# Patient Record
Sex: FEMALE | Race: WHITE | NOT HISPANIC OR LATINO | Employment: FULL TIME | URBAN - METROPOLITAN AREA
[De-identification: names, ages, dates, MRNs, and addresses within clinical notes are randomized per-mention and may not be internally consistent; named-entity substitution may affect disease eponyms.]

---

## 2021-02-10 ENCOUNTER — TELEPHONE (OUTPATIENT)
Dept: SURGERY | Facility: CLINIC | Age: 56
End: 2021-02-10

## 2021-02-10 DIAGNOSIS — K31.5 DUODENAL OBSTRUCTION: Primary | ICD-10-CM

## 2021-02-11 ENCOUNTER — OFFICE VISIT (OUTPATIENT)
Dept: SURGERY | Facility: CLINIC | Age: 56
DRG: 326 | End: 2021-02-11
Payer: COMMERCIAL

## 2021-02-11 ENCOUNTER — HOSPITAL ENCOUNTER (OUTPATIENT)
Dept: RADIOLOGY | Facility: HOSPITAL | Age: 56
Discharge: HOME | DRG: 326 | End: 2021-02-11
Attending: SURGERY
Payer: COMMERCIAL

## 2021-02-11 ENCOUNTER — HOSPITAL ENCOUNTER (INPATIENT)
Facility: HOSPITAL | Age: 56
LOS: 21 days | Discharge: HOME | DRG: 326 | End: 2021-03-04
Attending: EMERGENCY MEDICINE | Admitting: SURGERY
Payer: COMMERCIAL

## 2021-02-11 VITALS
SYSTOLIC BLOOD PRESSURE: 118 MMHG | HEART RATE: 92 BPM | BODY MASS INDEX: 24.29 KG/M2 | WEIGHT: 179.31 LBS | DIASTOLIC BLOOD PRESSURE: 73 MMHG | HEIGHT: 72 IN

## 2021-02-11 DIAGNOSIS — K31.5 DUODENAL OBSTRUCTION: ICD-10-CM

## 2021-02-11 DIAGNOSIS — Z90.411 HISTORY OF PARTIAL PANCREATECTOMY: Primary | ICD-10-CM

## 2021-02-11 DIAGNOSIS — C17.0 DUODENAL CANCER: ICD-10-CM

## 2021-02-11 DIAGNOSIS — K31.5 DUODENAL OBSTRUCTION: Primary | ICD-10-CM

## 2021-02-11 DIAGNOSIS — C25.9 PANCREATIC ADENOCARCINOMA: Primary | ICD-10-CM

## 2021-02-11 PROBLEM — N17.9 AKI (ACUTE KIDNEY INJURY): Status: ACTIVE | Noted: 2021-01-06

## 2021-02-11 PROBLEM — N20.0 KIDNEY STONE: Status: ACTIVE | Noted: 2019-10-14

## 2021-02-11 PROBLEM — I10 BENIGN HYPERTENSION: Status: ACTIVE | Noted: 2019-07-24

## 2021-02-11 PROBLEM — Z86.018 HISTORY OF DYSPLASTIC NEVUS: Status: ACTIVE | Noted: 2021-02-11

## 2021-02-11 PROBLEM — I34.1 MITRAL VALVE PROLAPSE: Status: ACTIVE | Noted: 2019-07-24

## 2021-02-11 PROBLEM — E78.00 PURE HYPERCHOLESTEROLEMIA: Status: ACTIVE | Noted: 2019-07-24

## 2021-02-11 LAB
ANION GAP SERPL CALC-SCNC: 10 MMOL/L (ref 8–16)
BASOPHILS # BLD AUTO: 0.02 K/UL (ref 0–0.2)
BASOPHILS NFR BLD: 0.4 % (ref 0–1.9)
BUN SERPL-MCNC: 26 MG/DL (ref 6–20)
CALCIUM SERPL-MCNC: 8.6 MG/DL (ref 8.7–10.5)
CHLORIDE SERPL-SCNC: 102 MMOL/L (ref 95–110)
CO2 SERPL-SCNC: 18 MMOL/L (ref 23–29)
CREAT SERPL-MCNC: 1.2 MG/DL (ref 0.5–1.4)
CTP QC/QA: YES
DIFFERENTIAL METHOD: ABNORMAL
EOSINOPHIL # BLD AUTO: 0.3 K/UL (ref 0–0.5)
EOSINOPHIL NFR BLD: 5.3 % (ref 0–8)
ERYTHROCYTE [DISTWIDTH] IN BLOOD BY AUTOMATED COUNT: 15.5 % (ref 11.5–14.5)
EST. GFR  (AFRICAN AMERICAN): 58.4 ML/MIN/1.73 M^2
EST. GFR  (NON AFRICAN AMERICAN): 50.6 ML/MIN/1.73 M^2
GLUCOSE SERPL-MCNC: 103 MG/DL (ref 70–110)
HCT VFR BLD AUTO: 31.9 % (ref 37–48.5)
HGB BLD-MCNC: 10.8 G/DL (ref 12–16)
IMM GRANULOCYTES # BLD AUTO: 0.01 K/UL (ref 0–0.04)
IMM GRANULOCYTES NFR BLD AUTO: 0.2 % (ref 0–0.5)
LYMPHOCYTES # BLD AUTO: 1.5 K/UL (ref 1–4.8)
LYMPHOCYTES NFR BLD: 26.2 % (ref 18–48)
MAGNESIUM SERPL-MCNC: 2.1 MG/DL (ref 1.6–2.6)
MCH RBC QN AUTO: 27.6 PG (ref 27–31)
MCHC RBC AUTO-ENTMCNC: 33.9 G/DL (ref 32–36)
MCV RBC AUTO: 82 FL (ref 82–98)
MONOCYTES # BLD AUTO: 0.4 K/UL (ref 0.3–1)
MONOCYTES NFR BLD: 7 % (ref 4–15)
NEUTROPHILS # BLD AUTO: 3.5 K/UL (ref 1.8–7.7)
NEUTROPHILS NFR BLD: 60.9 % (ref 38–73)
NRBC BLD-RTO: 0 /100 WBC
PHOSPHATE SERPL-MCNC: 3.7 MG/DL (ref 2.7–4.5)
PLATELET # BLD AUTO: 273 K/UL (ref 150–350)
PMV BLD AUTO: 10.8 FL (ref 9.2–12.9)
POTASSIUM SERPL-SCNC: 4 MMOL/L (ref 3.5–5.1)
RBC # BLD AUTO: 3.91 M/UL (ref 4–5.4)
SARS-COV-2 RDRP RESP QL NAA+PROBE: NEGATIVE
SODIUM SERPL-SCNC: 130 MMOL/L (ref 136–145)
WBC # BLD AUTO: 5.68 K/UL (ref 3.9–12.7)

## 2021-02-11 PROCEDURE — U0002 COVID-19 LAB TEST NON-CDC: HCPCS | Performed by: PHYSICIAN ASSISTANT

## 2021-02-11 PROCEDURE — 71250 CT CHEST ABDOMEN PELVIS WITHOUT CONTRAST(XPD): ICD-10-PCS | Mod: 26,,, | Performed by: RADIOLOGY

## 2021-02-11 PROCEDURE — 1126F AMNT PAIN NOTED NONE PRSNT: CPT | Mod: S$GLB,,, | Performed by: SURGERY

## 2021-02-11 PROCEDURE — 74176 CT ABD & PELVIS W/O CONTRAST: CPT | Mod: TC

## 2021-02-11 PROCEDURE — 99205 OFFICE O/P NEW HI 60 MIN: CPT | Mod: S$GLB,,, | Performed by: SURGERY

## 2021-02-11 PROCEDURE — 74176 CT ABD & PELVIS W/O CONTRAST: CPT | Mod: 26,,, | Performed by: RADIOLOGY

## 2021-02-11 PROCEDURE — 3008F BODY MASS INDEX DOCD: CPT | Mod: CPTII,S$GLB,, | Performed by: SURGERY

## 2021-02-11 PROCEDURE — 84100 ASSAY OF PHOSPHORUS: CPT

## 2021-02-11 PROCEDURE — 3008F PR BODY MASS INDEX (BMI) DOCUMENTED: ICD-10-PCS | Mod: CPTII,S$GLB,, | Performed by: SURGERY

## 2021-02-11 PROCEDURE — 99999 PR PBB SHADOW E&M-EST. PATIENT-LVL II: CPT | Mod: PBBFAC,,, | Performed by: SURGERY

## 2021-02-11 PROCEDURE — 94761 N-INVAS EAR/PLS OXIMETRY MLT: CPT

## 2021-02-11 PROCEDURE — 99285 PR EMERGENCY DEPT VISIT,LEVEL V: ICD-10-PCS | Mod: CS,,, | Performed by: PHYSICIAN ASSISTANT

## 2021-02-11 PROCEDURE — 63600175 PHARM REV CODE 636 W HCPCS: Performed by: PHYSICIAN ASSISTANT

## 2021-02-11 PROCEDURE — C9113 INJ PANTOPRAZOLE SODIUM, VIA: HCPCS | Performed by: STUDENT IN AN ORGANIZED HEALTH CARE EDUCATION/TRAINING PROGRAM

## 2021-02-11 PROCEDURE — 1126F PR PAIN SEVERITY QUANTIFIED, NO PAIN PRESENT: ICD-10-PCS | Mod: S$GLB,,, | Performed by: SURGERY

## 2021-02-11 PROCEDURE — 99285 EMERGENCY DEPT VISIT HI MDM: CPT | Mod: 25

## 2021-02-11 PROCEDURE — 71250 CT THORAX DX C-: CPT | Mod: 26,,, | Performed by: RADIOLOGY

## 2021-02-11 PROCEDURE — 83735 ASSAY OF MAGNESIUM: CPT

## 2021-02-11 PROCEDURE — 99285 EMERGENCY DEPT VISIT HI MDM: CPT | Mod: CS,,, | Performed by: PHYSICIAN ASSISTANT

## 2021-02-11 PROCEDURE — 63600175 PHARM REV CODE 636 W HCPCS: Performed by: STUDENT IN AN ORGANIZED HEALTH CARE EDUCATION/TRAINING PROGRAM

## 2021-02-11 PROCEDURE — 99999 PR PBB SHADOW E&M-EST. PATIENT-LVL II: ICD-10-PCS | Mod: PBBFAC,,, | Performed by: SURGERY

## 2021-02-11 PROCEDURE — 74176 CT CHEST ABDOMEN PELVIS WITHOUT CONTRAST(XPD): ICD-10-PCS | Mod: 26,,, | Performed by: RADIOLOGY

## 2021-02-11 PROCEDURE — 20600001 HC STEP DOWN PRIVATE ROOM

## 2021-02-11 PROCEDURE — 71250 CT THORAX DX C-: CPT | Mod: TC

## 2021-02-11 PROCEDURE — 25000003 PHARM REV CODE 250: Performed by: STUDENT IN AN ORGANIZED HEALTH CARE EDUCATION/TRAINING PROGRAM

## 2021-02-11 PROCEDURE — 85025 COMPLETE CBC W/AUTO DIFF WBC: CPT

## 2021-02-11 PROCEDURE — 99205 PR OFFICE/OUTPT VISIT, NEW, LEVL V, 60-74 MIN: ICD-10-PCS | Mod: S$GLB,,, | Performed by: SURGERY

## 2021-02-11 PROCEDURE — 80048 BASIC METABOLIC PNL TOTAL CA: CPT

## 2021-02-11 RX ORDER — PANTOPRAZOLE SODIUM 40 MG/1
TABLET, DELAYED RELEASE ORAL
COMMUNITY
Start: 2021-02-10 | End: 2022-03-25

## 2021-02-11 RX ORDER — ENOXAPARIN SODIUM 100 MG/ML
40 INJECTION SUBCUTANEOUS EVERY 24 HOURS
Status: DISCONTINUED | OUTPATIENT
Start: 2021-02-11 | End: 2021-02-21

## 2021-02-11 RX ORDER — LIDOCAINE HYDROCHLORIDE 10 MG/ML
1 INJECTION, SOLUTION EPIDURAL; INFILTRATION; INTRACAUDAL; PERINEURAL ONCE
Status: DISCONTINUED | OUTPATIENT
Start: 2021-02-11 | End: 2021-03-01

## 2021-02-11 RX ORDER — IBUPROFEN 600 MG/1
600 TABLET ORAL EVERY 6 HOURS PRN
Status: DISCONTINUED | OUTPATIENT
Start: 2021-02-11 | End: 2021-02-18

## 2021-02-11 RX ORDER — ACETAMINOPHEN 325 MG/1
650 TABLET ORAL EVERY 8 HOURS PRN
Status: DISCONTINUED | OUTPATIENT
Start: 2021-02-11 | End: 2021-02-18

## 2021-02-11 RX ORDER — ONDANSETRON 2 MG/ML
8 INJECTION INTRAMUSCULAR; INTRAVENOUS EVERY 6 HOURS PRN
Status: DISCONTINUED | OUTPATIENT
Start: 2021-02-11 | End: 2021-03-04 | Stop reason: HOSPADM

## 2021-02-11 RX ORDER — TALC
6 POWDER (GRAM) TOPICAL NIGHTLY PRN
Status: DISCONTINUED | OUTPATIENT
Start: 2021-02-11 | End: 2021-02-18

## 2021-02-11 RX ORDER — SODIUM CHLORIDE 0.9 % (FLUSH) 0.9 %
10 SYRINGE (ML) INJECTION
Status: DISCONTINUED | OUTPATIENT
Start: 2021-02-11 | End: 2021-03-04 | Stop reason: HOSPADM

## 2021-02-11 RX ORDER — PROMETHAZINE HYDROCHLORIDE 25 MG/1
SUPPOSITORY RECTAL
COMMUNITY
Start: 2021-02-10 | End: 2022-03-25

## 2021-02-11 RX ORDER — PANTOPRAZOLE SODIUM 40 MG/10ML
40 INJECTION, POWDER, LYOPHILIZED, FOR SOLUTION INTRAVENOUS 2 TIMES DAILY
Status: DISCONTINUED | OUTPATIENT
Start: 2021-02-11 | End: 2021-02-22

## 2021-02-11 RX ORDER — SODIUM CHLORIDE 9 MG/ML
INJECTION, SOLUTION INTRAVENOUS CONTINUOUS
Status: DISCONTINUED | OUTPATIENT
Start: 2021-02-11 | End: 2021-02-13

## 2021-02-11 RX ORDER — ROSUVASTATIN CALCIUM 10 MG/1
TABLET, COATED ORAL
COMMUNITY
Start: 2021-02-10

## 2021-02-11 RX ORDER — HYDROMORPHONE HYDROCHLORIDE 1 MG/ML
0.5 INJECTION, SOLUTION INTRAMUSCULAR; INTRAVENOUS; SUBCUTANEOUS EVERY 4 HOURS PRN
Status: DISCONTINUED | OUTPATIENT
Start: 2021-02-11 | End: 2021-02-15

## 2021-02-11 RX ADMIN — SODIUM CHLORIDE, SODIUM LACTATE, POTASSIUM CHLORIDE, AND CALCIUM CHLORIDE 1000 ML: .6; .31; .03; .02 INJECTION, SOLUTION INTRAVENOUS at 04:02

## 2021-02-11 RX ADMIN — ENOXAPARIN SODIUM 40 MG: 40 INJECTION SUBCUTANEOUS at 07:02

## 2021-02-11 RX ADMIN — PANTOPRAZOLE SODIUM 40 MG: 40 INJECTION, POWDER, FOR SOLUTION INTRAVENOUS at 11:02

## 2021-02-11 RX ADMIN — SODIUM CHLORIDE: 0.9 INJECTION, SOLUTION INTRAVENOUS at 07:02

## 2021-02-12 PROBLEM — E44.0 MALNUTRITION OF MODERATE DEGREE: Status: ACTIVE | Noted: 2021-02-12

## 2021-02-12 LAB
ALBUMIN SERPL BCP-MCNC: 3.4 G/DL (ref 3.5–5.2)
ALP SERPL-CCNC: 71 U/L (ref 55–135)
ALT SERPL W/O P-5'-P-CCNC: 22 U/L (ref 10–44)
ANION GAP SERPL CALC-SCNC: 8 MMOL/L (ref 8–16)
AST SERPL-CCNC: 17 U/L (ref 10–40)
BASOPHILS # BLD AUTO: 0.02 K/UL (ref 0–0.2)
BASOPHILS NFR BLD: 0.4 % (ref 0–1.9)
BILIRUB SERPL-MCNC: 0.5 MG/DL (ref 0.1–1)
BUN SERPL-MCNC: 23 MG/DL (ref 6–20)
CALCIUM SERPL-MCNC: 8.6 MG/DL (ref 8.7–10.5)
CHLORIDE SERPL-SCNC: 105 MMOL/L (ref 95–110)
CO2 SERPL-SCNC: 18 MMOL/L (ref 23–29)
CREAT SERPL-MCNC: 1.2 MG/DL (ref 0.5–1.4)
DIFFERENTIAL METHOD: ABNORMAL
EOSINOPHIL # BLD AUTO: 0.3 K/UL (ref 0–0.5)
EOSINOPHIL NFR BLD: 6.4 % (ref 0–8)
ERYTHROCYTE [DISTWIDTH] IN BLOOD BY AUTOMATED COUNT: 15.5 % (ref 11.5–14.5)
EST. GFR  (AFRICAN AMERICAN): 58.4 ML/MIN/1.73 M^2
EST. GFR  (NON AFRICAN AMERICAN): 50.6 ML/MIN/1.73 M^2
GLUCOSE SERPL-MCNC: 98 MG/DL (ref 70–110)
HCT VFR BLD AUTO: 29.8 % (ref 37–48.5)
HGB BLD-MCNC: 9.9 G/DL (ref 12–16)
IMM GRANULOCYTES # BLD AUTO: 0.01 K/UL (ref 0–0.04)
IMM GRANULOCYTES NFR BLD AUTO: 0.2 % (ref 0–0.5)
LYMPHOCYTES # BLD AUTO: 1.5 K/UL (ref 1–4.8)
LYMPHOCYTES NFR BLD: 29.7 % (ref 18–48)
MAGNESIUM SERPL-MCNC: 2 MG/DL (ref 1.6–2.6)
MCH RBC QN AUTO: 27 PG (ref 27–31)
MCHC RBC AUTO-ENTMCNC: 33.2 G/DL (ref 32–36)
MCV RBC AUTO: 81 FL (ref 82–98)
MONOCYTES # BLD AUTO: 0.4 K/UL (ref 0.3–1)
MONOCYTES NFR BLD: 8 % (ref 4–15)
NEUTROPHILS # BLD AUTO: 2.8 K/UL (ref 1.8–7.7)
NEUTROPHILS NFR BLD: 55.3 % (ref 38–73)
NRBC BLD-RTO: 0 /100 WBC
PHOSPHATE SERPL-MCNC: 4.4 MG/DL (ref 2.7–4.5)
PLATELET # BLD AUTO: 223 K/UL (ref 150–350)
PMV BLD AUTO: 11 FL (ref 9.2–12.9)
POTASSIUM SERPL-SCNC: 4.2 MMOL/L (ref 3.5–5.1)
PROT SERPL-MCNC: 6.4 G/DL (ref 6–8.4)
RBC # BLD AUTO: 3.66 M/UL (ref 4–5.4)
SODIUM SERPL-SCNC: 131 MMOL/L (ref 136–145)
WBC # BLD AUTO: 4.98 K/UL (ref 3.9–12.7)

## 2021-02-12 PROCEDURE — C9113 INJ PANTOPRAZOLE SODIUM, VIA: HCPCS | Performed by: STUDENT IN AN ORGANIZED HEALTH CARE EDUCATION/TRAINING PROGRAM

## 2021-02-12 PROCEDURE — 25000003 PHARM REV CODE 250: Performed by: STUDENT IN AN ORGANIZED HEALTH CARE EDUCATION/TRAINING PROGRAM

## 2021-02-12 PROCEDURE — 80053 COMPREHEN METABOLIC PANEL: CPT

## 2021-02-12 PROCEDURE — 20600001 HC STEP DOWN PRIVATE ROOM

## 2021-02-12 PROCEDURE — 84100 ASSAY OF PHOSPHORUS: CPT

## 2021-02-12 PROCEDURE — 63600175 PHARM REV CODE 636 W HCPCS: Performed by: STUDENT IN AN ORGANIZED HEALTH CARE EDUCATION/TRAINING PROGRAM

## 2021-02-12 PROCEDURE — 25000003 PHARM REV CODE 250: Performed by: NURSE PRACTITIONER

## 2021-02-12 PROCEDURE — 83735 ASSAY OF MAGNESIUM: CPT

## 2021-02-12 PROCEDURE — 36415 COLL VENOUS BLD VENIPUNCTURE: CPT

## 2021-02-12 PROCEDURE — 25500020 PHARM REV CODE 255: Performed by: SURGERY

## 2021-02-12 PROCEDURE — 85025 COMPLETE CBC W/AUTO DIFF WBC: CPT

## 2021-02-12 PROCEDURE — 97802 MEDICAL NUTRITION INDIV IN: CPT

## 2021-02-12 RX ORDER — MUPIROCIN 20 MG/G
OINTMENT TOPICAL DAILY PRN
Status: DISCONTINUED | OUTPATIENT
Start: 2021-02-12 | End: 2021-03-04 | Stop reason: HOSPADM

## 2021-02-12 RX ORDER — DOCUSATE SODIUM 100 MG/1
100 CAPSULE, LIQUID FILLED ORAL 2 TIMES DAILY PRN
Status: DISCONTINUED | OUTPATIENT
Start: 2021-02-12 | End: 2021-02-12

## 2021-02-12 RX ORDER — ROSUVASTATIN CALCIUM 10 MG/1
10 TABLET, COATED ORAL DAILY
Status: DISCONTINUED | OUTPATIENT
Start: 2021-02-12 | End: 2021-02-18

## 2021-02-12 RX ADMIN — HYDROMORPHONE HYDROCHLORIDE 0.5 MG: 1 INJECTION, SOLUTION INTRAMUSCULAR; INTRAVENOUS; SUBCUTANEOUS at 02:02

## 2021-02-12 RX ADMIN — HYDROMORPHONE HYDROCHLORIDE 0.5 MG: 1 INJECTION, SOLUTION INTRAMUSCULAR; INTRAVENOUS; SUBCUTANEOUS at 07:02

## 2021-02-12 RX ADMIN — ROSUVASTATIN CALCIUM 10 MG: 10 TABLET, FILM COATED ORAL at 02:02

## 2021-02-12 RX ADMIN — SODIUM CHLORIDE: 0.9 INJECTION, SOLUTION INTRAVENOUS at 08:02

## 2021-02-12 RX ADMIN — SODIUM CHLORIDE: 0.9 INJECTION, SOLUTION INTRAVENOUS at 03:02

## 2021-02-12 RX ADMIN — HYDROMORPHONE HYDROCHLORIDE 0.5 MG: 1 INJECTION, SOLUTION INTRAMUSCULAR; INTRAVENOUS; SUBCUTANEOUS at 10:02

## 2021-02-12 RX ADMIN — ENOXAPARIN SODIUM 40 MG: 40 INJECTION SUBCUTANEOUS at 06:02

## 2021-02-12 RX ADMIN — SODIUM CHLORIDE: 0.9 INJECTION, SOLUTION INTRAVENOUS at 12:02

## 2021-02-12 RX ADMIN — PANTOPRAZOLE SODIUM 40 MG: 40 INJECTION, POWDER, FOR SOLUTION INTRAVENOUS at 08:02

## 2021-02-12 RX ADMIN — IOHEXOL 75 ML: 350 INJECTION, SOLUTION INTRAVENOUS at 11:02

## 2021-02-13 LAB
ALBUMIN SERPL BCP-MCNC: 2.9 G/DL (ref 3.5–5.2)
ALP SERPL-CCNC: 60 U/L (ref 55–135)
ALT SERPL W/O P-5'-P-CCNC: 18 U/L (ref 10–44)
ANION GAP SERPL CALC-SCNC: 7 MMOL/L (ref 8–16)
AST SERPL-CCNC: 12 U/L (ref 10–40)
BASOPHILS # BLD AUTO: 0.02 K/UL (ref 0–0.2)
BASOPHILS NFR BLD: 0.6 % (ref 0–1.9)
BILIRUB SERPL-MCNC: 0.5 MG/DL (ref 0.1–1)
BUN SERPL-MCNC: 21 MG/DL (ref 6–20)
CALCIUM SERPL-MCNC: 8.1 MG/DL (ref 8.7–10.5)
CHLORIDE SERPL-SCNC: 113 MMOL/L (ref 95–110)
CO2 SERPL-SCNC: 16 MMOL/L (ref 23–29)
CREAT SERPL-MCNC: 1 MG/DL (ref 0.5–1.4)
DIFFERENTIAL METHOD: ABNORMAL
EOSINOPHIL # BLD AUTO: 0.2 K/UL (ref 0–0.5)
EOSINOPHIL NFR BLD: 6 % (ref 0–8)
ERYTHROCYTE [DISTWIDTH] IN BLOOD BY AUTOMATED COUNT: 15.8 % (ref 11.5–14.5)
EST. GFR  (AFRICAN AMERICAN): >60 ML/MIN/1.73 M^2
EST. GFR  (NON AFRICAN AMERICAN): >60 ML/MIN/1.73 M^2
GLUCOSE SERPL-MCNC: 112 MG/DL (ref 70–110)
HCT VFR BLD AUTO: 26.6 % (ref 37–48.5)
HGB BLD-MCNC: 8.8 G/DL (ref 12–16)
IMM GRANULOCYTES # BLD AUTO: 0.01 K/UL (ref 0–0.04)
IMM GRANULOCYTES NFR BLD AUTO: 0.3 % (ref 0–0.5)
LYMPHOCYTES # BLD AUTO: 1.2 K/UL (ref 1–4.8)
LYMPHOCYTES NFR BLD: 34.5 % (ref 18–48)
MAGNESIUM SERPL-MCNC: 2 MG/DL (ref 1.6–2.6)
MCH RBC QN AUTO: 27.2 PG (ref 27–31)
MCHC RBC AUTO-ENTMCNC: 33.1 G/DL (ref 32–36)
MCV RBC AUTO: 82 FL (ref 82–98)
MONOCYTES # BLD AUTO: 0.3 K/UL (ref 0.3–1)
MONOCYTES NFR BLD: 9.4 % (ref 4–15)
NEUTROPHILS # BLD AUTO: 1.7 K/UL (ref 1.8–7.7)
NEUTROPHILS NFR BLD: 49.2 % (ref 38–73)
NRBC BLD-RTO: 0 /100 WBC
PHOSPHATE SERPL-MCNC: 3.7 MG/DL (ref 2.7–4.5)
PLATELET # BLD AUTO: 201 K/UL (ref 150–350)
PMV BLD AUTO: 11.3 FL (ref 9.2–12.9)
POTASSIUM SERPL-SCNC: 3.9 MMOL/L (ref 3.5–5.1)
PROT SERPL-MCNC: 5.5 G/DL (ref 6–8.4)
RBC # BLD AUTO: 3.23 M/UL (ref 4–5.4)
SODIUM SERPL-SCNC: 136 MMOL/L (ref 136–145)
WBC # BLD AUTO: 3.51 K/UL (ref 3.9–12.7)

## 2021-02-13 PROCEDURE — 25000003 PHARM REV CODE 250: Performed by: NURSE PRACTITIONER

## 2021-02-13 PROCEDURE — 97535 SELF CARE MNGMENT TRAINING: CPT

## 2021-02-13 PROCEDURE — 97165 OT EVAL LOW COMPLEX 30 MIN: CPT

## 2021-02-13 PROCEDURE — 25000003 PHARM REV CODE 250: Performed by: STUDENT IN AN ORGANIZED HEALTH CARE EDUCATION/TRAINING PROGRAM

## 2021-02-13 PROCEDURE — 80053 COMPREHEN METABOLIC PANEL: CPT

## 2021-02-13 PROCEDURE — 97161 PT EVAL LOW COMPLEX 20 MIN: CPT

## 2021-02-13 PROCEDURE — 63600175 PHARM REV CODE 636 W HCPCS: Performed by: STUDENT IN AN ORGANIZED HEALTH CARE EDUCATION/TRAINING PROGRAM

## 2021-02-13 PROCEDURE — 84100 ASSAY OF PHOSPHORUS: CPT

## 2021-02-13 PROCEDURE — 85025 COMPLETE CBC W/AUTO DIFF WBC: CPT

## 2021-02-13 PROCEDURE — 83735 ASSAY OF MAGNESIUM: CPT

## 2021-02-13 PROCEDURE — C9113 INJ PANTOPRAZOLE SODIUM, VIA: HCPCS | Performed by: STUDENT IN AN ORGANIZED HEALTH CARE EDUCATION/TRAINING PROGRAM

## 2021-02-13 PROCEDURE — 20600001 HC STEP DOWN PRIVATE ROOM

## 2021-02-13 PROCEDURE — 36415 COLL VENOUS BLD VENIPUNCTURE: CPT

## 2021-02-13 RX ORDER — SODIUM CHLORIDE 450 MG/100ML
INJECTION, SOLUTION INTRAVENOUS CONTINUOUS
Status: DISCONTINUED | OUTPATIENT
Start: 2021-02-13 | End: 2021-02-14

## 2021-02-13 RX ORDER — POLYETHYLENE GLYCOL 3350 17 G/17G
17 POWDER, FOR SOLUTION ORAL DAILY
Status: DISCONTINUED | OUTPATIENT
Start: 2021-02-13 | End: 2021-02-14

## 2021-02-13 RX ADMIN — PANTOPRAZOLE SODIUM 40 MG: 40 INJECTION, POWDER, FOR SOLUTION INTRAVENOUS at 09:02

## 2021-02-13 RX ADMIN — SODIUM CHLORIDE: 0.45 INJECTION, SOLUTION INTRAVENOUS at 09:02

## 2021-02-13 RX ADMIN — SODIUM CHLORIDE: 0.9 INJECTION, SOLUTION INTRAVENOUS at 04:02

## 2021-02-13 RX ADMIN — ROSUVASTATIN CALCIUM 10 MG: 10 TABLET, FILM COATED ORAL at 09:02

## 2021-02-13 RX ADMIN — POLYETHYLENE GLYCOL 3350 17 G: 17 POWDER, FOR SOLUTION ORAL at 09:02

## 2021-02-13 RX ADMIN — ENOXAPARIN SODIUM 40 MG: 40 INJECTION SUBCUTANEOUS at 05:02

## 2021-02-13 RX ADMIN — PANTOPRAZOLE SODIUM 40 MG: 40 INJECTION, POWDER, FOR SOLUTION INTRAVENOUS at 08:02

## 2021-02-14 LAB
ALBUMIN SERPL BCP-MCNC: 3.4 G/DL (ref 3.5–5.2)
ALP SERPL-CCNC: 65 U/L (ref 55–135)
ALT SERPL W/O P-5'-P-CCNC: 17 U/L (ref 10–44)
ANION GAP SERPL CALC-SCNC: 9 MMOL/L (ref 8–16)
AST SERPL-CCNC: 14 U/L (ref 10–40)
BASOPHILS # BLD AUTO: 0.02 K/UL (ref 0–0.2)
BASOPHILS NFR BLD: 0.5 % (ref 0–1.9)
BILIRUB SERPL-MCNC: 0.5 MG/DL (ref 0.1–1)
BUN SERPL-MCNC: 13 MG/DL (ref 6–20)
CALCIUM SERPL-MCNC: 8.6 MG/DL (ref 8.7–10.5)
CHLORIDE SERPL-SCNC: 110 MMOL/L (ref 95–110)
CO2 SERPL-SCNC: 16 MMOL/L (ref 23–29)
CREAT SERPL-MCNC: 1 MG/DL (ref 0.5–1.4)
DIFFERENTIAL METHOD: ABNORMAL
EOSINOPHIL # BLD AUTO: 0.2 K/UL (ref 0–0.5)
EOSINOPHIL NFR BLD: 5.9 % (ref 0–8)
ERYTHROCYTE [DISTWIDTH] IN BLOOD BY AUTOMATED COUNT: 15.8 % (ref 11.5–14.5)
EST. GFR  (AFRICAN AMERICAN): >60 ML/MIN/1.73 M^2
EST. GFR  (NON AFRICAN AMERICAN): >60 ML/MIN/1.73 M^2
GLUCOSE SERPL-MCNC: 98 MG/DL (ref 70–110)
HCT VFR BLD AUTO: 28.8 % (ref 37–48.5)
HGB BLD-MCNC: 9.5 G/DL (ref 12–16)
IMM GRANULOCYTES # BLD AUTO: 0 K/UL (ref 0–0.04)
IMM GRANULOCYTES NFR BLD AUTO: 0 % (ref 0–0.5)
LYMPHOCYTES # BLD AUTO: 1.2 K/UL (ref 1–4.8)
LYMPHOCYTES NFR BLD: 31.7 % (ref 18–48)
MAGNESIUM SERPL-MCNC: 1.9 MG/DL (ref 1.6–2.6)
MCH RBC QN AUTO: 26.8 PG (ref 27–31)
MCHC RBC AUTO-ENTMCNC: 33 G/DL (ref 32–36)
MCV RBC AUTO: 81 FL (ref 82–98)
MONOCYTES # BLD AUTO: 0.3 K/UL (ref 0.3–1)
MONOCYTES NFR BLD: 8.3 % (ref 4–15)
NEUTROPHILS # BLD AUTO: 2 K/UL (ref 1.8–7.7)
NEUTROPHILS NFR BLD: 53.6 % (ref 38–73)
NRBC BLD-RTO: 0 /100 WBC
PHOSPHATE SERPL-MCNC: 4.3 MG/DL (ref 2.7–4.5)
PLATELET # BLD AUTO: 215 K/UL (ref 150–350)
PMV BLD AUTO: 11.2 FL (ref 9.2–12.9)
POTASSIUM SERPL-SCNC: 3.8 MMOL/L (ref 3.5–5.1)
PROT SERPL-MCNC: 6.3 G/DL (ref 6–8.4)
RBC # BLD AUTO: 3.55 M/UL (ref 4–5.4)
SODIUM SERPL-SCNC: 135 MMOL/L (ref 136–145)
WBC # BLD AUTO: 3.75 K/UL (ref 3.9–12.7)

## 2021-02-14 PROCEDURE — 83735 ASSAY OF MAGNESIUM: CPT

## 2021-02-14 PROCEDURE — 63600175 PHARM REV CODE 636 W HCPCS: Performed by: STUDENT IN AN ORGANIZED HEALTH CARE EDUCATION/TRAINING PROGRAM

## 2021-02-14 PROCEDURE — 85025 COMPLETE CBC W/AUTO DIFF WBC: CPT

## 2021-02-14 PROCEDURE — 84100 ASSAY OF PHOSPHORUS: CPT

## 2021-02-14 PROCEDURE — 25000003 PHARM REV CODE 250: Performed by: STUDENT IN AN ORGANIZED HEALTH CARE EDUCATION/TRAINING PROGRAM

## 2021-02-14 PROCEDURE — 25000003 PHARM REV CODE 250: Performed by: NURSE PRACTITIONER

## 2021-02-14 PROCEDURE — 36415 COLL VENOUS BLD VENIPUNCTURE: CPT

## 2021-02-14 PROCEDURE — 20600001 HC STEP DOWN PRIVATE ROOM

## 2021-02-14 PROCEDURE — 80053 COMPREHEN METABOLIC PANEL: CPT

## 2021-02-14 PROCEDURE — C9113 INJ PANTOPRAZOLE SODIUM, VIA: HCPCS | Performed by: STUDENT IN AN ORGANIZED HEALTH CARE EDUCATION/TRAINING PROGRAM

## 2021-02-14 RX ORDER — SODIUM CHLORIDE, SODIUM LACTATE, POTASSIUM CHLORIDE, CALCIUM CHLORIDE 600; 310; 30; 20 MG/100ML; MG/100ML; MG/100ML; MG/100ML
INJECTION, SOLUTION INTRAVENOUS CONTINUOUS
Status: DISCONTINUED | OUTPATIENT
Start: 2021-02-14 | End: 2021-02-18

## 2021-02-14 RX ORDER — POLYETHYLENE GLYCOL 3350 17 G/17G
17 POWDER, FOR SOLUTION ORAL DAILY PRN
Status: DISCONTINUED | OUTPATIENT
Start: 2021-02-14 | End: 2021-02-18

## 2021-02-14 RX ADMIN — PANTOPRAZOLE SODIUM 40 MG: 40 INJECTION, POWDER, FOR SOLUTION INTRAVENOUS at 09:02

## 2021-02-14 RX ADMIN — ENOXAPARIN SODIUM 40 MG: 40 INJECTION SUBCUTANEOUS at 06:02

## 2021-02-14 RX ADMIN — SODIUM CHLORIDE: 0.45 INJECTION, SOLUTION INTRAVENOUS at 04:02

## 2021-02-14 RX ADMIN — SODIUM CHLORIDE, SODIUM LACTATE, POTASSIUM CHLORIDE, AND CALCIUM CHLORIDE: .6; .31; .03; .02 INJECTION, SOLUTION INTRAVENOUS at 10:02

## 2021-02-14 RX ADMIN — ROSUVASTATIN CALCIUM 10 MG: 10 TABLET, FILM COATED ORAL at 10:02

## 2021-02-14 RX ADMIN — PANTOPRAZOLE SODIUM 40 MG: 40 INJECTION, POWDER, FOR SOLUTION INTRAVENOUS at 10:02

## 2021-02-15 ENCOUNTER — ANESTHESIA EVENT (OUTPATIENT)
Dept: SURGERY | Facility: HOSPITAL | Age: 56
DRG: 326 | End: 2021-02-15
Payer: COMMERCIAL

## 2021-02-15 LAB
ALBUMIN SERPL BCP-MCNC: 3.4 G/DL (ref 3.5–5.2)
ALP SERPL-CCNC: 61 U/L (ref 55–135)
ALT SERPL W/O P-5'-P-CCNC: 19 U/L (ref 10–44)
ANION GAP SERPL CALC-SCNC: 7 MMOL/L (ref 8–16)
AST SERPL-CCNC: 13 U/L (ref 10–40)
BASOPHILS # BLD AUTO: 0.02 K/UL (ref 0–0.2)
BASOPHILS NFR BLD: 0.5 % (ref 0–1.9)
BILIRUB SERPL-MCNC: 0.5 MG/DL (ref 0.1–1)
BUN SERPL-MCNC: 10 MG/DL (ref 6–20)
CALCIUM SERPL-MCNC: 8.6 MG/DL (ref 8.7–10.5)
CHLORIDE SERPL-SCNC: 109 MMOL/L (ref 95–110)
CO2 SERPL-SCNC: 17 MMOL/L (ref 23–29)
CREAT SERPL-MCNC: 0.9 MG/DL (ref 0.5–1.4)
DIFFERENTIAL METHOD: ABNORMAL
EOSINOPHIL # BLD AUTO: 0.2 K/UL (ref 0–0.5)
EOSINOPHIL NFR BLD: 5.4 % (ref 0–8)
ERYTHROCYTE [DISTWIDTH] IN BLOOD BY AUTOMATED COUNT: 15.4 % (ref 11.5–14.5)
EST. GFR  (AFRICAN AMERICAN): >60 ML/MIN/1.73 M^2
EST. GFR  (NON AFRICAN AMERICAN): >60 ML/MIN/1.73 M^2
GLUCOSE SERPL-MCNC: 91 MG/DL (ref 70–110)
HCT VFR BLD AUTO: 28.1 % (ref 37–48.5)
HGB BLD-MCNC: 9.6 G/DL (ref 12–16)
IMM GRANULOCYTES # BLD AUTO: 0.01 K/UL (ref 0–0.04)
IMM GRANULOCYTES NFR BLD AUTO: 0.3 % (ref 0–0.5)
LYMPHOCYTES # BLD AUTO: 1.2 K/UL (ref 1–4.8)
LYMPHOCYTES NFR BLD: 31.3 % (ref 18–48)
MAGNESIUM SERPL-MCNC: 2 MG/DL (ref 1.6–2.6)
MCH RBC QN AUTO: 27.8 PG (ref 27–31)
MCHC RBC AUTO-ENTMCNC: 34.2 G/DL (ref 32–36)
MCV RBC AUTO: 81 FL (ref 82–98)
MONOCYTES # BLD AUTO: 0.3 K/UL (ref 0.3–1)
MONOCYTES NFR BLD: 7.9 % (ref 4–15)
NEUTROPHILS # BLD AUTO: 2.1 K/UL (ref 1.8–7.7)
NEUTROPHILS NFR BLD: 54.6 % (ref 38–73)
NRBC BLD-RTO: 0 /100 WBC
PHOSPHATE SERPL-MCNC: 4.6 MG/DL (ref 2.7–4.5)
PLATELET # BLD AUTO: 200 K/UL (ref 150–350)
PMV BLD AUTO: 10.8 FL (ref 9.2–12.9)
POTASSIUM SERPL-SCNC: 3.8 MMOL/L (ref 3.5–5.1)
PREALB SERPL-MCNC: 32 MG/DL (ref 20–43)
PROT SERPL-MCNC: 6.1 G/DL (ref 6–8.4)
RBC # BLD AUTO: 3.45 M/UL (ref 4–5.4)
SODIUM SERPL-SCNC: 133 MMOL/L (ref 136–145)
WBC # BLD AUTO: 3.9 K/UL (ref 3.9–12.7)

## 2021-02-15 PROCEDURE — 63600175 PHARM REV CODE 636 W HCPCS: Performed by: STUDENT IN AN ORGANIZED HEALTH CARE EDUCATION/TRAINING PROGRAM

## 2021-02-15 PROCEDURE — 36415 COLL VENOUS BLD VENIPUNCTURE: CPT

## 2021-02-15 PROCEDURE — 84100 ASSAY OF PHOSPHORUS: CPT

## 2021-02-15 PROCEDURE — 25000003 PHARM REV CODE 250: Performed by: NURSE PRACTITIONER

## 2021-02-15 PROCEDURE — 83735 ASSAY OF MAGNESIUM: CPT

## 2021-02-15 PROCEDURE — 84134 ASSAY OF PREALBUMIN: CPT

## 2021-02-15 PROCEDURE — 85025 COMPLETE CBC W/AUTO DIFF WBC: CPT

## 2021-02-15 PROCEDURE — 80053 COMPREHEN METABOLIC PANEL: CPT

## 2021-02-15 PROCEDURE — C9113 INJ PANTOPRAZOLE SODIUM, VIA: HCPCS | Performed by: STUDENT IN AN ORGANIZED HEALTH CARE EDUCATION/TRAINING PROGRAM

## 2021-02-15 PROCEDURE — 20600001 HC STEP DOWN PRIVATE ROOM

## 2021-02-15 RX ADMIN — SODIUM CHLORIDE, SODIUM LACTATE, POTASSIUM CHLORIDE, AND CALCIUM CHLORIDE: .6; .31; .03; .02 INJECTION, SOLUTION INTRAVENOUS at 05:02

## 2021-02-15 RX ADMIN — PANTOPRAZOLE SODIUM 40 MG: 40 INJECTION, POWDER, FOR SOLUTION INTRAVENOUS at 09:02

## 2021-02-15 RX ADMIN — ROSUVASTATIN CALCIUM 10 MG: 10 TABLET, FILM COATED ORAL at 09:02

## 2021-02-15 RX ADMIN — ENOXAPARIN SODIUM 40 MG: 40 INJECTION SUBCUTANEOUS at 05:02

## 2021-02-16 LAB
ALBUMIN SERPL BCP-MCNC: 3.6 G/DL (ref 3.5–5.2)
ALP SERPL-CCNC: 68 U/L (ref 55–135)
ALT SERPL W/O P-5'-P-CCNC: 21 U/L (ref 10–44)
ANION GAP SERPL CALC-SCNC: 9 MMOL/L (ref 8–16)
AST SERPL-CCNC: 16 U/L (ref 10–40)
BASOPHILS # BLD AUTO: 0.03 K/UL (ref 0–0.2)
BASOPHILS NFR BLD: 0.7 % (ref 0–1.9)
BILIRUB SERPL-MCNC: 0.4 MG/DL (ref 0.1–1)
BUN SERPL-MCNC: 11 MG/DL (ref 6–20)
CALCIUM SERPL-MCNC: 8.7 MG/DL (ref 8.7–10.5)
CHLORIDE SERPL-SCNC: 110 MMOL/L (ref 95–110)
CO2 SERPL-SCNC: 15 MMOL/L (ref 23–29)
CREAT SERPL-MCNC: 1 MG/DL (ref 0.5–1.4)
DIFFERENTIAL METHOD: ABNORMAL
EOSINOPHIL # BLD AUTO: 0.2 K/UL (ref 0–0.5)
EOSINOPHIL NFR BLD: 4.3 % (ref 0–8)
ERYTHROCYTE [DISTWIDTH] IN BLOOD BY AUTOMATED COUNT: 15.5 % (ref 11.5–14.5)
EST. GFR  (AFRICAN AMERICAN): >60 ML/MIN/1.73 M^2
EST. GFR  (NON AFRICAN AMERICAN): >60 ML/MIN/1.73 M^2
GLUCOSE SERPL-MCNC: 106 MG/DL (ref 70–110)
HCT VFR BLD AUTO: 32.2 % (ref 37–48.5)
HGB BLD-MCNC: 10.7 G/DL (ref 12–16)
IMM GRANULOCYTES # BLD AUTO: 0 K/UL (ref 0–0.04)
IMM GRANULOCYTES NFR BLD AUTO: 0 % (ref 0–0.5)
LYMPHOCYTES # BLD AUTO: 1.3 K/UL (ref 1–4.8)
LYMPHOCYTES NFR BLD: 29.8 % (ref 18–48)
MAGNESIUM SERPL-MCNC: 2.1 MG/DL (ref 1.6–2.6)
MCH RBC QN AUTO: 27.5 PG (ref 27–31)
MCHC RBC AUTO-ENTMCNC: 33.2 G/DL (ref 32–36)
MCV RBC AUTO: 83 FL (ref 82–98)
MONOCYTES # BLD AUTO: 0.4 K/UL (ref 0.3–1)
MONOCYTES NFR BLD: 8.9 % (ref 4–15)
NEUTROPHILS # BLD AUTO: 2.5 K/UL (ref 1.8–7.7)
NEUTROPHILS NFR BLD: 56.3 % (ref 38–73)
NRBC BLD-RTO: 0 /100 WBC
PHOSPHATE SERPL-MCNC: 5 MG/DL (ref 2.7–4.5)
PLATELET # BLD AUTO: 209 K/UL (ref 150–350)
PMV BLD AUTO: 10.5 FL (ref 9.2–12.9)
POTASSIUM SERPL-SCNC: 3.9 MMOL/L (ref 3.5–5.1)
PROT SERPL-MCNC: 6.8 G/DL (ref 6–8.4)
RBC # BLD AUTO: 3.89 M/UL (ref 4–5.4)
SODIUM SERPL-SCNC: 134 MMOL/L (ref 136–145)
WBC # BLD AUTO: 4.39 K/UL (ref 3.9–12.7)

## 2021-02-16 PROCEDURE — C9113 INJ PANTOPRAZOLE SODIUM, VIA: HCPCS | Performed by: STUDENT IN AN ORGANIZED HEALTH CARE EDUCATION/TRAINING PROGRAM

## 2021-02-16 PROCEDURE — 20600001 HC STEP DOWN PRIVATE ROOM

## 2021-02-16 PROCEDURE — 63600175 PHARM REV CODE 636 W HCPCS: Performed by: STUDENT IN AN ORGANIZED HEALTH CARE EDUCATION/TRAINING PROGRAM

## 2021-02-16 PROCEDURE — 25000003 PHARM REV CODE 250: Performed by: NURSE PRACTITIONER

## 2021-02-16 PROCEDURE — 80053 COMPREHEN METABOLIC PANEL: CPT

## 2021-02-16 PROCEDURE — 36415 COLL VENOUS BLD VENIPUNCTURE: CPT

## 2021-02-16 PROCEDURE — 84100 ASSAY OF PHOSPHORUS: CPT

## 2021-02-16 PROCEDURE — 85025 COMPLETE CBC W/AUTO DIFF WBC: CPT

## 2021-02-16 PROCEDURE — 83735 ASSAY OF MAGNESIUM: CPT

## 2021-02-16 RX ADMIN — ENOXAPARIN SODIUM 40 MG: 40 INJECTION SUBCUTANEOUS at 04:02

## 2021-02-16 RX ADMIN — ROSUVASTATIN CALCIUM 10 MG: 10 TABLET, FILM COATED ORAL at 10:02

## 2021-02-16 RX ADMIN — PANTOPRAZOLE SODIUM 40 MG: 40 INJECTION, POWDER, FOR SOLUTION INTRAVENOUS at 09:02

## 2021-02-16 RX ADMIN — PANTOPRAZOLE SODIUM 40 MG: 40 INJECTION, POWDER, FOR SOLUTION INTRAVENOUS at 10:02

## 2021-02-17 LAB
ABO + RH BLD: NORMAL
ALBUMIN SERPL BCP-MCNC: 3.6 G/DL (ref 3.5–5.2)
ALP SERPL-CCNC: 72 U/L (ref 55–135)
ALT SERPL W/O P-5'-P-CCNC: 24 U/L (ref 10–44)
ANION GAP SERPL CALC-SCNC: 9 MMOL/L (ref 8–16)
AST SERPL-CCNC: 17 U/L (ref 10–40)
BASOPHILS # BLD AUTO: 0.02 K/UL (ref 0–0.2)
BASOPHILS NFR BLD: 0.4 % (ref 0–1.9)
BILIRUB SERPL-MCNC: 0.5 MG/DL (ref 0.1–1)
BLD GP AB SCN CELLS X3 SERPL QL: NORMAL
BUN SERPL-MCNC: 13 MG/DL (ref 6–20)
CALCIUM SERPL-MCNC: 8.8 MG/DL (ref 8.7–10.5)
CHLORIDE SERPL-SCNC: 108 MMOL/L (ref 95–110)
CO2 SERPL-SCNC: 15 MMOL/L (ref 23–29)
CREAT SERPL-MCNC: 1.1 MG/DL (ref 0.5–1.4)
DIFFERENTIAL METHOD: ABNORMAL
EOSINOPHIL # BLD AUTO: 0.2 K/UL (ref 0–0.5)
EOSINOPHIL NFR BLD: 4.7 % (ref 0–8)
ERYTHROCYTE [DISTWIDTH] IN BLOOD BY AUTOMATED COUNT: 15.9 % (ref 11.5–14.5)
EST. GFR  (AFRICAN AMERICAN): >60 ML/MIN/1.73 M^2
EST. GFR  (NON AFRICAN AMERICAN): 56.3 ML/MIN/1.73 M^2
GLUCOSE SERPL-MCNC: 101 MG/DL (ref 70–110)
HCT VFR BLD AUTO: 33.6 % (ref 37–48.5)
HGB BLD-MCNC: 11.4 G/DL (ref 12–16)
IMM GRANULOCYTES # BLD AUTO: 0.01 K/UL (ref 0–0.04)
IMM GRANULOCYTES NFR BLD AUTO: 0.2 % (ref 0–0.5)
LYMPHOCYTES # BLD AUTO: 1.3 K/UL (ref 1–4.8)
LYMPHOCYTES NFR BLD: 28.7 % (ref 18–48)
MAGNESIUM SERPL-MCNC: 2.2 MG/DL (ref 1.6–2.6)
MCH RBC QN AUTO: 27.5 PG (ref 27–31)
MCHC RBC AUTO-ENTMCNC: 33.9 G/DL (ref 32–36)
MCV RBC AUTO: 81 FL (ref 82–98)
MONOCYTES # BLD AUTO: 0.4 K/UL (ref 0.3–1)
MONOCYTES NFR BLD: 8.8 % (ref 4–15)
NEUTROPHILS # BLD AUTO: 2.7 K/UL (ref 1.8–7.7)
NEUTROPHILS NFR BLD: 57.2 % (ref 38–73)
NRBC BLD-RTO: 0 /100 WBC
PHOSPHATE SERPL-MCNC: 4.7 MG/DL (ref 2.7–4.5)
PLATELET # BLD AUTO: 239 K/UL (ref 150–350)
PMV BLD AUTO: 10.8 FL (ref 9.2–12.9)
POTASSIUM SERPL-SCNC: 4.1 MMOL/L (ref 3.5–5.1)
PROT SERPL-MCNC: 7 G/DL (ref 6–8.4)
RBC # BLD AUTO: 4.15 M/UL (ref 4–5.4)
SARS-COV-2 RDRP RESP QL NAA+PROBE: NEGATIVE
SODIUM SERPL-SCNC: 132 MMOL/L (ref 136–145)
WBC # BLD AUTO: 4.67 K/UL (ref 3.9–12.7)

## 2021-02-17 PROCEDURE — U0002 COVID-19 LAB TEST NON-CDC: HCPCS

## 2021-02-17 PROCEDURE — 85025 COMPLETE CBC W/AUTO DIFF WBC: CPT

## 2021-02-17 PROCEDURE — 63600175 PHARM REV CODE 636 W HCPCS: Performed by: STUDENT IN AN ORGANIZED HEALTH CARE EDUCATION/TRAINING PROGRAM

## 2021-02-17 PROCEDURE — 80053 COMPREHEN METABOLIC PANEL: CPT

## 2021-02-17 PROCEDURE — 36415 COLL VENOUS BLD VENIPUNCTURE: CPT

## 2021-02-17 PROCEDURE — 86920 COMPATIBILITY TEST SPIN: CPT

## 2021-02-17 PROCEDURE — 20600001 HC STEP DOWN PRIVATE ROOM

## 2021-02-17 PROCEDURE — 86900 BLOOD TYPING SEROLOGIC ABO: CPT

## 2021-02-17 PROCEDURE — C9113 INJ PANTOPRAZOLE SODIUM, VIA: HCPCS | Performed by: STUDENT IN AN ORGANIZED HEALTH CARE EDUCATION/TRAINING PROGRAM

## 2021-02-17 PROCEDURE — 25000003 PHARM REV CODE 250: Performed by: NURSE PRACTITIONER

## 2021-02-17 PROCEDURE — 84100 ASSAY OF PHOSPHORUS: CPT

## 2021-02-17 PROCEDURE — 83735 ASSAY OF MAGNESIUM: CPT

## 2021-02-17 RX ADMIN — ENOXAPARIN SODIUM 40 MG: 40 INJECTION SUBCUTANEOUS at 04:02

## 2021-02-17 RX ADMIN — PANTOPRAZOLE SODIUM 40 MG: 40 INJECTION, POWDER, FOR SOLUTION INTRAVENOUS at 09:02

## 2021-02-17 RX ADMIN — ROSUVASTATIN CALCIUM 10 MG: 10 TABLET, FILM COATED ORAL at 09:02

## 2021-02-17 RX ADMIN — SODIUM CHLORIDE, SODIUM LACTATE, POTASSIUM CHLORIDE, AND CALCIUM CHLORIDE: .6; .31; .03; .02 INJECTION, SOLUTION INTRAVENOUS at 10:02

## 2021-02-18 ENCOUNTER — ANESTHESIA (OUTPATIENT)
Dept: SURGERY | Facility: HOSPITAL | Age: 56
DRG: 326 | End: 2021-02-18
Payer: COMMERCIAL

## 2021-02-18 PROBLEM — C17.0 DUODENAL CANCER: Status: ACTIVE | Noted: 2021-02-18

## 2021-02-18 LAB
ALBUMIN SERPL BCP-MCNC: 3.2 G/DL (ref 3.5–5.2)
ALBUMIN SERPL BCP-MCNC: 3.8 G/DL (ref 3.5–5.2)
ALP SERPL-CCNC: 53 U/L (ref 55–135)
ALP SERPL-CCNC: 77 U/L (ref 55–135)
ALT SERPL W/O P-5'-P-CCNC: 25 U/L (ref 10–44)
ALT SERPL W/O P-5'-P-CCNC: 45 U/L (ref 10–44)
ANION GAP SERPL CALC-SCNC: 6 MMOL/L (ref 8–16)
ANION GAP SERPL CALC-SCNC: 9 MMOL/L (ref 8–16)
AST SERPL-CCNC: 19 U/L (ref 10–40)
AST SERPL-CCNC: 48 U/L (ref 10–40)
BASOPHILS # BLD AUTO: 0 K/UL (ref 0–0.2)
BASOPHILS # BLD AUTO: 0.02 K/UL (ref 0–0.2)
BASOPHILS NFR BLD: 0 % (ref 0–1.9)
BASOPHILS NFR BLD: 0.4 % (ref 0–1.9)
BILIRUB SERPL-MCNC: 0.6 MG/DL (ref 0.1–1)
BILIRUB SERPL-MCNC: 1.6 MG/DL (ref 0.1–1)
BUN SERPL-MCNC: 14 MG/DL (ref 6–20)
BUN SERPL-MCNC: 15 MG/DL (ref 6–20)
CALCIUM SERPL-MCNC: 7.1 MG/DL (ref 8.7–10.5)
CALCIUM SERPL-MCNC: 9 MG/DL (ref 8.7–10.5)
CHLORIDE SERPL-SCNC: 104 MMOL/L (ref 95–110)
CHLORIDE SERPL-SCNC: 108 MMOL/L (ref 95–110)
CO2 SERPL-SCNC: 18 MMOL/L (ref 23–29)
CO2 SERPL-SCNC: 20 MMOL/L (ref 23–29)
CREAT SERPL-MCNC: 1.1 MG/DL (ref 0.5–1.4)
CREAT SERPL-MCNC: 1.2 MG/DL (ref 0.5–1.4)
DIFFERENTIAL METHOD: ABNORMAL
DIFFERENTIAL METHOD: ABNORMAL
EOSINOPHIL # BLD AUTO: 0 K/UL (ref 0–0.5)
EOSINOPHIL # BLD AUTO: 0.2 K/UL (ref 0–0.5)
EOSINOPHIL NFR BLD: 0 % (ref 0–8)
EOSINOPHIL NFR BLD: 4.3 % (ref 0–8)
ERYTHROCYTE [DISTWIDTH] IN BLOOD BY AUTOMATED COUNT: 15.9 % (ref 11.5–14.5)
ERYTHROCYTE [DISTWIDTH] IN BLOOD BY AUTOMATED COUNT: 15.9 % (ref 11.5–14.5)
EST. GFR  (AFRICAN AMERICAN): 58.4 ML/MIN/1.73 M^2
EST. GFR  (AFRICAN AMERICAN): >60 ML/MIN/1.73 M^2
EST. GFR  (NON AFRICAN AMERICAN): 50.6 ML/MIN/1.73 M^2
EST. GFR  (NON AFRICAN AMERICAN): 56.3 ML/MIN/1.73 M^2
GLUCOSE SERPL-MCNC: 160 MG/DL (ref 70–110)
GLUCOSE SERPL-MCNC: 160 MG/DL (ref 70–110)
GLUCOSE SERPL-MCNC: 170 MG/DL (ref 70–110)
GLUCOSE SERPL-MCNC: 172 MG/DL (ref 70–110)
GLUCOSE SERPL-MCNC: 173 MG/DL (ref 70–110)
GLUCOSE SERPL-MCNC: 173 MG/DL (ref 70–110)
GLUCOSE SERPL-MCNC: 93 MG/DL (ref 70–110)
GRAM STN SPEC: NORMAL
GRAM STN SPEC: NORMAL
HCO3 UR-SCNC: 17.3 MMOL/L (ref 24–28)
HCO3 UR-SCNC: 17.4 MMOL/L (ref 24–28)
HCO3 UR-SCNC: 18.7 MMOL/L (ref 24–28)
HCO3 UR-SCNC: 19.4 MMOL/L (ref 24–28)
HCO3 UR-SCNC: 19.6 MMOL/L (ref 24–28)
HCT VFR BLD AUTO: 27.3 % (ref 37–48.5)
HCT VFR BLD AUTO: 32.2 % (ref 37–48.5)
HCT VFR BLD CALC: 25 %PCV (ref 36–54)
HCT VFR BLD CALC: 26 %PCV (ref 36–54)
HCT VFR BLD CALC: 26 %PCV (ref 36–54)
HCT VFR BLD CALC: 28 %PCV (ref 36–54)
HCT VFR BLD CALC: 30 %PCV (ref 36–54)
HGB BLD-MCNC: 11 G/DL (ref 12–16)
HGB BLD-MCNC: 9.7 G/DL (ref 12–16)
IMM GRANULOCYTES # BLD AUTO: 0 K/UL (ref 0–0.04)
IMM GRANULOCYTES # BLD AUTO: 0.02 K/UL (ref 0–0.04)
IMM GRANULOCYTES NFR BLD AUTO: 0 % (ref 0–0.5)
IMM GRANULOCYTES NFR BLD AUTO: 0.3 % (ref 0–0.5)
LYMPHOCYTES # BLD AUTO: 0.4 K/UL (ref 1–4.8)
LYMPHOCYTES # BLD AUTO: 1.5 K/UL (ref 1–4.8)
LYMPHOCYTES NFR BLD: 33.1 % (ref 18–48)
LYMPHOCYTES NFR BLD: 5.2 % (ref 18–48)
MAGNESIUM SERPL-MCNC: 1.9 MG/DL (ref 1.6–2.6)
MAGNESIUM SERPL-MCNC: 2.2 MG/DL (ref 1.6–2.6)
MCH RBC QN AUTO: 27.5 PG (ref 27–31)
MCH RBC QN AUTO: 27.8 PG (ref 27–31)
MCHC RBC AUTO-ENTMCNC: 34.2 G/DL (ref 32–36)
MCHC RBC AUTO-ENTMCNC: 35.5 G/DL (ref 32–36)
MCV RBC AUTO: 78 FL (ref 82–98)
MCV RBC AUTO: 81 FL (ref 82–98)
MONOCYTES # BLD AUTO: 0.5 K/UL (ref 0.3–1)
MONOCYTES # BLD AUTO: 0.5 K/UL (ref 0.3–1)
MONOCYTES NFR BLD: 6.4 % (ref 4–15)
MONOCYTES NFR BLD: 9.9 % (ref 4–15)
NEUTROPHILS # BLD AUTO: 2.4 K/UL (ref 1.8–7.7)
NEUTROPHILS # BLD AUTO: 6.5 K/UL (ref 1.8–7.7)
NEUTROPHILS NFR BLD: 52.3 % (ref 38–73)
NEUTROPHILS NFR BLD: 88.1 % (ref 38–73)
NRBC BLD-RTO: 0 /100 WBC
NRBC BLD-RTO: 0 /100 WBC
PCO2 BLDA: 31.2 MMHG (ref 35–45)
PCO2 BLDA: 35.8 MMHG (ref 35–45)
PCO2 BLDA: 36.7 MMHG (ref 35–45)
PCO2 BLDA: 39.6 MMHG (ref 35–45)
PCO2 BLDA: 42.1 MMHG (ref 35–45)
PH SMN: 7.27 [PH] (ref 7.35–7.45)
PH SMN: 7.28 [PH] (ref 7.35–7.45)
PH SMN: 7.28 [PH] (ref 7.35–7.45)
PH SMN: 7.35 [PH] (ref 7.35–7.45)
PH SMN: 7.35 [PH] (ref 7.35–7.45)
PHOSPHATE SERPL-MCNC: 4.6 MG/DL (ref 2.7–4.5)
PHOSPHATE SERPL-MCNC: 4.6 MG/DL (ref 2.7–4.5)
PLATELET # BLD AUTO: 203 K/UL (ref 150–350)
PLATELET # BLD AUTO: 225 K/UL (ref 150–350)
PMV BLD AUTO: 10.7 FL (ref 9.2–12.9)
PMV BLD AUTO: 11.3 FL (ref 9.2–12.9)
PO2 BLDA: 121 MMHG (ref 80–100)
PO2 BLDA: 139 MMHG (ref 80–100)
PO2 BLDA: 144 MMHG (ref 80–100)
PO2 BLDA: 182 MMHG (ref 80–100)
PO2 BLDA: 339 MMHG (ref 80–100)
POC BE: -6 MMOL/L
POC BE: -8 MMOL/L
POC BE: -9 MMOL/L
POC IONIZED CALCIUM: 1.03 MMOL/L (ref 1.06–1.42)
POC IONIZED CALCIUM: 1.05 MMOL/L (ref 1.06–1.42)
POC IONIZED CALCIUM: 1.12 MMOL/L (ref 1.06–1.42)
POC IONIZED CALCIUM: 1.17 MMOL/L (ref 1.06–1.42)
POC IONIZED CALCIUM: 1.26 MMOL/L (ref 1.06–1.42)
POC SATURATED O2: 100 % (ref 95–100)
POC SATURATED O2: 99 % (ref 95–100)
POC TCO2: 18 MMOL/L (ref 23–27)
POC TCO2: 18 MMOL/L (ref 23–27)
POC TCO2: 20 MMOL/L (ref 23–27)
POC TCO2: 21 MMOL/L (ref 23–27)
POC TCO2: 21 MMOL/L (ref 23–27)
POCT GLUCOSE: 139 MG/DL (ref 70–110)
POCT GLUCOSE: 158 MG/DL (ref 70–110)
POTASSIUM BLD-SCNC: 3.5 MMOL/L (ref 3.5–5.1)
POTASSIUM BLD-SCNC: 3.5 MMOL/L (ref 3.5–5.1)
POTASSIUM BLD-SCNC: 3.7 MMOL/L (ref 3.5–5.1)
POTASSIUM BLD-SCNC: 3.7 MMOL/L (ref 3.5–5.1)
POTASSIUM BLD-SCNC: 4 MMOL/L (ref 3.5–5.1)
POTASSIUM SERPL-SCNC: 3.6 MMOL/L (ref 3.5–5.1)
POTASSIUM SERPL-SCNC: 4.1 MMOL/L (ref 3.5–5.1)
PREALB SERPL-MCNC: 33 MG/DL (ref 20–43)
PROT SERPL-MCNC: 5.3 G/DL (ref 6–8.4)
PROT SERPL-MCNC: 7 G/DL (ref 6–8.4)
RBC # BLD AUTO: 3.49 M/UL (ref 4–5.4)
RBC # BLD AUTO: 4 M/UL (ref 4–5.4)
SAMPLE: ABNORMAL
SODIUM BLD-SCNC: 132 MMOL/L (ref 136–145)
SODIUM BLD-SCNC: 135 MMOL/L (ref 136–145)
SODIUM BLD-SCNC: 135 MMOL/L (ref 136–145)
SODIUM BLD-SCNC: 137 MMOL/L (ref 136–145)
SODIUM BLD-SCNC: 137 MMOL/L (ref 136–145)
SODIUM SERPL-SCNC: 130 MMOL/L (ref 136–145)
SODIUM SERPL-SCNC: 135 MMOL/L (ref 136–145)
WBC # BLD AUTO: 4.65 K/UL (ref 3.9–12.7)
WBC # BLD AUTO: 7.37 K/UL (ref 3.9–12.7)

## 2021-02-18 PROCEDURE — 36000709 HC OR TIME LEV III EA ADD 15 MIN: Performed by: SURGERY

## 2021-02-18 PROCEDURE — 85025 COMPLETE CBC W/AUTO DIFF WBC: CPT

## 2021-02-18 PROCEDURE — 88305 TISSUE EXAM BY PATHOLOGIST: CPT | Mod: 59 | Performed by: PATHOLOGY

## 2021-02-18 PROCEDURE — 25000003 PHARM REV CODE 250: Performed by: STUDENT IN AN ORGANIZED HEALTH CARE EDUCATION/TRAINING PROGRAM

## 2021-02-18 PROCEDURE — 63600175 PHARM REV CODE 636 W HCPCS: Performed by: STUDENT IN AN ORGANIZED HEALTH CARE EDUCATION/TRAINING PROGRAM

## 2021-02-18 PROCEDURE — 87102 FUNGUS ISOLATION CULTURE: CPT

## 2021-02-18 PROCEDURE — 27201037 HC PRESSURE MONITORING SET UP

## 2021-02-18 PROCEDURE — 88309 PR  SURG PATH,LEVEL VI: ICD-10-PCS | Mod: 26,,, | Performed by: PATHOLOGY

## 2021-02-18 PROCEDURE — 71000039 HC RECOVERY, EACH ADD'L HOUR: Performed by: SURGERY

## 2021-02-18 PROCEDURE — 88341 PR IHC OR ICC EACH ADD'L SINGLE ANTIBODY  STAINPR: ICD-10-PCS | Mod: 26,,, | Performed by: PATHOLOGY

## 2021-02-18 PROCEDURE — D9220A PRA ANESTHESIA: Mod: CRNA,,, | Performed by: STUDENT IN AN ORGANIZED HEALTH CARE EDUCATION/TRAINING PROGRAM

## 2021-02-18 PROCEDURE — P9045 ALBUMIN (HUMAN), 5%, 250 ML: HCPCS | Mod: JG | Performed by: STUDENT IN AN ORGANIZED HEALTH CARE EDUCATION/TRAINING PROGRAM

## 2021-02-18 PROCEDURE — 36620 PR INSERT CATH,ART,PERCUT,SHORTTERM: ICD-10-PCS | Mod: 59,,, | Performed by: ANESTHESIOLOGY

## 2021-02-18 PROCEDURE — D9220A PRA ANESTHESIA: ICD-10-PCS | Mod: CRNA,,, | Performed by: STUDENT IN AN ORGANIZED HEALTH CARE EDUCATION/TRAINING PROGRAM

## 2021-02-18 PROCEDURE — 88342 IMHCHEM/IMCYTCHM 1ST ANTB: CPT | Mod: 26,,, | Performed by: PATHOLOGY

## 2021-02-18 PROCEDURE — 27201423 OPTIME MED/SURG SUP & DEVICES STERILE SUPPLY: Performed by: SURGERY

## 2021-02-18 PROCEDURE — 44015 INSERT NEEDLE CATH BOWEL: CPT | Mod: ,,, | Performed by: SURGERY

## 2021-02-18 PROCEDURE — 36620 INSERTION CATHETER ARTERY: CPT | Mod: 59,,, | Performed by: ANESTHESIOLOGY

## 2021-02-18 PROCEDURE — 87205 SMEAR GRAM STAIN: CPT

## 2021-02-18 PROCEDURE — 88332 PATH CONSLTJ SURG EA ADD BLK: CPT | Performed by: PATHOLOGY

## 2021-02-18 PROCEDURE — D9220A PRA ANESTHESIA: ICD-10-PCS | Mod: ANES,,, | Performed by: ANESTHESIOLOGY

## 2021-02-18 PROCEDURE — 44120 REMOVAL OF SMALL INTESTINE: CPT | Mod: 59,,, | Performed by: SURGERY

## 2021-02-18 PROCEDURE — 87206 SMEAR FLUORESCENT/ACID STAI: CPT

## 2021-02-18 PROCEDURE — 48153 PANCREATECTOMY: CPT | Mod: ,,, | Performed by: SURGERY

## 2021-02-18 PROCEDURE — 36415 COLL VENOUS BLD VENIPUNCTURE: CPT

## 2021-02-18 PROCEDURE — 84100 ASSAY OF PHOSPHORUS: CPT

## 2021-02-18 PROCEDURE — 37000009 HC ANESTHESIA EA ADD 15 MINS: Performed by: SURGERY

## 2021-02-18 PROCEDURE — 88304 PR  SURG PATH,LEVEL III: ICD-10-PCS | Mod: 26,,, | Performed by: PATHOLOGY

## 2021-02-18 PROCEDURE — 88331 PATH CONSLTJ SURG 1 BLK 1SPC: CPT | Performed by: PATHOLOGY

## 2021-02-18 PROCEDURE — C9290 INJ, BUPIVACAINE LIPOSOME: HCPCS | Performed by: SURGERY

## 2021-02-18 PROCEDURE — 88341 IMHCHEM/IMCYTCHM EA ADD ANTB: CPT | Mod: 26,,, | Performed by: PATHOLOGY

## 2021-02-18 PROCEDURE — 88309 TISSUE EXAM BY PATHOLOGIST: CPT | Performed by: PATHOLOGY

## 2021-02-18 PROCEDURE — 87075 CULTR BACTERIA EXCEPT BLOOD: CPT

## 2021-02-18 PROCEDURE — 88309 TISSUE EXAM BY PATHOLOGIST: CPT | Mod: 26,,, | Performed by: PATHOLOGY

## 2021-02-18 PROCEDURE — 25000003 PHARM REV CODE 250: Performed by: SURGERY

## 2021-02-18 PROCEDURE — 87070 CULTURE OTHR SPECIMN AEROBIC: CPT

## 2021-02-18 PROCEDURE — 88342 IMHCHEM/IMCYTCHM 1ST ANTB: CPT | Performed by: PATHOLOGY

## 2021-02-18 PROCEDURE — 71000015 HC POSTOP RECOV 1ST HR: Performed by: SURGERY

## 2021-02-18 PROCEDURE — 84134 ASSAY OF PREALBUMIN: CPT

## 2021-02-18 PROCEDURE — 63600175 PHARM REV CODE 636 W HCPCS: Performed by: SURGERY

## 2021-02-18 PROCEDURE — 88341 IMHCHEM/IMCYTCHM EA ADD ANTB: CPT | Mod: 59 | Performed by: PATHOLOGY

## 2021-02-18 PROCEDURE — 37000008 HC ANESTHESIA 1ST 15 MINUTES: Performed by: SURGERY

## 2021-02-18 PROCEDURE — C1729 CATH, DRAINAGE: HCPCS | Performed by: SURGERY

## 2021-02-18 PROCEDURE — 71000033 HC RECOVERY, INTIAL HOUR: Performed by: SURGERY

## 2021-02-18 PROCEDURE — 80053 COMPREHEN METABOLIC PANEL: CPT | Mod: 91

## 2021-02-18 PROCEDURE — 88307 TISSUE EXAM BY PATHOLOGIST: CPT | Performed by: PATHOLOGY

## 2021-02-18 PROCEDURE — 88331 PR  PATH CONSULT IN SURG,W FRZ SEC: ICD-10-PCS | Mod: 26,,, | Performed by: PATHOLOGY

## 2021-02-18 PROCEDURE — 88342 CHG IMMUNOCYTOCHEMISTRY: ICD-10-PCS | Mod: 26,,, | Performed by: PATHOLOGY

## 2021-02-18 PROCEDURE — 88332 PR  PATH CONSULT IN SURG,W ADDN FRZ SEC: ICD-10-PCS | Mod: 26,,, | Performed by: PATHOLOGY

## 2021-02-18 PROCEDURE — 44120 PR RESECT SMALL INTEST,SINGL RESEC/ANAS: ICD-10-PCS | Mod: 59,,, | Performed by: SURGERY

## 2021-02-18 PROCEDURE — 63600175 PHARM REV CODE 636 W HCPCS: Mod: JG | Performed by: STUDENT IN AN ORGANIZED HEALTH CARE EDUCATION/TRAINING PROGRAM

## 2021-02-18 PROCEDURE — 44015 PR INSERT TUBE-BOWEL,ENTERAL ALIMENT: ICD-10-PCS | Mod: ,,, | Performed by: SURGERY

## 2021-02-18 PROCEDURE — 94640 AIRWAY INHALATION TREATMENT: CPT

## 2021-02-18 PROCEDURE — 36000708 HC OR TIME LEV III 1ST 15 MIN: Performed by: SURGERY

## 2021-02-18 PROCEDURE — 87116 MYCOBACTERIA CULTURE: CPT

## 2021-02-18 PROCEDURE — 88307 TISSUE EXAM BY PATHOLOGIST: CPT | Mod: 26,,, | Performed by: PATHOLOGY

## 2021-02-18 PROCEDURE — 88332 PATH CONSLTJ SURG EA ADD BLK: CPT | Mod: 26,,, | Performed by: PATHOLOGY

## 2021-02-18 PROCEDURE — 82962 GLUCOSE BLOOD TEST: CPT | Performed by: SURGERY

## 2021-02-18 PROCEDURE — 88304 TISSUE EXAM BY PATHOLOGIST: CPT | Mod: 26,,, | Performed by: PATHOLOGY

## 2021-02-18 PROCEDURE — 88304 TISSUE EXAM BY PATHOLOGIST: CPT | Performed by: PATHOLOGY

## 2021-02-18 PROCEDURE — C9113 INJ PANTOPRAZOLE SODIUM, VIA: HCPCS | Performed by: STUDENT IN AN ORGANIZED HEALTH CARE EDUCATION/TRAINING PROGRAM

## 2021-02-18 PROCEDURE — 48153 PR PART REMV PANC,PROX+PART DUOD+ANAST: ICD-10-PCS | Mod: ,,, | Performed by: SURGERY

## 2021-02-18 PROCEDURE — 88307 PR  SURG PATH,LEVEL V: ICD-10-PCS | Mod: 26,,, | Performed by: PATHOLOGY

## 2021-02-18 PROCEDURE — 94761 N-INVAS EAR/PLS OXIMETRY MLT: CPT

## 2021-02-18 PROCEDURE — D9220A PRA ANESTHESIA: Mod: ANES,,, | Performed by: ANESTHESIOLOGY

## 2021-02-18 PROCEDURE — 25000242 PHARM REV CODE 250 ALT 637 W/ HCPCS: Performed by: STUDENT IN AN ORGANIZED HEALTH CARE EDUCATION/TRAINING PROGRAM

## 2021-02-18 PROCEDURE — 88305 TISSUE EXAM BY PATHOLOGIST: CPT | Mod: 26,,, | Performed by: PATHOLOGY

## 2021-02-18 PROCEDURE — 20600001 HC STEP DOWN PRIVATE ROOM

## 2021-02-18 PROCEDURE — 88305 TISSUE EXAM BY PATHOLOGIST: ICD-10-PCS | Mod: 26,,, | Performed by: PATHOLOGY

## 2021-02-18 PROCEDURE — 88331 PATH CONSLTJ SURG 1 BLK 1SPC: CPT | Mod: 26,,, | Performed by: PATHOLOGY

## 2021-02-18 PROCEDURE — 27800903 OPTIME MED/SURG SUP & DEVICES OTHER IMPLANTS: Performed by: SURGERY

## 2021-02-18 PROCEDURE — 83735 ASSAY OF MAGNESIUM: CPT | Mod: 91

## 2021-02-18 DEVICE — TUBE BLLN MIC JENUNAL FEED 12F: Type: IMPLANTABLE DEVICE | Site: ABDOMEN | Status: FUNCTIONAL

## 2021-02-18 RX ORDER — ACETAMINOPHEN 10 MG/ML
1000 INJECTION, SOLUTION INTRAVENOUS EVERY 8 HOURS
Status: DISPENSED | OUTPATIENT
Start: 2021-02-18 | End: 2021-02-19

## 2021-02-18 RX ORDER — ALBUMIN HUMAN 50 G/1000ML
SOLUTION INTRAVENOUS CONTINUOUS PRN
Status: DISCONTINUED | OUTPATIENT
Start: 2021-02-18 | End: 2021-02-18

## 2021-02-18 RX ORDER — INSULIN ASPART 100 [IU]/ML
0-5 INJECTION, SOLUTION INTRAVENOUS; SUBCUTANEOUS EVERY 4 HOURS
Status: DISCONTINUED | OUTPATIENT
Start: 2021-02-18 | End: 2021-02-23

## 2021-02-18 RX ORDER — MIDAZOLAM HYDROCHLORIDE 1 MG/ML
INJECTION INTRAMUSCULAR; INTRAVENOUS
Status: DISCONTINUED | OUTPATIENT
Start: 2021-02-18 | End: 2021-02-18

## 2021-02-18 RX ORDER — INSULIN ASPART 100 [IU]/ML
1-10 INJECTION, SOLUTION INTRAVENOUS; SUBCUTANEOUS EVERY 6 HOURS PRN
Status: DISCONTINUED | OUTPATIENT
Start: 2021-02-18 | End: 2021-02-18

## 2021-02-18 RX ORDER — INDOMETHACIN 25 MG/1
CAPSULE ORAL
Status: DISCONTINUED | OUTPATIENT
Start: 2021-02-18 | End: 2021-02-18

## 2021-02-18 RX ORDER — LEVALBUTEROL INHALATION SOLUTION 0.63 MG/3ML
0.63 SOLUTION RESPIRATORY (INHALATION)
Status: DISPENSED | OUTPATIENT
Start: 2021-02-18 | End: 2021-02-20

## 2021-02-18 RX ORDER — FENTANYL CITRATE 50 UG/ML
INJECTION, SOLUTION INTRAMUSCULAR; INTRAVENOUS
Status: DISCONTINUED | OUTPATIENT
Start: 2021-02-18 | End: 2021-02-18

## 2021-02-18 RX ORDER — SUCCINYLCHOLINE CHLORIDE 20 MG/ML
INJECTION INTRAMUSCULAR; INTRAVENOUS
Status: DISCONTINUED | OUTPATIENT
Start: 2021-02-18 | End: 2021-02-18

## 2021-02-18 RX ORDER — SODIUM CHLORIDE, SODIUM LACTATE, POTASSIUM CHLORIDE, CALCIUM CHLORIDE 600; 310; 30; 20 MG/100ML; MG/100ML; MG/100ML; MG/100ML
INJECTION, SOLUTION INTRAVENOUS CONTINUOUS
Status: DISCONTINUED | OUTPATIENT
Start: 2021-02-18 | End: 2021-02-19

## 2021-02-18 RX ORDER — HYDROMORPHONE HCL IN 0.9% NACL 6 MG/30 ML
PATIENT CONTROLLED ANALGESIA SYRINGE INTRAVENOUS CONTINUOUS
Status: DISCONTINUED | OUTPATIENT
Start: 2021-02-18 | End: 2021-02-19

## 2021-02-18 RX ORDER — PROPOFOL 10 MG/ML
VIAL (ML) INTRAVENOUS
Status: DISCONTINUED | OUTPATIENT
Start: 2021-02-18 | End: 2021-02-18

## 2021-02-18 RX ORDER — LIDOCAINE HYDROCHLORIDE 20 MG/ML
INJECTION, SOLUTION EPIDURAL; INFILTRATION; INTRACAUDAL; PERINEURAL
Status: DISCONTINUED | OUTPATIENT
Start: 2021-02-18 | End: 2021-02-18

## 2021-02-18 RX ORDER — SODIUM CHLORIDE 0.9 % (FLUSH) 0.9 %
3 SYRINGE (ML) INJECTION
Status: DISCONTINUED | OUTPATIENT
Start: 2021-02-18 | End: 2021-02-18

## 2021-02-18 RX ORDER — NALOXONE HCL 0.4 MG/ML
0.02 VIAL (ML) INJECTION
Status: DISCONTINUED | OUTPATIENT
Start: 2021-02-18 | End: 2021-02-19

## 2021-02-18 RX ORDER — BUPIVACAINE HYDROCHLORIDE 5 MG/ML
INJECTION, SOLUTION EPIDURAL; INTRACAUDAL
Status: DISCONTINUED | OUTPATIENT
Start: 2021-02-18 | End: 2021-02-18

## 2021-02-18 RX ORDER — DIPHENHYDRAMINE HYDROCHLORIDE 50 MG/ML
12.5 INJECTION INTRAMUSCULAR; INTRAVENOUS EVERY 4 HOURS PRN
Status: DISCONTINUED | OUTPATIENT
Start: 2021-02-18 | End: 2021-02-19

## 2021-02-18 RX ORDER — ONDANSETRON 2 MG/ML
INJECTION INTRAMUSCULAR; INTRAVENOUS
Status: DISCONTINUED | OUTPATIENT
Start: 2021-02-18 | End: 2021-02-18

## 2021-02-18 RX ORDER — ROCURONIUM BROMIDE 10 MG/ML
INJECTION, SOLUTION INTRAVENOUS
Status: DISCONTINUED | OUTPATIENT
Start: 2021-02-18 | End: 2021-02-18

## 2021-02-18 RX ORDER — NEOSTIGMINE METHYLSULFATE 0.5 MG/ML
INJECTION, SOLUTION INTRAVENOUS
Status: DISCONTINUED | OUTPATIENT
Start: 2021-02-18 | End: 2021-02-18

## 2021-02-18 RX ORDER — DEXAMETHASONE SODIUM PHOSPHATE 4 MG/ML
INJECTION, SOLUTION INTRA-ARTICULAR; INTRALESIONAL; INTRAMUSCULAR; INTRAVENOUS; SOFT TISSUE
Status: DISCONTINUED | OUTPATIENT
Start: 2021-02-18 | End: 2021-02-18

## 2021-02-18 RX ORDER — GLUCAGON 1 MG
1 KIT INJECTION
Status: DISCONTINUED | OUTPATIENT
Start: 2021-02-18 | End: 2021-03-04 | Stop reason: HOSPADM

## 2021-02-18 RX ORDER — MUPIROCIN 20 MG/G
1 OINTMENT TOPICAL 2 TIMES DAILY
Status: COMPLETED | OUTPATIENT
Start: 2021-02-18 | End: 2021-02-23

## 2021-02-18 RX ORDER — PHENYLEPHRINE HYDROCHLORIDE 10 MG/ML
INJECTION INTRAVENOUS
Status: DISCONTINUED | OUTPATIENT
Start: 2021-02-18 | End: 2021-02-18

## 2021-02-18 RX ORDER — KETAMINE HCL IN 0.9 % NACL 50 MG/5 ML
SYRINGE (ML) INTRAVENOUS
Status: DISCONTINUED | OUTPATIENT
Start: 2021-02-18 | End: 2021-02-18

## 2021-02-18 RX ADMIN — ROCURONIUM BROMIDE 5 MG: 10 INJECTION, SOLUTION INTRAVENOUS at 02:02

## 2021-02-18 RX ADMIN — MUPIROCIN 1 G: 20 OINTMENT TOPICAL at 09:02

## 2021-02-18 RX ADMIN — SODIUM CHLORIDE, SODIUM GLUCONATE, SODIUM ACETATE, POTASSIUM CHLORIDE, MAGNESIUM CHLORIDE, SODIUM PHOSPHATE, DIBASIC, AND POTASSIUM PHOSPHATE: .53; .5; .37; .037; .03; .012; .00082 INJECTION, SOLUTION INTRAVENOUS at 07:02

## 2021-02-18 RX ADMIN — Medication: at 03:02

## 2021-02-18 RX ADMIN — FENTANYL CITRATE 25 MCG: 50 INJECTION, SOLUTION INTRAMUSCULAR; INTRAVENOUS at 02:02

## 2021-02-18 RX ADMIN — Medication 10 MG: at 09:02

## 2021-02-18 RX ADMIN — ROCURONIUM BROMIDE 5 MG: 10 INJECTION, SOLUTION INTRAVENOUS at 01:02

## 2021-02-18 RX ADMIN — GLYCOPYRROLATE 0.6 MG: 0.2 INJECTION, SOLUTION INTRAMUSCULAR; INTRAVITREAL at 02:02

## 2021-02-18 RX ADMIN — PHENYLEPHRINE HYDROCHLORIDE 100 MCG: 10 INJECTION INTRAVENOUS at 11:02

## 2021-02-18 RX ADMIN — SODIUM CHLORIDE: 0.9 INJECTION, SOLUTION INTRAVENOUS at 06:02

## 2021-02-18 RX ADMIN — ACETAMINOPHEN 1000 MG: 10 INJECTION, SOLUTION INTRAVENOUS at 09:02

## 2021-02-18 RX ADMIN — ALBUMIN (HUMAN): 12.5 INJECTION, SOLUTION INTRAVENOUS at 09:02

## 2021-02-18 RX ADMIN — Medication 10 MG: at 10:02

## 2021-02-18 RX ADMIN — PANTOPRAZOLE SODIUM 40 MG: 40 INJECTION, POWDER, FOR SOLUTION INTRAVENOUS at 09:02

## 2021-02-18 RX ADMIN — PROPOFOL 180 MG: 10 INJECTION, EMULSION INTRAVENOUS at 07:02

## 2021-02-18 RX ADMIN — FENTANYL CITRATE 25 MCG: 50 INJECTION, SOLUTION INTRAMUSCULAR; INTRAVENOUS at 12:02

## 2021-02-18 RX ADMIN — ROCURONIUM BROMIDE 20 MG: 10 INJECTION, SOLUTION INTRAVENOUS at 09:02

## 2021-02-18 RX ADMIN — LIDOCAINE HYDROCHLORIDE 80 MG: 20 INJECTION, SOLUTION EPIDURAL; INFILTRATION; INTRACAUDAL; PERINEURAL at 07:02

## 2021-02-18 RX ADMIN — ACETAMINOPHEN 1000 MG: 10 INJECTION, SOLUTION INTRAVENOUS at 04:02

## 2021-02-18 RX ADMIN — ROCURONIUM BROMIDE 10 MG: 10 INJECTION, SOLUTION INTRAVENOUS at 10:02

## 2021-02-18 RX ADMIN — SODIUM CHLORIDE 0.2 MCG/KG/MIN: 9 INJECTION, SOLUTION INTRAVENOUS at 08:02

## 2021-02-18 RX ADMIN — PHENYLEPHRINE HYDROCHLORIDE 100 MCG: 10 INJECTION INTRAVENOUS at 10:02

## 2021-02-18 RX ADMIN — SODIUM CHLORIDE, SODIUM LACTATE, POTASSIUM CHLORIDE, AND CALCIUM CHLORIDE: .6; .31; .03; .02 INJECTION, SOLUTION INTRAVENOUS at 03:02

## 2021-02-18 RX ADMIN — ONDANSETRON 4 MG: 2 INJECTION, SOLUTION INTRAMUSCULAR; INTRAVENOUS at 02:02

## 2021-02-18 RX ADMIN — SUCCINYLCHOLINE CHLORIDE 150 MG: 20 INJECTION, SOLUTION INTRAMUSCULAR; INTRAVENOUS at 07:02

## 2021-02-18 RX ADMIN — PHENYLEPHRINE HYDROCHLORIDE 100 MCG: 10 INJECTION INTRAVENOUS at 07:02

## 2021-02-18 RX ADMIN — LEVALBUTEROL HYDROCHLORIDE 0.63 MG: 0.63 SOLUTION RESPIRATORY (INHALATION) at 08:02

## 2021-02-18 RX ADMIN — MIDAZOLAM HYDROCHLORIDE 2 MG: 1 INJECTION, SOLUTION INTRAMUSCULAR; INTRAVENOUS at 06:02

## 2021-02-18 RX ADMIN — ROCURONIUM BROMIDE 10 MG: 10 INJECTION, SOLUTION INTRAVENOUS at 09:02

## 2021-02-18 RX ADMIN — NEOSTIGMINE METHYLSULFATE 5 MG: 0.5 INJECTION INTRAVENOUS at 02:02

## 2021-02-18 RX ADMIN — PHENYLEPHRINE HYDROCHLORIDE 200 MCG: 10 INJECTION INTRAVENOUS at 07:02

## 2021-02-18 RX ADMIN — SODIUM BICARBONATE 50 MEQ: 84 INJECTION, SOLUTION INTRAVENOUS at 09:02

## 2021-02-18 RX ADMIN — ROCURONIUM BROMIDE 15 MG: 10 INJECTION, SOLUTION INTRAVENOUS at 12:02

## 2021-02-18 RX ADMIN — ROCURONIUM BROMIDE 5 MG: 10 INJECTION, SOLUTION INTRAVENOUS at 07:02

## 2021-02-18 RX ADMIN — ROCURONIUM BROMIDE 45 MG: 10 INJECTION, SOLUTION INTRAVENOUS at 07:02

## 2021-02-18 RX ADMIN — ROCURONIUM BROMIDE 25 MG: 10 INJECTION, SOLUTION INTRAVENOUS at 11:02

## 2021-02-18 RX ADMIN — ENOXAPARIN SODIUM 40 MG: 40 INJECTION SUBCUTANEOUS at 04:02

## 2021-02-18 RX ADMIN — Medication: at 11:02

## 2021-02-18 RX ADMIN — GLYCOPYRROLATE 0.2 MG: 0.2 INJECTION, SOLUTION INTRAMUSCULAR; INTRAVITREAL at 07:02

## 2021-02-18 RX ADMIN — Medication 20 MG: at 07:02

## 2021-02-18 RX ADMIN — PROPOFOL 20 MG: 10 INJECTION, EMULSION INTRAVENOUS at 07:02

## 2021-02-18 RX ADMIN — METHOCARBAMOL 500 MG: 100 INJECTION, SOLUTION INTRAMUSCULAR; INTRAVENOUS at 10:02

## 2021-02-18 RX ADMIN — PHENYLEPHRINE HYDROCHLORIDE 100 MCG: 10 INJECTION INTRAVENOUS at 08:02

## 2021-02-18 RX ADMIN — DEXAMETHASONE SODIUM PHOSPHATE 4 MG: 4 INJECTION, SOLUTION INTRAMUSCULAR; INTRAVENOUS at 07:02

## 2021-02-19 PROBLEM — Z90.411 HISTORY OF PARTIAL PANCREATECTOMY: Status: ACTIVE | Noted: 2021-02-19

## 2021-02-19 LAB
ALBUMIN SERPL BCP-MCNC: 2.8 G/DL (ref 3.5–5.2)
ALP SERPL-CCNC: 44 U/L (ref 55–135)
ALT SERPL W/O P-5'-P-CCNC: 36 U/L (ref 10–44)
ANION GAP SERPL CALC-SCNC: 5 MMOL/L (ref 8–16)
AST SERPL-CCNC: 30 U/L (ref 10–40)
BASOPHILS # BLD AUTO: 0.01 K/UL (ref 0–0.2)
BASOPHILS NFR BLD: 0.1 % (ref 0–1.9)
BILIRUB SERPL-MCNC: 0.6 MG/DL (ref 0.1–1)
BUN SERPL-MCNC: 14 MG/DL (ref 6–20)
CALCIUM SERPL-MCNC: 7.3 MG/DL (ref 8.7–10.5)
CHLORIDE SERPL-SCNC: 109 MMOL/L (ref 95–110)
CO2 SERPL-SCNC: 23 MMOL/L (ref 23–29)
CREAT SERPL-MCNC: 0.9 MG/DL (ref 0.5–1.4)
DIFFERENTIAL METHOD: ABNORMAL
EOSINOPHIL # BLD AUTO: 0 K/UL (ref 0–0.5)
EOSINOPHIL NFR BLD: 0.1 % (ref 0–8)
ERYTHROCYTE [DISTWIDTH] IN BLOOD BY AUTOMATED COUNT: 16.4 % (ref 11.5–14.5)
EST. GFR  (AFRICAN AMERICAN): >60 ML/MIN/1.73 M^2
EST. GFR  (NON AFRICAN AMERICAN): >60 ML/MIN/1.73 M^2
ESTIMATED AVG GLUCOSE: 111 MG/DL (ref 68–131)
GLUCOSE SERPL-MCNC: 111 MG/DL (ref 70–110)
HBA1C MFR BLD: 5.5 % (ref 4–5.6)
HCT VFR BLD AUTO: 25.8 % (ref 37–48.5)
HGB BLD-MCNC: 8.7 G/DL (ref 12–16)
IMM GRANULOCYTES # BLD AUTO: 0.03 K/UL (ref 0–0.04)
IMM GRANULOCYTES NFR BLD AUTO: 0.4 % (ref 0–0.5)
LYMPHOCYTES # BLD AUTO: 0.9 K/UL (ref 1–4.8)
LYMPHOCYTES NFR BLD: 11.3 % (ref 18–48)
MAGNESIUM SERPL-MCNC: 1.9 MG/DL (ref 1.6–2.6)
MCH RBC QN AUTO: 27.3 PG (ref 27–31)
MCHC RBC AUTO-ENTMCNC: 33.7 G/DL (ref 32–36)
MCV RBC AUTO: 81 FL (ref 82–98)
MONOCYTES # BLD AUTO: 0.7 K/UL (ref 0.3–1)
MONOCYTES NFR BLD: 8.9 % (ref 4–15)
NEUTROPHILS # BLD AUTO: 6.2 K/UL (ref 1.8–7.7)
NEUTROPHILS NFR BLD: 79.2 % (ref 38–73)
NRBC BLD-RTO: 0 /100 WBC
PHOSPHATE SERPL-MCNC: 4.1 MG/DL (ref 2.7–4.5)
PLATELET # BLD AUTO: 196 K/UL (ref 150–350)
PMV BLD AUTO: 10.9 FL (ref 9.2–12.9)
POCT GLUCOSE: 116 MG/DL (ref 70–110)
POCT GLUCOSE: 127 MG/DL (ref 70–110)
POCT GLUCOSE: 135 MG/DL (ref 70–110)
POCT GLUCOSE: 142 MG/DL (ref 70–110)
POCT GLUCOSE: 149 MG/DL (ref 70–110)
POCT GLUCOSE: 91 MG/DL (ref 70–110)
POTASSIUM SERPL-SCNC: 3.9 MMOL/L (ref 3.5–5.1)
PROT SERPL-MCNC: 4.9 G/DL (ref 6–8.4)
RBC # BLD AUTO: 3.19 M/UL (ref 4–5.4)
SODIUM SERPL-SCNC: 137 MMOL/L (ref 136–145)
WBC # BLD AUTO: 7.88 K/UL (ref 3.9–12.7)

## 2021-02-19 PROCEDURE — 36415 COLL VENOUS BLD VENIPUNCTURE: CPT

## 2021-02-19 PROCEDURE — 94761 N-INVAS EAR/PLS OXIMETRY MLT: CPT

## 2021-02-19 PROCEDURE — 99900035 HC TECH TIME PER 15 MIN (STAT)

## 2021-02-19 PROCEDURE — 97535 SELF CARE MNGMENT TRAINING: CPT

## 2021-02-19 PROCEDURE — 63600175 PHARM REV CODE 636 W HCPCS: Performed by: STUDENT IN AN ORGANIZED HEALTH CARE EDUCATION/TRAINING PROGRAM

## 2021-02-19 PROCEDURE — 80053 COMPREHEN METABOLIC PANEL: CPT

## 2021-02-19 PROCEDURE — 25000003 PHARM REV CODE 250: Performed by: STUDENT IN AN ORGANIZED HEALTH CARE EDUCATION/TRAINING PROGRAM

## 2021-02-19 PROCEDURE — 97116 GAIT TRAINING THERAPY: CPT

## 2021-02-19 PROCEDURE — 20600001 HC STEP DOWN PRIVATE ROOM

## 2021-02-19 PROCEDURE — 84100 ASSAY OF PHOSPHORUS: CPT

## 2021-02-19 PROCEDURE — 83735 ASSAY OF MAGNESIUM: CPT

## 2021-02-19 PROCEDURE — 97164 PT RE-EVAL EST PLAN CARE: CPT

## 2021-02-19 PROCEDURE — 94640 AIRWAY INHALATION TREATMENT: CPT

## 2021-02-19 PROCEDURE — 27000221 HC OXYGEN, UP TO 24 HOURS

## 2021-02-19 PROCEDURE — 99223 PR INITIAL HOSPITAL CARE,LEVL III: ICD-10-PCS | Mod: ,,, | Performed by: INTERNAL MEDICINE

## 2021-02-19 PROCEDURE — 27000646 HC AEROBIKA DEVICE

## 2021-02-19 PROCEDURE — 97168 OT RE-EVAL EST PLAN CARE: CPT

## 2021-02-19 PROCEDURE — 83036 HEMOGLOBIN GLYCOSYLATED A1C: CPT

## 2021-02-19 PROCEDURE — 25000242 PHARM REV CODE 250 ALT 637 W/ HCPCS: Performed by: STUDENT IN AN ORGANIZED HEALTH CARE EDUCATION/TRAINING PROGRAM

## 2021-02-19 PROCEDURE — 99223 1ST HOSP IP/OBS HIGH 75: CPT | Mod: ,,, | Performed by: INTERNAL MEDICINE

## 2021-02-19 PROCEDURE — C9113 INJ PANTOPRAZOLE SODIUM, VIA: HCPCS | Performed by: STUDENT IN AN ORGANIZED HEALTH CARE EDUCATION/TRAINING PROGRAM

## 2021-02-19 PROCEDURE — 85025 COMPLETE CBC W/AUTO DIFF WBC: CPT

## 2021-02-19 PROCEDURE — 94664 DEMO&/EVAL PT USE INHALER: CPT

## 2021-02-19 RX ORDER — HYDROMORPHONE HCL IN 0.9% NACL 6 MG/30 ML
PATIENT CONTROLLED ANALGESIA SYRINGE INTRAVENOUS CONTINUOUS
Status: DISCONTINUED | OUTPATIENT
Start: 2021-02-19 | End: 2021-02-20

## 2021-02-19 RX ORDER — NALOXONE HCL 0.4 MG/ML
0.02 VIAL (ML) INJECTION
Status: DISCONTINUED | OUTPATIENT
Start: 2021-02-19 | End: 2021-03-04 | Stop reason: HOSPADM

## 2021-02-19 RX ORDER — KETOROLAC TROMETHAMINE 15 MG/ML
15 INJECTION, SOLUTION INTRAMUSCULAR; INTRAVENOUS EVERY 8 HOURS
Status: COMPLETED | OUTPATIENT
Start: 2021-02-19 | End: 2021-02-22

## 2021-02-19 RX ORDER — DEXTROSE MONOHYDRATE, SODIUM CHLORIDE, AND POTASSIUM CHLORIDE 50; 1.49; 4.5 G/1000ML; G/1000ML; G/1000ML
INJECTION, SOLUTION INTRAVENOUS CONTINUOUS
Status: DISCONTINUED | OUTPATIENT
Start: 2021-02-19 | End: 2021-02-21

## 2021-02-19 RX ORDER — ACETAMINOPHEN 10 MG/ML
1000 INJECTION, SOLUTION INTRAVENOUS EVERY 8 HOURS
Status: COMPLETED | OUTPATIENT
Start: 2021-02-19 | End: 2021-02-20

## 2021-02-19 RX ADMIN — PANTOPRAZOLE SODIUM 40 MG: 40 INJECTION, POWDER, FOR SOLUTION INTRAVENOUS at 09:02

## 2021-02-19 RX ADMIN — Medication: at 07:02

## 2021-02-19 RX ADMIN — PIPERACILLIN SODIUM AND TAZOBACTAM SODIUM 4.5 G: 4; .5 INJECTION, POWDER, LYOPHILIZED, FOR SOLUTION INTRAVENOUS at 10:02

## 2021-02-19 RX ADMIN — MUPIROCIN 1 G: 20 OINTMENT TOPICAL at 09:02

## 2021-02-19 RX ADMIN — ACETAMINOPHEN 1000 MG: 10 INJECTION, SOLUTION INTRAVENOUS at 01:02

## 2021-02-19 RX ADMIN — PIPERACILLIN SODIUM AND TAZOBACTAM SODIUM 4.5 G: 4; .5 INJECTION, POWDER, LYOPHILIZED, FOR SOLUTION INTRAVENOUS at 06:02

## 2021-02-19 RX ADMIN — ACETAMINOPHEN 1000 MG: 10 INJECTION, SOLUTION INTRAVENOUS at 09:02

## 2021-02-19 RX ADMIN — KETOROLAC TROMETHAMINE 15 MG: 15 INJECTION, SOLUTION INTRAMUSCULAR; INTRAVENOUS at 01:02

## 2021-02-19 RX ADMIN — ONDANSETRON 8 MG: 2 INJECTION INTRAMUSCULAR; INTRAVENOUS at 11:02

## 2021-02-19 RX ADMIN — LEVALBUTEROL HYDROCHLORIDE 0.63 MG: 0.63 SOLUTION RESPIRATORY (INHALATION) at 09:02

## 2021-02-19 RX ADMIN — METHOCARBAMOL 500 MG: 100 INJECTION, SOLUTION INTRAMUSCULAR; INTRAVENOUS at 01:02

## 2021-02-19 RX ADMIN — ENOXAPARIN SODIUM 40 MG: 40 INJECTION SUBCUTANEOUS at 06:02

## 2021-02-19 RX ADMIN — POTASSIUM CHLORIDE, DEXTROSE MONOHYDRATE AND SODIUM CHLORIDE: 150; 5; 450 INJECTION, SOLUTION INTRAVENOUS at 07:02

## 2021-02-19 RX ADMIN — KETOROLAC TROMETHAMINE 15 MG: 15 INJECTION, SOLUTION INTRAMUSCULAR; INTRAVENOUS at 09:02

## 2021-02-19 RX ADMIN — METHOCARBAMOL 500 MG: 100 INJECTION, SOLUTION INTRAMUSCULAR; INTRAVENOUS at 09:02

## 2021-02-19 RX ADMIN — LEVALBUTEROL HYDROCHLORIDE 0.63 MG: 0.63 SOLUTION RESPIRATORY (INHALATION) at 01:02

## 2021-02-19 RX ADMIN — Medication: at 11:02

## 2021-02-19 RX ADMIN — METHOCARBAMOL 500 MG: 100 INJECTION, SOLUTION INTRAMUSCULAR; INTRAVENOUS at 06:02

## 2021-02-19 RX ADMIN — SODIUM CHLORIDE, SODIUM LACTATE, POTASSIUM CHLORIDE, AND CALCIUM CHLORIDE: .6; .31; .03; .02 INJECTION, SOLUTION INTRAVENOUS at 12:02

## 2021-02-20 LAB
ALBUMIN SERPL BCP-MCNC: 2.6 G/DL (ref 3.5–5.2)
ALP SERPL-CCNC: 62 U/L (ref 55–135)
ALT SERPL W/O P-5'-P-CCNC: 27 U/L (ref 10–44)
ANION GAP SERPL CALC-SCNC: 5 MMOL/L (ref 8–16)
AST SERPL-CCNC: 23 U/L (ref 10–40)
BASOPHILS # BLD AUTO: 0.04 K/UL (ref 0–0.2)
BASOPHILS NFR BLD: 0.4 % (ref 0–1.9)
BILIRUB SERPL-MCNC: 0.4 MG/DL (ref 0.1–1)
BUN SERPL-MCNC: 15 MG/DL (ref 6–20)
CALCIUM SERPL-MCNC: 7.8 MG/DL (ref 8.7–10.5)
CHLORIDE SERPL-SCNC: 106 MMOL/L (ref 95–110)
CO2 SERPL-SCNC: 24 MMOL/L (ref 23–29)
CREAT SERPL-MCNC: 1.1 MG/DL (ref 0.5–1.4)
DIFFERENTIAL METHOD: ABNORMAL
EOSINOPHIL # BLD AUTO: 0.1 K/UL (ref 0–0.5)
EOSINOPHIL NFR BLD: 1.2 % (ref 0–8)
ERYTHROCYTE [DISTWIDTH] IN BLOOD BY AUTOMATED COUNT: 18 % (ref 11.5–14.5)
EST. GFR  (AFRICAN AMERICAN): >60 ML/MIN/1.73 M^2
EST. GFR  (NON AFRICAN AMERICAN): 56.3 ML/MIN/1.73 M^2
GLUCOSE SERPL-MCNC: 99 MG/DL (ref 70–110)
HCT VFR BLD AUTO: 23.2 % (ref 37–48.5)
HGB BLD-MCNC: 7.6 G/DL (ref 12–16)
IMM GRANULOCYTES # BLD AUTO: 0.04 K/UL (ref 0–0.04)
IMM GRANULOCYTES NFR BLD AUTO: 0.4 % (ref 0–0.5)
LYMPHOCYTES # BLD AUTO: 2.1 K/UL (ref 1–4.8)
LYMPHOCYTES NFR BLD: 20.4 % (ref 18–48)
MAGNESIUM SERPL-MCNC: 1.9 MG/DL (ref 1.6–2.6)
MCH RBC QN AUTO: 27.8 PG (ref 27–31)
MCHC RBC AUTO-ENTMCNC: 32.8 G/DL (ref 32–36)
MCV RBC AUTO: 85 FL (ref 82–98)
MONOCYTES # BLD AUTO: 0.7 K/UL (ref 0.3–1)
MONOCYTES NFR BLD: 6.8 % (ref 4–15)
NEUTROPHILS # BLD AUTO: 7.3 K/UL (ref 1.8–7.7)
NEUTROPHILS NFR BLD: 70.8 % (ref 38–73)
NRBC BLD-RTO: 0 /100 WBC
PHOSPHATE SERPL-MCNC: 2.4 MG/DL (ref 2.7–4.5)
PLATELET # BLD AUTO: 179 K/UL (ref 150–350)
PMV BLD AUTO: 11.5 FL (ref 9.2–12.9)
POCT GLUCOSE: 105 MG/DL (ref 70–110)
POCT GLUCOSE: 107 MG/DL (ref 70–110)
POCT GLUCOSE: 116 MG/DL (ref 70–110)
POCT GLUCOSE: 121 MG/DL (ref 70–110)
POCT GLUCOSE: 67 MG/DL (ref 70–110)
POCT GLUCOSE: 71 MG/DL (ref 70–110)
POCT GLUCOSE: 85 MG/DL (ref 70–110)
POCT GLUCOSE: 96 MG/DL (ref 70–110)
POTASSIUM SERPL-SCNC: 4.2 MMOL/L (ref 3.5–5.1)
PROT SERPL-MCNC: 5.1 G/DL (ref 6–8.4)
RBC # BLD AUTO: 2.73 M/UL (ref 4–5.4)
SODIUM SERPL-SCNC: 135 MMOL/L (ref 136–145)
WBC # BLD AUTO: 10.33 K/UL (ref 3.9–12.7)

## 2021-02-20 PROCEDURE — 36415 COLL VENOUS BLD VENIPUNCTURE: CPT

## 2021-02-20 PROCEDURE — 63600175 PHARM REV CODE 636 W HCPCS: Performed by: STUDENT IN AN ORGANIZED HEALTH CARE EDUCATION/TRAINING PROGRAM

## 2021-02-20 PROCEDURE — 99900035 HC TECH TIME PER 15 MIN (STAT)

## 2021-02-20 PROCEDURE — 84100 ASSAY OF PHOSPHORUS: CPT

## 2021-02-20 PROCEDURE — 25000003 PHARM REV CODE 250: Performed by: STUDENT IN AN ORGANIZED HEALTH CARE EDUCATION/TRAINING PROGRAM

## 2021-02-20 PROCEDURE — 94640 AIRWAY INHALATION TREATMENT: CPT

## 2021-02-20 PROCEDURE — 27000646 HC AEROBIKA DEVICE

## 2021-02-20 PROCEDURE — 97116 GAIT TRAINING THERAPY: CPT | Mod: CQ

## 2021-02-20 PROCEDURE — 25000242 PHARM REV CODE 250 ALT 637 W/ HCPCS: Performed by: STUDENT IN AN ORGANIZED HEALTH CARE EDUCATION/TRAINING PROGRAM

## 2021-02-20 PROCEDURE — 20600001 HC STEP DOWN PRIVATE ROOM

## 2021-02-20 PROCEDURE — 94664 DEMO&/EVAL PT USE INHALER: CPT

## 2021-02-20 PROCEDURE — 27000221 HC OXYGEN, UP TO 24 HOURS

## 2021-02-20 PROCEDURE — 94761 N-INVAS EAR/PLS OXIMETRY MLT: CPT

## 2021-02-20 PROCEDURE — 80053 COMPREHEN METABOLIC PANEL: CPT

## 2021-02-20 PROCEDURE — C9113 INJ PANTOPRAZOLE SODIUM, VIA: HCPCS | Performed by: STUDENT IN AN ORGANIZED HEALTH CARE EDUCATION/TRAINING PROGRAM

## 2021-02-20 PROCEDURE — 83735 ASSAY OF MAGNESIUM: CPT

## 2021-02-20 PROCEDURE — 85025 COMPLETE CBC W/AUTO DIFF WBC: CPT

## 2021-02-20 RX ORDER — OXYCODONE HCL 5 MG/5 ML
5 SOLUTION, ORAL ORAL EVERY 4 HOURS PRN
Status: DISCONTINUED | OUTPATIENT
Start: 2021-02-20 | End: 2021-02-23

## 2021-02-20 RX ORDER — OXYCODONE HCL 5 MG/5 ML
10 SOLUTION, ORAL ORAL EVERY 4 HOURS PRN
Status: DISCONTINUED | OUTPATIENT
Start: 2021-02-20 | End: 2021-02-23

## 2021-02-20 RX ORDER — ACETAMINOPHEN 650 MG/20.3ML
1000 LIQUID ORAL EVERY 8 HOURS
Status: DISCONTINUED | OUTPATIENT
Start: 2021-02-20 | End: 2021-02-22

## 2021-02-20 RX ADMIN — PANTOPRAZOLE SODIUM 40 MG: 40 INJECTION, POWDER, FOR SOLUTION INTRAVENOUS at 08:02

## 2021-02-20 RX ADMIN — METHOCARBAMOL 500 MG: 100 INJECTION, SOLUTION INTRAMUSCULAR; INTRAVENOUS at 10:02

## 2021-02-20 RX ADMIN — POTASSIUM CHLORIDE, DEXTROSE MONOHYDRATE AND SODIUM CHLORIDE: 150; 5; 450 INJECTION, SOLUTION INTRAVENOUS at 08:02

## 2021-02-20 RX ADMIN — OXYCODONE HYDROCHLORIDE 10 MG: 5 SOLUTION ORAL at 10:02

## 2021-02-20 RX ADMIN — POTASSIUM CHLORIDE, DEXTROSE MONOHYDRATE AND SODIUM CHLORIDE 50 ML/HR: 150; 5; 450 INJECTION, SOLUTION INTRAVENOUS at 09:02

## 2021-02-20 RX ADMIN — OXYCODONE HYDROCHLORIDE 10 MG: 5 SOLUTION ORAL at 01:02

## 2021-02-20 RX ADMIN — KETOROLAC TROMETHAMINE 15 MG: 15 INJECTION, SOLUTION INTRAMUSCULAR; INTRAVENOUS at 05:02

## 2021-02-20 RX ADMIN — METHOCARBAMOL 500 MG: 100 INJECTION, SOLUTION INTRAMUSCULAR; INTRAVENOUS at 01:02

## 2021-02-20 RX ADMIN — LEVALBUTEROL HYDROCHLORIDE 0.63 MG: 0.63 SOLUTION RESPIRATORY (INHALATION) at 07:02

## 2021-02-20 RX ADMIN — PANTOPRAZOLE SODIUM 40 MG: 40 INJECTION, POWDER, FOR SOLUTION INTRAVENOUS at 09:02

## 2021-02-20 RX ADMIN — ACETAMINOPHEN 999.01 MG: 160 SOLUTION ORAL at 01:02

## 2021-02-20 RX ADMIN — MUPIROCIN 1 G: 20 OINTMENT TOPICAL at 08:02

## 2021-02-20 RX ADMIN — OXYCODONE HYDROCHLORIDE 10 MG: 5 SOLUTION ORAL at 06:02

## 2021-02-20 RX ADMIN — KETOROLAC TROMETHAMINE 15 MG: 15 INJECTION, SOLUTION INTRAMUSCULAR; INTRAVENOUS at 09:02

## 2021-02-20 RX ADMIN — MUPIROCIN 1 G: 20 OINTMENT TOPICAL at 09:02

## 2021-02-20 RX ADMIN — ACETAMINOPHEN 999.01 MG: 160 SOLUTION ORAL at 09:02

## 2021-02-20 RX ADMIN — LEVALBUTEROL HYDROCHLORIDE 0.63 MG: 0.63 SOLUTION RESPIRATORY (INHALATION) at 01:02

## 2021-02-20 RX ADMIN — ACETAMINOPHEN 1000 MG: 10 INJECTION, SOLUTION INTRAVENOUS at 05:02

## 2021-02-20 RX ADMIN — ENOXAPARIN SODIUM 40 MG: 40 INJECTION SUBCUTANEOUS at 04:02

## 2021-02-20 RX ADMIN — METHOCARBAMOL 500 MG: 100 INJECTION, SOLUTION INTRAMUSCULAR; INTRAVENOUS at 05:02

## 2021-02-20 RX ADMIN — KETOROLAC TROMETHAMINE 15 MG: 15 INJECTION, SOLUTION INTRAMUSCULAR; INTRAVENOUS at 01:02

## 2021-02-20 RX ADMIN — OXYCODONE HYDROCHLORIDE 10 MG: 5 SOLUTION ORAL at 09:02

## 2021-02-21 LAB
ABO + RH BLD: NORMAL
ALBUMIN SERPL BCP-MCNC: 2 G/DL (ref 3.5–5.2)
ALP SERPL-CCNC: 54 U/L (ref 55–135)
ALT SERPL W/O P-5'-P-CCNC: 23 U/L (ref 10–44)
ANION GAP SERPL CALC-SCNC: 5 MMOL/L (ref 8–16)
AST SERPL-CCNC: 18 U/L (ref 10–40)
BASOPHILS # BLD AUTO: 0.01 K/UL (ref 0–0.2)
BASOPHILS NFR BLD: 0.2 % (ref 0–1.9)
BILIRUB SERPL-MCNC: 0.3 MG/DL (ref 0.1–1)
BLD GP AB SCN CELLS X3 SERPL QL: NORMAL
BLD PROD TYP BPU: NORMAL
BLOOD UNIT EXPIRATION DATE: NORMAL
BLOOD UNIT TYPE CODE: 6200
BLOOD UNIT TYPE: NORMAL
BUN SERPL-MCNC: 11 MG/DL (ref 6–20)
CALCIUM SERPL-MCNC: 7.3 MG/DL (ref 8.7–10.5)
CHLORIDE SERPL-SCNC: 108 MMOL/L (ref 95–110)
CO2 SERPL-SCNC: 22 MMOL/L (ref 23–29)
CODING SYSTEM: NORMAL
CREAT SERPL-MCNC: 0.8 MG/DL (ref 0.5–1.4)
CRP SERPL-MCNC: 92.29 MG/L (ref 0–3.19)
DIFFERENTIAL METHOD: ABNORMAL
DISPENSE STATUS: NORMAL
EOSINOPHIL # BLD AUTO: 0.2 K/UL (ref 0–0.5)
EOSINOPHIL NFR BLD: 3.9 % (ref 0–8)
ERYTHROCYTE [DISTWIDTH] IN BLOOD BY AUTOMATED COUNT: 17.7 % (ref 11.5–14.5)
EST. GFR  (AFRICAN AMERICAN): >60 ML/MIN/1.73 M^2
EST. GFR  (NON AFRICAN AMERICAN): >60 ML/MIN/1.73 M^2
GLUCOSE SERPL-MCNC: 112 MG/DL (ref 70–110)
HCT VFR BLD AUTO: 20.4 % (ref 37–48.5)
HGB BLD-MCNC: 6.4 G/DL (ref 12–16)
IMM GRANULOCYTES # BLD AUTO: 0.01 K/UL (ref 0–0.04)
IMM GRANULOCYTES NFR BLD AUTO: 0.2 % (ref 0–0.5)
LYMPHOCYTES # BLD AUTO: 0.9 K/UL (ref 1–4.8)
LYMPHOCYTES NFR BLD: 17.3 % (ref 18–48)
MAGNESIUM SERPL-MCNC: 1.8 MG/DL (ref 1.6–2.6)
MCH RBC QN AUTO: 27.4 PG (ref 27–31)
MCHC RBC AUTO-ENTMCNC: 31.4 G/DL (ref 32–36)
MCV RBC AUTO: 87 FL (ref 82–98)
MONOCYTES # BLD AUTO: 0.3 K/UL (ref 0.3–1)
MONOCYTES NFR BLD: 6.5 % (ref 4–15)
NEUTROPHILS # BLD AUTO: 3.5 K/UL (ref 1.8–7.7)
NEUTROPHILS NFR BLD: 71.9 % (ref 38–73)
NRBC BLD-RTO: 0 /100 WBC
NUM UNITS TRANS PACKED RBC: NORMAL
NUM UNITS TRANS PACKED RBC: NORMAL
PHOSPHATE SERPL-MCNC: 2.3 MG/DL (ref 2.7–4.5)
PLATELET # BLD AUTO: 147 K/UL (ref 150–350)
PMV BLD AUTO: 11.1 FL (ref 9.2–12.9)
POCT GLUCOSE: 108 MG/DL (ref 70–110)
POCT GLUCOSE: 110 MG/DL (ref 70–110)
POCT GLUCOSE: 116 MG/DL (ref 70–110)
POCT GLUCOSE: 81 MG/DL (ref 70–110)
POCT GLUCOSE: 89 MG/DL (ref 70–110)
POCT GLUCOSE: 95 MG/DL (ref 70–110)
POTASSIUM SERPL-SCNC: 3.4 MMOL/L (ref 3.5–5.1)
PROT SERPL-MCNC: 4.4 G/DL (ref 6–8.4)
RBC # BLD AUTO: 2.34 M/UL (ref 4–5.4)
SODIUM SERPL-SCNC: 135 MMOL/L (ref 136–145)
TRANS ERYTHROCYTES VOL PATIENT: NORMAL ML
WBC # BLD AUTO: 4.92 K/UL (ref 3.9–12.7)

## 2021-02-21 PROCEDURE — 25500020 PHARM REV CODE 255: Performed by: SURGERY

## 2021-02-21 PROCEDURE — 25000003 PHARM REV CODE 250: Performed by: STUDENT IN AN ORGANIZED HEALTH CARE EDUCATION/TRAINING PROGRAM

## 2021-02-21 PROCEDURE — 36415 COLL VENOUS BLD VENIPUNCTURE: CPT

## 2021-02-21 PROCEDURE — P9016 RBC LEUKOCYTES REDUCED: HCPCS

## 2021-02-21 PROCEDURE — 25000242 PHARM REV CODE 250 ALT 637 W/ HCPCS: Performed by: STUDENT IN AN ORGANIZED HEALTH CARE EDUCATION/TRAINING PROGRAM

## 2021-02-21 PROCEDURE — 84100 ASSAY OF PHOSPHORUS: CPT

## 2021-02-21 PROCEDURE — 20600001 HC STEP DOWN PRIVATE ROOM

## 2021-02-21 PROCEDURE — 80053 COMPREHEN METABOLIC PANEL: CPT

## 2021-02-21 PROCEDURE — 63600175 PHARM REV CODE 636 W HCPCS: Performed by: STUDENT IN AN ORGANIZED HEALTH CARE EDUCATION/TRAINING PROGRAM

## 2021-02-21 PROCEDURE — 94761 N-INVAS EAR/PLS OXIMETRY MLT: CPT

## 2021-02-21 PROCEDURE — 85025 COMPLETE CBC W/AUTO DIFF WBC: CPT

## 2021-02-21 PROCEDURE — 86900 BLOOD TYPING SEROLOGIC ABO: CPT

## 2021-02-21 PROCEDURE — 86920 COMPATIBILITY TEST SPIN: CPT

## 2021-02-21 PROCEDURE — 86141 C-REACTIVE PROTEIN HS: CPT

## 2021-02-21 PROCEDURE — C9113 INJ PANTOPRAZOLE SODIUM, VIA: HCPCS | Performed by: STUDENT IN AN ORGANIZED HEALTH CARE EDUCATION/TRAINING PROGRAM

## 2021-02-21 PROCEDURE — 83735 ASSAY OF MAGNESIUM: CPT

## 2021-02-21 PROCEDURE — 36430 TRANSFUSION BLD/BLD COMPNT: CPT

## 2021-02-21 RX ORDER — SIMETHICONE 80 MG
1 TABLET,CHEWABLE ORAL 3 TIMES DAILY PRN
Status: DISCONTINUED | OUTPATIENT
Start: 2021-02-21 | End: 2021-03-04 | Stop reason: HOSPADM

## 2021-02-21 RX ORDER — HYDROCODONE BITARTRATE AND ACETAMINOPHEN 500; 5 MG/1; MG/1
TABLET ORAL
Status: DISCONTINUED | OUTPATIENT
Start: 2021-02-21 | End: 2021-03-04 | Stop reason: HOSPADM

## 2021-02-21 RX ADMIN — PANTOPRAZOLE SODIUM 40 MG: 40 INJECTION, POWDER, FOR SOLUTION INTRAVENOUS at 09:02

## 2021-02-21 RX ADMIN — KETOROLAC TROMETHAMINE 15 MG: 15 INJECTION, SOLUTION INTRAMUSCULAR; INTRAVENOUS at 05:02

## 2021-02-21 RX ADMIN — ACETAMINOPHEN 999.01 MG: 160 SOLUTION ORAL at 05:02

## 2021-02-21 RX ADMIN — ACETAMINOPHEN 999.01 MG: 160 SOLUTION ORAL at 09:02

## 2021-02-21 RX ADMIN — ONDANSETRON 8 MG: 2 INJECTION INTRAMUSCULAR; INTRAVENOUS at 07:02

## 2021-02-21 RX ADMIN — ACETAMINOPHEN 999.01 MG: 160 SOLUTION ORAL at 03:02

## 2021-02-21 RX ADMIN — IOHEXOL 75 ML: 350 INJECTION, SOLUTION INTRAVENOUS at 10:02

## 2021-02-21 RX ADMIN — KETOROLAC TROMETHAMINE 15 MG: 15 INJECTION, SOLUTION INTRAMUSCULAR; INTRAVENOUS at 03:02

## 2021-02-21 RX ADMIN — MUPIROCIN 1 G: 20 OINTMENT TOPICAL at 09:02

## 2021-02-21 RX ADMIN — METHOCARBAMOL 500 MG: 100 INJECTION, SOLUTION INTRAMUSCULAR; INTRAVENOUS at 06:02

## 2021-02-21 RX ADMIN — METHOCARBAMOL 500 MG: 100 INJECTION, SOLUTION INTRAMUSCULAR; INTRAVENOUS at 09:02

## 2021-02-21 RX ADMIN — POTASSIUM PHOSPHATE, MONOBASIC AND POTASSIUM PHOSPHATE, DIBASIC 30 MMOL: 224; 236 INJECTION, SOLUTION, CONCENTRATE INTRAVENOUS at 09:02

## 2021-02-21 RX ADMIN — OXYCODONE HYDROCHLORIDE 10 MG: 5 SOLUTION ORAL at 12:02

## 2021-02-21 RX ADMIN — METHOCARBAMOL 500 MG: 100 INJECTION, SOLUTION INTRAMUSCULAR; INTRAVENOUS at 03:02

## 2021-02-21 RX ADMIN — OXYCODONE HYDROCHLORIDE 10 MG: 5 SOLUTION ORAL at 07:02

## 2021-02-21 RX ADMIN — OXYCODONE HYDROCHLORIDE 10 MG: 5 SOLUTION ORAL at 05:02

## 2021-02-21 RX ADMIN — SIMETHICONE 80 MG: 80 TABLET, CHEWABLE ORAL at 07:02

## 2021-02-21 RX ADMIN — PANCRELIPASE 1 CAPSULE: 60000; 12000; 38000 CAPSULE, DELAYED RELEASE PELLETS ORAL at 09:02

## 2021-02-21 RX ADMIN — KETOROLAC TROMETHAMINE 15 MG: 15 INJECTION, SOLUTION INTRAMUSCULAR; INTRAVENOUS at 09:02

## 2021-02-22 LAB
ALBUMIN SERPL BCP-MCNC: 2.2 G/DL (ref 3.5–5.2)
ALP SERPL-CCNC: 68 U/L (ref 55–135)
ALT SERPL W/O P-5'-P-CCNC: 22 U/L (ref 10–44)
ANION GAP SERPL CALC-SCNC: 8 MMOL/L (ref 8–16)
AST SERPL-CCNC: 18 U/L (ref 10–40)
BACTERIA SPEC AEROBE CULT: NO GROWTH
BASOPHILS # BLD AUTO: 0.02 K/UL (ref 0–0.2)
BASOPHILS NFR BLD: 0.4 % (ref 0–1.9)
BILIRUB SERPL-MCNC: 0.3 MG/DL (ref 0.1–1)
BUN SERPL-MCNC: 7 MG/DL (ref 6–20)
CALCIUM SERPL-MCNC: 7.8 MG/DL (ref 8.7–10.5)
CHLORIDE SERPL-SCNC: 109 MMOL/L (ref 95–110)
CO2 SERPL-SCNC: 19 MMOL/L (ref 23–29)
CREAT SERPL-MCNC: 0.7 MG/DL (ref 0.5–1.4)
DIFFERENTIAL METHOD: ABNORMAL
EOSINOPHIL # BLD AUTO: 0.2 K/UL (ref 0–0.5)
EOSINOPHIL NFR BLD: 4.5 % (ref 0–8)
ERYTHROCYTE [DISTWIDTH] IN BLOOD BY AUTOMATED COUNT: 16.1 % (ref 11.5–14.5)
EST. GFR  (AFRICAN AMERICAN): >60 ML/MIN/1.73 M^2
EST. GFR  (NON AFRICAN AMERICAN): >60 ML/MIN/1.73 M^2
GLUCOSE SERPL-MCNC: 100 MG/DL (ref 70–110)
HCT VFR BLD AUTO: 30.3 % (ref 37–48.5)
HGB BLD-MCNC: 9.6 G/DL (ref 12–16)
IMM GRANULOCYTES # BLD AUTO: 0.01 K/UL (ref 0–0.04)
IMM GRANULOCYTES NFR BLD AUTO: 0.2 % (ref 0–0.5)
LYMPHOCYTES # BLD AUTO: 0.6 K/UL (ref 1–4.8)
LYMPHOCYTES NFR BLD: 12.8 % (ref 18–48)
MAGNESIUM SERPL-MCNC: 1.7 MG/DL (ref 1.6–2.6)
MCH RBC QN AUTO: 27.6 PG (ref 27–31)
MCHC RBC AUTO-ENTMCNC: 31.7 G/DL (ref 32–36)
MCV RBC AUTO: 87 FL (ref 82–98)
MONOCYTES # BLD AUTO: 0.4 K/UL (ref 0.3–1)
MONOCYTES NFR BLD: 7.9 % (ref 4–15)
NEUTROPHILS # BLD AUTO: 3.7 K/UL (ref 1.8–7.7)
NEUTROPHILS NFR BLD: 74.2 % (ref 38–73)
NRBC BLD-RTO: 0 /100 WBC
PHOSPHATE SERPL-MCNC: 3.5 MG/DL (ref 2.7–4.5)
PLATELET # BLD AUTO: 178 K/UL (ref 150–350)
PMV BLD AUTO: 10.8 FL (ref 9.2–12.9)
POCT GLUCOSE: 101 MG/DL (ref 70–110)
POCT GLUCOSE: 105 MG/DL (ref 70–110)
POCT GLUCOSE: 125 MG/DL (ref 70–110)
POCT GLUCOSE: 88 MG/DL (ref 70–110)
POCT GLUCOSE: 97 MG/DL (ref 70–110)
POTASSIUM SERPL-SCNC: 3.8 MMOL/L (ref 3.5–5.1)
PREALB SERPL-MCNC: 13 MG/DL (ref 20–43)
PROT SERPL-MCNC: 4.7 G/DL (ref 6–8.4)
RBC # BLD AUTO: 3.48 M/UL (ref 4–5.4)
SODIUM SERPL-SCNC: 136 MMOL/L (ref 136–145)
WBC # BLD AUTO: 4.92 K/UL (ref 3.9–12.7)

## 2021-02-22 PROCEDURE — 84134 ASSAY OF PREALBUMIN: CPT

## 2021-02-22 PROCEDURE — 94761 N-INVAS EAR/PLS OXIMETRY MLT: CPT

## 2021-02-22 PROCEDURE — 25000242 PHARM REV CODE 250 ALT 637 W/ HCPCS: Performed by: STUDENT IN AN ORGANIZED HEALTH CARE EDUCATION/TRAINING PROGRAM

## 2021-02-22 PROCEDURE — 36415 COLL VENOUS BLD VENIPUNCTURE: CPT

## 2021-02-22 PROCEDURE — 63600175 PHARM REV CODE 636 W HCPCS: Performed by: STUDENT IN AN ORGANIZED HEALTH CARE EDUCATION/TRAINING PROGRAM

## 2021-02-22 PROCEDURE — 84100 ASSAY OF PHOSPHORUS: CPT

## 2021-02-22 PROCEDURE — 85025 COMPLETE CBC W/AUTO DIFF WBC: CPT

## 2021-02-22 PROCEDURE — 97535 SELF CARE MNGMENT TRAINING: CPT

## 2021-02-22 PROCEDURE — 25000003 PHARM REV CODE 250: Performed by: STUDENT IN AN ORGANIZED HEALTH CARE EDUCATION/TRAINING PROGRAM

## 2021-02-22 PROCEDURE — 20600001 HC STEP DOWN PRIVATE ROOM

## 2021-02-22 PROCEDURE — 80053 COMPREHEN METABOLIC PANEL: CPT

## 2021-02-22 PROCEDURE — 83735 ASSAY OF MAGNESIUM: CPT

## 2021-02-22 PROCEDURE — 25000003 PHARM REV CODE 250: Performed by: NURSE PRACTITIONER

## 2021-02-22 RX ORDER — ENOXAPARIN SODIUM 100 MG/ML
40 INJECTION SUBCUTANEOUS EVERY 24 HOURS
Status: DISCONTINUED | OUTPATIENT
Start: 2021-02-22 | End: 2021-03-04

## 2021-02-22 RX ORDER — ROSUVASTATIN CALCIUM 10 MG/1
10 TABLET, COATED ORAL DAILY
Status: DISCONTINUED | OUTPATIENT
Start: 2021-02-22 | End: 2021-02-25

## 2021-02-22 RX ORDER — ACETAMINOPHEN 500 MG
1000 TABLET ORAL EVERY 8 HOURS
Status: DISCONTINUED | OUTPATIENT
Start: 2021-02-22 | End: 2021-02-24

## 2021-02-22 RX ORDER — LANOLIN ALCOHOL/MO/W.PET/CERES
800 CREAM (GRAM) TOPICAL ONCE
Status: COMPLETED | OUTPATIENT
Start: 2021-02-22 | End: 2021-02-22

## 2021-02-22 RX ORDER — PANTOPRAZOLE SODIUM 40 MG/1
40 TABLET, DELAYED RELEASE ORAL DAILY
Status: DISCONTINUED | OUTPATIENT
Start: 2021-02-22 | End: 2021-02-24

## 2021-02-22 RX ORDER — METHOCARBAMOL 500 MG/1
500 TABLET, FILM COATED ORAL EVERY 8 HOURS
Status: DISCONTINUED | OUTPATIENT
Start: 2021-02-22 | End: 2021-02-24

## 2021-02-22 RX ORDER — ENOXAPARIN SODIUM 100 MG/ML
40 INJECTION SUBCUTANEOUS DAILY
Qty: 5.6 ML | Refills: 0 | Status: SHIPPED | OUTPATIENT
Start: 2021-02-22 | End: 2021-03-19 | Stop reason: CLARIF

## 2021-02-22 RX ADMIN — ROSUVASTATIN CALCIUM 10 MG: 10 TABLET, FILM COATED ORAL at 11:02

## 2021-02-22 RX ADMIN — ACETAMINOPHEN 1000 MG: 500 TABLET ORAL at 09:02

## 2021-02-22 RX ADMIN — ENOXAPARIN SODIUM 40 MG: 40 INJECTION SUBCUTANEOUS at 04:02

## 2021-02-22 RX ADMIN — POTASSIUM BICARBONATE 20 MEQ: 391 TABLET, EFFERVESCENT ORAL at 11:02

## 2021-02-22 RX ADMIN — ONDANSETRON 8 MG: 2 INJECTION INTRAMUSCULAR; INTRAVENOUS at 11:02

## 2021-02-22 RX ADMIN — OXYCODONE HYDROCHLORIDE 10 MG: 5 SOLUTION ORAL at 08:02

## 2021-02-22 RX ADMIN — PANTOPRAZOLE SODIUM 40 MG: 40 TABLET, DELAYED RELEASE ORAL at 09:02

## 2021-02-22 RX ADMIN — MUPIROCIN 1 G: 20 OINTMENT TOPICAL at 09:02

## 2021-02-22 RX ADMIN — OXYCODONE HYDROCHLORIDE 10 MG: 5 SOLUTION ORAL at 04:02

## 2021-02-22 RX ADMIN — METHOCARBAMOL 500 MG: 100 INJECTION, SOLUTION INTRAMUSCULAR; INTRAVENOUS at 05:02

## 2021-02-22 RX ADMIN — OXYCODONE HYDROCHLORIDE 10 MG: 5 SOLUTION ORAL at 11:02

## 2021-02-22 RX ADMIN — KETOROLAC TROMETHAMINE 15 MG: 15 INJECTION, SOLUTION INTRAMUSCULAR; INTRAVENOUS at 05:02

## 2021-02-22 RX ADMIN — ACETAMINOPHEN 1000 MG: 500 TABLET ORAL at 02:02

## 2021-02-22 RX ADMIN — ACETAMINOPHEN 999.01 MG: 160 SOLUTION ORAL at 05:02

## 2021-02-22 RX ADMIN — Medication 800 MG: at 11:02

## 2021-02-22 RX ADMIN — METHOCARBAMOL TABLETS 500 MG: 500 TABLET, COATED ORAL at 09:02

## 2021-02-22 RX ADMIN — METHOCARBAMOL TABLETS 500 MG: 500 TABLET, COATED ORAL at 02:02

## 2021-02-23 LAB
ALBUMIN SERPL BCP-MCNC: 2.3 G/DL (ref 3.5–5.2)
ALP SERPL-CCNC: 82 U/L (ref 55–135)
ALT SERPL W/O P-5'-P-CCNC: 39 U/L (ref 10–44)
ANION GAP SERPL CALC-SCNC: 7 MMOL/L (ref 8–16)
AST SERPL-CCNC: 37 U/L (ref 10–40)
BASOPHILS # BLD AUTO: 0.02 K/UL (ref 0–0.2)
BASOPHILS NFR BLD: 0.5 % (ref 0–1.9)
BILIRUB SERPL-MCNC: 0.3 MG/DL (ref 0.1–1)
BUN SERPL-MCNC: 5 MG/DL (ref 6–20)
CALCIUM SERPL-MCNC: 8 MG/DL (ref 8.7–10.5)
CHLORIDE SERPL-SCNC: 107 MMOL/L (ref 95–110)
CO2 SERPL-SCNC: 24 MMOL/L (ref 23–29)
CREAT SERPL-MCNC: 0.7 MG/DL (ref 0.5–1.4)
CRP SERPL-MCNC: 21.32 MG/L (ref 0–3.19)
DIFFERENTIAL METHOD: ABNORMAL
EOSINOPHIL # BLD AUTO: 0.2 K/UL (ref 0–0.5)
EOSINOPHIL NFR BLD: 4.6 % (ref 0–8)
ERYTHROCYTE [DISTWIDTH] IN BLOOD BY AUTOMATED COUNT: 15.9 % (ref 11.5–14.5)
EST. GFR  (AFRICAN AMERICAN): >60 ML/MIN/1.73 M^2
EST. GFR  (NON AFRICAN AMERICAN): >60 ML/MIN/1.73 M^2
GLUCOSE SERPL-MCNC: 103 MG/DL (ref 70–110)
HCT VFR BLD AUTO: 29.7 % (ref 37–48.5)
HGB BLD-MCNC: 10 G/DL (ref 12–16)
IMM GRANULOCYTES # BLD AUTO: 0.01 K/UL (ref 0–0.04)
IMM GRANULOCYTES NFR BLD AUTO: 0.3 % (ref 0–0.5)
LYMPHOCYTES # BLD AUTO: 0.8 K/UL (ref 1–4.8)
LYMPHOCYTES NFR BLD: 21.2 % (ref 18–48)
MAGNESIUM SERPL-MCNC: 1.7 MG/DL (ref 1.6–2.6)
MCH RBC QN AUTO: 28.2 PG (ref 27–31)
MCHC RBC AUTO-ENTMCNC: 33.7 G/DL (ref 32–36)
MCV RBC AUTO: 84 FL (ref 82–98)
MONOCYTES # BLD AUTO: 0.4 K/UL (ref 0.3–1)
MONOCYTES NFR BLD: 10.2 % (ref 4–15)
NEUTROPHILS # BLD AUTO: 2.4 K/UL (ref 1.8–7.7)
NEUTROPHILS NFR BLD: 63.2 % (ref 38–73)
NRBC BLD-RTO: 0 /100 WBC
PHOSPHATE SERPL-MCNC: 3.5 MG/DL (ref 2.7–4.5)
PLATELET # BLD AUTO: 209 K/UL (ref 150–350)
PMV BLD AUTO: 9.9 FL (ref 9.2–12.9)
POCT GLUCOSE: 110 MG/DL (ref 70–110)
POCT GLUCOSE: 112 MG/DL (ref 70–110)
POCT GLUCOSE: 116 MG/DL (ref 70–110)
POCT GLUCOSE: 91 MG/DL (ref 70–110)
POTASSIUM SERPL-SCNC: 3.6 MMOL/L (ref 3.5–5.1)
PROT SERPL-MCNC: 4.8 G/DL (ref 6–8.4)
RBC # BLD AUTO: 3.54 M/UL (ref 4–5.4)
SODIUM SERPL-SCNC: 138 MMOL/L (ref 136–145)
WBC # BLD AUTO: 3.73 K/UL (ref 3.9–12.7)

## 2021-02-23 PROCEDURE — 80053 COMPREHEN METABOLIC PANEL: CPT

## 2021-02-23 PROCEDURE — 36415 COLL VENOUS BLD VENIPUNCTURE: CPT

## 2021-02-23 PROCEDURE — 63600175 PHARM REV CODE 636 W HCPCS: Performed by: STUDENT IN AN ORGANIZED HEALTH CARE EDUCATION/TRAINING PROGRAM

## 2021-02-23 PROCEDURE — 25000003 PHARM REV CODE 250: Performed by: NURSE PRACTITIONER

## 2021-02-23 PROCEDURE — 25000242 PHARM REV CODE 250 ALT 637 W/ HCPCS: Performed by: STUDENT IN AN ORGANIZED HEALTH CARE EDUCATION/TRAINING PROGRAM

## 2021-02-23 PROCEDURE — 25000003 PHARM REV CODE 250: Performed by: STUDENT IN AN ORGANIZED HEALTH CARE EDUCATION/TRAINING PROGRAM

## 2021-02-23 PROCEDURE — 97116 GAIT TRAINING THERAPY: CPT

## 2021-02-23 PROCEDURE — 20600001 HC STEP DOWN PRIVATE ROOM

## 2021-02-23 PROCEDURE — 86141 C-REACTIVE PROTEIN HS: CPT

## 2021-02-23 PROCEDURE — 85025 COMPLETE CBC W/AUTO DIFF WBC: CPT

## 2021-02-23 PROCEDURE — 83735 ASSAY OF MAGNESIUM: CPT

## 2021-02-23 PROCEDURE — 84100 ASSAY OF PHOSPHORUS: CPT

## 2021-02-23 PROCEDURE — 25000003 PHARM REV CODE 250: Performed by: SURGERY

## 2021-02-23 RX ORDER — LIDOCAINE 50 MG/G
1 PATCH TOPICAL
Status: DISCONTINUED | OUTPATIENT
Start: 2021-02-23 | End: 2021-03-04 | Stop reason: HOSPADM

## 2021-02-23 RX ORDER — INSULIN ASPART 100 [IU]/ML
0-5 INJECTION, SOLUTION INTRAVENOUS; SUBCUTANEOUS
Status: DISCONTINUED | OUTPATIENT
Start: 2021-02-23 | End: 2021-03-03

## 2021-02-23 RX ORDER — HYDROMORPHONE HYDROCHLORIDE 1 MG/ML
0.5 INJECTION, SOLUTION INTRAMUSCULAR; INTRAVENOUS; SUBCUTANEOUS ONCE
Status: DISCONTINUED | OUTPATIENT
Start: 2021-02-23 | End: 2021-02-24

## 2021-02-23 RX ORDER — POTASSIUM CHLORIDE 7.45 MG/ML
10 INJECTION INTRAVENOUS
Status: COMPLETED | OUTPATIENT
Start: 2021-02-23 | End: 2021-02-23

## 2021-02-23 RX ORDER — OXYCODONE HYDROCHLORIDE 5 MG/1
5 TABLET ORAL EVERY 4 HOURS PRN
Status: DISCONTINUED | OUTPATIENT
Start: 2021-02-23 | End: 2021-02-24

## 2021-02-23 RX ORDER — INSULIN ASPART 100 [IU]/ML
0-5 INJECTION, SOLUTION INTRAVENOUS; SUBCUTANEOUS
Status: DISCONTINUED | OUTPATIENT
Start: 2021-02-23 | End: 2021-02-23

## 2021-02-23 RX ORDER — OXYCODONE HYDROCHLORIDE 10 MG/1
10 TABLET ORAL EVERY 4 HOURS PRN
Status: DISCONTINUED | OUTPATIENT
Start: 2021-02-23 | End: 2021-02-24

## 2021-02-23 RX ADMIN — LIDOCAINE 1 PATCH: 50 PATCH CUTANEOUS at 11:02

## 2021-02-23 RX ADMIN — OXYCODONE HYDROCHLORIDE 10 MG: 5 SOLUTION ORAL at 02:02

## 2021-02-23 RX ADMIN — METHOCARBAMOL TABLETS 500 MG: 500 TABLET, COATED ORAL at 05:02

## 2021-02-23 RX ADMIN — POTASSIUM CHLORIDE 10 MEQ: 10 INJECTION, SOLUTION INTRAVENOUS at 10:02

## 2021-02-23 RX ADMIN — OXYCODONE HYDROCHLORIDE 10 MG: 10 TABLET ORAL at 06:02

## 2021-02-23 RX ADMIN — METHOCARBAMOL TABLETS 500 MG: 500 TABLET, COATED ORAL at 02:02

## 2021-02-23 RX ADMIN — PANTOPRAZOLE SODIUM 40 MG: 40 TABLET, DELAYED RELEASE ORAL at 09:02

## 2021-02-23 RX ADMIN — ACETAMINOPHEN 1000 MG: 500 TABLET ORAL at 02:02

## 2021-02-23 RX ADMIN — METHOCARBAMOL TABLETS 500 MG: 500 TABLET, COATED ORAL at 09:02

## 2021-02-23 RX ADMIN — ENOXAPARIN SODIUM 40 MG: 40 INJECTION SUBCUTANEOUS at 05:02

## 2021-02-23 RX ADMIN — OXYCODONE HYDROCHLORIDE 10 MG: 5 SOLUTION ORAL at 09:02

## 2021-02-23 RX ADMIN — OXYCODONE HYDROCHLORIDE 10 MG: 10 TABLET ORAL at 10:02

## 2021-02-23 RX ADMIN — ACETAMINOPHEN 1000 MG: 500 TABLET ORAL at 09:02

## 2021-02-23 RX ADMIN — POTASSIUM CHLORIDE 10 MEQ: 10 INJECTION, SOLUTION INTRAVENOUS at 11:02

## 2021-02-23 RX ADMIN — ROSUVASTATIN CALCIUM 10 MG: 10 TABLET, FILM COATED ORAL at 09:02

## 2021-02-23 RX ADMIN — MUPIROCIN 1 G: 20 OINTMENT TOPICAL at 09:02

## 2021-02-23 RX ADMIN — ACETAMINOPHEN 1000 MG: 500 TABLET ORAL at 05:02

## 2021-02-23 RX ADMIN — OXYCODONE HYDROCHLORIDE 10 MG: 5 SOLUTION ORAL at 05:02

## 2021-02-24 LAB
ALBUMIN SERPL BCP-MCNC: 2.4 G/DL (ref 3.5–5.2)
ALP SERPL-CCNC: 87 U/L (ref 55–135)
ALT SERPL W/O P-5'-P-CCNC: 42 U/L (ref 10–44)
ANION GAP SERPL CALC-SCNC: 8 MMOL/L (ref 8–16)
AST SERPL-CCNC: 30 U/L (ref 10–40)
BASOPHILS # BLD AUTO: 0.01 K/UL (ref 0–0.2)
BASOPHILS NFR BLD: 0.2 % (ref 0–1.9)
BILIRUB SERPL-MCNC: 0.3 MG/DL (ref 0.1–1)
BUN SERPL-MCNC: 6 MG/DL (ref 6–20)
CALCIUM SERPL-MCNC: 8.2 MG/DL (ref 8.7–10.5)
CHLORIDE SERPL-SCNC: 101 MMOL/L (ref 95–110)
CO2 SERPL-SCNC: 26 MMOL/L (ref 23–29)
CREAT SERPL-MCNC: 0.7 MG/DL (ref 0.5–1.4)
CRP SERPL-MCNC: 16.15 MG/L (ref 0–3.19)
DIFFERENTIAL METHOD: ABNORMAL
EOSINOPHIL # BLD AUTO: 0.1 K/UL (ref 0–0.5)
EOSINOPHIL NFR BLD: 2.3 % (ref 0–8)
ERYTHROCYTE [DISTWIDTH] IN BLOOD BY AUTOMATED COUNT: 15.9 % (ref 11.5–14.5)
EST. GFR  (AFRICAN AMERICAN): >60 ML/MIN/1.73 M^2
EST. GFR  (NON AFRICAN AMERICAN): >60 ML/MIN/1.73 M^2
GLUCOSE SERPL-MCNC: 132 MG/DL (ref 70–110)
HCT VFR BLD AUTO: 33.8 % (ref 37–48.5)
HGB BLD-MCNC: 11.1 G/DL (ref 12–16)
IMM GRANULOCYTES # BLD AUTO: 0.01 K/UL (ref 0–0.04)
IMM GRANULOCYTES NFR BLD AUTO: 0.2 % (ref 0–0.5)
LYMPHOCYTES # BLD AUTO: 0.8 K/UL (ref 1–4.8)
LYMPHOCYTES NFR BLD: 12.8 % (ref 18–48)
MAGNESIUM SERPL-MCNC: 1.8 MG/DL (ref 1.6–2.6)
MCH RBC QN AUTO: 28 PG (ref 27–31)
MCHC RBC AUTO-ENTMCNC: 32.8 G/DL (ref 32–36)
MCV RBC AUTO: 85 FL (ref 82–98)
MONOCYTES # BLD AUTO: 0.7 K/UL (ref 0.3–1)
MONOCYTES NFR BLD: 12.2 % (ref 4–15)
NEUTROPHILS # BLD AUTO: 4.3 K/UL (ref 1.8–7.7)
NEUTROPHILS NFR BLD: 72.3 % (ref 38–73)
NRBC BLD-RTO: 0 /100 WBC
PHOSPHATE SERPL-MCNC: 4.2 MG/DL (ref 2.7–4.5)
PLATELET # BLD AUTO: 239 K/UL (ref 150–350)
PMV BLD AUTO: 10.3 FL (ref 9.2–12.9)
POCT GLUCOSE: 114 MG/DL (ref 70–110)
POCT GLUCOSE: 140 MG/DL (ref 70–110)
POCT GLUCOSE: 98 MG/DL (ref 70–110)
POTASSIUM SERPL-SCNC: 3.6 MMOL/L (ref 3.5–5.1)
PROT SERPL-MCNC: 5.1 G/DL (ref 6–8.4)
RBC # BLD AUTO: 3.96 M/UL (ref 4–5.4)
SODIUM SERPL-SCNC: 135 MMOL/L (ref 136–145)
WBC # BLD AUTO: 6 K/UL (ref 3.9–12.7)

## 2021-02-24 PROCEDURE — 63600175 PHARM REV CODE 636 W HCPCS: Performed by: STUDENT IN AN ORGANIZED HEALTH CARE EDUCATION/TRAINING PROGRAM

## 2021-02-24 PROCEDURE — 25000003 PHARM REV CODE 250: Performed by: NURSE PRACTITIONER

## 2021-02-24 PROCEDURE — 86141 C-REACTIVE PROTEIN HS: CPT

## 2021-02-24 PROCEDURE — 84100 ASSAY OF PHOSPHORUS: CPT

## 2021-02-24 PROCEDURE — 80053 COMPREHEN METABOLIC PANEL: CPT

## 2021-02-24 PROCEDURE — C9113 INJ PANTOPRAZOLE SODIUM, VIA: HCPCS | Performed by: SURGERY

## 2021-02-24 PROCEDURE — 97110 THERAPEUTIC EXERCISES: CPT

## 2021-02-24 PROCEDURE — 63600175 PHARM REV CODE 636 W HCPCS: Performed by: SURGERY

## 2021-02-24 PROCEDURE — 25000003 PHARM REV CODE 250: Performed by: SURGERY

## 2021-02-24 PROCEDURE — 20600001 HC STEP DOWN PRIVATE ROOM

## 2021-02-24 PROCEDURE — 25000003 PHARM REV CODE 250: Performed by: STUDENT IN AN ORGANIZED HEALTH CARE EDUCATION/TRAINING PROGRAM

## 2021-02-24 PROCEDURE — 83735 ASSAY OF MAGNESIUM: CPT

## 2021-02-24 PROCEDURE — 97530 THERAPEUTIC ACTIVITIES: CPT

## 2021-02-24 PROCEDURE — 85025 COMPLETE CBC W/AUTO DIFF WBC: CPT

## 2021-02-24 RX ORDER — PANTOPRAZOLE SODIUM 40 MG/10ML
40 INJECTION, POWDER, LYOPHILIZED, FOR SOLUTION INTRAVENOUS DAILY
Status: DISCONTINUED | OUTPATIENT
Start: 2021-02-24 | End: 2021-03-01

## 2021-02-24 RX ORDER — OXYCODONE HCL 5 MG/5 ML
5 SOLUTION, ORAL ORAL EVERY 6 HOURS PRN
Status: DISCONTINUED | OUTPATIENT
Start: 2021-02-24 | End: 2021-02-26

## 2021-02-24 RX ORDER — OXYCODONE HYDROCHLORIDE 5 MG/1
5 TABLET ORAL EVERY 6 HOURS PRN
Status: DISCONTINUED | OUTPATIENT
Start: 2021-02-24 | End: 2021-02-24

## 2021-02-24 RX ORDER — TRAMADOL HYDROCHLORIDE 50 MG/1
50 TABLET ORAL EVERY 6 HOURS PRN
Status: DISCONTINUED | OUTPATIENT
Start: 2021-02-24 | End: 2021-03-04 | Stop reason: HOSPADM

## 2021-02-24 RX ORDER — ACETAMINOPHEN 650 MG/20.3ML
650 LIQUID ORAL EVERY 6 HOURS
Status: DISCONTINUED | OUTPATIENT
Start: 2021-02-24 | End: 2021-03-04 | Stop reason: HOSPADM

## 2021-02-24 RX ORDER — TRIPROLIDINE/PSEUDOEPHEDRINE 2.5MG-60MG
400 TABLET ORAL EVERY 6 HOURS
Status: DISCONTINUED | OUTPATIENT
Start: 2021-02-24 | End: 2021-03-03

## 2021-02-24 RX ORDER — ACETAMINOPHEN 650 MG/20.3ML
650 LIQUID ORAL EVERY 6 HOURS
Status: DISCONTINUED | OUTPATIENT
Start: 2021-02-24 | End: 2021-02-24

## 2021-02-24 RX ORDER — IBUPROFEN 400 MG/1
400 TABLET ORAL EVERY 6 HOURS
Status: DISCONTINUED | OUTPATIENT
Start: 2021-02-24 | End: 2021-02-24

## 2021-02-24 RX ORDER — GABAPENTIN 250 MG/5ML
250 SOLUTION ORAL EVERY 8 HOURS
Status: DISCONTINUED | OUTPATIENT
Start: 2021-02-24 | End: 2021-03-03

## 2021-02-24 RX ADMIN — METHOCARBAMOL 500 MG: 100 INJECTION INTRAMUSCULAR; INTRAVENOUS at 01:02

## 2021-02-24 RX ADMIN — ONDANSETRON 8 MG: 2 INJECTION INTRAMUSCULAR; INTRAVENOUS at 03:02

## 2021-02-24 RX ADMIN — METHOCARBAMOL 500 MG: 100 INJECTION INTRAMUSCULAR; INTRAVENOUS at 10:02

## 2021-02-24 RX ADMIN — OXYCODONE HYDROCHLORIDE 10 MG: 10 TABLET ORAL at 02:02

## 2021-02-24 RX ADMIN — PANTOPRAZOLE SODIUM 40 MG: 40 INJECTION, POWDER, FOR SOLUTION INTRAVENOUS at 08:02

## 2021-02-24 RX ADMIN — TRAMADOL HYDROCHLORIDE 50 MG: 50 TABLET, COATED ORAL at 10:02

## 2021-02-24 RX ADMIN — ACETAMINOPHEN 650 MG: 160 SOLUTION ORAL at 05:02

## 2021-02-24 RX ADMIN — IBUPROFEN 400 MG: 100 SUSPENSION ORAL at 05:02

## 2021-02-24 RX ADMIN — ROSUVASTATIN CALCIUM 10 MG: 10 TABLET, FILM COATED ORAL at 08:02

## 2021-02-24 RX ADMIN — GABAPENTIN 250 MG: 250 SOLUTION ORAL at 10:02

## 2021-02-24 RX ADMIN — POTASSIUM BICARBONATE 40 MEQ: 391 TABLET, EFFERVESCENT ORAL at 12:02

## 2021-02-24 RX ADMIN — GABAPENTIN 250 MG: 250 SOLUTION ORAL at 01:02

## 2021-02-24 RX ADMIN — ACETAMINOPHEN 1000 MG: 500 TABLET ORAL at 05:02

## 2021-02-24 RX ADMIN — IBUPROFEN 400 MG: 100 SUSPENSION ORAL at 10:02

## 2021-02-24 RX ADMIN — ONDANSETRON 8 MG: 2 INJECTION INTRAMUSCULAR; INTRAVENOUS at 08:02

## 2021-02-24 RX ADMIN — METHOCARBAMOL TABLETS 500 MG: 500 TABLET, COATED ORAL at 05:02

## 2021-02-24 RX ADMIN — LIDOCAINE 1 PATCH: 50 PATCH CUTANEOUS at 11:02

## 2021-02-24 RX ADMIN — ACETAMINOPHEN 650 MG: 160 SOLUTION ORAL at 11:02

## 2021-02-24 RX ADMIN — ENOXAPARIN SODIUM 40 MG: 40 INJECTION SUBCUTANEOUS at 05:02

## 2021-02-25 LAB
ALBUMIN SERPL BCP-MCNC: 2.2 G/DL (ref 3.5–5.2)
ALP SERPL-CCNC: 84 U/L (ref 55–135)
ALT SERPL W/O P-5'-P-CCNC: 41 U/L (ref 10–44)
ANION GAP SERPL CALC-SCNC: 8 MMOL/L (ref 8–16)
AST SERPL-CCNC: 35 U/L (ref 10–40)
BASOPHILS # BLD AUTO: 0.01 K/UL (ref 0–0.2)
BASOPHILS NFR BLD: 0.2 % (ref 0–1.9)
BILIRUB SERPL-MCNC: 0.3 MG/DL (ref 0.1–1)
BUN SERPL-MCNC: 10 MG/DL (ref 6–20)
CALCIUM SERPL-MCNC: 7.9 MG/DL (ref 8.7–10.5)
CHLORIDE SERPL-SCNC: 101 MMOL/L (ref 95–110)
CO2 SERPL-SCNC: 27 MMOL/L (ref 23–29)
COMMENT: NORMAL
CREAT SERPL-MCNC: 0.7 MG/DL (ref 0.5–1.4)
DIFFERENTIAL METHOD: ABNORMAL
EOSINOPHIL # BLD AUTO: 0.2 K/UL (ref 0–0.5)
EOSINOPHIL NFR BLD: 5.1 % (ref 0–8)
ERYTHROCYTE [DISTWIDTH] IN BLOOD BY AUTOMATED COUNT: 15.9 % (ref 11.5–14.5)
EST. GFR  (AFRICAN AMERICAN): >60 ML/MIN/1.73 M^2
EST. GFR  (NON AFRICAN AMERICAN): >60 ML/MIN/1.73 M^2
FINAL PATHOLOGIC DIAGNOSIS: NORMAL
FROZEN SECTION DIAGNOSIS: NORMAL
FROZEN SECTION FOOTNOTE: NORMAL
GLUCOSE SERPL-MCNC: 114 MG/DL (ref 70–110)
GROSS: NORMAL
HCT VFR BLD AUTO: 31.6 % (ref 37–48.5)
HGB BLD-MCNC: 10.3 G/DL (ref 12–16)
IMM GRANULOCYTES # BLD AUTO: 0.02 K/UL (ref 0–0.04)
IMM GRANULOCYTES NFR BLD AUTO: 0.4 % (ref 0–0.5)
LYMPHOCYTES # BLD AUTO: 1 K/UL (ref 1–4.8)
LYMPHOCYTES NFR BLD: 21.3 % (ref 18–48)
Lab: NORMAL
MAGNESIUM SERPL-MCNC: 1.7 MG/DL (ref 1.6–2.6)
MCH RBC QN AUTO: 28 PG (ref 27–31)
MCHC RBC AUTO-ENTMCNC: 32.6 G/DL (ref 32–36)
MCV RBC AUTO: 86 FL (ref 82–98)
MONOCYTES # BLD AUTO: 0.5 K/UL (ref 0.3–1)
MONOCYTES NFR BLD: 10.2 % (ref 4–15)
NEUTROPHILS # BLD AUTO: 2.8 K/UL (ref 1.8–7.7)
NEUTROPHILS NFR BLD: 62.8 % (ref 38–73)
NRBC BLD-RTO: 0 /100 WBC
PHOSPHATE SERPL-MCNC: 4.3 MG/DL (ref 2.7–4.5)
PLATELET # BLD AUTO: 221 K/UL (ref 150–350)
PMV BLD AUTO: 9.9 FL (ref 9.2–12.9)
POCT GLUCOSE: 125 MG/DL (ref 70–110)
POCT GLUCOSE: 95 MG/DL (ref 70–110)
POCT GLUCOSE: 96 MG/DL (ref 70–110)
POTASSIUM SERPL-SCNC: 3.8 MMOL/L (ref 3.5–5.1)
PREALB SERPL-MCNC: 14 MG/DL (ref 20–43)
PROT SERPL-MCNC: 5 G/DL (ref 6–8.4)
RBC # BLD AUTO: 3.68 M/UL (ref 4–5.4)
SODIUM SERPL-SCNC: 136 MMOL/L (ref 136–145)
WBC # BLD AUTO: 4.5 K/UL (ref 3.9–12.7)

## 2021-02-25 PROCEDURE — 36415 COLL VENOUS BLD VENIPUNCTURE: CPT

## 2021-02-25 PROCEDURE — 25000003 PHARM REV CODE 250: Performed by: NURSE PRACTITIONER

## 2021-02-25 PROCEDURE — 85025 COMPLETE CBC W/AUTO DIFF WBC: CPT

## 2021-02-25 PROCEDURE — 63600175 PHARM REV CODE 636 W HCPCS: Performed by: STUDENT IN AN ORGANIZED HEALTH CARE EDUCATION/TRAINING PROGRAM

## 2021-02-25 PROCEDURE — 83735 ASSAY OF MAGNESIUM: CPT

## 2021-02-25 PROCEDURE — 84134 ASSAY OF PREALBUMIN: CPT

## 2021-02-25 PROCEDURE — 25000003 PHARM REV CODE 250: Performed by: STUDENT IN AN ORGANIZED HEALTH CARE EDUCATION/TRAINING PROGRAM

## 2021-02-25 PROCEDURE — 84100 ASSAY OF PHOSPHORUS: CPT

## 2021-02-25 PROCEDURE — 25000003 PHARM REV CODE 250: Performed by: SURGERY

## 2021-02-25 PROCEDURE — 20600001 HC STEP DOWN PRIVATE ROOM

## 2021-02-25 PROCEDURE — 97116 GAIT TRAINING THERAPY: CPT

## 2021-02-25 PROCEDURE — 63600175 PHARM REV CODE 636 W HCPCS: Performed by: SURGERY

## 2021-02-25 PROCEDURE — 80053 COMPREHEN METABOLIC PANEL: CPT

## 2021-02-25 PROCEDURE — 97110 THERAPEUTIC EXERCISES: CPT

## 2021-02-25 PROCEDURE — C9113 INJ PANTOPRAZOLE SODIUM, VIA: HCPCS | Performed by: SURGERY

## 2021-02-25 RX ORDER — POLYETHYLENE GLYCOL 3350 17 G/17G
17 POWDER, FOR SOLUTION ORAL DAILY
Status: DISCONTINUED | OUTPATIENT
Start: 2021-02-25 | End: 2021-03-04 | Stop reason: HOSPADM

## 2021-02-25 RX ORDER — BISACODYL 10 MG
10 SUPPOSITORY, RECTAL RECTAL ONCE
Status: DISCONTINUED | OUTPATIENT
Start: 2021-02-25 | End: 2021-03-04 | Stop reason: HOSPADM

## 2021-02-25 RX ORDER — BISACODYL 10 MG
10 SUPPOSITORY, RECTAL RECTAL DAILY PRN
Status: DISCONTINUED | OUTPATIENT
Start: 2021-02-25 | End: 2021-02-25

## 2021-02-25 RX ORDER — ROSUVASTATIN CALCIUM 10 MG/1
10 TABLET, COATED ORAL DAILY
Status: DISCONTINUED | OUTPATIENT
Start: 2021-02-26 | End: 2021-03-04 | Stop reason: HOSPADM

## 2021-02-25 RX ADMIN — METHOCARBAMOL 500 MG: 100 INJECTION INTRAMUSCULAR; INTRAVENOUS at 02:02

## 2021-02-25 RX ADMIN — METHOCARBAMOL 500 MG: 100 INJECTION INTRAMUSCULAR; INTRAVENOUS at 06:02

## 2021-02-25 RX ADMIN — ENOXAPARIN SODIUM 40 MG: 40 INJECTION SUBCUTANEOUS at 05:02

## 2021-02-25 RX ADMIN — ACETAMINOPHEN 650 MG: 160 SOLUTION ORAL at 06:02

## 2021-02-25 RX ADMIN — ONDANSETRON 8 MG: 2 INJECTION INTRAMUSCULAR; INTRAVENOUS at 09:02

## 2021-02-25 RX ADMIN — ACETAMINOPHEN 650 MG: 160 SOLUTION ORAL at 05:02

## 2021-02-25 RX ADMIN — GABAPENTIN 250 MG: 250 SOLUTION ORAL at 06:02

## 2021-02-25 RX ADMIN — TRAMADOL HYDROCHLORIDE 50 MG: 50 TABLET, COATED ORAL at 10:02

## 2021-02-25 RX ADMIN — GABAPENTIN 250 MG: 250 SOLUTION ORAL at 01:02

## 2021-02-25 RX ADMIN — ROSUVASTATIN CALCIUM 10 MG: 10 TABLET, FILM COATED ORAL at 09:02

## 2021-02-25 RX ADMIN — IBUPROFEN 400 MG: 100 SUSPENSION ORAL at 06:02

## 2021-02-25 RX ADMIN — TRAMADOL HYDROCHLORIDE 50 MG: 50 TABLET, COATED ORAL at 03:02

## 2021-02-25 RX ADMIN — IBUPROFEN 400 MG: 100 SUSPENSION ORAL at 01:02

## 2021-02-25 RX ADMIN — LIDOCAINE 1 PATCH: 50 PATCH CUTANEOUS at 12:02

## 2021-02-25 RX ADMIN — PANTOPRAZOLE SODIUM 40 MG: 40 INJECTION, POWDER, FOR SOLUTION INTRAVENOUS at 09:02

## 2021-02-25 RX ADMIN — SIMETHICONE 80 MG: 80 TABLET, CHEWABLE ORAL at 03:02

## 2021-02-25 RX ADMIN — GABAPENTIN 250 MG: 250 SOLUTION ORAL at 09:02

## 2021-02-25 RX ADMIN — POLYETHYLENE GLYCOL 3350 17 G: 17 POWDER, FOR SOLUTION ORAL at 09:02

## 2021-02-25 RX ADMIN — SIMETHICONE 80 MG: 80 TABLET, CHEWABLE ORAL at 10:02

## 2021-02-25 RX ADMIN — PROMETHAZINE HYDROCHLORIDE 6.25 MG: 25 INJECTION INTRAMUSCULAR; INTRAVENOUS at 12:02

## 2021-02-25 RX ADMIN — METHOCARBAMOL 500 MG: 100 INJECTION INTRAMUSCULAR; INTRAVENOUS at 09:02

## 2021-02-25 RX ADMIN — ACETAMINOPHEN 650 MG: 160 SOLUTION ORAL at 12:02

## 2021-02-26 LAB
ALBUMIN SERPL BCP-MCNC: 2.2 G/DL (ref 3.5–5.2)
ALP SERPL-CCNC: 80 U/L (ref 55–135)
ALT SERPL W/O P-5'-P-CCNC: 33 U/L (ref 10–44)
ANION GAP SERPL CALC-SCNC: 9 MMOL/L (ref 8–16)
AST SERPL-CCNC: 21 U/L (ref 10–40)
BACTERIA SPEC ANAEROBE CULT: NORMAL
BASOPHILS # BLD AUTO: 0.02 K/UL (ref 0–0.2)
BASOPHILS NFR BLD: 0.3 % (ref 0–1.9)
BILIRUB SERPL-MCNC: 0.2 MG/DL (ref 0.1–1)
BUN SERPL-MCNC: 13 MG/DL (ref 6–20)
CALCIUM SERPL-MCNC: 7.6 MG/DL (ref 8.7–10.5)
CHLORIDE SERPL-SCNC: 101 MMOL/L (ref 95–110)
CO2 SERPL-SCNC: 29 MMOL/L (ref 23–29)
CREAT SERPL-MCNC: 0.8 MG/DL (ref 0.5–1.4)
DIFFERENTIAL METHOD: ABNORMAL
EOSINOPHIL # BLD AUTO: 0.2 K/UL (ref 0–0.5)
EOSINOPHIL NFR BLD: 3.7 % (ref 0–8)
ERYTHROCYTE [DISTWIDTH] IN BLOOD BY AUTOMATED COUNT: 15.8 % (ref 11.5–14.5)
EST. GFR  (AFRICAN AMERICAN): >60 ML/MIN/1.73 M^2
EST. GFR  (NON AFRICAN AMERICAN): >60 ML/MIN/1.73 M^2
GLUCOSE SERPL-MCNC: 111 MG/DL (ref 70–110)
HCT VFR BLD AUTO: 32.8 % (ref 37–48.5)
HGB BLD-MCNC: 10.4 G/DL (ref 12–16)
IMM GRANULOCYTES # BLD AUTO: 0.03 K/UL (ref 0–0.04)
IMM GRANULOCYTES NFR BLD AUTO: 0.5 % (ref 0–0.5)
LYMPHOCYTES # BLD AUTO: 1 K/UL (ref 1–4.8)
LYMPHOCYTES NFR BLD: 14.8 % (ref 18–48)
MAGNESIUM SERPL-MCNC: 1.8 MG/DL (ref 1.6–2.6)
MCH RBC QN AUTO: 27.8 PG (ref 27–31)
MCHC RBC AUTO-ENTMCNC: 31.7 G/DL (ref 32–36)
MCV RBC AUTO: 88 FL (ref 82–98)
MONOCYTES # BLD AUTO: 0.6 K/UL (ref 0.3–1)
MONOCYTES NFR BLD: 9 % (ref 4–15)
NEUTROPHILS # BLD AUTO: 4.6 K/UL (ref 1.8–7.7)
NEUTROPHILS NFR BLD: 71.7 % (ref 38–73)
NRBC BLD-RTO: 0 /100 WBC
PHOSPHATE SERPL-MCNC: 4.2 MG/DL (ref 2.7–4.5)
PLATELET # BLD AUTO: 295 K/UL (ref 150–350)
PMV BLD AUTO: 10.2 FL (ref 9.2–12.9)
POCT GLUCOSE: 72 MG/DL (ref 70–110)
POCT GLUCOSE: 91 MG/DL (ref 70–110)
POCT GLUCOSE: 95 MG/DL (ref 70–110)
POTASSIUM SERPL-SCNC: 3.5 MMOL/L (ref 3.5–5.1)
PROT SERPL-MCNC: 5 G/DL (ref 6–8.4)
RBC # BLD AUTO: 3.74 M/UL (ref 4–5.4)
SODIUM SERPL-SCNC: 139 MMOL/L (ref 136–145)
WBC # BLD AUTO: 6.47 K/UL (ref 3.9–12.7)

## 2021-02-26 PROCEDURE — 94761 N-INVAS EAR/PLS OXIMETRY MLT: CPT

## 2021-02-26 PROCEDURE — 20600001 HC STEP DOWN PRIVATE ROOM

## 2021-02-26 PROCEDURE — 99900035 HC TECH TIME PER 15 MIN (STAT)

## 2021-02-26 PROCEDURE — 83735 ASSAY OF MAGNESIUM: CPT

## 2021-02-26 PROCEDURE — 97530 THERAPEUTIC ACTIVITIES: CPT

## 2021-02-26 PROCEDURE — 63600175 PHARM REV CODE 636 W HCPCS: Performed by: SURGERY

## 2021-02-26 PROCEDURE — 85025 COMPLETE CBC W/AUTO DIFF WBC: CPT

## 2021-02-26 PROCEDURE — 25000003 PHARM REV CODE 250: Performed by: NURSE PRACTITIONER

## 2021-02-26 PROCEDURE — 36415 COLL VENOUS BLD VENIPUNCTURE: CPT

## 2021-02-26 PROCEDURE — 63600175 PHARM REV CODE 636 W HCPCS: Performed by: STUDENT IN AN ORGANIZED HEALTH CARE EDUCATION/TRAINING PROGRAM

## 2021-02-26 PROCEDURE — 25000003 PHARM REV CODE 250: Performed by: SURGERY

## 2021-02-26 PROCEDURE — 97110 THERAPEUTIC EXERCISES: CPT

## 2021-02-26 PROCEDURE — 84100 ASSAY OF PHOSPHORUS: CPT

## 2021-02-26 PROCEDURE — 27000646 HC AEROBIKA DEVICE

## 2021-02-26 PROCEDURE — 80053 COMPREHEN METABOLIC PANEL: CPT

## 2021-02-26 PROCEDURE — 94664 DEMO&/EVAL PT USE INHALER: CPT

## 2021-02-26 PROCEDURE — C9113 INJ PANTOPRAZOLE SODIUM, VIA: HCPCS | Performed by: SURGERY

## 2021-02-26 RX ORDER — POLYETHYLENE GLYCOL 3350 17 G/17G
17 POWDER, FOR SOLUTION ORAL DAILY
Qty: 238 G | Refills: 0 | Status: SHIPPED | OUTPATIENT
Start: 2021-02-27 | End: 2021-03-18

## 2021-02-26 RX ORDER — OXYCODONE HYDROCHLORIDE 5 MG/1
5 TABLET ORAL EVERY 6 HOURS PRN
Status: DISCONTINUED | OUTPATIENT
Start: 2021-02-26 | End: 2021-03-04 | Stop reason: HOSPADM

## 2021-02-26 RX ORDER — METHOCARBAMOL 500 MG/1
500 TABLET, FILM COATED ORAL EVERY 8 HOURS PRN
Status: DISCONTINUED | OUTPATIENT
Start: 2021-02-26 | End: 2021-03-04 | Stop reason: HOSPADM

## 2021-02-26 RX ORDER — SIMETHICONE 80 MG
80 TABLET,CHEWABLE ORAL 3 TIMES DAILY PRN
Qty: 20 TABLET | Refills: 0 | Status: SHIPPED | OUTPATIENT
Start: 2021-02-26 | End: 2022-03-25

## 2021-02-26 RX ADMIN — IBUPROFEN 400 MG: 100 SUSPENSION ORAL at 05:02

## 2021-02-26 RX ADMIN — POLYETHYLENE GLYCOL 3350 17 G: 17 POWDER, FOR SOLUTION ORAL at 08:02

## 2021-02-26 RX ADMIN — GABAPENTIN 250 MG: 250 SOLUTION ORAL at 09:02

## 2021-02-26 RX ADMIN — IBUPROFEN 400 MG: 100 SUSPENSION ORAL at 12:02

## 2021-02-26 RX ADMIN — LIDOCAINE 1 PATCH: 50 PATCH CUTANEOUS at 12:02

## 2021-02-26 RX ADMIN — IBUPROFEN 400 MG: 100 SUSPENSION ORAL at 01:02

## 2021-02-26 RX ADMIN — PANTOPRAZOLE SODIUM 40 MG: 40 INJECTION, POWDER, FOR SOLUTION INTRAVENOUS at 08:02

## 2021-02-26 RX ADMIN — ACETAMINOPHEN 650 MG: 160 SOLUTION ORAL at 11:02

## 2021-02-26 RX ADMIN — ONDANSETRON 8 MG: 2 INJECTION INTRAMUSCULAR; INTRAVENOUS at 04:02

## 2021-02-26 RX ADMIN — IBUPROFEN 400 MG: 100 SUSPENSION ORAL at 06:02

## 2021-02-26 RX ADMIN — ACETAMINOPHEN 650 MG: 160 SOLUTION ORAL at 01:02

## 2021-02-26 RX ADMIN — ENOXAPARIN SODIUM 40 MG: 40 INJECTION SUBCUTANEOUS at 05:02

## 2021-02-26 RX ADMIN — GABAPENTIN 250 MG: 250 SOLUTION ORAL at 01:02

## 2021-02-26 RX ADMIN — METHOCARBAMOL 500 MG: 100 INJECTION INTRAMUSCULAR; INTRAVENOUS at 06:02

## 2021-02-26 RX ADMIN — ACETAMINOPHEN 650 MG: 160 SOLUTION ORAL at 05:02

## 2021-02-26 RX ADMIN — IBUPROFEN 400 MG: 100 SUSPENSION ORAL at 11:02

## 2021-02-26 RX ADMIN — METHOCARBAMOL 500 MG: 100 INJECTION INTRAMUSCULAR; INTRAVENOUS at 01:02

## 2021-02-26 RX ADMIN — ACETAMINOPHEN 650 MG: 160 SOLUTION ORAL at 06:02

## 2021-02-26 RX ADMIN — ACETAMINOPHEN 650 MG: 160 SOLUTION ORAL at 12:02

## 2021-02-26 RX ADMIN — ROSUVASTATIN CALCIUM 10 MG: 10 TABLET, FILM COATED ORAL at 08:02

## 2021-02-26 RX ADMIN — GABAPENTIN 250 MG: 250 SOLUTION ORAL at 06:02

## 2021-02-27 LAB
ALBUMIN SERPL BCP-MCNC: 2.2 G/DL (ref 3.5–5.2)
ALP SERPL-CCNC: 82 U/L (ref 55–135)
ALT SERPL W/O P-5'-P-CCNC: 27 U/L (ref 10–44)
ANION GAP SERPL CALC-SCNC: 11 MMOL/L (ref 8–16)
AST SERPL-CCNC: 20 U/L (ref 10–40)
BASOPHILS # BLD AUTO: 0.03 K/UL (ref 0–0.2)
BASOPHILS NFR BLD: 0.6 % (ref 0–1.9)
BILIRUB SERPL-MCNC: 0.1 MG/DL (ref 0.1–1)
BUN SERPL-MCNC: 11 MG/DL (ref 6–20)
CALCIUM SERPL-MCNC: 7.9 MG/DL (ref 8.7–10.5)
CHLORIDE SERPL-SCNC: 101 MMOL/L (ref 95–110)
CO2 SERPL-SCNC: 27 MMOL/L (ref 23–29)
CREAT SERPL-MCNC: 0.7 MG/DL (ref 0.5–1.4)
DIFFERENTIAL METHOD: ABNORMAL
EOSINOPHIL # BLD AUTO: 0.2 K/UL (ref 0–0.5)
EOSINOPHIL NFR BLD: 4.8 % (ref 0–8)
ERYTHROCYTE [DISTWIDTH] IN BLOOD BY AUTOMATED COUNT: 15.4 % (ref 11.5–14.5)
EST. GFR  (AFRICAN AMERICAN): >60 ML/MIN/1.73 M^2
EST. GFR  (NON AFRICAN AMERICAN): >60 ML/MIN/1.73 M^2
GLUCOSE SERPL-MCNC: 107 MG/DL (ref 70–110)
HCT VFR BLD AUTO: 32.6 % (ref 37–48.5)
HGB BLD-MCNC: 10.6 G/DL (ref 12–16)
IMM GRANULOCYTES # BLD AUTO: 0.03 K/UL (ref 0–0.04)
IMM GRANULOCYTES NFR BLD AUTO: 0.6 % (ref 0–0.5)
LYMPHOCYTES # BLD AUTO: 0.8 K/UL (ref 1–4.8)
LYMPHOCYTES NFR BLD: 15.7 % (ref 18–48)
MAGNESIUM SERPL-MCNC: 1.9 MG/DL (ref 1.6–2.6)
MCH RBC QN AUTO: 28.6 PG (ref 27–31)
MCHC RBC AUTO-ENTMCNC: 32.5 G/DL (ref 32–36)
MCV RBC AUTO: 88 FL (ref 82–98)
MONOCYTES # BLD AUTO: 0.4 K/UL (ref 0.3–1)
MONOCYTES NFR BLD: 8.3 % (ref 4–15)
NEUTROPHILS # BLD AUTO: 3.5 K/UL (ref 1.8–7.7)
NEUTROPHILS NFR BLD: 70 % (ref 38–73)
NRBC BLD-RTO: 0 /100 WBC
PHOSPHATE SERPL-MCNC: 4.2 MG/DL (ref 2.7–4.5)
PLATELET # BLD AUTO: 308 K/UL (ref 150–350)
PMV BLD AUTO: 9.6 FL (ref 9.2–12.9)
POCT GLUCOSE: 106 MG/DL (ref 70–110)
POCT GLUCOSE: 96 MG/DL (ref 70–110)
POCT GLUCOSE: 96 MG/DL (ref 70–110)
POTASSIUM SERPL-SCNC: 3.5 MMOL/L (ref 3.5–5.1)
PROT SERPL-MCNC: 4.8 G/DL (ref 6–8.4)
RBC # BLD AUTO: 3.7 M/UL (ref 4–5.4)
SODIUM SERPL-SCNC: 139 MMOL/L (ref 136–145)
WBC # BLD AUTO: 4.96 K/UL (ref 3.9–12.7)

## 2021-02-27 PROCEDURE — 83735 ASSAY OF MAGNESIUM: CPT

## 2021-02-27 PROCEDURE — 94761 N-INVAS EAR/PLS OXIMETRY MLT: CPT

## 2021-02-27 PROCEDURE — 63600175 PHARM REV CODE 636 W HCPCS: Performed by: STUDENT IN AN ORGANIZED HEALTH CARE EDUCATION/TRAINING PROGRAM

## 2021-02-27 PROCEDURE — 84100 ASSAY OF PHOSPHORUS: CPT

## 2021-02-27 PROCEDURE — 63600175 PHARM REV CODE 636 W HCPCS: Performed by: SURGERY

## 2021-02-27 PROCEDURE — 94664 DEMO&/EVAL PT USE INHALER: CPT

## 2021-02-27 PROCEDURE — 99900035 HC TECH TIME PER 15 MIN (STAT)

## 2021-02-27 PROCEDURE — 25000003 PHARM REV CODE 250: Performed by: STUDENT IN AN ORGANIZED HEALTH CARE EDUCATION/TRAINING PROGRAM

## 2021-02-27 PROCEDURE — 20600001 HC STEP DOWN PRIVATE ROOM

## 2021-02-27 PROCEDURE — 85025 COMPLETE CBC W/AUTO DIFF WBC: CPT

## 2021-02-27 PROCEDURE — 27000646 HC AEROBIKA DEVICE

## 2021-02-27 PROCEDURE — 80053 COMPREHEN METABOLIC PANEL: CPT

## 2021-02-27 PROCEDURE — 36415 COLL VENOUS BLD VENIPUNCTURE: CPT

## 2021-02-27 PROCEDURE — 25000003 PHARM REV CODE 250: Performed by: NURSE PRACTITIONER

## 2021-02-27 PROCEDURE — C9113 INJ PANTOPRAZOLE SODIUM, VIA: HCPCS | Performed by: SURGERY

## 2021-02-27 RX ORDER — ACETAMINOPHEN 160 MG/5ML
650 LIQUID ORAL EVERY 6 HOURS
Qty: 812 ML | Refills: 0 | Status: SHIPPED | OUTPATIENT
Start: 2021-02-27 | End: 2021-03-14

## 2021-02-27 RX ORDER — GABAPENTIN 250 MG/5ML
250 SOLUTION ORAL EVERY 8 HOURS
Qty: 150 ML | Refills: 0 | Status: SHIPPED | OUTPATIENT
Start: 2021-02-27 | End: 2021-03-03 | Stop reason: HOSPADM

## 2021-02-27 RX ADMIN — ENOXAPARIN SODIUM 40 MG: 40 INJECTION SUBCUTANEOUS at 05:02

## 2021-02-27 RX ADMIN — ACETAMINOPHEN 650 MG: 160 SOLUTION ORAL at 05:02

## 2021-02-27 RX ADMIN — IBUPROFEN 400 MG: 100 SUSPENSION ORAL at 05:02

## 2021-02-27 RX ADMIN — IBUPROFEN 400 MG: 100 SUSPENSION ORAL at 12:02

## 2021-02-27 RX ADMIN — ACETAMINOPHEN 650 MG: 160 SOLUTION ORAL at 12:02

## 2021-02-27 RX ADMIN — ROSUVASTATIN CALCIUM 10 MG: 10 TABLET, FILM COATED ORAL at 09:02

## 2021-02-27 RX ADMIN — ONDANSETRON 8 MG: 2 INJECTION INTRAMUSCULAR; INTRAVENOUS at 07:02

## 2021-02-27 RX ADMIN — PANTOPRAZOLE SODIUM 40 MG: 40 INJECTION, POWDER, FOR SOLUTION INTRAVENOUS at 09:02

## 2021-02-27 RX ADMIN — SIMETHICONE 80 MG: 80 TABLET, CHEWABLE ORAL at 08:02

## 2021-02-27 RX ADMIN — GABAPENTIN 250 MG: 250 SOLUTION ORAL at 05:02

## 2021-02-27 RX ADMIN — GABAPENTIN 250 MG: 250 SOLUTION ORAL at 02:02

## 2021-02-27 RX ADMIN — GABAPENTIN 250 MG: 250 SOLUTION ORAL at 09:02

## 2021-02-27 RX ADMIN — SIMETHICONE 80 MG: 80 TABLET, CHEWABLE ORAL at 05:02

## 2021-02-28 PROBLEM — K31.5 DUODENAL OBSTRUCTION: Status: RESOLVED | Noted: 2021-02-11 | Resolved: 2021-02-28

## 2021-02-28 LAB
ALBUMIN SERPL BCP-MCNC: 2.1 G/DL (ref 3.5–5.2)
ALP SERPL-CCNC: 82 U/L (ref 55–135)
ALT SERPL W/O P-5'-P-CCNC: 25 U/L (ref 10–44)
ANION GAP SERPL CALC-SCNC: 8 MMOL/L (ref 8–16)
AST SERPL-CCNC: 19 U/L (ref 10–40)
BASOPHILS # BLD AUTO: 0.02 K/UL (ref 0–0.2)
BASOPHILS NFR BLD: 0.2 % (ref 0–1.9)
BILIRUB SERPL-MCNC: 0.2 MG/DL (ref 0.1–1)
BUN SERPL-MCNC: 10 MG/DL (ref 6–20)
CALCIUM SERPL-MCNC: 7.5 MG/DL (ref 8.7–10.5)
CHLORIDE SERPL-SCNC: 102 MMOL/L (ref 95–110)
CO2 SERPL-SCNC: 28 MMOL/L (ref 23–29)
CREAT SERPL-MCNC: 0.7 MG/DL (ref 0.5–1.4)
DIFFERENTIAL METHOD: ABNORMAL
EOSINOPHIL # BLD AUTO: 0.3 K/UL (ref 0–0.5)
EOSINOPHIL NFR BLD: 3.8 % (ref 0–8)
ERYTHROCYTE [DISTWIDTH] IN BLOOD BY AUTOMATED COUNT: 15.4 % (ref 11.5–14.5)
EST. GFR  (AFRICAN AMERICAN): >60 ML/MIN/1.73 M^2
EST. GFR  (NON AFRICAN AMERICAN): >60 ML/MIN/1.73 M^2
GLUCOSE SERPL-MCNC: 118 MG/DL (ref 70–110)
HCT VFR BLD AUTO: 32.5 % (ref 37–48.5)
HGB BLD-MCNC: 10.3 G/DL (ref 12–16)
IMM GRANULOCYTES # BLD AUTO: 0.03 K/UL (ref 0–0.04)
IMM GRANULOCYTES NFR BLD AUTO: 0.4 % (ref 0–0.5)
LYMPHOCYTES # BLD AUTO: 1 K/UL (ref 1–4.8)
LYMPHOCYTES NFR BLD: 12.2 % (ref 18–48)
MAGNESIUM SERPL-MCNC: 1.9 MG/DL (ref 1.6–2.6)
MCH RBC QN AUTO: 28.3 PG (ref 27–31)
MCHC RBC AUTO-ENTMCNC: 31.7 G/DL (ref 32–36)
MCV RBC AUTO: 89 FL (ref 82–98)
MONOCYTES # BLD AUTO: 0.6 K/UL (ref 0.3–1)
MONOCYTES NFR BLD: 7.2 % (ref 4–15)
NEUTROPHILS # BLD AUTO: 6.2 K/UL (ref 1.8–7.7)
NEUTROPHILS NFR BLD: 76.2 % (ref 38–73)
NRBC BLD-RTO: 0 /100 WBC
PHOSPHATE SERPL-MCNC: 3.8 MG/DL (ref 2.7–4.5)
PLATELET # BLD AUTO: 293 K/UL (ref 150–350)
PMV BLD AUTO: 10 FL (ref 9.2–12.9)
POCT GLUCOSE: 110 MG/DL (ref 70–110)
POCT GLUCOSE: 115 MG/DL (ref 70–110)
POTASSIUM SERPL-SCNC: 3.5 MMOL/L (ref 3.5–5.1)
PROT SERPL-MCNC: 4.8 G/DL (ref 6–8.4)
RBC # BLD AUTO: 3.64 M/UL (ref 4–5.4)
SODIUM SERPL-SCNC: 138 MMOL/L (ref 136–145)
WBC # BLD AUTO: 8.17 K/UL (ref 3.9–12.7)

## 2021-02-28 PROCEDURE — 85025 COMPLETE CBC W/AUTO DIFF WBC: CPT

## 2021-02-28 PROCEDURE — 25000003 PHARM REV CODE 250: Performed by: NURSE PRACTITIONER

## 2021-02-28 PROCEDURE — 27000646 HC AEROBIKA DEVICE

## 2021-02-28 PROCEDURE — 20600001 HC STEP DOWN PRIVATE ROOM

## 2021-02-28 PROCEDURE — 84100 ASSAY OF PHOSPHORUS: CPT

## 2021-02-28 PROCEDURE — 99900035 HC TECH TIME PER 15 MIN (STAT)

## 2021-02-28 PROCEDURE — 80053 COMPREHEN METABOLIC PANEL: CPT

## 2021-02-28 PROCEDURE — 94664 DEMO&/EVAL PT USE INHALER: CPT

## 2021-02-28 PROCEDURE — 83735 ASSAY OF MAGNESIUM: CPT

## 2021-02-28 PROCEDURE — 36415 COLL VENOUS BLD VENIPUNCTURE: CPT

## 2021-02-28 PROCEDURE — 94761 N-INVAS EAR/PLS OXIMETRY MLT: CPT

## 2021-02-28 PROCEDURE — 25000003 PHARM REV CODE 250: Performed by: STUDENT IN AN ORGANIZED HEALTH CARE EDUCATION/TRAINING PROGRAM

## 2021-02-28 PROCEDURE — C9113 INJ PANTOPRAZOLE SODIUM, VIA: HCPCS | Performed by: SURGERY

## 2021-02-28 PROCEDURE — 25000003 PHARM REV CODE 250: Performed by: SURGERY

## 2021-02-28 PROCEDURE — 63600175 PHARM REV CODE 636 W HCPCS: Performed by: SURGERY

## 2021-02-28 PROCEDURE — 63600175 PHARM REV CODE 636 W HCPCS: Performed by: STUDENT IN AN ORGANIZED HEALTH CARE EDUCATION/TRAINING PROGRAM

## 2021-02-28 RX ORDER — METHOCARBAMOL 500 MG/1
500 TABLET, FILM COATED ORAL 3 TIMES DAILY
Qty: 21 TABLET | Refills: 0 | Status: SHIPPED | OUTPATIENT
Start: 2021-02-28 | End: 2021-03-11

## 2021-02-28 RX ORDER — METHOCARBAMOL 750 MG/1
750 TABLET, FILM COATED ORAL ONCE
Status: COMPLETED | OUTPATIENT
Start: 2021-02-28 | End: 2021-02-28

## 2021-02-28 RX ADMIN — GABAPENTIN 250 MG: 250 SOLUTION ORAL at 05:02

## 2021-02-28 RX ADMIN — PANTOPRAZOLE SODIUM 40 MG: 40 INJECTION, POWDER, FOR SOLUTION INTRAVENOUS at 08:02

## 2021-02-28 RX ADMIN — ENOXAPARIN SODIUM 40 MG: 40 INJECTION SUBCUTANEOUS at 05:02

## 2021-02-28 RX ADMIN — METHOCARBAMOL 750 MG: 750 TABLET ORAL at 09:02

## 2021-02-28 RX ADMIN — ACETAMINOPHEN 650 MG: 160 SOLUTION ORAL at 12:02

## 2021-02-28 RX ADMIN — IBUPROFEN 400 MG: 100 SUSPENSION ORAL at 05:02

## 2021-02-28 RX ADMIN — LIDOCAINE 1 PATCH: 50 PATCH CUTANEOUS at 12:02

## 2021-02-28 RX ADMIN — GABAPENTIN 250 MG: 250 SOLUTION ORAL at 09:02

## 2021-02-28 RX ADMIN — IBUPROFEN 400 MG: 100 SUSPENSION ORAL at 12:02

## 2021-02-28 RX ADMIN — GABAPENTIN 250 MG: 250 SOLUTION ORAL at 03:02

## 2021-02-28 RX ADMIN — ROSUVASTATIN CALCIUM 10 MG: 10 TABLET, FILM COATED ORAL at 08:02

## 2021-02-28 RX ADMIN — ACETAMINOPHEN 650 MG: 160 SOLUTION ORAL at 05:02

## 2021-02-28 RX ADMIN — METHOCARBAMOL 500 MG: 500 TABLET ORAL at 06:02

## 2021-03-01 LAB
ALBUMIN SERPL BCP-MCNC: 2.1 G/DL (ref 3.5–5.2)
ALP SERPL-CCNC: 93 U/L (ref 55–135)
ALT SERPL W/O P-5'-P-CCNC: 22 U/L (ref 10–44)
ANION GAP SERPL CALC-SCNC: 9 MMOL/L (ref 8–16)
AST SERPL-CCNC: 18 U/L (ref 10–40)
BASOPHILS # BLD AUTO: 0.04 K/UL (ref 0–0.2)
BASOPHILS NFR BLD: 0.2 % (ref 0–1.9)
BILIRUB SERPL-MCNC: 0.3 MG/DL (ref 0.1–1)
BUN SERPL-MCNC: 8 MG/DL (ref 6–20)
CALCIUM SERPL-MCNC: 8 MG/DL (ref 8.7–10.5)
CHLORIDE SERPL-SCNC: 100 MMOL/L (ref 95–110)
CO2 SERPL-SCNC: 29 MMOL/L (ref 23–29)
CREAT SERPL-MCNC: 0.7 MG/DL (ref 0.5–1.4)
DIFFERENTIAL METHOD: ABNORMAL
EOSINOPHIL # BLD AUTO: 0.4 K/UL (ref 0–0.5)
EOSINOPHIL NFR BLD: 1.9 % (ref 0–8)
ERYTHROCYTE [DISTWIDTH] IN BLOOD BY AUTOMATED COUNT: 15.5 % (ref 11.5–14.5)
EST. GFR  (AFRICAN AMERICAN): >60 ML/MIN/1.73 M^2
EST. GFR  (NON AFRICAN AMERICAN): >60 ML/MIN/1.73 M^2
GLUCOSE SERPL-MCNC: 108 MG/DL (ref 70–110)
HCT VFR BLD AUTO: 35.4 % (ref 37–48.5)
HGB BLD-MCNC: 11.1 G/DL (ref 12–16)
IMM GRANULOCYTES # BLD AUTO: 0.06 K/UL (ref 0–0.04)
IMM GRANULOCYTES NFR BLD AUTO: 0.3 % (ref 0–0.5)
LYMPHOCYTES # BLD AUTO: 1.3 K/UL (ref 1–4.8)
LYMPHOCYTES NFR BLD: 7.3 % (ref 18–48)
MAGNESIUM SERPL-MCNC: 2 MG/DL (ref 1.6–2.6)
MCH RBC QN AUTO: 27.9 PG (ref 27–31)
MCHC RBC AUTO-ENTMCNC: 31.4 G/DL (ref 32–36)
MCV RBC AUTO: 89 FL (ref 82–98)
MONOCYTES # BLD AUTO: 1 K/UL (ref 0.3–1)
MONOCYTES NFR BLD: 5.5 % (ref 4–15)
NEUTROPHILS # BLD AUTO: 15.4 K/UL (ref 1.8–7.7)
NEUTROPHILS NFR BLD: 84.8 % (ref 38–73)
NRBC BLD-RTO: 0 /100 WBC
PHOSPHATE SERPL-MCNC: 4.1 MG/DL (ref 2.7–4.5)
PLATELET # BLD AUTO: 349 K/UL (ref 150–350)
PMV BLD AUTO: 10.2 FL (ref 9.2–12.9)
POCT GLUCOSE: 123 MG/DL (ref 70–110)
POTASSIUM SERPL-SCNC: 4.2 MMOL/L (ref 3.5–5.1)
PREALB SERPL-MCNC: 14 MG/DL (ref 20–43)
PROT SERPL-MCNC: 5.2 G/DL (ref 6–8.4)
RBC # BLD AUTO: 3.98 M/UL (ref 4–5.4)
SODIUM SERPL-SCNC: 138 MMOL/L (ref 136–145)
WBC # BLD AUTO: 18.15 K/UL (ref 3.9–12.7)

## 2021-03-01 PROCEDURE — 25000003 PHARM REV CODE 250: Performed by: NURSE PRACTITIONER

## 2021-03-01 PROCEDURE — 36415 COLL VENOUS BLD VENIPUNCTURE: CPT

## 2021-03-01 PROCEDURE — 63600175 PHARM REV CODE 636 W HCPCS: Performed by: STUDENT IN AN ORGANIZED HEALTH CARE EDUCATION/TRAINING PROGRAM

## 2021-03-01 PROCEDURE — 25000003 PHARM REV CODE 250: Performed by: SURGERY

## 2021-03-01 PROCEDURE — 83735 ASSAY OF MAGNESIUM: CPT

## 2021-03-01 PROCEDURE — 84100 ASSAY OF PHOSPHORUS: CPT

## 2021-03-01 PROCEDURE — 25500020 PHARM REV CODE 255: Performed by: SURGERY

## 2021-03-01 PROCEDURE — 25500020 PHARM REV CODE 255: Performed by: STUDENT IN AN ORGANIZED HEALTH CARE EDUCATION/TRAINING PROGRAM

## 2021-03-01 PROCEDURE — 20600001 HC STEP DOWN PRIVATE ROOM

## 2021-03-01 PROCEDURE — 80053 COMPREHEN METABOLIC PANEL: CPT

## 2021-03-01 PROCEDURE — 85025 COMPLETE CBC W/AUTO DIFF WBC: CPT

## 2021-03-01 PROCEDURE — 94664 DEMO&/EVAL PT USE INHALER: CPT

## 2021-03-01 PROCEDURE — 63600175 PHARM REV CODE 636 W HCPCS: Performed by: NURSE PRACTITIONER

## 2021-03-01 PROCEDURE — 84134 ASSAY OF PREALBUMIN: CPT

## 2021-03-01 RX ORDER — LIDOCAINE HYDROCHLORIDE 10 MG/ML
10 INJECTION INFILTRATION; PERINEURAL ONCE
Status: COMPLETED | OUTPATIENT
Start: 2021-03-01 | End: 2021-03-01

## 2021-03-01 RX ORDER — PANTOPRAZOLE SODIUM 40 MG/1
40 TABLET, DELAYED RELEASE ORAL DAILY
Status: DISCONTINUED | OUTPATIENT
Start: 2021-03-01 | End: 2021-03-04 | Stop reason: HOSPADM

## 2021-03-01 RX ORDER — OXYCODONE HYDROCHLORIDE 5 MG/1
5 TABLET ORAL EVERY 6 HOURS PRN
Qty: 30 TABLET | Refills: 0 | Status: SHIPPED | OUTPATIENT
Start: 2021-03-01 | End: 2021-03-04

## 2021-03-01 RX ADMIN — ACETAMINOPHEN 650 MG: 160 SOLUTION ORAL at 12:03

## 2021-03-01 RX ADMIN — ONDANSETRON 8 MG: 2 INJECTION INTRAMUSCULAR; INTRAVENOUS at 08:03

## 2021-03-01 RX ADMIN — GABAPENTIN 250 MG: 250 SOLUTION ORAL at 02:03

## 2021-03-01 RX ADMIN — IBUPROFEN 400 MG: 100 SUSPENSION ORAL at 06:03

## 2021-03-01 RX ADMIN — ENOXAPARIN SODIUM 40 MG: 40 INJECTION SUBCUTANEOUS at 05:03

## 2021-03-01 RX ADMIN — IOHEXOL 15 ML: 350 INJECTION, SOLUTION INTRAVENOUS at 10:03

## 2021-03-01 RX ADMIN — PANTOPRAZOLE SODIUM 40 MG: 40 TABLET, DELAYED RELEASE ORAL at 10:03

## 2021-03-01 RX ADMIN — ACETAMINOPHEN 650 MG: 160 SOLUTION ORAL at 06:03

## 2021-03-01 RX ADMIN — ACETAMINOPHEN 650 MG: 160 SOLUTION ORAL at 11:03

## 2021-03-01 RX ADMIN — GABAPENTIN 250 MG: 250 SOLUTION ORAL at 10:03

## 2021-03-01 RX ADMIN — PIPERACILLIN AND TAZOBACTAM 4.5 G: 4; .5 INJECTION, POWDER, LYOPHILIZED, FOR SOLUTION INTRAVENOUS; PARENTERAL at 05:03

## 2021-03-01 RX ADMIN — ACETAMINOPHEN 650 MG: 160 SOLUTION ORAL at 05:03

## 2021-03-01 RX ADMIN — ROSUVASTATIN CALCIUM 10 MG: 10 TABLET, FILM COATED ORAL at 08:03

## 2021-03-01 RX ADMIN — IBUPROFEN 400 MG: 100 SUSPENSION ORAL at 12:03

## 2021-03-01 RX ADMIN — GABAPENTIN 250 MG: 250 SOLUTION ORAL at 06:03

## 2021-03-01 RX ADMIN — IOHEXOL 75 ML: 350 INJECTION, SOLUTION INTRAVENOUS at 01:03

## 2021-03-01 RX ADMIN — IBUPROFEN 400 MG: 100 SUSPENSION ORAL at 11:03

## 2021-03-01 RX ADMIN — IBUPROFEN 400 MG: 100 SUSPENSION ORAL at 05:03

## 2021-03-01 RX ADMIN — LIDOCAINE HYDROCHLORIDE 10 ML: 10 INJECTION, SOLUTION INFILTRATION; PERINEURAL at 12:03

## 2021-03-01 RX ADMIN — METHOCARBAMOL 500 MG: 500 TABLET ORAL at 10:03

## 2021-03-01 RX ADMIN — LIDOCAINE 1 PATCH: 50 PATCH CUTANEOUS at 12:03

## 2021-03-01 RX ADMIN — METHOCARBAMOL 500 MG: 500 TABLET ORAL at 01:03

## 2021-03-01 RX ADMIN — METHOCARBAMOL 500 MG: 500 TABLET ORAL at 08:03

## 2021-03-02 ENCOUNTER — TELEPHONE (OUTPATIENT)
Dept: SURGERY | Facility: CLINIC | Age: 56
End: 2021-03-02

## 2021-03-02 LAB
ALBUMIN SERPL BCP-MCNC: 1.8 G/DL (ref 3.5–5.2)
ALP SERPL-CCNC: 93 U/L (ref 55–135)
ALT SERPL W/O P-5'-P-CCNC: 15 U/L (ref 10–44)
ANION GAP SERPL CALC-SCNC: 9 MMOL/L (ref 8–16)
AST SERPL-CCNC: 11 U/L (ref 10–40)
BASOPHILS # BLD AUTO: 0.03 K/UL (ref 0–0.2)
BASOPHILS NFR BLD: 0.2 % (ref 0–1.9)
BILIRUB SERPL-MCNC: 0.2 MG/DL (ref 0.1–1)
BUN SERPL-MCNC: 11 MG/DL (ref 6–20)
CALCIUM SERPL-MCNC: 7.5 MG/DL (ref 8.7–10.5)
CHLORIDE SERPL-SCNC: 101 MMOL/L (ref 95–110)
CO2 SERPL-SCNC: 29 MMOL/L (ref 23–29)
CREAT SERPL-MCNC: 0.7 MG/DL (ref 0.5–1.4)
DIFFERENTIAL METHOD: ABNORMAL
EOSINOPHIL # BLD AUTO: 0.5 K/UL (ref 0–0.5)
EOSINOPHIL NFR BLD: 3.3 % (ref 0–8)
ERYTHROCYTE [DISTWIDTH] IN BLOOD BY AUTOMATED COUNT: 15.8 % (ref 11.5–14.5)
EST. GFR  (AFRICAN AMERICAN): >60 ML/MIN/1.73 M^2
EST. GFR  (NON AFRICAN AMERICAN): >60 ML/MIN/1.73 M^2
GLUCOSE SERPL-MCNC: 116 MG/DL (ref 70–110)
HCT VFR BLD AUTO: 29.7 % (ref 37–48.5)
HGB BLD-MCNC: 9.2 G/DL (ref 12–16)
IMM GRANULOCYTES # BLD AUTO: 0.03 K/UL (ref 0–0.04)
IMM GRANULOCYTES NFR BLD AUTO: 0.2 % (ref 0–0.5)
INR PPP: 1 (ref 0.8–1.2)
LYMPHOCYTES # BLD AUTO: 1.1 K/UL (ref 1–4.8)
LYMPHOCYTES NFR BLD: 7.8 % (ref 18–48)
MAGNESIUM SERPL-MCNC: 2 MG/DL (ref 1.6–2.6)
MCH RBC QN AUTO: 28 PG (ref 27–31)
MCHC RBC AUTO-ENTMCNC: 31 G/DL (ref 32–36)
MCV RBC AUTO: 91 FL (ref 82–98)
MONOCYTES # BLD AUTO: 0.8 K/UL (ref 0.3–1)
MONOCYTES NFR BLD: 5.5 % (ref 4–15)
NEUTROPHILS # BLD AUTO: 11.4 K/UL (ref 1.8–7.7)
NEUTROPHILS NFR BLD: 83 % (ref 38–73)
NRBC BLD-RTO: 0 /100 WBC
PHOSPHATE SERPL-MCNC: 3.2 MG/DL (ref 2.7–4.5)
PLATELET # BLD AUTO: 280 K/UL (ref 150–350)
PMV BLD AUTO: 10 FL (ref 9.2–12.9)
POCT GLUCOSE: 130 MG/DL (ref 70–110)
POCT GLUCOSE: 139 MG/DL (ref 70–110)
POTASSIUM SERPL-SCNC: 3.5 MMOL/L (ref 3.5–5.1)
PROT SERPL-MCNC: 4.6 G/DL (ref 6–8.4)
PROTHROMBIN TIME: 10.9 SEC (ref 9–12.5)
RBC # BLD AUTO: 3.28 M/UL (ref 4–5.4)
SODIUM SERPL-SCNC: 139 MMOL/L (ref 136–145)
WBC # BLD AUTO: 13.77 K/UL (ref 3.9–12.7)

## 2021-03-02 PROCEDURE — 63600175 PHARM REV CODE 636 W HCPCS: Performed by: STUDENT IN AN ORGANIZED HEALTH CARE EDUCATION/TRAINING PROGRAM

## 2021-03-02 PROCEDURE — 85025 COMPLETE CBC W/AUTO DIFF WBC: CPT

## 2021-03-02 PROCEDURE — 80053 COMPREHEN METABOLIC PANEL: CPT

## 2021-03-02 PROCEDURE — 84100 ASSAY OF PHOSPHORUS: CPT

## 2021-03-02 PROCEDURE — 25000003 PHARM REV CODE 250: Performed by: SURGERY

## 2021-03-02 PROCEDURE — 25000003 PHARM REV CODE 250: Performed by: STUDENT IN AN ORGANIZED HEALTH CARE EDUCATION/TRAINING PROGRAM

## 2021-03-02 PROCEDURE — 36415 COLL VENOUS BLD VENIPUNCTURE: CPT

## 2021-03-02 PROCEDURE — 83735 ASSAY OF MAGNESIUM: CPT

## 2021-03-02 PROCEDURE — 97530 THERAPEUTIC ACTIVITIES: CPT

## 2021-03-02 PROCEDURE — 20600001 HC STEP DOWN PRIVATE ROOM

## 2021-03-02 PROCEDURE — 63600175 PHARM REV CODE 636 W HCPCS: Performed by: NURSE PRACTITIONER

## 2021-03-02 PROCEDURE — 87086 URINE CULTURE/COLONY COUNT: CPT

## 2021-03-02 PROCEDURE — 25000003 PHARM REV CODE 250: Performed by: NURSE PRACTITIONER

## 2021-03-02 PROCEDURE — 97535 SELF CARE MNGMENT TRAINING: CPT

## 2021-03-02 PROCEDURE — 85610 PROTHROMBIN TIME: CPT

## 2021-03-02 RX ORDER — AMOXICILLIN AND CLAVULANATE POTASSIUM 875; 125 MG/1; MG/1
1 TABLET, FILM COATED ORAL EVERY 12 HOURS
Status: DISCONTINUED | OUTPATIENT
Start: 2021-03-02 | End: 2021-03-02

## 2021-03-02 RX ADMIN — ACETAMINOPHEN 650 MG: 160 SOLUTION ORAL at 11:03

## 2021-03-02 RX ADMIN — IBUPROFEN 400 MG: 100 SUSPENSION ORAL at 12:03

## 2021-03-02 RX ADMIN — IBUPROFEN 400 MG: 100 SUSPENSION ORAL at 11:03

## 2021-03-02 RX ADMIN — IBUPROFEN 400 MG: 100 SUSPENSION ORAL at 05:03

## 2021-03-02 RX ADMIN — ACETAMINOPHEN 650 MG: 160 SOLUTION ORAL at 05:03

## 2021-03-02 RX ADMIN — GABAPENTIN 250 MG: 250 SOLUTION ORAL at 05:03

## 2021-03-02 RX ADMIN — ROSUVASTATIN CALCIUM 10 MG: 10 TABLET, FILM COATED ORAL at 08:03

## 2021-03-02 RX ADMIN — PIPERACILLIN AND TAZOBACTAM 4.5 G: 4; .5 INJECTION, POWDER, LYOPHILIZED, FOR SOLUTION INTRAVENOUS; PARENTERAL at 09:03

## 2021-03-02 RX ADMIN — ACETAMINOPHEN 650 MG: 160 SOLUTION ORAL at 06:03

## 2021-03-02 RX ADMIN — METHOCARBAMOL 500 MG: 500 TABLET ORAL at 09:03

## 2021-03-02 RX ADMIN — PANTOPRAZOLE SODIUM 40 MG: 40 TABLET, DELAYED RELEASE ORAL at 08:03

## 2021-03-02 RX ADMIN — PIPERACILLIN AND TAZOBACTAM 4.5 G: 4; .5 INJECTION, POWDER, LYOPHILIZED, FOR SOLUTION INTRAVENOUS; PARENTERAL at 11:03

## 2021-03-02 RX ADMIN — GABAPENTIN 250 MG: 250 SOLUTION ORAL at 02:03

## 2021-03-02 RX ADMIN — GABAPENTIN 250 MG: 250 SOLUTION ORAL at 11:03

## 2021-03-02 RX ADMIN — LIDOCAINE 1 PATCH: 50 PATCH CUTANEOUS at 12:03

## 2021-03-02 RX ADMIN — IBUPROFEN 400 MG: 100 SUSPENSION ORAL at 06:03

## 2021-03-02 RX ADMIN — ENOXAPARIN SODIUM 40 MG: 40 INJECTION SUBCUTANEOUS at 05:03

## 2021-03-02 RX ADMIN — PIPERACILLIN AND TAZOBACTAM 4.5 G: 4; .5 INJECTION, POWDER, LYOPHILIZED, FOR SOLUTION INTRAVENOUS; PARENTERAL at 01:03

## 2021-03-02 RX ADMIN — PIPERACILLIN AND TAZOBACTAM 4.5 G: 4; .5 INJECTION, POWDER, LYOPHILIZED, FOR SOLUTION INTRAVENOUS; PARENTERAL at 03:03

## 2021-03-03 LAB
ALBUMIN SERPL BCP-MCNC: 1.7 G/DL (ref 3.5–5.2)
ALP SERPL-CCNC: 91 U/L (ref 55–135)
ALT SERPL W/O P-5'-P-CCNC: 15 U/L (ref 10–44)
ANION GAP SERPL CALC-SCNC: 9 MMOL/L (ref 8–16)
APPEARANCE FLD: NORMAL
AST SERPL-CCNC: 10 U/L (ref 10–40)
BACTERIA UR CULT: NO GROWTH
BASOPHILS # BLD AUTO: 0.03 K/UL (ref 0–0.2)
BASOPHILS NFR BLD: 0.3 % (ref 0–1.9)
BILIRUB SERPL-MCNC: 0.2 MG/DL (ref 0.1–1)
BODY FLD TYPE: NORMAL
BODY FLUID COMMENTS: NORMAL
BUN SERPL-MCNC: 10 MG/DL (ref 6–20)
CALCIUM SERPL-MCNC: 7.5 MG/DL (ref 8.7–10.5)
CHLORIDE SERPL-SCNC: 102 MMOL/L (ref 95–110)
CO2 SERPL-SCNC: 28 MMOL/L (ref 23–29)
COLOR FLD: NORMAL
CREAT SERPL-MCNC: 0.8 MG/DL (ref 0.5–1.4)
DIFFERENTIAL METHOD: ABNORMAL
EOSINOPHIL # BLD AUTO: 0.5 K/UL (ref 0–0.5)
EOSINOPHIL NFR BLD: 6 % (ref 0–8)
EOSINOPHIL NFR FLD MANUAL: 1 %
ERYTHROCYTE [DISTWIDTH] IN BLOOD BY AUTOMATED COUNT: 15.5 % (ref 11.5–14.5)
EST. GFR  (AFRICAN AMERICAN): >60 ML/MIN/1.73 M^2
EST. GFR  (NON AFRICAN AMERICAN): >60 ML/MIN/1.73 M^2
GLUCOSE SERPL-MCNC: 97 MG/DL (ref 70–110)
GRAM STN SPEC: NORMAL
GRAM STN SPEC: NORMAL
HCT VFR BLD AUTO: 27.5 % (ref 37–48.5)
HGB BLD-MCNC: 8.7 G/DL (ref 12–16)
IMM GRANULOCYTES # BLD AUTO: 0.02 K/UL (ref 0–0.04)
IMM GRANULOCYTES NFR BLD AUTO: 0.2 % (ref 0–0.5)
LYMPHOCYTES # BLD AUTO: 1.4 K/UL (ref 1–4.8)
LYMPHOCYTES NFR BLD: 15.9 % (ref 18–48)
LYMPHOCYTES NFR FLD MANUAL: 2 %
MAGNESIUM SERPL-MCNC: 2 MG/DL (ref 1.6–2.6)
MCH RBC QN AUTO: 28.4 PG (ref 27–31)
MCHC RBC AUTO-ENTMCNC: 31.6 G/DL (ref 32–36)
MCV RBC AUTO: 90 FL (ref 82–98)
MONOCYTES # BLD AUTO: 0.7 K/UL (ref 0.3–1)
MONOCYTES NFR BLD: 7.5 % (ref 4–15)
MONOS+MACROS NFR FLD MANUAL: 1 %
NEUTROPHILS # BLD AUTO: 6.2 K/UL (ref 1.8–7.7)
NEUTROPHILS NFR BLD: 70.1 % (ref 38–73)
NEUTROPHILS NFR FLD MANUAL: 96 %
NRBC BLD-RTO: 0 /100 WBC
PHOSPHATE SERPL-MCNC: 4.4 MG/DL (ref 2.7–4.5)
PLATELET # BLD AUTO: 280 K/UL (ref 150–350)
PMV BLD AUTO: 10.2 FL (ref 9.2–12.9)
POCT GLUCOSE: 121 MG/DL (ref 70–110)
POTASSIUM SERPL-SCNC: 3 MMOL/L (ref 3.5–5.1)
PROT SERPL-MCNC: 4.6 G/DL (ref 6–8.4)
RBC # BLD AUTO: 3.06 M/UL (ref 4–5.4)
SODIUM SERPL-SCNC: 139 MMOL/L (ref 136–145)
WBC # BLD AUTO: 8.79 K/UL (ref 3.9–12.7)
WBC # FLD: NORMAL /CU MM

## 2021-03-03 PROCEDURE — 87075 CULTR BACTERIA EXCEPT BLOOD: CPT | Performed by: STUDENT IN AN ORGANIZED HEALTH CARE EDUCATION/TRAINING PROGRAM

## 2021-03-03 PROCEDURE — 87186 SC STD MICRODIL/AGAR DIL: CPT | Performed by: STUDENT IN AN ORGANIZED HEALTH CARE EDUCATION/TRAINING PROGRAM

## 2021-03-03 PROCEDURE — 36415 COLL VENOUS BLD VENIPUNCTURE: CPT

## 2021-03-03 PROCEDURE — 20600001 HC STEP DOWN PRIVATE ROOM

## 2021-03-03 PROCEDURE — 87205 SMEAR GRAM STAIN: CPT | Performed by: STUDENT IN AN ORGANIZED HEALTH CARE EDUCATION/TRAINING PROGRAM

## 2021-03-03 PROCEDURE — 25000003 PHARM REV CODE 250: Performed by: NURSE PRACTITIONER

## 2021-03-03 PROCEDURE — 63600175 PHARM REV CODE 636 W HCPCS: Performed by: RADIOLOGY

## 2021-03-03 PROCEDURE — 85025 COMPLETE CBC W/AUTO DIFF WBC: CPT

## 2021-03-03 PROCEDURE — 99900035 HC TECH TIME PER 15 MIN (STAT)

## 2021-03-03 PROCEDURE — 80053 COMPREHEN METABOLIC PANEL: CPT

## 2021-03-03 PROCEDURE — 87077 CULTURE AEROBIC IDENTIFY: CPT | Performed by: STUDENT IN AN ORGANIZED HEALTH CARE EDUCATION/TRAINING PROGRAM

## 2021-03-03 PROCEDURE — 84100 ASSAY OF PHOSPHORUS: CPT

## 2021-03-03 PROCEDURE — 25000003 PHARM REV CODE 250: Performed by: STUDENT IN AN ORGANIZED HEALTH CARE EDUCATION/TRAINING PROGRAM

## 2021-03-03 PROCEDURE — 87070 CULTURE OTHR SPECIMN AEROBIC: CPT | Performed by: STUDENT IN AN ORGANIZED HEALTH CARE EDUCATION/TRAINING PROGRAM

## 2021-03-03 PROCEDURE — 89051 BODY FLUID CELL COUNT: CPT | Performed by: STUDENT IN AN ORGANIZED HEALTH CARE EDUCATION/TRAINING PROGRAM

## 2021-03-03 PROCEDURE — 94761 N-INVAS EAR/PLS OXIMETRY MLT: CPT

## 2021-03-03 PROCEDURE — 94664 DEMO&/EVAL PT USE INHALER: CPT

## 2021-03-03 PROCEDURE — 25000003 PHARM REV CODE 250: Performed by: RADIOLOGY

## 2021-03-03 PROCEDURE — 63600175 PHARM REV CODE 636 W HCPCS: Performed by: STUDENT IN AN ORGANIZED HEALTH CARE EDUCATION/TRAINING PROGRAM

## 2021-03-03 PROCEDURE — 27000646 HC AEROBIKA DEVICE

## 2021-03-03 PROCEDURE — 83735 ASSAY OF MAGNESIUM: CPT

## 2021-03-03 RX ORDER — LIDOCAINE HYDROCHLORIDE 10 MG/ML
INJECTION INFILTRATION; PERINEURAL CODE/TRAUMA/SEDATION MEDICATION
Status: COMPLETED | OUTPATIENT
Start: 2021-03-03 | End: 2021-03-03

## 2021-03-03 RX ORDER — MIDAZOLAM HYDROCHLORIDE 1 MG/ML
INJECTION INTRAMUSCULAR; INTRAVENOUS CODE/TRAUMA/SEDATION MEDICATION
Status: COMPLETED | OUTPATIENT
Start: 2021-03-03 | End: 2021-03-03

## 2021-03-03 RX ORDER — FENTANYL CITRATE 50 UG/ML
INJECTION, SOLUTION INTRAMUSCULAR; INTRAVENOUS CODE/TRAUMA/SEDATION MEDICATION
Status: COMPLETED | OUTPATIENT
Start: 2021-03-03 | End: 2021-03-03

## 2021-03-03 RX ORDER — GABAPENTIN 100 MG/1
200 CAPSULE ORAL EVERY 8 HOURS
Status: DISCONTINUED | OUTPATIENT
Start: 2021-03-03 | End: 2021-03-04 | Stop reason: HOSPADM

## 2021-03-03 RX ORDER — IBUPROFEN 400 MG/1
400 TABLET ORAL EVERY 6 HOURS
Status: DISCONTINUED | OUTPATIENT
Start: 2021-03-03 | End: 2021-03-04 | Stop reason: HOSPADM

## 2021-03-03 RX ORDER — GABAPENTIN 100 MG/1
200 CAPSULE ORAL EVERY 8 HOURS PRN
Qty: 30 CAPSULE | Refills: 0 | Status: SHIPPED | OUTPATIENT
Start: 2021-03-03 | End: 2022-03-25

## 2021-03-03 RX ADMIN — ENOXAPARIN SODIUM 40 MG: 40 INJECTION SUBCUTANEOUS at 05:03

## 2021-03-03 RX ADMIN — IBUPROFEN 400 MG: 400 TABLET, FILM COATED ORAL at 10:03

## 2021-03-03 RX ADMIN — PIPERACILLIN AND TAZOBACTAM 4.5 G: 4; .5 INJECTION, POWDER, LYOPHILIZED, FOR SOLUTION INTRAVENOUS; PARENTERAL at 03:03

## 2021-03-03 RX ADMIN — MIDAZOLAM HYDROCHLORIDE 0.5 MG: 1 INJECTION, SOLUTION INTRAMUSCULAR; INTRAVENOUS at 11:03

## 2021-03-03 RX ADMIN — GABAPENTIN 250 MG: 250 SOLUTION ORAL at 05:03

## 2021-03-03 RX ADMIN — FENTANYL CITRATE 50 MCG: 50 INJECTION, SOLUTION INTRAMUSCULAR; INTRAVENOUS at 11:03

## 2021-03-03 RX ADMIN — ACETAMINOPHEN 650 MG: 160 SOLUTION ORAL at 05:03

## 2021-03-03 RX ADMIN — LIDOCAINE HYDROCHLORIDE 3 ML: 10 INJECTION, SOLUTION INFILTRATION; PERINEURAL at 11:03

## 2021-03-03 RX ADMIN — MIDAZOLAM HYDROCHLORIDE 1 MG: 1 INJECTION, SOLUTION INTRAMUSCULAR; INTRAVENOUS at 11:03

## 2021-03-03 RX ADMIN — PIPERACILLIN AND TAZOBACTAM 4.5 G: 4; .5 INJECTION, POWDER, LYOPHILIZED, FOR SOLUTION INTRAVENOUS; PARENTERAL at 11:03

## 2021-03-03 RX ADMIN — GABAPENTIN 200 MG: 100 CAPSULE ORAL at 10:03

## 2021-03-03 RX ADMIN — IBUPROFEN 400 MG: 400 TABLET, FILM COATED ORAL at 05:03

## 2021-03-03 RX ADMIN — PIPERACILLIN AND TAZOBACTAM 4.5 G: 4; .5 INJECTION, POWDER, LYOPHILIZED, FOR SOLUTION INTRAVENOUS; PARENTERAL at 07:03

## 2021-03-03 RX ADMIN — GABAPENTIN 250 MG: 250 SOLUTION ORAL at 02:03

## 2021-03-03 RX ADMIN — ACETAMINOPHEN 650 MG: 160 SOLUTION ORAL at 10:03

## 2021-03-03 RX ADMIN — IBUPROFEN 400 MG: 100 SUSPENSION ORAL at 05:03

## 2021-03-04 VITALS
OXYGEN SATURATION: 98 % | DIASTOLIC BLOOD PRESSURE: 74 MMHG | BODY MASS INDEX: 24.22 KG/M2 | WEIGHT: 178.81 LBS | HEART RATE: 86 BPM | HEIGHT: 72 IN | SYSTOLIC BLOOD PRESSURE: 130 MMHG | TEMPERATURE: 98 F | RESPIRATION RATE: 18 BRPM

## 2021-03-04 LAB
ALBUMIN SERPL BCP-MCNC: 1.6 G/DL (ref 3.5–5.2)
ALP SERPL-CCNC: 82 U/L (ref 55–135)
ALT SERPL W/O P-5'-P-CCNC: 9 U/L (ref 10–44)
ANION GAP SERPL CALC-SCNC: 6 MMOL/L (ref 8–16)
AST SERPL-CCNC: 12 U/L (ref 10–40)
BASOPHILS # BLD AUTO: 0.03 K/UL (ref 0–0.2)
BASOPHILS NFR BLD: 0.5 % (ref 0–1.9)
BILIRUB SERPL-MCNC: 0.1 MG/DL (ref 0.1–1)
BUN SERPL-MCNC: 10 MG/DL (ref 6–20)
CALCIUM SERPL-MCNC: 7.5 MG/DL (ref 8.7–10.5)
CHLORIDE SERPL-SCNC: 106 MMOL/L (ref 95–110)
CO2 SERPL-SCNC: 28 MMOL/L (ref 23–29)
CREAT SERPL-MCNC: 0.6 MG/DL (ref 0.5–1.4)
DIFFERENTIAL METHOD: ABNORMAL
EOSINOPHIL # BLD AUTO: 0.5 K/UL (ref 0–0.5)
EOSINOPHIL NFR BLD: 7.5 % (ref 0–8)
ERYTHROCYTE [DISTWIDTH] IN BLOOD BY AUTOMATED COUNT: 15 % (ref 11.5–14.5)
EST. GFR  (AFRICAN AMERICAN): >60 ML/MIN/1.73 M^2
EST. GFR  (NON AFRICAN AMERICAN): >60 ML/MIN/1.73 M^2
GLUCOSE SERPL-MCNC: 139 MG/DL (ref 70–110)
HCT VFR BLD AUTO: 28.6 % (ref 37–48.5)
HGB BLD-MCNC: 9.1 G/DL (ref 12–16)
IMM GRANULOCYTES # BLD AUTO: 0.01 K/UL (ref 0–0.04)
IMM GRANULOCYTES NFR BLD AUTO: 0.2 % (ref 0–0.5)
LYMPHOCYTES # BLD AUTO: 0.9 K/UL (ref 1–4.8)
LYMPHOCYTES NFR BLD: 14.1 % (ref 18–48)
MAGNESIUM SERPL-MCNC: 1.8 MG/DL (ref 1.6–2.6)
MCH RBC QN AUTO: 28.4 PG (ref 27–31)
MCHC RBC AUTO-ENTMCNC: 31.8 G/DL (ref 32–36)
MCV RBC AUTO: 89 FL (ref 82–98)
MONOCYTES # BLD AUTO: 0.4 K/UL (ref 0.3–1)
MONOCYTES NFR BLD: 6.6 % (ref 4–15)
NEUTROPHILS # BLD AUTO: 4.5 K/UL (ref 1.8–7.7)
NEUTROPHILS NFR BLD: 71.1 % (ref 38–73)
NRBC BLD-RTO: 0 /100 WBC
PHOSPHATE SERPL-MCNC: 2.9 MG/DL (ref 2.7–4.5)
PLATELET # BLD AUTO: 316 K/UL (ref 150–350)
PMV BLD AUTO: 9.8 FL (ref 9.2–12.9)
POCT GLUCOSE: 122 MG/DL (ref 70–110)
POCT GLUCOSE: 146 MG/DL (ref 70–110)
POTASSIUM SERPL-SCNC: 3.1 MMOL/L (ref 3.5–5.1)
PREALB SERPL-MCNC: 11 MG/DL (ref 20–43)
PROT SERPL-MCNC: 4.7 G/DL (ref 6–8.4)
RBC # BLD AUTO: 3.2 M/UL (ref 4–5.4)
SODIUM SERPL-SCNC: 140 MMOL/L (ref 136–145)
WBC # BLD AUTO: 6.38 K/UL (ref 3.9–12.7)

## 2021-03-04 PROCEDURE — 36415 COLL VENOUS BLD VENIPUNCTURE: CPT | Performed by: STUDENT IN AN ORGANIZED HEALTH CARE EDUCATION/TRAINING PROGRAM

## 2021-03-04 PROCEDURE — 94664 DEMO&/EVAL PT USE INHALER: CPT

## 2021-03-04 PROCEDURE — 99900035 HC TECH TIME PER 15 MIN (STAT)

## 2021-03-04 PROCEDURE — 84134 ASSAY OF PREALBUMIN: CPT | Performed by: STUDENT IN AN ORGANIZED HEALTH CARE EDUCATION/TRAINING PROGRAM

## 2021-03-04 PROCEDURE — 85025 COMPLETE CBC W/AUTO DIFF WBC: CPT | Performed by: STUDENT IN AN ORGANIZED HEALTH CARE EDUCATION/TRAINING PROGRAM

## 2021-03-04 PROCEDURE — 97535 SELF CARE MNGMENT TRAINING: CPT

## 2021-03-04 PROCEDURE — 80053 COMPREHEN METABOLIC PANEL: CPT | Performed by: STUDENT IN AN ORGANIZED HEALTH CARE EDUCATION/TRAINING PROGRAM

## 2021-03-04 PROCEDURE — 25000003 PHARM REV CODE 250: Performed by: STUDENT IN AN ORGANIZED HEALTH CARE EDUCATION/TRAINING PROGRAM

## 2021-03-04 PROCEDURE — 25000003 PHARM REV CODE 250: Performed by: NURSE PRACTITIONER

## 2021-03-04 PROCEDURE — 63600175 PHARM REV CODE 636 W HCPCS: Performed by: NURSE PRACTITIONER

## 2021-03-04 PROCEDURE — 63600175 PHARM REV CODE 636 W HCPCS: Performed by: STUDENT IN AN ORGANIZED HEALTH CARE EDUCATION/TRAINING PROGRAM

## 2021-03-04 PROCEDURE — 84100 ASSAY OF PHOSPHORUS: CPT | Performed by: STUDENT IN AN ORGANIZED HEALTH CARE EDUCATION/TRAINING PROGRAM

## 2021-03-04 PROCEDURE — 83735 ASSAY OF MAGNESIUM: CPT | Performed by: STUDENT IN AN ORGANIZED HEALTH CARE EDUCATION/TRAINING PROGRAM

## 2021-03-04 PROCEDURE — 94761 N-INVAS EAR/PLS OXIMETRY MLT: CPT

## 2021-03-04 PROCEDURE — 27000646 HC AEROBIKA DEVICE

## 2021-03-04 RX ORDER — AMOXICILLIN AND CLAVULANATE POTASSIUM 875; 125 MG/1; MG/1
1 TABLET, FILM COATED ORAL EVERY 12 HOURS
Qty: 14 TABLET | Refills: 0 | Status: SHIPPED | OUTPATIENT
Start: 2021-03-04 | End: 2021-03-11

## 2021-03-04 RX ORDER — OXYCODONE HYDROCHLORIDE 5 MG/1
5 TABLET ORAL EVERY 6 HOURS PRN
Qty: 24 TABLET | Refills: 0 | Status: ON HOLD | OUTPATIENT
Start: 2021-03-04 | End: 2021-03-26 | Stop reason: SDUPTHER

## 2021-03-04 RX ORDER — ENOXAPARIN SODIUM 100 MG/ML
40 INJECTION SUBCUTANEOUS EVERY 24 HOURS
Status: DISCONTINUED | OUTPATIENT
Start: 2021-03-04 | End: 2021-03-04 | Stop reason: HOSPADM

## 2021-03-04 RX ORDER — AMOXICILLIN AND CLAVULANATE POTASSIUM 875; 125 MG/1; MG/1
1 TABLET, FILM COATED ORAL EVERY 12 HOURS
Status: DISCONTINUED | OUTPATIENT
Start: 2021-03-04 | End: 2021-03-04 | Stop reason: HOSPADM

## 2021-03-04 RX ADMIN — PANTOPRAZOLE SODIUM 40 MG: 40 TABLET, DELAYED RELEASE ORAL at 09:03

## 2021-03-04 RX ADMIN — PIPERACILLIN AND TAZOBACTAM 4.5 G: 4; .5 INJECTION, POWDER, LYOPHILIZED, FOR SOLUTION INTRAVENOUS; PARENTERAL at 07:03

## 2021-03-04 RX ADMIN — ACETAMINOPHEN 650 MG: 160 SOLUTION ORAL at 12:03

## 2021-03-04 RX ADMIN — ACETAMINOPHEN 650 MG: 160 SOLUTION ORAL at 07:03

## 2021-03-04 RX ADMIN — IBUPROFEN 400 MG: 400 TABLET, FILM COATED ORAL at 06:03

## 2021-03-04 RX ADMIN — ENOXAPARIN SODIUM 40 MG: 40 INJECTION SUBCUTANEOUS at 09:03

## 2021-03-04 RX ADMIN — GABAPENTIN 200 MG: 100 CAPSULE ORAL at 03:03

## 2021-03-04 RX ADMIN — ROSUVASTATIN CALCIUM 10 MG: 10 TABLET, FILM COATED ORAL at 09:03

## 2021-03-04 RX ADMIN — GABAPENTIN 200 MG: 100 CAPSULE ORAL at 06:03

## 2021-03-04 RX ADMIN — AMOXICILLIN AND CLAVULANATE POTASSIUM 1 TABLET: 875; 125 TABLET, FILM COATED ORAL at 09:03

## 2021-03-04 RX ADMIN — IBUPROFEN 400 MG: 400 TABLET, FILM COATED ORAL at 12:03

## 2021-03-05 ENCOUNTER — TELEPHONE (OUTPATIENT)
Dept: SURGERY | Facility: CLINIC | Age: 56
End: 2021-03-05

## 2021-03-05 ENCOUNTER — PATIENT OUTREACH (OUTPATIENT)
Dept: ADMINISTRATIVE | Facility: CLINIC | Age: 56
End: 2021-03-05

## 2021-03-05 LAB — BACTERIA SPEC AEROBE CULT: ABNORMAL

## 2021-03-08 ENCOUNTER — TELEPHONE (OUTPATIENT)
Dept: SURGERY | Facility: CLINIC | Age: 56
End: 2021-03-08

## 2021-03-08 LAB — BACTERIA SPEC ANAEROBE CULT: NORMAL

## 2021-03-10 ENCOUNTER — OFFICE VISIT (OUTPATIENT)
Dept: HEMATOLOGY/ONCOLOGY | Facility: CLINIC | Age: 56
End: 2021-03-10
Payer: COMMERCIAL

## 2021-03-10 ENCOUNTER — OFFICE VISIT (OUTPATIENT)
Dept: SURGERY | Facility: CLINIC | Age: 56
End: 2021-03-10
Payer: COMMERCIAL

## 2021-03-10 VITALS
HEIGHT: 72 IN | BODY MASS INDEX: 24.83 KG/M2 | DIASTOLIC BLOOD PRESSURE: 78 MMHG | SYSTOLIC BLOOD PRESSURE: 125 MMHG | WEIGHT: 183.31 LBS | HEART RATE: 96 BPM

## 2021-03-10 VITALS
DIASTOLIC BLOOD PRESSURE: 76 MMHG | HEIGHT: 72 IN | OXYGEN SATURATION: 100 % | TEMPERATURE: 97 F | BODY MASS INDEX: 24.81 KG/M2 | RESPIRATION RATE: 16 BRPM | WEIGHT: 183.19 LBS | HEART RATE: 87 BPM | SYSTOLIC BLOOD PRESSURE: 141 MMHG

## 2021-03-10 DIAGNOSIS — Z09 POSTOP CHECK: Primary | ICD-10-CM

## 2021-03-10 DIAGNOSIS — C77.9 SECONDARY ADENOCARCINOMA OF LYMPH NODE: ICD-10-CM

## 2021-03-10 DIAGNOSIS — C17.0 DUODENAL CANCER: Primary | ICD-10-CM

## 2021-03-10 PROCEDURE — 1126F AMNT PAIN NOTED NONE PRSNT: CPT | Mod: S$GLB,,, | Performed by: INTERNAL MEDICINE

## 2021-03-10 PROCEDURE — 99205 OFFICE O/P NEW HI 60 MIN: CPT | Mod: S$GLB,,, | Performed by: INTERNAL MEDICINE

## 2021-03-10 PROCEDURE — 1126F PR PAIN SEVERITY QUANTIFIED, NO PAIN PRESENT: ICD-10-PCS | Mod: S$GLB,,, | Performed by: INTERNAL MEDICINE

## 2021-03-10 PROCEDURE — 99999 PR PBB SHADOW E&M-EST. PATIENT-LVL III: ICD-10-PCS | Mod: PBBFAC,,, | Performed by: SURGERY

## 2021-03-10 PROCEDURE — 99999 PR PBB SHADOW E&M-EST. PATIENT-LVL III: CPT | Mod: PBBFAC,,, | Performed by: SURGERY

## 2021-03-10 PROCEDURE — 3008F BODY MASS INDEX DOCD: CPT | Mod: CPTII,S$GLB,, | Performed by: INTERNAL MEDICINE

## 2021-03-10 PROCEDURE — 3008F BODY MASS INDEX DOCD: CPT | Mod: CPTII,S$GLB,, | Performed by: SURGERY

## 2021-03-10 PROCEDURE — 99024 POSTOP FOLLOW-UP VISIT: CPT | Mod: S$GLB,,, | Performed by: SURGERY

## 2021-03-10 PROCEDURE — 3008F PR BODY MASS INDEX (BMI) DOCUMENTED: ICD-10-PCS | Mod: CPTII,S$GLB,, | Performed by: SURGERY

## 2021-03-10 PROCEDURE — 99024 PR POST-OP FOLLOW-UP VISIT: ICD-10-PCS | Mod: S$GLB,,, | Performed by: SURGERY

## 2021-03-10 PROCEDURE — 3008F PR BODY MASS INDEX (BMI) DOCUMENTED: ICD-10-PCS | Mod: CPTII,S$GLB,, | Performed by: INTERNAL MEDICINE

## 2021-03-10 PROCEDURE — 99999 PR PBB SHADOW E&M-EST. PATIENT-LVL III: CPT | Mod: PBBFAC,,, | Performed by: INTERNAL MEDICINE

## 2021-03-10 PROCEDURE — 99999 PR PBB SHADOW E&M-EST. PATIENT-LVL III: ICD-10-PCS | Mod: PBBFAC,,, | Performed by: INTERNAL MEDICINE

## 2021-03-10 PROCEDURE — 99205 PR OFFICE/OUTPT VISIT, NEW, LEVL V, 60-74 MIN: ICD-10-PCS | Mod: S$GLB,,, | Performed by: INTERNAL MEDICINE

## 2021-03-10 PROCEDURE — 1126F PR PAIN SEVERITY QUANTIFIED, NO PAIN PRESENT: ICD-10-PCS | Mod: S$GLB,,, | Performed by: SURGERY

## 2021-03-10 PROCEDURE — 1126F AMNT PAIN NOTED NONE PRSNT: CPT | Mod: S$GLB,,, | Performed by: SURGERY

## 2021-03-11 ENCOUNTER — TELEPHONE (OUTPATIENT)
Dept: HEMATOLOGY/ONCOLOGY | Facility: CLINIC | Age: 56
End: 2021-03-11

## 2021-03-15 ENCOUNTER — TUMOR BOARD CONFERENCE (OUTPATIENT)
Dept: SURGERY | Facility: CLINIC | Age: 56
End: 2021-03-15

## 2021-03-15 DIAGNOSIS — C17.0 DUODENAL CANCER: Primary | ICD-10-CM

## 2021-03-16 ENCOUNTER — TELEPHONE (OUTPATIENT)
Dept: SURGERY | Facility: CLINIC | Age: 56
End: 2021-03-16

## 2021-03-17 ENCOUNTER — DOCUMENTATION ONLY (OUTPATIENT)
Dept: HEMATOLOGY/ONCOLOGY | Facility: CLINIC | Age: 56
End: 2021-03-17

## 2021-03-17 ENCOUNTER — TELEPHONE (OUTPATIENT)
Dept: HEMATOLOGY/ONCOLOGY | Facility: CLINIC | Age: 56
End: 2021-03-17

## 2021-03-17 ENCOUNTER — OFFICE VISIT (OUTPATIENT)
Dept: HEMATOLOGY/ONCOLOGY | Facility: CLINIC | Age: 56
End: 2021-03-17
Payer: COMMERCIAL

## 2021-03-17 VITALS
DIASTOLIC BLOOD PRESSURE: 88 MMHG | BODY MASS INDEX: 25.02 KG/M2 | TEMPERATURE: 98 F | OXYGEN SATURATION: 99 % | WEIGHT: 184.75 LBS | RESPIRATION RATE: 16 BRPM | HEIGHT: 72 IN | HEART RATE: 86 BPM | SYSTOLIC BLOOD PRESSURE: 145 MMHG

## 2021-03-17 DIAGNOSIS — Z86.018 HISTORY OF DYSPLASTIC NEVUS: ICD-10-CM

## 2021-03-17 DIAGNOSIS — C17.0 DUODENAL CANCER: Primary | ICD-10-CM

## 2021-03-17 DIAGNOSIS — E78.00 PURE HYPERCHOLESTEROLEMIA: ICD-10-CM

## 2021-03-17 DIAGNOSIS — I10 BENIGN HYPERTENSION: ICD-10-CM

## 2021-03-17 DIAGNOSIS — Z90.411 HISTORY OF PARTIAL PANCREATECTOMY: ICD-10-CM

## 2021-03-17 LAB
ALBUMIN SERPL BCP-MCNC: 3.6 G/DL (ref 3.5–5.2)
ALP SERPL-CCNC: 57 U/L (ref 55–135)
ALT SERPL W/O P-5'-P-CCNC: 31 U/L (ref 10–44)
ANION GAP SERPL CALC-SCNC: 8 MMOL/L (ref 8–16)
AST SERPL-CCNC: 28 U/L (ref 10–40)
BASOPHILS # BLD AUTO: 0.03 K/UL (ref 0–0.2)
BASOPHILS NFR BLD: 0.6 % (ref 0–1.9)
BILIRUB SERPL-MCNC: 0.3 MG/DL (ref 0.1–1)
BUN SERPL-MCNC: 16 MG/DL (ref 6–20)
CALCIUM SERPL-MCNC: 9.1 MG/DL (ref 8.7–10.5)
CHLORIDE SERPL-SCNC: 104 MMOL/L (ref 95–110)
CO2 SERPL-SCNC: 30 MMOL/L (ref 23–29)
CREAT SERPL-MCNC: 0.7 MG/DL (ref 0.5–1.4)
DIFFERENTIAL METHOD: ABNORMAL
EOSINOPHIL # BLD AUTO: 0.4 K/UL (ref 0–0.5)
EOSINOPHIL NFR BLD: 9.1 % (ref 0–8)
ERYTHROCYTE [DISTWIDTH] IN BLOOD BY AUTOMATED COUNT: 16.1 % (ref 11.5–14.5)
EST. GFR  (AFRICAN AMERICAN): >60 ML/MIN/1.73 M^2
EST. GFR  (NON AFRICAN AMERICAN): >60 ML/MIN/1.73 M^2
GLUCOSE SERPL-MCNC: 100 MG/DL (ref 70–110)
HCT VFR BLD AUTO: 31.7 % (ref 37–48.5)
HGB BLD-MCNC: 10.1 G/DL (ref 12–16)
IMM GRANULOCYTES # BLD AUTO: 0.01 K/UL (ref 0–0.04)
IMM GRANULOCYTES NFR BLD AUTO: 0.2 % (ref 0–0.5)
LYMPHOCYTES # BLD AUTO: 1 K/UL (ref 1–4.8)
LYMPHOCYTES NFR BLD: 21.4 % (ref 18–48)
MCH RBC QN AUTO: 29 PG (ref 27–31)
MCHC RBC AUTO-ENTMCNC: 31.9 G/DL (ref 32–36)
MCV RBC AUTO: 91 FL (ref 82–98)
MONOCYTES # BLD AUTO: 0.4 K/UL (ref 0.3–1)
MONOCYTES NFR BLD: 7.9 % (ref 4–15)
NEUTROPHILS # BLD AUTO: 2.9 K/UL (ref 1.8–7.7)
NEUTROPHILS NFR BLD: 60.8 % (ref 38–73)
NRBC BLD-RTO: 0 /100 WBC
PLATELET # BLD AUTO: 214 K/UL (ref 150–350)
PMV BLD AUTO: 10.7 FL (ref 9.2–12.9)
POTASSIUM SERPL-SCNC: 3.5 MMOL/L (ref 3.5–5.1)
PROT SERPL-MCNC: 6.8 G/DL (ref 6–8.4)
RBC # BLD AUTO: 3.48 M/UL (ref 4–5.4)
SODIUM SERPL-SCNC: 142 MMOL/L (ref 136–145)
WBC # BLD AUTO: 4.71 K/UL (ref 3.9–12.7)

## 2021-03-17 PROCEDURE — 3077F SYST BP >= 140 MM HG: CPT | Mod: CPTII,S$GLB,, | Performed by: INTERNAL MEDICINE

## 2021-03-17 PROCEDURE — 99215 OFFICE O/P EST HI 40 MIN: CPT | Mod: S$GLB,,, | Performed by: INTERNAL MEDICINE

## 2021-03-17 PROCEDURE — 80053 COMPREHEN METABOLIC PANEL: CPT | Performed by: INTERNAL MEDICINE

## 2021-03-17 PROCEDURE — 3008F PR BODY MASS INDEX (BMI) DOCUMENTED: ICD-10-PCS | Mod: CPTII,S$GLB,, | Performed by: INTERNAL MEDICINE

## 2021-03-17 PROCEDURE — 1126F AMNT PAIN NOTED NONE PRSNT: CPT | Mod: S$GLB,,, | Performed by: INTERNAL MEDICINE

## 2021-03-17 PROCEDURE — 3079F PR MOST RECENT DIASTOLIC BLOOD PRESSURE 80-89 MM HG: ICD-10-PCS | Mod: CPTII,S$GLB,, | Performed by: INTERNAL MEDICINE

## 2021-03-17 PROCEDURE — 85025 COMPLETE CBC W/AUTO DIFF WBC: CPT | Performed by: INTERNAL MEDICINE

## 2021-03-17 PROCEDURE — 3077F PR MOST RECENT SYSTOLIC BLOOD PRESSURE >= 140 MM HG: ICD-10-PCS | Mod: CPTII,S$GLB,, | Performed by: INTERNAL MEDICINE

## 2021-03-17 PROCEDURE — 99999 PR PBB SHADOW E&M-EST. PATIENT-LVL IV: CPT | Mod: PBBFAC,,, | Performed by: INTERNAL MEDICINE

## 2021-03-17 PROCEDURE — 3079F DIAST BP 80-89 MM HG: CPT | Mod: CPTII,S$GLB,, | Performed by: INTERNAL MEDICINE

## 2021-03-17 PROCEDURE — 1126F PR PAIN SEVERITY QUANTIFIED, NO PAIN PRESENT: ICD-10-PCS | Mod: S$GLB,,, | Performed by: INTERNAL MEDICINE

## 2021-03-17 PROCEDURE — 99215 PR OFFICE/OUTPT VISIT, EST, LEVL V, 40-54 MIN: ICD-10-PCS | Mod: S$GLB,,, | Performed by: INTERNAL MEDICINE

## 2021-03-17 PROCEDURE — 3008F BODY MASS INDEX DOCD: CPT | Mod: CPTII,S$GLB,, | Performed by: INTERNAL MEDICINE

## 2021-03-17 PROCEDURE — 99999 PR PBB SHADOW E&M-EST. PATIENT-LVL IV: ICD-10-PCS | Mod: PBBFAC,,, | Performed by: INTERNAL MEDICINE

## 2021-03-17 RX ORDER — HEPARIN 100 UNIT/ML
500 SYRINGE INTRAVENOUS
Status: CANCELLED | OUTPATIENT
Start: 2021-04-01

## 2021-03-17 RX ORDER — EPINEPHRINE 0.3 MG/.3ML
0.3 INJECTION SUBCUTANEOUS ONCE AS NEEDED
Status: CANCELLED | OUTPATIENT
Start: 2021-03-30

## 2021-03-17 RX ORDER — SODIUM CHLORIDE 0.9 % (FLUSH) 0.9 %
10 SYRINGE (ML) INJECTION
Status: CANCELLED | OUTPATIENT
Start: 2021-04-01

## 2021-03-17 RX ORDER — HEPARIN 100 UNIT/ML
500 SYRINGE INTRAVENOUS
Status: CANCELLED | OUTPATIENT
Start: 2021-03-30

## 2021-03-17 RX ORDER — SODIUM CHLORIDE 0.9 % (FLUSH) 0.9 %
10 SYRINGE (ML) INJECTION
Status: CANCELLED | OUTPATIENT
Start: 2021-03-30

## 2021-03-17 RX ORDER — FLUOROURACIL 50 MG/ML
400 INJECTION, SOLUTION INTRAVENOUS
Status: CANCELLED | OUTPATIENT
Start: 2021-03-30

## 2021-03-17 RX ORDER — DIPHENHYDRAMINE HYDROCHLORIDE 50 MG/ML
50 INJECTION INTRAMUSCULAR; INTRAVENOUS ONCE AS NEEDED
Status: CANCELLED | OUTPATIENT
Start: 2021-03-30

## 2021-03-18 ENCOUNTER — DOCUMENTATION ONLY (OUTPATIENT)
Dept: HEMATOLOGY/ONCOLOGY | Facility: CLINIC | Age: 56
End: 2021-03-18

## 2021-03-18 ENCOUNTER — TELEPHONE (OUTPATIENT)
Dept: SURGERY | Facility: CLINIC | Age: 56
End: 2021-03-18

## 2021-03-18 ENCOUNTER — OFFICE VISIT (OUTPATIENT)
Dept: SURGERY | Facility: CLINIC | Age: 56
End: 2021-03-18
Payer: COMMERCIAL

## 2021-03-18 DIAGNOSIS — Z01.818 PRE-OP TESTING: ICD-10-CM

## 2021-03-18 DIAGNOSIS — C17.0 DUODENAL CANCER: ICD-10-CM

## 2021-03-18 PROCEDURE — 99204 PR OFFICE/OUTPT VISIT, NEW, LEVL IV, 45-59 MIN: ICD-10-PCS | Mod: 95,,, | Performed by: SURGERY

## 2021-03-18 PROCEDURE — 99204 OFFICE O/P NEW MOD 45 MIN: CPT | Mod: 95,,, | Performed by: SURGERY

## 2021-03-18 RX ORDER — SODIUM CHLORIDE 9 MG/ML
INJECTION, SOLUTION INTRAVENOUS CONTINUOUS
Status: CANCELLED | OUTPATIENT
Start: 2021-03-18

## 2021-03-19 ENCOUNTER — TELEPHONE (OUTPATIENT)
Dept: PREADMISSION TESTING | Facility: HOSPITAL | Age: 56
End: 2021-03-19

## 2021-03-19 ENCOUNTER — DOCUMENTATION ONLY (OUTPATIENT)
Dept: HEMATOLOGY/ONCOLOGY | Facility: CLINIC | Age: 56
End: 2021-03-19

## 2021-03-19 DIAGNOSIS — Z01.818 PREOP TESTING: Primary | ICD-10-CM

## 2021-03-23 ENCOUNTER — LAB VISIT (OUTPATIENT)
Dept: OTOLARYNGOLOGY | Facility: CLINIC | Age: 56
End: 2021-03-23
Payer: COMMERCIAL

## 2021-03-23 ENCOUNTER — OFFICE VISIT (OUTPATIENT)
Dept: HEMATOLOGY/ONCOLOGY | Facility: CLINIC | Age: 56
End: 2021-03-23
Payer: COMMERCIAL

## 2021-03-23 ENCOUNTER — ANESTHESIA EVENT (OUTPATIENT)
Dept: SURGERY | Facility: HOSPITAL | Age: 56
End: 2021-03-23
Payer: COMMERCIAL

## 2021-03-23 ENCOUNTER — HOSPITAL ENCOUNTER (OUTPATIENT)
Dept: CARDIOLOGY | Facility: HOSPITAL | Age: 56
Discharge: HOME | End: 2021-03-23
Attending: SURGERY
Payer: COMMERCIAL

## 2021-03-23 DIAGNOSIS — C17.0 DUODENAL CANCER: Primary | ICD-10-CM

## 2021-03-23 DIAGNOSIS — Z01.818 PRE-OP TESTING: ICD-10-CM

## 2021-03-23 DIAGNOSIS — Z01.818 PREOP TESTING: ICD-10-CM

## 2021-03-23 DIAGNOSIS — Z71.9 ENCOUNTER FOR EDUCATION: ICD-10-CM

## 2021-03-23 LAB — FUNGUS SPEC CULT: NORMAL

## 2021-03-23 PROCEDURE — 99999 PR PBB SHADOW E&M-EST. PATIENT-LVL II: CPT | Mod: PBBFAC,,, | Performed by: NURSE PRACTITIONER

## 2021-03-23 PROCEDURE — U0005 INFEC AGEN DETEC AMPLI PROBE: HCPCS | Performed by: SURGERY

## 2021-03-23 PROCEDURE — 99215 PR OFFICE/OUTPT VISIT, EST, LEVL V, 40-54 MIN: ICD-10-PCS | Mod: S$GLB,,, | Performed by: NURSE PRACTITIONER

## 2021-03-23 PROCEDURE — U0003 INFECTIOUS AGENT DETECTION BY NUCLEIC ACID (DNA OR RNA); SEVERE ACUTE RESPIRATORY SYNDROME CORONAVIRUS 2 (SARS-COV-2) (CORONAVIRUS DISEASE [COVID-19]), AMPLIFIED PROBE TECHNIQUE, MAKING USE OF HIGH THROUGHPUT TECHNOLOGIES AS DESCRIBED BY CMS-2020-01-R: HCPCS | Performed by: SURGERY

## 2021-03-23 PROCEDURE — 99215 OFFICE O/P EST HI 40 MIN: CPT | Mod: S$GLB,,, | Performed by: NURSE PRACTITIONER

## 2021-03-23 PROCEDURE — 93005 ELECTROCARDIOGRAM TRACING: CPT

## 2021-03-23 PROCEDURE — 93010 ELECTROCARDIOGRAM REPORT: CPT | Mod: ,,, | Performed by: INTERNAL MEDICINE

## 2021-03-23 PROCEDURE — 99999 PR PBB SHADOW E&M-EST. PATIENT-LVL II: ICD-10-PCS | Mod: PBBFAC,,, | Performed by: NURSE PRACTITIONER

## 2021-03-23 PROCEDURE — 93010 EKG 12-LEAD: ICD-10-PCS | Mod: ,,, | Performed by: INTERNAL MEDICINE

## 2021-03-23 RX ORDER — DEXAMETHASONE 4 MG/1
8 TABLET ORAL DAILY
Qty: 30 TABLET | Refills: 3 | Status: SHIPPED | OUTPATIENT
Start: 2021-03-24 | End: 2022-03-25

## 2021-03-23 RX ORDER — PROCHLORPERAZINE MALEATE 10 MG
10 TABLET ORAL 3 TIMES DAILY PRN
Qty: 60 TABLET | Refills: 5 | Status: CANCELLED | OUTPATIENT
Start: 2021-03-23

## 2021-03-23 RX ORDER — ONDANSETRON 8 MG/1
8 TABLET, ORALLY DISINTEGRATING ORAL EVERY 12 HOURS PRN
Qty: 60 TABLET | Refills: 5 | Status: CANCELLED | OUTPATIENT
Start: 2021-03-23

## 2021-03-23 RX ORDER — PROCHLORPERAZINE MALEATE 10 MG
10 TABLET ORAL 3 TIMES DAILY PRN
Qty: 60 TABLET | Refills: 5 | Status: SHIPPED | OUTPATIENT
Start: 2021-03-23 | End: 2022-03-25

## 2021-03-23 RX ORDER — DEXAMETHASONE 4 MG/1
8 TABLET ORAL DAILY
Qty: 30 TABLET | Refills: 3 | Status: CANCELLED | OUTPATIENT
Start: 2021-03-24

## 2021-03-23 RX ORDER — ONDANSETRON 8 MG/1
8 TABLET, ORALLY DISINTEGRATING ORAL EVERY 12 HOURS PRN
Qty: 60 TABLET | Refills: 5 | Status: SHIPPED | OUTPATIENT
Start: 2021-03-23 | End: 2022-03-25

## 2021-03-24 ENCOUNTER — OFFICE VISIT (OUTPATIENT)
Dept: SURGERY | Facility: CLINIC | Age: 56
End: 2021-03-24
Payer: COMMERCIAL

## 2021-03-24 VITALS
WEIGHT: 182.63 LBS | DIASTOLIC BLOOD PRESSURE: 88 MMHG | SYSTOLIC BLOOD PRESSURE: 142 MMHG | HEIGHT: 72 IN | RESPIRATION RATE: 20 BRPM | BODY MASS INDEX: 24.74 KG/M2 | HEART RATE: 86 BPM

## 2021-03-24 DIAGNOSIS — Z09 POSTOP CHECK: Primary | ICD-10-CM

## 2021-03-24 LAB — SARS-COV-2 RNA RESP QL NAA+PROBE: NOT DETECTED

## 2021-03-24 PROCEDURE — 3008F BODY MASS INDEX DOCD: CPT | Mod: CPTII,S$GLB,, | Performed by: SURGERY

## 2021-03-24 PROCEDURE — 99024 PR POST-OP FOLLOW-UP VISIT: ICD-10-PCS | Mod: S$GLB,,, | Performed by: SURGERY

## 2021-03-24 PROCEDURE — 3008F PR BODY MASS INDEX (BMI) DOCUMENTED: ICD-10-PCS | Mod: CPTII,S$GLB,, | Performed by: SURGERY

## 2021-03-24 PROCEDURE — 99024 POSTOP FOLLOW-UP VISIT: CPT | Mod: S$GLB,,, | Performed by: SURGERY

## 2021-03-24 PROCEDURE — 99999 PR PBB SHADOW E&M-EST. PATIENT-LVL III: ICD-10-PCS | Mod: PBBFAC,,, | Performed by: SURGERY

## 2021-03-24 PROCEDURE — 99999 PR PBB SHADOW E&M-EST. PATIENT-LVL III: CPT | Mod: PBBFAC,,, | Performed by: SURGERY

## 2021-03-24 PROCEDURE — 1126F AMNT PAIN NOTED NONE PRSNT: CPT | Mod: S$GLB,,, | Performed by: SURGERY

## 2021-03-24 PROCEDURE — 1126F PR PAIN SEVERITY QUANTIFIED, NO PAIN PRESENT: ICD-10-PCS | Mod: S$GLB,,, | Performed by: SURGERY

## 2021-03-25 ENCOUNTER — TELEPHONE (OUTPATIENT)
Dept: PREADMISSION TESTING | Facility: HOSPITAL | Age: 56
End: 2021-03-25

## 2021-03-26 ENCOUNTER — HOSPITAL ENCOUNTER (OUTPATIENT)
Facility: HOSPITAL | Age: 56
Discharge: HOME | End: 2021-03-26
Attending: SURGERY | Admitting: SURGERY
Payer: COMMERCIAL

## 2021-03-26 ENCOUNTER — HOSPITAL ENCOUNTER (OUTPATIENT)
Dept: RADIOLOGY | Facility: HOSPITAL | Age: 56
Discharge: HOME | End: 2021-03-26
Attending: SURGERY | Admitting: SURGERY
Payer: COMMERCIAL

## 2021-03-26 ENCOUNTER — ANESTHESIA (OUTPATIENT)
Dept: SURGERY | Facility: HOSPITAL | Age: 56
End: 2021-03-26
Payer: COMMERCIAL

## 2021-03-26 VITALS
OXYGEN SATURATION: 100 % | HEART RATE: 68 BPM | RESPIRATION RATE: 20 BRPM | HEIGHT: 72 IN | TEMPERATURE: 98 F | SYSTOLIC BLOOD PRESSURE: 145 MMHG | DIASTOLIC BLOOD PRESSURE: 80 MMHG | BODY MASS INDEX: 25.35 KG/M2 | WEIGHT: 187.19 LBS

## 2021-03-26 DIAGNOSIS — C17.0 DUODENAL CANCER: ICD-10-CM

## 2021-03-26 PROCEDURE — D9220A PRA ANESTHESIA: Mod: CRNA,,, | Performed by: NURSE ANESTHETIST, CERTIFIED REGISTERED

## 2021-03-26 PROCEDURE — 25000003 PHARM REV CODE 250: Performed by: NURSE ANESTHETIST, CERTIFIED REGISTERED

## 2021-03-26 PROCEDURE — 37000009 HC ANESTHESIA EA ADD 15 MINS: Performed by: SURGERY

## 2021-03-26 PROCEDURE — 63600175 PHARM REV CODE 636 W HCPCS: Performed by: NURSE ANESTHETIST, CERTIFIED REGISTERED

## 2021-03-26 PROCEDURE — 77001 FLUOROGUIDE FOR VEIN DEVICE: CPT | Mod: 26,,, | Performed by: SURGERY

## 2021-03-26 PROCEDURE — 25000003 PHARM REV CODE 250: Performed by: SURGERY

## 2021-03-26 PROCEDURE — 77001 CHG FLUOROGUIDE CNTRL VEN ACCESS,PLACE,REPLACE,REMOVE: ICD-10-PCS | Mod: 26,,, | Performed by: SURGERY

## 2021-03-26 PROCEDURE — 63600175 PHARM REV CODE 636 W HCPCS: Performed by: SURGERY

## 2021-03-26 PROCEDURE — 71045 XR CHEST AP PORTABLE: ICD-10-PCS | Mod: 26,,, | Performed by: RADIOLOGY

## 2021-03-26 PROCEDURE — 36000706: Performed by: SURGERY

## 2021-03-26 PROCEDURE — D9220A PRA ANESTHESIA: ICD-10-PCS | Mod: ANES,,, | Performed by: ANESTHESIOLOGY

## 2021-03-26 PROCEDURE — 71000033 HC RECOVERY, INTIAL HOUR: Performed by: SURGERY

## 2021-03-26 PROCEDURE — 36561 PR INSERT TUNNELED CV CATH WITH PORT: ICD-10-PCS | Mod: RT,,, | Performed by: SURGERY

## 2021-03-26 PROCEDURE — D9220A PRA ANESTHESIA: ICD-10-PCS | Mod: CRNA,,, | Performed by: NURSE ANESTHETIST, CERTIFIED REGISTERED

## 2021-03-26 PROCEDURE — 71000015 HC POSTOP RECOV 1ST HR: Performed by: SURGERY

## 2021-03-26 PROCEDURE — D9220A PRA ANESTHESIA: Mod: ANES,,, | Performed by: ANESTHESIOLOGY

## 2021-03-26 PROCEDURE — 71045 X-RAY EXAM CHEST 1 VIEW: CPT | Mod: 26,,, | Performed by: RADIOLOGY

## 2021-03-26 PROCEDURE — 71045 X-RAY EXAM CHEST 1 VIEW: CPT | Mod: TC

## 2021-03-26 PROCEDURE — C1788 PORT, INDWELLING, IMP: HCPCS | Performed by: SURGERY

## 2021-03-26 PROCEDURE — 37000008 HC ANESTHESIA 1ST 15 MINUTES: Performed by: SURGERY

## 2021-03-26 PROCEDURE — 36000707: Performed by: SURGERY

## 2021-03-26 PROCEDURE — 36561 INSERT TUNNELED CV CATH: CPT | Mod: RT,,, | Performed by: SURGERY

## 2021-03-26 DEVICE — PORT POWER CLEAR VIEW: Type: IMPLANTABLE DEVICE | Site: CHEST | Status: FUNCTIONAL

## 2021-03-26 RX ORDER — LIDOCAINE HYDROCHLORIDE 10 MG/ML
INJECTION, SOLUTION EPIDURAL; INFILTRATION; INTRACAUDAL; PERINEURAL
Status: DISCONTINUED | OUTPATIENT
Start: 2021-03-26 | End: 2021-03-26 | Stop reason: HOSPADM

## 2021-03-26 RX ORDER — MEPERIDINE HYDROCHLORIDE 25 MG/ML
12.5 INJECTION INTRAMUSCULAR; INTRAVENOUS; SUBCUTANEOUS ONCE
Status: DISCONTINUED | OUTPATIENT
Start: 2021-03-26 | End: 2021-03-26 | Stop reason: HOSPADM

## 2021-03-26 RX ORDER — ONDANSETRON 2 MG/ML
4 INJECTION INTRAMUSCULAR; INTRAVENOUS ONCE AS NEEDED
Status: DISCONTINUED | OUTPATIENT
Start: 2021-03-26 | End: 2021-03-26 | Stop reason: HOSPADM

## 2021-03-26 RX ORDER — MIDAZOLAM HYDROCHLORIDE 1 MG/ML
INJECTION INTRAMUSCULAR; INTRAVENOUS
Status: DISCONTINUED | OUTPATIENT
Start: 2021-03-26 | End: 2021-03-26

## 2021-03-26 RX ORDER — PROPOFOL 10 MG/ML
VIAL (ML) INTRAVENOUS
Status: DISCONTINUED | OUTPATIENT
Start: 2021-03-26 | End: 2021-03-26

## 2021-03-26 RX ORDER — OXYCODONE HYDROCHLORIDE 5 MG/1
5 TABLET ORAL EVERY 6 HOURS PRN
Qty: 13 TABLET | Refills: 0 | Status: SHIPPED | OUTPATIENT
Start: 2021-03-26 | End: 2022-03-25

## 2021-03-26 RX ORDER — OXYCODONE HYDROCHLORIDE 5 MG/1
5 TABLET ORAL EVERY 6 HOURS PRN
Qty: 13 TABLET | Refills: 0 | Status: SHIPPED | OUTPATIENT
Start: 2021-03-26 | End: 2021-03-26 | Stop reason: SDUPTHER

## 2021-03-26 RX ORDER — HEPARIN 100 UNIT/ML
SYRINGE INTRAVENOUS
Status: DISCONTINUED
Start: 2021-03-26 | End: 2021-03-26 | Stop reason: HOSPADM

## 2021-03-26 RX ORDER — LIDOCAINE HYDROCHLORIDE 10 MG/ML
INJECTION, SOLUTION EPIDURAL; INFILTRATION; INTRACAUDAL; PERINEURAL
Status: DISCONTINUED
Start: 2021-03-26 | End: 2021-03-26 | Stop reason: HOSPADM

## 2021-03-26 RX ORDER — ALBUTEROL SULFATE 0.83 MG/ML
2.5 SOLUTION RESPIRATORY (INHALATION) EVERY 4 HOURS PRN
Status: DISCONTINUED | OUTPATIENT
Start: 2021-03-26 | End: 2021-03-26 | Stop reason: HOSPADM

## 2021-03-26 RX ORDER — HEPARIN 100 UNIT/ML
SYRINGE INTRAVENOUS
Status: DISCONTINUED | OUTPATIENT
Start: 2021-03-26 | End: 2021-03-26 | Stop reason: HOSPADM

## 2021-03-26 RX ORDER — LIDOCAINE HYDROCHLORIDE 20 MG/ML
INJECTION, SOLUTION EPIDURAL; INFILTRATION; INTRACAUDAL; PERINEURAL
Status: DISCONTINUED | OUTPATIENT
Start: 2021-03-26 | End: 2021-03-26

## 2021-03-26 RX ORDER — FENTANYL CITRATE 50 UG/ML
25 INJECTION, SOLUTION INTRAMUSCULAR; INTRAVENOUS EVERY 5 MIN PRN
Status: DISCONTINUED | OUTPATIENT
Start: 2021-03-26 | End: 2021-03-26 | Stop reason: HOSPADM

## 2021-03-26 RX ORDER — BUPIVACAINE HYDROCHLORIDE 2.5 MG/ML
INJECTION, SOLUTION EPIDURAL; INFILTRATION; INTRACAUDAL
Status: DISCONTINUED
Start: 2021-03-26 | End: 2021-03-26 | Stop reason: HOSPADM

## 2021-03-26 RX ORDER — FENTANYL CITRATE 50 UG/ML
INJECTION, SOLUTION INTRAMUSCULAR; INTRAVENOUS
Status: DISCONTINUED | OUTPATIENT
Start: 2021-03-26 | End: 2021-03-26

## 2021-03-26 RX ORDER — SODIUM CHLORIDE, SODIUM LACTATE, POTASSIUM CHLORIDE, CALCIUM CHLORIDE 600; 310; 30; 20 MG/100ML; MG/100ML; MG/100ML; MG/100ML
1000 INJECTION, SOLUTION INTRAVENOUS CONTINUOUS
Status: DISCONTINUED | OUTPATIENT
Start: 2021-03-26 | End: 2021-03-26 | Stop reason: HOSPADM

## 2021-03-26 RX ORDER — ONDANSETRON 2 MG/ML
INJECTION INTRAMUSCULAR; INTRAVENOUS
Status: DISCONTINUED | OUTPATIENT
Start: 2021-03-26 | End: 2021-03-26

## 2021-03-26 RX ORDER — DIPHENHYDRAMINE HYDROCHLORIDE 50 MG/ML
25 INJECTION INTRAMUSCULAR; INTRAVENOUS EVERY 6 HOURS PRN
Status: DISCONTINUED | OUTPATIENT
Start: 2021-03-26 | End: 2021-03-26 | Stop reason: HOSPADM

## 2021-03-26 RX ORDER — CEFAZOLIN SODIUM 2 G/50ML
2 SOLUTION INTRAVENOUS
Status: DISCONTINUED | OUTPATIENT
Start: 2021-03-26 | End: 2021-03-26 | Stop reason: HOSPADM

## 2021-03-26 RX ORDER — SODIUM CHLORIDE 9 MG/ML
INJECTION, SOLUTION INTRAVENOUS CONTINUOUS
Status: DISCONTINUED | OUTPATIENT
Start: 2021-03-26 | End: 2021-03-26 | Stop reason: HOSPADM

## 2021-03-26 RX ORDER — BUPIVACAINE HYDROCHLORIDE 2.5 MG/ML
INJECTION, SOLUTION EPIDURAL; INFILTRATION; INTRACAUDAL
Status: DISCONTINUED | OUTPATIENT
Start: 2021-03-26 | End: 2021-03-26 | Stop reason: HOSPADM

## 2021-03-26 RX ADMIN — PROPOFOL 30 MG: 10 INJECTION, EMULSION INTRAVENOUS at 07:03

## 2021-03-26 RX ADMIN — CEFAZOLIN SODIUM 2 G: 2 SOLUTION INTRAVENOUS at 06:03

## 2021-03-26 RX ADMIN — PROPOFOL 20 MG: 10 INJECTION, EMULSION INTRAVENOUS at 07:03

## 2021-03-26 RX ADMIN — PROPOFOL 10 MG: 10 INJECTION, EMULSION INTRAVENOUS at 07:03

## 2021-03-26 RX ADMIN — FENTANYL CITRATE 50 MCG: 50 INJECTION, SOLUTION INTRAMUSCULAR; INTRAVENOUS at 07:03

## 2021-03-26 RX ADMIN — PROPOFOL 50 MG: 10 INJECTION, EMULSION INTRAVENOUS at 07:03

## 2021-03-26 RX ADMIN — ONDANSETRON 4 MG: 2 INJECTION, SOLUTION INTRAMUSCULAR; INTRAVENOUS at 07:03

## 2021-03-26 RX ADMIN — SODIUM CHLORIDE, SODIUM LACTATE, POTASSIUM CHLORIDE, AND CALCIUM CHLORIDE 1000 ML: .6; .31; .03; .02 INJECTION, SOLUTION INTRAVENOUS at 06:03

## 2021-03-26 RX ADMIN — MIDAZOLAM HYDROCHLORIDE 2 MG: 1 INJECTION INTRAMUSCULAR; INTRAVENOUS at 06:03

## 2021-03-26 RX ADMIN — PROPOFOL 40 MG: 10 INJECTION, EMULSION INTRAVENOUS at 07:03

## 2021-03-26 RX ADMIN — LIDOCAINE HYDROCHLORIDE 50 MG: 20 INJECTION, SOLUTION EPIDURAL; INFILTRATION; INTRACAUDAL; PERINEURAL at 07:03

## 2021-03-29 ENCOUNTER — DOCUMENTATION ONLY (OUTPATIENT)
Dept: HEMATOLOGY/ONCOLOGY | Facility: CLINIC | Age: 56
End: 2021-03-29

## 2021-03-30 ENCOUNTER — DOCUMENTATION ONLY (OUTPATIENT)
Dept: HEMATOLOGY/ONCOLOGY | Facility: CLINIC | Age: 56
End: 2021-03-30

## 2021-03-30 ENCOUNTER — OFFICE VISIT (OUTPATIENT)
Dept: HEMATOLOGY/ONCOLOGY | Facility: CLINIC | Age: 56
End: 2021-03-30
Payer: COMMERCIAL

## 2021-03-30 ENCOUNTER — LAB VISIT (OUTPATIENT)
Dept: LAB | Facility: HOSPITAL | Age: 56
End: 2021-03-30
Attending: INTERNAL MEDICINE
Payer: COMMERCIAL

## 2021-03-30 ENCOUNTER — INFUSION (OUTPATIENT)
Dept: INFUSION THERAPY | Facility: HOSPITAL | Age: 56
End: 2021-03-30
Attending: INTERNAL MEDICINE
Payer: COMMERCIAL

## 2021-03-30 VITALS
DIASTOLIC BLOOD PRESSURE: 86 MMHG | SYSTOLIC BLOOD PRESSURE: 134 MMHG | OXYGEN SATURATION: 100 % | BODY MASS INDEX: 25.24 KG/M2 | HEART RATE: 85 BPM | HEIGHT: 72 IN | WEIGHT: 186.31 LBS | TEMPERATURE: 98 F

## 2021-03-30 VITALS
TEMPERATURE: 98 F | WEIGHT: 186.31 LBS | DIASTOLIC BLOOD PRESSURE: 90 MMHG | RESPIRATION RATE: 18 BRPM | HEIGHT: 72 IN | HEART RATE: 80 BPM | OXYGEN SATURATION: 98 % | BODY MASS INDEX: 25.24 KG/M2 | SYSTOLIC BLOOD PRESSURE: 152 MMHG

## 2021-03-30 DIAGNOSIS — Z03.818 ENCOUNTER FOR OBSERVATION FOR SUSPECTED EXPOSURE TO OTHER BIOLOGICAL AGENTS RULED OUT: Primary | ICD-10-CM

## 2021-03-30 DIAGNOSIS — Z90.411 HISTORY OF PARTIAL PANCREATECTOMY: ICD-10-CM

## 2021-03-30 DIAGNOSIS — C17.0 DUODENAL CANCER: ICD-10-CM

## 2021-03-30 DIAGNOSIS — C77.9 SECONDARY ADENOCARCINOMA OF LYMPH NODE: ICD-10-CM

## 2021-03-30 DIAGNOSIS — N91.2 AMENIA: ICD-10-CM

## 2021-03-30 DIAGNOSIS — C17.0 DUODENAL CANCER: Primary | ICD-10-CM

## 2021-03-30 LAB
ALBUMIN SERPL BCP-MCNC: 3.8 G/DL (ref 3.5–5.2)
ALP SERPL-CCNC: 68 U/L (ref 55–135)
ALT SERPL W/O P-5'-P-CCNC: 19 U/L (ref 10–44)
ANION GAP SERPL CALC-SCNC: 10 MMOL/L (ref 8–16)
AST SERPL-CCNC: 21 U/L (ref 10–40)
BASOPHILS # BLD AUTO: 0.02 K/UL (ref 0–0.2)
BASOPHILS NFR BLD: 0.5 % (ref 0–1.9)
BILIRUB SERPL-MCNC: 0.3 MG/DL (ref 0.1–1)
BUN SERPL-MCNC: 16 MG/DL (ref 6–20)
CALCIUM SERPL-MCNC: 9.1 MG/DL (ref 8.7–10.5)
CHLORIDE SERPL-SCNC: 105 MMOL/L (ref 95–110)
CO2 SERPL-SCNC: 27 MMOL/L (ref 23–29)
CREAT SERPL-MCNC: 0.8 MG/DL (ref 0.5–1.4)
DIFFERENTIAL METHOD: ABNORMAL
EOSINOPHIL # BLD AUTO: 0.5 K/UL (ref 0–0.5)
EOSINOPHIL NFR BLD: 11.8 % (ref 0–8)
ERYTHROCYTE [DISTWIDTH] IN BLOOD BY AUTOMATED COUNT: 14.6 % (ref 11.5–14.5)
EST. GFR  (AFRICAN AMERICAN): >60 ML/MIN/1.73 M^2
EST. GFR  (NON AFRICAN AMERICAN): >60 ML/MIN/1.73 M^2
FERRITIN SERPL-MCNC: 61 NG/ML (ref 20–300)
GLUCOSE SERPL-MCNC: 128 MG/DL (ref 70–110)
HCT VFR BLD AUTO: 33.4 % (ref 37–48.5)
HGB BLD-MCNC: 10.5 G/DL (ref 12–16)
IMM GRANULOCYTES # BLD AUTO: 0.01 K/UL (ref 0–0.04)
IMM GRANULOCYTES NFR BLD AUTO: 0.3 % (ref 0–0.5)
IRON SERPL-MCNC: 48 UG/DL (ref 30–160)
LYMPHOCYTES # BLD AUTO: 1.3 K/UL (ref 1–4.8)
LYMPHOCYTES NFR BLD: 32.7 % (ref 18–48)
MCH RBC QN AUTO: 28.8 PG (ref 27–31)
MCHC RBC AUTO-ENTMCNC: 31.4 G/DL (ref 32–36)
MCV RBC AUTO: 92 FL (ref 82–98)
MONOCYTES # BLD AUTO: 0.4 K/UL (ref 0.3–1)
MONOCYTES NFR BLD: 11.1 % (ref 4–15)
NEUTROPHILS # BLD AUTO: 1.7 K/UL (ref 1.8–7.7)
NEUTROPHILS NFR BLD: 43.6 % (ref 38–73)
NRBC BLD-RTO: 0 /100 WBC
PLATELET # BLD AUTO: 199 K/UL (ref 150–450)
PMV BLD AUTO: 9.8 FL (ref 9.2–12.9)
POTASSIUM SERPL-SCNC: 4.4 MMOL/L (ref 3.5–5.1)
PROT SERPL-MCNC: 6.9 G/DL (ref 6–8.4)
RBC # BLD AUTO: 3.65 M/UL (ref 4–5.4)
SARS-COV-2 RDRP RESP QL NAA+PROBE: NEGATIVE
SATURATED IRON: 11 % (ref 20–50)
SODIUM SERPL-SCNC: 142 MMOL/L (ref 136–145)
TOTAL IRON BINDING CAPACITY: 420 UG/DL (ref 250–450)
TRANSFERRIN SERPL-MCNC: 284 MG/DL (ref 200–375)
WBC # BLD AUTO: 3.98 K/UL (ref 3.9–12.7)

## 2021-03-30 PROCEDURE — 80053 COMPREHEN METABOLIC PANEL: CPT | Performed by: INTERNAL MEDICINE

## 2021-03-30 PROCEDURE — 3008F PR BODY MASS INDEX (BMI) DOCUMENTED: ICD-10-PCS | Mod: CPTII,S$GLB,, | Performed by: NURSE PRACTITIONER

## 2021-03-30 PROCEDURE — U0002 COVID-19 LAB TEST NON-CDC: HCPCS | Performed by: INTERNAL MEDICINE

## 2021-03-30 PROCEDURE — 96413 CHEMO IV INFUSION 1 HR: CPT

## 2021-03-30 PROCEDURE — 3075F SYST BP GE 130 - 139MM HG: CPT | Mod: CPTII,S$GLB,, | Performed by: NURSE PRACTITIONER

## 2021-03-30 PROCEDURE — 99999 PR PBB SHADOW E&M-EST. PATIENT-LVL III: CPT | Mod: PBBFAC,,, | Performed by: NURSE PRACTITIONER

## 2021-03-30 PROCEDURE — 63600175 PHARM REV CODE 636 W HCPCS: Performed by: INTERNAL MEDICINE

## 2021-03-30 PROCEDURE — 25000003 PHARM REV CODE 250: Performed by: INTERNAL MEDICINE

## 2021-03-30 PROCEDURE — 99999 PR PBB SHADOW E&M-EST. PATIENT-LVL III: ICD-10-PCS | Mod: PBBFAC,,, | Performed by: NURSE PRACTITIONER

## 2021-03-30 PROCEDURE — 96416 CHEMO PROLONG INFUSE W/PUMP: CPT

## 2021-03-30 PROCEDURE — 3079F PR MOST RECENT DIASTOLIC BLOOD PRESSURE 80-89 MM HG: ICD-10-PCS | Mod: CPTII,S$GLB,, | Performed by: NURSE PRACTITIONER

## 2021-03-30 PROCEDURE — 1126F AMNT PAIN NOTED NONE PRSNT: CPT | Mod: S$GLB,,, | Performed by: NURSE PRACTITIONER

## 2021-03-30 PROCEDURE — 85025 COMPLETE CBC W/AUTO DIFF WBC: CPT | Performed by: INTERNAL MEDICINE

## 2021-03-30 PROCEDURE — 96415 CHEMO IV INFUSION ADDL HR: CPT

## 2021-03-30 PROCEDURE — 96411 CHEMO IV PUSH ADDL DRUG: CPT

## 2021-03-30 PROCEDURE — 1126F PR PAIN SEVERITY QUANTIFIED, NO PAIN PRESENT: ICD-10-PCS | Mod: S$GLB,,, | Performed by: NURSE PRACTITIONER

## 2021-03-30 PROCEDURE — 96368 THER/DIAG CONCURRENT INF: CPT

## 2021-03-30 PROCEDURE — 99215 OFFICE O/P EST HI 40 MIN: CPT | Mod: S$GLB,,, | Performed by: NURSE PRACTITIONER

## 2021-03-30 PROCEDURE — 36415 COLL VENOUS BLD VENIPUNCTURE: CPT | Performed by: INTERNAL MEDICINE

## 2021-03-30 PROCEDURE — 83540 ASSAY OF IRON: CPT | Performed by: NURSE PRACTITIONER

## 2021-03-30 PROCEDURE — 3079F DIAST BP 80-89 MM HG: CPT | Mod: CPTII,S$GLB,, | Performed by: NURSE PRACTITIONER

## 2021-03-30 PROCEDURE — 3075F PR MOST RECENT SYSTOLIC BLOOD PRESS GE 130-139MM HG: ICD-10-PCS | Mod: CPTII,S$GLB,, | Performed by: NURSE PRACTITIONER

## 2021-03-30 PROCEDURE — 96367 TX/PROPH/DG ADDL SEQ IV INF: CPT

## 2021-03-30 PROCEDURE — 3008F BODY MASS INDEX DOCD: CPT | Mod: CPTII,S$GLB,, | Performed by: NURSE PRACTITIONER

## 2021-03-30 PROCEDURE — 99215 PR OFFICE/OUTPT VISIT, EST, LEVL V, 40-54 MIN: ICD-10-PCS | Mod: S$GLB,,, | Performed by: NURSE PRACTITIONER

## 2021-03-30 PROCEDURE — 36415 COLL VENOUS BLD VENIPUNCTURE: CPT | Performed by: NURSE PRACTITIONER

## 2021-03-30 PROCEDURE — 82728 ASSAY OF FERRITIN: CPT | Performed by: NURSE PRACTITIONER

## 2021-03-30 RX ORDER — FLUOROURACIL 50 MG/ML
400 INJECTION, SOLUTION INTRAVENOUS
Status: COMPLETED | OUTPATIENT
Start: 2021-03-30 | End: 2021-03-30

## 2021-03-30 RX ADMIN — DEXAMETHASONE SODIUM PHOSPHATE: 4 INJECTION, SOLUTION INTRAMUSCULAR; INTRAVENOUS at 09:03

## 2021-03-30 RX ADMIN — FLUOROURACIL 825 MG: 50 INJECTION, SOLUTION INTRAVENOUS at 12:03

## 2021-03-30 RX ADMIN — LEUCOVORIN CALCIUM 825 MG: 350 INJECTION, POWDER, LYOPHILIZED, FOR SOLUTION INTRAMUSCULAR; INTRAVENOUS at 10:03

## 2021-03-30 RX ADMIN — FLUOROURACIL 4945 MG: 50 INJECTION, SOLUTION INTRAVENOUS at 12:03

## 2021-03-30 RX ADMIN — OXALIPLATIN 175 MG: 100 INJECTION, SOLUTION, CONCENTRATE INTRAVENOUS at 10:03

## 2021-03-30 RX ADMIN — DEXTROSE: 5 SOLUTION INTRAVENOUS at 09:03

## 2021-04-01 ENCOUNTER — DOCUMENTATION ONLY (OUTPATIENT)
Dept: HEMATOLOGY/ONCOLOGY | Facility: CLINIC | Age: 56
End: 2021-04-01

## 2021-04-01 ENCOUNTER — INFUSION (OUTPATIENT)
Dept: INFUSION THERAPY | Facility: HOSPITAL | Age: 56
End: 2021-04-01
Attending: INTERNAL MEDICINE
Payer: COMMERCIAL

## 2021-04-01 VITALS
TEMPERATURE: 98 F | DIASTOLIC BLOOD PRESSURE: 89 MMHG | SYSTOLIC BLOOD PRESSURE: 156 MMHG | BODY MASS INDEX: 25.24 KG/M2 | WEIGHT: 186.31 LBS | OXYGEN SATURATION: 99 % | RESPIRATION RATE: 18 BRPM | HEIGHT: 72 IN | HEART RATE: 70 BPM

## 2021-04-01 DIAGNOSIS — C17.0 DUODENAL CANCER: Primary | ICD-10-CM

## 2021-04-01 PROCEDURE — 96523 IRRIG DRUG DELIVERY DEVICE: CPT

## 2021-04-01 PROCEDURE — 63600175 PHARM REV CODE 636 W HCPCS: Performed by: INTERNAL MEDICINE

## 2021-04-01 PROCEDURE — 25000003 PHARM REV CODE 250: Performed by: INTERNAL MEDICINE

## 2021-04-01 PROCEDURE — A4216 STERILE WATER/SALINE, 10 ML: HCPCS | Performed by: INTERNAL MEDICINE

## 2021-04-01 RX ORDER — HEPARIN 100 UNIT/ML
500 SYRINGE INTRAVENOUS
Status: DISCONTINUED | OUTPATIENT
Start: 2021-04-01 | End: 2021-04-01 | Stop reason: HOSPADM

## 2021-04-01 RX ORDER — SODIUM CHLORIDE 0.9 % (FLUSH) 0.9 %
10 SYRINGE (ML) INJECTION
Status: DISCONTINUED | OUTPATIENT
Start: 2021-04-01 | End: 2021-04-01 | Stop reason: HOSPADM

## 2021-04-01 RX ADMIN — HEPARIN SODIUM (PORCINE) LOCK FLUSH IV SOLN 100 UNIT/ML 500 UNITS: 100 SOLUTION at 10:04

## 2021-04-01 RX ADMIN — Medication 10 ML: at 10:04

## 2021-04-07 ENCOUNTER — TELEPHONE (OUTPATIENT)
Dept: SURGERY | Facility: CLINIC | Age: 56
End: 2021-04-07

## 2021-04-07 ENCOUNTER — OFFICE VISIT (OUTPATIENT)
Dept: PALLIATIVE MEDICINE | Facility: CLINIC | Age: 56
End: 2021-04-07
Payer: COMMERCIAL

## 2021-04-07 DIAGNOSIS — C17.0 DUODENAL CANCER: ICD-10-CM

## 2021-04-07 DIAGNOSIS — Z51.5 ENCOUNTER FOR PALLIATIVE CARE: ICD-10-CM

## 2021-04-07 DIAGNOSIS — Z71.89 ACP (ADVANCE CARE PLANNING): ICD-10-CM

## 2021-04-07 PROCEDURE — 99204 OFFICE O/P NEW MOD 45 MIN: CPT | Mod: 95,,, | Performed by: FAMILY MEDICINE

## 2021-04-07 PROCEDURE — 99204 PR OFFICE/OUTPT VISIT, NEW, LEVL IV, 45-59 MIN: ICD-10-PCS | Mod: 95,,, | Performed by: FAMILY MEDICINE

## 2021-04-08 ENCOUNTER — OFFICE VISIT (OUTPATIENT)
Dept: SURGERY | Facility: CLINIC | Age: 56
End: 2021-04-08
Payer: COMMERCIAL

## 2021-04-08 VITALS
WEIGHT: 186.19 LBS | OXYGEN SATURATION: 100 % | RESPIRATION RATE: 20 BRPM | SYSTOLIC BLOOD PRESSURE: 160 MMHG | HEIGHT: 72 IN | DIASTOLIC BLOOD PRESSURE: 77 MMHG | HEART RATE: 92 BPM | BODY MASS INDEX: 25.22 KG/M2

## 2021-04-08 DIAGNOSIS — C17.0 DUODENAL CANCER: Primary | ICD-10-CM

## 2021-04-08 PROCEDURE — 99024 POSTOP FOLLOW-UP VISIT: CPT | Mod: S$GLB,,, | Performed by: SURGERY

## 2021-04-08 PROCEDURE — 3008F PR BODY MASS INDEX (BMI) DOCUMENTED: ICD-10-PCS | Mod: CPTII,S$GLB,, | Performed by: SURGERY

## 2021-04-08 PROCEDURE — 3008F BODY MASS INDEX DOCD: CPT | Mod: CPTII,S$GLB,, | Performed by: SURGERY

## 2021-04-08 PROCEDURE — 99024 PR POST-OP FOLLOW-UP VISIT: ICD-10-PCS | Mod: S$GLB,,, | Performed by: SURGERY

## 2021-04-08 PROCEDURE — 1126F PR PAIN SEVERITY QUANTIFIED, NO PAIN PRESENT: ICD-10-PCS | Mod: S$GLB,,, | Performed by: SURGERY

## 2021-04-08 PROCEDURE — 1126F AMNT PAIN NOTED NONE PRSNT: CPT | Mod: S$GLB,,, | Performed by: SURGERY

## 2021-04-08 PROCEDURE — 99999 PR PBB SHADOW E&M-EST. PATIENT-LVL III: ICD-10-PCS | Mod: PBBFAC,,, | Performed by: SURGERY

## 2021-04-08 PROCEDURE — 99999 PR PBB SHADOW E&M-EST. PATIENT-LVL III: CPT | Mod: PBBFAC,,, | Performed by: SURGERY

## 2021-04-08 RX ORDER — CLINDAMYCIN HYDROCHLORIDE 300 MG/1
300 CAPSULE ORAL EVERY 8 HOURS
Qty: 21 CAPSULE | Refills: 0 | Status: SHIPPED | OUTPATIENT
Start: 2021-04-08 | End: 2021-04-15

## 2021-04-12 ENCOUNTER — LAB VISIT (OUTPATIENT)
Dept: LAB | Facility: HOSPITAL | Age: 56
End: 2021-04-12
Attending: NURSE PRACTITIONER
Payer: COMMERCIAL

## 2021-04-12 DIAGNOSIS — Z90.411 HISTORY OF PARTIAL PANCREATECTOMY: ICD-10-CM

## 2021-04-12 DIAGNOSIS — C77.9 SECONDARY ADENOCARCINOMA OF LYMPH NODE: ICD-10-CM

## 2021-04-12 DIAGNOSIS — C17.0 DUODENAL CANCER: ICD-10-CM

## 2021-04-12 DIAGNOSIS — D50.9 IRON DEFICIENCY ANEMIA, UNSPECIFIED IRON DEFICIENCY ANEMIA TYPE: ICD-10-CM

## 2021-04-12 DIAGNOSIS — N91.2 AMENIA: ICD-10-CM

## 2021-04-12 LAB
ALBUMIN SERPL BCP-MCNC: 3.7 G/DL (ref 3.5–5.2)
ALP SERPL-CCNC: 91 U/L (ref 55–135)
ALT SERPL W/O P-5'-P-CCNC: 23 U/L (ref 10–44)
ANION GAP SERPL CALC-SCNC: 7 MMOL/L (ref 8–16)
AST SERPL-CCNC: 20 U/L (ref 10–40)
BASOPHILS # BLD AUTO: 0.02 K/UL (ref 0–0.2)
BASOPHILS NFR BLD: 0.6 % (ref 0–1.9)
BILIRUB SERPL-MCNC: 0.3 MG/DL (ref 0.1–1)
BUN SERPL-MCNC: 13 MG/DL (ref 6–20)
CALCIUM SERPL-MCNC: 8.7 MG/DL (ref 8.7–10.5)
CHLORIDE SERPL-SCNC: 109 MMOL/L (ref 95–110)
CO2 SERPL-SCNC: 27 MMOL/L (ref 23–29)
CREAT SERPL-MCNC: 0.8 MG/DL (ref 0.5–1.4)
DIFFERENTIAL METHOD: ABNORMAL
EOSINOPHIL # BLD AUTO: 0.2 K/UL (ref 0–0.5)
EOSINOPHIL NFR BLD: 5.1 % (ref 0–8)
ERYTHROCYTE [DISTWIDTH] IN BLOOD BY AUTOMATED COUNT: 13.3 % (ref 11.5–14.5)
EST. GFR  (AFRICAN AMERICAN): >60 ML/MIN/1.73 M^2
EST. GFR  (NON AFRICAN AMERICAN): >60 ML/MIN/1.73 M^2
GLUCOSE SERPL-MCNC: 109 MG/DL (ref 70–110)
HCT VFR BLD AUTO: 34.4 % (ref 37–48.5)
HGB BLD-MCNC: 10.8 G/DL (ref 12–16)
IMM GRANULOCYTES # BLD AUTO: 0 K/UL (ref 0–0.04)
IMM GRANULOCYTES NFR BLD AUTO: 0 % (ref 0–0.5)
LYMPHOCYTES # BLD AUTO: 1.2 K/UL (ref 1–4.8)
LYMPHOCYTES NFR BLD: 35.7 % (ref 18–48)
MCH RBC QN AUTO: 28.1 PG (ref 27–31)
MCHC RBC AUTO-ENTMCNC: 31.4 G/DL (ref 32–36)
MCV RBC AUTO: 89 FL (ref 82–98)
MONOCYTES # BLD AUTO: 0.3 K/UL (ref 0.3–1)
MONOCYTES NFR BLD: 9.2 % (ref 4–15)
NEUTROPHILS # BLD AUTO: 1.7 K/UL (ref 1.8–7.7)
NEUTROPHILS NFR BLD: 49.4 % (ref 38–73)
NRBC BLD-RTO: 0 /100 WBC
PLATELET # BLD AUTO: 150 K/UL (ref 150–450)
PMV BLD AUTO: 10.5 FL (ref 9.2–12.9)
POTASSIUM SERPL-SCNC: 4.3 MMOL/L (ref 3.5–5.1)
PROT SERPL-MCNC: 6.6 G/DL (ref 6–8.4)
RBC # BLD AUTO: 3.85 M/UL (ref 4–5.4)
SODIUM SERPL-SCNC: 143 MMOL/L (ref 136–145)
WBC # BLD AUTO: 3.36 K/UL (ref 3.9–12.7)

## 2021-04-12 PROCEDURE — 80053 COMPREHEN METABOLIC PANEL: CPT | Performed by: NURSE PRACTITIONER

## 2021-04-12 PROCEDURE — 36415 COLL VENOUS BLD VENIPUNCTURE: CPT | Mod: PO | Performed by: NURSE PRACTITIONER

## 2021-04-12 PROCEDURE — 85025 COMPLETE CBC W/AUTO DIFF WBC: CPT | Performed by: NURSE PRACTITIONER

## 2021-04-12 RX ORDER — SODIUM CHLORIDE 0.9 % (FLUSH) 0.9 %
10 SYRINGE (ML) INJECTION
Status: CANCELLED | OUTPATIENT
Start: 2021-04-12

## 2021-04-12 RX ORDER — HEPARIN 100 UNIT/ML
500 SYRINGE INTRAVENOUS
Status: CANCELLED | OUTPATIENT
Start: 2021-04-12

## 2021-04-13 ENCOUNTER — OFFICE VISIT (OUTPATIENT)
Dept: HEMATOLOGY/ONCOLOGY | Facility: CLINIC | Age: 56
End: 2021-04-13
Payer: COMMERCIAL

## 2021-04-13 ENCOUNTER — DOCUMENTATION ONLY (OUTPATIENT)
Dept: HEMATOLOGY/ONCOLOGY | Facility: CLINIC | Age: 56
End: 2021-04-13

## 2021-04-13 ENCOUNTER — INFUSION (OUTPATIENT)
Dept: INFUSION THERAPY | Facility: HOSPITAL | Age: 56
End: 2021-04-13
Attending: INTERNAL MEDICINE
Payer: COMMERCIAL

## 2021-04-13 VITALS
RESPIRATION RATE: 16 BRPM | OXYGEN SATURATION: 100 % | WEIGHT: 187.63 LBS | TEMPERATURE: 97 F | DIASTOLIC BLOOD PRESSURE: 88 MMHG | HEIGHT: 72 IN | BODY MASS INDEX: 25.41 KG/M2 | SYSTOLIC BLOOD PRESSURE: 155 MMHG | HEART RATE: 70 BPM

## 2021-04-13 VITALS
HEART RATE: 74 BPM | SYSTOLIC BLOOD PRESSURE: 142 MMHG | TEMPERATURE: 97 F | OXYGEN SATURATION: 100 % | BODY MASS INDEX: 25.41 KG/M2 | WEIGHT: 187.63 LBS | HEIGHT: 72 IN | DIASTOLIC BLOOD PRESSURE: 70 MMHG

## 2021-04-13 DIAGNOSIS — Z71.89 ACP (ADVANCE CARE PLANNING): ICD-10-CM

## 2021-04-13 DIAGNOSIS — Z90.411 HISTORY OF PARTIAL PANCREATECTOMY: ICD-10-CM

## 2021-04-13 DIAGNOSIS — C17.0 DUODENAL CANCER: Primary | ICD-10-CM

## 2021-04-13 DIAGNOSIS — Z15.89 MONOALLELIC MUTATION OF MUTYH GENE: ICD-10-CM

## 2021-04-13 PROCEDURE — 96415 CHEMO IV INFUSION ADDL HR: CPT

## 2021-04-13 PROCEDURE — 25000003 PHARM REV CODE 250: Performed by: INTERNAL MEDICINE

## 2021-04-13 PROCEDURE — 96413 CHEMO IV INFUSION 1 HR: CPT

## 2021-04-13 PROCEDURE — 99999 PR PBB SHADOW E&M-EST. PATIENT-LVL IV: CPT | Mod: PBBFAC,,, | Performed by: INTERNAL MEDICINE

## 2021-04-13 PROCEDURE — 63600175 PHARM REV CODE 636 W HCPCS: Performed by: INTERNAL MEDICINE

## 2021-04-13 PROCEDURE — 1126F AMNT PAIN NOTED NONE PRSNT: CPT | Mod: S$GLB,,, | Performed by: INTERNAL MEDICINE

## 2021-04-13 PROCEDURE — 96368 THER/DIAG CONCURRENT INF: CPT

## 2021-04-13 PROCEDURE — 3008F PR BODY MASS INDEX (BMI) DOCUMENTED: ICD-10-PCS | Mod: CPTII,S$GLB,, | Performed by: INTERNAL MEDICINE

## 2021-04-13 PROCEDURE — 99999 PR PBB SHADOW E&M-EST. PATIENT-LVL IV: ICD-10-PCS | Mod: PBBFAC,,, | Performed by: INTERNAL MEDICINE

## 2021-04-13 PROCEDURE — 99215 OFFICE O/P EST HI 40 MIN: CPT | Mod: 25,S$GLB,, | Performed by: INTERNAL MEDICINE

## 2021-04-13 PROCEDURE — 3008F BODY MASS INDEX DOCD: CPT | Mod: CPTII,S$GLB,, | Performed by: INTERNAL MEDICINE

## 2021-04-13 PROCEDURE — 1126F PR PAIN SEVERITY QUANTIFIED, NO PAIN PRESENT: ICD-10-PCS | Mod: S$GLB,,, | Performed by: INTERNAL MEDICINE

## 2021-04-13 PROCEDURE — 96411 CHEMO IV PUSH ADDL DRUG: CPT

## 2021-04-13 PROCEDURE — 96367 TX/PROPH/DG ADDL SEQ IV INF: CPT

## 2021-04-13 PROCEDURE — 96416 CHEMO PROLONG INFUSE W/PUMP: CPT

## 2021-04-13 PROCEDURE — 99215 PR OFFICE/OUTPT VISIT, EST, LEVL V, 40-54 MIN: ICD-10-PCS | Mod: 25,S$GLB,, | Performed by: INTERNAL MEDICINE

## 2021-04-13 RX ORDER — HEPARIN 100 UNIT/ML
500 SYRINGE INTRAVENOUS
Status: CANCELLED | OUTPATIENT
Start: 2021-04-13

## 2021-04-13 RX ORDER — SODIUM CHLORIDE 0.9 % (FLUSH) 0.9 %
10 SYRINGE (ML) INJECTION
Status: DISCONTINUED | OUTPATIENT
Start: 2021-04-13 | End: 2021-04-13 | Stop reason: HOSPADM

## 2021-04-13 RX ORDER — NEBIVOLOL 10 MG/1
10 TABLET ORAL DAILY
COMMUNITY
End: 2021-09-01

## 2021-04-13 RX ORDER — FLUOROURACIL 50 MG/ML
400 INJECTION, SOLUTION INTRAVENOUS
Status: COMPLETED | OUTPATIENT
Start: 2021-04-13 | End: 2021-04-13

## 2021-04-13 RX ORDER — SODIUM CHLORIDE 0.9 % (FLUSH) 0.9 %
10 SYRINGE (ML) INJECTION
Status: CANCELLED | OUTPATIENT
Start: 2021-04-13

## 2021-04-13 RX ORDER — LISINOPRIL 10 MG/1
10 TABLET ORAL DAILY
COMMUNITY
End: 2021-08-24

## 2021-04-13 RX ORDER — DIPHENHYDRAMINE HYDROCHLORIDE 50 MG/ML
50 INJECTION INTRAMUSCULAR; INTRAVENOUS ONCE AS NEEDED
Status: CANCELLED | OUTPATIENT
Start: 2021-04-13

## 2021-04-13 RX ORDER — EPINEPHRINE 0.3 MG/.3ML
0.3 INJECTION SUBCUTANEOUS ONCE AS NEEDED
Status: CANCELLED | OUTPATIENT
Start: 2021-04-13

## 2021-04-13 RX ORDER — HEPARIN 100 UNIT/ML
500 SYRINGE INTRAVENOUS
Status: CANCELLED | OUTPATIENT
Start: 2021-04-15

## 2021-04-13 RX ORDER — SODIUM CHLORIDE 0.9 % (FLUSH) 0.9 %
10 SYRINGE (ML) INJECTION
Status: CANCELLED | OUTPATIENT
Start: 2021-04-15

## 2021-04-13 RX ORDER — FLUOROURACIL 50 MG/ML
400 INJECTION, SOLUTION INTRAVENOUS
Status: CANCELLED | OUTPATIENT
Start: 2021-04-13

## 2021-04-13 RX ADMIN — PALONOSETRON HYDROCHLORIDE: 0.25 INJECTION, SOLUTION INTRAVENOUS at 11:04

## 2021-04-13 RX ADMIN — LEUCOVORIN CALCIUM 825 MG: 500 INJECTION, POWDER, LYOPHILIZED, FOR SOLUTION INTRAMUSCULAR; INTRAVENOUS at 11:04

## 2021-04-13 RX ADMIN — DEXTROSE MONOHYDRATE 175 MG: 50 INJECTION, SOLUTION INTRAVENOUS at 11:04

## 2021-04-13 RX ADMIN — FLUOROURACIL 825 MG: 50 INJECTION, SOLUTION INTRAVENOUS at 02:04

## 2021-04-13 RX ADMIN — FLUOROURACIL 4945 MG: 50 INJECTION, SOLUTION INTRAVENOUS at 02:04

## 2021-04-14 ENCOUNTER — TELEPHONE (OUTPATIENT)
Dept: HEMATOLOGY/ONCOLOGY | Facility: CLINIC | Age: 56
End: 2021-04-14

## 2021-04-14 ENCOUNTER — PATIENT MESSAGE (OUTPATIENT)
Dept: PALLIATIVE MEDICINE | Facility: CLINIC | Age: 56
End: 2021-04-14

## 2021-04-14 ENCOUNTER — TELEPHONE (OUTPATIENT)
Dept: PALLIATIVE MEDICINE | Facility: CLINIC | Age: 56
End: 2021-04-14

## 2021-04-14 DIAGNOSIS — R45.89 ANXIETY ABOUT HEALTH: Primary | ICD-10-CM

## 2021-04-14 RX ORDER — CLONAZEPAM 0.5 MG/1
.25-.5 TABLET ORAL 2 TIMES DAILY PRN
Qty: 60 TABLET | Refills: 1 | Status: SHIPPED | OUTPATIENT
Start: 2021-04-14 | End: 2021-04-15 | Stop reason: SDUPTHER

## 2021-04-15 ENCOUNTER — INFUSION (OUTPATIENT)
Dept: INFUSION THERAPY | Facility: HOSPITAL | Age: 56
End: 2021-04-15
Attending: INTERNAL MEDICINE
Payer: COMMERCIAL

## 2021-04-15 VITALS
WEIGHT: 187.63 LBS | HEIGHT: 72 IN | OXYGEN SATURATION: 98 % | DIASTOLIC BLOOD PRESSURE: 84 MMHG | TEMPERATURE: 98 F | SYSTOLIC BLOOD PRESSURE: 137 MMHG | HEART RATE: 67 BPM | BODY MASS INDEX: 25.41 KG/M2 | RESPIRATION RATE: 16 BRPM

## 2021-04-15 DIAGNOSIS — C17.0 DUODENAL CANCER: Primary | ICD-10-CM

## 2021-04-15 DIAGNOSIS — R45.89 ANXIETY ABOUT HEALTH: ICD-10-CM

## 2021-04-15 PROCEDURE — 96523 IRRIG DRUG DELIVERY DEVICE: CPT

## 2021-04-15 PROCEDURE — A4216 STERILE WATER/SALINE, 10 ML: HCPCS | Performed by: INTERNAL MEDICINE

## 2021-04-15 PROCEDURE — 63600175 PHARM REV CODE 636 W HCPCS: Performed by: INTERNAL MEDICINE

## 2021-04-15 PROCEDURE — 25000003 PHARM REV CODE 250: Performed by: INTERNAL MEDICINE

## 2021-04-15 RX ORDER — HEPARIN 100 UNIT/ML
500 SYRINGE INTRAVENOUS
Status: DISCONTINUED | OUTPATIENT
Start: 2021-04-15 | End: 2021-04-15 | Stop reason: HOSPADM

## 2021-04-15 RX ORDER — SODIUM CHLORIDE 0.9 % (FLUSH) 0.9 %
10 SYRINGE (ML) INJECTION
Status: DISCONTINUED | OUTPATIENT
Start: 2021-04-15 | End: 2021-04-15 | Stop reason: HOSPADM

## 2021-04-15 RX ORDER — CLONAZEPAM 0.5 MG/1
.25-.5 TABLET ORAL 2 TIMES DAILY PRN
Qty: 60 TABLET | Refills: 1 | Status: SHIPPED | OUTPATIENT
Start: 2021-04-15 | End: 2022-03-25

## 2021-04-15 RX ADMIN — HEPARIN 500 UNITS: 100 SYRINGE at 01:04

## 2021-04-15 RX ADMIN — Medication 10 ML: at 01:04

## 2021-04-19 ENCOUNTER — INFUSION (OUTPATIENT)
Dept: INFUSION THERAPY | Facility: HOSPITAL | Age: 56
End: 2021-04-19
Attending: NURSE PRACTITIONER
Payer: COMMERCIAL

## 2021-04-19 VITALS
RESPIRATION RATE: 16 BRPM | HEART RATE: 62 BPM | OXYGEN SATURATION: 99 % | TEMPERATURE: 98 F | SYSTOLIC BLOOD PRESSURE: 123 MMHG | DIASTOLIC BLOOD PRESSURE: 77 MMHG

## 2021-04-19 DIAGNOSIS — D50.9 IRON DEFICIENCY ANEMIA, UNSPECIFIED IRON DEFICIENCY ANEMIA TYPE: Primary | ICD-10-CM

## 2021-04-19 PROCEDURE — 96365 THER/PROPH/DIAG IV INF INIT: CPT

## 2021-04-19 PROCEDURE — 63600175 PHARM REV CODE 636 W HCPCS: Performed by: NURSE PRACTITIONER

## 2021-04-19 PROCEDURE — 25000003 PHARM REV CODE 250: Performed by: NURSE PRACTITIONER

## 2021-04-19 RX ORDER — HEPARIN 100 UNIT/ML
500 SYRINGE INTRAVENOUS
Status: DISCONTINUED | OUTPATIENT
Start: 2021-04-19 | End: 2021-04-19 | Stop reason: HOSPADM

## 2021-04-19 RX ADMIN — HEPARIN 500 UNITS: 100 SYRINGE at 12:04

## 2021-04-19 RX ADMIN — FERUMOXYTOL 510 MG: 510 INJECTION INTRAVENOUS at 11:04

## 2021-04-22 ENCOUNTER — TELEPHONE (OUTPATIENT)
Dept: SURGERY | Facility: CLINIC | Age: 56
End: 2021-04-22

## 2021-04-22 LAB
ACID FAST MOD KINY STN SPEC: NORMAL
MYCOBACTERIUM SPEC QL CULT: NORMAL

## 2021-04-23 DIAGNOSIS — C17.0 DUODENAL CANCER: Primary | ICD-10-CM

## 2021-04-23 DIAGNOSIS — R10.9 ABDOMINAL PAIN, UNSPECIFIED ABDOMINAL LOCATION: ICD-10-CM

## 2021-04-25 ENCOUNTER — PATIENT MESSAGE (OUTPATIENT)
Dept: SURGERY | Facility: CLINIC | Age: 56
End: 2021-04-25

## 2021-04-26 ENCOUNTER — TELEPHONE (OUTPATIENT)
Dept: SURGERY | Facility: CLINIC | Age: 56
End: 2021-04-26

## 2021-04-26 ENCOUNTER — LAB VISIT (OUTPATIENT)
Dept: LAB | Facility: HOSPITAL | Age: 56
End: 2021-04-26
Attending: INTERNAL MEDICINE
Payer: COMMERCIAL

## 2021-04-26 DIAGNOSIS — C17.0 DUODENAL CANCER: ICD-10-CM

## 2021-04-26 LAB
ALBUMIN SERPL BCP-MCNC: 3.7 G/DL (ref 3.5–5.2)
ALP SERPL-CCNC: 81 U/L (ref 55–135)
ALT SERPL W/O P-5'-P-CCNC: 24 U/L (ref 10–44)
ANION GAP SERPL CALC-SCNC: 6 MMOL/L (ref 8–16)
AST SERPL-CCNC: 24 U/L (ref 10–40)
BASOPHILS # BLD AUTO: 0.01 K/UL (ref 0–0.2)
BASOPHILS NFR BLD: 0.3 % (ref 0–1.9)
BILIRUB SERPL-MCNC: 0.4 MG/DL (ref 0.1–1)
BUN SERPL-MCNC: 13 MG/DL (ref 6–20)
CALCIUM SERPL-MCNC: 8.6 MG/DL (ref 8.7–10.5)
CHLORIDE SERPL-SCNC: 109 MMOL/L (ref 95–110)
CO2 SERPL-SCNC: 28 MMOL/L (ref 23–29)
CREAT SERPL-MCNC: 0.9 MG/DL (ref 0.5–1.4)
DIFFERENTIAL METHOD: ABNORMAL
EOSINOPHIL # BLD AUTO: 0.1 K/UL (ref 0–0.5)
EOSINOPHIL NFR BLD: 2 % (ref 0–8)
ERYTHROCYTE [DISTWIDTH] IN BLOOD BY AUTOMATED COUNT: 14.5 % (ref 11.5–14.5)
EST. GFR  (AFRICAN AMERICAN): >60 ML/MIN/1.73 M^2
EST. GFR  (NON AFRICAN AMERICAN): >60 ML/MIN/1.73 M^2
GLUCOSE SERPL-MCNC: 130 MG/DL (ref 70–110)
HCT VFR BLD AUTO: 34.4 % (ref 37–48.5)
HGB BLD-MCNC: 10.9 G/DL (ref 12–16)
IMM GRANULOCYTES # BLD AUTO: 0.01 K/UL (ref 0–0.04)
IMM GRANULOCYTES NFR BLD AUTO: 0.3 % (ref 0–0.5)
LYMPHOCYTES # BLD AUTO: 1.2 K/UL (ref 1–4.8)
LYMPHOCYTES NFR BLD: 35.7 % (ref 18–48)
MCH RBC QN AUTO: 28.6 PG (ref 27–31)
MCHC RBC AUTO-ENTMCNC: 31.7 G/DL (ref 32–36)
MCV RBC AUTO: 90 FL (ref 82–98)
MONOCYTES # BLD AUTO: 0.4 K/UL (ref 0.3–1)
MONOCYTES NFR BLD: 11.3 % (ref 4–15)
NEUTROPHILS # BLD AUTO: 1.7 K/UL (ref 1.8–7.7)
NEUTROPHILS NFR BLD: 50.4 % (ref 38–73)
NRBC BLD-RTO: 0 /100 WBC
PLATELET # BLD AUTO: 122 K/UL (ref 150–450)
PMV BLD AUTO: 10.9 FL (ref 9.2–12.9)
POTASSIUM SERPL-SCNC: 3.7 MMOL/L (ref 3.5–5.1)
PROT SERPL-MCNC: 6.4 G/DL (ref 6–8.4)
RBC # BLD AUTO: 3.81 M/UL (ref 4–5.4)
SODIUM SERPL-SCNC: 143 MMOL/L (ref 136–145)
WBC # BLD AUTO: 3.45 K/UL (ref 3.9–12.7)

## 2021-04-26 PROCEDURE — 36415 COLL VENOUS BLD VENIPUNCTURE: CPT | Mod: PO | Performed by: INTERNAL MEDICINE

## 2021-04-26 PROCEDURE — 85025 COMPLETE CBC W/AUTO DIFF WBC: CPT | Performed by: INTERNAL MEDICINE

## 2021-04-26 PROCEDURE — 80053 COMPREHEN METABOLIC PANEL: CPT | Performed by: INTERNAL MEDICINE

## 2021-04-27 ENCOUNTER — OFFICE VISIT (OUTPATIENT)
Dept: HEMATOLOGY/ONCOLOGY | Facility: CLINIC | Age: 56
End: 2021-04-27
Payer: COMMERCIAL

## 2021-04-27 ENCOUNTER — INFUSION (OUTPATIENT)
Dept: INFUSION THERAPY | Facility: HOSPITAL | Age: 56
End: 2021-04-27
Attending: INTERNAL MEDICINE
Payer: COMMERCIAL

## 2021-04-27 VITALS
HEART RATE: 73 BPM | DIASTOLIC BLOOD PRESSURE: 91 MMHG | TEMPERATURE: 99 F | RESPIRATION RATE: 16 BRPM | OXYGEN SATURATION: 99 % | SYSTOLIC BLOOD PRESSURE: 142 MMHG

## 2021-04-27 VITALS
HEIGHT: 72 IN | TEMPERATURE: 98 F | SYSTOLIC BLOOD PRESSURE: 138 MMHG | WEIGHT: 187.81 LBS | DIASTOLIC BLOOD PRESSURE: 86 MMHG | HEART RATE: 76 BPM | BODY MASS INDEX: 25.44 KG/M2 | OXYGEN SATURATION: 99 %

## 2021-04-27 DIAGNOSIS — Z71.89 ACP (ADVANCE CARE PLANNING): ICD-10-CM

## 2021-04-27 DIAGNOSIS — D84.821 IMMUNODEFICIENCY DUE TO CHEMOTHERAPY: ICD-10-CM

## 2021-04-27 DIAGNOSIS — D50.9 IRON DEFICIENCY ANEMIA, UNSPECIFIED IRON DEFICIENCY ANEMIA TYPE: ICD-10-CM

## 2021-04-27 DIAGNOSIS — Z90.411 HISTORY OF PARTIAL PANCREATECTOMY: ICD-10-CM

## 2021-04-27 DIAGNOSIS — C17.0 DUODENAL CANCER: Primary | ICD-10-CM

## 2021-04-27 DIAGNOSIS — Z15.89 MONOALLELIC MUTATION OF MUTYH GENE: ICD-10-CM

## 2021-04-27 DIAGNOSIS — Z79.899 IMMUNODEFICIENCY DUE TO CHEMOTHERAPY: ICD-10-CM

## 2021-04-27 DIAGNOSIS — T45.1X5A IMMUNODEFICIENCY DUE TO CHEMOTHERAPY: ICD-10-CM

## 2021-04-27 PROBLEM — Z79.69 IMMUNODEFICIENCY DUE TO CHEMOTHERAPY: Status: ACTIVE | Noted: 2021-04-27

## 2021-04-27 PROCEDURE — 99215 OFFICE O/P EST HI 40 MIN: CPT | Mod: 25,S$GLB,, | Performed by: INTERNAL MEDICINE

## 2021-04-27 PROCEDURE — 96368 THER/DIAG CONCURRENT INF: CPT

## 2021-04-27 PROCEDURE — 96411 CHEMO IV PUSH ADDL DRUG: CPT

## 2021-04-27 PROCEDURE — 99215 PR OFFICE/OUTPT VISIT, EST, LEVL V, 40-54 MIN: ICD-10-PCS | Mod: 25,S$GLB,, | Performed by: INTERNAL MEDICINE

## 2021-04-27 PROCEDURE — 99999 PR PBB SHADOW E&M-EST. PATIENT-LVL III: ICD-10-PCS | Mod: PBBFAC,,, | Performed by: INTERNAL MEDICINE

## 2021-04-27 PROCEDURE — 25000003 PHARM REV CODE 250: Performed by: INTERNAL MEDICINE

## 2021-04-27 PROCEDURE — 3008F BODY MASS INDEX DOCD: CPT | Mod: CPTII,S$GLB,, | Performed by: INTERNAL MEDICINE

## 2021-04-27 PROCEDURE — 3008F PR BODY MASS INDEX (BMI) DOCUMENTED: ICD-10-PCS | Mod: CPTII,S$GLB,, | Performed by: INTERNAL MEDICINE

## 2021-04-27 PROCEDURE — 1126F AMNT PAIN NOTED NONE PRSNT: CPT | Mod: S$GLB,,, | Performed by: INTERNAL MEDICINE

## 2021-04-27 PROCEDURE — 96415 CHEMO IV INFUSION ADDL HR: CPT

## 2021-04-27 PROCEDURE — 1126F PR PAIN SEVERITY QUANTIFIED, NO PAIN PRESENT: ICD-10-PCS | Mod: S$GLB,,, | Performed by: INTERNAL MEDICINE

## 2021-04-27 PROCEDURE — 96367 TX/PROPH/DG ADDL SEQ IV INF: CPT

## 2021-04-27 PROCEDURE — 99999 PR PBB SHADOW E&M-EST. PATIENT-LVL III: CPT | Mod: PBBFAC,,, | Performed by: INTERNAL MEDICINE

## 2021-04-27 PROCEDURE — 96413 CHEMO IV INFUSION 1 HR: CPT

## 2021-04-27 PROCEDURE — 63600175 PHARM REV CODE 636 W HCPCS: Performed by: INTERNAL MEDICINE

## 2021-04-27 PROCEDURE — 96416 CHEMO PROLONG INFUSE W/PUMP: CPT

## 2021-04-27 RX ORDER — HEPARIN 100 UNIT/ML
500 SYRINGE INTRAVENOUS
Status: CANCELLED | OUTPATIENT
Start: 2021-04-29

## 2021-04-27 RX ORDER — DIPHENHYDRAMINE HYDROCHLORIDE 50 MG/ML
50 INJECTION INTRAMUSCULAR; INTRAVENOUS ONCE AS NEEDED
Status: CANCELLED | OUTPATIENT
Start: 2021-04-27

## 2021-04-27 RX ORDER — SODIUM CHLORIDE 0.9 % (FLUSH) 0.9 %
10 SYRINGE (ML) INJECTION
Status: CANCELLED | OUTPATIENT
Start: 2021-04-27

## 2021-04-27 RX ORDER — FLUOROURACIL 50 MG/ML
400 INJECTION, SOLUTION INTRAVENOUS
Status: CANCELLED | OUTPATIENT
Start: 2021-04-27

## 2021-04-27 RX ORDER — EPINEPHRINE 0.3 MG/.3ML
0.3 INJECTION SUBCUTANEOUS ONCE AS NEEDED
Status: CANCELLED | OUTPATIENT
Start: 2021-04-27

## 2021-04-27 RX ORDER — HEPARIN 100 UNIT/ML
500 SYRINGE INTRAVENOUS
Status: CANCELLED | OUTPATIENT
Start: 2021-04-27

## 2021-04-27 RX ORDER — FLUOROURACIL 50 MG/ML
400 INJECTION, SOLUTION INTRAVENOUS
Status: COMPLETED | OUTPATIENT
Start: 2021-04-27 | End: 2021-04-27

## 2021-04-27 RX ORDER — SODIUM CHLORIDE 0.9 % (FLUSH) 0.9 %
10 SYRINGE (ML) INJECTION
Status: CANCELLED | OUTPATIENT
Start: 2021-04-29

## 2021-04-27 RX ADMIN — LEUCOVORIN CALCIUM 800 MG: 350 INJECTION, POWDER, LYOPHILIZED, FOR SOLUTION INTRAMUSCULAR; INTRAVENOUS at 12:04

## 2021-04-27 RX ADMIN — FLUOROURACIL 4945 MG: 50 INJECTION, SOLUTION INTRAVENOUS at 02:04

## 2021-04-27 RX ADMIN — PALONOSETRON HYDROCHLORIDE: 0.25 INJECTION, SOLUTION INTRAVENOUS at 11:04

## 2021-04-27 RX ADMIN — OXALIPLATIN 175 MG: 100 INJECTION, SOLUTION, CONCENTRATE INTRAVENOUS at 12:04

## 2021-04-27 RX ADMIN — FLUOROURACIL 825 MG: 50 INJECTION, SOLUTION INTRAVENOUS at 02:04

## 2021-04-28 ENCOUNTER — PATIENT MESSAGE (OUTPATIENT)
Dept: RESEARCH | Facility: HOSPITAL | Age: 56
End: 2021-04-28

## 2021-04-29 ENCOUNTER — INFUSION (OUTPATIENT)
Dept: INFUSION THERAPY | Facility: HOSPITAL | Age: 56
End: 2021-04-29
Attending: INTERNAL MEDICINE
Payer: COMMERCIAL

## 2021-04-29 VITALS
OXYGEN SATURATION: 98 % | HEART RATE: 68 BPM | TEMPERATURE: 98 F | SYSTOLIC BLOOD PRESSURE: 153 MMHG | RESPIRATION RATE: 18 BRPM | DIASTOLIC BLOOD PRESSURE: 88 MMHG

## 2021-04-29 DIAGNOSIS — C17.0 DUODENAL CANCER: Primary | ICD-10-CM

## 2021-04-29 PROCEDURE — 96523 IRRIG DRUG DELIVERY DEVICE: CPT

## 2021-04-29 PROCEDURE — A4216 STERILE WATER/SALINE, 10 ML: HCPCS | Performed by: INTERNAL MEDICINE

## 2021-04-29 PROCEDURE — 63600175 PHARM REV CODE 636 W HCPCS: Performed by: INTERNAL MEDICINE

## 2021-04-29 PROCEDURE — 25000003 PHARM REV CODE 250: Performed by: INTERNAL MEDICINE

## 2021-04-29 RX ORDER — SODIUM CHLORIDE 0.9 % (FLUSH) 0.9 %
10 SYRINGE (ML) INJECTION
Status: DISCONTINUED | OUTPATIENT
Start: 2021-04-29 | End: 2021-04-29 | Stop reason: HOSPADM

## 2021-04-29 RX ORDER — HEPARIN 100 UNIT/ML
500 SYRINGE INTRAVENOUS
Status: DISCONTINUED | OUTPATIENT
Start: 2021-04-29 | End: 2021-04-29 | Stop reason: HOSPADM

## 2021-04-29 RX ADMIN — HEPARIN 500 UNITS: 100 SYRINGE at 12:04

## 2021-04-29 RX ADMIN — SODIUM CHLORIDE, PRESERVATIVE FREE 10 ML: 5 INJECTION INTRAVENOUS at 12:04

## 2021-05-07 ENCOUNTER — TELEPHONE (OUTPATIENT)
Dept: RADIOLOGY | Facility: HOSPITAL | Age: 56
End: 2021-05-07

## 2021-05-10 ENCOUNTER — HOSPITAL ENCOUNTER (OUTPATIENT)
Dept: RADIOLOGY | Facility: HOSPITAL | Age: 56
Discharge: HOME | End: 2021-05-10
Attending: SURGERY
Payer: COMMERCIAL

## 2021-05-10 DIAGNOSIS — R10.9 ABDOMINAL PAIN, UNSPECIFIED ABDOMINAL LOCATION: ICD-10-CM

## 2021-05-10 PROCEDURE — A9698 NON-RAD CONTRAST MATERIALNOC: HCPCS | Performed by: SURGERY

## 2021-05-10 PROCEDURE — 74177 CT ABD & PELVIS W/CONTRAST: CPT | Mod: TC

## 2021-05-10 PROCEDURE — 25500020 PHARM REV CODE 255: Performed by: SURGERY

## 2021-05-10 PROCEDURE — 74177 CT ABDOMEN PELVIS WITH CONTRAST: ICD-10-PCS | Mod: 26,,, | Performed by: RADIOLOGY

## 2021-05-10 PROCEDURE — 74177 CT ABD & PELVIS W/CONTRAST: CPT | Mod: 26,,, | Performed by: RADIOLOGY

## 2021-05-10 RX ADMIN — IOHEXOL 100 ML: 350 INJECTION, SOLUTION INTRAVENOUS at 03:05

## 2021-05-10 RX ADMIN — IOHEXOL 1000 ML: 12 SOLUTION ORAL at 03:05

## 2021-05-11 ENCOUNTER — INFUSION (OUTPATIENT)
Dept: INFUSION THERAPY | Facility: HOSPITAL | Age: 56
End: 2021-05-11
Attending: INTERNAL MEDICINE
Payer: COMMERCIAL

## 2021-05-11 ENCOUNTER — OFFICE VISIT (OUTPATIENT)
Dept: HEMATOLOGY/ONCOLOGY | Facility: CLINIC | Age: 56
End: 2021-05-11
Payer: COMMERCIAL

## 2021-05-11 VITALS
HEART RATE: 69 BPM | RESPIRATION RATE: 16 BRPM | OXYGEN SATURATION: 99 % | TEMPERATURE: 99 F | SYSTOLIC BLOOD PRESSURE: 150 MMHG | DIASTOLIC BLOOD PRESSURE: 90 MMHG

## 2021-05-11 VITALS
HEIGHT: 72 IN | OXYGEN SATURATION: 98 % | HEART RATE: 71 BPM | WEIGHT: 190.06 LBS | DIASTOLIC BLOOD PRESSURE: 86 MMHG | SYSTOLIC BLOOD PRESSURE: 148 MMHG | TEMPERATURE: 98 F | BODY MASS INDEX: 25.74 KG/M2

## 2021-05-11 DIAGNOSIS — D84.821 IMMUNOSUPPRESSED DUE TO CHEMOTHERAPY: Primary | ICD-10-CM

## 2021-05-11 DIAGNOSIS — C17.0 DUODENAL CANCER: Primary | ICD-10-CM

## 2021-05-11 DIAGNOSIS — T45.1X5A IMMUNOSUPPRESSED DUE TO CHEMOTHERAPY: Primary | ICD-10-CM

## 2021-05-11 DIAGNOSIS — Z79.899 IMMUNOSUPPRESSED DUE TO CHEMOTHERAPY: Primary | ICD-10-CM

## 2021-05-11 DIAGNOSIS — C17.0 DUODENAL CANCER: ICD-10-CM

## 2021-05-11 PROCEDURE — 96416 CHEMO PROLONG INFUSE W/PUMP: CPT

## 2021-05-11 PROCEDURE — 63600175 PHARM REV CODE 636 W HCPCS: Performed by: INTERNAL MEDICINE

## 2021-05-11 PROCEDURE — 3008F PR BODY MASS INDEX (BMI) DOCUMENTED: ICD-10-PCS | Mod: CPTII,S$GLB,, | Performed by: INTERNAL MEDICINE

## 2021-05-11 PROCEDURE — 99999 PR PBB SHADOW E&M-EST. PATIENT-LVL III: ICD-10-PCS | Mod: PBBFAC,,, | Performed by: INTERNAL MEDICINE

## 2021-05-11 PROCEDURE — 96367 TX/PROPH/DG ADDL SEQ IV INF: CPT

## 2021-05-11 PROCEDURE — 96415 CHEMO IV INFUSION ADDL HR: CPT

## 2021-05-11 PROCEDURE — 3008F BODY MASS INDEX DOCD: CPT | Mod: CPTII,S$GLB,, | Performed by: INTERNAL MEDICINE

## 2021-05-11 PROCEDURE — 99215 PR OFFICE/OUTPT VISIT, EST, LEVL V, 40-54 MIN: ICD-10-PCS | Mod: 25,S$GLB,, | Performed by: INTERNAL MEDICINE

## 2021-05-11 PROCEDURE — 96411 CHEMO IV PUSH ADDL DRUG: CPT

## 2021-05-11 PROCEDURE — 99999 PR PBB SHADOW E&M-EST. PATIENT-LVL III: CPT | Mod: PBBFAC,,, | Performed by: INTERNAL MEDICINE

## 2021-05-11 PROCEDURE — 1126F AMNT PAIN NOTED NONE PRSNT: CPT | Mod: S$GLB,,, | Performed by: INTERNAL MEDICINE

## 2021-05-11 PROCEDURE — 99215 OFFICE O/P EST HI 40 MIN: CPT | Mod: 25,S$GLB,, | Performed by: INTERNAL MEDICINE

## 2021-05-11 PROCEDURE — 1126F PR PAIN SEVERITY QUANTIFIED, NO PAIN PRESENT: ICD-10-PCS | Mod: S$GLB,,, | Performed by: INTERNAL MEDICINE

## 2021-05-11 PROCEDURE — 25000003 PHARM REV CODE 250: Performed by: INTERNAL MEDICINE

## 2021-05-11 PROCEDURE — 96368 THER/DIAG CONCURRENT INF: CPT

## 2021-05-11 PROCEDURE — 96413 CHEMO IV INFUSION 1 HR: CPT

## 2021-05-11 RX ORDER — DIPHENHYDRAMINE HYDROCHLORIDE 50 MG/ML
50 INJECTION INTRAMUSCULAR; INTRAVENOUS ONCE AS NEEDED
Status: CANCELLED | OUTPATIENT
Start: 2021-05-11

## 2021-05-11 RX ORDER — EPINEPHRINE 0.3 MG/.3ML
0.3 INJECTION SUBCUTANEOUS ONCE AS NEEDED
Status: CANCELLED | OUTPATIENT
Start: 2021-05-11

## 2021-05-11 RX ORDER — SODIUM CHLORIDE 0.9 % (FLUSH) 0.9 %
10 SYRINGE (ML) INJECTION
Status: CANCELLED | OUTPATIENT
Start: 2021-05-13

## 2021-05-11 RX ORDER — SODIUM CHLORIDE 0.9 % (FLUSH) 0.9 %
10 SYRINGE (ML) INJECTION
Status: CANCELLED | OUTPATIENT
Start: 2021-05-11

## 2021-05-11 RX ORDER — FLUOROURACIL 50 MG/ML
400 INJECTION, SOLUTION INTRAVENOUS
Status: CANCELLED | OUTPATIENT
Start: 2021-05-11

## 2021-05-11 RX ORDER — HEPARIN 100 UNIT/ML
500 SYRINGE INTRAVENOUS
Status: CANCELLED | OUTPATIENT
Start: 2021-05-13

## 2021-05-11 RX ORDER — HEPARIN 100 UNIT/ML
500 SYRINGE INTRAVENOUS
Status: CANCELLED | OUTPATIENT
Start: 2021-05-11

## 2021-05-11 RX ORDER — FLUOROURACIL 50 MG/ML
400 INJECTION, SOLUTION INTRAVENOUS
Status: COMPLETED | OUTPATIENT
Start: 2021-05-11 | End: 2021-05-11

## 2021-05-11 RX ADMIN — PALONOSETRON HYDROCHLORIDE: 0.25 INJECTION, SOLUTION INTRAVENOUS at 09:05

## 2021-05-11 RX ADMIN — LEUCOVORIN CALCIUM 800 MG: 350 INJECTION, POWDER, LYOPHILIZED, FOR SOLUTION INTRAMUSCULAR; INTRAVENOUS at 09:05

## 2021-05-11 RX ADMIN — FLUOROURACIL 4945 MG: 50 INJECTION, SOLUTION INTRAVENOUS at 11:05

## 2021-05-11 RX ADMIN — FLUOROURACIL 825 MG: 50 INJECTION, SOLUTION INTRAVENOUS at 11:05

## 2021-05-11 RX ADMIN — OXALIPLATIN 175 MG: 100 INJECTION, SOLUTION, CONCENTRATE INTRAVENOUS at 09:05

## 2021-05-12 ENCOUNTER — PATIENT MESSAGE (OUTPATIENT)
Dept: HEMATOLOGY/ONCOLOGY | Facility: CLINIC | Age: 56
End: 2021-05-12

## 2021-05-13 ENCOUNTER — DOCUMENTATION ONLY (OUTPATIENT)
Dept: HEMATOLOGY/ONCOLOGY | Facility: CLINIC | Age: 56
End: 2021-05-13

## 2021-05-13 ENCOUNTER — TELEPHONE (OUTPATIENT)
Dept: HEMATOLOGY/ONCOLOGY | Facility: CLINIC | Age: 56
End: 2021-05-13

## 2021-05-13 ENCOUNTER — PATIENT MESSAGE (OUTPATIENT)
Dept: SURGERY | Facility: CLINIC | Age: 56
End: 2021-05-13

## 2021-05-13 ENCOUNTER — INFUSION (OUTPATIENT)
Dept: INFUSION THERAPY | Facility: HOSPITAL | Age: 56
End: 2021-05-13
Attending: INTERNAL MEDICINE
Payer: COMMERCIAL

## 2021-05-13 ENCOUNTER — PATIENT MESSAGE (OUTPATIENT)
Dept: HEMATOLOGY/ONCOLOGY | Facility: CLINIC | Age: 56
End: 2021-05-13

## 2021-05-13 VITALS
SYSTOLIC BLOOD PRESSURE: 146 MMHG | HEART RATE: 70 BPM | DIASTOLIC BLOOD PRESSURE: 92 MMHG | OXYGEN SATURATION: 99 % | RESPIRATION RATE: 18 BRPM | TEMPERATURE: 98 F

## 2021-05-13 DIAGNOSIS — C17.0 DUODENAL CANCER: Primary | ICD-10-CM

## 2021-05-13 PROCEDURE — 96523 IRRIG DRUG DELIVERY DEVICE: CPT

## 2021-05-13 PROCEDURE — 25000003 PHARM REV CODE 250: Performed by: INTERNAL MEDICINE

## 2021-05-13 PROCEDURE — 63600175 PHARM REV CODE 636 W HCPCS: Performed by: INTERNAL MEDICINE

## 2021-05-13 PROCEDURE — A4216 STERILE WATER/SALINE, 10 ML: HCPCS | Performed by: INTERNAL MEDICINE

## 2021-05-13 RX ORDER — HEPARIN 100 UNIT/ML
500 SYRINGE INTRAVENOUS
Status: DISCONTINUED | OUTPATIENT
Start: 2021-05-13 | End: 2021-05-13 | Stop reason: HOSPADM

## 2021-05-13 RX ORDER — SODIUM CHLORIDE 0.9 % (FLUSH) 0.9 %
10 SYRINGE (ML) INJECTION
Status: DISCONTINUED | OUTPATIENT
Start: 2021-05-13 | End: 2021-05-13 | Stop reason: HOSPADM

## 2021-05-13 RX ADMIN — HEPARIN 500 UNITS: 100 SYRINGE at 09:05

## 2021-05-13 RX ADMIN — Medication 10 ML: at 09:05

## 2021-05-24 ENCOUNTER — LAB VISIT (OUTPATIENT)
Dept: LAB | Facility: HOSPITAL | Age: 56
End: 2021-05-24
Attending: INTERNAL MEDICINE
Payer: COMMERCIAL

## 2021-05-24 DIAGNOSIS — C17.0 DUODENAL CANCER: ICD-10-CM

## 2021-05-24 LAB
ALBUMIN SERPL BCP-MCNC: 3.8 G/DL (ref 3.5–5.2)
ALP SERPL-CCNC: 100 U/L (ref 55–135)
ALT SERPL W/O P-5'-P-CCNC: 31 U/L (ref 10–44)
ANION GAP SERPL CALC-SCNC: 10 MMOL/L (ref 8–16)
AST SERPL-CCNC: 29 U/L (ref 10–40)
BASOPHILS # BLD AUTO: 0.02 K/UL (ref 0–0.2)
BASOPHILS NFR BLD: 0.7 % (ref 0–1.9)
BILIRUB SERPL-MCNC: 0.5 MG/DL (ref 0.1–1)
BUN SERPL-MCNC: 13 MG/DL (ref 6–20)
CALCIUM SERPL-MCNC: 9.1 MG/DL (ref 8.7–10.5)
CHLORIDE SERPL-SCNC: 105 MMOL/L (ref 95–110)
CO2 SERPL-SCNC: 28 MMOL/L (ref 23–29)
CREAT SERPL-MCNC: 0.8 MG/DL (ref 0.5–1.4)
DIFFERENTIAL METHOD: ABNORMAL
EOSINOPHIL # BLD AUTO: 0.1 K/UL (ref 0–0.5)
EOSINOPHIL NFR BLD: 2.4 % (ref 0–8)
ERYTHROCYTE [DISTWIDTH] IN BLOOD BY AUTOMATED COUNT: 14.6 % (ref 11.5–14.5)
EST. GFR  (AFRICAN AMERICAN): >60 ML/MIN/1.73 M^2
EST. GFR  (NON AFRICAN AMERICAN): >60 ML/MIN/1.73 M^2
GLUCOSE SERPL-MCNC: 118 MG/DL (ref 70–110)
HCT VFR BLD AUTO: 35.3 % (ref 37–48.5)
HGB BLD-MCNC: 11.9 G/DL (ref 12–16)
IMM GRANULOCYTES # BLD AUTO: 0 K/UL (ref 0–0.04)
IMM GRANULOCYTES NFR BLD AUTO: 0 % (ref 0–0.5)
LYMPHOCYTES # BLD AUTO: 0.9 K/UL (ref 1–4.8)
LYMPHOCYTES NFR BLD: 31 % (ref 18–48)
MCH RBC QN AUTO: 29.1 PG (ref 27–31)
MCHC RBC AUTO-ENTMCNC: 33.7 G/DL (ref 32–36)
MCV RBC AUTO: 86 FL (ref 82–98)
MONOCYTES # BLD AUTO: 0.5 K/UL (ref 0.3–1)
MONOCYTES NFR BLD: 15.5 % (ref 4–15)
NEUTROPHILS # BLD AUTO: 1.5 K/UL (ref 1.8–7.7)
NEUTROPHILS NFR BLD: 50.4 % (ref 38–73)
NRBC BLD-RTO: 0 /100 WBC
PLATELET # BLD AUTO: 90 K/UL (ref 150–450)
PMV BLD AUTO: 10.6 FL (ref 9.2–12.9)
POTASSIUM SERPL-SCNC: 3.6 MMOL/L (ref 3.5–5.1)
PROT SERPL-MCNC: 6.9 G/DL (ref 6–8.4)
RBC # BLD AUTO: 4.09 M/UL (ref 4–5.4)
SODIUM SERPL-SCNC: 143 MMOL/L (ref 136–145)
WBC # BLD AUTO: 2.97 K/UL (ref 3.9–12.7)

## 2021-05-24 PROCEDURE — 85025 COMPLETE CBC W/AUTO DIFF WBC: CPT | Performed by: INTERNAL MEDICINE

## 2021-05-24 PROCEDURE — 80053 COMPREHEN METABOLIC PANEL: CPT | Performed by: INTERNAL MEDICINE

## 2021-05-24 PROCEDURE — 36415 COLL VENOUS BLD VENIPUNCTURE: CPT | Mod: PO | Performed by: INTERNAL MEDICINE

## 2021-05-25 ENCOUNTER — OFFICE VISIT (OUTPATIENT)
Dept: HEMATOLOGY/ONCOLOGY | Facility: CLINIC | Age: 56
End: 2021-05-25
Payer: COMMERCIAL

## 2021-05-25 ENCOUNTER — INFUSION (OUTPATIENT)
Dept: INFUSION THERAPY | Facility: HOSPITAL | Age: 56
End: 2021-05-25
Attending: INTERNAL MEDICINE
Payer: COMMERCIAL

## 2021-05-25 VITALS
SYSTOLIC BLOOD PRESSURE: 147 MMHG | TEMPERATURE: 98 F | DIASTOLIC BLOOD PRESSURE: 93 MMHG | HEART RATE: 72 BPM | RESPIRATION RATE: 16 BRPM | OXYGEN SATURATION: 97 %

## 2021-05-25 VITALS
HEIGHT: 72 IN | OXYGEN SATURATION: 98 % | SYSTOLIC BLOOD PRESSURE: 143 MMHG | HEART RATE: 76 BPM | DIASTOLIC BLOOD PRESSURE: 91 MMHG | WEIGHT: 188.25 LBS | TEMPERATURE: 98 F | BODY MASS INDEX: 25.5 KG/M2

## 2021-05-25 DIAGNOSIS — C17.0 DUODENAL CANCER: Primary | ICD-10-CM

## 2021-05-25 DIAGNOSIS — D61.810 PANCYTOPENIA DUE TO ANTINEOPLASTIC CHEMOTHERAPY: ICD-10-CM

## 2021-05-25 DIAGNOSIS — Z79.899 IMMUNODEFICIENCY DUE TO CHEMOTHERAPY: ICD-10-CM

## 2021-05-25 DIAGNOSIS — Z15.89 MONOALLELIC MUTATION OF MUTYH GENE: ICD-10-CM

## 2021-05-25 DIAGNOSIS — D84.821 IMMUNODEFICIENCY DUE TO CHEMOTHERAPY: ICD-10-CM

## 2021-05-25 DIAGNOSIS — T45.1X5A PANCYTOPENIA DUE TO ANTINEOPLASTIC CHEMOTHERAPY: ICD-10-CM

## 2021-05-25 DIAGNOSIS — T45.1X5A IMMUNODEFICIENCY DUE TO CHEMOTHERAPY: ICD-10-CM

## 2021-05-25 DIAGNOSIS — Z71.89 ACP (ADVANCE CARE PLANNING): ICD-10-CM

## 2021-05-25 DIAGNOSIS — Z90.411 HISTORY OF PARTIAL PANCREATECTOMY: ICD-10-CM

## 2021-05-25 PROCEDURE — 96367 TX/PROPH/DG ADDL SEQ IV INF: CPT

## 2021-05-25 PROCEDURE — 63600175 PHARM REV CODE 636 W HCPCS: Performed by: INTERNAL MEDICINE

## 2021-05-25 PROCEDURE — 96413 CHEMO IV INFUSION 1 HR: CPT

## 2021-05-25 PROCEDURE — 96415 CHEMO IV INFUSION ADDL HR: CPT

## 2021-05-25 PROCEDURE — 3008F PR BODY MASS INDEX (BMI) DOCUMENTED: ICD-10-PCS | Mod: CPTII,S$GLB,, | Performed by: INTERNAL MEDICINE

## 2021-05-25 PROCEDURE — 99999 PR PBB SHADOW E&M-EST. PATIENT-LVL IV: ICD-10-PCS | Mod: PBBFAC,,, | Performed by: INTERNAL MEDICINE

## 2021-05-25 PROCEDURE — 99215 PR OFFICE/OUTPT VISIT, EST, LEVL V, 40-54 MIN: ICD-10-PCS | Mod: 25,S$GLB,, | Performed by: INTERNAL MEDICINE

## 2021-05-25 PROCEDURE — 25000003 PHARM REV CODE 250: Performed by: INTERNAL MEDICINE

## 2021-05-25 PROCEDURE — 96368 THER/DIAG CONCURRENT INF: CPT

## 2021-05-25 PROCEDURE — 99215 OFFICE O/P EST HI 40 MIN: CPT | Mod: 25,S$GLB,, | Performed by: INTERNAL MEDICINE

## 2021-05-25 PROCEDURE — 96416 CHEMO PROLONG INFUSE W/PUMP: CPT

## 2021-05-25 PROCEDURE — 3008F BODY MASS INDEX DOCD: CPT | Mod: CPTII,S$GLB,, | Performed by: INTERNAL MEDICINE

## 2021-05-25 PROCEDURE — 1126F AMNT PAIN NOTED NONE PRSNT: CPT | Mod: S$GLB,,, | Performed by: INTERNAL MEDICINE

## 2021-05-25 PROCEDURE — 96411 CHEMO IV PUSH ADDL DRUG: CPT

## 2021-05-25 PROCEDURE — 99999 PR PBB SHADOW E&M-EST. PATIENT-LVL IV: CPT | Mod: PBBFAC,,, | Performed by: INTERNAL MEDICINE

## 2021-05-25 PROCEDURE — 1126F PR PAIN SEVERITY QUANTIFIED, NO PAIN PRESENT: ICD-10-PCS | Mod: S$GLB,,, | Performed by: INTERNAL MEDICINE

## 2021-05-25 RX ORDER — HEPARIN 100 UNIT/ML
500 SYRINGE INTRAVENOUS
Status: CANCELLED | OUTPATIENT
Start: 2021-05-25

## 2021-05-25 RX ORDER — DIPHENHYDRAMINE HYDROCHLORIDE 50 MG/ML
50 INJECTION INTRAMUSCULAR; INTRAVENOUS ONCE AS NEEDED
Status: CANCELLED | OUTPATIENT
Start: 2021-05-25

## 2021-05-25 RX ORDER — HEPARIN 100 UNIT/ML
500 SYRINGE INTRAVENOUS
Status: CANCELLED | OUTPATIENT
Start: 2021-05-27

## 2021-05-25 RX ORDER — SODIUM CHLORIDE 0.9 % (FLUSH) 0.9 %
10 SYRINGE (ML) INJECTION
Status: CANCELLED | OUTPATIENT
Start: 2021-05-25

## 2021-05-25 RX ORDER — SODIUM CHLORIDE 0.9 % (FLUSH) 0.9 %
10 SYRINGE (ML) INJECTION
Status: CANCELLED | OUTPATIENT
Start: 2021-05-27

## 2021-05-25 RX ORDER — FLUOROURACIL 50 MG/ML
400 INJECTION, SOLUTION INTRAVENOUS
Status: CANCELLED | OUTPATIENT
Start: 2021-05-25

## 2021-05-25 RX ORDER — FLUOROURACIL 50 MG/ML
400 INJECTION, SOLUTION INTRAVENOUS
Status: COMPLETED | OUTPATIENT
Start: 2021-05-25 | End: 2021-05-25

## 2021-05-25 RX ORDER — EPINEPHRINE 0.3 MG/.3ML
0.3 INJECTION SUBCUTANEOUS ONCE AS NEEDED
Status: CANCELLED | OUTPATIENT
Start: 2021-05-25

## 2021-05-25 RX ADMIN — FLUOROURACIL 825 MG: 50 INJECTION, SOLUTION INTRAVENOUS at 02:05

## 2021-05-25 RX ADMIN — PALONOSETRON HYDROCHLORIDE: 0.25 INJECTION, SOLUTION INTRAVENOUS at 11:05

## 2021-05-25 RX ADMIN — OXALIPLATIN 175 MG: 5 INJECTION, SOLUTION INTRAVENOUS at 12:05

## 2021-05-25 RX ADMIN — LEUCOVORIN CALCIUM 825 MG: 350 INJECTION, POWDER, LYOPHILIZED, FOR SOLUTION INTRAMUSCULAR; INTRAVENOUS at 12:05

## 2021-05-25 RX ADMIN — FLUOROURACIL 4945 MG: 50 INJECTION, SOLUTION INTRAVENOUS at 02:05

## 2021-05-27 ENCOUNTER — INFUSION (OUTPATIENT)
Dept: INFUSION THERAPY | Facility: HOSPITAL | Age: 56
End: 2021-05-27
Attending: NURSE PRACTITIONER
Payer: COMMERCIAL

## 2021-05-27 VITALS
RESPIRATION RATE: 16 BRPM | SYSTOLIC BLOOD PRESSURE: 159 MMHG | OXYGEN SATURATION: 98 % | TEMPERATURE: 98 F | HEART RATE: 72 BPM | DIASTOLIC BLOOD PRESSURE: 96 MMHG

## 2021-05-27 DIAGNOSIS — C17.0 DUODENAL CANCER: Primary | ICD-10-CM

## 2021-05-27 PROCEDURE — 25000003 PHARM REV CODE 250: Performed by: INTERNAL MEDICINE

## 2021-05-27 PROCEDURE — 96523 IRRIG DRUG DELIVERY DEVICE: CPT

## 2021-05-27 PROCEDURE — A4216 STERILE WATER/SALINE, 10 ML: HCPCS | Performed by: INTERNAL MEDICINE

## 2021-05-27 PROCEDURE — 63600175 PHARM REV CODE 636 W HCPCS: Performed by: INTERNAL MEDICINE

## 2021-05-27 RX ORDER — SODIUM CHLORIDE 0.9 % (FLUSH) 0.9 %
10 SYRINGE (ML) INJECTION
Status: DISCONTINUED | OUTPATIENT
Start: 2021-05-27 | End: 2021-05-27 | Stop reason: HOSPADM

## 2021-05-27 RX ORDER — HEPARIN 100 UNIT/ML
500 SYRINGE INTRAVENOUS
Status: DISCONTINUED | OUTPATIENT
Start: 2021-05-27 | End: 2021-05-27 | Stop reason: HOSPADM

## 2021-05-27 RX ADMIN — HEPARIN 500 UNITS: 100 SYRINGE at 12:05

## 2021-05-27 RX ADMIN — Medication 10 ML: at 12:05

## 2021-06-10 ENCOUNTER — DOCUMENTATION ONLY (OUTPATIENT)
Dept: HEMATOLOGY/ONCOLOGY | Facility: CLINIC | Age: 56
End: 2021-06-10

## 2021-06-14 ENCOUNTER — LAB VISIT (OUTPATIENT)
Dept: LAB | Facility: HOSPITAL | Age: 56
End: 2021-06-14
Attending: INTERNAL MEDICINE
Payer: COMMERCIAL

## 2021-06-14 DIAGNOSIS — C17.0 DUODENAL CANCER: ICD-10-CM

## 2021-06-14 LAB
ALBUMIN SERPL BCP-MCNC: 3.7 G/DL (ref 3.5–5.2)
ALP SERPL-CCNC: 121 U/L (ref 55–135)
ALT SERPL W/O P-5'-P-CCNC: 41 U/L (ref 10–44)
ANION GAP SERPL CALC-SCNC: 12 MMOL/L (ref 8–16)
ANISOCYTOSIS BLD QL SMEAR: SLIGHT
AST SERPL-CCNC: 47 U/L (ref 10–40)
BASOPHILS # BLD AUTO: 0.01 K/UL (ref 0–0.2)
BASOPHILS NFR BLD: 0.4 % (ref 0–1.9)
BILIRUB SERPL-MCNC: 0.6 MG/DL (ref 0.1–1)
BUN SERPL-MCNC: 9 MG/DL (ref 6–20)
CALCIUM SERPL-MCNC: 8.7 MG/DL (ref 8.7–10.5)
CHLORIDE SERPL-SCNC: 107 MMOL/L (ref 95–110)
CO2 SERPL-SCNC: 26 MMOL/L (ref 23–29)
CREAT SERPL-MCNC: 0.8 MG/DL (ref 0.5–1.4)
DACRYOCYTES BLD QL SMEAR: ABNORMAL
DIFFERENTIAL METHOD: ABNORMAL
EOSINOPHIL # BLD AUTO: 0 K/UL (ref 0–0.5)
EOSINOPHIL NFR BLD: 1.3 % (ref 0–8)
ERYTHROCYTE [DISTWIDTH] IN BLOOD BY AUTOMATED COUNT: 15.3 % (ref 11.5–14.5)
EST. GFR  (AFRICAN AMERICAN): >60 ML/MIN/1.73 M^2
EST. GFR  (NON AFRICAN AMERICAN): >60 ML/MIN/1.73 M^2
GLUCOSE SERPL-MCNC: 97 MG/DL (ref 70–110)
HCT VFR BLD AUTO: 37.3 % (ref 37–48.5)
HGB BLD-MCNC: 11.9 G/DL (ref 12–16)
IMM GRANULOCYTES # BLD AUTO: 0.02 K/UL (ref 0–0.04)
IMM GRANULOCYTES NFR BLD AUTO: 0.8 % (ref 0–0.5)
LYMPHOCYTES # BLD AUTO: 1.2 K/UL (ref 1–4.8)
LYMPHOCYTES NFR BLD: 50 % (ref 18–48)
MCH RBC QN AUTO: 28.9 PG (ref 27–31)
MCHC RBC AUTO-ENTMCNC: 31.9 G/DL (ref 32–36)
MCV RBC AUTO: 91 FL (ref 82–98)
MONOCYTES # BLD AUTO: 0.5 K/UL (ref 0.3–1)
MONOCYTES NFR BLD: 22 % (ref 4–15)
NEUTROPHILS # BLD AUTO: 0.6 K/UL (ref 1.8–7.7)
NEUTROPHILS NFR BLD: 25.5 % (ref 38–73)
NRBC BLD-RTO: 0 /100 WBC
OVALOCYTES BLD QL SMEAR: ABNORMAL
PLATELET # BLD AUTO: 115 K/UL (ref 150–450)
PLATELET BLD QL SMEAR: ABNORMAL
PMV BLD AUTO: 10.4 FL (ref 9.2–12.9)
POIKILOCYTOSIS BLD QL SMEAR: SLIGHT
POLYCHROMASIA BLD QL SMEAR: ABNORMAL
POTASSIUM SERPL-SCNC: 4.1 MMOL/L (ref 3.5–5.1)
PROT SERPL-MCNC: 6.7 G/DL (ref 6–8.4)
RBC # BLD AUTO: 4.12 M/UL (ref 4–5.4)
SODIUM SERPL-SCNC: 145 MMOL/L (ref 136–145)
WBC # BLD AUTO: 2.36 K/UL (ref 3.9–12.7)

## 2021-06-14 PROCEDURE — 36415 COLL VENOUS BLD VENIPUNCTURE: CPT | Mod: PO | Performed by: INTERNAL MEDICINE

## 2021-06-14 PROCEDURE — 80053 COMPREHEN METABOLIC PANEL: CPT | Performed by: INTERNAL MEDICINE

## 2021-06-14 PROCEDURE — 85025 COMPLETE CBC W/AUTO DIFF WBC: CPT | Performed by: INTERNAL MEDICINE

## 2021-06-21 ENCOUNTER — LAB VISIT (OUTPATIENT)
Dept: LAB | Facility: HOSPITAL | Age: 56
End: 2021-06-21
Attending: INTERNAL MEDICINE
Payer: COMMERCIAL

## 2021-06-21 DIAGNOSIS — C17.0 DUODENAL CANCER: ICD-10-CM

## 2021-06-21 DIAGNOSIS — Z79.899 IMMUNOSUPPRESSED DUE TO CHEMOTHERAPY: ICD-10-CM

## 2021-06-21 DIAGNOSIS — T45.1X5A IMMUNOSUPPRESSED DUE TO CHEMOTHERAPY: ICD-10-CM

## 2021-06-21 DIAGNOSIS — D84.821 IMMUNOSUPPRESSED DUE TO CHEMOTHERAPY: ICD-10-CM

## 2021-06-21 LAB
ALBUMIN SERPL BCP-MCNC: 3.6 G/DL (ref 3.5–5.2)
ALP SERPL-CCNC: 107 U/L (ref 55–135)
ALT SERPL W/O P-5'-P-CCNC: 37 U/L (ref 10–44)
ANION GAP SERPL CALC-SCNC: 7 MMOL/L (ref 8–16)
AST SERPL-CCNC: 38 U/L (ref 10–40)
BASOPHILS # BLD AUTO: 0.03 K/UL (ref 0–0.2)
BASOPHILS NFR BLD: 0.8 % (ref 0–1.9)
BILIRUB SERPL-MCNC: 0.5 MG/DL (ref 0.1–1)
BUN SERPL-MCNC: 11 MG/DL (ref 6–20)
CALCIUM SERPL-MCNC: 8.8 MG/DL (ref 8.7–10.5)
CHLORIDE SERPL-SCNC: 108 MMOL/L (ref 95–110)
CO2 SERPL-SCNC: 28 MMOL/L (ref 23–29)
CREAT SERPL-MCNC: 1 MG/DL (ref 0.5–1.4)
DIFFERENTIAL METHOD: ABNORMAL
EOSINOPHIL # BLD AUTO: 0.1 K/UL (ref 0–0.5)
EOSINOPHIL NFR BLD: 1.6 % (ref 0–8)
ERYTHROCYTE [DISTWIDTH] IN BLOOD BY AUTOMATED COUNT: 14.9 % (ref 11.5–14.5)
EST. GFR  (AFRICAN AMERICAN): >60 ML/MIN/1.73 M^2
EST. GFR  (NON AFRICAN AMERICAN): >60 ML/MIN/1.73 M^2
GLUCOSE SERPL-MCNC: 134 MG/DL (ref 70–110)
HCT VFR BLD AUTO: 34.3 % (ref 37–48.5)
HGB BLD-MCNC: 11 G/DL (ref 12–16)
IMM GRANULOCYTES # BLD AUTO: 0.01 K/UL (ref 0–0.04)
IMM GRANULOCYTES NFR BLD AUTO: 0.3 % (ref 0–0.5)
LYMPHOCYTES # BLD AUTO: 1.4 K/UL (ref 1–4.8)
LYMPHOCYTES NFR BLD: 36.2 % (ref 18–48)
MCH RBC QN AUTO: 28.8 PG (ref 27–31)
MCHC RBC AUTO-ENTMCNC: 32.1 G/DL (ref 32–36)
MCV RBC AUTO: 90 FL (ref 82–98)
MONOCYTES # BLD AUTO: 0.4 K/UL (ref 0.3–1)
MONOCYTES NFR BLD: 10.3 % (ref 4–15)
NEUTROPHILS # BLD AUTO: 2 K/UL (ref 1.8–7.7)
NEUTROPHILS NFR BLD: 50.8 % (ref 38–73)
NRBC BLD-RTO: 0 /100 WBC
PLATELET # BLD AUTO: 139 K/UL (ref 150–450)
PMV BLD AUTO: 11.1 FL (ref 9.2–12.9)
POTASSIUM SERPL-SCNC: 3.9 MMOL/L (ref 3.5–5.1)
PROT SERPL-MCNC: 6.4 G/DL (ref 6–8.4)
RBC # BLD AUTO: 3.82 M/UL (ref 4–5.4)
SODIUM SERPL-SCNC: 143 MMOL/L (ref 136–145)
WBC # BLD AUTO: 3.87 K/UL (ref 3.9–12.7)

## 2021-06-21 PROCEDURE — 80053 COMPREHEN METABOLIC PANEL: CPT | Performed by: INTERNAL MEDICINE

## 2021-06-21 PROCEDURE — 36415 COLL VENOUS BLD VENIPUNCTURE: CPT | Mod: PO | Performed by: INTERNAL MEDICINE

## 2021-06-21 PROCEDURE — 85025 COMPLETE CBC W/AUTO DIFF WBC: CPT | Performed by: INTERNAL MEDICINE

## 2021-06-22 ENCOUNTER — OFFICE VISIT (OUTPATIENT)
Dept: HEMATOLOGY/ONCOLOGY | Facility: CLINIC | Age: 56
End: 2021-06-22
Payer: COMMERCIAL

## 2021-06-22 ENCOUNTER — INFUSION (OUTPATIENT)
Dept: INFUSION THERAPY | Facility: HOSPITAL | Age: 56
End: 2021-06-22
Attending: INTERNAL MEDICINE
Payer: COMMERCIAL

## 2021-06-22 VITALS
SYSTOLIC BLOOD PRESSURE: 134 MMHG | OXYGEN SATURATION: 98 % | DIASTOLIC BLOOD PRESSURE: 90 MMHG | WEIGHT: 194.44 LBS | HEIGHT: 72 IN | TEMPERATURE: 97 F | HEART RATE: 73 BPM | BODY MASS INDEX: 26.34 KG/M2

## 2021-06-22 VITALS
HEART RATE: 67 BPM | TEMPERATURE: 97 F | RESPIRATION RATE: 18 BRPM | BODY MASS INDEX: 26.34 KG/M2 | OXYGEN SATURATION: 97 % | HEIGHT: 72 IN | SYSTOLIC BLOOD PRESSURE: 164 MMHG | DIASTOLIC BLOOD PRESSURE: 98 MMHG | WEIGHT: 194.44 LBS

## 2021-06-22 DIAGNOSIS — D64.81 ANEMIA DUE TO CHEMOTHERAPY: ICD-10-CM

## 2021-06-22 DIAGNOSIS — C17.0 DUODENAL CANCER: Primary | ICD-10-CM

## 2021-06-22 DIAGNOSIS — T45.1X5A ANEMIA DUE TO CHEMOTHERAPY: ICD-10-CM

## 2021-06-22 DIAGNOSIS — T45.1X5A CHEMOTHERAPY-INDUCED THROMBOCYTOPENIA: ICD-10-CM

## 2021-06-22 DIAGNOSIS — D69.59 CHEMOTHERAPY-INDUCED THROMBOCYTOPENIA: ICD-10-CM

## 2021-06-22 PROCEDURE — 1126F PR PAIN SEVERITY QUANTIFIED, NO PAIN PRESENT: ICD-10-PCS | Mod: S$GLB,,, | Performed by: NURSE PRACTITIONER

## 2021-06-22 PROCEDURE — 3008F BODY MASS INDEX DOCD: CPT | Mod: CPTII,S$GLB,, | Performed by: NURSE PRACTITIONER

## 2021-06-22 PROCEDURE — 99999 PR PBB SHADOW E&M-EST. PATIENT-LVL III: ICD-10-PCS | Mod: PBBFAC,,, | Performed by: NURSE PRACTITIONER

## 2021-06-22 PROCEDURE — 25000003 PHARM REV CODE 250: Performed by: NURSE PRACTITIONER

## 2021-06-22 PROCEDURE — 96413 CHEMO IV INFUSION 1 HR: CPT

## 2021-06-22 PROCEDURE — 96416 CHEMO PROLONG INFUSE W/PUMP: CPT

## 2021-06-22 PROCEDURE — 1126F AMNT PAIN NOTED NONE PRSNT: CPT | Mod: S$GLB,,, | Performed by: NURSE PRACTITIONER

## 2021-06-22 PROCEDURE — 99215 OFFICE O/P EST HI 40 MIN: CPT | Mod: S$GLB,,, | Performed by: NURSE PRACTITIONER

## 2021-06-22 PROCEDURE — 99215 PR OFFICE/OUTPT VISIT, EST, LEVL V, 40-54 MIN: ICD-10-PCS | Mod: S$GLB,,, | Performed by: NURSE PRACTITIONER

## 2021-06-22 PROCEDURE — 99999 PR PBB SHADOW E&M-EST. PATIENT-LVL III: CPT | Mod: PBBFAC,,, | Performed by: NURSE PRACTITIONER

## 2021-06-22 PROCEDURE — 63600175 PHARM REV CODE 636 W HCPCS: Performed by: INTERNAL MEDICINE

## 2021-06-22 PROCEDURE — 96368 THER/DIAG CONCURRENT INF: CPT

## 2021-06-22 PROCEDURE — 96367 TX/PROPH/DG ADDL SEQ IV INF: CPT

## 2021-06-22 PROCEDURE — 96415 CHEMO IV INFUSION ADDL HR: CPT

## 2021-06-22 PROCEDURE — 96411 CHEMO IV PUSH ADDL DRUG: CPT

## 2021-06-22 PROCEDURE — 25000003 PHARM REV CODE 250: Performed by: INTERNAL MEDICINE

## 2021-06-22 PROCEDURE — 3008F PR BODY MASS INDEX (BMI) DOCUMENTED: ICD-10-PCS | Mod: CPTII,S$GLB,, | Performed by: NURSE PRACTITIONER

## 2021-06-22 RX ORDER — HYDRALAZINE HYDROCHLORIDE 25 MG/1
25 TABLET, FILM COATED ORAL
Status: COMPLETED | OUTPATIENT
Start: 2021-06-22 | End: 2021-06-22

## 2021-06-22 RX ORDER — SODIUM CHLORIDE 0.9 % (FLUSH) 0.9 %
10 SYRINGE (ML) INJECTION
Status: CANCELLED | OUTPATIENT
Start: 2021-06-22

## 2021-06-22 RX ORDER — FLUOROURACIL 50 MG/ML
400 INJECTION, SOLUTION INTRAVENOUS
Status: COMPLETED | OUTPATIENT
Start: 2021-06-22 | End: 2021-06-22

## 2021-06-22 RX ORDER — HEPARIN 100 UNIT/ML
500 SYRINGE INTRAVENOUS
Status: CANCELLED | OUTPATIENT
Start: 2021-06-22

## 2021-06-22 RX ORDER — FLUOROURACIL 50 MG/ML
400 INJECTION, SOLUTION INTRAVENOUS
Status: CANCELLED | OUTPATIENT
Start: 2021-06-22

## 2021-06-22 RX ORDER — SODIUM CHLORIDE 0.9 % (FLUSH) 0.9 %
10 SYRINGE (ML) INJECTION
Status: CANCELLED | OUTPATIENT
Start: 2021-06-24

## 2021-06-22 RX ORDER — DIPHENHYDRAMINE HYDROCHLORIDE 50 MG/ML
50 INJECTION INTRAMUSCULAR; INTRAVENOUS ONCE AS NEEDED
Status: CANCELLED | OUTPATIENT
Start: 2021-06-22

## 2021-06-22 RX ORDER — HYDRALAZINE HYDROCHLORIDE 10 MG/1
25 TABLET, FILM COATED ORAL
Status: CANCELLED
Start: 2021-06-22 | End: 2021-06-22

## 2021-06-22 RX ORDER — EPINEPHRINE 0.3 MG/.3ML
0.3 INJECTION SUBCUTANEOUS ONCE AS NEEDED
Status: CANCELLED | OUTPATIENT
Start: 2021-06-22

## 2021-06-22 RX ORDER — HEPARIN 100 UNIT/ML
500 SYRINGE INTRAVENOUS
Status: CANCELLED | OUTPATIENT
Start: 2021-06-24

## 2021-06-22 RX ADMIN — HYDRALAZINE HYDROCHLORIDE 25 MG: 25 TABLET, FILM COATED ORAL at 02:06

## 2021-06-22 RX ADMIN — OXALIPLATIN 175 MG: 5 INJECTION, SOLUTION INTRAVENOUS at 11:06

## 2021-06-22 RX ADMIN — FLUOROURACIL 4945 MG: 50 INJECTION, SOLUTION INTRAVENOUS at 01:06

## 2021-06-22 RX ADMIN — DEXAMETHASONE SODIUM PHOSPHATE 0.25 MG: 4 INJECTION, SOLUTION INTRA-ARTICULAR; INTRALESIONAL; INTRAMUSCULAR; INTRAVENOUS; SOFT TISSUE at 10:06

## 2021-06-22 RX ADMIN — LEUCOVORIN CALCIUM 825 MG: 350 INJECTION, POWDER, LYOPHILIZED, FOR SOLUTION INTRAMUSCULAR; INTRAVENOUS at 11:06

## 2021-06-22 RX ADMIN — FLUOROURACIL 825 MG: 50 INJECTION, SOLUTION INTRAVENOUS at 01:06

## 2021-06-24 ENCOUNTER — INFUSION (OUTPATIENT)
Dept: INFUSION THERAPY | Facility: HOSPITAL | Age: 56
End: 2021-06-24
Attending: INTERNAL MEDICINE
Payer: COMMERCIAL

## 2021-06-24 VITALS
HEART RATE: 61 BPM | SYSTOLIC BLOOD PRESSURE: 137 MMHG | RESPIRATION RATE: 18 BRPM | DIASTOLIC BLOOD PRESSURE: 85 MMHG | OXYGEN SATURATION: 97 % | TEMPERATURE: 98 F

## 2021-06-24 DIAGNOSIS — C17.0 DUODENAL CANCER: Primary | ICD-10-CM

## 2021-06-24 PROCEDURE — A4216 STERILE WATER/SALINE, 10 ML: HCPCS | Performed by: INTERNAL MEDICINE

## 2021-06-24 PROCEDURE — 25000003 PHARM REV CODE 250: Performed by: INTERNAL MEDICINE

## 2021-06-24 PROCEDURE — 63600175 PHARM REV CODE 636 W HCPCS: Performed by: INTERNAL MEDICINE

## 2021-06-24 RX ORDER — HEPARIN 100 UNIT/ML
500 SYRINGE INTRAVENOUS
Status: DISCONTINUED | OUTPATIENT
Start: 2021-06-24 | End: 2021-06-24 | Stop reason: HOSPADM

## 2021-06-24 RX ORDER — SODIUM CHLORIDE 0.9 % (FLUSH) 0.9 %
10 SYRINGE (ML) INJECTION
Status: DISCONTINUED | OUTPATIENT
Start: 2021-06-24 | End: 2021-06-24 | Stop reason: HOSPADM

## 2021-06-24 RX ADMIN — HEPARIN 500 UNITS: 100 SYRINGE at 11:06

## 2021-06-24 RX ADMIN — Medication 10 ML: at 11:06

## 2021-06-30 ENCOUNTER — TELEPHONE (OUTPATIENT)
Dept: HEMATOLOGY/ONCOLOGY | Facility: CLINIC | Age: 56
End: 2021-06-30

## 2021-07-06 ENCOUNTER — LAB VISIT (OUTPATIENT)
Dept: LAB | Facility: HOSPITAL | Age: 56
End: 2021-07-06
Attending: INTERNAL MEDICINE
Payer: COMMERCIAL

## 2021-07-06 ENCOUNTER — OFFICE VISIT (OUTPATIENT)
Dept: HEMATOLOGY/ONCOLOGY | Facility: CLINIC | Age: 56
End: 2021-07-06
Payer: COMMERCIAL

## 2021-07-06 ENCOUNTER — INFUSION (OUTPATIENT)
Dept: INFUSION THERAPY | Facility: HOSPITAL | Age: 56
End: 2021-07-06
Attending: INTERNAL MEDICINE
Payer: COMMERCIAL

## 2021-07-06 ENCOUNTER — PATIENT MESSAGE (OUTPATIENT)
Dept: HEMATOLOGY/ONCOLOGY | Facility: CLINIC | Age: 56
End: 2021-07-06

## 2021-07-06 VITALS
TEMPERATURE: 98 F | RESPIRATION RATE: 18 BRPM | BODY MASS INDEX: 26.34 KG/M2 | OXYGEN SATURATION: 98 % | HEART RATE: 84 BPM | SYSTOLIC BLOOD PRESSURE: 156 MMHG | HEIGHT: 72 IN | WEIGHT: 194.44 LBS | DIASTOLIC BLOOD PRESSURE: 93 MMHG

## 2021-07-06 DIAGNOSIS — C17.0 DUODENAL CANCER: Primary | ICD-10-CM

## 2021-07-06 DIAGNOSIS — T45.1X5A PERIPHERAL NEUROPATHY DUE TO CHEMOTHERAPY: ICD-10-CM

## 2021-07-06 DIAGNOSIS — C17.0 DUODENAL CANCER: ICD-10-CM

## 2021-07-06 DIAGNOSIS — T45.1X5A CHEMOTHERAPY-INDUCED THROMBOCYTOPENIA: ICD-10-CM

## 2021-07-06 DIAGNOSIS — T45.1X5A ANEMIA DUE TO CHEMOTHERAPY: ICD-10-CM

## 2021-07-06 DIAGNOSIS — D69.59 CHEMOTHERAPY-INDUCED THROMBOCYTOPENIA: ICD-10-CM

## 2021-07-06 DIAGNOSIS — T45.1X5A IMMUNODEFICIENCY DUE TO CHEMOTHERAPY: ICD-10-CM

## 2021-07-06 DIAGNOSIS — D64.81 ANEMIA DUE TO CHEMOTHERAPY: ICD-10-CM

## 2021-07-06 DIAGNOSIS — Z90.411 HISTORY OF PARTIAL PANCREATECTOMY: ICD-10-CM

## 2021-07-06 DIAGNOSIS — G62.0 PERIPHERAL NEUROPATHY DUE TO CHEMOTHERAPY: ICD-10-CM

## 2021-07-06 DIAGNOSIS — I10 BENIGN HYPERTENSION: ICD-10-CM

## 2021-07-06 DIAGNOSIS — D84.821 IMMUNODEFICIENCY DUE TO CHEMOTHERAPY: ICD-10-CM

## 2021-07-06 DIAGNOSIS — Z79.899 IMMUNODEFICIENCY DUE TO CHEMOTHERAPY: ICD-10-CM

## 2021-07-06 LAB
ALBUMIN SERPL BCP-MCNC: 3.7 G/DL (ref 3.5–5.2)
ALP SERPL-CCNC: 96 U/L (ref 55–135)
ALT SERPL W/O P-5'-P-CCNC: 38 U/L (ref 10–44)
ANION GAP SERPL CALC-SCNC: 11 MMOL/L (ref 8–16)
AST SERPL-CCNC: 35 U/L (ref 10–40)
BASOPHILS # BLD AUTO: 0.01 K/UL (ref 0–0.2)
BASOPHILS NFR BLD: 0.4 % (ref 0–1.9)
BILIRUB SERPL-MCNC: 0.5 MG/DL (ref 0.1–1)
BUN SERPL-MCNC: 11 MG/DL (ref 6–20)
CALCIUM SERPL-MCNC: 9.1 MG/DL (ref 8.7–10.5)
CHLORIDE SERPL-SCNC: 107 MMOL/L (ref 95–110)
CO2 SERPL-SCNC: 27 MMOL/L (ref 23–29)
CREAT SERPL-MCNC: 0.9 MG/DL (ref 0.5–1.4)
DIFFERENTIAL METHOD: ABNORMAL
EOSINOPHIL # BLD AUTO: 0.1 K/UL (ref 0–0.5)
EOSINOPHIL NFR BLD: 3.6 % (ref 0–8)
ERYTHROCYTE [DISTWIDTH] IN BLOOD BY AUTOMATED COUNT: 14.5 % (ref 11.5–14.5)
EST. GFR  (AFRICAN AMERICAN): >60 ML/MIN/1.73 M^2
EST. GFR  (NON AFRICAN AMERICAN): >60 ML/MIN/1.73 M^2
GLUCOSE SERPL-MCNC: 127 MG/DL (ref 70–110)
HCT VFR BLD AUTO: 32.3 % (ref 37–48.5)
HGB BLD-MCNC: 10.9 G/DL (ref 12–16)
IMM GRANULOCYTES # BLD AUTO: 0 K/UL (ref 0–0.04)
IMM GRANULOCYTES NFR BLD AUTO: 0 % (ref 0–0.5)
LYMPHOCYTES # BLD AUTO: 0.9 K/UL (ref 1–4.8)
LYMPHOCYTES NFR BLD: 33.6 % (ref 18–48)
MCH RBC QN AUTO: 28.8 PG (ref 27–31)
MCHC RBC AUTO-ENTMCNC: 33.7 G/DL (ref 32–36)
MCV RBC AUTO: 85 FL (ref 82–98)
MONOCYTES # BLD AUTO: 0.4 K/UL (ref 0.3–1)
MONOCYTES NFR BLD: 14.2 % (ref 4–15)
NEUTROPHILS # BLD AUTO: 1.3 K/UL (ref 1.8–7.7)
NEUTROPHILS NFR BLD: 48.2 % (ref 38–73)
NRBC BLD-RTO: 0 /100 WBC
PLATELET # BLD AUTO: 95 K/UL (ref 150–450)
PMV BLD AUTO: 9.8 FL (ref 9.2–12.9)
POTASSIUM SERPL-SCNC: 4.3 MMOL/L (ref 3.5–5.1)
PROT SERPL-MCNC: 6.4 G/DL (ref 6–8.4)
RBC # BLD AUTO: 3.79 M/UL (ref 4–5.4)
SODIUM SERPL-SCNC: 145 MMOL/L (ref 136–145)
WBC # BLD AUTO: 2.74 K/UL (ref 3.9–12.7)

## 2021-07-06 PROCEDURE — 96411 CHEMO IV PUSH ADDL DRUG: CPT

## 2021-07-06 PROCEDURE — 99213 PR OFFICE/OUTPT VISIT, EST, LEVL III, 20-29 MIN: ICD-10-PCS | Mod: 95,,, | Performed by: INTERNAL MEDICINE

## 2021-07-06 PROCEDURE — 96416 CHEMO PROLONG INFUSE W/PUMP: CPT

## 2021-07-06 PROCEDURE — 96367 TX/PROPH/DG ADDL SEQ IV INF: CPT

## 2021-07-06 PROCEDURE — 85025 COMPLETE CBC W/AUTO DIFF WBC: CPT | Performed by: NURSE PRACTITIONER

## 2021-07-06 PROCEDURE — 96415 CHEMO IV INFUSION ADDL HR: CPT

## 2021-07-06 PROCEDURE — 25000003 PHARM REV CODE 250: Performed by: INTERNAL MEDICINE

## 2021-07-06 PROCEDURE — 96413 CHEMO IV INFUSION 1 HR: CPT

## 2021-07-06 PROCEDURE — 99213 OFFICE O/P EST LOW 20 MIN: CPT | Mod: 95,,, | Performed by: INTERNAL MEDICINE

## 2021-07-06 PROCEDURE — 96368 THER/DIAG CONCURRENT INF: CPT

## 2021-07-06 PROCEDURE — 96366 THER/PROPH/DIAG IV INF ADDON: CPT

## 2021-07-06 PROCEDURE — 80053 COMPREHEN METABOLIC PANEL: CPT | Performed by: NURSE PRACTITIONER

## 2021-07-06 PROCEDURE — 63600175 PHARM REV CODE 636 W HCPCS: Performed by: INTERNAL MEDICINE

## 2021-07-06 PROCEDURE — 36415 COLL VENOUS BLD VENIPUNCTURE: CPT | Performed by: NURSE PRACTITIONER

## 2021-07-06 RX ORDER — HEPARIN 100 UNIT/ML
500 SYRINGE INTRAVENOUS
Status: CANCELLED | OUTPATIENT
Start: 2021-07-06

## 2021-07-06 RX ORDER — FLUOROURACIL 50 MG/ML
400 INJECTION, SOLUTION INTRAVENOUS
Status: COMPLETED | OUTPATIENT
Start: 2021-07-06 | End: 2021-07-06

## 2021-07-06 RX ORDER — EPINEPHRINE 0.3 MG/.3ML
0.3 INJECTION SUBCUTANEOUS ONCE AS NEEDED
Status: CANCELLED | OUTPATIENT
Start: 2021-07-06

## 2021-07-06 RX ORDER — DIPHENHYDRAMINE HYDROCHLORIDE 50 MG/ML
50 INJECTION INTRAMUSCULAR; INTRAVENOUS ONCE AS NEEDED
Status: CANCELLED | OUTPATIENT
Start: 2021-07-06

## 2021-07-06 RX ORDER — SODIUM CHLORIDE 0.9 % (FLUSH) 0.9 %
10 SYRINGE (ML) INJECTION
Status: CANCELLED | OUTPATIENT
Start: 2021-07-06

## 2021-07-06 RX ORDER — HEPARIN 100 UNIT/ML
500 SYRINGE INTRAVENOUS
Status: CANCELLED | OUTPATIENT
Start: 2021-07-08

## 2021-07-06 RX ORDER — SODIUM CHLORIDE 0.9 % (FLUSH) 0.9 %
10 SYRINGE (ML) INJECTION
Status: DISCONTINUED | OUTPATIENT
Start: 2021-07-06 | End: 2021-07-06 | Stop reason: HOSPADM

## 2021-07-06 RX ORDER — SODIUM CHLORIDE 0.9 % (FLUSH) 0.9 %
10 SYRINGE (ML) INJECTION
Status: CANCELLED | OUTPATIENT
Start: 2021-07-08

## 2021-07-06 RX ORDER — FLUOROURACIL 50 MG/ML
400 INJECTION, SOLUTION INTRAVENOUS
Status: CANCELLED | OUTPATIENT
Start: 2021-07-06

## 2021-07-06 RX ADMIN — DEXAMETHASONE SODIUM PHOSPHATE 0.25 MG: 4 INJECTION, SOLUTION INTRA-ARTICULAR; INTRALESIONAL; INTRAMUSCULAR; INTRAVENOUS; SOFT TISSUE at 10:07

## 2021-07-06 RX ADMIN — OXALIPLATIN 175 MG: 5 INJECTION, SOLUTION INTRAVENOUS at 11:07

## 2021-07-06 RX ADMIN — FLUOROURACIL 825 MG: 50 INJECTION, SOLUTION INTRAVENOUS at 01:07

## 2021-07-06 RX ADMIN — LEUCOVORIN CALCIUM 800 MG: 350 INJECTION, POWDER, LYOPHILIZED, FOR SOLUTION INTRAMUSCULAR; INTRAVENOUS at 11:07

## 2021-07-06 RX ADMIN — FLUOROURACIL 4945 MG: 50 INJECTION, SOLUTION INTRAVENOUS at 02:07

## 2021-07-08 ENCOUNTER — INFUSION (OUTPATIENT)
Dept: INFUSION THERAPY | Facility: HOSPITAL | Age: 56
End: 2021-07-08
Attending: INTERNAL MEDICINE
Payer: COMMERCIAL

## 2021-07-08 VITALS
TEMPERATURE: 98 F | WEIGHT: 196.63 LBS | HEART RATE: 59 BPM | SYSTOLIC BLOOD PRESSURE: 150 MMHG | OXYGEN SATURATION: 99 % | RESPIRATION RATE: 16 BRPM | DIASTOLIC BLOOD PRESSURE: 90 MMHG | BODY MASS INDEX: 26.67 KG/M2

## 2021-07-08 DIAGNOSIS — C17.0 DUODENAL CANCER: Primary | ICD-10-CM

## 2021-07-08 PROCEDURE — A4216 STERILE WATER/SALINE, 10 ML: HCPCS | Performed by: INTERNAL MEDICINE

## 2021-07-08 PROCEDURE — 25000003 PHARM REV CODE 250: Performed by: INTERNAL MEDICINE

## 2021-07-08 PROCEDURE — 96523 IRRIG DRUG DELIVERY DEVICE: CPT

## 2021-07-08 PROCEDURE — 63600175 PHARM REV CODE 636 W HCPCS: Performed by: INTERNAL MEDICINE

## 2021-07-08 RX ORDER — SODIUM CHLORIDE 0.9 % (FLUSH) 0.9 %
10 SYRINGE (ML) INJECTION
Status: DISCONTINUED | OUTPATIENT
Start: 2021-07-08 | End: 2021-07-08 | Stop reason: HOSPADM

## 2021-07-08 RX ORDER — HEPARIN 100 UNIT/ML
500 SYRINGE INTRAVENOUS
Status: DISCONTINUED | OUTPATIENT
Start: 2021-07-08 | End: 2021-07-08 | Stop reason: HOSPADM

## 2021-07-08 RX ADMIN — HEPARIN 500 UNITS: 100 SYRINGE at 11:07

## 2021-07-08 RX ADMIN — Medication 10 ML: at 11:07

## 2021-07-26 ENCOUNTER — LAB VISIT (OUTPATIENT)
Dept: LAB | Facility: HOSPITAL | Age: 56
End: 2021-07-26
Payer: COMMERCIAL

## 2021-07-26 DIAGNOSIS — C17.0 DUODENAL CANCER: ICD-10-CM

## 2021-07-26 LAB
ALBUMIN SERPL BCP-MCNC: 3.8 G/DL (ref 3.5–5.2)
ALP SERPL-CCNC: 118 U/L (ref 55–135)
ALT SERPL W/O P-5'-P-CCNC: 38 U/L (ref 10–44)
ANION GAP SERPL CALC-SCNC: 9 MMOL/L (ref 8–16)
AST SERPL-CCNC: 38 U/L (ref 10–40)
BASOPHILS # BLD AUTO: 0.01 K/UL (ref 0–0.2)
BASOPHILS NFR BLD: 0.3 % (ref 0–1.9)
BILIRUB SERPL-MCNC: 0.6 MG/DL (ref 0.1–1)
BUN SERPL-MCNC: 12 MG/DL (ref 6–20)
CALCIUM SERPL-MCNC: 8.7 MG/DL (ref 8.7–10.5)
CHLORIDE SERPL-SCNC: 108 MMOL/L (ref 95–110)
CO2 SERPL-SCNC: 25 MMOL/L (ref 23–29)
CREAT SERPL-MCNC: 0.9 MG/DL (ref 0.5–1.4)
DIFFERENTIAL METHOD: ABNORMAL
EOSINOPHIL # BLD AUTO: 0 K/UL (ref 0–0.5)
EOSINOPHIL NFR BLD: 1.3 % (ref 0–8)
ERYTHROCYTE [DISTWIDTH] IN BLOOD BY AUTOMATED COUNT: 15 % (ref 11.5–14.5)
EST. GFR  (AFRICAN AMERICAN): >60 ML/MIN/1.73 M^2
EST. GFR  (NON AFRICAN AMERICAN): >60 ML/MIN/1.73 M^2
GLUCOSE SERPL-MCNC: 171 MG/DL (ref 70–110)
HCT VFR BLD AUTO: 34.8 % (ref 37–48.5)
HGB BLD-MCNC: 11.2 G/DL (ref 12–16)
IMM GRANULOCYTES # BLD AUTO: 0.02 K/UL (ref 0–0.04)
IMM GRANULOCYTES NFR BLD AUTO: 0.7 % (ref 0–0.5)
LYMPHOCYTES # BLD AUTO: 1.5 K/UL (ref 1–4.8)
LYMPHOCYTES NFR BLD: 49.5 % (ref 18–48)
MCH RBC QN AUTO: 29.4 PG (ref 27–31)
MCHC RBC AUTO-ENTMCNC: 32.2 G/DL (ref 32–36)
MCV RBC AUTO: 91 FL (ref 82–98)
MONOCYTES # BLD AUTO: 0.3 K/UL (ref 0.3–1)
MONOCYTES NFR BLD: 11.2 % (ref 4–15)
NEUTROPHILS # BLD AUTO: 1.1 K/UL (ref 1.8–7.7)
NEUTROPHILS NFR BLD: 37 % (ref 38–73)
NRBC BLD-RTO: 0 /100 WBC
PLATELET # BLD AUTO: 127 K/UL (ref 150–450)
PMV BLD AUTO: 10.6 FL (ref 9.2–12.9)
POTASSIUM SERPL-SCNC: 4.1 MMOL/L (ref 3.5–5.1)
PROT SERPL-MCNC: 6.5 G/DL (ref 6–8.4)
RBC # BLD AUTO: 3.81 M/UL (ref 4–5.4)
SODIUM SERPL-SCNC: 142 MMOL/L (ref 136–145)
WBC # BLD AUTO: 3.03 K/UL (ref 3.9–12.7)

## 2021-07-26 PROCEDURE — 80053 COMPREHEN METABOLIC PANEL: CPT | Performed by: INTERNAL MEDICINE

## 2021-07-26 PROCEDURE — 85025 COMPLETE CBC W/AUTO DIFF WBC: CPT | Performed by: INTERNAL MEDICINE

## 2021-07-26 PROCEDURE — 36415 COLL VENOUS BLD VENIPUNCTURE: CPT | Mod: PO | Performed by: INTERNAL MEDICINE

## 2021-07-27 ENCOUNTER — OFFICE VISIT (OUTPATIENT)
Dept: HEMATOLOGY/ONCOLOGY | Facility: CLINIC | Age: 56
End: 2021-07-27
Payer: COMMERCIAL

## 2021-07-27 VITALS
TEMPERATURE: 97 F | HEART RATE: 71 BPM | HEIGHT: 72 IN | WEIGHT: 193.13 LBS | BODY MASS INDEX: 26.16 KG/M2 | DIASTOLIC BLOOD PRESSURE: 89 MMHG | OXYGEN SATURATION: 98 % | SYSTOLIC BLOOD PRESSURE: 148 MMHG

## 2021-07-27 DIAGNOSIS — C17.0 DUODENAL CANCER: ICD-10-CM

## 2021-07-27 DIAGNOSIS — Z79.899 IMMUNODEFICIENCY DUE TO CHEMOTHERAPY: ICD-10-CM

## 2021-07-27 DIAGNOSIS — T45.1X5A IMMUNODEFICIENCY DUE TO CHEMOTHERAPY: ICD-10-CM

## 2021-07-27 DIAGNOSIS — Z15.89 MONOALLELIC MUTATION OF MUTYH GENE: ICD-10-CM

## 2021-07-27 DIAGNOSIS — D84.821 IMMUNODEFICIENCY DUE TO CHEMOTHERAPY: ICD-10-CM

## 2021-07-27 DIAGNOSIS — L03.311 CELLULITIS OF ABDOMINAL WALL: Primary | ICD-10-CM

## 2021-07-27 DIAGNOSIS — Z90.411 HISTORY OF PARTIAL PANCREATECTOMY: ICD-10-CM

## 2021-07-27 DIAGNOSIS — D50.9 IRON DEFICIENCY ANEMIA, UNSPECIFIED IRON DEFICIENCY ANEMIA TYPE: ICD-10-CM

## 2021-07-27 PROCEDURE — 3077F SYST BP >= 140 MM HG: CPT | Mod: CPTII,S$GLB,, | Performed by: INTERNAL MEDICINE

## 2021-07-27 PROCEDURE — 3008F PR BODY MASS INDEX (BMI) DOCUMENTED: ICD-10-PCS | Mod: CPTII,S$GLB,, | Performed by: INTERNAL MEDICINE

## 2021-07-27 PROCEDURE — 99999 PR PBB SHADOW E&M-EST. PATIENT-LVL III: CPT | Mod: PBBFAC,,, | Performed by: INTERNAL MEDICINE

## 2021-07-27 PROCEDURE — 99999 PR PBB SHADOW E&M-EST. PATIENT-LVL III: ICD-10-PCS | Mod: PBBFAC,,, | Performed by: INTERNAL MEDICINE

## 2021-07-27 PROCEDURE — 3008F BODY MASS INDEX DOCD: CPT | Mod: CPTII,S$GLB,, | Performed by: INTERNAL MEDICINE

## 2021-07-27 PROCEDURE — 3077F PR MOST RECENT SYSTOLIC BLOOD PRESSURE >= 140 MM HG: ICD-10-PCS | Mod: CPTII,S$GLB,, | Performed by: INTERNAL MEDICINE

## 2021-07-27 PROCEDURE — 1160F RVW MEDS BY RX/DR IN RCRD: CPT | Mod: CPTII,S$GLB,, | Performed by: INTERNAL MEDICINE

## 2021-07-27 PROCEDURE — 3079F DIAST BP 80-89 MM HG: CPT | Mod: CPTII,S$GLB,, | Performed by: INTERNAL MEDICINE

## 2021-07-27 PROCEDURE — 3079F PR MOST RECENT DIASTOLIC BLOOD PRESSURE 80-89 MM HG: ICD-10-PCS | Mod: CPTII,S$GLB,, | Performed by: INTERNAL MEDICINE

## 2021-07-27 PROCEDURE — 1126F AMNT PAIN NOTED NONE PRSNT: CPT | Mod: CPTII,S$GLB,, | Performed by: INTERNAL MEDICINE

## 2021-07-27 PROCEDURE — 1126F PR PAIN SEVERITY QUANTIFIED, NO PAIN PRESENT: ICD-10-PCS | Mod: CPTII,S$GLB,, | Performed by: INTERNAL MEDICINE

## 2021-07-27 PROCEDURE — 1160F PR REVIEW ALL MEDS BY PRESCRIBER/CLIN PHARMACIST DOCUMENTED: ICD-10-PCS | Mod: CPTII,S$GLB,, | Performed by: INTERNAL MEDICINE

## 2021-07-27 PROCEDURE — 99213 OFFICE O/P EST LOW 20 MIN: CPT | Mod: S$GLB,,, | Performed by: INTERNAL MEDICINE

## 2021-07-27 PROCEDURE — 1159F PR MEDICATION LIST DOCUMENTED IN MEDICAL RECORD: ICD-10-PCS | Mod: CPTII,S$GLB,, | Performed by: INTERNAL MEDICINE

## 2021-07-27 PROCEDURE — 1159F MED LIST DOCD IN RCRD: CPT | Mod: CPTII,S$GLB,, | Performed by: INTERNAL MEDICINE

## 2021-07-27 PROCEDURE — 99213 PR OFFICE/OUTPT VISIT, EST, LEVL III, 20-29 MIN: ICD-10-PCS | Mod: S$GLB,,, | Performed by: INTERNAL MEDICINE

## 2021-07-27 RX ORDER — SULFAMETHOXAZOLE AND TRIMETHOPRIM 400; 80 MG/1; MG/1
1 TABLET ORAL 2 TIMES DAILY
Qty: 20 TABLET | Refills: 0 | Status: SHIPPED | OUTPATIENT
Start: 2021-07-27 | End: 2021-08-06

## 2021-08-02 PROBLEM — L03.90 CELLULITIS: Status: ACTIVE | Noted: 2021-08-02

## 2021-08-03 ENCOUNTER — OFFICE VISIT (OUTPATIENT)
Dept: HEMATOLOGY/ONCOLOGY | Facility: CLINIC | Age: 56
End: 2021-08-03
Payer: COMMERCIAL

## 2021-08-03 ENCOUNTER — INFUSION (OUTPATIENT)
Dept: INFUSION THERAPY | Facility: HOSPITAL | Age: 56
End: 2021-08-03
Attending: INTERNAL MEDICINE
Payer: COMMERCIAL

## 2021-08-03 ENCOUNTER — LAB VISIT (OUTPATIENT)
Dept: LAB | Facility: HOSPITAL | Age: 56
End: 2021-08-03
Attending: INTERNAL MEDICINE
Payer: COMMERCIAL

## 2021-08-03 VITALS
BODY MASS INDEX: 26.57 KG/M2 | SYSTOLIC BLOOD PRESSURE: 153 MMHG | OXYGEN SATURATION: 99 % | DIASTOLIC BLOOD PRESSURE: 96 MMHG | HEIGHT: 72 IN | TEMPERATURE: 97 F | WEIGHT: 196.19 LBS | HEART RATE: 72 BPM

## 2021-08-03 VITALS
SYSTOLIC BLOOD PRESSURE: 140 MMHG | DIASTOLIC BLOOD PRESSURE: 78 MMHG | RESPIRATION RATE: 18 BRPM | OXYGEN SATURATION: 97 % | HEART RATE: 65 BPM | TEMPERATURE: 98 F

## 2021-08-03 DIAGNOSIS — L03.90 CELLULITIS, UNSPECIFIED CELLULITIS SITE: ICD-10-CM

## 2021-08-03 DIAGNOSIS — C17.0 DUODENAL CANCER: Primary | ICD-10-CM

## 2021-08-03 DIAGNOSIS — Z90.411 HISTORY OF PARTIAL PANCREATECTOMY: ICD-10-CM

## 2021-08-03 DIAGNOSIS — C17.0 DUODENAL CANCER: ICD-10-CM

## 2021-08-03 LAB
ALBUMIN SERPL BCP-MCNC: 3.8 G/DL (ref 3.5–5.2)
ALBUMIN SERPL BCP-MCNC: 3.8 G/DL (ref 3.5–5.2)
ALP SERPL-CCNC: 103 U/L (ref 55–135)
ALP SERPL-CCNC: 106 U/L (ref 55–135)
ALT SERPL W/O P-5'-P-CCNC: 46 U/L (ref 10–44)
ALT SERPL W/O P-5'-P-CCNC: 48 U/L (ref 10–44)
ANION GAP SERPL CALC-SCNC: 9 MMOL/L (ref 8–16)
ANION GAP SERPL CALC-SCNC: 9 MMOL/L (ref 8–16)
AST SERPL-CCNC: 43 U/L (ref 10–40)
AST SERPL-CCNC: 43 U/L (ref 10–40)
BASOPHILS # BLD AUTO: 0.02 K/UL (ref 0–0.2)
BASOPHILS NFR BLD: 0.5 % (ref 0–1.9)
BILIRUB SERPL-MCNC: 0.5 MG/DL (ref 0.1–1)
BILIRUB SERPL-MCNC: 0.5 MG/DL (ref 0.1–1)
BUN SERPL-MCNC: 14 MG/DL (ref 6–20)
BUN SERPL-MCNC: 14 MG/DL (ref 6–20)
CALCIUM SERPL-MCNC: 8.7 MG/DL (ref 8.7–10.5)
CALCIUM SERPL-MCNC: 8.8 MG/DL (ref 8.7–10.5)
CHLORIDE SERPL-SCNC: 111 MMOL/L (ref 95–110)
CHLORIDE SERPL-SCNC: 111 MMOL/L (ref 95–110)
CO2 SERPL-SCNC: 23 MMOL/L (ref 23–29)
CO2 SERPL-SCNC: 24 MMOL/L (ref 23–29)
CREAT SERPL-MCNC: 0.8 MG/DL (ref 0.5–1.4)
CREAT SERPL-MCNC: 0.9 MG/DL (ref 0.5–1.4)
DIFFERENTIAL METHOD: ABNORMAL
EOSINOPHIL # BLD AUTO: 0.1 K/UL (ref 0–0.5)
EOSINOPHIL NFR BLD: 1.6 % (ref 0–8)
ERYTHROCYTE [DISTWIDTH] IN BLOOD BY AUTOMATED COUNT: 14.5 % (ref 11.5–14.5)
EST. GFR  (AFRICAN AMERICAN): >60 ML/MIN/1.73 M^2
EST. GFR  (AFRICAN AMERICAN): >60 ML/MIN/1.73 M^2
EST. GFR  (NON AFRICAN AMERICAN): >60 ML/MIN/1.73 M^2
EST. GFR  (NON AFRICAN AMERICAN): >60 ML/MIN/1.73 M^2
GLUCOSE SERPL-MCNC: 113 MG/DL (ref 70–110)
GLUCOSE SERPL-MCNC: 116 MG/DL (ref 70–110)
HCT VFR BLD AUTO: 34.3 % (ref 37–48.5)
HGB BLD-MCNC: 11 G/DL (ref 12–16)
IMM GRANULOCYTES # BLD AUTO: 0.02 K/UL (ref 0–0.04)
IMM GRANULOCYTES NFR BLD AUTO: 0.5 % (ref 0–0.5)
LYMPHOCYTES # BLD AUTO: 1.3 K/UL (ref 1–4.8)
LYMPHOCYTES NFR BLD: 34.5 % (ref 18–48)
MCH RBC QN AUTO: 29.2 PG (ref 27–31)
MCHC RBC AUTO-ENTMCNC: 32.1 G/DL (ref 32–36)
MCV RBC AUTO: 91 FL (ref 82–98)
MONOCYTES # BLD AUTO: 0.5 K/UL (ref 0.3–1)
MONOCYTES NFR BLD: 12.2 % (ref 4–15)
NEUTROPHILS # BLD AUTO: 1.9 K/UL (ref 1.8–7.7)
NEUTROPHILS NFR BLD: 50.7 % (ref 38–73)
NRBC BLD-RTO: 0 /100 WBC
PLATELET # BLD AUTO: 130 K/UL (ref 150–450)
PMV BLD AUTO: 10.6 FL (ref 9.2–12.9)
POTASSIUM SERPL-SCNC: 4.1 MMOL/L (ref 3.5–5.1)
POTASSIUM SERPL-SCNC: 4.3 MMOL/L (ref 3.5–5.1)
PROT SERPL-MCNC: 6.2 G/DL (ref 6–8.4)
PROT SERPL-MCNC: 6.3 G/DL (ref 6–8.4)
RBC # BLD AUTO: 3.77 M/UL (ref 4–5.4)
SODIUM SERPL-SCNC: 143 MMOL/L (ref 136–145)
SODIUM SERPL-SCNC: 144 MMOL/L (ref 136–145)
WBC # BLD AUTO: 3.68 K/UL (ref 3.9–12.7)

## 2021-08-03 PROCEDURE — 1126F AMNT PAIN NOTED NONE PRSNT: CPT | Mod: CPTII,S$GLB,, | Performed by: INTERNAL MEDICINE

## 2021-08-03 PROCEDURE — 3008F BODY MASS INDEX DOCD: CPT | Mod: CPTII,S$GLB,, | Performed by: INTERNAL MEDICINE

## 2021-08-03 PROCEDURE — 3080F PR MOST RECENT DIASTOLIC BLOOD PRESSURE >= 90 MM HG: ICD-10-PCS | Mod: CPTII,S$GLB,, | Performed by: INTERNAL MEDICINE

## 2021-08-03 PROCEDURE — 3044F HG A1C LEVEL LT 7.0%: CPT | Mod: CPTII,S$GLB,, | Performed by: INTERNAL MEDICINE

## 2021-08-03 PROCEDURE — 96368 THER/DIAG CONCURRENT INF: CPT

## 2021-08-03 PROCEDURE — 99215 OFFICE O/P EST HI 40 MIN: CPT | Mod: 25,S$GLB,, | Performed by: INTERNAL MEDICINE

## 2021-08-03 PROCEDURE — 3077F PR MOST RECENT SYSTOLIC BLOOD PRESSURE >= 140 MM HG: ICD-10-PCS | Mod: CPTII,S$GLB,, | Performed by: INTERNAL MEDICINE

## 2021-08-03 PROCEDURE — 80053 COMPREHEN METABOLIC PANEL: CPT | Mod: 91 | Performed by: INTERNAL MEDICINE

## 2021-08-03 PROCEDURE — 1126F PR PAIN SEVERITY QUANTIFIED, NO PAIN PRESENT: ICD-10-PCS | Mod: CPTII,S$GLB,, | Performed by: INTERNAL MEDICINE

## 2021-08-03 PROCEDURE — 25000003 PHARM REV CODE 250: Performed by: INTERNAL MEDICINE

## 2021-08-03 PROCEDURE — 3008F PR BODY MASS INDEX (BMI) DOCUMENTED: ICD-10-PCS | Mod: CPTII,S$GLB,, | Performed by: INTERNAL MEDICINE

## 2021-08-03 PROCEDURE — 85025 COMPLETE CBC W/AUTO DIFF WBC: CPT | Performed by: STUDENT IN AN ORGANIZED HEALTH CARE EDUCATION/TRAINING PROGRAM

## 2021-08-03 PROCEDURE — 96416 CHEMO PROLONG INFUSE W/PUMP: CPT

## 2021-08-03 PROCEDURE — 96413 CHEMO IV INFUSION 1 HR: CPT

## 2021-08-03 PROCEDURE — 99999 PR PBB SHADOW E&M-EST. PATIENT-LVL III: CPT | Mod: PBBFAC,,, | Performed by: INTERNAL MEDICINE

## 2021-08-03 PROCEDURE — 96411 CHEMO IV PUSH ADDL DRUG: CPT

## 2021-08-03 PROCEDURE — 63600175 PHARM REV CODE 636 W HCPCS: Performed by: INTERNAL MEDICINE

## 2021-08-03 PROCEDURE — 36415 COLL VENOUS BLD VENIPUNCTURE: CPT | Performed by: STUDENT IN AN ORGANIZED HEALTH CARE EDUCATION/TRAINING PROGRAM

## 2021-08-03 PROCEDURE — 3044F PR MOST RECENT HEMOGLOBIN A1C LEVEL <7.0%: ICD-10-PCS | Mod: CPTII,S$GLB,, | Performed by: INTERNAL MEDICINE

## 2021-08-03 PROCEDURE — 99215 PR OFFICE/OUTPT VISIT, EST, LEVL V, 40-54 MIN: ICD-10-PCS | Mod: 25,S$GLB,, | Performed by: INTERNAL MEDICINE

## 2021-08-03 PROCEDURE — 3077F SYST BP >= 140 MM HG: CPT | Mod: CPTII,S$GLB,, | Performed by: INTERNAL MEDICINE

## 2021-08-03 PROCEDURE — 3080F DIAST BP >= 90 MM HG: CPT | Mod: CPTII,S$GLB,, | Performed by: INTERNAL MEDICINE

## 2021-08-03 PROCEDURE — 96415 CHEMO IV INFUSION ADDL HR: CPT

## 2021-08-03 PROCEDURE — 99999 PR PBB SHADOW E&M-EST. PATIENT-LVL III: ICD-10-PCS | Mod: PBBFAC,,, | Performed by: INTERNAL MEDICINE

## 2021-08-03 PROCEDURE — 80053 COMPREHEN METABOLIC PANEL: CPT | Performed by: STUDENT IN AN ORGANIZED HEALTH CARE EDUCATION/TRAINING PROGRAM

## 2021-08-03 PROCEDURE — 96367 TX/PROPH/DG ADDL SEQ IV INF: CPT

## 2021-08-03 RX ORDER — DIPHENHYDRAMINE HYDROCHLORIDE 50 MG/ML
50 INJECTION INTRAMUSCULAR; INTRAVENOUS ONCE AS NEEDED
Status: CANCELLED | OUTPATIENT
Start: 2021-08-03

## 2021-08-03 RX ORDER — SODIUM CHLORIDE 0.9 % (FLUSH) 0.9 %
10 SYRINGE (ML) INJECTION
Status: CANCELLED | OUTPATIENT
Start: 2021-08-03

## 2021-08-03 RX ORDER — HEPARIN 100 UNIT/ML
500 SYRINGE INTRAVENOUS
Status: CANCELLED | OUTPATIENT
Start: 2021-08-03

## 2021-08-03 RX ORDER — FLUOROURACIL 50 MG/ML
400 INJECTION, SOLUTION INTRAVENOUS
Status: COMPLETED | OUTPATIENT
Start: 2021-08-03 | End: 2021-08-03

## 2021-08-03 RX ORDER — FLUOROURACIL 50 MG/ML
400 INJECTION, SOLUTION INTRAVENOUS
Status: CANCELLED | OUTPATIENT
Start: 2021-08-03

## 2021-08-03 RX ORDER — SODIUM CHLORIDE 0.9 % (FLUSH) 0.9 %
10 SYRINGE (ML) INJECTION
Status: CANCELLED | OUTPATIENT
Start: 2021-08-04

## 2021-08-03 RX ORDER — EPINEPHRINE 0.3 MG/.3ML
0.3 INJECTION SUBCUTANEOUS ONCE AS NEEDED
Status: CANCELLED | OUTPATIENT
Start: 2021-08-03

## 2021-08-03 RX ORDER — HEPARIN 100 UNIT/ML
500 SYRINGE INTRAVENOUS
Status: CANCELLED | OUTPATIENT
Start: 2021-08-04

## 2021-08-03 RX ADMIN — LEUCOVORIN CALCIUM 825 MG: 350 INJECTION, POWDER, LYOPHILIZED, FOR SUSPENSION INTRAMUSCULAR; INTRAVENOUS at 10:08

## 2021-08-03 RX ADMIN — FLUOROURACIL 5000 MG: 50 INJECTION, SOLUTION INTRAVENOUS at 01:08

## 2021-08-03 RX ADMIN — FLUOROURACIL 825 MG: 50 INJECTION, SOLUTION INTRAVENOUS at 01:08

## 2021-08-03 RX ADMIN — PALONOSETRON HYDROCHLORIDE 0.25 MG: 0.25 INJECTION, SOLUTION INTRAVENOUS at 10:08

## 2021-08-03 RX ADMIN — OXALIPLATIN 175 MG: 100 INJECTION, SOLUTION INTRAVENOUS at 10:08

## 2021-08-05 ENCOUNTER — INFUSION (OUTPATIENT)
Dept: INFUSION THERAPY | Facility: HOSPITAL | Age: 56
End: 2021-08-05
Attending: INTERNAL MEDICINE
Payer: COMMERCIAL

## 2021-08-05 VITALS
SYSTOLIC BLOOD PRESSURE: 144 MMHG | HEART RATE: 63 BPM | DIASTOLIC BLOOD PRESSURE: 93 MMHG | RESPIRATION RATE: 18 BRPM | OXYGEN SATURATION: 98 % | TEMPERATURE: 98 F

## 2021-08-05 DIAGNOSIS — C17.0 DUODENAL CANCER: Primary | ICD-10-CM

## 2021-08-05 PROCEDURE — 25000003 PHARM REV CODE 250: Performed by: INTERNAL MEDICINE

## 2021-08-05 PROCEDURE — 63600175 PHARM REV CODE 636 W HCPCS: Performed by: INTERNAL MEDICINE

## 2021-08-05 PROCEDURE — A4216 STERILE WATER/SALINE, 10 ML: HCPCS | Performed by: INTERNAL MEDICINE

## 2021-08-05 RX ORDER — SODIUM CHLORIDE 0.9 % (FLUSH) 0.9 %
10 SYRINGE (ML) INJECTION
Status: DISCONTINUED | OUTPATIENT
Start: 2021-08-05 | End: 2021-08-05 | Stop reason: HOSPADM

## 2021-08-05 RX ORDER — HEPARIN 100 UNIT/ML
500 SYRINGE INTRAVENOUS
Status: DISCONTINUED | OUTPATIENT
Start: 2021-08-05 | End: 2021-08-05 | Stop reason: HOSPADM

## 2021-08-05 RX ADMIN — Medication 10 ML: at 12:08

## 2021-08-05 RX ADMIN — HEPARIN 500 UNITS: 100 SYRINGE at 12:08

## 2021-08-16 ENCOUNTER — PATIENT MESSAGE (OUTPATIENT)
Dept: HEMATOLOGY/ONCOLOGY | Facility: CLINIC | Age: 56
End: 2021-08-16

## 2021-08-16 ENCOUNTER — LAB VISIT (OUTPATIENT)
Dept: LAB | Facility: HOSPITAL | Age: 56
End: 2021-08-16
Attending: INTERNAL MEDICINE
Payer: COMMERCIAL

## 2021-08-16 DIAGNOSIS — C17.0 DUODENAL CANCER: ICD-10-CM

## 2021-08-16 DIAGNOSIS — L03.90 CELLULITIS, UNSPECIFIED CELLULITIS SITE: ICD-10-CM

## 2021-08-16 LAB
ALBUMIN SERPL BCP-MCNC: 3.8 G/DL (ref 3.5–5.2)
ALP SERPL-CCNC: 98 U/L (ref 55–135)
ALT SERPL W/O P-5'-P-CCNC: 42 U/L (ref 10–44)
ANION GAP SERPL CALC-SCNC: 14 MMOL/L (ref 8–16)
AST SERPL-CCNC: 36 U/L (ref 10–40)
BASOPHILS # BLD AUTO: 0.01 K/UL (ref 0–0.2)
BASOPHILS NFR BLD: 0.4 % (ref 0–1.9)
BILIRUB SERPL-MCNC: 0.7 MG/DL (ref 0.1–1)
BUN SERPL-MCNC: 8 MG/DL (ref 6–20)
CALCIUM SERPL-MCNC: 9.3 MG/DL (ref 8.7–10.5)
CHLORIDE SERPL-SCNC: 107 MMOL/L (ref 95–110)
CO2 SERPL-SCNC: 23 MMOL/L (ref 23–29)
CREAT SERPL-MCNC: 0.8 MG/DL (ref 0.5–1.4)
DIFFERENTIAL METHOD: ABNORMAL
EOSINOPHIL # BLD AUTO: 0.1 K/UL (ref 0–0.5)
EOSINOPHIL NFR BLD: 2.6 % (ref 0–8)
ERYTHROCYTE [DISTWIDTH] IN BLOOD BY AUTOMATED COUNT: 13.7 % (ref 11.5–14.5)
EST. GFR  (AFRICAN AMERICAN): >60 ML/MIN/1.73 M^2
EST. GFR  (NON AFRICAN AMERICAN): >60 ML/MIN/1.73 M^2
GLUCOSE SERPL-MCNC: 112 MG/DL (ref 70–110)
HCT VFR BLD AUTO: 33 % (ref 37–48.5)
HGB BLD-MCNC: 10.9 G/DL (ref 12–16)
IMM GRANULOCYTES # BLD AUTO: 0 K/UL (ref 0–0.04)
IMM GRANULOCYTES NFR BLD AUTO: 0 % (ref 0–0.5)
LYMPHOCYTES # BLD AUTO: 0.9 K/UL (ref 1–4.8)
LYMPHOCYTES NFR BLD: 37.1 % (ref 18–48)
MCH RBC QN AUTO: 29.7 PG (ref 27–31)
MCHC RBC AUTO-ENTMCNC: 33 G/DL (ref 32–36)
MCV RBC AUTO: 90 FL (ref 82–98)
MONOCYTES # BLD AUTO: 0.3 K/UL (ref 0.3–1)
MONOCYTES NFR BLD: 13.1 % (ref 4–15)
NEUTROPHILS # BLD AUTO: 1.1 K/UL (ref 1.8–7.7)
NEUTROPHILS NFR BLD: 46.8 % (ref 38–73)
NRBC BLD-RTO: 0 /100 WBC
PLATELET # BLD AUTO: 90 K/UL (ref 150–450)
PMV BLD AUTO: 11 FL (ref 9.2–12.9)
POTASSIUM SERPL-SCNC: 3.8 MMOL/L (ref 3.5–5.1)
PROT SERPL-MCNC: 6.3 G/DL (ref 6–8.4)
RBC # BLD AUTO: 3.67 M/UL (ref 4–5.4)
SODIUM SERPL-SCNC: 144 MMOL/L (ref 136–145)
WBC # BLD AUTO: 2.29 K/UL (ref 3.9–12.7)

## 2021-08-16 PROCEDURE — 36415 COLL VENOUS BLD VENIPUNCTURE: CPT | Mod: PO | Performed by: INTERNAL MEDICINE

## 2021-08-16 PROCEDURE — 85025 COMPLETE CBC W/AUTO DIFF WBC: CPT | Performed by: INTERNAL MEDICINE

## 2021-08-16 PROCEDURE — 80053 COMPREHEN METABOLIC PANEL: CPT | Performed by: INTERNAL MEDICINE

## 2021-08-23 ENCOUNTER — LAB VISIT (OUTPATIENT)
Dept: LAB | Facility: HOSPITAL | Age: 56
End: 2021-08-23
Attending: SURGERY
Payer: COMMERCIAL

## 2021-08-23 LAB
ALBUMIN SERPL BCP-MCNC: 3.9 G/DL (ref 3.5–5.2)
ALP SERPL-CCNC: 102 U/L (ref 55–135)
ALT SERPL W/O P-5'-P-CCNC: 43 U/L (ref 10–44)
ANION GAP SERPL CALC-SCNC: 11 MMOL/L (ref 8–16)
AST SERPL-CCNC: 50 U/L (ref 10–40)
BASOPHILS # BLD AUTO: 0.02 K/UL (ref 0–0.2)
BASOPHILS NFR BLD: 0.7 % (ref 0–1.9)
BILIRUB SERPL-MCNC: 0.9 MG/DL (ref 0.1–1)
BUN SERPL-MCNC: 11 MG/DL (ref 6–20)
CALCIUM SERPL-MCNC: 9.5 MG/DL (ref 8.7–10.5)
CHLORIDE SERPL-SCNC: 105 MMOL/L (ref 95–110)
CO2 SERPL-SCNC: 27 MMOL/L (ref 23–29)
CREAT SERPL-MCNC: 0.9 MG/DL (ref 0.5–1.4)
DIFFERENTIAL METHOD: ABNORMAL
EOSINOPHIL # BLD AUTO: 0.1 K/UL (ref 0–0.5)
EOSINOPHIL NFR BLD: 2 % (ref 0–8)
ERYTHROCYTE [DISTWIDTH] IN BLOOD BY AUTOMATED COUNT: 14.6 % (ref 11.5–14.5)
EST. GFR  (AFRICAN AMERICAN): >60 ML/MIN/1.73 M^2
EST. GFR  (NON AFRICAN AMERICAN): >60 ML/MIN/1.73 M^2
GLUCOSE SERPL-MCNC: 129 MG/DL (ref 70–110)
HCT VFR BLD AUTO: 36.3 % (ref 37–48.5)
HGB BLD-MCNC: 11.6 G/DL (ref 12–16)
IMM GRANULOCYTES # BLD AUTO: 0.01 K/UL (ref 0–0.04)
IMM GRANULOCYTES NFR BLD AUTO: 0.3 % (ref 0–0.5)
LYMPHOCYTES # BLD AUTO: 1.6 K/UL (ref 1–4.8)
LYMPHOCYTES NFR BLD: 52.7 % (ref 18–48)
MCH RBC QN AUTO: 30 PG (ref 27–31)
MCHC RBC AUTO-ENTMCNC: 32 G/DL (ref 32–36)
MCV RBC AUTO: 94 FL (ref 82–98)
MONOCYTES # BLD AUTO: 0.5 K/UL (ref 0.3–1)
MONOCYTES NFR BLD: 16.1 % (ref 4–15)
NEUTROPHILS # BLD AUTO: 0.8 K/UL (ref 1.8–7.7)
NEUTROPHILS NFR BLD: 28.2 % (ref 38–73)
NRBC BLD-RTO: 0 /100 WBC
PLATELET # BLD AUTO: 169 K/UL (ref 150–450)
PMV BLD AUTO: 11.1 FL (ref 9.2–12.9)
POTASSIUM SERPL-SCNC: 4.7 MMOL/L (ref 3.5–5.1)
PROT SERPL-MCNC: 6.6 G/DL (ref 6–8.4)
RBC # BLD AUTO: 3.87 M/UL (ref 4–5.4)
SODIUM SERPL-SCNC: 143 MMOL/L (ref 136–145)
WBC # BLD AUTO: 2.98 K/UL (ref 3.9–12.7)

## 2021-08-23 PROCEDURE — 80053 COMPREHEN METABOLIC PANEL: CPT | Performed by: SURGERY

## 2021-08-23 PROCEDURE — 85025 COMPLETE CBC W/AUTO DIFF WBC: CPT | Performed by: SURGERY

## 2021-08-23 PROCEDURE — 36415 COLL VENOUS BLD VENIPUNCTURE: CPT | Mod: PO | Performed by: SURGERY

## 2021-08-24 ENCOUNTER — OFFICE VISIT (OUTPATIENT)
Dept: HEMATOLOGY/ONCOLOGY | Facility: CLINIC | Age: 56
End: 2021-08-24
Payer: COMMERCIAL

## 2021-08-24 ENCOUNTER — PATIENT MESSAGE (OUTPATIENT)
Dept: HEMATOLOGY/ONCOLOGY | Facility: CLINIC | Age: 56
End: 2021-08-24

## 2021-08-24 VITALS
TEMPERATURE: 98 F | SYSTOLIC BLOOD PRESSURE: 152 MMHG | WEIGHT: 197.06 LBS | HEART RATE: 70 BPM | OXYGEN SATURATION: 98 % | HEIGHT: 72 IN | DIASTOLIC BLOOD PRESSURE: 90 MMHG | BODY MASS INDEX: 26.69 KG/M2

## 2021-08-24 DIAGNOSIS — D84.821 IMMUNODEFICIENCY DUE TO CHEMOTHERAPY: ICD-10-CM

## 2021-08-24 DIAGNOSIS — T45.1X5A PANCYTOPENIA DUE TO ANTINEOPLASTIC CHEMOTHERAPY: Primary | ICD-10-CM

## 2021-08-24 DIAGNOSIS — C17.0 DUODENAL CANCER: ICD-10-CM

## 2021-08-24 DIAGNOSIS — T45.1X5A IMMUNODEFICIENCY DUE TO CHEMOTHERAPY: ICD-10-CM

## 2021-08-24 DIAGNOSIS — D61.810 PANCYTOPENIA DUE TO ANTINEOPLASTIC CHEMOTHERAPY: Primary | ICD-10-CM

## 2021-08-24 DIAGNOSIS — I10 ESSENTIAL HYPERTENSION: ICD-10-CM

## 2021-08-24 DIAGNOSIS — Z79.899 IMMUNODEFICIENCY DUE TO CHEMOTHERAPY: ICD-10-CM

## 2021-08-24 PROCEDURE — 1159F PR MEDICATION LIST DOCUMENTED IN MEDICAL RECORD: ICD-10-PCS | Mod: CPTII,S$GLB,, | Performed by: INTERNAL MEDICINE

## 2021-08-24 PROCEDURE — 1126F AMNT PAIN NOTED NONE PRSNT: CPT | Mod: CPTII,S$GLB,, | Performed by: INTERNAL MEDICINE

## 2021-08-24 PROCEDURE — 1126F PR PAIN SEVERITY QUANTIFIED, NO PAIN PRESENT: ICD-10-PCS | Mod: CPTII,S$GLB,, | Performed by: INTERNAL MEDICINE

## 2021-08-24 PROCEDURE — 99999 PR PBB SHADOW E&M-EST. PATIENT-LVL IV: ICD-10-PCS | Mod: PBBFAC,,, | Performed by: INTERNAL MEDICINE

## 2021-08-24 PROCEDURE — 3044F PR MOST RECENT HEMOGLOBIN A1C LEVEL <7.0%: ICD-10-PCS | Mod: CPTII,S$GLB,, | Performed by: INTERNAL MEDICINE

## 2021-08-24 PROCEDURE — 3077F PR MOST RECENT SYSTOLIC BLOOD PRESSURE >= 140 MM HG: ICD-10-PCS | Mod: CPTII,S$GLB,, | Performed by: INTERNAL MEDICINE

## 2021-08-24 PROCEDURE — 1160F RVW MEDS BY RX/DR IN RCRD: CPT | Mod: CPTII,S$GLB,, | Performed by: INTERNAL MEDICINE

## 2021-08-24 PROCEDURE — 3008F BODY MASS INDEX DOCD: CPT | Mod: CPTII,S$GLB,, | Performed by: INTERNAL MEDICINE

## 2021-08-24 PROCEDURE — 99214 OFFICE O/P EST MOD 30 MIN: CPT | Mod: S$GLB,,, | Performed by: INTERNAL MEDICINE

## 2021-08-24 PROCEDURE — 99999 PR PBB SHADOW E&M-EST. PATIENT-LVL IV: CPT | Mod: PBBFAC,,, | Performed by: INTERNAL MEDICINE

## 2021-08-24 PROCEDURE — 3080F PR MOST RECENT DIASTOLIC BLOOD PRESSURE >= 90 MM HG: ICD-10-PCS | Mod: CPTII,S$GLB,, | Performed by: INTERNAL MEDICINE

## 2021-08-24 PROCEDURE — 1160F PR REVIEW ALL MEDS BY PRESCRIBER/CLIN PHARMACIST DOCUMENTED: ICD-10-PCS | Mod: CPTII,S$GLB,, | Performed by: INTERNAL MEDICINE

## 2021-08-24 PROCEDURE — 3077F SYST BP >= 140 MM HG: CPT | Mod: CPTII,S$GLB,, | Performed by: INTERNAL MEDICINE

## 2021-08-24 PROCEDURE — 3044F HG A1C LEVEL LT 7.0%: CPT | Mod: CPTII,S$GLB,, | Performed by: INTERNAL MEDICINE

## 2021-08-24 PROCEDURE — 3008F PR BODY MASS INDEX (BMI) DOCUMENTED: ICD-10-PCS | Mod: CPTII,S$GLB,, | Performed by: INTERNAL MEDICINE

## 2021-08-24 PROCEDURE — 1159F MED LIST DOCD IN RCRD: CPT | Mod: CPTII,S$GLB,, | Performed by: INTERNAL MEDICINE

## 2021-08-24 PROCEDURE — 99214 PR OFFICE/OUTPT VISIT, EST, LEVL IV, 30-39 MIN: ICD-10-PCS | Mod: S$GLB,,, | Performed by: INTERNAL MEDICINE

## 2021-08-24 PROCEDURE — 3080F DIAST BP >= 90 MM HG: CPT | Mod: CPTII,S$GLB,, | Performed by: INTERNAL MEDICINE

## 2021-08-24 RX ORDER — DIPHENHYDRAMINE HYDROCHLORIDE 50 MG/ML
50 INJECTION INTRAMUSCULAR; INTRAVENOUS ONCE AS NEEDED
Status: CANCELLED | OUTPATIENT
Start: 2021-09-16

## 2021-08-24 RX ORDER — NEBIVOLOL HYDROCHLORIDE 20 MG/1
1 TABLET ORAL DAILY
COMMUNITY
Start: 2021-08-16

## 2021-08-24 RX ORDER — HEPARIN 100 UNIT/ML
500 SYRINGE INTRAVENOUS
Status: CANCELLED | OUTPATIENT
Start: 2021-09-16

## 2021-08-24 RX ORDER — SODIUM CHLORIDE 0.9 % (FLUSH) 0.9 %
10 SYRINGE (ML) INJECTION
Status: CANCELLED | OUTPATIENT
Start: 2021-09-16

## 2021-08-24 RX ORDER — HEPARIN 100 UNIT/ML
500 SYRINGE INTRAVENOUS
Status: CANCELLED | OUTPATIENT
Start: 2021-09-14

## 2021-08-24 RX ORDER — EPINEPHRINE 0.3 MG/.3ML
0.3 INJECTION SUBCUTANEOUS ONCE AS NEEDED
Status: CANCELLED | OUTPATIENT
Start: 2021-09-16

## 2021-08-24 RX ORDER — SODIUM CHLORIDE 0.9 % (FLUSH) 0.9 %
10 SYRINGE (ML) INJECTION
Status: CANCELLED | OUTPATIENT
Start: 2021-09-14

## 2021-08-24 RX ORDER — FLUOROURACIL 50 MG/ML
400 INJECTION, SOLUTION INTRAVENOUS
Status: CANCELLED | OUTPATIENT
Start: 2021-09-16

## 2021-08-31 ENCOUNTER — PATIENT MESSAGE (OUTPATIENT)
Dept: HEMATOLOGY/ONCOLOGY | Facility: CLINIC | Age: 56
End: 2021-08-31

## 2021-09-01 ENCOUNTER — OFFICE VISIT (OUTPATIENT)
Dept: HEMATOLOGY/ONCOLOGY | Facility: CLINIC | Age: 56
End: 2021-09-01
Payer: COMMERCIAL

## 2021-09-01 ENCOUNTER — OFFICE VISIT (OUTPATIENT)
Dept: CARDIOLOGY | Facility: CLINIC | Age: 56
End: 2021-09-01
Payer: COMMERCIAL

## 2021-09-01 ENCOUNTER — LAB VISIT (OUTPATIENT)
Dept: LAB | Facility: HOSPITAL | Age: 56
End: 2021-09-01
Attending: INTERNAL MEDICINE
Payer: COMMERCIAL

## 2021-09-01 ENCOUNTER — INFUSION (OUTPATIENT)
Dept: INFUSION THERAPY | Facility: HOSPITAL | Age: 56
End: 2021-09-01
Attending: INTERNAL MEDICINE
Payer: COMMERCIAL

## 2021-09-01 VITALS
OXYGEN SATURATION: 100 % | TEMPERATURE: 97 F | SYSTOLIC BLOOD PRESSURE: 162 MMHG | BODY MASS INDEX: 26.73 KG/M2 | DIASTOLIC BLOOD PRESSURE: 80 MMHG | HEART RATE: 64 BPM | WEIGHT: 197.06 LBS | BODY MASS INDEX: 26.63 KG/M2 | HEIGHT: 72 IN | SYSTOLIC BLOOD PRESSURE: 166 MMHG | HEART RATE: 66 BPM | OXYGEN SATURATION: 98 % | DIASTOLIC BLOOD PRESSURE: 95 MMHG | WEIGHT: 196.63 LBS

## 2021-09-01 VITALS
TEMPERATURE: 98 F | OXYGEN SATURATION: 98 % | BODY MASS INDEX: 26.63 KG/M2 | HEIGHT: 72 IN | DIASTOLIC BLOOD PRESSURE: 82 MMHG | SYSTOLIC BLOOD PRESSURE: 170 MMHG | WEIGHT: 196.63 LBS | HEART RATE: 61 BPM | RESPIRATION RATE: 18 BRPM

## 2021-09-01 DIAGNOSIS — D84.821 IMMUNODEFICIENCY DUE TO CHEMOTHERAPY: Primary | ICD-10-CM

## 2021-09-01 DIAGNOSIS — C17.0 DUODENAL CANCER: Primary | ICD-10-CM

## 2021-09-01 DIAGNOSIS — Z01.818 PREOP TESTING: ICD-10-CM

## 2021-09-01 DIAGNOSIS — I34.1 MITRAL VALVE PROLAPSE: ICD-10-CM

## 2021-09-01 DIAGNOSIS — Z79.899 IMMUNODEFICIENCY DUE TO CHEMOTHERAPY: Primary | ICD-10-CM

## 2021-09-01 DIAGNOSIS — I10 BENIGN HYPERTENSION: Primary | ICD-10-CM

## 2021-09-01 DIAGNOSIS — C17.0 DUODENAL CANCER: ICD-10-CM

## 2021-09-01 DIAGNOSIS — T45.1X5A IMMUNODEFICIENCY DUE TO CHEMOTHERAPY: Primary | ICD-10-CM

## 2021-09-01 DIAGNOSIS — E78.00 PURE HYPERCHOLESTEROLEMIA: ICD-10-CM

## 2021-09-01 LAB
ALBUMIN SERPL BCP-MCNC: 3.7 G/DL (ref 3.5–5.2)
ALP SERPL-CCNC: 109 U/L (ref 55–135)
ALT SERPL W/O P-5'-P-CCNC: 38 U/L (ref 10–44)
ANION GAP SERPL CALC-SCNC: 9 MMOL/L (ref 8–16)
AST SERPL-CCNC: 36 U/L (ref 10–40)
BASOPHILS # BLD AUTO: 0.02 K/UL (ref 0–0.2)
BASOPHILS NFR BLD: 0.7 % (ref 0–1.9)
BILIRUB SERPL-MCNC: 0.6 MG/DL (ref 0.1–1)
BUN SERPL-MCNC: 13 MG/DL (ref 6–20)
CALCIUM SERPL-MCNC: 8.9 MG/DL (ref 8.7–10.5)
CHLORIDE SERPL-SCNC: 110 MMOL/L (ref 95–110)
CO2 SERPL-SCNC: 26 MMOL/L (ref 23–29)
CREAT SERPL-MCNC: 0.9 MG/DL (ref 0.5–1.4)
DIFFERENTIAL METHOD: ABNORMAL
EOSINOPHIL # BLD AUTO: 0.1 K/UL (ref 0–0.5)
EOSINOPHIL NFR BLD: 1.7 % (ref 0–8)
ERYTHROCYTE [DISTWIDTH] IN BLOOD BY AUTOMATED COUNT: 13.3 % (ref 11.5–14.5)
EST. GFR  (AFRICAN AMERICAN): >60 ML/MIN/1.73 M^2
EST. GFR  (NON AFRICAN AMERICAN): >60 ML/MIN/1.73 M^2
GLUCOSE SERPL-MCNC: 112 MG/DL (ref 70–110)
HCT VFR BLD AUTO: 34.8 % (ref 37–48.5)
HGB BLD-MCNC: 11.5 G/DL (ref 12–16)
IMM GRANULOCYTES # BLD AUTO: 0.01 K/UL (ref 0–0.04)
IMM GRANULOCYTES NFR BLD AUTO: 0.3 % (ref 0–0.5)
LYMPHOCYTES # BLD AUTO: 1.2 K/UL (ref 1–4.8)
LYMPHOCYTES NFR BLD: 41.5 % (ref 18–48)
MCH RBC QN AUTO: 29.9 PG (ref 27–31)
MCHC RBC AUTO-ENTMCNC: 33 G/DL (ref 32–36)
MCV RBC AUTO: 91 FL (ref 82–98)
MONOCYTES # BLD AUTO: 0.4 K/UL (ref 0.3–1)
MONOCYTES NFR BLD: 12.6 % (ref 4–15)
NEUTROPHILS # BLD AUTO: 1.3 K/UL (ref 1.8–7.7)
NEUTROPHILS NFR BLD: 43.2 % (ref 38–73)
NRBC BLD-RTO: 0 /100 WBC
PLATELET # BLD AUTO: 121 K/UL (ref 150–450)
PMV BLD AUTO: 10.1 FL (ref 9.2–12.9)
POTASSIUM SERPL-SCNC: 3.9 MMOL/L (ref 3.5–5.1)
PROT SERPL-MCNC: 6.4 G/DL (ref 6–8.4)
RBC # BLD AUTO: 3.84 M/UL (ref 4–5.4)
SODIUM SERPL-SCNC: 145 MMOL/L (ref 136–145)
WBC # BLD AUTO: 2.94 K/UL (ref 3.9–12.7)

## 2021-09-01 PROCEDURE — 96415 CHEMO IV INFUSION ADDL HR: CPT

## 2021-09-01 PROCEDURE — 3080F PR MOST RECENT DIASTOLIC BLOOD PRESSURE >= 90 MM HG: ICD-10-PCS | Mod: CPTII,S$GLB,, | Performed by: INTERNAL MEDICINE

## 2021-09-01 PROCEDURE — 3008F PR BODY MASS INDEX (BMI) DOCUMENTED: ICD-10-PCS | Mod: CPTII,S$GLB,, | Performed by: INTERNAL MEDICINE

## 2021-09-01 PROCEDURE — 1160F RVW MEDS BY RX/DR IN RCRD: CPT | Mod: CPTII,S$GLB,, | Performed by: INTERNAL MEDICINE

## 2021-09-01 PROCEDURE — 96411 CHEMO IV PUSH ADDL DRUG: CPT

## 2021-09-01 PROCEDURE — 96413 CHEMO IV INFUSION 1 HR: CPT

## 2021-09-01 PROCEDURE — 3044F HG A1C LEVEL LT 7.0%: CPT | Mod: CPTII,S$GLB,, | Performed by: INTERNAL MEDICINE

## 2021-09-01 PROCEDURE — 99215 OFFICE O/P EST HI 40 MIN: CPT | Mod: 25,S$GLB,, | Performed by: INTERNAL MEDICINE

## 2021-09-01 PROCEDURE — 1160F PR REVIEW ALL MEDS BY PRESCRIBER/CLIN PHARMACIST DOCUMENTED: ICD-10-PCS | Mod: CPTII,S$GLB,, | Performed by: INTERNAL MEDICINE

## 2021-09-01 PROCEDURE — 99999 PR PBB SHADOW E&M-EST. PATIENT-LVL III: CPT | Mod: PBBFAC,,, | Performed by: INTERNAL MEDICINE

## 2021-09-01 PROCEDURE — 99204 OFFICE O/P NEW MOD 45 MIN: CPT | Mod: S$GLB,,, | Performed by: INTERNAL MEDICINE

## 2021-09-01 PROCEDURE — 3080F DIAST BP >= 90 MM HG: CPT | Mod: CPTII,S$GLB,, | Performed by: INTERNAL MEDICINE

## 2021-09-01 PROCEDURE — 99204 PR OFFICE/OUTPT VISIT, NEW, LEVL IV, 45-59 MIN: ICD-10-PCS | Mod: S$GLB,,, | Performed by: INTERNAL MEDICINE

## 2021-09-01 PROCEDURE — 3008F BODY MASS INDEX DOCD: CPT | Mod: CPTII,S$GLB,, | Performed by: INTERNAL MEDICINE

## 2021-09-01 PROCEDURE — 3044F PR MOST RECENT HEMOGLOBIN A1C LEVEL <7.0%: ICD-10-PCS | Mod: CPTII,S$GLB,, | Performed by: INTERNAL MEDICINE

## 2021-09-01 PROCEDURE — 1159F MED LIST DOCD IN RCRD: CPT | Mod: CPTII,S$GLB,, | Performed by: INTERNAL MEDICINE

## 2021-09-01 PROCEDURE — 25000003 PHARM REV CODE 250: Performed by: INTERNAL MEDICINE

## 2021-09-01 PROCEDURE — 96368 THER/DIAG CONCURRENT INF: CPT

## 2021-09-01 PROCEDURE — 3077F SYST BP >= 140 MM HG: CPT | Mod: CPTII,S$GLB,, | Performed by: INTERNAL MEDICINE

## 2021-09-01 PROCEDURE — 3077F PR MOST RECENT SYSTOLIC BLOOD PRESSURE >= 140 MM HG: ICD-10-PCS | Mod: CPTII,S$GLB,, | Performed by: INTERNAL MEDICINE

## 2021-09-01 PROCEDURE — 96367 TX/PROPH/DG ADDL SEQ IV INF: CPT

## 2021-09-01 PROCEDURE — 99999 PR PBB SHADOW E&M-EST. PATIENT-LVL III: ICD-10-PCS | Mod: PBBFAC,,, | Performed by: INTERNAL MEDICINE

## 2021-09-01 PROCEDURE — 1159F PR MEDICATION LIST DOCUMENTED IN MEDICAL RECORD: ICD-10-PCS | Mod: CPTII,S$GLB,, | Performed by: INTERNAL MEDICINE

## 2021-09-01 PROCEDURE — 96417 CHEMO IV INFUS EACH ADDL SEQ: CPT

## 2021-09-01 PROCEDURE — 85025 COMPLETE CBC W/AUTO DIFF WBC: CPT | Performed by: INTERNAL MEDICINE

## 2021-09-01 PROCEDURE — 99215 PR OFFICE/OUTPT VISIT, EST, LEVL V, 40-54 MIN: ICD-10-PCS | Mod: 25,S$GLB,, | Performed by: INTERNAL MEDICINE

## 2021-09-01 PROCEDURE — 63600175 PHARM REV CODE 636 W HCPCS: Performed by: INTERNAL MEDICINE

## 2021-09-01 PROCEDURE — 80053 COMPREHEN METABOLIC PANEL: CPT | Performed by: INTERNAL MEDICINE

## 2021-09-01 PROCEDURE — 36415 COLL VENOUS BLD VENIPUNCTURE: CPT | Performed by: INTERNAL MEDICINE

## 2021-09-01 PROCEDURE — 3079F DIAST BP 80-89 MM HG: CPT | Mod: CPTII,S$GLB,, | Performed by: INTERNAL MEDICINE

## 2021-09-01 PROCEDURE — 96416 CHEMO PROLONG INFUSE W/PUMP: CPT

## 2021-09-01 PROCEDURE — 3079F PR MOST RECENT DIASTOLIC BLOOD PRESSURE 80-89 MM HG: ICD-10-PCS | Mod: CPTII,S$GLB,, | Performed by: INTERNAL MEDICINE

## 2021-09-01 RX ORDER — FLUOROURACIL 50 MG/ML
400 INJECTION, SOLUTION INTRAVENOUS
Status: COMPLETED | OUTPATIENT
Start: 2021-09-01 | End: 2021-09-01

## 2021-09-01 RX ORDER — OLMESARTAN MEDOXOMIL 40 MG/1
40 TABLET ORAL DAILY
Qty: 90 TABLET | Refills: 3
Start: 2021-09-01 | End: 2021-09-09 | Stop reason: SDUPTHER

## 2021-09-01 RX ORDER — AMLODIPINE BESYLATE 5 MG/1
5 TABLET ORAL DAILY
Qty: 30 TABLET | Refills: 11 | Status: SHIPPED | OUTPATIENT
Start: 2021-09-01 | End: 2021-09-09 | Stop reason: SDUPTHER

## 2021-09-01 RX ADMIN — DEXAMETHASONE SODIUM PHOSPHATE 0.25 MG: 4 INJECTION, SOLUTION INTRA-ARTICULAR; INTRALESIONAL; INTRAMUSCULAR; INTRAVENOUS; SOFT TISSUE at 10:09

## 2021-09-01 RX ADMIN — FLUOROURACIL 4945 MG: 50 INJECTION, SOLUTION INTRAVENOUS at 01:09

## 2021-09-01 RX ADMIN — OXALIPLATIN 175 MG: 100 INJECTION, SOLUTION, CONCENTRATE INTRAVENOUS at 11:09

## 2021-09-01 RX ADMIN — FLUOROURACIL 825 MG: 50 INJECTION, SOLUTION INTRAVENOUS at 01:09

## 2021-09-01 RX ADMIN — DEXTROSE MONOHYDRATE: 50 INJECTION, SOLUTION INTRAVENOUS at 10:09

## 2021-09-01 RX ADMIN — LEUCOVORIN CALCIUM 800 MG: 350 INJECTION, POWDER, LYOPHILIZED, FOR SUSPENSION INTRAMUSCULAR; INTRAVENOUS at 11:09

## 2021-09-03 ENCOUNTER — INFUSION (OUTPATIENT)
Dept: INFUSION THERAPY | Facility: HOSPITAL | Age: 56
End: 2021-09-03
Attending: NURSE PRACTITIONER
Payer: COMMERCIAL

## 2021-09-03 VITALS
OXYGEN SATURATION: 98 % | RESPIRATION RATE: 18 BRPM | SYSTOLIC BLOOD PRESSURE: 137 MMHG | HEART RATE: 63 BPM | DIASTOLIC BLOOD PRESSURE: 84 MMHG | TEMPERATURE: 98 F

## 2021-09-03 DIAGNOSIS — C17.0 DUODENAL CANCER: Primary | ICD-10-CM

## 2021-09-03 PROCEDURE — 25000003 PHARM REV CODE 250: Performed by: INTERNAL MEDICINE

## 2021-09-03 PROCEDURE — 63600175 PHARM REV CODE 636 W HCPCS: Performed by: INTERNAL MEDICINE

## 2021-09-03 PROCEDURE — A4216 STERILE WATER/SALINE, 10 ML: HCPCS | Performed by: INTERNAL MEDICINE

## 2021-09-03 PROCEDURE — 96377 APPLICATON ON-BODY INJECTOR: CPT

## 2021-09-03 RX ORDER — SODIUM CHLORIDE 0.9 % (FLUSH) 0.9 %
10 SYRINGE (ML) INJECTION
Status: DISCONTINUED | OUTPATIENT
Start: 2021-09-03 | End: 2021-09-03 | Stop reason: HOSPADM

## 2021-09-03 RX ORDER — HEPARIN 100 UNIT/ML
500 SYRINGE INTRAVENOUS
Status: DISCONTINUED | OUTPATIENT
Start: 2021-09-03 | End: 2021-09-03 | Stop reason: HOSPADM

## 2021-09-03 RX ADMIN — Medication 10 ML: at 12:09

## 2021-09-03 RX ADMIN — HEPARIN 500 UNITS: 100 SYRINGE at 12:09

## 2021-09-03 RX ADMIN — PEGFILGRASTIM 6 MG: KIT SUBCUTANEOUS at 12:09

## 2021-09-09 ENCOUNTER — PATIENT MESSAGE (OUTPATIENT)
Dept: CARDIOLOGY | Facility: CLINIC | Age: 56
End: 2021-09-09

## 2021-09-09 DIAGNOSIS — I34.1 MITRAL VALVE PROLAPSE: ICD-10-CM

## 2021-09-09 DIAGNOSIS — I10 BENIGN HYPERTENSION: ICD-10-CM

## 2021-09-09 DIAGNOSIS — Z01.818 PREOP TESTING: ICD-10-CM

## 2021-09-09 DIAGNOSIS — E78.00 PURE HYPERCHOLESTEROLEMIA: ICD-10-CM

## 2021-09-09 RX ORDER — OLMESARTAN MEDOXOMIL 40 MG/1
40 TABLET ORAL DAILY
Qty: 90 TABLET | Refills: 3 | Status: SHIPPED | OUTPATIENT
Start: 2021-09-09 | End: 2022-06-16

## 2021-09-09 RX ORDER — AMLODIPINE BESYLATE 5 MG/1
5 TABLET ORAL DAILY
Qty: 30 TABLET | Refills: 11 | Status: SHIPPED | OUTPATIENT
Start: 2021-09-09 | End: 2022-08-15

## 2021-09-13 ENCOUNTER — LAB VISIT (OUTPATIENT)
Dept: LAB | Facility: HOSPITAL | Age: 56
End: 2021-09-13
Attending: INTERNAL MEDICINE
Payer: COMMERCIAL

## 2021-09-13 ENCOUNTER — PATIENT MESSAGE (OUTPATIENT)
Dept: HEMATOLOGY/ONCOLOGY | Facility: CLINIC | Age: 56
End: 2021-09-13

## 2021-09-13 ENCOUNTER — DOCUMENTATION ONLY (OUTPATIENT)
Dept: HEMATOLOGY/ONCOLOGY | Facility: CLINIC | Age: 56
End: 2021-09-13

## 2021-09-13 DIAGNOSIS — T45.1X5A IMMUNODEFICIENCY DUE TO CHEMOTHERAPY: ICD-10-CM

## 2021-09-13 DIAGNOSIS — D84.821 IMMUNODEFICIENCY DUE TO CHEMOTHERAPY: ICD-10-CM

## 2021-09-13 DIAGNOSIS — C17.0 DUODENAL CANCER: ICD-10-CM

## 2021-09-13 DIAGNOSIS — Z79.899 IMMUNODEFICIENCY DUE TO CHEMOTHERAPY: ICD-10-CM

## 2021-09-13 LAB
ALBUMIN SERPL BCP-MCNC: 4 G/DL (ref 3.5–5.2)
ALP SERPL-CCNC: 152 U/L (ref 55–135)
ALT SERPL W/O P-5'-P-CCNC: 52 U/L (ref 10–44)
ANION GAP SERPL CALC-SCNC: 12 MMOL/L (ref 8–16)
AST SERPL-CCNC: 56 U/L (ref 10–40)
BASOPHILS # BLD AUTO: 0.03 K/UL (ref 0–0.2)
BASOPHILS NFR BLD: 0.6 % (ref 0–1.9)
BILIRUB SERPL-MCNC: 0.6 MG/DL (ref 0.1–1)
BUN SERPL-MCNC: 8 MG/DL (ref 6–20)
CALCIUM SERPL-MCNC: 9.6 MG/DL (ref 8.7–10.5)
CHLORIDE SERPL-SCNC: 108 MMOL/L (ref 95–110)
CO2 SERPL-SCNC: 24 MMOL/L (ref 23–29)
CREAT SERPL-MCNC: 0.9 MG/DL (ref 0.5–1.4)
DIFFERENTIAL METHOD: ABNORMAL
EOSINOPHIL # BLD AUTO: 0 K/UL (ref 0–0.5)
EOSINOPHIL NFR BLD: 0.8 % (ref 0–8)
ERYTHROCYTE [DISTWIDTH] IN BLOOD BY AUTOMATED COUNT: 13.5 % (ref 11.5–14.5)
EST. GFR  (AFRICAN AMERICAN): >60 ML/MIN/1.73 M^2
EST. GFR  (NON AFRICAN AMERICAN): >60 ML/MIN/1.73 M^2
GLUCOSE SERPL-MCNC: 99 MG/DL (ref 70–110)
HCT VFR BLD AUTO: 36.7 % (ref 37–48.5)
HGB BLD-MCNC: 12.8 G/DL (ref 12–16)
IMM GRANULOCYTES # BLD AUTO: 0.11 K/UL (ref 0–0.04)
IMM GRANULOCYTES NFR BLD AUTO: 2.3 % (ref 0–0.5)
LYMPHOCYTES # BLD AUTO: 1.4 K/UL (ref 1–4.8)
LYMPHOCYTES NFR BLD: 28.5 % (ref 18–48)
MCH RBC QN AUTO: 31.9 PG (ref 27–31)
MCHC RBC AUTO-ENTMCNC: 34.9 G/DL (ref 32–36)
MCV RBC AUTO: 92 FL (ref 82–98)
MONOCYTES # BLD AUTO: 0.4 K/UL (ref 0.3–1)
MONOCYTES NFR BLD: 9.3 % (ref 4–15)
NEUTROPHILS # BLD AUTO: 2.8 K/UL (ref 1.8–7.7)
NEUTROPHILS NFR BLD: 58.5 % (ref 38–73)
NRBC BLD-RTO: 0 /100 WBC
PLATELET # BLD AUTO: 106 K/UL (ref 150–450)
PMV BLD AUTO: 11.8 FL (ref 9.2–12.9)
POTASSIUM SERPL-SCNC: 3.9 MMOL/L (ref 3.5–5.1)
PROT SERPL-MCNC: 6.7 G/DL (ref 6–8.4)
RBC # BLD AUTO: 4.01 M/UL (ref 4–5.4)
SODIUM SERPL-SCNC: 144 MMOL/L (ref 136–145)
WBC # BLD AUTO: 4.73 K/UL (ref 3.9–12.7)

## 2021-09-13 PROCEDURE — 36415 COLL VENOUS BLD VENIPUNCTURE: CPT | Mod: PO | Performed by: INTERNAL MEDICINE

## 2021-09-13 PROCEDURE — 85025 COMPLETE CBC W/AUTO DIFF WBC: CPT | Performed by: INTERNAL MEDICINE

## 2021-09-13 PROCEDURE — 80053 COMPREHEN METABOLIC PANEL: CPT | Performed by: INTERNAL MEDICINE

## 2021-09-14 ENCOUNTER — INFUSION (OUTPATIENT)
Dept: INFUSION THERAPY | Facility: HOSPITAL | Age: 56
End: 2021-09-14
Attending: INTERNAL MEDICINE
Payer: COMMERCIAL

## 2021-09-14 ENCOUNTER — OFFICE VISIT (OUTPATIENT)
Dept: HEMATOLOGY/ONCOLOGY | Facility: CLINIC | Age: 56
End: 2021-09-14
Payer: COMMERCIAL

## 2021-09-14 VITALS
OXYGEN SATURATION: 98 % | RESPIRATION RATE: 18 BRPM | HEART RATE: 72 BPM | DIASTOLIC BLOOD PRESSURE: 89 MMHG | SYSTOLIC BLOOD PRESSURE: 158 MMHG | BODY MASS INDEX: 26.57 KG/M2 | TEMPERATURE: 98 F | HEIGHT: 72 IN | WEIGHT: 196.19 LBS

## 2021-09-14 DIAGNOSIS — T45.1X5A IMMUNODEFICIENCY DUE TO CHEMOTHERAPY: Primary | ICD-10-CM

## 2021-09-14 DIAGNOSIS — C17.0 DUODENAL CANCER: Primary | ICD-10-CM

## 2021-09-14 DIAGNOSIS — C17.0 DUODENAL CANCER: ICD-10-CM

## 2021-09-14 DIAGNOSIS — D84.821 IMMUNODEFICIENCY DUE TO CHEMOTHERAPY: Primary | ICD-10-CM

## 2021-09-14 DIAGNOSIS — Z79.899 IMMUNODEFICIENCY DUE TO CHEMOTHERAPY: Primary | ICD-10-CM

## 2021-09-14 PROCEDURE — 3044F PR MOST RECENT HEMOGLOBIN A1C LEVEL <7.0%: ICD-10-PCS | Mod: CPTII,95,, | Performed by: INTERNAL MEDICINE

## 2021-09-14 PROCEDURE — 99215 OFFICE O/P EST HI 40 MIN: CPT | Mod: 25,95,, | Performed by: INTERNAL MEDICINE

## 2021-09-14 PROCEDURE — 96415 CHEMO IV INFUSION ADDL HR: CPT

## 2021-09-14 PROCEDURE — 96367 TX/PROPH/DG ADDL SEQ IV INF: CPT

## 2021-09-14 PROCEDURE — 25000003 PHARM REV CODE 250: Performed by: INTERNAL MEDICINE

## 2021-09-14 PROCEDURE — 4010F ACE/ARB THERAPY RXD/TAKEN: CPT | Mod: CPTII,95,, | Performed by: INTERNAL MEDICINE

## 2021-09-14 PROCEDURE — 3044F HG A1C LEVEL LT 7.0%: CPT | Mod: CPTII,95,, | Performed by: INTERNAL MEDICINE

## 2021-09-14 PROCEDURE — 96413 CHEMO IV INFUSION 1 HR: CPT

## 2021-09-14 PROCEDURE — 96368 THER/DIAG CONCURRENT INF: CPT

## 2021-09-14 PROCEDURE — 96411 CHEMO IV PUSH ADDL DRUG: CPT

## 2021-09-14 PROCEDURE — 96416 CHEMO PROLONG INFUSE W/PUMP: CPT

## 2021-09-14 PROCEDURE — 99215 PR OFFICE/OUTPT VISIT, EST, LEVL V, 40-54 MIN: ICD-10-PCS | Mod: 25,95,, | Performed by: INTERNAL MEDICINE

## 2021-09-14 PROCEDURE — 4010F PR ACE/ARB THEARPY RXD/TAKEN: ICD-10-PCS | Mod: CPTII,95,, | Performed by: INTERNAL MEDICINE

## 2021-09-14 PROCEDURE — 63600175 PHARM REV CODE 636 W HCPCS: Performed by: INTERNAL MEDICINE

## 2021-09-14 RX ORDER — HEPARIN 100 UNIT/ML
500 SYRINGE INTRAVENOUS
Status: CANCELLED | OUTPATIENT
Start: 2021-09-14

## 2021-09-14 RX ORDER — DIPHENHYDRAMINE HYDROCHLORIDE 50 MG/ML
50 INJECTION INTRAMUSCULAR; INTRAVENOUS ONCE AS NEEDED
Status: CANCELLED | OUTPATIENT
Start: 2021-09-14

## 2021-09-14 RX ORDER — HEPARIN 100 UNIT/ML
500 SYRINGE INTRAVENOUS
Status: CANCELLED | OUTPATIENT
Start: 2021-09-16

## 2021-09-14 RX ORDER — SODIUM CHLORIDE 0.9 % (FLUSH) 0.9 %
10 SYRINGE (ML) INJECTION
Status: CANCELLED | OUTPATIENT
Start: 2021-09-14

## 2021-09-14 RX ORDER — EPINEPHRINE 0.3 MG/.3ML
0.3 INJECTION SUBCUTANEOUS ONCE AS NEEDED
Status: CANCELLED | OUTPATIENT
Start: 2021-09-14

## 2021-09-14 RX ORDER — FLUOROURACIL 50 MG/ML
400 INJECTION, SOLUTION INTRAVENOUS
Status: COMPLETED | OUTPATIENT
Start: 2021-09-14 | End: 2021-09-14

## 2021-09-14 RX ORDER — DIPHENHYDRAMINE HYDROCHLORIDE 50 MG/ML
50 INJECTION INTRAMUSCULAR; INTRAVENOUS ONCE AS NEEDED
Status: DISCONTINUED | OUTPATIENT
Start: 2021-09-14 | End: 2021-09-14 | Stop reason: HOSPADM

## 2021-09-14 RX ORDER — FLUOROURACIL 50 MG/ML
400 INJECTION, SOLUTION INTRAVENOUS
Status: CANCELLED | OUTPATIENT
Start: 2021-09-14

## 2021-09-14 RX ORDER — SODIUM CHLORIDE 0.9 % (FLUSH) 0.9 %
10 SYRINGE (ML) INJECTION
Status: CANCELLED | OUTPATIENT
Start: 2021-09-16

## 2021-09-14 RX ORDER — EPINEPHRINE 0.3 MG/.3ML
0.3 INJECTION SUBCUTANEOUS ONCE AS NEEDED
Status: DISCONTINUED | OUTPATIENT
Start: 2021-09-14 | End: 2021-09-14 | Stop reason: HOSPADM

## 2021-09-14 RX ADMIN — OXALIPLATIN 175 MG: 100 INJECTION, SOLUTION, CONCENTRATE INTRAVENOUS at 12:09

## 2021-09-14 RX ADMIN — LEUCOVORIN CALCIUM 800 MG: 350 INJECTION, POWDER, LYOPHILIZED, FOR SOLUTION INTRAMUSCULAR; INTRAVENOUS at 12:09

## 2021-09-14 RX ADMIN — FLUOROURACIL 825 MG: 50 INJECTION, SOLUTION INTRAVENOUS at 03:09

## 2021-09-14 RX ADMIN — SODIUM CHLORIDE: 9 INJECTION, SOLUTION INTRAVENOUS at 12:09

## 2021-09-14 RX ADMIN — DEXTROSE: 5 SOLUTION INTRAVENOUS at 12:09

## 2021-09-14 RX ADMIN — DEXAMETHASONE SODIUM PHOSPHATE 0.25 MG: 4 INJECTION, SOLUTION INTRA-ARTICULAR; INTRALESIONAL; INTRAMUSCULAR; INTRAVENOUS; SOFT TISSUE at 12:09

## 2021-09-14 RX ADMIN — FLUOROURACIL 4945 MG: 50 INJECTION, SOLUTION INTRAVENOUS at 03:09

## 2021-09-16 ENCOUNTER — INFUSION (OUTPATIENT)
Dept: INFUSION THERAPY | Facility: HOSPITAL | Age: 56
End: 2021-09-16
Attending: INTERNAL MEDICINE
Payer: COMMERCIAL

## 2021-09-16 VITALS
RESPIRATION RATE: 18 BRPM | TEMPERATURE: 98 F | SYSTOLIC BLOOD PRESSURE: 138 MMHG | OXYGEN SATURATION: 97 % | DIASTOLIC BLOOD PRESSURE: 82 MMHG | HEART RATE: 65 BPM

## 2021-09-16 DIAGNOSIS — C17.0 DUODENAL CANCER: Primary | ICD-10-CM

## 2021-09-16 PROCEDURE — 25000003 PHARM REV CODE 250: Performed by: INTERNAL MEDICINE

## 2021-09-16 PROCEDURE — A4216 STERILE WATER/SALINE, 10 ML: HCPCS | Performed by: INTERNAL MEDICINE

## 2021-09-16 PROCEDURE — 63600175 PHARM REV CODE 636 W HCPCS: Performed by: INTERNAL MEDICINE

## 2021-09-16 PROCEDURE — 96377 APPLICATON ON-BODY INJECTOR: CPT

## 2021-09-16 RX ORDER — HEPARIN 100 UNIT/ML
500 SYRINGE INTRAVENOUS
Status: DISCONTINUED | OUTPATIENT
Start: 2021-09-16 | End: 2021-09-16 | Stop reason: HOSPADM

## 2021-09-16 RX ORDER — SODIUM CHLORIDE 0.9 % (FLUSH) 0.9 %
10 SYRINGE (ML) INJECTION
Status: DISCONTINUED | OUTPATIENT
Start: 2021-09-16 | End: 2021-09-16 | Stop reason: HOSPADM

## 2021-09-16 RX ADMIN — PEGFILGRASTIM 6 MG: KIT SUBCUTANEOUS at 01:09

## 2021-09-16 RX ADMIN — HEPARIN 500 UNITS: 100 SYRINGE at 01:09

## 2021-09-16 RX ADMIN — Medication 10 ML: at 01:09

## 2021-09-21 ENCOUNTER — HOSPITAL ENCOUNTER (OUTPATIENT)
Dept: CARDIOLOGY | Facility: HOSPITAL | Age: 56
Discharge: HOME | End: 2021-09-21
Attending: INTERNAL MEDICINE
Payer: COMMERCIAL

## 2021-09-21 ENCOUNTER — PATIENT MESSAGE (OUTPATIENT)
Dept: OPHTHALMOLOGY | Facility: CLINIC | Age: 56
End: 2021-09-21

## 2021-09-21 ENCOUNTER — OFFICE VISIT (OUTPATIENT)
Dept: OPHTHALMOLOGY | Facility: CLINIC | Age: 56
End: 2021-09-21
Payer: COMMERCIAL

## 2021-09-21 DIAGNOSIS — I34.1 MITRAL VALVE PROLAPSE: ICD-10-CM

## 2021-09-21 DIAGNOSIS — I10 BENIGN HYPERTENSION: ICD-10-CM

## 2021-09-21 DIAGNOSIS — H40.003 GLAUCOMA SUSPECT OF BOTH EYES: ICD-10-CM

## 2021-09-21 DIAGNOSIS — E78.00 PURE HYPERCHOLESTEROLEMIA: ICD-10-CM

## 2021-09-21 DIAGNOSIS — Z01.818 PREOP TESTING: ICD-10-CM

## 2021-09-21 DIAGNOSIS — H25.13 NUCLEAR SCLEROTIC CATARACT OF BOTH EYES: Primary | ICD-10-CM

## 2021-09-21 DIAGNOSIS — H01.011 ULCERATIVE BLEPHARITIS OF UPPER EYELIDS OF BOTH EYES: ICD-10-CM

## 2021-09-21 DIAGNOSIS — H01.014 ULCERATIVE BLEPHARITIS OF UPPER EYELIDS OF BOTH EYES: ICD-10-CM

## 2021-09-21 DIAGNOSIS — H52.4 PRESBYOPIA OF BOTH EYES: ICD-10-CM

## 2021-09-21 PROCEDURE — 93306 ECHO (CUPID ONLY): ICD-10-PCS | Mod: 26,,, | Performed by: INTERNAL MEDICINE

## 2021-09-21 PROCEDURE — 1160F RVW MEDS BY RX/DR IN RCRD: CPT | Mod: CPTII,S$GLB,, | Performed by: OPTOMETRIST

## 2021-09-21 PROCEDURE — 4010F PR ACE/ARB THEARPY RXD/TAKEN: ICD-10-PCS | Mod: CPTII,S$GLB,, | Performed by: OPTOMETRIST

## 2021-09-21 PROCEDURE — 1159F PR MEDICATION LIST DOCUMENTED IN MEDICAL RECORD: ICD-10-PCS | Mod: CPTII,S$GLB,, | Performed by: OPTOMETRIST

## 2021-09-21 PROCEDURE — 92004 COMPRE OPH EXAM NEW PT 1/>: CPT | Mod: S$GLB,,, | Performed by: OPTOMETRIST

## 2021-09-21 PROCEDURE — 92133 CPTRZD OPH DX IMG PST SGM ON: CPT | Mod: S$GLB,,, | Performed by: OPTOMETRIST

## 2021-09-21 PROCEDURE — 92133 OCT, OPTIC NERVE - OU - BOTH EYES: ICD-10-PCS | Mod: S$GLB,,, | Performed by: OPTOMETRIST

## 2021-09-21 PROCEDURE — 1160F PR REVIEW ALL MEDS BY PRESCRIBER/CLIN PHARMACIST DOCUMENTED: ICD-10-PCS | Mod: CPTII,S$GLB,, | Performed by: OPTOMETRIST

## 2021-09-21 PROCEDURE — 93306 TTE W/DOPPLER COMPLETE: CPT

## 2021-09-21 PROCEDURE — 1159F MED LIST DOCD IN RCRD: CPT | Mod: CPTII,S$GLB,, | Performed by: OPTOMETRIST

## 2021-09-21 PROCEDURE — 99999 PR PBB SHADOW E&M-EST. PATIENT-LVL II: ICD-10-PCS | Mod: PBBFAC,,, | Performed by: OPTOMETRIST

## 2021-09-21 PROCEDURE — 3044F PR MOST RECENT HEMOGLOBIN A1C LEVEL <7.0%: ICD-10-PCS | Mod: CPTII,S$GLB,, | Performed by: OPTOMETRIST

## 2021-09-21 PROCEDURE — 99999 PR PBB SHADOW E&M-EST. PATIENT-LVL II: CPT | Mod: PBBFAC,,, | Performed by: OPTOMETRIST

## 2021-09-21 PROCEDURE — 4010F ACE/ARB THERAPY RXD/TAKEN: CPT | Mod: CPTII,S$GLB,, | Performed by: OPTOMETRIST

## 2021-09-21 PROCEDURE — 93306 TTE W/DOPPLER COMPLETE: CPT | Mod: 26,,, | Performed by: INTERNAL MEDICINE

## 2021-09-21 PROCEDURE — 92004 PR EYE EXAM, NEW PATIENT,COMPREHESV: ICD-10-PCS | Mod: S$GLB,,, | Performed by: OPTOMETRIST

## 2021-09-21 PROCEDURE — 3044F HG A1C LEVEL LT 7.0%: CPT | Mod: CPTII,S$GLB,, | Performed by: OPTOMETRIST

## 2021-09-21 PROCEDURE — 92015 DETERMINE REFRACTIVE STATE: CPT | Mod: S$GLB,,, | Performed by: OPTOMETRIST

## 2021-09-21 PROCEDURE — 92015 PR REFRACTION: ICD-10-PCS | Mod: S$GLB,,, | Performed by: OPTOMETRIST

## 2021-09-22 ENCOUNTER — OFFICE VISIT (OUTPATIENT)
Dept: CARDIOLOGY | Facility: CLINIC | Age: 56
End: 2021-09-22
Payer: COMMERCIAL

## 2021-09-22 DIAGNOSIS — I34.1 MITRAL VALVE PROLAPSE: ICD-10-CM

## 2021-09-22 DIAGNOSIS — Z01.818 PREOP TESTING: Primary | ICD-10-CM

## 2021-09-22 DIAGNOSIS — E78.00 PURE HYPERCHOLESTEROLEMIA: ICD-10-CM

## 2021-09-22 DIAGNOSIS — I10 BENIGN HYPERTENSION: ICD-10-CM

## 2021-09-22 LAB
AORTIC ROOT ANNULUS: 3.08 CM
AV INDEX (PROSTH): 0.71
AV MEAN GRADIENT: 3 MMHG
AV PEAK GRADIENT: 6 MMHG
AV VALVE AREA: 2.45 CM2
AV VELOCITY RATIO: 0.73
CV ECHO LV RWT: 0.45 CM
DOP CALC AO PEAK VEL: 1.26 M/S
DOP CALC AO VTI: 26.04 CM
DOP CALC LVOT AREA: 3.4 CM2
DOP CALC LVOT DIAMETER: 2.09 CM
DOP CALC LVOT PEAK VEL: 0.92 M/S
DOP CALC LVOT STROKE VOLUME: 63.68 CM3
DOP CALC RVOT PEAK VEL: 0.99 M/S
DOP CALC RVOT VTI: 20.87 CM
DOP CALCLVOT PEAK VEL VTI: 18.57 CM
E WAVE DECELERATION TIME: 233.82 MSEC
E/A RATIO: 1.06
ECHO LV POSTERIOR WALL: 1.01 CM (ref 0.6–1.1)
EJECTION FRACTION: 65 %
FRACTIONAL SHORTENING: 33 % (ref 28–44)
INTERVENTRICULAR SEPTUM: 0.99 CM (ref 0.6–1.1)
IVRT: 134.16 MSEC
LA MAJOR: 3.25 CM
LA MINOR: 3.98 CM
LA WIDTH: 3.11 CM
LEFT ATRIUM SIZE: 3.53 CM
LEFT ATRIUM VOLUME: 33.39 CM3
LEFT INTERNAL DIMENSION IN SYSTOLE: 2.97 CM (ref 2.1–4)
LEFT VENTRICLE DIASTOLIC VOLUME: 89.65 ML
LEFT VENTRICLE SYSTOLIC VOLUME: 34.1 ML
LEFT VENTRICULAR INTERNAL DIMENSION IN DIASTOLE: 4.44 CM (ref 3.5–6)
LEFT VENTRICULAR MASS: 150 G
LV LATERAL E/E' RATIO: 7 M/S
MV A" WAVE DURATION": 10.56 MSEC
MV PEAK A VEL: 0.79 M/S
MV PEAK E VEL: 0.84 M/S
MV STENOSIS PRESSURE HALF TIME: 67.81 MS
MV VALVE AREA P 1/2 METHOD: 3.24 CM2
PISA TR MAX VEL: 1.79 M/S
PULM VEIN S/D RATIO: 0.98
PV MEAN GRADIENT: 2 MMHG
PV PEAK D VEL: 0.42 M/S
PV PEAK GRADIENT: 4 MMHG
PV PEAK S VEL: 0.41 M/S
PV PEAK VELOCITY: 0.94 CM/S
RA MAJOR: 3.94 CM
RA WIDTH: 3.22 CM
RIGHT VENTRICULAR END-DIASTOLIC DIMENSION: 2.96 CM
SINUS: 3.42 CM
STJ: 3.07 CM
TDI LATERAL: 0.12 M/S
TR MAX PG: 13 MMHG
TRICUSPID ANNULAR PLANE SYSTOLIC EXCURSION: 1.84 CM

## 2021-09-22 PROCEDURE — 99214 OFFICE O/P EST MOD 30 MIN: CPT | Mod: 95,,, | Performed by: INTERNAL MEDICINE

## 2021-09-22 PROCEDURE — 3044F PR MOST RECENT HEMOGLOBIN A1C LEVEL <7.0%: ICD-10-PCS | Mod: CPTII,95,, | Performed by: INTERNAL MEDICINE

## 2021-09-22 PROCEDURE — 99214 PR OFFICE/OUTPT VISIT, EST, LEVL IV, 30-39 MIN: ICD-10-PCS | Mod: 95,,, | Performed by: INTERNAL MEDICINE

## 2021-09-22 PROCEDURE — 4010F ACE/ARB THERAPY RXD/TAKEN: CPT | Mod: CPTII,95,, | Performed by: INTERNAL MEDICINE

## 2021-09-22 PROCEDURE — 4010F PR ACE/ARB THEARPY RXD/TAKEN: ICD-10-PCS | Mod: CPTII,95,, | Performed by: INTERNAL MEDICINE

## 2021-09-22 PROCEDURE — 3044F HG A1C LEVEL LT 7.0%: CPT | Mod: CPTII,95,, | Performed by: INTERNAL MEDICINE

## 2021-09-27 ENCOUNTER — LAB VISIT (OUTPATIENT)
Dept: LAB | Facility: HOSPITAL | Age: 56
End: 2021-09-27
Attending: INTERNAL MEDICINE
Payer: COMMERCIAL

## 2021-09-27 DIAGNOSIS — T45.1X5A IMMUNODEFICIENCY DUE TO CHEMOTHERAPY: ICD-10-CM

## 2021-09-27 DIAGNOSIS — Z79.899 IMMUNODEFICIENCY DUE TO CHEMOTHERAPY: ICD-10-CM

## 2021-09-27 DIAGNOSIS — D84.821 IMMUNODEFICIENCY DUE TO CHEMOTHERAPY: ICD-10-CM

## 2021-09-27 DIAGNOSIS — C17.0 DUODENAL CANCER: ICD-10-CM

## 2021-09-27 LAB
ALBUMIN SERPL BCP-MCNC: 3.7 G/DL (ref 3.5–5.2)
ALP SERPL-CCNC: 153 U/L (ref 55–135)
ALT SERPL W/O P-5'-P-CCNC: 47 U/L (ref 10–44)
ANION GAP SERPL CALC-SCNC: 11 MMOL/L (ref 8–16)
ANISOCYTOSIS BLD QL SMEAR: SLIGHT
AST SERPL-CCNC: 43 U/L (ref 10–40)
BASOPHILS NFR BLD: 0 % (ref 0–1.9)
BILIRUB SERPL-MCNC: 0.6 MG/DL (ref 0.1–1)
BUN SERPL-MCNC: 13 MG/DL (ref 6–20)
CALCIUM SERPL-MCNC: 9.5 MG/DL (ref 8.7–10.5)
CHLORIDE SERPL-SCNC: 107 MMOL/L (ref 95–110)
CO2 SERPL-SCNC: 26 MMOL/L (ref 23–29)
CREAT SERPL-MCNC: 0.8 MG/DL (ref 0.5–1.4)
DACRYOCYTES BLD QL SMEAR: ABNORMAL
DIFFERENTIAL METHOD: ABNORMAL
EOSINOPHIL NFR BLD: 0 % (ref 0–8)
ERYTHROCYTE [DISTWIDTH] IN BLOOD BY AUTOMATED COUNT: 13.9 % (ref 11.5–14.5)
EST. GFR  (AFRICAN AMERICAN): >60 ML/MIN/1.73 M^2
EST. GFR  (NON AFRICAN AMERICAN): >60 ML/MIN/1.73 M^2
GLUCOSE SERPL-MCNC: 108 MG/DL (ref 70–110)
HCT VFR BLD AUTO: 34.9 % (ref 37–48.5)
HGB BLD-MCNC: 11.9 G/DL (ref 12–16)
IMM GRANULOCYTES # BLD AUTO: ABNORMAL K/UL (ref 0–0.04)
IMM GRANULOCYTES NFR BLD AUTO: ABNORMAL % (ref 0–0.5)
LYMPHOCYTES NFR BLD: 24 % (ref 18–48)
MCH RBC QN AUTO: 31 PG (ref 27–31)
MCHC RBC AUTO-ENTMCNC: 34.1 G/DL (ref 32–36)
MCV RBC AUTO: 91 FL (ref 82–98)
METAMYELOCYTES NFR BLD MANUAL: 5 %
MONOCYTES NFR BLD: 5 % (ref 4–15)
MYELOCYTES NFR BLD MANUAL: 1 %
NEUTROPHILS NFR BLD: 65 % (ref 38–73)
NRBC BLD-RTO: 0 /100 WBC
OVALOCYTES BLD QL SMEAR: ABNORMAL
PLATELET # BLD AUTO: 97 K/UL (ref 150–450)
PLATELET BLD QL SMEAR: ABNORMAL
PMV BLD AUTO: 11.6 FL (ref 9.2–12.9)
POIKILOCYTOSIS BLD QL SMEAR: SLIGHT
POTASSIUM SERPL-SCNC: 3.7 MMOL/L (ref 3.5–5.1)
PROT SERPL-MCNC: 6.3 G/DL (ref 6–8.4)
RBC # BLD AUTO: 3.84 M/UL (ref 4–5.4)
SODIUM SERPL-SCNC: 144 MMOL/L (ref 136–145)
WBC # BLD AUTO: 6.24 K/UL (ref 3.9–12.7)

## 2021-09-27 PROCEDURE — 85007 BL SMEAR W/DIFF WBC COUNT: CPT | Performed by: INTERNAL MEDICINE

## 2021-09-27 PROCEDURE — 80053 COMPREHEN METABOLIC PANEL: CPT | Performed by: INTERNAL MEDICINE

## 2021-09-27 PROCEDURE — 85027 COMPLETE CBC AUTOMATED: CPT | Performed by: INTERNAL MEDICINE

## 2021-09-27 PROCEDURE — 36415 COLL VENOUS BLD VENIPUNCTURE: CPT | Mod: PO | Performed by: INTERNAL MEDICINE

## 2021-09-28 ENCOUNTER — INFUSION (OUTPATIENT)
Dept: INFUSION THERAPY | Facility: HOSPITAL | Age: 56
End: 2021-09-28
Attending: INTERNAL MEDICINE
Payer: COMMERCIAL

## 2021-09-28 ENCOUNTER — OFFICE VISIT (OUTPATIENT)
Dept: HEMATOLOGY/ONCOLOGY | Facility: CLINIC | Age: 56
End: 2021-09-28
Payer: COMMERCIAL

## 2021-09-28 VITALS
HEIGHT: 72 IN | HEART RATE: 72 BPM | BODY MASS INDEX: 26.31 KG/M2 | RESPIRATION RATE: 16 BRPM | OXYGEN SATURATION: 98 % | WEIGHT: 194.25 LBS | SYSTOLIC BLOOD PRESSURE: 160 MMHG | TEMPERATURE: 98 F | DIASTOLIC BLOOD PRESSURE: 90 MMHG

## 2021-09-28 DIAGNOSIS — T45.1X5A PANCYTOPENIA DUE TO ANTINEOPLASTIC CHEMOTHERAPY: ICD-10-CM

## 2021-09-28 DIAGNOSIS — Z90.411 HISTORY OF PARTIAL PANCREATECTOMY: ICD-10-CM

## 2021-09-28 DIAGNOSIS — Z79.899 IMMUNODEFICIENCY DUE TO CHEMOTHERAPY: ICD-10-CM

## 2021-09-28 DIAGNOSIS — T45.1X5A PERIPHERAL NEUROPATHY DUE TO CHEMOTHERAPY: ICD-10-CM

## 2021-09-28 DIAGNOSIS — C17.0 DUODENAL CANCER: Primary | ICD-10-CM

## 2021-09-28 DIAGNOSIS — D61.810 PANCYTOPENIA DUE TO ANTINEOPLASTIC CHEMOTHERAPY: ICD-10-CM

## 2021-09-28 DIAGNOSIS — D84.821 IMMUNODEFICIENCY DUE TO CHEMOTHERAPY: ICD-10-CM

## 2021-09-28 DIAGNOSIS — T45.1X5A IMMUNODEFICIENCY DUE TO CHEMOTHERAPY: ICD-10-CM

## 2021-09-28 DIAGNOSIS — G62.0 PERIPHERAL NEUROPATHY DUE TO CHEMOTHERAPY: ICD-10-CM

## 2021-09-28 PROBLEM — N17.9 AKI (ACUTE KIDNEY INJURY): Status: RESOLVED | Noted: 2021-01-06 | Resolved: 2021-09-28

## 2021-09-28 PROBLEM — L03.90 CELLULITIS: Status: RESOLVED | Noted: 2021-08-02 | Resolved: 2021-09-28

## 2021-09-28 PROCEDURE — 96411 CHEMO IV PUSH ADDL DRUG: CPT

## 2021-09-28 PROCEDURE — 4010F ACE/ARB THERAPY RXD/TAKEN: CPT | Mod: CPTII,95,, | Performed by: INTERNAL MEDICINE

## 2021-09-28 PROCEDURE — 1159F MED LIST DOCD IN RCRD: CPT | Mod: CPTII,95,, | Performed by: INTERNAL MEDICINE

## 2021-09-28 PROCEDURE — 99214 OFFICE O/P EST MOD 30 MIN: CPT | Mod: 25,95,, | Performed by: INTERNAL MEDICINE

## 2021-09-28 PROCEDURE — 96416 CHEMO PROLONG INFUSE W/PUMP: CPT

## 2021-09-28 PROCEDURE — 96367 TX/PROPH/DG ADDL SEQ IV INF: CPT

## 2021-09-28 PROCEDURE — 1160F RVW MEDS BY RX/DR IN RCRD: CPT | Mod: CPTII,95,, | Performed by: INTERNAL MEDICINE

## 2021-09-28 PROCEDURE — 25000003 PHARM REV CODE 250: Performed by: INTERNAL MEDICINE

## 2021-09-28 PROCEDURE — 99214 PR OFFICE/OUTPT VISIT, EST, LEVL IV, 30-39 MIN: ICD-10-PCS | Mod: 25,95,, | Performed by: INTERNAL MEDICINE

## 2021-09-28 PROCEDURE — 96413 CHEMO IV INFUSION 1 HR: CPT

## 2021-09-28 PROCEDURE — 3044F PR MOST RECENT HEMOGLOBIN A1C LEVEL <7.0%: ICD-10-PCS | Mod: CPTII,95,, | Performed by: INTERNAL MEDICINE

## 2021-09-28 PROCEDURE — 1159F PR MEDICATION LIST DOCUMENTED IN MEDICAL RECORD: ICD-10-PCS | Mod: CPTII,95,, | Performed by: INTERNAL MEDICINE

## 2021-09-28 PROCEDURE — 96415 CHEMO IV INFUSION ADDL HR: CPT

## 2021-09-28 PROCEDURE — 63600175 PHARM REV CODE 636 W HCPCS: Performed by: INTERNAL MEDICINE

## 2021-09-28 PROCEDURE — 1160F PR REVIEW ALL MEDS BY PRESCRIBER/CLIN PHARMACIST DOCUMENTED: ICD-10-PCS | Mod: CPTII,95,, | Performed by: INTERNAL MEDICINE

## 2021-09-28 PROCEDURE — 96368 THER/DIAG CONCURRENT INF: CPT

## 2021-09-28 PROCEDURE — 4010F PR ACE/ARB THEARPY RXD/TAKEN: ICD-10-PCS | Mod: CPTII,95,, | Performed by: INTERNAL MEDICINE

## 2021-09-28 PROCEDURE — 3044F HG A1C LEVEL LT 7.0%: CPT | Mod: CPTII,95,, | Performed by: INTERNAL MEDICINE

## 2021-09-28 RX ORDER — SODIUM CHLORIDE 0.9 % (FLUSH) 0.9 %
10 SYRINGE (ML) INJECTION
Status: CANCELLED | OUTPATIENT
Start: 2021-09-30

## 2021-09-28 RX ORDER — HEPARIN 100 UNIT/ML
500 SYRINGE INTRAVENOUS
Status: CANCELLED | OUTPATIENT
Start: 2021-09-30

## 2021-09-28 RX ORDER — HEPARIN 100 UNIT/ML
500 SYRINGE INTRAVENOUS
Status: CANCELLED | OUTPATIENT
Start: 2021-09-28

## 2021-09-28 RX ORDER — FLUOROURACIL 50 MG/ML
400 INJECTION, SOLUTION INTRAVENOUS
Status: COMPLETED | OUTPATIENT
Start: 2021-09-28 | End: 2021-09-28

## 2021-09-28 RX ORDER — DIPHENHYDRAMINE HYDROCHLORIDE 50 MG/ML
50 INJECTION INTRAMUSCULAR; INTRAVENOUS ONCE AS NEEDED
Status: CANCELLED | OUTPATIENT
Start: 2021-09-28

## 2021-09-28 RX ORDER — SODIUM CHLORIDE 0.9 % (FLUSH) 0.9 %
10 SYRINGE (ML) INJECTION
Status: CANCELLED | OUTPATIENT
Start: 2021-09-28

## 2021-09-28 RX ORDER — EPINEPHRINE 0.3 MG/.3ML
0.3 INJECTION SUBCUTANEOUS ONCE AS NEEDED
Status: CANCELLED | OUTPATIENT
Start: 2021-09-28

## 2021-09-28 RX ORDER — FLUOROURACIL 50 MG/ML
400 INJECTION, SOLUTION INTRAVENOUS
Status: CANCELLED | OUTPATIENT
Start: 2021-09-28

## 2021-09-28 RX ADMIN — FLUOROURACIL 5000 MG: 50 INJECTION, SOLUTION INTRAVENOUS at 01:09

## 2021-09-28 RX ADMIN — FLUOROURACIL 825 MG: 50 INJECTION, SOLUTION INTRAVENOUS at 01:09

## 2021-09-28 RX ADMIN — DEXAMETHASONE SODIUM PHOSPHATE 0.25 MG: 4 INJECTION, SOLUTION INTRA-ARTICULAR; INTRALESIONAL; INTRAMUSCULAR; INTRAVENOUS; SOFT TISSUE at 10:09

## 2021-09-28 RX ADMIN — LEUCOVORIN CALCIUM 825 MG: 350 INJECTION, POWDER, LYOPHILIZED, FOR SUSPENSION INTRAMUSCULAR; INTRAVENOUS at 10:09

## 2021-09-28 RX ADMIN — SODIUM CHLORIDE: 9 INJECTION, SOLUTION INTRAVENOUS at 10:09

## 2021-09-28 RX ADMIN — OXALIPLATIN 175 MG: 100 INJECTION, SOLUTION, CONCENTRATE INTRAVENOUS at 10:09

## 2021-09-28 RX ADMIN — DEXTROSE: 5 SOLUTION INTRAVENOUS at 10:09

## 2021-09-30 ENCOUNTER — INFUSION (OUTPATIENT)
Dept: INFUSION THERAPY | Facility: HOSPITAL | Age: 56
End: 2021-09-30
Attending: INTERNAL MEDICINE
Payer: COMMERCIAL

## 2021-09-30 VITALS
HEART RATE: 65 BPM | DIASTOLIC BLOOD PRESSURE: 90 MMHG | TEMPERATURE: 98 F | RESPIRATION RATE: 16 BRPM | SYSTOLIC BLOOD PRESSURE: 146 MMHG | OXYGEN SATURATION: 99 %

## 2021-09-30 DIAGNOSIS — C17.0 DUODENAL CANCER: Primary | ICD-10-CM

## 2021-09-30 PROCEDURE — 63600175 PHARM REV CODE 636 W HCPCS: Mod: JG | Performed by: INTERNAL MEDICINE

## 2021-09-30 PROCEDURE — 96377 APPLICATON ON-BODY INJECTOR: CPT

## 2021-09-30 RX ORDER — HEPARIN 100 UNIT/ML
500 SYRINGE INTRAVENOUS
Status: DISCONTINUED | OUTPATIENT
Start: 2021-09-30 | End: 2021-09-30 | Stop reason: HOSPADM

## 2021-09-30 RX ADMIN — HEPARIN 500 UNITS: 100 SYRINGE at 11:09

## 2021-09-30 RX ADMIN — PEGFILGRASTIM 6 MG: KIT SUBCUTANEOUS at 11:09

## 2021-10-18 ENCOUNTER — LAB VISIT (OUTPATIENT)
Dept: LAB | Facility: HOSPITAL | Age: 56
End: 2021-10-18
Attending: INTERNAL MEDICINE
Payer: COMMERCIAL

## 2021-10-18 DIAGNOSIS — C17.0 DUODENAL CANCER: ICD-10-CM

## 2021-10-18 LAB
ALBUMIN SERPL BCP-MCNC: 4 G/DL (ref 3.5–5.2)
ALP SERPL-CCNC: 151 U/L (ref 55–135)
ALT SERPL W/O P-5'-P-CCNC: 57 U/L (ref 10–44)
ANION GAP SERPL CALC-SCNC: 10 MMOL/L (ref 8–16)
AST SERPL-CCNC: 59 U/L (ref 10–40)
BASOPHILS # BLD AUTO: 0.02 K/UL (ref 0–0.2)
BASOPHILS NFR BLD: 0.5 % (ref 0–1.9)
BILIRUB SERPL-MCNC: 0.7 MG/DL (ref 0.1–1)
BUN SERPL-MCNC: 14 MG/DL (ref 6–20)
CALCIUM SERPL-MCNC: 9.7 MG/DL (ref 8.7–10.5)
CHLORIDE SERPL-SCNC: 108 MMOL/L (ref 95–110)
CO2 SERPL-SCNC: 25 MMOL/L (ref 23–29)
CREAT SERPL-MCNC: 0.8 MG/DL (ref 0.5–1.4)
DIFFERENTIAL METHOD: ABNORMAL
EOSINOPHIL # BLD AUTO: 0 K/UL (ref 0–0.5)
EOSINOPHIL NFR BLD: 0.7 % (ref 0–8)
ERYTHROCYTE [DISTWIDTH] IN BLOOD BY AUTOMATED COUNT: 14.8 % (ref 11.5–14.5)
EST. GFR  (AFRICAN AMERICAN): >60 ML/MIN/1.73 M^2
EST. GFR  (NON AFRICAN AMERICAN): >60 ML/MIN/1.73 M^2
GLUCOSE SERPL-MCNC: 88 MG/DL (ref 70–110)
HCT VFR BLD AUTO: 37.3 % (ref 37–48.5)
HGB BLD-MCNC: 11.9 G/DL (ref 12–16)
IMM GRANULOCYTES # BLD AUTO: 0.01 K/UL (ref 0–0.04)
IMM GRANULOCYTES NFR BLD AUTO: 0.2 % (ref 0–0.5)
LYMPHOCYTES # BLD AUTO: 1.1 K/UL (ref 1–4.8)
LYMPHOCYTES NFR BLD: 27.5 % (ref 18–48)
MCH RBC QN AUTO: 30.2 PG (ref 27–31)
MCHC RBC AUTO-ENTMCNC: 31.9 G/DL (ref 32–36)
MCV RBC AUTO: 95 FL (ref 82–98)
MONOCYTES # BLD AUTO: 0.6 K/UL (ref 0.3–1)
MONOCYTES NFR BLD: 14 % (ref 4–15)
NEUTROPHILS # BLD AUTO: 2.4 K/UL (ref 1.8–7.7)
NEUTROPHILS NFR BLD: 57.1 % (ref 38–73)
NRBC BLD-RTO: 0 /100 WBC
PLATELET # BLD AUTO: 170 K/UL (ref 150–450)
PMV BLD AUTO: 11.3 FL (ref 9.2–12.9)
POTASSIUM SERPL-SCNC: 4.1 MMOL/L (ref 3.5–5.1)
PROT SERPL-MCNC: 6.8 G/DL (ref 6–8.4)
RBC # BLD AUTO: 3.94 M/UL (ref 4–5.4)
SODIUM SERPL-SCNC: 143 MMOL/L (ref 136–145)
WBC # BLD AUTO: 4.14 K/UL (ref 3.9–12.7)

## 2021-10-18 PROCEDURE — 36415 COLL VENOUS BLD VENIPUNCTURE: CPT | Mod: PO | Performed by: INTERNAL MEDICINE

## 2021-10-18 PROCEDURE — 80053 COMPREHEN METABOLIC PANEL: CPT | Performed by: INTERNAL MEDICINE

## 2021-10-18 PROCEDURE — 85025 COMPLETE CBC W/AUTO DIFF WBC: CPT | Performed by: INTERNAL MEDICINE

## 2021-10-19 ENCOUNTER — INFUSION (OUTPATIENT)
Dept: INFUSION THERAPY | Facility: HOSPITAL | Age: 56
End: 2021-10-19
Attending: INTERNAL MEDICINE
Payer: COMMERCIAL

## 2021-10-19 ENCOUNTER — OFFICE VISIT (OUTPATIENT)
Dept: HEMATOLOGY/ONCOLOGY | Facility: CLINIC | Age: 56
End: 2021-10-19
Payer: COMMERCIAL

## 2021-10-19 VITALS
DIASTOLIC BLOOD PRESSURE: 83 MMHG | BODY MASS INDEX: 26.4 KG/M2 | HEART RATE: 65 BPM | TEMPERATURE: 98 F | RESPIRATION RATE: 18 BRPM | OXYGEN SATURATION: 98 % | WEIGHT: 194.88 LBS | HEIGHT: 72 IN | SYSTOLIC BLOOD PRESSURE: 142 MMHG

## 2021-10-19 DIAGNOSIS — T45.1X5A IMMUNODEFICIENCY DUE TO CHEMOTHERAPY: ICD-10-CM

## 2021-10-19 DIAGNOSIS — Z79.899 IMMUNODEFICIENCY DUE TO CHEMOTHERAPY: ICD-10-CM

## 2021-10-19 DIAGNOSIS — T45.1X5A PANCYTOPENIA DUE TO ANTINEOPLASTIC CHEMOTHERAPY: ICD-10-CM

## 2021-10-19 DIAGNOSIS — C17.0 DUODENAL CANCER: Primary | ICD-10-CM

## 2021-10-19 DIAGNOSIS — I10 BENIGN HYPERTENSION: ICD-10-CM

## 2021-10-19 DIAGNOSIS — D84.821 IMMUNODEFICIENCY DUE TO CHEMOTHERAPY: ICD-10-CM

## 2021-10-19 DIAGNOSIS — G62.0 PERIPHERAL NEUROPATHY DUE TO CHEMOTHERAPY: ICD-10-CM

## 2021-10-19 DIAGNOSIS — T45.1X5A PERIPHERAL NEUROPATHY DUE TO CHEMOTHERAPY: ICD-10-CM

## 2021-10-19 DIAGNOSIS — D61.810 PANCYTOPENIA DUE TO ANTINEOPLASTIC CHEMOTHERAPY: ICD-10-CM

## 2021-10-19 PROCEDURE — 96413 CHEMO IV INFUSION 1 HR: CPT

## 2021-10-19 PROCEDURE — 3044F PR MOST RECENT HEMOGLOBIN A1C LEVEL <7.0%: ICD-10-PCS | Mod: CPTII,95,, | Performed by: INTERNAL MEDICINE

## 2021-10-19 PROCEDURE — 3044F HG A1C LEVEL LT 7.0%: CPT | Mod: CPTII,95,, | Performed by: INTERNAL MEDICINE

## 2021-10-19 PROCEDURE — 96411 CHEMO IV PUSH ADDL DRUG: CPT

## 2021-10-19 PROCEDURE — 96367 TX/PROPH/DG ADDL SEQ IV INF: CPT

## 2021-10-19 PROCEDURE — 96368 THER/DIAG CONCURRENT INF: CPT

## 2021-10-19 PROCEDURE — 99214 OFFICE O/P EST MOD 30 MIN: CPT | Mod: 25,95,, | Performed by: INTERNAL MEDICINE

## 2021-10-19 PROCEDURE — 96415 CHEMO IV INFUSION ADDL HR: CPT

## 2021-10-19 PROCEDURE — 25000003 PHARM REV CODE 250: Performed by: INTERNAL MEDICINE

## 2021-10-19 PROCEDURE — 96416 CHEMO PROLONG INFUSE W/PUMP: CPT

## 2021-10-19 PROCEDURE — 4010F ACE/ARB THERAPY RXD/TAKEN: CPT | Mod: CPTII,95,, | Performed by: INTERNAL MEDICINE

## 2021-10-19 PROCEDURE — 63600175 PHARM REV CODE 636 W HCPCS: Performed by: INTERNAL MEDICINE

## 2021-10-19 PROCEDURE — 4010F PR ACE/ARB THEARPY RXD/TAKEN: ICD-10-PCS | Mod: CPTII,95,, | Performed by: INTERNAL MEDICINE

## 2021-10-19 PROCEDURE — 99214 PR OFFICE/OUTPT VISIT, EST, LEVL IV, 30-39 MIN: ICD-10-PCS | Mod: 25,95,, | Performed by: INTERNAL MEDICINE

## 2021-10-19 PROCEDURE — 96375 TX/PRO/DX INJ NEW DRUG ADDON: CPT

## 2021-10-19 RX ORDER — HEPARIN 100 UNIT/ML
500 SYRINGE INTRAVENOUS
Status: CANCELLED | OUTPATIENT
Start: 2021-10-19

## 2021-10-19 RX ORDER — SODIUM CHLORIDE 0.9 % (FLUSH) 0.9 %
10 SYRINGE (ML) INJECTION
Status: CANCELLED | OUTPATIENT
Start: 2021-10-21

## 2021-10-19 RX ORDER — EPINEPHRINE 0.3 MG/.3ML
0.3 INJECTION SUBCUTANEOUS ONCE AS NEEDED
Status: CANCELLED | OUTPATIENT
Start: 2021-10-19

## 2021-10-19 RX ORDER — FLUOROURACIL 50 MG/ML
400 INJECTION, SOLUTION INTRAVENOUS
Status: CANCELLED | OUTPATIENT
Start: 2021-10-19

## 2021-10-19 RX ORDER — FLUOROURACIL 50 MG/ML
400 INJECTION, SOLUTION INTRAVENOUS
Status: COMPLETED | OUTPATIENT
Start: 2021-10-19 | End: 2021-10-19

## 2021-10-19 RX ORDER — HEPARIN 100 UNIT/ML
500 SYRINGE INTRAVENOUS
Status: CANCELLED | OUTPATIENT
Start: 2021-10-21

## 2021-10-19 RX ORDER — DIPHENHYDRAMINE HYDROCHLORIDE 50 MG/ML
50 INJECTION INTRAMUSCULAR; INTRAVENOUS ONCE AS NEEDED
Status: CANCELLED | OUTPATIENT
Start: 2021-10-19

## 2021-10-19 RX ORDER — SODIUM CHLORIDE 0.9 % (FLUSH) 0.9 %
10 SYRINGE (ML) INJECTION
Status: CANCELLED | OUTPATIENT
Start: 2021-10-19

## 2021-10-19 RX ADMIN — FLUOROURACIL 4945 MG: 50 INJECTION, SOLUTION INTRAVENOUS at 01:10

## 2021-10-19 RX ADMIN — DEXAMETHASONE SODIUM PHOSPHATE 0.25 MG: 4 INJECTION, SOLUTION INTRA-ARTICULAR; INTRALESIONAL; INTRAMUSCULAR; INTRAVENOUS; SOFT TISSUE at 10:10

## 2021-10-19 RX ADMIN — FLUOROURACIL 825 MG: 50 INJECTION, SOLUTION INTRAVENOUS at 01:10

## 2021-10-19 RX ADMIN — LEUCOVORIN CALCIUM 800 MG: 350 INJECTION, POWDER, LYOPHILIZED, FOR SUSPENSION INTRAMUSCULAR; INTRAVENOUS at 10:10

## 2021-10-19 RX ADMIN — OXALIPLATIN 175 MG: 100 INJECTION, SOLUTION, CONCENTRATE INTRAVENOUS at 10:10

## 2021-10-20 ENCOUNTER — PATIENT MESSAGE (OUTPATIENT)
Dept: SURGERY | Facility: CLINIC | Age: 56
End: 2021-10-20
Payer: COMMERCIAL

## 2021-10-21 ENCOUNTER — TELEPHONE (OUTPATIENT)
Dept: HEMATOLOGY/ONCOLOGY | Facility: CLINIC | Age: 56
End: 2021-10-21

## 2021-10-21 ENCOUNTER — PATIENT MESSAGE (OUTPATIENT)
Dept: HEMATOLOGY/ONCOLOGY | Facility: CLINIC | Age: 56
End: 2021-10-21
Payer: COMMERCIAL

## 2021-10-21 ENCOUNTER — INFUSION (OUTPATIENT)
Dept: INFUSION THERAPY | Facility: HOSPITAL | Age: 56
End: 2021-10-21
Attending: INTERNAL MEDICINE
Payer: COMMERCIAL

## 2021-10-21 VITALS
SYSTOLIC BLOOD PRESSURE: 140 MMHG | RESPIRATION RATE: 18 BRPM | OXYGEN SATURATION: 99 % | TEMPERATURE: 98 F | DIASTOLIC BLOOD PRESSURE: 88 MMHG | HEART RATE: 68 BPM

## 2021-10-21 DIAGNOSIS — C17.0 DUODENAL CANCER: Primary | ICD-10-CM

## 2021-10-21 PROCEDURE — 96377 APPLICATON ON-BODY INJECTOR: CPT

## 2021-10-21 PROCEDURE — 63600175 PHARM REV CODE 636 W HCPCS: Mod: JG | Performed by: INTERNAL MEDICINE

## 2021-10-21 RX ORDER — HEPARIN 100 UNIT/ML
500 SYRINGE INTRAVENOUS
Status: DISCONTINUED | OUTPATIENT
Start: 2021-10-21 | End: 2021-10-21 | Stop reason: HOSPADM

## 2021-10-21 RX ADMIN — HEPARIN 500 UNITS: 100 SYRINGE at 10:10

## 2021-10-21 RX ADMIN — PEGFILGRASTIM 6 MG: KIT SUBCUTANEOUS at 10:10

## 2021-11-09 ENCOUNTER — PATIENT MESSAGE (OUTPATIENT)
Dept: SURGERY | Facility: CLINIC | Age: 56
End: 2021-11-09
Payer: COMMERCIAL

## 2021-11-19 ENCOUNTER — TELEPHONE (OUTPATIENT)
Dept: RADIOLOGY | Facility: HOSPITAL | Age: 56
End: 2021-11-19
Payer: COMMERCIAL

## 2021-11-22 ENCOUNTER — HOSPITAL ENCOUNTER (OUTPATIENT)
Dept: RADIOLOGY | Facility: HOSPITAL | Age: 56
Discharge: HOME | End: 2021-11-22
Attending: INTERNAL MEDICINE
Payer: COMMERCIAL

## 2021-11-22 ENCOUNTER — INFUSION (OUTPATIENT)
Dept: INFUSION THERAPY | Facility: HOSPITAL | Age: 56
End: 2021-11-22
Attending: INTERNAL MEDICINE
Payer: COMMERCIAL

## 2021-11-22 ENCOUNTER — LAB VISIT (OUTPATIENT)
Dept: LAB | Facility: HOSPITAL | Age: 56
End: 2021-11-22
Attending: INTERNAL MEDICINE
Payer: COMMERCIAL

## 2021-11-22 VITALS
DIASTOLIC BLOOD PRESSURE: 92 MMHG | OXYGEN SATURATION: 99 % | RESPIRATION RATE: 18 BRPM | SYSTOLIC BLOOD PRESSURE: 168 MMHG | HEART RATE: 58 BPM | TEMPERATURE: 98 F

## 2021-11-22 DIAGNOSIS — T45.1X5A PANCYTOPENIA DUE TO ANTINEOPLASTIC CHEMOTHERAPY: ICD-10-CM

## 2021-11-22 DIAGNOSIS — C17.0 DUODENAL CANCER: ICD-10-CM

## 2021-11-22 DIAGNOSIS — D61.810 PANCYTOPENIA DUE TO ANTINEOPLASTIC CHEMOTHERAPY: ICD-10-CM

## 2021-11-22 DIAGNOSIS — D84.821 IMMUNODEFICIENCY DUE TO CHEMOTHERAPY: ICD-10-CM

## 2021-11-22 DIAGNOSIS — C17.0 DUODENAL CANCER: Primary | ICD-10-CM

## 2021-11-22 DIAGNOSIS — Z79.899 IMMUNODEFICIENCY DUE TO CHEMOTHERAPY: ICD-10-CM

## 2021-11-22 DIAGNOSIS — T45.1X5A IMMUNODEFICIENCY DUE TO CHEMOTHERAPY: ICD-10-CM

## 2021-11-22 LAB
ALBUMIN SERPL BCP-MCNC: 4.1 G/DL (ref 3.5–5.2)
ALP SERPL-CCNC: 140 U/L (ref 55–135)
ALT SERPL W/O P-5'-P-CCNC: 43 U/L (ref 10–44)
ANION GAP SERPL CALC-SCNC: 10 MMOL/L (ref 8–16)
AST SERPL-CCNC: 57 U/L (ref 10–40)
BASOPHILS # BLD AUTO: 0.02 K/UL (ref 0–0.2)
BASOPHILS NFR BLD: 0.5 % (ref 0–1.9)
BILIRUB SERPL-MCNC: 0.9 MG/DL (ref 0.1–1)
BUN SERPL-MCNC: 10 MG/DL (ref 6–20)
CALCIUM SERPL-MCNC: 8.5 MG/DL (ref 8.7–10.5)
CHLORIDE SERPL-SCNC: 107 MMOL/L (ref 95–110)
CO2 SERPL-SCNC: 24 MMOL/L (ref 23–29)
CREAT SERPL-MCNC: 0.7 MG/DL (ref 0.5–1.4)
DIFFERENTIAL METHOD: ABNORMAL
EOSINOPHIL # BLD AUTO: 0 K/UL (ref 0–0.5)
EOSINOPHIL NFR BLD: 1 % (ref 0–8)
ERYTHROCYTE [DISTWIDTH] IN BLOOD BY AUTOMATED COUNT: 13.2 % (ref 11.5–14.5)
EST. GFR  (AFRICAN AMERICAN): >60 ML/MIN/1.73 M^2
EST. GFR  (NON AFRICAN AMERICAN): >60 ML/MIN/1.73 M^2
GLUCOSE SERPL-MCNC: 108 MG/DL (ref 70–110)
HCT VFR BLD AUTO: 34.7 % (ref 37–48.5)
HGB BLD-MCNC: 11.5 G/DL (ref 12–16)
IMM GRANULOCYTES # BLD AUTO: 0 K/UL (ref 0–0.04)
IMM GRANULOCYTES NFR BLD AUTO: 0 % (ref 0–0.5)
LYMPHOCYTES # BLD AUTO: 0.9 K/UL (ref 1–4.8)
LYMPHOCYTES NFR BLD: 22 % (ref 18–48)
MCH RBC QN AUTO: 30.4 PG (ref 27–31)
MCHC RBC AUTO-ENTMCNC: 33.1 G/DL (ref 32–36)
MCV RBC AUTO: 92 FL (ref 82–98)
MONOCYTES # BLD AUTO: 0.4 K/UL (ref 0.3–1)
MONOCYTES NFR BLD: 9.6 % (ref 4–15)
NEUTROPHILS # BLD AUTO: 2.6 K/UL (ref 1.8–7.7)
NEUTROPHILS NFR BLD: 66.9 % (ref 38–73)
NRBC BLD-RTO: 0 /100 WBC
PLATELET # BLD AUTO: 115 K/UL (ref 150–450)
PMV BLD AUTO: 10.5 FL (ref 9.2–12.9)
POTASSIUM SERPL-SCNC: 3.9 MMOL/L (ref 3.5–5.1)
PROT SERPL-MCNC: 6.2 G/DL (ref 6–8.4)
RBC # BLD AUTO: 3.78 M/UL (ref 4–5.4)
SODIUM SERPL-SCNC: 141 MMOL/L (ref 136–145)
WBC # BLD AUTO: 3.87 K/UL (ref 3.9–12.7)

## 2021-11-22 PROCEDURE — 36415 COLL VENOUS BLD VENIPUNCTURE: CPT | Performed by: INTERNAL MEDICINE

## 2021-11-22 PROCEDURE — 85025 COMPLETE CBC W/AUTO DIFF WBC: CPT | Performed by: INTERNAL MEDICINE

## 2021-11-22 PROCEDURE — 71260 CT CHEST ABDOMEN PELVIS WITH CONTRAST (XPD): ICD-10-PCS | Mod: 26,,, | Performed by: RADIOLOGY

## 2021-11-22 PROCEDURE — 74177 CT ABD & PELVIS W/CONTRAST: CPT | Mod: 26,,, | Performed by: RADIOLOGY

## 2021-11-22 PROCEDURE — 74177 CT ABD & PELVIS W/CONTRAST: CPT | Mod: TC

## 2021-11-22 PROCEDURE — 25000003 PHARM REV CODE 250: Performed by: INTERNAL MEDICINE

## 2021-11-22 PROCEDURE — A4216 STERILE WATER/SALINE, 10 ML: HCPCS | Performed by: INTERNAL MEDICINE

## 2021-11-22 PROCEDURE — 71260 CT THORAX DX C+: CPT | Mod: TC

## 2021-11-22 PROCEDURE — 74177 CT CHEST ABDOMEN PELVIS WITH CONTRAST (XPD): ICD-10-PCS | Mod: 26,,, | Performed by: RADIOLOGY

## 2021-11-22 PROCEDURE — A9698 NON-RAD CONTRAST MATERIALNOC: HCPCS | Performed by: INTERNAL MEDICINE

## 2021-11-22 PROCEDURE — 80053 COMPREHEN METABOLIC PANEL: CPT | Performed by: INTERNAL MEDICINE

## 2021-11-22 PROCEDURE — 71260 CT THORAX DX C+: CPT | Mod: 26,,, | Performed by: RADIOLOGY

## 2021-11-22 PROCEDURE — 63600175 PHARM REV CODE 636 W HCPCS: Performed by: INTERNAL MEDICINE

## 2021-11-22 PROCEDURE — 36591 DRAW BLOOD OFF VENOUS DEVICE: CPT

## 2021-11-22 PROCEDURE — 25500020 PHARM REV CODE 255: Performed by: INTERNAL MEDICINE

## 2021-11-22 RX ORDER — HEPARIN 100 UNIT/ML
500 SYRINGE INTRAVENOUS
Status: DISCONTINUED | OUTPATIENT
Start: 2021-11-22 | End: 2021-11-22 | Stop reason: HOSPADM

## 2021-11-22 RX ORDER — HEPARIN 100 UNIT/ML
500 SYRINGE INTRAVENOUS
Status: CANCELLED | OUTPATIENT
Start: 2021-11-22

## 2021-11-22 RX ORDER — SODIUM CHLORIDE 0.9 % (FLUSH) 0.9 %
10 SYRINGE (ML) INJECTION
Status: DISCONTINUED | OUTPATIENT
Start: 2021-11-22 | End: 2021-11-22 | Stop reason: HOSPADM

## 2021-11-22 RX ORDER — SODIUM CHLORIDE 0.9 % (FLUSH) 0.9 %
10 SYRINGE (ML) INJECTION
Status: CANCELLED | OUTPATIENT
Start: 2021-11-22

## 2021-11-22 RX ADMIN — SODIUM CHLORIDE, PRESERVATIVE FREE 10 ML: 5 INJECTION INTRAVENOUS at 09:11

## 2021-11-22 RX ADMIN — IOHEXOL 1000 ML: 12 SOLUTION ORAL at 07:11

## 2021-11-22 RX ADMIN — IOHEXOL 100 ML: 350 INJECTION, SOLUTION INTRAVENOUS at 09:11

## 2021-11-22 RX ADMIN — HEPARIN 500 UNITS: 100 SYRINGE at 09:11

## 2021-11-23 ENCOUNTER — PATIENT MESSAGE (OUTPATIENT)
Dept: SURGICAL ONCOLOGY | Facility: CLINIC | Age: 56
End: 2021-11-23
Payer: COMMERCIAL

## 2021-11-23 ENCOUNTER — PATIENT MESSAGE (OUTPATIENT)
Dept: HEMATOLOGY/ONCOLOGY | Facility: CLINIC | Age: 56
End: 2021-11-23
Payer: COMMERCIAL

## 2021-11-24 DIAGNOSIS — C17.0 DUODENAL CANCER: Primary | ICD-10-CM

## 2021-11-26 ENCOUNTER — OFFICE VISIT (OUTPATIENT)
Dept: SURGICAL ONCOLOGY | Facility: CLINIC | Age: 56
End: 2021-11-26
Payer: COMMERCIAL

## 2021-11-26 VITALS
SYSTOLIC BLOOD PRESSURE: 142 MMHG | DIASTOLIC BLOOD PRESSURE: 88 MMHG | BODY MASS INDEX: 26.46 KG/M2 | HEART RATE: 61 BPM | WEIGHT: 195.13 LBS | TEMPERATURE: 97 F

## 2021-11-26 DIAGNOSIS — C17.0 DUODENAL CANCER: Primary | ICD-10-CM

## 2021-11-26 PROCEDURE — 99213 PR OFFICE/OUTPT VISIT, EST, LEVL III, 20-29 MIN: ICD-10-PCS | Mod: S$GLB,,, | Performed by: SURGERY

## 2021-11-26 PROCEDURE — 4010F ACE/ARB THERAPY RXD/TAKEN: CPT | Mod: CPTII,S$GLB,, | Performed by: SURGERY

## 2021-11-26 PROCEDURE — 99213 OFFICE O/P EST LOW 20 MIN: CPT | Mod: S$GLB,,, | Performed by: SURGERY

## 2021-11-26 PROCEDURE — 99999 PR PBB SHADOW E&M-EST. PATIENT-LVL II: ICD-10-PCS | Mod: PBBFAC,,, | Performed by: SURGERY

## 2021-11-26 PROCEDURE — 99999 PR PBB SHADOW E&M-EST. PATIENT-LVL II: CPT | Mod: PBBFAC,,, | Performed by: SURGERY

## 2021-11-26 PROCEDURE — 4010F PR ACE/ARB THEARPY RXD/TAKEN: ICD-10-PCS | Mod: CPTII,S$GLB,, | Performed by: SURGERY

## 2021-11-27 ENCOUNTER — PATIENT MESSAGE (OUTPATIENT)
Dept: HEMATOLOGY/ONCOLOGY | Facility: CLINIC | Age: 56
End: 2021-11-27
Payer: COMMERCIAL

## 2021-11-30 ENCOUNTER — OFFICE VISIT (OUTPATIENT)
Dept: HEMATOLOGY/ONCOLOGY | Facility: CLINIC | Age: 56
End: 2021-11-30
Payer: COMMERCIAL

## 2021-11-30 ENCOUNTER — TELEPHONE (OUTPATIENT)
Dept: SURGERY | Facility: CLINIC | Age: 56
End: 2021-11-30
Payer: COMMERCIAL

## 2021-11-30 DIAGNOSIS — Z90.411 HISTORY OF PARTIAL PANCREATECTOMY: ICD-10-CM

## 2021-11-30 DIAGNOSIS — C17.0 DUODENAL CANCER: Primary | ICD-10-CM

## 2021-11-30 DIAGNOSIS — T45.1X5A IMMUNODEFICIENCY DUE TO CHEMOTHERAPY: ICD-10-CM

## 2021-11-30 DIAGNOSIS — T45.1X5A PERIPHERAL NEUROPATHY DUE TO CHEMOTHERAPY: ICD-10-CM

## 2021-11-30 DIAGNOSIS — Z79.899 IMMUNODEFICIENCY DUE TO CHEMOTHERAPY: ICD-10-CM

## 2021-11-30 DIAGNOSIS — D84.821 IMMUNODEFICIENCY DUE TO CHEMOTHERAPY: ICD-10-CM

## 2021-11-30 DIAGNOSIS — Z15.89 MONOALLELIC MUTATION OF MUTYH GENE: ICD-10-CM

## 2021-11-30 DIAGNOSIS — G62.0 PERIPHERAL NEUROPATHY DUE TO CHEMOTHERAPY: ICD-10-CM

## 2021-11-30 PROCEDURE — 4010F ACE/ARB THERAPY RXD/TAKEN: CPT | Mod: CPTII,95,, | Performed by: INTERNAL MEDICINE

## 2021-11-30 PROCEDURE — 4010F PR ACE/ARB THEARPY RXD/TAKEN: ICD-10-PCS | Mod: CPTII,95,, | Performed by: INTERNAL MEDICINE

## 2021-11-30 PROCEDURE — 99214 OFFICE O/P EST MOD 30 MIN: CPT | Mod: 95,,, | Performed by: INTERNAL MEDICINE

## 2021-11-30 PROCEDURE — 99214 PR OFFICE/OUTPT VISIT, EST, LEVL IV, 30-39 MIN: ICD-10-PCS | Mod: 95,,, | Performed by: INTERNAL MEDICINE

## 2021-12-01 ENCOUNTER — PATIENT MESSAGE (OUTPATIENT)
Dept: HEMATOLOGY/ONCOLOGY | Facility: CLINIC | Age: 56
End: 2021-12-01
Payer: COMMERCIAL

## 2021-12-20 ENCOUNTER — TELEPHONE (OUTPATIENT)
Dept: SURGERY | Facility: CLINIC | Age: 56
End: 2021-12-20
Payer: COMMERCIAL

## 2021-12-20 ENCOUNTER — PATIENT MESSAGE (OUTPATIENT)
Dept: SURGICAL ONCOLOGY | Facility: CLINIC | Age: 56
End: 2021-12-20
Payer: COMMERCIAL

## 2021-12-20 ENCOUNTER — PATIENT MESSAGE (OUTPATIENT)
Dept: HEMATOLOGY/ONCOLOGY | Facility: CLINIC | Age: 56
End: 2021-12-20
Payer: COMMERCIAL

## 2021-12-21 ENCOUNTER — TELEPHONE (OUTPATIENT)
Dept: HEMATOLOGY/ONCOLOGY | Facility: CLINIC | Age: 56
End: 2021-12-21
Payer: COMMERCIAL

## 2021-12-23 ENCOUNTER — SOCIAL WORK (OUTPATIENT)
Dept: HEMATOLOGY/ONCOLOGY | Facility: CLINIC | Age: 56
End: 2021-12-23
Payer: COMMERCIAL

## 2021-12-28 ENCOUNTER — PATIENT MESSAGE (OUTPATIENT)
Dept: SURGERY | Facility: CLINIC | Age: 56
End: 2021-12-28
Payer: COMMERCIAL

## 2021-12-29 ENCOUNTER — PROCEDURE VISIT (OUTPATIENT)
Dept: SURGERY | Facility: CLINIC | Age: 56
End: 2021-12-29
Payer: COMMERCIAL

## 2021-12-29 VITALS
SYSTOLIC BLOOD PRESSURE: 147 MMHG | TEMPERATURE: 97 F | WEIGHT: 191.56 LBS | HEART RATE: 68 BPM | DIASTOLIC BLOOD PRESSURE: 88 MMHG | BODY MASS INDEX: 25.98 KG/M2

## 2021-12-29 DIAGNOSIS — C17.0 DUODENAL CANCER: Primary | ICD-10-CM

## 2021-12-29 DIAGNOSIS — Z95.828 PORT-A-CATH IN PLACE: ICD-10-CM

## 2021-12-29 PROCEDURE — 36590 PR REMOVAL TUNNELED CV CATH W SUBQ PORT OR PUMP: ICD-10-PCS | Mod: S$GLB,,, | Performed by: SURGERY

## 2021-12-29 PROCEDURE — 36590 REMOVAL TUNNELED CV CATH: CPT | Mod: S$GLB,,, | Performed by: SURGERY

## 2022-01-03 NOTE — PROGRESS NOTES
NICOLE faxed completed [employer] ppwk to requested number on today's date. Will continue to monitor needs.

## 2022-02-18 ENCOUNTER — PATIENT MESSAGE (OUTPATIENT)
Dept: HEMATOLOGY/ONCOLOGY | Facility: CLINIC | Age: 57
End: 2022-02-18
Payer: COMMERCIAL

## 2022-03-02 DIAGNOSIS — C17.0 DUODENAL CANCER: Primary | ICD-10-CM

## 2022-03-07 ENCOUNTER — HOSPITAL ENCOUNTER (OUTPATIENT)
Dept: RADIOLOGY | Facility: HOSPITAL | Age: 57
Discharge: HOME | End: 2022-03-07
Attending: INTERNAL MEDICINE
Payer: COMMERCIAL

## 2022-03-07 ENCOUNTER — LAB VISIT (OUTPATIENT)
Dept: LAB | Facility: HOSPITAL | Age: 57
End: 2022-03-07
Attending: INTERNAL MEDICINE
Payer: COMMERCIAL

## 2022-03-07 DIAGNOSIS — C17.0 DUODENAL CANCER: ICD-10-CM

## 2022-03-07 LAB
ALBUMIN SERPL BCP-MCNC: 4.5 G/DL (ref 3.5–5.2)
ALP SERPL-CCNC: 150 U/L (ref 55–135)
ALT SERPL W/O P-5'-P-CCNC: 54 U/L (ref 10–44)
ANION GAP SERPL CALC-SCNC: 11 MMOL/L (ref 8–16)
AST SERPL-CCNC: 48 U/L (ref 10–40)
BASOPHILS # BLD AUTO: 0.01 K/UL (ref 0–0.2)
BASOPHILS NFR BLD: 0.2 % (ref 0–1.9)
BILIRUB SERPL-MCNC: 0.8 MG/DL (ref 0.1–1)
BUN SERPL-MCNC: 11 MG/DL (ref 6–20)
CALCIUM SERPL-MCNC: 9.6 MG/DL (ref 8.7–10.5)
CHLORIDE SERPL-SCNC: 107 MMOL/L (ref 95–110)
CO2 SERPL-SCNC: 26 MMOL/L (ref 23–29)
CREAT SERPL-MCNC: 0.9 MG/DL (ref 0.5–1.4)
DIFFERENTIAL METHOD: ABNORMAL
EOSINOPHIL # BLD AUTO: 0.1 K/UL (ref 0–0.5)
EOSINOPHIL NFR BLD: 1.9 % (ref 0–8)
ERYTHROCYTE [DISTWIDTH] IN BLOOD BY AUTOMATED COUNT: 12.2 % (ref 11.5–14.5)
EST. GFR  (AFRICAN AMERICAN): >60 ML/MIN/1.73 M^2
EST. GFR  (NON AFRICAN AMERICAN): >60 ML/MIN/1.73 M^2
GLUCOSE SERPL-MCNC: 115 MG/DL (ref 70–110)
HCT VFR BLD AUTO: 39.9 % (ref 37–48.5)
HGB BLD-MCNC: 13.2 G/DL (ref 12–16)
IMM GRANULOCYTES # BLD AUTO: 0 K/UL (ref 0–0.04)
IMM GRANULOCYTES NFR BLD AUTO: 0 % (ref 0–0.5)
LYMPHOCYTES # BLD AUTO: 1.4 K/UL (ref 1–4.8)
LYMPHOCYTES NFR BLD: 32.5 % (ref 18–48)
MCH RBC QN AUTO: 29.3 PG (ref 27–31)
MCHC RBC AUTO-ENTMCNC: 33.1 G/DL (ref 32–36)
MCV RBC AUTO: 89 FL (ref 82–98)
MONOCYTES # BLD AUTO: 0.3 K/UL (ref 0.3–1)
MONOCYTES NFR BLD: 7.6 % (ref 4–15)
NEUTROPHILS # BLD AUTO: 2.4 K/UL (ref 1.8–7.7)
NEUTROPHILS NFR BLD: 57.8 % (ref 38–73)
NRBC BLD-RTO: 0 /100 WBC
PLATELET # BLD AUTO: 146 K/UL (ref 150–450)
PMV BLD AUTO: 10 FL (ref 9.2–12.9)
POTASSIUM SERPL-SCNC: 4.8 MMOL/L (ref 3.5–5.1)
PROT SERPL-MCNC: 7.3 G/DL (ref 6–8.4)
RBC # BLD AUTO: 4.51 M/UL (ref 4–5.4)
SODIUM SERPL-SCNC: 144 MMOL/L (ref 136–145)
WBC # BLD AUTO: 4.22 K/UL (ref 3.9–12.7)

## 2022-03-07 PROCEDURE — 80053 COMPREHEN METABOLIC PANEL: CPT | Performed by: INTERNAL MEDICINE

## 2022-03-07 PROCEDURE — 74177 CT ABD & PELVIS W/CONTRAST: CPT | Mod: 26,,, | Performed by: RADIOLOGY

## 2022-03-07 PROCEDURE — 71260 CT CHEST ABDOMEN PELVIS WITH CONTRAST (XPD): ICD-10-PCS | Mod: 26,,, | Performed by: RADIOLOGY

## 2022-03-07 PROCEDURE — 25500020 PHARM REV CODE 255: Performed by: INTERNAL MEDICINE

## 2022-03-07 PROCEDURE — 36415 COLL VENOUS BLD VENIPUNCTURE: CPT | Performed by: INTERNAL MEDICINE

## 2022-03-07 PROCEDURE — 74177 CT CHEST ABDOMEN PELVIS WITH CONTRAST (XPD): ICD-10-PCS | Mod: 26,,, | Performed by: RADIOLOGY

## 2022-03-07 PROCEDURE — 85025 COMPLETE CBC W/AUTO DIFF WBC: CPT | Performed by: INTERNAL MEDICINE

## 2022-03-07 PROCEDURE — 71260 CT THORAX DX C+: CPT | Mod: 26,,, | Performed by: RADIOLOGY

## 2022-03-07 PROCEDURE — 71260 CT THORAX DX C+: CPT | Mod: TC

## 2022-03-07 PROCEDURE — 74177 CT ABD & PELVIS W/CONTRAST: CPT | Mod: TC

## 2022-03-07 RX ADMIN — IOHEXOL 100 ML: 350 INJECTION, SOLUTION INTRAVENOUS at 12:03

## 2022-03-07 RX ADMIN — IOHEXOL 30 ML: 350 INJECTION, SOLUTION INTRAVENOUS at 11:03

## 2022-03-08 ENCOUNTER — PATIENT MESSAGE (OUTPATIENT)
Dept: SURGICAL ONCOLOGY | Facility: CLINIC | Age: 57
End: 2022-03-08
Payer: COMMERCIAL

## 2022-03-08 ENCOUNTER — TELEPHONE (OUTPATIENT)
Dept: SURGERY | Facility: CLINIC | Age: 57
End: 2022-03-08
Payer: COMMERCIAL

## 2022-03-08 NOTE — TELEPHONE ENCOUNTER
Called pt regarding portal message. Pt requesting appt in [place]. Pt provided with all appt details. Pt also requesting Dr. Díaz to review recent CT scan. Instructed pt RN will notify pt of her request. Pt verbalized understanding to all of the above.

## 2022-03-15 ENCOUNTER — OFFICE VISIT (OUTPATIENT)
Dept: HEMATOLOGY/ONCOLOGY | Facility: CLINIC | Age: 57
End: 2022-03-15
Payer: COMMERCIAL

## 2022-03-15 DIAGNOSIS — G62.0 PERIPHERAL NEUROPATHY DUE TO CHEMOTHERAPY: ICD-10-CM

## 2022-03-15 DIAGNOSIS — Z15.89 MONOALLELIC MUTATION OF MUTYH GENE: ICD-10-CM

## 2022-03-15 DIAGNOSIS — Z90.411 HISTORY OF PARTIAL PANCREATECTOMY: ICD-10-CM

## 2022-03-15 DIAGNOSIS — C17.0 DUODENAL CANCER: Primary | ICD-10-CM

## 2022-03-15 DIAGNOSIS — T45.1X5A PERIPHERAL NEUROPATHY DUE TO CHEMOTHERAPY: ICD-10-CM

## 2022-03-15 PROBLEM — D84.821 IMMUNODEFICIENCY DUE TO CHEMOTHERAPY: Status: RESOLVED | Noted: 2021-04-27 | Resolved: 2022-03-15

## 2022-03-15 PROBLEM — Z79.899 IMMUNODEFICIENCY DUE TO CHEMOTHERAPY: Status: RESOLVED | Noted: 2021-04-27 | Resolved: 2022-03-15

## 2022-03-15 PROBLEM — Z79.69 IMMUNODEFICIENCY DUE TO CHEMOTHERAPY: Status: RESOLVED | Noted: 2021-04-27 | Resolved: 2022-03-15

## 2022-03-15 PROCEDURE — 4010F ACE/ARB THERAPY RXD/TAKEN: CPT | Mod: CPTII,95,, | Performed by: INTERNAL MEDICINE

## 2022-03-15 PROCEDURE — 1159F PR MEDICATION LIST DOCUMENTED IN MEDICAL RECORD: ICD-10-PCS | Mod: CPTII,95,, | Performed by: INTERNAL MEDICINE

## 2022-03-15 PROCEDURE — 1160F RVW MEDS BY RX/DR IN RCRD: CPT | Mod: CPTII,95,, | Performed by: INTERNAL MEDICINE

## 2022-03-15 PROCEDURE — 99214 OFFICE O/P EST MOD 30 MIN: CPT | Mod: 95,,, | Performed by: INTERNAL MEDICINE

## 2022-03-15 PROCEDURE — 4010F PR ACE/ARB THEARPY RXD/TAKEN: ICD-10-PCS | Mod: CPTII,95,, | Performed by: INTERNAL MEDICINE

## 2022-03-15 PROCEDURE — 1160F PR REVIEW ALL MEDS BY PRESCRIBER/CLIN PHARMACIST DOCUMENTED: ICD-10-PCS | Mod: CPTII,95,, | Performed by: INTERNAL MEDICINE

## 2022-03-15 PROCEDURE — 99214 PR OFFICE/OUTPT VISIT, EST, LEVL IV, 30-39 MIN: ICD-10-PCS | Mod: 95,,, | Performed by: INTERNAL MEDICINE

## 2022-03-15 PROCEDURE — 1159F MED LIST DOCD IN RCRD: CPT | Mod: CPTII,95,, | Performed by: INTERNAL MEDICINE

## 2022-03-15 NOTE — PROGRESS NOTES
Subjective:       Patient ID: Karen Gutierrez is a 56 y.o. female.    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  Patient returns for review of laboratory studies and imaging studies ECOG status 1 video visit  Past Medical History:   Diagnosis Date    Cancer     duodenal cancer    Hypertension     Hypotension, iatrogenic     Mitral valve prolapse      Family History   Problem Relation Age of Onset    Ovarian cancer Mother     Thyroid cancer Mother     Atrial fibrillation Mother     Stroke Mother     Hyperlipidemia Mother     Diabetes Mother     Bladder Cancer Father     Hypertension Father     Diabetes Father     Stroke Maternal Grandfather      Social History     Socioeconomic History    Marital status:    Tobacco Use    Smoking status: Never Smoker    Smokeless tobacco: Never Used   Substance and Sexual Activity    Alcohol use: Not Currently    Drug use: Never     Past Surgical History:   Procedure Laterality Date    BUNIONECTOMY Left     HYSTERECTOMY  2000    INSERTION OF TUNNELED CENTRAL VENOUS CATHETER (CVC) WITH SUBCUTANEOUS PORT N/A 3/26/2021    Procedure: [id number]-CATH;  Surgeon: Lauren Abraham MD;  Location: [facility] OR;  Service: General;  Laterality: N/A;    LITHOTRIPSY      PLACEMENT OF JEJUNOSTOMY TUBE  2/18/2021    Procedure: INSERTION, JEJUNOSTOMY TUBE;  Surgeon: Hitesh Díaz MD;  Location: [facility] OR [address] [place] [address];  Service: General;;    WHIPPLE PROCEDURE N/A 2/18/2021    Procedure: WHIPPLE PROCEDURE;  Surgeon: Hitesh Díaz MD;  Location: [facility] OR 2ND FLR;  Service: General;  Laterality: N/A;       Labs:  Lab Results   Component Value Date    WBC 4.22 03/07/2022    HGB 13.2 03/07/2022    HCT 39.9 03/07/2022    MCV 89 03/07/2022     (L) 03/07/2022     BMP  Lab Results   Component Value Date     03/07/2022    K 4.8 03/07/2022     03/07/2022    CO2 26  03/07/2022    BUN 11 03/07/2022    CREATININE 0.9 03/07/2022    CALCIUM 9.6 03/07/2022    ANIONGAP 11 03/07/2022    ESTGFRAFRICA >60 03/07/2022    EGFRNONAA >60 03/07/2022     Lab Results   Component Value Date    ALT 54 (H) 03/07/2022    AST 48 (H) 03/07/2022    ALKPHOS 150 (H) 03/07/2022    BILITOT 0.8 03/07/2022       Lab Results   Component Value Date    IRON 42 05/10/2021    TIBC 404 05/10/2021    FERRITIN 345 (H) 05/10/2021     No results found for: YVGHMRZK16  No results found for: FOLATE  No results found for: TSH      Review of Systems   Constitutional: Negative for activity change, appetite change, chills, diaphoresis, fatigue, fever and unexpected weight change.   HENT: Negative for congestion, dental problem, drooling, ear discharge, ear pain, facial swelling, hearing loss, mouth sores, nosebleeds, postnasal drip, rhinorrhea, sinus pressure, sneezing, sore throat, tinnitus, trouble swallowing and voice change.    Eyes: Negative for photophobia, pain, discharge, redness, itching and visual disturbance.   Respiratory: Negative for cough, choking, chest tightness, shortness of breath, wheezing and stridor.    Cardiovascular: Negative for chest pain, palpitations and leg swelling.   Gastrointestinal: Negative for abdominal distention, abdominal pain, anal bleeding, blood in stool, constipation, diarrhea, nausea, rectal pain and vomiting.   Endocrine: Negative for cold intolerance, heat intolerance, polydipsia, polyphagia and polyuria.   Genitourinary: Negative for decreased urine volume, difficulty urinating, dyspareunia, dysuria, enuresis, flank pain, frequency, genital sores, hematuria, menstrual problem, pelvic pain, urgency, vaginal bleeding, vaginal discharge and vaginal pain.   Musculoskeletal: Negative for arthralgias, back pain, gait problem, joint swelling, myalgias, neck pain and neck stiffness.   Skin: Negative for color change, pallor and rash.   Allergic/Immunologic: Negative for environmental  allergies, food allergies and immunocompromised state.   Neurological: Positive for numbness. Negative for dizziness, tremors, seizures, syncope, facial asymmetry, speech difficulty, weakness, light-headedness and headaches.        Grade 1 peripheral neuropathy   Hematological: Negative for adenopathy. Does not bruise/bleed easily.   Psychiatric/Behavioral: Negative for agitation, behavioral problems, confusion, decreased concentration, dysphoric mood, hallucinations, self-injury, sleep disturbance and suicidal ideas. The patient is not nervous/anxious and is not hyperactive.        Objective:      Physical Exam  Constitutional:       Appearance: Normal appearance.             Assessment:      1. Duodenal cancer    2. s/p partial pancreatectomy    3. Peripheral neuropathy due to chemotherapy    4. Monoallelic mutation of MUTYH gene           Plan:     The patient location is:  home  The chief complaint leading to consultation is:  Duodenal cancer    Visit type:  Synchronous audio video    Face to Face time with patient: 25 minutes of total time spent on the encounter, which includes face to face time and non-face to face time preparing to see the patient (eg, review of tests), Obtaining and/or reviewing separately obtained history, Documenting clinical information in the electronic or other health record, Independently interpreting results (not separately reported) and communicating results to the patient/family/caregiver, or Care coordination (not separately reported).         Each patient to whom he or she provides medical services by telemedicine is:  (1) informed of the relationship between the physician and patient and the respective role of any other health care provider with respect to management of the patient; and (2) notified that he or she may decline to receive medical services by telemedicine and may withdraw from such care at any time.    Notes:  Reviewed laboratory studies normal findings status post  chemotherapy patient is improving symptomatology with grade 1 peripheral neuropathy.  Repeat imaging and labs in 6 months.  Discussed the fact with mutational status recommend colonoscopy she states she had 1 2 years ago and will have 1 again in 3 years.  No polyps found.  Will discuss further in further follow-up discussed implications answered questions with her        Enzo Daley Jr, MD FACP

## 2022-03-25 ENCOUNTER — OFFICE VISIT (OUTPATIENT)
Dept: SURGICAL ONCOLOGY | Facility: CLINIC | Age: 57
End: 2022-03-25
Payer: COMMERCIAL

## 2022-03-25 VITALS
WEIGHT: 179.25 LBS | TEMPERATURE: 97 F | SYSTOLIC BLOOD PRESSURE: 144 MMHG | BODY MASS INDEX: 24.31 KG/M2 | DIASTOLIC BLOOD PRESSURE: 90 MMHG | HEART RATE: 65 BPM

## 2022-03-25 DIAGNOSIS — K43.2 INCISIONAL HERNIA, WITHOUT OBSTRUCTION OR GANGRENE: ICD-10-CM

## 2022-03-25 DIAGNOSIS — Z90.411 HISTORY OF PARTIAL PANCREATECTOMY: Primary | ICD-10-CM

## 2022-03-25 DIAGNOSIS — C17.0 DUODENAL CANCER: Primary | ICD-10-CM

## 2022-03-25 PROCEDURE — 3077F SYST BP >= 140 MM HG: CPT | Mod: CPTII,S$GLB,, | Performed by: SURGERY

## 2022-03-25 PROCEDURE — 3008F BODY MASS INDEX DOCD: CPT | Mod: CPTII,S$GLB,, | Performed by: SURGERY

## 2022-03-25 PROCEDURE — 99213 PR OFFICE/OUTPT VISIT, EST, LEVL III, 20-29 MIN: ICD-10-PCS | Mod: S$GLB,,, | Performed by: SURGERY

## 2022-03-25 PROCEDURE — 99999 PR PBB SHADOW E&M-EST. PATIENT-LVL IV: ICD-10-PCS | Mod: PBBFAC,,, | Performed by: SURGERY

## 2022-03-25 PROCEDURE — 1159F PR MEDICATION LIST DOCUMENTED IN MEDICAL RECORD: ICD-10-PCS | Mod: CPTII,S$GLB,, | Performed by: SURGERY

## 2022-03-25 PROCEDURE — 1160F PR REVIEW ALL MEDS BY PRESCRIBER/CLIN PHARMACIST DOCUMENTED: ICD-10-PCS | Mod: CPTII,S$GLB,, | Performed by: SURGERY

## 2022-03-25 PROCEDURE — 3077F PR MOST RECENT SYSTOLIC BLOOD PRESSURE >= 140 MM HG: ICD-10-PCS | Mod: CPTII,S$GLB,, | Performed by: SURGERY

## 2022-03-25 PROCEDURE — 4010F ACE/ARB THERAPY RXD/TAKEN: CPT | Mod: CPTII,S$GLB,, | Performed by: SURGERY

## 2022-03-25 PROCEDURE — 99999 PR PBB SHADOW E&M-EST. PATIENT-LVL IV: CPT | Mod: PBBFAC,,, | Performed by: SURGERY

## 2022-03-25 PROCEDURE — 99213 OFFICE O/P EST LOW 20 MIN: CPT | Mod: S$GLB,,, | Performed by: SURGERY

## 2022-03-25 PROCEDURE — 3008F PR BODY MASS INDEX (BMI) DOCUMENTED: ICD-10-PCS | Mod: CPTII,S$GLB,, | Performed by: SURGERY

## 2022-03-25 PROCEDURE — 4010F PR ACE/ARB THEARPY RXD/TAKEN: ICD-10-PCS | Mod: CPTII,S$GLB,, | Performed by: SURGERY

## 2022-03-25 PROCEDURE — 1159F MED LIST DOCD IN RCRD: CPT | Mod: CPTII,S$GLB,, | Performed by: SURGERY

## 2022-03-25 PROCEDURE — 1160F RVW MEDS BY RX/DR IN RCRD: CPT | Mod: CPTII,S$GLB,, | Performed by: SURGERY

## 2022-03-25 PROCEDURE — 3080F PR MOST RECENT DIASTOLIC BLOOD PRESSURE >= 90 MM HG: ICD-10-PCS | Mod: CPTII,S$GLB,, | Performed by: SURGERY

## 2022-03-25 PROCEDURE — 3080F DIAST BP >= 90 MM HG: CPT | Mod: CPTII,S$GLB,, | Performed by: SURGERY

## 2022-03-25 RX ORDER — PANCRELIPASE 24000; 76000; 120000 [USP'U]/1; [USP'U]/1; [USP'U]/1
2 CAPSULE, DELAYED RELEASE PELLETS ORAL
Qty: 240 CAPSULE | Refills: 6 | Status: SHIPPED | OUTPATIENT
Start: 2022-03-25 | End: 2022-06-24

## 2022-03-25 NOTE — PROGRESS NOTES
Karen is doing well.  Her primary complaint is some cramping and bloating with meals.  She is not having or oily stools or diarrhea.  There may be some component of pancreatic enzyme insufficiency and we can start her on Creon, some of this may just be related to having her gallbladder removed and I encouraged her to keep a food diary.    She continues to have this midline hernia which is bothering her a little bit more.  I have reviewed her imaging and on physical exam is consistent with a fairly sizable incisional hernia here.  We will reach out some my colleagues to discuss the best approach for repair and set her up for this.  All of her imaging and tumor markers remain negative and she continues on surveillance with Dr. Daley.    I spent 20 minutes with Ms. Jones, with >50% of time spent face to face and additional time preparing to see the patient (eg, review of tests), obtaining and/or reviewing separately obtained history, documenting clinical information in the electronic or other health record, independently interpreting results (not separately reported) and communicating results to the patient/family/caregiver, or Care coordination (not separately reported).           Cruz Díaz MD  Upper GI / Hepatobiliary Surgical Oncology  [facility] [place], [place]  Office: [phone]  Fax: [phone]

## 2022-03-25 NOTE — Clinical Note
Post whipple w MARÍA ELENA, has a midline incisional hernia. Healthy lady, very nice. Take a look at this scan. [place] trying a robotic approach?

## 2022-05-02 ENCOUNTER — PATIENT MESSAGE (OUTPATIENT)
Dept: SURGICAL ONCOLOGY | Facility: CLINIC | Age: 57
End: 2022-05-02
Payer: COMMERCIAL

## 2022-05-02 ENCOUNTER — PATIENT MESSAGE (OUTPATIENT)
Dept: SURGERY | Facility: CLINIC | Age: 57
End: 2022-05-02
Payer: COMMERCIAL

## 2022-05-30 ENCOUNTER — PATIENT MESSAGE (OUTPATIENT)
Dept: HEMATOLOGY/ONCOLOGY | Facility: CLINIC | Age: 57
End: 2022-05-30
Payer: COMMERCIAL

## 2022-05-31 DIAGNOSIS — C17.0 DUODENAL CANCER: Primary | ICD-10-CM

## 2022-06-16 DIAGNOSIS — I10 BENIGN HYPERTENSION: ICD-10-CM

## 2022-06-16 DIAGNOSIS — Z01.818 PREOP TESTING: ICD-10-CM

## 2022-06-16 DIAGNOSIS — I34.1 MITRAL VALVE PROLAPSE: ICD-10-CM

## 2022-06-16 DIAGNOSIS — E78.00 PURE HYPERCHOLESTEROLEMIA: ICD-10-CM

## 2022-06-16 RX ORDER — OLMESARTAN MEDOXOMIL 40 MG/1
TABLET ORAL
Qty: 90 TABLET | Refills: 3 | Status: SHIPPED | OUTPATIENT
Start: 2022-06-16 | End: 2023-09-11

## 2022-06-24 ENCOUNTER — OFFICE VISIT (OUTPATIENT)
Dept: SURGERY | Facility: CLINIC | Age: 57
End: 2022-06-24
Payer: COMMERCIAL

## 2022-06-24 VITALS
BODY MASS INDEX: 25.61 KG/M2 | WEIGHT: 189.06 LBS | SYSTOLIC BLOOD PRESSURE: 154 MMHG | DIASTOLIC BLOOD PRESSURE: 78 MMHG | HEART RATE: 58 BPM | HEIGHT: 72 IN

## 2022-06-24 DIAGNOSIS — K43.2 INCISIONAL HERNIA, WITHOUT OBSTRUCTION OR GANGRENE: Primary | ICD-10-CM

## 2022-06-24 PROCEDURE — 3008F PR BODY MASS INDEX (BMI) DOCUMENTED: ICD-10-PCS | Mod: CPTII,S$GLB,, | Performed by: SURGERY

## 2022-06-24 PROCEDURE — 99213 PR OFFICE/OUTPT VISIT, EST, LEVL III, 20-29 MIN: ICD-10-PCS | Mod: S$GLB,,, | Performed by: SURGERY

## 2022-06-24 PROCEDURE — 4010F PR ACE/ARB THEARPY RXD/TAKEN: ICD-10-PCS | Mod: CPTII,S$GLB,, | Performed by: SURGERY

## 2022-06-24 PROCEDURE — 4010F ACE/ARB THERAPY RXD/TAKEN: CPT | Mod: CPTII,S$GLB,, | Performed by: SURGERY

## 2022-06-24 PROCEDURE — 99213 OFFICE O/P EST LOW 20 MIN: CPT | Mod: S$GLB,,, | Performed by: SURGERY

## 2022-06-24 PROCEDURE — 1160F RVW MEDS BY RX/DR IN RCRD: CPT | Mod: CPTII,S$GLB,, | Performed by: SURGERY

## 2022-06-24 PROCEDURE — 1159F MED LIST DOCD IN RCRD: CPT | Mod: CPTII,S$GLB,, | Performed by: SURGERY

## 2022-06-24 PROCEDURE — 99999 PR PBB SHADOW E&M-EST. PATIENT-LVL III: CPT | Mod: PBBFAC,,, | Performed by: SURGERY

## 2022-06-24 PROCEDURE — 99999 PR PBB SHADOW E&M-EST. PATIENT-LVL III: ICD-10-PCS | Mod: PBBFAC,,, | Performed by: SURGERY

## 2022-06-24 PROCEDURE — 3077F SYST BP >= 140 MM HG: CPT | Mod: CPTII,S$GLB,, | Performed by: SURGERY

## 2022-06-24 PROCEDURE — 3008F BODY MASS INDEX DOCD: CPT | Mod: CPTII,S$GLB,, | Performed by: SURGERY

## 2022-06-24 PROCEDURE — 3078F DIAST BP <80 MM HG: CPT | Mod: CPTII,S$GLB,, | Performed by: SURGERY

## 2022-06-24 PROCEDURE — 1159F PR MEDICATION LIST DOCUMENTED IN MEDICAL RECORD: ICD-10-PCS | Mod: CPTII,S$GLB,, | Performed by: SURGERY

## 2022-06-24 PROCEDURE — 3077F PR MOST RECENT SYSTOLIC BLOOD PRESSURE >= 140 MM HG: ICD-10-PCS | Mod: CPTII,S$GLB,, | Performed by: SURGERY

## 2022-06-24 PROCEDURE — 3078F PR MOST RECENT DIASTOLIC BLOOD PRESSURE < 80 MM HG: ICD-10-PCS | Mod: CPTII,S$GLB,, | Performed by: SURGERY

## 2022-06-24 PROCEDURE — 1160F PR REVIEW ALL MEDS BY PRESCRIBER/CLIN PHARMACIST DOCUMENTED: ICD-10-PCS | Mod: CPTII,S$GLB,, | Performed by: SURGERY

## 2022-06-24 NOTE — PROGRESS NOTES
History & Physical    SUBJECTIVE:     History of Present Illness:  Patient is a 57 y.o. female with a past medical history of Duodenal cancer and post Whipple surgery 2/18/2021. Pt developed a midline incisional hernia since surgery which is bothering her. It is not painful and reducible but causes discomfort. Pt states that it hasn't changed in size but rather gets bigger after eating. Patient has regular bowel movements. Patient denies constipation, diarrhea, nausea, or vomiting. Denies fever/chills. Doesn't lift heavy objects.       Review of patient's allergies indicates:   Allergen Reactions    Benzalkonium chloride Hives    Codeine Itching    Morphine Nausea Only    Neosporin [neomycin-bacitracin-polymyxin] Hives    Sulfa (sulfonamide antibiotics) Hives and Itching           Hydrocortisone Hives     Redness / can use bactroban         Current Outpatient Medications   Medication Sig Dispense Refill    amLODIPine (NORVASC) 5 MG tablet Take 1 tablet (5 mg total) by mouth once daily. 30 tablet 11    BYSTOLIC 20 mg Tab Take 1 tablet by mouth once daily.      lipase-protease-amylase 24,000-76,000-120,000 units (CREON) 24,000-76,000 -120,000 unit capsule Take 2 capsules by mouth 4 (four) times daily with meals and nightly. 240 capsule 6    olmesartan (BENICAR) 40 MG tablet TAKE 1 TABLET(40 MG) BY MOUTH EVERY DAY 90 tablet 3    rosuvastatin (CRESTOR) 10 MG tablet        No current facility-administered medications for this visit.       Past Medical History:   Diagnosis Date    Cancer     duodenal cancer    Hypertension     Hypotension, iatrogenic     Mitral valve prolapse      Past Surgical History:   Procedure Laterality Date    BUNIONECTOMY Left     HYSTERECTOMY  2000    INSERTION OF TUNNELED CENTRAL VENOUS CATHETER (CVC) WITH SUBCUTANEOUS PORT N/A 3/26/2021    Procedure: [id number]-A-CATH;  Surgeon: Lauren Abraham MD;  Location: [facility];  Service: General;  Laterality: N/A;     LITHOTRIPSY      PLACEMENT OF JEJUNOSTOMY TUBE  2/18/2021    Procedure: INSERTION, JEJUNOSTOMY TUBE;  Surgeon: Hitesh Díaz MD;  Location: [facility] OR 2ND FLR;  Service: General;;    WHIPPLE PROCEDURE N/A 2/18/2021    Procedure: WHIPPLE PROCEDURE;  Surgeon: Hitesh Díaz MD;  Location: [facility] OR 2ND FLR;  Service: General;  Laterality: N/A;     Family History   Problem Relation Age of Onset    Ovarian cancer Mother     Thyroid cancer Mother     Atrial fibrillation Mother     Stroke Mother     Hyperlipidemia Mother     Diabetes Mother     Bladder Cancer Father     Hypertension Father     Diabetes Father     Stroke Maternal Grandfather      Social History     Tobacco Use    Smoking status: Never Smoker    Smokeless tobacco: Never Used   Substance Use Topics    Alcohol use: Not Currently    Drug use: Never        Review of Systems:  Constitutional: Negative for activity change, appetite change, chills, diaphoresis, fatigue and fever.   HENT: Negative for congestion, sinus pressure, sneezing and sore throat.    Respiratory: Negative for apnea, cough, choking, chest tightness and shortness of breath.    Cardiovascular: Negative for chest pain, palpitations and leg swelling.   Gastrointestinal: Negative for abdominal distention, ascites. Negative for constipation, diarrhea, nausea, vomiting.   Musculoskeletal: Negative for arthralgias, back pain and gait problem.   Skin: Negative for color change, pallor and rash.   Neurological: Negative for dizziness, facial asymmetry, light-headedness and headaches.   Psychiatric/Behavioral: Negative for agitation, behavioral problems and confusion.       OBJECTIVE:     Vital Signs (Most Recent)  Pulse: (!) 58 (06/24/22 0952)  BP: (!) 154/78 (06/24/22 0952)  6' (1.829 m)  85.7 kg (189 lb 0.7 oz)     Physical Exam  Vitals reviewed.   Constitutional:       General: No acute distress.     Appearance: Normal appearance. Well-developed. Not ill-appearing.   HENT:      Head:  Normocephalic and atraumatic.   Cardiovascular:      Rate and Rhythm: Normal rate and regular rhythm.   Pulmonary:      Effort: Pulmonary effort is normal. No respiratory distress.   Abdominal:      General: There is no distension.      Palpations: Abdomen is soft.      Tenderness: There is no abdominal tenderness. There is no guarding.      Hernia: Hernia (small incisional hernia present vs diastasis, reducible, non-tender, no associated skin changes) is present   Musculoskeletal:         General: Normal range of motion.      Cervical back: Normal range of motion and neck supple.   Skin:     General: Skin is warm and dry.      Comments:  Neurological:      General: No focal deficit present.      Mental Status: Alert and oriented to person, place, and time.   Psychiatric:         Mood and Affect: Mood normal.         Behavior: Behavior normal.      Labs:  Reviewed      Imaging:  CT Chest Abdomen Pelvis without contrast 3/7/2022 : Reviewed      Pathology:  n/a    ASSESSMENT/PLAN:     Assessment:  57 y.o female patient with incisional hernia vs diastasis.       Plan:   -Not concerning on imaging.   -The patient would like to wait on scheduling surgery at this time.    -RTC prn  -Patient understood and all her questions were answered in office.   -Will call when she would like to proceed

## 2022-08-01 ENCOUNTER — PATIENT MESSAGE (OUTPATIENT)
Dept: HEMATOLOGY/ONCOLOGY | Facility: CLINIC | Age: 57
End: 2022-08-01
Payer: COMMERCIAL

## 2022-09-26 ENCOUNTER — OFFICE VISIT (OUTPATIENT)
Dept: PSYCHIATRY | Facility: CLINIC | Age: 57
End: 2022-09-26
Payer: COMMERCIAL

## 2022-09-26 ENCOUNTER — PATIENT MESSAGE (OUTPATIENT)
Dept: PSYCHIATRY | Facility: CLINIC | Age: 57
End: 2022-09-26
Payer: COMMERCIAL

## 2022-09-26 VITALS
BODY MASS INDEX: 25.71 KG/M2 | SYSTOLIC BLOOD PRESSURE: 142 MMHG | WEIGHT: 189.63 LBS | DIASTOLIC BLOOD PRESSURE: 90 MMHG | HEART RATE: 65 BPM

## 2022-09-26 DIAGNOSIS — F41.1 GAD (GENERALIZED ANXIETY DISORDER): Primary | ICD-10-CM

## 2022-09-26 DIAGNOSIS — C17.0 DUODENAL CANCER: ICD-10-CM

## 2022-09-26 PROCEDURE — 3077F SYST BP >= 140 MM HG: CPT | Mod: CPTII,S$GLB,, | Performed by: PSYCHIATRY & NEUROLOGY

## 2022-09-26 PROCEDURE — 90792 PSYCH DIAG EVAL W/MED SRVCS: CPT | Mod: S$GLB,,, | Performed by: PSYCHIATRY & NEUROLOGY

## 2022-09-26 PROCEDURE — 3077F PR MOST RECENT SYSTOLIC BLOOD PRESSURE >= 140 MM HG: ICD-10-PCS | Mod: CPTII,S$GLB,, | Performed by: PSYCHIATRY & NEUROLOGY

## 2022-09-26 PROCEDURE — 4010F ACE/ARB THERAPY RXD/TAKEN: CPT | Mod: CPTII,S$GLB,, | Performed by: PSYCHIATRY & NEUROLOGY

## 2022-09-26 PROCEDURE — 3080F PR MOST RECENT DIASTOLIC BLOOD PRESSURE >= 90 MM HG: ICD-10-PCS | Mod: CPTII,S$GLB,, | Performed by: PSYCHIATRY & NEUROLOGY

## 2022-09-26 PROCEDURE — 99999 PR PBB SHADOW E&M-EST. PATIENT-LVL II: ICD-10-PCS | Mod: PBBFAC,,, | Performed by: PSYCHIATRY & NEUROLOGY

## 2022-09-26 PROCEDURE — 3080F DIAST BP >= 90 MM HG: CPT | Mod: CPTII,S$GLB,, | Performed by: PSYCHIATRY & NEUROLOGY

## 2022-09-26 PROCEDURE — 4010F PR ACE/ARB THEARPY RXD/TAKEN: ICD-10-PCS | Mod: CPTII,S$GLB,, | Performed by: PSYCHIATRY & NEUROLOGY

## 2022-09-26 PROCEDURE — 99999 PR PBB SHADOW E&M-EST. PATIENT-LVL II: CPT | Mod: PBBFAC,,, | Performed by: PSYCHIATRY & NEUROLOGY

## 2022-09-26 PROCEDURE — 90792 PR PSYCHIATRIC DIAGNOSTIC EVALUATION W/MEDICAL SERVICES: ICD-10-PCS | Mod: S$GLB,,, | Performed by: PSYCHIATRY & NEUROLOGY

## 2022-09-26 RX ORDER — BUSPIRONE HYDROCHLORIDE 30 MG/1
TABLET ORAL
Qty: 60 TABLET | Refills: 2 | Status: SHIPPED | OUTPATIENT
Start: 2022-09-26 | End: 2022-11-28

## 2022-09-26 NOTE — PROGRESS NOTES
Outpatient Psychiatry Initial Visit (MD/NP)    9/26/2022    Karen Gutierrez, a 57 y.o. female, presenting for initial evaluation visit. Met with patient.    Reason for Encounter: Patient complains of distractability, anxiety.     History of Present Illness: Patient is a 57 year old woman who presents for establishment of care, endorses chronic problems with anxiety , distractability, difficulty completing tasks. Reports having first brought this problem up to primary care physician Han Arshad, about five years ago following patient's niece's diagnosis with adhd and patient's recognition of similar attentional problems in herself. Reports no formal testing, no psych assessment, treated with adderall with benefit to both focus and moods (reduced levels of anxiety and depression, helping her worry less, catastrophize less).     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 Status post surgery (whipple), chemo. Follow-up going well.   Returned to work 2021.     GELACIO-7 = 11;     Psych Hx: Endorses history of obsessions, no psychiatric diagnoses or assessments. No history of michael, hallucinations, delusions, self-harm behaviors, psych hospitalizations.    Family Hx: niece with adhd.  Sister with considerable irritability considerably helped by medication; father has similar symptoms.      Past Medical History:   Diagnosis Date    Cancer     duodenal cancer    Hypertension     Hypotension, iatrogenic     Mitral valve prolapse      Social Hx: born in [place], [place]. Born full-term, no complications.   No developmental or health problems. Active as a child. In lots of clubs. Always highly active. Moved to  by first grade. No serious maltreatment. Dad was short-tempered. Older sister (lives in ). Childhood was happy. Both parents were in the home. Loved school. Loved school, performed well. No discipline  problems. Good experience in school. Problems with bullying. Close to sister.     Graduated HS, went to [employer], majored in fashion design/merchandising. Then studied in program for medical administration. 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 Went back to work after extended medical leave, had a pay-cut, loss some of her responsibilities.     ,  for 7 years. Raised one son as a single mom. He's now 26 years old. Son is newly  and he and wife live with patient. He's been accepted to med school, he and his wife will move to [place] soon for school. Starts in January.     Review Of Systems:     GENERAL:  No weight gain or loss  SKIN:  No rashes or lacerations  HEAD:  No headaches  CHEST:  No shortness of breath, hyperventilation or cough  CARDIOVASCULAR:  No tachycardia or chest pain  ABDOMEN:  No nausea, vomiting, pain, constipation or diarrhea  URINARY:  No frequency, dysuria or sexual dysfunction  ENDOCRINE:  No polydipsia, polyuria  MUSCULOSKELETAL:  No pain or stiffness of the joints  NEUROLOGIC:  No weakness, sensory changes, seizures, confusion, memory loss, tremor or other abnormal movements    Current Evaluation:     Nutritional Screening: Considering the patient's height and weight, medications, medical history and preferences, should a referral be made to the dietitian? no    Constitutional  Vitals:  Most recent vital signs, dated less than 90 days prior to this appointment, were reviewed.    Vitals:    09/26/22 0809   BP: (!) 142/90   Pulse: 65   Weight: 86 kg (189 lb 9.5 oz)        General:  unremarkable, age appropriate     Musculoskeletal  Muscle Strength/Tone:  no tremor, no tic   Gait & Station:  non-ataxic     Psychiatric  Appearance: casually dressed & groomed;   Behavior: calm,   Cooperation: cooperative with assessment  Speech: normal rate, volume, tone  Thought Process: linear, goal-directed  Thought  Content: No suicidal or homicidal ideation; no delusions  Affect: mildly anxious  Mood: mildly anxious  Perceptions: No auditory or visual hallucinations  Level of Consciousness: alert throughout interview  Insight: fair  Cognition: Oriented to person, place, time, & situation  Memory: no apparent deficits to general clinical interview; not formally assessed  Attention/Concentration: no apparent deficits to general clinical interview; not formally assessed  Fund of Knowledge: average by vocabulary/education    Laboratory Data  No visits with results within 1 Month(s) from this visit.   Latest known visit with results is:   Lab Visit on 03/07/2022   Component Date Value Ref Range Status    Sodium 03/07/2022 144  136 - 145 mmol/L Final    Potassium 03/07/2022 4.8  3.5 - 5.1 mmol/L Final    Chloride 03/07/2022 107  95 - 110 mmol/L Final    CO2 03/07/2022 26  23 - 29 mmol/L Final    Glucose 03/07/2022 115 (H)  70 - 110 mg/dL Final    BUN 03/07/2022 11  6 - 20 mg/dL Final    Creatinine 03/07/2022 0.9  0.5 - 1.4 mg/dL Final    Calcium 03/07/2022 9.6  8.7 - 10.5 mg/dL Final    Total Protein 03/07/2022 7.3  6.0 - 8.4 g/dL Final    Albumin 03/07/2022 4.5  3.5 - 5.2 g/dL Final    Total Bilirubin 03/07/2022 0.8  0.1 - 1.0 mg/dL Final    Alkaline Phosphatase 03/07/2022 150 (H)  55 - 135 U/L Final    AST 03/07/2022 48 (H)  10 - 40 U/L Final    ALT 03/07/2022 54 (H)  10 - 44 U/L Final    Anion Gap 03/07/2022 11  8 - 16 mmol/L Final    eGFR if African American 03/07/2022 >60  >60 mL/min/1.73 m^2 Final    eGFR if non African American 03/07/2022 >60  >60 mL/min/1.73 m^2 Final    WBC 03/07/2022 4.22  3.90 - 12.70 K/uL Final    RBC 03/07/2022 4.51  4.00 - 5.40 M/uL Final    Hemoglobin 03/07/2022 13.2  12.0 - 16.0 g/dL Final    Hematocrit 03/07/2022 39.9  37.0 - 48.5 % Final    MCV 03/07/2022 89  82 - 98 fL Final    MCH 03/07/2022 29.3  27.0 - 31.0 pg Final    MCHC 03/07/2022 33.1  32.0 - 36.0 g/dL Final    RDW 03/07/2022 12.2  11.5 -  14.5 % Final    Platelets 03/07/2022 146 (L)  150 - 450 K/uL Final    MPV 03/07/2022 10.0  9.2 - 12.9 fL Final    Immature Granulocytes 03/07/2022 0.0  0.0 - 0.5 % Final    Gran # (ANC) 03/07/2022 2.4  1.8 - 7.7 K/uL Final    Immature Grans (Abs) 03/07/2022 0.00  0.00 - 0.04 K/uL Final    Lymph # 03/07/2022 1.4  1.0 - 4.8 K/uL Final    Mono # 03/07/2022 0.3  0.3 - 1.0 K/uL Final    Eos # 03/07/2022 0.1  0.0 - 0.5 K/uL Final    Baso # 03/07/2022 0.01  0.00 - 0.20 K/uL Final    nRBC 03/07/2022 0  0 /100 WBC Final    Gran % 03/07/2022 57.8  38.0 - 73.0 % Final    Lymph % 03/07/2022 32.5  18.0 - 48.0 % Final    Mono % 03/07/2022 7.6  4.0 - 15.0 % Final    Eosinophil % 03/07/2022 1.9  0.0 - 8.0 % Final    Basophil % 03/07/2022 0.2  0.0 - 1.9 % Final    Differential Method 03/07/2022 Automated   Final     Medications  Outpatient Encounter Medications as of 9/26/2022   Medication Sig Dispense Refill    amLODIPine (NORVASC) 5 MG tablet TAKE 1 TABLET(5 MG) BY MOUTH EVERY DAY 30 tablet 11    BYSTOLIC 20 mg Tab Take 1 tablet by mouth once daily.      olmesartan (BENICAR) 40 MG tablet TAKE 1 TABLET(40 MG) BY MOUTH EVERY DAY 90 tablet 3    rosuvastatin (CRESTOR) 10 MG tablet        No facility-administered encounter medications on file as of 9/26/2022.     Assessment - Diagnosis - Goals:     Impression: 57 year old woman with chronic anxiety problems, currently moderately anxious. Has also had problems with attention, task completion. Previously treated with some benefit with stimulants, not formally diagnosed with adhd. Duodenal CA in remission.     Dx: generalized anxiety disorder    Treatment Goals:  Specify outcomes written in observable, behavioral terms: reduce anxiety by self-report    Treatment Plan/Recommendations:   Buspirone trial.   Information about self-care in recovery, how to access psychotherapy.   Discussed risks, benefits, and alternatives to treatment plan documented above with patient. I answered all patient  questions related to this plan and patient expressed understanding and agreement.       Return to Clinic: 2 months    Counseling time: 10 minutes  Total time: 50 minutes    JAMES Rose MD  Psychiatry  [facility] [address] , [place] [address] [phone]

## 2022-10-05 ENCOUNTER — HOSPITAL ENCOUNTER (OUTPATIENT)
Dept: RADIOLOGY | Facility: HOSPITAL | Age: 57
Discharge: HOME | End: 2022-10-05
Attending: INTERNAL MEDICINE
Payer: COMMERCIAL

## 2022-10-05 DIAGNOSIS — C17.0 DUODENAL CANCER: ICD-10-CM

## 2022-10-05 PROCEDURE — 71260 CT THORAX DX C+: CPT | Mod: TC

## 2022-10-05 PROCEDURE — 74177 CT ABD & PELVIS W/CONTRAST: CPT | Mod: 26,,, | Performed by: RADIOLOGY

## 2022-10-05 PROCEDURE — 71260 CT THORAX DX C+: CPT | Mod: 26,,, | Performed by: RADIOLOGY

## 2022-10-05 PROCEDURE — 25500020 PHARM REV CODE 255: Performed by: INTERNAL MEDICINE

## 2022-10-05 PROCEDURE — 74177 CT ABD & PELVIS W/CONTRAST: CPT | Mod: TC

## 2022-10-05 PROCEDURE — 71260 CT CHEST ABDOMEN PELVIS WITH CONTRAST (XPD): ICD-10-PCS | Mod: 26,,, | Performed by: RADIOLOGY

## 2022-10-05 PROCEDURE — 74177 CT CHEST ABDOMEN PELVIS WITH CONTRAST (XPD): ICD-10-PCS | Mod: 26,,, | Performed by: RADIOLOGY

## 2022-10-05 RX ADMIN — IOHEXOL 100 ML: 350 INJECTION, SOLUTION INTRAVENOUS at 10:10

## 2022-10-05 RX ADMIN — IOHEXOL 30 ML: 350 INJECTION, SOLUTION INTRAVENOUS at 08:10

## 2022-10-11 ENCOUNTER — PATIENT MESSAGE (OUTPATIENT)
Dept: HEMATOLOGY/ONCOLOGY | Facility: CLINIC | Age: 57
End: 2022-10-11

## 2022-10-11 ENCOUNTER — OFFICE VISIT (OUTPATIENT)
Dept: HEMATOLOGY/ONCOLOGY | Facility: CLINIC | Age: 57
End: 2022-10-11
Payer: COMMERCIAL

## 2022-10-11 VITALS
HEIGHT: 72 IN | BODY MASS INDEX: 25.83 KG/M2 | WEIGHT: 190.69 LBS | SYSTOLIC BLOOD PRESSURE: 160 MMHG | HEART RATE: 60 BPM | OXYGEN SATURATION: 98 % | TEMPERATURE: 98 F | DIASTOLIC BLOOD PRESSURE: 93 MMHG

## 2022-10-11 DIAGNOSIS — Z90.411 HISTORY OF PARTIAL PANCREATECTOMY: ICD-10-CM

## 2022-10-11 DIAGNOSIS — C17.0 DUODENAL CANCER: Primary | ICD-10-CM

## 2022-10-11 DIAGNOSIS — Z15.89 MONOALLELIC MUTATION OF MUTYH GENE: ICD-10-CM

## 2022-10-11 DIAGNOSIS — Z86.018 HISTORY OF DYSPLASTIC NEVUS: ICD-10-CM

## 2022-10-11 PROBLEM — D61.810 PANCYTOPENIA DUE TO ANTINEOPLASTIC CHEMOTHERAPY: Status: RESOLVED | Noted: 2021-05-25 | Resolved: 2022-10-11

## 2022-10-11 PROBLEM — T45.1X5A PANCYTOPENIA DUE TO ANTINEOPLASTIC CHEMOTHERAPY: Status: RESOLVED | Noted: 2021-05-25 | Resolved: 2022-10-11

## 2022-10-11 PROCEDURE — 1160F RVW MEDS BY RX/DR IN RCRD: CPT | Mod: CPTII,S$GLB,, | Performed by: INTERNAL MEDICINE

## 2022-10-11 PROCEDURE — 4010F PR ACE/ARB THEARPY RXD/TAKEN: ICD-10-PCS | Mod: CPTII,S$GLB,, | Performed by: INTERNAL MEDICINE

## 2022-10-11 PROCEDURE — 3008F BODY MASS INDEX DOCD: CPT | Mod: CPTII,S$GLB,, | Performed by: INTERNAL MEDICINE

## 2022-10-11 PROCEDURE — 1159F PR MEDICATION LIST DOCUMENTED IN MEDICAL RECORD: ICD-10-PCS | Mod: CPTII,S$GLB,, | Performed by: INTERNAL MEDICINE

## 2022-10-11 PROCEDURE — 99999 PR PBB SHADOW E&M-EST. PATIENT-LVL III: ICD-10-PCS | Mod: PBBFAC,,, | Performed by: INTERNAL MEDICINE

## 2022-10-11 PROCEDURE — 3077F PR MOST RECENT SYSTOLIC BLOOD PRESSURE >= 140 MM HG: ICD-10-PCS | Mod: CPTII,S$GLB,, | Performed by: INTERNAL MEDICINE

## 2022-10-11 PROCEDURE — 1159F MED LIST DOCD IN RCRD: CPT | Mod: CPTII,S$GLB,, | Performed by: INTERNAL MEDICINE

## 2022-10-11 PROCEDURE — 99214 OFFICE O/P EST MOD 30 MIN: CPT | Mod: S$GLB,,, | Performed by: INTERNAL MEDICINE

## 2022-10-11 PROCEDURE — 3080F PR MOST RECENT DIASTOLIC BLOOD PRESSURE >= 90 MM HG: ICD-10-PCS | Mod: CPTII,S$GLB,, | Performed by: INTERNAL MEDICINE

## 2022-10-11 PROCEDURE — 3080F DIAST BP >= 90 MM HG: CPT | Mod: CPTII,S$GLB,, | Performed by: INTERNAL MEDICINE

## 2022-10-11 PROCEDURE — 3008F PR BODY MASS INDEX (BMI) DOCUMENTED: ICD-10-PCS | Mod: CPTII,S$GLB,, | Performed by: INTERNAL MEDICINE

## 2022-10-11 PROCEDURE — 1160F PR REVIEW ALL MEDS BY PRESCRIBER/CLIN PHARMACIST DOCUMENTED: ICD-10-PCS | Mod: CPTII,S$GLB,, | Performed by: INTERNAL MEDICINE

## 2022-10-11 PROCEDURE — 99214 PR OFFICE/OUTPT VISIT, EST, LEVL IV, 30-39 MIN: ICD-10-PCS | Mod: S$GLB,,, | Performed by: INTERNAL MEDICINE

## 2022-10-11 PROCEDURE — 4010F ACE/ARB THERAPY RXD/TAKEN: CPT | Mod: CPTII,S$GLB,, | Performed by: INTERNAL MEDICINE

## 2022-10-11 PROCEDURE — 99999 PR PBB SHADOW E&M-EST. PATIENT-LVL III: CPT | Mod: PBBFAC,,, | Performed by: INTERNAL MEDICINE

## 2022-10-11 PROCEDURE — 3077F SYST BP >= 140 MM HG: CPT | Mod: CPTII,S$GLB,, | Performed by: INTERNAL MEDICINE

## 2022-10-11 NOTE — PROGRESS NOTES
Subjective:       Patient ID: Karen Gutierrez is a 57 y.o. female.    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  Patient is status post resection.  Patient is doing remarkably well feels good denies nausea vomiting fevers chills night sweats ECOG status 1    Past Medical History:   Diagnosis Date    Cancer     duodenal cancer    Hypertension     Hypotension, iatrogenic     Mitral valve prolapse      Family History   Problem Relation Age of Onset    Ovarian cancer Mother     Thyroid cancer Mother     Atrial fibrillation Mother     Stroke Mother     Hyperlipidemia Mother     Diabetes Mother     Bladder Cancer Father     Hypertension Father     Diabetes Father     Stroke Maternal Grandfather      Social History     Socioeconomic History    Marital status:    Tobacco Use    Smoking status: Former    Smokeless tobacco: Never   Substance and Sexual Activity    Alcohol use: Not Currently    Drug use: Never     Past Surgical History:   Procedure Laterality Date    BUNIONECTOMY Left     HYSTERECTOMY  2000    INSERTION OF TUNNELED CENTRAL VENOUS CATHETER (CVC) WITH SUBCUTANEOUS PORT N/A 3/26/2021    Procedure: [id number];  Surgeon: Lauren Abraham MD;  Location: [facility] OR;  Service: General;  Laterality: N/A;    LITHOTRIPSY      PLACEMENT OF JEJUNOSTOMY TUBE  2/18/2021    Procedure: INSERTION, JEJUNOSTOMY TUBE;  Surgeon: Hitesh Díaz MD;  [facility];  Service: General;;    WHIPPLE PROCEDURE N/A 2/18/2021    Procedure: WHIPPLE PROCEDURE;  Surgeon: Hitesh Díaz MD;  Location: [facility] OR 2ND FLR;  Service: General;  Laterality: N/A;       Labs:  Lab Results   Component Value Date    WBC 3.41 (L) 10/05/2022    HGB 12.6 10/05/2022    HCT 36.9 (L) 10/05/2022    MCV 86 10/05/2022     10/05/2022     BMP  Lab Results   Component Value Date     10/05/2022    K 4.3 10/05/2022     10/05/2022    CO2 25 10/05/2022    BUN 16 10/05/2022     CREATININE 1.1 10/05/2022    CALCIUM 9.6 10/05/2022    ANIONGAP 14 10/05/2022    ESTGFRAFRICA >60 03/07/2022    EGFRNONAA >60 03/07/2022     Lab Results   Component Value Date    ALT 48 (H) 10/05/2022    AST 32 10/05/2022    ALKPHOS 102 10/05/2022    BILITOT 0.7 10/05/2022       Lab Results   Component Value Date    IRON 42 05/10/2021    TIBC 404 05/10/2021    FERRITIN 345 (H) 05/10/2021     No results found for: DLWCYDCM33  No results found for: FOLATE  No results found for: TSH      Review of Systems   Constitutional:  Negative for activity change, appetite change, chills, diaphoresis, fatigue, fever and unexpected weight change.   HENT:  Negative for congestion, dental problem, drooling, ear discharge, ear pain, facial swelling, hearing loss, mouth sores, nosebleeds, postnasal drip, rhinorrhea, sinus pressure, sneezing, sore throat, tinnitus, trouble swallowing and voice change.    Eyes:  Negative for photophobia, pain, discharge, redness, itching and visual disturbance.   Respiratory:  Negative for cough, choking, chest tightness, shortness of breath, wheezing and stridor.    Cardiovascular:  Negative for chest pain, palpitations and leg swelling.   Gastrointestinal:  Negative for abdominal distention, abdominal pain, anal bleeding, blood in stool, constipation, diarrhea, nausea, rectal pain and vomiting.   Endocrine: Negative for cold intolerance, heat intolerance, polydipsia, polyphagia and polyuria.   Genitourinary:  Negative for decreased urine volume, difficulty urinating, dyspareunia, dysuria, enuresis, flank pain, frequency, genital sores, hematuria, menstrual problem, pelvic pain, urgency, vaginal bleeding, vaginal discharge and vaginal pain.   Musculoskeletal:  Negative for arthralgias, back pain, gait problem, joint swelling, myalgias, neck pain and neck stiffness.   Skin:  Negative for color change, pallor and rash.   Allergic/Immunologic: Negative for environmental allergies, food allergies and  immunocompromised state.   Neurological:  Negative for dizziness, tremors, seizures, syncope, facial asymmetry, speech difficulty, weakness, light-headedness, numbness and headaches.   Hematological:  Negative for adenopathy. Does not bruise/bleed easily.   Psychiatric/Behavioral:  Negative for agitation, behavioral problems, confusion, decreased concentration, dysphoric mood, hallucinations, self-injury, sleep disturbance and suicidal ideas. The patient is not nervous/anxious and is not hyperactive.      Objective:      Physical Exam  Vitals reviewed.   Constitutional:       General: She is not in acute distress.     Appearance: She is well-developed. She is not diaphoretic.   HENT:      Head: Normocephalic and atraumatic.      Right Ear: External ear normal.      Left Ear: External ear normal.      Nose: Nose normal.      Right Sinus: No maxillary sinus tenderness or frontal sinus tenderness.      Left Sinus: No maxillary sinus tenderness or frontal sinus tenderness.      Mouth/Throat:      Pharynx: No oropharyngeal exudate.   Eyes:      General: Lids are normal. No scleral icterus.        Right eye: No discharge.         Left eye: No discharge.      Conjunctiva/sclera: Conjunctivae normal.      Right eye: Right conjunctiva is not injected. No hemorrhage.     Left eye: Left conjunctiva is not injected. No hemorrhage.     Pupils: Pupils are equal, round, and reactive to light.   Neck:      Thyroid: No thyromegaly.      Vascular: No JVD.      Trachea: No tracheal deviation.   Cardiovascular:      Rate and Rhythm: Normal rate.   Pulmonary:      Effort: Pulmonary effort is normal. No respiratory distress.      Breath sounds: No stridor.   Chest:      Chest wall: No tenderness.   Abdominal:      General: Bowel sounds are normal. There is no distension.      Palpations: Abdomen is soft. There is no hepatomegaly, splenomegaly or mass.      Tenderness: There is no abdominal tenderness. There is no rebound.    Musculoskeletal:         General: No tenderness. Normal range of motion.      Cervical back: Normal range of motion and neck supple.   Lymphadenopathy:      Cervical: No cervical adenopathy.      Upper Body:      Right upper body: No supraclavicular adenopathy.      Left upper body: No supraclavicular adenopathy.   Skin:     General: Skin is dry.      Findings: No erythema or rash.   Neurological:      Mental Status: She is alert and oriented to person, place, and time.      Cranial Nerves: No cranial nerve deficit.      Coordination: Coordination normal.   Psychiatric:         Behavior: Behavior normal.         Thought Content: Thought content normal.         Judgment: Judgment normal.           Assessment:      1. Duodenal cancer    2. s/p partial pancreatectomy    3. Monoallelic mutation of MUTYH gene           Plan:     Laboratory review and most recent CT chest abdomen pelvis demonstrates no evidence of malignancy reviewed images personally showing the hemangioma in her liver.  Follow-up with laboratory studies and imaging studies in 6 months would recommend full body skin exam on yearly basis previous history of dysplastic nevus orders written reviewed        Enzo Daley Jr, MD FACP

## 2022-10-12 ENCOUNTER — PATIENT MESSAGE (OUTPATIENT)
Dept: PREADMISSION TESTING | Facility: HOSPITAL | Age: 57
End: 2022-10-12
Payer: COMMERCIAL

## 2022-10-12 ENCOUNTER — TELEPHONE (OUTPATIENT)
Dept: GENETICS | Facility: CLINIC | Age: 57
End: 2022-10-12
Payer: COMMERCIAL

## 2022-11-14 DIAGNOSIS — Z15.89 MTHFR MUTATION (METHYLENETETRAHYDROFOLATE REDUCTASE): Primary | ICD-10-CM

## 2022-11-28 ENCOUNTER — OFFICE VISIT (OUTPATIENT)
Dept: PSYCHIATRY | Facility: CLINIC | Age: 57
End: 2022-11-28
Payer: COMMERCIAL

## 2022-11-28 VITALS — HEART RATE: 62 BPM | SYSTOLIC BLOOD PRESSURE: 156 MMHG | DIASTOLIC BLOOD PRESSURE: 98 MMHG

## 2022-11-28 DIAGNOSIS — F41.1 GAD (GENERALIZED ANXIETY DISORDER): Primary | ICD-10-CM

## 2022-11-28 PROCEDURE — 99999 PR PBB SHADOW E&M-EST. PATIENT-LVL II: CPT | Mod: PBBFAC,,, | Performed by: PSYCHIATRY & NEUROLOGY

## 2022-11-28 PROCEDURE — 99999 PR PBB SHADOW E&M-EST. PATIENT-LVL II: ICD-10-PCS | Mod: PBBFAC,,, | Performed by: PSYCHIATRY & NEUROLOGY

## 2022-11-28 PROCEDURE — 4010F PR ACE/ARB THEARPY RXD/TAKEN: ICD-10-PCS | Mod: CPTII,S$GLB,, | Performed by: PSYCHIATRY & NEUROLOGY

## 2022-11-28 PROCEDURE — 1159F PR MEDICATION LIST DOCUMENTED IN MEDICAL RECORD: ICD-10-PCS | Mod: CPTII,S$GLB,, | Performed by: PSYCHIATRY & NEUROLOGY

## 2022-11-28 PROCEDURE — 1159F MED LIST DOCD IN RCRD: CPT | Mod: CPTII,S$GLB,, | Performed by: PSYCHIATRY & NEUROLOGY

## 2022-11-28 PROCEDURE — 3077F SYST BP >= 140 MM HG: CPT | Mod: CPTII,S$GLB,, | Performed by: PSYCHIATRY & NEUROLOGY

## 2022-11-28 PROCEDURE — 4010F ACE/ARB THERAPY RXD/TAKEN: CPT | Mod: CPTII,S$GLB,, | Performed by: PSYCHIATRY & NEUROLOGY

## 2022-11-28 PROCEDURE — 99214 PR OFFICE/OUTPT VISIT, EST, LEVL IV, 30-39 MIN: ICD-10-PCS | Mod: S$GLB,,, | Performed by: PSYCHIATRY & NEUROLOGY

## 2022-11-28 PROCEDURE — 1160F RVW MEDS BY RX/DR IN RCRD: CPT | Mod: CPTII,S$GLB,, | Performed by: PSYCHIATRY & NEUROLOGY

## 2022-11-28 PROCEDURE — 3077F PR MOST RECENT SYSTOLIC BLOOD PRESSURE >= 140 MM HG: ICD-10-PCS | Mod: CPTII,S$GLB,, | Performed by: PSYCHIATRY & NEUROLOGY

## 2022-11-28 PROCEDURE — 1160F PR REVIEW ALL MEDS BY PRESCRIBER/CLIN PHARMACIST DOCUMENTED: ICD-10-PCS | Mod: CPTII,S$GLB,, | Performed by: PSYCHIATRY & NEUROLOGY

## 2022-11-28 PROCEDURE — 3080F PR MOST RECENT DIASTOLIC BLOOD PRESSURE >= 90 MM HG: ICD-10-PCS | Mod: CPTII,S$GLB,, | Performed by: PSYCHIATRY & NEUROLOGY

## 2022-11-28 PROCEDURE — 99214 OFFICE O/P EST MOD 30 MIN: CPT | Mod: S$GLB,,, | Performed by: PSYCHIATRY & NEUROLOGY

## 2022-11-28 PROCEDURE — 3080F DIAST BP >= 90 MM HG: CPT | Mod: CPTII,S$GLB,, | Performed by: PSYCHIATRY & NEUROLOGY

## 2022-11-28 RX ORDER — ESCITALOPRAM OXALATE 10 MG/1
10 TABLET ORAL DAILY
Qty: 30 TABLET | Refills: 2 | Status: SHIPPED | OUTPATIENT
Start: 2022-11-28 | End: 2023-01-30 | Stop reason: SDUPTHER

## 2022-11-28 NOTE — PROGRESS NOTES
Outpatient Psychiatry Follow-up Visit (MD/NP)    11/28/2022    Karen Gutierrez, a 57 y.o. female, presenting for follow-up visit. Met with patient.    Reason for Encounter: Patient complains of distractability, anxiety.     Interval Hx: Patient seen and interviewed for follow-up, last seen about two months ago. Mostly adherent to medication, though sometimes forgets second dose. Reports anxiety is improved, continuing to have problems with attention/concentration. Depression mild and ongoing. No new mental health symptoms. No new general health symptoms. No new medications.     Background: Pt is a 56 y/o woman who presents for establishment of care, endorses chronic problems with anxiety , distractability, difficulty completing tasks. Reports having first brought this problem up to PCP Han Arshad, about five years ago following patient's niece's diagnosis with adhd and patient's recognition of similar attentional problems in herself. Reports no formal testing, no psych assessment, treated with adderall with benefit to both focus & moods (reduced levels of anxiety and depression, helping her worry less, catastrophize less).     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 Status post surgery (whipple), chemo. Follow-up going well.   Returned to work 2021. GELACIO-7 = 11;     Psych Hx: Endorses history of obsessions, no psychiatric diagnoses or assessments. No history of michael, hallucinations, delusions, self-harm behaviors, psych hospitalizations.    Family Hx: niece with adhd.  Sister with considerable irritability considerably helped by medication; father has similar symptoms.      Past Medical History:   Diagnosis Date    Cancer     duodenal cancer    Hypertension     Hypotension, iatrogenic     Mitral valve prolapse      Social Hx: born in [place], [place]. Born full-term, no complications.   No  developmental or health problems. Active as a child. In lots of clubs. Always highly active. Moved to  by first grade. No serious maltreatment. Dad was short-tempered. Older sister (lives in ). Childhood was happy. Both parents were in the home. Loved school. Loved school, performed well. No discipline problems. Good experience in school. Problems with bullying. Close to sister.     Graduated HS, went to [employer], majored in fashion design/merchandising. Then studied in program for medical administration. 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 Went back to work after extended medical leave, had a pay-cut, loss some of her responsibilities.     ,  for 7 years. Raised one son as a single mom. He's now 26 years old. Son is newly  and he and wife live with patient. He's been accepted to med school, he and his wife will move to [place] soon for school. Starts in January.     Review Of Systems:     GENERAL:  No weight gain or loss  SKIN:  No rashes or lacerations  HEAD:  No headaches  CHEST:  No shortness of breath, hyperventilation or cough  CARDIOVASCULAR:  No tachycardia or chest pain  ABDOMEN:  No nausea, vomiting, pain, constipation or diarrhea  URINARY:  No frequency, dysuria or sexual dysfunction  ENDOCRINE:  No polydipsia, polyuria  MUSCULOSKELETAL:  No pain or stiffness of the joints  NEUROLOGIC:  No weakness, sensory changes, seizures, confusion, memory loss, tremor or other abnormal movements    Current Evaluation:     Nutritional Screening: Considering the patient's height and weight, medications, medical history and preferences, should a referral be made to the dietitian? no    Constitutional  Vitals:  Most recent vital signs, dated less than 90 days prior to this appointment, were reviewed.    There were no vitals filed for this visit.       General:  unremarkable, age appropriate     Musculoskeletal  Muscle  Strength/Tone:  no tremor, no tic   Gait & Station:  non-ataxic     Psychiatric  Appearance: casually dressed & groomed;   Behavior: calm,   Cooperation: cooperative with assessment  Speech: normal rate, volume, tone  Thought Process: linear, goal-directed  Thought Content: No suicidal or homicidal ideation; no delusions  Affect: mildly anxious  Mood: mildly anxious  Perceptions: No auditory or visual hallucinations  Level of Consciousness: alert throughout interview  Insight: fair  Cognition: Oriented to person, place, time, & situation  Memory: no apparent deficits to general clinical interview; not formally assessed  Attention/Concentration: no apparent deficits to general clinical interview; not formally assessed  Fund of Knowledge: average by vocabulary/education    Laboratory Data  No visits with results within 1 Month(s) from this visit.   Latest known visit with results is:   Lab Visit on 10/05/2022   Component Date Value Ref Range Status    Sodium 10/05/2022 145  136 - 145 mmol/L Final    Potassium 10/05/2022 4.3  3.5 - 5.1 mmol/L Final    Chloride 10/05/2022 106  95 - 110 mmol/L Final    CO2 10/05/2022 25  23 - 29 mmol/L Final    Glucose 10/05/2022 127 (H)  70 - 110 mg/dL Final    BUN 10/05/2022 16  6 - 20 mg/dL Final    Creatinine 10/05/2022 1.1  0.5 - 1.4 mg/dL Final    Calcium 10/05/2022 9.6  8.7 - 10.5 mg/dL Final    Total Protein 10/05/2022 6.8  6.0 - 8.4 g/dL Final    Albumin 10/05/2022 4.3  3.5 - 5.2 g/dL Final    Total Bilirubin 10/05/2022 0.7  0.1 - 1.0 mg/dL Final    Alkaline Phosphatase 10/05/2022 102  55 - 135 U/L Final    AST 10/05/2022 32  10 - 40 U/L Final    ALT 10/05/2022 48 (H)  10 - 44 U/L Final    Anion Gap 10/05/2022 14  8 - 16 mmol/L Final    eGFR 10/05/2022 58 (A)  >60 mL/min/1.73 m^2 Final    WBC 10/05/2022 3.41 (L)  3.90 - 12.70 K/uL Final    RBC 10/05/2022 4.29  4.00 - 5.40 M/uL Final    Hemoglobin 10/05/2022 12.6  12.0 - 16.0 g/dL Final    Hematocrit 10/05/2022 36.9 (L)  37.0 -  48.5 % Final    MCV 10/05/2022 86  82 - 98 fL Final    MCH 10/05/2022 29.4  27.0 - 31.0 pg Final    MCHC 10/05/2022 34.1  32.0 - 36.0 g/dL Final    RDW 10/05/2022 12.3  11.5 - 14.5 % Final    Platelets 10/05/2022 157  150 - 450 K/uL Final    MPV 10/05/2022 9.7  9.2 - 12.9 fL Final    Immature Granulocytes 10/05/2022 0.0  0.0 - 0.5 % Final    Gran # (ANC) 10/05/2022 1.9  1.8 - 7.7 K/uL Final    Immature Grans (Abs) 10/05/2022 0.00  0.00 - 0.04 K/uL Final    Lymph # 10/05/2022 1.2  1.0 - 4.8 K/uL Final    Mono # 10/05/2022 0.3  0.3 - 1.0 K/uL Final    Eos # 10/05/2022 0.1  0.0 - 0.5 K/uL Final    Baso # 10/05/2022 0.02  0.00 - 0.20 K/uL Final    nRBC 10/05/2022 0  0 /100 WBC Final    Gran % 10/05/2022 55.7  38.0 - 73.0 % Final    Lymph % 10/05/2022 33.7  18.0 - 48.0 % Final    Mono % 10/05/2022 7.9  4.0 - 15.0 % Final    Eosinophil % 10/05/2022 2.1  0.0 - 8.0 % Final    Basophil % 10/05/2022 0.6  0.0 - 1.9 % Final    Differential Method 10/05/2022 Automated   Final    LD 10/05/2022 182  110 - 260 U/L Final     Medications  Outpatient Encounter Medications as of 11/28/2022   Medication Sig Dispense Refill    amLODIPine (NORVASC) 5 MG tablet TAKE 1 TABLET(5 MG) BY MOUTH EVERY DAY 30 tablet 11    busPIRone (BUSPAR) 30 MG Tab Take 1 twice daily. 60 tablet 2    BYSTOLIC 20 mg Tab Take 1 tablet by mouth once daily.      olmesartan (BENICAR) 40 MG tablet TAKE 1 TABLET(40 MG) BY MOUTH EVERY DAY 90 tablet 3    rosuvastatin (CRESTOR) 10 MG tablet        No facility-administered encounter medications on file as of 11/28/2022.     Assessment - Diagnosis - Goals:     Impression: 57 year old woman with chronic anxiety problems, currently moderately anxious. Has also had problems with attention, task completion. Previously treated with some benefit with stimulants, not formally diagnosed with adhd. Duodenal CA in remission.     Dx: generalized anxiety disorder    Treatment Goals:  Specify outcomes written in observable, behavioral  terms: reduce anxiety by self-report    Treatment Plan/Recommendations:   Escitalopram trial.   Information about self-care in recovery, how to access psychotherapy.   Discussed risks, benefits, and alternatives to treatment plan documented above with patient. I answered all patient questions related to this plan and patient expressed understanding and agreement.       Return to Clinic: 2 months    JAMES Rose MD  Psychiatry  [facility]  [address] , [place] [address] [phone]

## 2022-12-09 ENCOUNTER — DOCUMENTATION ONLY (OUTPATIENT)
Dept: GENETICS | Facility: CLINIC | Age: 57
End: 2022-12-09
Payer: COMMERCIAL

## 2022-12-09 NOTE — PROGRESS NOTES
Genetic Referral from Dr Daley,  Nurse reached out to pt for rescheduling of her genetic apt, no answer, voice message left.

## 2023-01-30 ENCOUNTER — OFFICE VISIT (OUTPATIENT)
Dept: PSYCHIATRY | Facility: CLINIC | Age: 58
End: 2023-01-30
Payer: COMMERCIAL

## 2023-01-30 VITALS
DIASTOLIC BLOOD PRESSURE: 88 MMHG | SYSTOLIC BLOOD PRESSURE: 156 MMHG | BODY MASS INDEX: 26.28 KG/M2 | HEART RATE: 61 BPM | WEIGHT: 193.81 LBS

## 2023-01-30 DIAGNOSIS — F41.1 GAD (GENERALIZED ANXIETY DISORDER): Primary | ICD-10-CM

## 2023-01-30 PROCEDURE — 3008F PR BODY MASS INDEX (BMI) DOCUMENTED: ICD-10-PCS | Mod: CPTII,S$GLB,, | Performed by: PSYCHIATRY & NEUROLOGY

## 2023-01-30 PROCEDURE — 99214 PR OFFICE/OUTPT VISIT, EST, LEVL IV, 30-39 MIN: ICD-10-PCS | Mod: S$GLB,,, | Performed by: PSYCHIATRY & NEUROLOGY

## 2023-01-30 PROCEDURE — 99214 OFFICE O/P EST MOD 30 MIN: CPT | Mod: S$GLB,,, | Performed by: PSYCHIATRY & NEUROLOGY

## 2023-01-30 PROCEDURE — 99999 PR PBB SHADOW E&M-EST. PATIENT-LVL II: CPT | Mod: PBBFAC,,, | Performed by: PSYCHIATRY & NEUROLOGY

## 2023-01-30 PROCEDURE — 99999 PR PBB SHADOW E&M-EST. PATIENT-LVL II: ICD-10-PCS | Mod: PBBFAC,,, | Performed by: PSYCHIATRY & NEUROLOGY

## 2023-01-30 PROCEDURE — 3079F PR MOST RECENT DIASTOLIC BLOOD PRESSURE 80-89 MM HG: ICD-10-PCS | Mod: CPTII,S$GLB,, | Performed by: PSYCHIATRY & NEUROLOGY

## 2023-01-30 PROCEDURE — 3008F BODY MASS INDEX DOCD: CPT | Mod: CPTII,S$GLB,, | Performed by: PSYCHIATRY & NEUROLOGY

## 2023-01-30 PROCEDURE — 3077F SYST BP >= 140 MM HG: CPT | Mod: CPTII,S$GLB,, | Performed by: PSYCHIATRY & NEUROLOGY

## 2023-01-30 PROCEDURE — 3079F DIAST BP 80-89 MM HG: CPT | Mod: CPTII,S$GLB,, | Performed by: PSYCHIATRY & NEUROLOGY

## 2023-01-30 PROCEDURE — 3077F PR MOST RECENT SYSTOLIC BLOOD PRESSURE >= 140 MM HG: ICD-10-PCS | Mod: CPTII,S$GLB,, | Performed by: PSYCHIATRY & NEUROLOGY

## 2023-01-30 RX ORDER — ESCITALOPRAM OXALATE 10 MG/1
10 TABLET ORAL DAILY
Qty: 90 TABLET | Refills: 1 | Status: SHIPPED | OUTPATIENT
Start: 2023-01-30 | End: 2023-05-30 | Stop reason: SDUPTHER

## 2023-01-30 NOTE — PROGRESS NOTES
Outpatient Psychiatry Follow-up Visit (MD/NP)    1/30/2023    Karen Gutierrez, a 57 y.o. female, presenting for follow-up visit. Met with patient.    Reason for Encounter: Patient complains of distractability, anxiety.     Interval Hx: Patient seen and interviewed for follow-up, last seen about two months ago. Mostly adherent to medication, though sometimes forgets second dose. Reports improvement in positivity, pleasure. Buspirone - wasn't tolerated. No new general health. Cancer monitoring. No new health conditions. Adherent to escitalopram. Denies side effects.     Background: Pt is a 58 y/o woman who presents for establishment of care, endorses chronic problems with anxiety , distractability, difficulty completing tasks. Reports having first brought this problem up to PCP aHn Arshad, about five years ago following patient's niece's diagnosis with adhd and patient's recognition of similar attentional problems in herself. Reports no formal testing, no psych assessment, treated with adderall with benefit to both focus & moods (reduced levels of anxiety and depression, helping her worry less, catastrophize less).     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 Status post surgery (whipple), chemo. Follow-up going well.   Returned to work 2021. GELACIO-7 = 11;     Psych Hx: Endorses history of obsessions, no psychiatric diagnoses or assessments. No history of michael, hallucinations, delusions, self-harm behaviors, psych hospitalizations.    Family Hx: niece with adhd.  Sister with considerable irritability considerably helped by medication; father has similar symptoms.      Past Medical History:   Diagnosis Date    Cancer     duodenal cancer    Hypertension     Hypotension, iatrogenic     Mitral valve prolapse      Social Hx: born in [place], [place]. Born full-term, no complications.   No developmental  or health problems. Active as a child. In lots of clubs. Always highly active. Moved to  by first grade. No serious maltreatment. Dad was short-tempered. Older sister (lives in ). Childhood was happy. Both parents were in the home. Loved school. Loved school, performed well. No discipline problems. Good experience in school. Problems with bullying. Close to sister.     Graduated HS, went to [employer], majored in fashion design/merchandising. Then studied in program for medical administration. 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 Went back to work after extended medical leave, had a pay-cut, loss some of her responsibilities.     ,  for 7 years. Raised one son as a single mom. He's now 26 years old. Son is newly  and he and wife live with patient. He's been accepted to med school, he and his wife will move to [place] soon for school. Starts in January.     Review Of Systems:     GENERAL:  No weight gain or loss  SKIN:  No rashes or lacerations  HEAD:  No headaches  CHEST:  No shortness of breath, hyperventilation or cough  CARDIOVASCULAR:  No tachycardia or chest pain  ABDOMEN:  No nausea, vomiting, pain, constipation or diarrhea  URINARY:  No frequency, dysuria or sexual dysfunction  ENDOCRINE:  No polydipsia, polyuria  MUSCULOSKELETAL:  No pain or stiffness of the joints  NEUROLOGIC:  No weakness, sensory changes, seizures, confusion, memory loss, tremor or other abnormal movements    Current Evaluation:     Nutritional Screening: Considering the patient's height and weight, medications, medical history and preferences, should a referral be made to the dietitian? no    Constitutional  Vitals:  Most recent vital signs, dated less than 90 days prior to this appointment, were reviewed.    There were no vitals filed for this visit.       General:  unremarkable, age appropriate     Musculoskeletal  Muscle Strength/Tone:  no  tremor, no tic   Gait & Station:  non-ataxic     Psychiatric  Appearance: casually dressed & groomed;   Behavior: calm,   Cooperation: cooperative with assessment  Speech: normal rate, volume, tone  Thought Process: linear, goal-directed  Thought Content: No suicidal or homicidal ideation; no delusions  Affect: mildly anxious  Mood: mildly anxious  Perceptions: No auditory or visual hallucinations  Level of Consciousness: alert throughout interview  Insight: fair  Cognition: Oriented to person, place, time, & situation  Memory: no apparent deficits to general clinical interview; not formally assessed  Attention/Concentration: no apparent deficits to general clinical interview; not formally assessed  Fund of Knowledge: average by vocabulary/education    Laboratory Data  No visits with results within 1 Month(s) from this visit.   Latest known visit with results is:   Lab Visit on 10/05/2022   Component Date Value Ref Range Status    Sodium 10/05/2022 145  136 - 145 mmol/L Final    Potassium 10/05/2022 4.3  3.5 - 5.1 mmol/L Final    Chloride 10/05/2022 106  95 - 110 mmol/L Final    CO2 10/05/2022 25  23 - 29 mmol/L Final    Glucose 10/05/2022 127 (H)  70 - 110 mg/dL Final    BUN 10/05/2022 16  6 - 20 mg/dL Final    Creatinine 10/05/2022 1.1  0.5 - 1.4 mg/dL Final    Calcium 10/05/2022 9.6  8.7 - 10.5 mg/dL Final    Total Protein 10/05/2022 6.8  6.0 - 8.4 g/dL Final    Albumin 10/05/2022 4.3  3.5 - 5.2 g/dL Final    Total Bilirubin 10/05/2022 0.7  0.1 - 1.0 mg/dL Final    Alkaline Phosphatase 10/05/2022 102  55 - 135 U/L Final    AST 10/05/2022 32  10 - 40 U/L Final    ALT 10/05/2022 48 (H)  10 - 44 U/L Final    Anion Gap 10/05/2022 14  8 - 16 mmol/L Final    eGFR 10/05/2022 58 (A)  >60 mL/min/1.73 m^2 Final    WBC 10/05/2022 3.41 (L)  3.90 - 12.70 K/uL Final    RBC 10/05/2022 4.29  4.00 - 5.40 M/uL Final    Hemoglobin 10/05/2022 12.6  12.0 - 16.0 g/dL Final    Hematocrit 10/05/2022 36.9 (L)  37.0 - 48.5 % Final    MCV  10/05/2022 86  82 - 98 fL Final    MCH 10/05/2022 29.4  27.0 - 31.0 pg Final    MCHC 10/05/2022 34.1  32.0 - 36.0 g/dL Final    RDW 10/05/2022 12.3  11.5 - 14.5 % Final    Platelets 10/05/2022 157  150 - 450 K/uL Final    MPV 10/05/2022 9.7  9.2 - 12.9 fL Final    Immature Granulocytes 10/05/2022 0.0  0.0 - 0.5 % Final    Gran # (ANC) 10/05/2022 1.9  1.8 - 7.7 K/uL Final    Immature Grans (Abs) 10/05/2022 0.00  0.00 - 0.04 K/uL Final    Lymph # 10/05/2022 1.2  1.0 - 4.8 K/uL Final    Mono # 10/05/2022 0.3  0.3 - 1.0 K/uL Final    Eos # 10/05/2022 0.1  0.0 - 0.5 K/uL Final    Baso # 10/05/2022 0.02  0.00 - 0.20 K/uL Final    nRBC 10/05/2022 0  0 /100 WBC Final    Gran % 10/05/2022 55.7  38.0 - 73.0 % Final    Lymph % 10/05/2022 33.7  18.0 - 48.0 % Final    Mono % 10/05/2022 7.9  4.0 - 15.0 % Final    Eosinophil % 10/05/2022 2.1  0.0 - 8.0 % Final    Basophil % 10/05/2022 0.6  0.0 - 1.9 % Final    Differential Method 10/05/2022 Automated   Final    LD 10/05/2022 182  110 - 260 U/L Final     Medications  Outpatient Encounter Medications as of 1/30/2023   Medication Sig Dispense Refill    amLODIPine (NORVASC) 5 MG tablet TAKE 1 TABLET(5 MG) BY MOUTH EVERY DAY 30 tablet 11    BYSTOLIC 20 mg Tab Take 1 tablet by mouth once daily.      EScitalopram oxalate (LEXAPRO) 10 MG tablet Take 1 tablet (10 mg total) by mouth once daily. 30 tablet 2    olmesartan (BENICAR) 40 MG tablet TAKE 1 TABLET(40 MG) BY MOUTH EVERY DAY 90 tablet 3    rosuvastatin (CRESTOR) 10 MG tablet        No facility-administered encounter medications on file as of 1/30/2023.     Assessment - Diagnosis - Goals:     Impression: 57 year old woman with chronic anxiety problems, currently moderately anxious. Has also had problems with attention, task completion. Previously treated with some benefit with stimulants, not formally diagnosed with adhd. Duodenal CA in remission. Symptoms improved, mild. Treatment well tolerated.    Dx: generalized anxiety  disorder    Treatment Goals:  Specify outcomes written in observable, behavioral terms: reduce anxiety by self-report    Treatment Plan/Recommendations:   Escitalopram trial.   Information about self-care in recovery, how to access psychotherapy.   Discussed risks, benefits, and alternatives to treatment plan documented above with patient. I answered all patient questions related to this plan and patient expressed understanding and agreement.       Return to Clinic: 2 months    JAMES Rose MD  Psychiatry  [facility]  [address] [place] [address]  [phone]

## 2023-03-10 ENCOUNTER — PATIENT MESSAGE (OUTPATIENT)
Dept: HEMATOLOGY/ONCOLOGY | Facility: CLINIC | Age: 58
End: 2023-03-10
Payer: COMMERCIAL

## 2023-04-12 ENCOUNTER — OFFICE VISIT (OUTPATIENT)
Dept: HEMATOLOGY/ONCOLOGY | Facility: CLINIC | Age: 58
End: 2023-04-12
Payer: COMMERCIAL

## 2023-04-12 ENCOUNTER — HOSPITAL ENCOUNTER (OUTPATIENT)
Dept: RADIOLOGY | Facility: HOSPITAL | Age: 58
Discharge: HOME | End: 2023-04-12
Attending: INTERNAL MEDICINE
Payer: COMMERCIAL

## 2023-04-12 VITALS
HEART RATE: 61 BPM | TEMPERATURE: 97 F | HEIGHT: 72 IN | SYSTOLIC BLOOD PRESSURE: 138 MMHG | DIASTOLIC BLOOD PRESSURE: 89 MMHG | BODY MASS INDEX: 26.16 KG/M2 | WEIGHT: 193.13 LBS | OXYGEN SATURATION: 99 %

## 2023-04-12 DIAGNOSIS — C17.0 DUODENAL CANCER: Primary | ICD-10-CM

## 2023-04-12 DIAGNOSIS — Z90.411 HISTORY OF PARTIAL PANCREATECTOMY: ICD-10-CM

## 2023-04-12 DIAGNOSIS — Z15.89 MONOALLELIC MUTATION OF MUTYH GENE: ICD-10-CM

## 2023-04-12 DIAGNOSIS — C17.0 DUODENAL CANCER: ICD-10-CM

## 2023-04-12 PROCEDURE — 4010F PR ACE/ARB THEARPY RXD/TAKEN: ICD-10-PCS | Mod: CPTII,S$GLB,, | Performed by: INTERNAL MEDICINE

## 2023-04-12 PROCEDURE — 99214 OFFICE O/P EST MOD 30 MIN: CPT | Mod: S$GLB,,, | Performed by: INTERNAL MEDICINE

## 2023-04-12 PROCEDURE — 99214 PR OFFICE/OUTPT VISIT, EST, LEVL IV, 30-39 MIN: ICD-10-PCS | Mod: S$GLB,,, | Performed by: INTERNAL MEDICINE

## 2023-04-12 PROCEDURE — 4010F ACE/ARB THERAPY RXD/TAKEN: CPT | Mod: CPTII,S$GLB,, | Performed by: INTERNAL MEDICINE

## 2023-04-12 PROCEDURE — A9698 NON-RAD CONTRAST MATERIALNOC: HCPCS | Performed by: INTERNAL MEDICINE

## 2023-04-12 PROCEDURE — 74177 CT ABD & PELVIS W/CONTRAST: CPT | Mod: 26,,, | Performed by: RADIOLOGY

## 2023-04-12 PROCEDURE — 71260 CT THORAX DX C+: CPT | Mod: 26,,, | Performed by: RADIOLOGY

## 2023-04-12 PROCEDURE — 3075F PR MOST RECENT SYSTOLIC BLOOD PRESS GE 130-139MM HG: ICD-10-PCS | Mod: CPTII,S$GLB,, | Performed by: INTERNAL MEDICINE

## 2023-04-12 PROCEDURE — 3075F SYST BP GE 130 - 139MM HG: CPT | Mod: CPTII,S$GLB,, | Performed by: INTERNAL MEDICINE

## 2023-04-12 PROCEDURE — 25500020 PHARM REV CODE 255: Performed by: INTERNAL MEDICINE

## 2023-04-12 PROCEDURE — 3008F PR BODY MASS INDEX (BMI) DOCUMENTED: ICD-10-PCS | Mod: CPTII,S$GLB,, | Performed by: INTERNAL MEDICINE

## 2023-04-12 PROCEDURE — 71260 CT THORAX DX C+: CPT | Mod: TC

## 2023-04-12 PROCEDURE — 71260 CT CHEST ABDOMEN PELVIS WITH CONTRAST (XPD): ICD-10-PCS | Mod: 26,,, | Performed by: RADIOLOGY

## 2023-04-12 PROCEDURE — 3008F BODY MASS INDEX DOCD: CPT | Mod: CPTII,S$GLB,, | Performed by: INTERNAL MEDICINE

## 2023-04-12 PROCEDURE — 1159F PR MEDICATION LIST DOCUMENTED IN MEDICAL RECORD: ICD-10-PCS | Mod: CPTII,S$GLB,, | Performed by: INTERNAL MEDICINE

## 2023-04-12 PROCEDURE — 1160F PR REVIEW ALL MEDS BY PRESCRIBER/CLIN PHARMACIST DOCUMENTED: ICD-10-PCS | Mod: CPTII,S$GLB,, | Performed by: INTERNAL MEDICINE

## 2023-04-12 PROCEDURE — 99999 PR PBB SHADOW E&M-EST. PATIENT-LVL IV: ICD-10-PCS | Mod: PBBFAC,,, | Performed by: INTERNAL MEDICINE

## 2023-04-12 PROCEDURE — 99999 PR PBB SHADOW E&M-EST. PATIENT-LVL IV: CPT | Mod: PBBFAC,,, | Performed by: INTERNAL MEDICINE

## 2023-04-12 PROCEDURE — 3079F DIAST BP 80-89 MM HG: CPT | Mod: CPTII,S$GLB,, | Performed by: INTERNAL MEDICINE

## 2023-04-12 PROCEDURE — 1159F MED LIST DOCD IN RCRD: CPT | Mod: CPTII,S$GLB,, | Performed by: INTERNAL MEDICINE

## 2023-04-12 PROCEDURE — 74177 CT CHEST ABDOMEN PELVIS WITH CONTRAST (XPD): ICD-10-PCS | Mod: 26,,, | Performed by: RADIOLOGY

## 2023-04-12 PROCEDURE — 3079F PR MOST RECENT DIASTOLIC BLOOD PRESSURE 80-89 MM HG: ICD-10-PCS | Mod: CPTII,S$GLB,, | Performed by: INTERNAL MEDICINE

## 2023-04-12 PROCEDURE — 1160F RVW MEDS BY RX/DR IN RCRD: CPT | Mod: CPTII,S$GLB,, | Performed by: INTERNAL MEDICINE

## 2023-04-12 PROCEDURE — 74177 CT ABD & PELVIS W/CONTRAST: CPT | Mod: TC

## 2023-04-12 RX ADMIN — IOHEXOL 1000 ML: 9 SOLUTION ORAL at 12:04

## 2023-04-12 RX ADMIN — IOHEXOL 100 ML: 350 INJECTION, SOLUTION INTRAVENOUS at 01:04

## 2023-04-12 NOTE — PROGRESS NOTES
Subjective:       Patient ID: Karen Gutierrez is a 57 y.o. female.    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 patient returns for follow-up patient is doing reasonably chills night sweats ECOG status 1    Past Medical History:   Diagnosis Date    Cancer     duodenal cancer    Hypertension     Hypotension, iatrogenic     Mitral valve prolapse      Family History   Problem Relation Age of Onset    Ovarian cancer Mother     Thyroid cancer Mother     Atrial fibrillation Mother     Stroke Mother     Hyperlipidemia Mother     Diabetes Mother     Bladder Cancer Father     Hypertension Father     Diabetes Father     Stroke Maternal Grandfather      Social History     Socioeconomic History    Marital status:    Tobacco Use    Smoking status: Former    Smokeless tobacco: Never   Substance and Sexual Activity    Alcohol use: Not Currently    Drug use: Never     Past Surgical History:   Procedure Laterality Date    BUNIONECTOMY Left     HYSTERECTOMY  2000    INSERTION OF TUNNELED CENTRAL VENOUS CATHETER (CVC) WITH SUBCUTANEOUS PORT N/A 3/26/2021    Procedure: [id number];  Surgeon: Lauren Abraham MD;  Location: [facility] OR;  Service: General;  Laterality: N/A;    LITHOTRIPSY      PLACEMENT OF JEJUNOSTOMY TUBE  2/18/2021    Procedure: INSERTION, JEJUNOSTOMY TUBE;  Surgeon: Hitesh Díaz MD;  Location: [facility] OR [address] [place] [address];  Service: General;;    WHIPPLE PROCEDURE N/A 2/18/2021    Procedure: WHIPPLE PROCEDURE;  Surgeon: Hitesh Díaz MD;  [facility];  Service: General;  Laterality: N/A;       Labs:  Lab Results   Component Value Date    WBC 4.26 04/12/2023    HGB 13.0 04/12/2023    HCT 39.5 04/12/2023    MCV 87 04/12/2023     04/12/2023     BMP  Lab Results   Component Value Date     04/12/2023    K 4.7 04/12/2023     04/12/2023    CO2 27 04/12/2023    BUN 16 04/12/2023    CREATININE  0.9 04/12/2023    CALCIUM 9.4 04/12/2023    ANIONGAP 8 04/12/2023    ESTGFRAFRICA >60 03/07/2022    EGFRNONAA >60 03/07/2022     Lab Results   Component Value Date    ALT 40 04/12/2023    AST 27 04/12/2023    ALKPHOS 108 04/12/2023    BILITOT 0.5 04/12/2023       Lab Results   Component Value Date    IRON 42 05/10/2021    TIBC 404 05/10/2021    FERRITIN 345 (H) 05/10/2021     No results found for: [id number]  No results found for: FOLATE  No results found for: TSH      Review of Systems   Constitutional:  Negative for activity change, appetite change, chills, diaphoresis, fatigue, fever and unexpected weight change.   HENT:  Negative for congestion, dental problem, drooling, ear discharge, ear pain, facial swelling, hearing loss, mouth sores, nosebleeds, postnasal drip, rhinorrhea, sinus pressure, sneezing, sore throat, tinnitus, trouble swallowing and voice change.    Eyes:  Negative for photophobia, pain, discharge, redness, itching and visual disturbance.   Respiratory:  Negative for cough, choking, chest tightness, shortness of breath, wheezing and stridor.    Cardiovascular:  Negative for chest pain, palpitations and leg swelling.   Gastrointestinal:  Negative for abdominal distention, abdominal pain, anal bleeding, blood in stool, constipation, diarrhea, nausea, rectal pain and vomiting.   Endocrine: Negative for cold intolerance, heat intolerance, polydipsia, polyphagia and polyuria.   Genitourinary:  Negative for decreased urine volume, difficulty urinating, dyspareunia, dysuria, enuresis, flank pain, frequency, genital sores, hematuria, menstrual problem, pelvic pain, urgency, vaginal bleeding, vaginal discharge and vaginal pain.   Musculoskeletal:  Negative for arthralgias, back pain, gait problem, joint swelling, myalgias, neck pain and neck stiffness.   Skin:  Negative for color change, pallor and rash.   Allergic/Immunologic: Negative for environmental allergies, food allergies and immunocompromised  state.   Neurological:  Negative for dizziness, tremors, seizures, syncope, facial asymmetry, speech difficulty, weakness, light-headedness, numbness and headaches.   Hematological:  Negative for adenopathy. Does not bruise/bleed easily.   Psychiatric/Behavioral:  Negative for agitation, behavioral problems, confusion, decreased concentration, dysphoric mood, hallucinations, self-injury, sleep disturbance and suicidal ideas. The patient is not nervous/anxious and is not hyperactive.      Objective:      Physical Exam  Vitals reviewed.   Constitutional:       General: She is not in acute distress.     Appearance: She is well-developed. She is not diaphoretic.   HENT:      Head: Normocephalic and atraumatic.      Right Ear: External ear normal.      Left Ear: External ear normal.      Nose: Nose normal.      Right Sinus: No maxillary sinus tenderness or frontal sinus tenderness.      Left Sinus: No maxillary sinus tenderness or frontal sinus tenderness.      Mouth/Throat:      Pharynx: No oropharyngeal exudate.   Eyes:      General: Lids are normal. No scleral icterus.        Right eye: No discharge.         Left eye: No discharge.      Conjunctiva/sclera: Conjunctivae normal.      Right eye: Right conjunctiva is not injected. No hemorrhage.     Left eye: Left conjunctiva is not injected. No hemorrhage.     Pupils: Pupils are equal, round, and reactive to light.   Neck:      Thyroid: No thyromegaly.      Vascular: No JVD.      Trachea: No tracheal deviation.   Cardiovascular:      Rate and Rhythm: Normal rate.   Pulmonary:      Effort: Pulmonary effort is normal. No respiratory distress.      Breath sounds: No stridor.   Chest:      Chest wall: No tenderness.   Abdominal:      General: Bowel sounds are normal. There is no distension.      Palpations: Abdomen is soft. There is no hepatomegaly, splenomegaly or mass.      Tenderness: There is no abdominal tenderness. There is no rebound.   Musculoskeletal:          General: No tenderness. Normal range of motion.      Cervical back: Normal range of motion and neck supple.   Lymphadenopathy:      Cervical: No cervical adenopathy.      Upper Body:      Right upper body: No supraclavicular adenopathy.      Left upper body: No supraclavicular adenopathy.   Skin:     General: Skin is dry.      Findings: No erythema or rash.   Neurological:      Mental Status: She is alert and oriented to person, place, and time.      Cranial Nerves: No cranial nerve deficit.      Coordination: Coordination normal.   Psychiatric:         Behavior: Behavior normal.         Thought Content: Thought content normal.         Judgment: Judgment normal.           Assessment:      1. Duodenal cancer    2. History of partial pancreatectomy    3. Monoallelic mutation of MUTYH gene           Med Onc Chart Routing      Follow up with physician 6 months. Return to clinic in 6 months to see me with CBC CMP LDH CT chest abdomen pelvis prior   Follow up with [facility]    Infusion scheduling note    Injection scheduling note    Labs    Imaging    Pharmacy appointment    Other referrals            Plan:     No evidence of recurrence on my review will followed results of CT chest abdomen pelvis when completed.  Laboratory studies stable.  Come in on why no mention of stomach.  Reviewed previous imaging demonstrating stomach.  Certainly would recommend more frequent colonoscopy interventions and 10 years with new gene mutation.  Discussed implications answered questions told to continue with routine screenings as well as follow-up with Dermatology for previous history of dysplastic nevus        Enzo Daley Jr, MD FACP

## 2023-05-30 ENCOUNTER — OFFICE VISIT (OUTPATIENT)
Dept: PSYCHIATRY | Facility: CLINIC | Age: 58
End: 2023-05-30
Payer: COMMERCIAL

## 2023-05-30 DIAGNOSIS — F41.1 GAD (GENERALIZED ANXIETY DISORDER): Primary | ICD-10-CM

## 2023-05-30 PROCEDURE — 99213 OFFICE O/P EST LOW 20 MIN: CPT | Mod: 95,,, | Performed by: PSYCHIATRY & NEUROLOGY

## 2023-05-30 PROCEDURE — 4010F PR ACE/ARB THEARPY RXD/TAKEN: ICD-10-PCS | Mod: CPTII,95,, | Performed by: PSYCHIATRY & NEUROLOGY

## 2023-05-30 PROCEDURE — 4010F ACE/ARB THERAPY RXD/TAKEN: CPT | Mod: CPTII,95,, | Performed by: PSYCHIATRY & NEUROLOGY

## 2023-05-30 PROCEDURE — 99213 PR OFFICE/OUTPT VISIT, EST, LEVL III, 20-29 MIN: ICD-10-PCS | Mod: 95,,, | Performed by: PSYCHIATRY & NEUROLOGY

## 2023-05-30 RX ORDER — ESCITALOPRAM OXALATE 10 MG/1
10 TABLET ORAL DAILY
Qty: 90 TABLET | Refills: 1 | Status: SHIPPED | OUTPATIENT
Start: 2023-05-30 | End: 2023-08-23

## 2023-05-30 NOTE — PROGRESS NOTES
"Outpatient Psychiatry Follow-up Visit (MD/NP)    5/30/2023    Karen Gutierrez, a 58 y.o. female, presenting for follow-up visit. Met with patient.    Reason for Encounter: Patient complains of distractability, anxiety.     Interval Hx: Patient seen and interviewed for follow-up, last seen about two months ago. Reports cancer in remission, relieved but continues intermittently anxious. Generally feels back to her "old self". No new health conditions or complaints. No new medications. Adherent to medication. Reports ongoing benefits. Denies side effects.     Background: Pt is a 56 y/o woman who presents for establishment of care, endorses chronic problems with anxiety , distractability, difficulty completing tasks. Reports having first brought this problem up to PCP Han Arshad, about five years ago following patient's niece's diagnosis with adhd and patient's recognition of similar attentional problems in herself. Reports no formal testing, no psych assessment, treated with adderall with benefit to both focus & moods (reduced levels of anxiety and depression, helping her worry less, catastrophize less).     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 Status post surgery (whipple), chemo. Follow-up going well.   Returned to work 2021. GELACIO-7 = 11;     Psych Hx: Endorses history of obsessions, no psychiatric diagnoses or assessments. No history of michael, hallucinations, delusions, self-harm behaviors, psych hospitalizations.    Family Hx: niece with adhd.  Sister with considerable irritability considerably helped by medication; father has similar symptoms.      Past Medical History:   Diagnosis Date    Cancer     duodenal cancer    Hypertension     Hypotension, iatrogenic     Mitral valve prolapse      Social Hx: born in [place], [place]. Born full-term, no complications.   No developmental or health " problems. Active as a child. In lots of clubs. Always highly active. Moved to  by first grade. No serious maltreatment. Dad was short-tempered. Older sister (lives in ). Childhood was happy. Both parents were in the home. Loved school. Loved school, performed well. No discipline problems. Good experience in school. Problems with bullying. Close to sister.     Graduated HS, went to [employer], majored in fashion design/merchandising. Then studied in program for medical administration. 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 Went back to work after extended medical leave, had a pay-cut, loss some of her responsibilities.     ,  for 7 years. Raised one son as a single mom. He's now 26 years old. Son is newly  and he and wife live with patient. He's been accepted to med school, he and his wife will move to [place] soon for school. Starts in January.     Review Of Systems:     GENERAL:  No weight gain or loss  SKIN:  No rashes or lacerations  HEAD:  No headaches  CHEST:  No shortness of breath, hyperventilation or cough  CARDIOVASCULAR:  No tachycardia or chest pain  ABDOMEN:  No nausea, vomiting, pain, constipation or diarrhea  URINARY:  No frequency, dysuria or sexual dysfunction  ENDOCRINE:  No polydipsia, polyuria  MUSCULOSKELETAL:  No pain or stiffness of the joints  NEUROLOGIC:  No weakness, sensory changes, seizures, confusion, memory loss, tremor or other abnormal movements    Current Evaluation:     Nutritional Screening: Considering the patient's height and weight, medications, medical history and preferences, should a referral be made to the dietitian? no    Constitutional  Vitals:  Most recent vital signs, dated less than 90 days prior to this appointment, were reviewed.    There were no vitals filed for this visit.       General:  unremarkable, age appropriate     Musculoskeletal  Muscle Strength/Tone:  no tremor, no tic    Gait & Station:  non-ataxic     Psychiatric  Appearance: casually dressed & groomed;   Behavior: calm,   Cooperation: cooperative with assessment  Speech: normal rate, volume, tone  Thought Process: linear, goal-directed  Thought Content: No suicidal or homicidal ideation; no delusions  Affect: mildly anxious  Mood: mildly anxious  Perceptions: No auditory or visual hallucinations  Level of Consciousness: alert throughout interview  Insight: fair  Cognition: Oriented to person, place, time, & situation  Memory: no apparent deficits to general clinical interview; not formally assessed  Attention/Concentration: no apparent deficits to general clinical interview; not formally assessed  Fund of Knowledge: average by vocabulary/education    Laboratory Data  No visits with results within 1 Month(s) from this visit.   Latest known visit with results is:   Lab Visit on 04/12/2023   Component Date Value Ref Range Status    Sodium 04/12/2023 141  136 - 145 mmol/L Final    Potassium 04/12/2023 4.7  3.5 - 5.1 mmol/L Final    Chloride 04/12/2023 106  95 - 110 mmol/L Final    CO2 04/12/2023 27  23 - 29 mmol/L Final    Glucose 04/12/2023 112 (H)  70 - 110 mg/dL Final    BUN 04/12/2023 16  6 - 20 mg/dL Final    Creatinine 04/12/2023 0.9  0.5 - 1.4 mg/dL Final    Calcium 04/12/2023 9.4  8.7 - 10.5 mg/dL Final    Total Protein 04/12/2023 7.1  6.0 - 8.4 g/dL Final    Albumin 04/12/2023 4.3  3.5 - 5.2 g/dL Final    Total Bilirubin 04/12/2023 0.5  0.1 - 1.0 mg/dL Final    Alkaline Phosphatase 04/12/2023 108  55 - 135 U/L Final    AST 04/12/2023 27  10 - 40 U/L Final    ALT 04/12/2023 40  10 - 44 U/L Final    Anion Gap 04/12/2023 8  8 - 16 mmol/L Final    eGFR 04/12/2023 >60  >60 mL/min/1.73 m^2 Final    WBC 04/12/2023 4.26  3.90 - 12.70 K/uL Final    RBC 04/12/2023 4.54  4.00 - 5.40 M/uL Final    Hemoglobin 04/12/2023 13.0  12.0 - 16.0 g/dL Final    Hematocrit 04/12/2023 39.5  37.0 - 48.5 % Final    MCV 04/12/2023 87  82 - 98 fL Final     MCH 04/12/2023 28.6  27.0 - 31.0 pg Final    MCHC 04/12/2023 32.9  32.0 - 36.0 g/dL Final    RDW 04/12/2023 12.5  11.5 - 14.5 % Final    Platelets 04/12/2023 174  150 - 450 K/uL Final    MPV 04/12/2023 10.4  9.2 - 12.9 fL Final    Immature Granulocytes 04/12/2023 0.2  0.0 - 0.5 % Final    Gran # (ANC) 04/12/2023 2.2  1.8 - 7.7 K/uL Final    Immature Grans (Abs) 04/12/2023 0.01  0.00 - 0.04 K/uL Final    Lymph # 04/12/2023 1.7  1.0 - 4.8 K/uL Final    Mono # 04/12/2023 0.3  0.3 - 1.0 K/uL Final    Eos # 04/12/2023 0.1  0.0 - 0.5 K/uL Final    Baso # 04/12/2023 0.03  0.00 - 0.20 K/uL Final    nRBC 04/12/2023 0  0 /100 WBC Final    Gran % 04/12/2023 50.5  38.0 - 73.0 % Final    Lymph % 04/12/2023 39.7  18.0 - 48.0 % Final    Mono % 04/12/2023 7.3  4.0 - 15.0 % Final    Eosinophil % 04/12/2023 1.6  0.0 - 8.0 % Final    Basophil % 04/12/2023 0.7  0.0 - 1.9 % Final    Differential Method 04/12/2023 Automated   Final    LD 04/12/2023 162  110 - 260 U/L Final     Medications  Outpatient Encounter Medications as of 5/30/2023   Medication Sig Dispense Refill    amLODIPine (NORVASC) 5 MG tablet TAKE 1 TABLET(5 MG) BY MOUTH EVERY DAY 30 tablet 11    BYSTOLIC 20 mg Tab Take 1 tablet by mouth once daily.      EScitalopram oxalate (LEXAPRO) 10 MG tablet Take 1 tablet (10 mg total) by mouth once daily. 90 tablet 1    olmesartan (BENICAR) 40 MG tablet TAKE 1 TABLET(40 MG) BY MOUTH EVERY DAY 90 tablet 3    rosuvastatin (CRESTOR) 10 MG tablet        No facility-administered encounter medications on file as of 5/30/2023.     Assessment - Diagnosis - Goals:     Impression: 57 year old woman with chronic anxiety problems, currently moderately anxious. Has also had problems with attention, task completion. Previously treated with some benefit with stimulants, not formally diagnosed with adhd. Duodenal CA in remission. Symptoms improved, mild. Treatment well tolerated.    Dx: generalized anxiety disorder    Treatment Goals:  Specify  outcomes written in observable, behavioral terms: reduce anxiety by self-report    Treatment Plan/Recommendations:   Escitalopram trial.   Information about self-care in recovery, how to access psychotherapy.   Discussed risks, benefits, and alternatives to treatment plan documented above with patient. I answered all patient questions related to this plan and patient expressed understanding and agreement.     Return to Clinic: 2 months    JAMES Rose MD  Psychiatry  [facility]  [address] , [place], [place] [id number] [phone]

## 2023-05-31 ENCOUNTER — PATIENT MESSAGE (OUTPATIENT)
Dept: PSYCHIATRY | Facility: CLINIC | Age: 58
End: 2023-05-31
Payer: COMMERCIAL

## 2023-05-31 ENCOUNTER — PATIENT MESSAGE (OUTPATIENT)
Dept: CARDIOLOGY | Facility: CLINIC | Age: 58
End: 2023-05-31
Payer: COMMERCIAL

## 2023-06-01 ENCOUNTER — PATIENT MESSAGE (OUTPATIENT)
Dept: PSYCHIATRY | Facility: CLINIC | Age: 58
End: 2023-06-01
Payer: COMMERCIAL

## 2023-08-23 RX ORDER — ESCITALOPRAM OXALATE 10 MG/1
10 TABLET ORAL
Qty: 90 TABLET | Refills: 0 | Status: SHIPPED | OUTPATIENT
Start: 2023-08-23 | End: 2024-02-21 | Stop reason: SDUPTHER

## 2023-08-25 DIAGNOSIS — E78.00 PURE HYPERCHOLESTEROLEMIA: ICD-10-CM

## 2023-08-25 DIAGNOSIS — I10 BENIGN HYPERTENSION: ICD-10-CM

## 2023-08-25 DIAGNOSIS — Z01.818 PREOP TESTING: ICD-10-CM

## 2023-08-25 DIAGNOSIS — I34.1 MITRAL VALVE PROLAPSE: ICD-10-CM

## 2023-08-25 RX ORDER — AMLODIPINE BESYLATE 5 MG/1
TABLET ORAL
Qty: 30 TABLET | Refills: 11 | Status: SHIPPED | OUTPATIENT
Start: 2023-08-25

## 2023-10-11 ENCOUNTER — HOSPITAL ENCOUNTER (OUTPATIENT)
Dept: RADIOLOGY | Facility: HOSPITAL | Age: 58
Discharge: HOME | End: 2023-10-11
Attending: INTERNAL MEDICINE
Payer: COMMERCIAL

## 2023-10-11 ENCOUNTER — OFFICE VISIT (OUTPATIENT)
Dept: HEMATOLOGY/ONCOLOGY | Facility: CLINIC | Age: 58
End: 2023-10-11
Payer: COMMERCIAL

## 2023-10-11 ENCOUNTER — PATIENT MESSAGE (OUTPATIENT)
Dept: HEMATOLOGY/ONCOLOGY | Facility: CLINIC | Age: 58
End: 2023-10-11

## 2023-10-11 DIAGNOSIS — Z15.89 MONOALLELIC MUTATION OF MUTYH GENE: ICD-10-CM

## 2023-10-11 DIAGNOSIS — Z86.018 HISTORY OF DYSPLASTIC NEVUS: ICD-10-CM

## 2023-10-11 DIAGNOSIS — C17.0 DUODENAL CANCER: ICD-10-CM

## 2023-10-11 DIAGNOSIS — C17.0 DUODENAL CANCER: Primary | ICD-10-CM

## 2023-10-11 DIAGNOSIS — Z90.411 HISTORY OF PARTIAL PANCREATECTOMY: ICD-10-CM

## 2023-10-11 DIAGNOSIS — E44.0 MALNUTRITION OF MODERATE DEGREE: ICD-10-CM

## 2023-10-11 PROCEDURE — 99214 PR OFFICE/OUTPT VISIT, EST, LEVL IV, 30-39 MIN: ICD-10-PCS | Mod: 95,,, | Performed by: INTERNAL MEDICINE

## 2023-10-11 PROCEDURE — 74177 CT ABD & PELVIS W/CONTRAST: CPT | Mod: 26,,, | Performed by: STUDENT IN AN ORGANIZED HEALTH CARE EDUCATION/TRAINING PROGRAM

## 2023-10-11 PROCEDURE — 71260 CT CHEST ABDOMEN PELVIS WITH CONTRAST (XPD): ICD-10-PCS | Mod: 26,,, | Performed by: STUDENT IN AN ORGANIZED HEALTH CARE EDUCATION/TRAINING PROGRAM

## 2023-10-11 PROCEDURE — 4010F PR ACE/ARB THEARPY RXD/TAKEN: ICD-10-PCS | Mod: CPTII,95,, | Performed by: INTERNAL MEDICINE

## 2023-10-11 PROCEDURE — 4010F ACE/ARB THERAPY RXD/TAKEN: CPT | Mod: CPTII,95,, | Performed by: INTERNAL MEDICINE

## 2023-10-11 PROCEDURE — 74177 CT CHEST ABDOMEN PELVIS WITH CONTRAST (XPD): ICD-10-PCS | Mod: 26,,, | Performed by: STUDENT IN AN ORGANIZED HEALTH CARE EDUCATION/TRAINING PROGRAM

## 2023-10-11 PROCEDURE — 71260 CT THORAX DX C+: CPT | Mod: 26,,, | Performed by: STUDENT IN AN ORGANIZED HEALTH CARE EDUCATION/TRAINING PROGRAM

## 2023-10-11 PROCEDURE — 71260 CT THORAX DX C+: CPT | Mod: TC

## 2023-10-11 PROCEDURE — 25500020 PHARM REV CODE 255: Performed by: INTERNAL MEDICINE

## 2023-10-11 PROCEDURE — 99214 OFFICE O/P EST MOD 30 MIN: CPT | Mod: 95,,, | Performed by: INTERNAL MEDICINE

## 2023-10-11 PROCEDURE — A9698 NON-RAD CONTRAST MATERIALNOC: HCPCS | Performed by: INTERNAL MEDICINE

## 2023-10-11 PROCEDURE — 74177 CT ABD & PELVIS W/CONTRAST: CPT | Mod: TC

## 2023-10-11 RX ADMIN — IOHEXOL 1000 ML: 12 SOLUTION ORAL at 10:10

## 2023-10-11 RX ADMIN — IOHEXOL 100 ML: 350 INJECTION, SOLUTION INTRAVENOUS at 11:10

## 2023-10-11 NOTE — PROGRESS NOTES
Subjective:       Patient ID: Karen Gutierrez is a 58 y.o. female.    Chief Complaint: Results and Cancer    HPI:  58-year-old female status post pancreatectomy for stage IIIB duodenal carcinoma patient is doing well feels good denies nausea vomiting fevers chills night sweats.  Patient seen in video visit follow-up ECOG status 1    Past Medical History:   Diagnosis Date    Cancer     duodenal cancer    Hypertension     Hypotension, iatrogenic     Mitral valve prolapse      Family History   Problem Relation Age of Onset    Ovarian cancer Mother     Thyroid cancer Mother     Atrial fibrillation Mother     Stroke Mother     Hyperlipidemia Mother     Diabetes Mother     Bladder Cancer Father     Hypertension Father     Diabetes Father     Stroke Maternal Grandfather      Social History     Socioeconomic History    Marital status:    Tobacco Use    Smoking status: Former    Smokeless tobacco: Never   Substance and Sexual Activity    Alcohol use: Not Currently    Drug use: Never     Past Surgical History:   Procedure Laterality Date    BUNIONECTOMY Left     HYSTERECTOMY  2000    INSERTION OF TUNNELED CENTRAL VENOUS CATHETER (CVC) WITH SUBCUTANEOUS PORT N/A 3/26/2021    Procedure: [id number];  Surgeon: Lauren Abraham MD;  Location: [facility] OR;  Service: General;  Laterality: N/A;    LITHOTRIPSY      PLACEMENT OF JEJUNOSTOMY TUBE  2/18/2021    Procedure: INSERTION, JEJUNOSTOMY TUBE;  Surgeon: Hitesh Díaz MD;  Location: [facility] OR 2ND FLR;  Service: General;;    WHIPPLE PROCEDURE N/A 2/18/2021    Procedure: WHIPPLE PROCEDURE;  Surgeon: Hitesh Díaz MD;  Location: [facility] OR 2ND FLR;  Service: General;  Laterality: N/A;       Labs:  Lab Results   Component Value Date    WBC 4.03 10/11/2023    HGB 12.9 10/11/2023    HCT 38.1 10/11/2023    MCV 87 10/11/2023     10/11/2023     BMP  Lab Results   Component Value Date     10/11/2023    K 4.5 10/11/2023     10/11/2023    CO2 25  "10/11/2023    BUN 9 10/11/2023    CREATININE 0.9 10/11/2023    CALCIUM 9.2 10/11/2023    ANIONGAP 13 10/11/2023    ESTGFRAFRICA >60 03/07/2022    EGFRNONAA >60 03/07/2022     Lab Results   Component Value Date    ALT 28 10/11/2023    AST 24 10/11/2023    ALKPHOS 96 10/11/2023    BILITOT 0.7 10/11/2023       Lab Results   Component Value Date    IRON 42 05/10/2021    TIBC 404 05/10/2021    FERRITIN 345 (H) 05/10/2021     No results found for: "IAYUMBEJ88"  No results found for: "FOLATE"  No results found for: "TSH"      Review of Systems   Constitutional:  Negative for activity change, appetite change, chills, diaphoresis, fatigue, fever and unexpected weight change.   HENT:  Negative for congestion, dental problem, drooling, ear discharge, ear pain, facial swelling, hearing loss, mouth sores, nosebleeds, postnasal drip, rhinorrhea, sinus pressure, sneezing, sore throat, tinnitus, trouble swallowing and voice change.    Eyes:  Negative for photophobia, pain, discharge, redness, itching and visual disturbance.   Respiratory:  Negative for cough, choking, chest tightness, shortness of breath, wheezing and stridor.    Cardiovascular:  Negative for chest pain, palpitations and leg swelling.   Gastrointestinal:  Negative for abdominal distention, abdominal pain, anal bleeding, blood in stool, constipation, diarrhea, nausea, rectal pain and vomiting.   Endocrine: Negative for cold intolerance, heat intolerance, polydipsia, polyphagia and polyuria.   Genitourinary:  Negative for decreased urine volume, difficulty urinating, dyspareunia, dysuria, enuresis, flank pain, frequency, genital sores, hematuria, menstrual problem, pelvic pain, urgency, vaginal bleeding, vaginal discharge and vaginal pain.   Musculoskeletal:  Negative for arthralgias, back pain, gait problem, joint swelling, myalgias, neck pain and neck stiffness.   Skin:  Negative for color change, pallor and rash.   Allergic/Immunologic: Negative for environmental " allergies, food allergies and immunocompromised state.   Neurological:  Negative for dizziness, tremors, seizures, syncope, facial asymmetry, speech difficulty, weakness, light-headedness, numbness and headaches.   Hematological:  Negative for adenopathy. Does not bruise/bleed easily.   Psychiatric/Behavioral:  Negative for agitation, behavioral problems, confusion, decreased concentration, dysphoric mood, hallucinations, self-injury, sleep disturbance and suicidal ideas. The patient is not nervous/anxious and is not hyperactive.        Objective:      Physical Exam  Constitutional:       Appearance: Normal appearance.             Assessment:      1. Duodenal cancer    2. History of partial pancreatectomy    3. History of dysplastic nevus    4. Malnutrition of moderate degree    5. Monoallelic mutation of MUTYH gene           Med Onc Chart Routing      Follow up with physician . Follow-up with me in 6 months with CBC CMP LDH in CT chest abdomen pelvis   Follow up with [facility]    Infusion scheduling note    Injection scheduling note    Labs    Imaging    Pharmacy appointment    Other referrals                   Plan:     The patient location is:  Home  The chief complaint leading to consultation is:  Duodenal cancer    Visit type: audiovisual    Face to Face time with patient: 25 minutes of total time spent on the encounter, which includes face to face time and non-face to face time preparing to see the patient (eg, review of tests), Obtaining and/or reviewing separately obtained history, Documenting clinical information in the electronic or other health record, Independently interpreting results (not separately reported) and communicating results to the patient/family/caregiver, or Care coordination (not separately reported).         Each patient to whom he or she provides medical services by telemedicine is:  (1) informed of the relationship between the physician and patient and the respective role of any other health  care provider with respect to management of the patient; and (2) notified that he or she may decline to receive medical services by telemedicine and may withdraw from such care at any time.    Notes:   Patient is doing remarkably well feels good very asymptomatic at present time I did offer her extension of imaging studies and laboratory studies but declined would like to have six-month follow-up with standing labs and CT chest abdomen pelvis in review      Enzo Daley Jr, MD FACP

## 2024-01-30 ENCOUNTER — PATIENT MESSAGE (OUTPATIENT)
Dept: HEMATOLOGY/ONCOLOGY | Facility: CLINIC | Age: 59
End: 2024-01-30
Payer: COMMERCIAL

## 2024-02-19 ENCOUNTER — PATIENT MESSAGE (OUTPATIENT)
Dept: PSYCHIATRY | Facility: CLINIC | Age: 59
End: 2024-02-19
Payer: COMMERCIAL

## 2024-02-21 ENCOUNTER — OFFICE VISIT (OUTPATIENT)
Dept: PSYCHIATRY | Facility: CLINIC | Age: 59
End: 2024-02-21
Payer: COMMERCIAL

## 2024-02-21 DIAGNOSIS — R41.840 INATTENTION: ICD-10-CM

## 2024-02-21 DIAGNOSIS — F41.1 GAD (GENERALIZED ANXIETY DISORDER): Primary | ICD-10-CM

## 2024-02-21 PROCEDURE — 99214 OFFICE O/P EST MOD 30 MIN: CPT | Mod: 95,,, | Performed by: PSYCHIATRY & NEUROLOGY

## 2024-02-21 PROCEDURE — 4010F ACE/ARB THERAPY RXD/TAKEN: CPT | Mod: CPTII,95,, | Performed by: PSYCHIATRY & NEUROLOGY

## 2024-02-21 RX ORDER — ESCITALOPRAM OXALATE 10 MG/1
10 TABLET ORAL DAILY
Qty: 90 TABLET | Refills: 1 | Status: SHIPPED | OUTPATIENT
Start: 2024-02-21 | End: 2024-05-16 | Stop reason: SDUPTHER

## 2024-02-21 RX ORDER — ATOMOXETINE 40 MG/1
40 CAPSULE ORAL DAILY
Qty: 60 CAPSULE | Refills: 2 | Status: SHIPPED | OUTPATIENT
Start: 2024-02-21 | End: 2024-05-16 | Stop reason: SDUPTHER

## 2024-02-21 NOTE — PROGRESS NOTES
Outpatient Psychiatry Follow-up Visit (MD/NP)    2/21/2024    Karen Gutierrez, a 58 y.o. female, presenting for follow-up visit. Met with patient.    Reason for Encounter: Patient complains of distractability, anxiety.     Interval Hx: Patient seen and interviewed for follow-up, last seen about two months ago.   Moods in ok place. Adherent to medication. Ongoing benefits. Denies side effects. Cancer remains in remission. Complains of chronic inattention.     Background: Pt is a 58 y/o woman who presents for establishment of care, endorses chronic problems with anxiety , distractability, difficulty completing tasks. Reports having first brought this problem up to PCP Han Arshad, about five years ago following patient's niece's diagnosis with adhd and patient's recognition of similar attentional problems in herself. Reports no formal testing, no psych assessment, treated with adderall with benefit to both focus & moods (reduced levels of anxiety and depression, helping her worry less, catastrophize less).     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 Status post surgery (whipple), chemo. Follow-up going well.   Returned to work 2021. GELACIO-7 = 11;     Psych Hx: Endorses history of obsessions, no psychiatric diagnoses or assessments. No history of michael, hallucinations, delusions, self-harm behaviors, psych hospitalizations.    Family Hx: niece with adhd.  Sister with considerable irritability considerably helped by medication; father has similar symptoms.      Past Medical History:   Diagnosis Date    Cancer     duodenal cancer    Hypertension     Hypotension, iatrogenic     Mitral valve prolapse      Social Hx: born in [place], [place]. Born full-term, no complications.   No developmental or health problems. Active as a child. In lots of clubs. Always highly active. Moved to  by first grade. No  serious maltreatment. Dad was short-tempered. Older sister (lives in ). Childhood was happy. Both parents were in the home. Loved school. Loved school, performed well. No discipline problems. Good experience in school. Problems with bullying. Close to sister.     Graduated HS, went to [employer], majored in fashion design/merchandising. Then studied in program for medical administration. 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 Went back to work after extended medical leave, had a pay-cut, loss some of her responsibilities.     ,  for 7 years. Raised one son as a single mom. He's now 26 years old. Son is newly  and he and wife live with patient. He's been accepted to med school, he and his wife will move to [place] soon for school. Starts in January.     Review Of Systems:     GENERAL:  No weight gain or loss  SKIN:  No rashes or lacerations  HEAD:  No headaches  CHEST:  No shortness of breath, hyperventilation or cough  CARDIOVASCULAR:  No tachycardia or chest pain  ABDOMEN:  No nausea, vomiting, pain, constipation or diarrhea  URINARY:  No frequency, dysuria or sexual dysfunction  ENDOCRINE:  No polydipsia, polyuria  MUSCULOSKELETAL:  No pain or stiffness of the joints  NEUROLOGIC:  No weakness, sensory changes, seizures, confusion, memory loss, tremor or other abnormal movements    Current Evaluation:     Nutritional Screening: Considering the patient's height and weight, medications, medical history and preferences, should a referral be made to the dietitian? no    Constitutional  Vitals:  Most recent vital signs, dated less than 90 days prior to this appointment, were reviewed.    There were no vitals filed for this visit.       General:  unremarkable, age appropriate     Musculoskeletal  Muscle Strength/Tone:  no tremor, no tic   Gait & Station:  non-ataxic     Psychiatric  Appearance: casually dressed & groomed;   Behavior:  calm,   Cooperation: cooperative with assessment  Speech: normal rate, volume, tone  Thought Process: linear, goal-directed  Thought Content: No suicidal or homicidal ideation; no delusions  Affect: mildly anxious  Mood: mildly anxious  Perceptions: No auditory or visual hallucinations  Level of Consciousness: alert throughout interview  Insight: fair  Cognition: Oriented to person, place, time, & situation  Memory: no apparent deficits to general clinical interview; not formally assessed  Attention/Concentration: no apparent deficits to general clinical interview; not formally assessed  Fund of Knowledge: average by vocabulary/education    Laboratory Data  No visits with results within 1 Month(s) from this visit.   Latest known visit with results is:   Lab Visit on 10/11/2023   Component Date Value Ref Range Status    Sodium 10/11/2023 144  136 - 145 mmol/L Final    Potassium 10/11/2023 4.5  3.5 - 5.1 mmol/L Final    Chloride 10/11/2023 106  95 - 110 mmol/L Final    CO2 10/11/2023 25  23 - 29 mmol/L Final    Glucose 10/11/2023 111 (H)  70 - 110 mg/dL Final    BUN 10/11/2023 9  6 - 20 mg/dL Final    Creatinine 10/11/2023 0.9  0.5 - 1.4 mg/dL Final    Calcium 10/11/2023 9.2  8.7 - 10.5 mg/dL Final    Total Protein 10/11/2023 7.2  6.0 - 8.4 g/dL Final    Albumin 10/11/2023 4.2  3.5 - 5.2 g/dL Final    Total Bilirubin 10/11/2023 0.7  0.1 - 1.0 mg/dL Final    Alkaline Phosphatase 10/11/2023 96  55 - 135 U/L Final    AST 10/11/2023 24  10 - 40 U/L Final    ALT 10/11/2023 28  10 - 44 U/L Final    eGFR 10/11/2023 >60  >60 mL/min/1.73 m^2 Final    Anion Gap 10/11/2023 13  8 - 16 mmol/L Final    WBC 10/11/2023 4.03  3.90 - 12.70 K/uL Final    RBC 10/11/2023 4.37  4.00 - 5.40 M/uL Final    Hemoglobin 10/11/2023 12.9  12.0 - 16.0 g/dL Final    Hematocrit 10/11/2023 38.1  37.0 - 48.5 % Final    MCV 10/11/2023 87  82 - 98 fL Final    MCH 10/11/2023 29.5  27.0 - 31.0 pg Final    MCHC 10/11/2023 33.9  32.0 - 36.0 g/dL Final    RDW  10/11/2023 12.5  11.5 - 14.5 % Final    Platelets 10/11/2023 179  150 - 450 K/uL Final    MPV 10/11/2023 9.7  9.2 - 12.9 fL Final    Immature Granulocytes 10/11/2023 0.0  0.0 - 0.5 % Final    Gran # (ANC) 10/11/2023 2.2  1.8 - 7.7 K/uL Final    Immature Grans (Abs) 10/11/2023 0.00  0.00 - 0.04 K/uL Final    Lymph # 10/11/2023 1.4  1.0 - 4.8 K/uL Final    Mono # 10/11/2023 0.3  0.3 - 1.0 K/uL Final    Eos # 10/11/2023 0.1  0.0 - 0.5 K/uL Final    Baso # 10/11/2023 0.02  0.00 - 0.20 K/uL Final    nRBC 10/11/2023 0  0 /100 WBC Final    Gran % 10/11/2023 54.9  38.0 - 73.0 % Final    Lymph % 10/11/2023 34.5  18.0 - 48.0 % Final    Mono % 10/11/2023 7.9  4.0 - 15.0 % Final    Eosinophil % 10/11/2023 2.2  0.0 - 8.0 % Final    Basophil % 10/11/2023 0.5  0.0 - 1.9 % Final    Differential Method 10/11/2023 Automated   Final    LD 10/11/2023 161  110 - 260 U/L Final     Medications  Outpatient Encounter Medications as of 2/21/2024   Medication Sig Dispense Refill    amLODIPine (NORVASC) 5 MG tablet TAKE 1 TABLET(5 MG) BY MOUTH EVERY DAY 30 tablet 11    BYSTOLIC 20 mg Tab Take 1 tablet by mouth once daily.      EScitalopram oxalate (LEXAPRO) 10 MG tablet TAKE 1 TABLET(10 MG) BY MOUTH EVERY DAY 90 tablet 0    olmesartan (BENICAR) 40 MG tablet TAKE 1 TABLET(40 MG) BY MOUTH EVERY DAY 90 tablet 3    rosuvastatin (CRESTOR) 10 MG tablet        No facility-administered encounter medications on file as of 2/21/2024.     Assessment - Diagnosis - Goals:     Impression: 58 year old woman with chronic anxiety problems, currently moderately anxious. Has also had problems with attention, task completion. Previously treated with some benefit with stimulants, not formally diagnosed with adhd. Duodenal CA in remission. Symptoms improved, mild. Treatment well tolerated.    Dx: generalized anxiety disorder    Treatment Goals:  Specify outcomes written in observable, behavioral terms: reduce anxiety by self-report    Treatment  Plan/Recommendations:   Continue escitalopram, atomoxetine trial  Information about self-care in recovery, how to access psychotherapy.   Discussed risks, benefits, and alternatives to treatment plan documented above with patient. I answered all patient questions related to this plan and patient expressed understanding and agreement.     Return to Clinic: 2 months    JAMES Rose MD  Psychiatry, [facility]

## 2024-04-18 ENCOUNTER — TELEPHONE (OUTPATIENT)
Dept: HEMATOLOGY/ONCOLOGY | Facility: CLINIC | Age: 59
End: 2024-04-18
Payer: COMMERCIAL

## 2024-04-18 NOTE — TELEPHONE ENCOUNTER
Attempted to call patient to r/s upcoming appointment and there was no answer. Voicemail was left for patient to contact the clinic at their earliest convenience for rescheduling.

## 2024-04-19 ENCOUNTER — HOSPITAL ENCOUNTER (OUTPATIENT)
Dept: RADIOLOGY | Facility: HOSPITAL | Age: 59
Discharge: HOME | End: 2024-04-19
Attending: INTERNAL MEDICINE
Payer: COMMERCIAL

## 2024-04-19 DIAGNOSIS — Z90.411 HISTORY OF PARTIAL PANCREATECTOMY: ICD-10-CM

## 2024-04-19 DIAGNOSIS — C17.0 DUODENAL CANCER: ICD-10-CM

## 2024-04-19 PROCEDURE — 74177 CT ABD & PELVIS W/CONTRAST: CPT | Mod: 26,,, | Performed by: RADIOLOGY

## 2024-04-19 PROCEDURE — 71260 CT THORAX DX C+: CPT | Mod: 26,,, | Performed by: RADIOLOGY

## 2024-04-19 PROCEDURE — A9698 NON-RAD CONTRAST MATERIALNOC: HCPCS | Performed by: INTERNAL MEDICINE

## 2024-04-19 PROCEDURE — 74177 CT ABD & PELVIS W/CONTRAST: CPT | Mod: TC

## 2024-04-19 PROCEDURE — 25500020 PHARM REV CODE 255: Performed by: INTERNAL MEDICINE

## 2024-04-19 RX ADMIN — IOHEXOL 100 ML: 350 INJECTION, SOLUTION INTRAVENOUS at 10:04

## 2024-04-19 RX ADMIN — IOHEXOL 1000 ML: 12 SOLUTION ORAL at 09:04

## 2024-04-20 DIAGNOSIS — C17.0 DUODENAL CANCER: Primary | ICD-10-CM

## 2024-04-22 ENCOUNTER — LAB VISIT (OUTPATIENT)
Dept: LAB | Facility: HOSPITAL | Age: 59
End: 2024-04-22
Attending: INTERNAL MEDICINE
Payer: COMMERCIAL

## 2024-04-22 DIAGNOSIS — Z90.411 HISTORY OF PARTIAL PANCREATECTOMY: ICD-10-CM

## 2024-04-22 DIAGNOSIS — C17.0 DUODENAL CANCER: ICD-10-CM

## 2024-04-22 LAB
ALBUMIN SERPL BCP-MCNC: 4.2 G/DL (ref 3.5–5.2)
ALP SERPL-CCNC: 105 U/L (ref 55–135)
ALT SERPL W/O P-5'-P-CCNC: 47 U/L (ref 10–44)
ANION GAP SERPL CALC-SCNC: 11 MMOL/L (ref 8–16)
AST SERPL-CCNC: 38 U/L (ref 10–40)
BASOPHILS # BLD AUTO: 0.04 K/UL (ref 0–0.2)
BASOPHILS NFR BLD: 0.7 % (ref 0–1.9)
BILIRUB SERPL-MCNC: 0.5 MG/DL (ref 0.1–1)
BUN SERPL-MCNC: 16 MG/DL (ref 6–20)
CALCIUM SERPL-MCNC: 9.6 MG/DL (ref 8.7–10.5)
CHLORIDE SERPL-SCNC: 103 MMOL/L (ref 95–110)
CO2 SERPL-SCNC: 26 MMOL/L (ref 23–29)
CREAT SERPL-MCNC: 1.2 MG/DL (ref 0.5–1.4)
DIFFERENTIAL METHOD BLD: NORMAL
EOSINOPHIL # BLD AUTO: 0.2 K/UL (ref 0–0.5)
EOSINOPHIL NFR BLD: 2.8 % (ref 0–8)
ERYTHROCYTE [DISTWIDTH] IN BLOOD BY AUTOMATED COUNT: 12.6 % (ref 11.5–14.5)
EST. GFR  (NO RACE VARIABLE): 52 ML/MIN/1.73 M^2
GLUCOSE SERPL-MCNC: 151 MG/DL (ref 70–110)
HCT VFR BLD AUTO: 41.1 % (ref 37–48.5)
HGB BLD-MCNC: 13.4 G/DL (ref 12–16)
IMM GRANULOCYTES # BLD AUTO: 0.02 K/UL (ref 0–0.04)
IMM GRANULOCYTES NFR BLD AUTO: 0.4 % (ref 0–0.5)
LDH SERPL L TO P-CCNC: 208 U/L (ref 110–260)
LYMPHOCYTES # BLD AUTO: 1.9 K/UL (ref 1–4.8)
LYMPHOCYTES NFR BLD: 33.5 % (ref 18–48)
MCH RBC QN AUTO: 28.8 PG (ref 27–31)
MCHC RBC AUTO-ENTMCNC: 32.6 G/DL (ref 32–36)
MCV RBC AUTO: 88 FL (ref 82–98)
MONOCYTES # BLD AUTO: 0.5 K/UL (ref 0.3–1)
MONOCYTES NFR BLD: 9.1 % (ref 4–15)
NEUTROPHILS # BLD AUTO: 3.1 K/UL (ref 1.8–7.7)
NEUTROPHILS NFR BLD: 53.5 % (ref 38–73)
NRBC BLD-RTO: 0 /100 WBC
PLATELET # BLD AUTO: 200 K/UL (ref 150–450)
PMV BLD AUTO: 10.2 FL (ref 9.2–12.9)
POTASSIUM SERPL-SCNC: 5 MMOL/L (ref 3.5–5.1)
PROT SERPL-MCNC: 7.5 G/DL (ref 6–8.4)
RBC # BLD AUTO: 4.66 M/UL (ref 4–5.4)
SODIUM SERPL-SCNC: 140 MMOL/L (ref 136–145)
WBC # BLD AUTO: 5.71 K/UL (ref 3.9–12.7)

## 2024-04-22 PROCEDURE — 83615 LACTATE (LD) (LDH) ENZYME: CPT | Performed by: INTERNAL MEDICINE

## 2024-04-22 PROCEDURE — 85025 COMPLETE CBC W/AUTO DIFF WBC: CPT | Performed by: INTERNAL MEDICINE

## 2024-04-22 PROCEDURE — 36415 COLL VENOUS BLD VENIPUNCTURE: CPT | Performed by: INTERNAL MEDICINE

## 2024-04-22 PROCEDURE — 80053 COMPREHEN METABOLIC PANEL: CPT | Performed by: INTERNAL MEDICINE

## 2024-04-24 ENCOUNTER — OFFICE VISIT (OUTPATIENT)
Dept: HEMATOLOGY/ONCOLOGY | Facility: CLINIC | Age: 59
End: 2024-04-24
Payer: COMMERCIAL

## 2024-04-24 ENCOUNTER — LAB VISIT (OUTPATIENT)
Dept: LAB | Facility: HOSPITAL | Age: 59
End: 2024-04-24
Attending: INTERNAL MEDICINE
Payer: COMMERCIAL

## 2024-04-24 VITALS
HEIGHT: 72 IN | BODY MASS INDEX: 28.88 KG/M2 | WEIGHT: 213.19 LBS | HEART RATE: 69 BPM | SYSTOLIC BLOOD PRESSURE: 127 MMHG | TEMPERATURE: 98 F | RESPIRATION RATE: 18 BRPM | DIASTOLIC BLOOD PRESSURE: 78 MMHG | OXYGEN SATURATION: 97 %

## 2024-04-24 DIAGNOSIS — Z15.89 MONOALLELIC MUTATION OF MUTYH GENE: ICD-10-CM

## 2024-04-24 DIAGNOSIS — C17.0 DUODENAL CANCER: Primary | ICD-10-CM

## 2024-04-24 DIAGNOSIS — Z86.018 HISTORY OF DYSPLASTIC NEVUS: ICD-10-CM

## 2024-04-24 DIAGNOSIS — C17.0 DUODENAL CANCER: ICD-10-CM

## 2024-04-24 DIAGNOSIS — Z90.411 HISTORY OF PARTIAL PANCREATECTOMY: ICD-10-CM

## 2024-04-24 LAB — TEMPUS BLOOD ADD-ON: NORMAL

## 2024-04-24 PROCEDURE — 99999 PR PBB SHADOW E&M-EST. PATIENT-LVL IV: CPT | Mod: PBBFAC,,, | Performed by: INTERNAL MEDICINE

## 2024-04-24 PROCEDURE — 1159F MED LIST DOCD IN RCRD: CPT | Mod: CPTII,S$GLB,, | Performed by: INTERNAL MEDICINE

## 2024-04-24 PROCEDURE — 99215 OFFICE O/P EST HI 40 MIN: CPT | Mod: S$GLB,,, | Performed by: INTERNAL MEDICINE

## 2024-04-24 PROCEDURE — 3074F SYST BP LT 130 MM HG: CPT | Mod: CPTII,S$GLB,, | Performed by: INTERNAL MEDICINE

## 2024-04-24 PROCEDURE — 36415 COLL VENOUS BLD VENIPUNCTURE: CPT | Performed by: INTERNAL MEDICINE

## 2024-04-24 PROCEDURE — 1160F RVW MEDS BY RX/DR IN RCRD: CPT | Mod: CPTII,S$GLB,, | Performed by: INTERNAL MEDICINE

## 2024-04-24 PROCEDURE — 3078F DIAST BP <80 MM HG: CPT | Mod: CPTII,S$GLB,, | Performed by: INTERNAL MEDICINE

## 2024-04-24 PROCEDURE — 4010F ACE/ARB THERAPY RXD/TAKEN: CPT | Mod: CPTII,S$GLB,, | Performed by: INTERNAL MEDICINE

## 2024-04-24 PROCEDURE — 3008F BODY MASS INDEX DOCD: CPT | Mod: CPTII,S$GLB,, | Performed by: INTERNAL MEDICINE

## 2024-04-24 NOTE — PROGRESS NOTES
Subjective:       Patient ID: Karen Gutierrez is a 58 y.o. female.    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  ECOG status 1    Past Medical History:   Diagnosis Date    Cancer     duodenal cancer    Hypertension     Hypotension, iatrogenic     Mitral valve prolapse      Family History   Problem Relation Name Age of Onset    Ovarian cancer Mother      Thyroid cancer Mother      Atrial fibrillation Mother      Stroke Mother      Hyperlipidemia Mother      Diabetes Mother      Bladder Cancer Father      Hypertension Father      Diabetes Father      Stroke Maternal Grandfather       Social History     Socioeconomic History    Marital status:    Tobacco Use    Smoking status: Former    Smokeless tobacco: Never   Substance and Sexual Activity    Alcohol use: Not Currently    Drug use: Never     Past Surgical History:   Procedure Laterality Date    BUNIONECTOMY Left     HYSTERECTOMY  2000    INSERTION OF TUNNELED CENTRAL VENOUS CATHETER (CVC) WITH SUBCUTANEOUS PORT N/A 3/26/2021    Procedure: [id number];  Surgeon: Lauren Abraham MD;  Location: [facility] OR;  Service: General;  Laterality: N/A;    LITHOTRIPSY      PLACEMENT OF JEJUNOSTOMY TUBE  2/18/2021    Procedure: INSERTION, JEJUNOSTOMY TUBE;  Surgeon: Hitesh Díaz MD;  [facility];  Service: General;;    WHIPPLE PROCEDURE N/A 2/18/2021    Procedure: WHIPPLE PROCEDURE;  Surgeon: Hitesh Díaz MD;  Location: [facility] OR 2ND FLR;  Service: General;  Laterality: N/A;       Labs:  Lab Results   Component Value Date    WBC 5.71 04/22/2024    HGB 13.4 04/22/2024    HCT 41.1 04/22/2024    MCV 88 04/22/2024     04/22/2024     BMP  Lab Results   Component Value Date     04/22/2024    K 5.0 04/22/2024     04/22/2024    CO2 26 04/22/2024    BUN 16 04/22/2024  "   CREATININE 1.2 04/22/2024    CALCIUM 9.6 04/22/2024    ANIONGAP 11 04/22/2024    ESTGFRAFRICA >60 03/07/2022    EGFRNONAA >60 03/07/2022     Lab Results   Component Value Date    ALT 47 (H) 04/22/2024    AST 38 04/22/2024    ALKPHOS 105 04/22/2024    BILITOT 0.5 04/22/2024       Lab Results   Component Value Date    IRON 42 05/10/2021    TIBC 404 05/10/2021    FERRITIN 345 (H) 05/10/2021     No results found for: "NYGHLHCP97"  No results found for: "FOLATE"  No results found for: "TSH"      Review of Systems   Constitutional:  Negative for activity change, appetite change, chills, diaphoresis, fatigue, fever and unexpected weight change.   HENT:  Negative for congestion, dental problem, drooling, ear discharge, ear pain, facial swelling, hearing loss, mouth sores, nosebleeds, postnasal drip, rhinorrhea, sinus pressure, sneezing, sore throat, tinnitus, trouble swallowing and voice change.    Eyes:  Negative for photophobia, pain, discharge, redness, itching and visual disturbance.   Respiratory:  Negative for cough, choking, chest tightness, shortness of breath, wheezing and stridor.    Cardiovascular:  Negative for chest pain, palpitations and leg swelling.   Gastrointestinal:  Negative for abdominal distention, abdominal pain, anal bleeding, blood in stool, constipation, diarrhea, nausea, rectal pain and vomiting.   Endocrine: Negative for cold intolerance, heat intolerance, polydipsia, polyphagia and polyuria.   Genitourinary:  Negative for decreased urine volume, difficulty urinating, dyspareunia, dysuria, enuresis, flank pain, frequency, genital sores, hematuria, menstrual problem, pelvic pain, urgency, vaginal bleeding, vaginal discharge and vaginal pain.   Musculoskeletal:  Negative for arthralgias, back pain, gait problem, joint swelling, myalgias, neck pain and neck stiffness.   Skin:  Negative for color change, pallor and rash.   Allergic/Immunologic: Negative for environmental allergies, food allergies " and immunocompromised state.   Neurological:  Negative for dizziness, tremors, seizures, syncope, facial asymmetry, speech difficulty, weakness, light-headedness, numbness and headaches.   Hematological:  Negative for adenopathy. Does not bruise/bleed easily.   Psychiatric/Behavioral:  Negative for agitation, behavioral problems, confusion, decreased concentration, dysphoric mood, hallucinations, self-injury, sleep disturbance and suicidal ideas. The patient is not nervous/anxious and is not hyperactive.        Objective:      Physical Exam  Vitals reviewed.   Constitutional:       General: She is not in acute distress.     Appearance: She is well-developed. She is not diaphoretic.   HENT:      Head: Normocephalic and atraumatic.      Right Ear: External ear normal.      Left Ear: External ear normal.      Nose: Nose normal.      Right Sinus: No maxillary sinus tenderness or frontal sinus tenderness.      Left Sinus: No maxillary sinus tenderness or frontal sinus tenderness.      Mouth/Throat:      Pharynx: No oropharyngeal exudate.   Eyes:      General: Lids are normal. No scleral icterus.        Right eye: No discharge.         Left eye: No discharge.      Conjunctiva/sclera: Conjunctivae normal.      Right eye: Right conjunctiva is not injected. No hemorrhage.     Left eye: Left conjunctiva is not injected. No hemorrhage.     Pupils: Pupils are equal, round, and reactive to light.   Neck:      Thyroid: No thyromegaly.      Vascular: No JVD.      Trachea: No tracheal deviation.   Cardiovascular:      Rate and Rhythm: Normal rate and regular rhythm.   Pulmonary:      Effort: Pulmonary effort is normal. No respiratory distress.      Breath sounds: No stridor.   Chest:      Chest wall: No tenderness.   Abdominal:      General: Bowel sounds are normal. There is no distension.      Palpations: Abdomen is soft. There is no hepatomegaly, splenomegaly or mass.      Tenderness: There is no abdominal tenderness. There is no  rebound.          Comments: Palpable mass   Musculoskeletal:         General: No tenderness. Normal range of motion.      Cervical back: Normal range of motion and neck supple.   Lymphadenopathy:      Cervical: No cervical adenopathy.      Upper Body:      Right upper body: No supraclavicular adenopathy.      Left upper body: No supraclavicular adenopathy.   Skin:     General: Skin is dry.      Findings: No erythema or rash.   Neurological:      Mental Status: She is alert and oriented to person, place, and time.      Cranial Nerves: No cranial nerve deficit.      Coordination: Coordination normal.   Psychiatric:         Behavior: Behavior normal.         Thought Content: Thought content normal.         Judgment: Judgment normal.           Assessment:      1. Duodenal cancer    2. History of dysplastic nevus    3. s/p partial pancreatectomy    4. Monoallelic mutation of MUTYH gene           Med Onc Chart Routing      Follow up with physician . Return to clinic to see me after CT-directed biopsy 4 days   Follow up with SANTIAGO    Infusion scheduling note    Injection scheduling note    Labs   Scheduling:  Preferred lab:  Lab interval:  [facility] next generation sequencing drawn today   Imaging    Pharmacy appointment    Other referrals         Needs CT-directed biopsy abdominal wall as soon as possible          Plan:     Extensive conversation with the patient reviewed images comparing October of 2023 with most recent demonstrating large mass anterior abdominal wall highly suspicious for malignancy.  Heparin at this point I have placed consult for Surgical Oncology.  In case CT-directed biopsy unable to obtain tissue confirmation then biopsy and port placement.  Talk to patient about potential diagnosis.  With treatable but not curable malignancy last treated 18 months ago will most likely treat with FOLFOX unless additional information from next generation sequencing which was drawn today as well as from tissue  confirmation offered additional options.  Discussed implications and answered questions        Enzo Daley Jr, MD FACP

## 2024-04-24 NOTE — H&P (VIEW-ONLY)
Subjective:       Patient ID: Karen Gutierrez is a 58 y.o. female.    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  ECOG status 1    Past Medical History:   Diagnosis Date    Cancer     duodenal cancer    Hypertension     Hypotension, iatrogenic     Mitral valve prolapse      Family History   Problem Relation Name Age of Onset    Ovarian cancer Mother      Thyroid cancer Mother      Atrial fibrillation Mother      Stroke Mother      Hyperlipidemia Mother      Diabetes Mother      Bladder Cancer Father      Hypertension Father      Diabetes Father      Stroke Maternal Grandfather       Social History     Socioeconomic History    Marital status:    Tobacco Use    Smoking status: Former    Smokeless tobacco: Never   Substance and Sexual Activity    Alcohol use: Not Currently    Drug use: Never     Past Surgical History:   Procedure Laterality Date    BUNIONECTOMY Left     HYSTERECTOMY  2000    INSERTION OF TUNNELED CENTRAL VENOUS CATHETER (CVC) WITH SUBCUTANEOUS PORT N/A 3/26/2021    Procedure: [id number];  Surgeon: Lauren Abraham MD;  Location: [facility] OR;  Service: General;  Laterality: N/A;    LITHOTRIPSY      PLACEMENT OF JEJUNOSTOMY TUBE  2/18/2021    Procedure: INSERTION, JEJUNOSTOMY TUBE;  Surgeon: Hitesh Díaz MD;  [facility];  Service: General;;    WHIPPLE PROCEDURE N/A 2/18/2021    Procedure: WHIPPLE PROCEDURE;  Surgeon: Hitesh Díaz MD;  Location: [facility] OR 2ND FLR;  Service: General;  Laterality: N/A;       Labs:  Lab Results   Component Value Date    WBC 5.71 04/22/2024    HGB 13.4 04/22/2024    HCT 41.1 04/22/2024    MCV 88 04/22/2024     04/22/2024     BMP  Lab Results   Component Value Date     04/22/2024    K 5.0 04/22/2024     04/22/2024    CO2 26 04/22/2024    BUN 16 04/22/2024  "   CREATININE 1.2 04/22/2024    CALCIUM 9.6 04/22/2024    ANIONGAP 11 04/22/2024    ESTGFRAFRICA >60 03/07/2022    EGFRNONAA >60 03/07/2022     Lab Results   Component Value Date    ALT 47 (H) 04/22/2024    AST 38 04/22/2024    ALKPHOS 105 04/22/2024    BILITOT 0.5 04/22/2024       Lab Results   Component Value Date    IRON 42 05/10/2021    TIBC 404 05/10/2021    FERRITIN 345 (H) 05/10/2021     No results found for: "YXBOGPXG27"  No results found for: "FOLATE"  No results found for: "TSH"      Review of Systems   Constitutional:  Negative for activity change, appetite change, chills, diaphoresis, fatigue, fever and unexpected weight change.   HENT:  Negative for congestion, dental problem, drooling, ear discharge, ear pain, facial swelling, hearing loss, mouth sores, nosebleeds, postnasal drip, rhinorrhea, sinus pressure, sneezing, sore throat, tinnitus, trouble swallowing and voice change.    Eyes:  Negative for photophobia, pain, discharge, redness, itching and visual disturbance.   Respiratory:  Negative for cough, choking, chest tightness, shortness of breath, wheezing and stridor.    Cardiovascular:  Negative for chest pain, palpitations and leg swelling.   Gastrointestinal:  Negative for abdominal distention, abdominal pain, anal bleeding, blood in stool, constipation, diarrhea, nausea, rectal pain and vomiting.   Endocrine: Negative for cold intolerance, heat intolerance, polydipsia, polyphagia and polyuria.   Genitourinary:  Negative for decreased urine volume, difficulty urinating, dyspareunia, dysuria, enuresis, flank pain, frequency, genital sores, hematuria, menstrual problem, pelvic pain, urgency, vaginal bleeding, vaginal discharge and vaginal pain.   Musculoskeletal:  Negative for arthralgias, back pain, gait problem, joint swelling, myalgias, neck pain and neck stiffness.   Skin:  Negative for color change, pallor and rash.   Allergic/Immunologic: Negative for environmental allergies, food allergies " and immunocompromised state.   Neurological:  Negative for dizziness, tremors, seizures, syncope, facial asymmetry, speech difficulty, weakness, light-headedness, numbness and headaches.   Hematological:  Negative for adenopathy. Does not bruise/bleed easily.   Psychiatric/Behavioral:  Negative for agitation, behavioral problems, confusion, decreased concentration, dysphoric mood, hallucinations, self-injury, sleep disturbance and suicidal ideas. The patient is not nervous/anxious and is not hyperactive.        Objective:      Physical Exam  Vitals reviewed.   Constitutional:       General: She is not in acute distress.     Appearance: She is well-developed. She is not diaphoretic.   HENT:      Head: Normocephalic and atraumatic.      Right Ear: External ear normal.      Left Ear: External ear normal.      Nose: Nose normal.      Right Sinus: No maxillary sinus tenderness or frontal sinus tenderness.      Left Sinus: No maxillary sinus tenderness or frontal sinus tenderness.      Mouth/Throat:      Pharynx: No oropharyngeal exudate.   Eyes:      General: Lids are normal. No scleral icterus.        Right eye: No discharge.         Left eye: No discharge.      Conjunctiva/sclera: Conjunctivae normal.      Right eye: Right conjunctiva is not injected. No hemorrhage.     Left eye: Left conjunctiva is not injected. No hemorrhage.     Pupils: Pupils are equal, round, and reactive to light.   Neck:      Thyroid: No thyromegaly.      Vascular: No JVD.      Trachea: No tracheal deviation.   Cardiovascular:      Rate and Rhythm: Normal rate and regular rhythm.   Pulmonary:      Effort: Pulmonary effort is normal. No respiratory distress.      Breath sounds: No stridor.   Chest:      Chest wall: No tenderness.   Abdominal:      General: Bowel sounds are normal. There is no distension.      Palpations: Abdomen is soft. There is no hepatomegaly, splenomegaly or mass.      Tenderness: There is no abdominal tenderness. There is no  rebound.          Comments: Palpable mass   Musculoskeletal:         General: No tenderness. Normal range of motion.      Cervical back: Normal range of motion and neck supple.   Lymphadenopathy:      Cervical: No cervical adenopathy.      Upper Body:      Right upper body: No supraclavicular adenopathy.      Left upper body: No supraclavicular adenopathy.   Skin:     General: Skin is dry.      Findings: No erythema or rash.   Neurological:      Mental Status: She is alert and oriented to person, place, and time.      Cranial Nerves: No cranial nerve deficit.      Coordination: Coordination normal.   Psychiatric:         Behavior: Behavior normal.         Thought Content: Thought content normal.         Judgment: Judgment normal.           Assessment:      1. Duodenal cancer    2. History of dysplastic nevus    3. s/p partial pancreatectomy    4. Monoallelic mutation of MUTYH gene           Med Onc Chart Routing      Follow up with physician . Return to clinic to see me after CT-directed biopsy 4 days   Follow up with SANTIAGO    Infusion scheduling note    Injection scheduling note    Labs   Scheduling:  Preferred lab:  Lab interval:  [facility] next generation sequencing drawn today   Imaging    Pharmacy appointment    Other referrals         Needs CT-directed biopsy abdominal wall as soon as possible          Plan:     Extensive conversation with the patient reviewed images comparing October of 2023 with most recent demonstrating large mass anterior abdominal wall highly suspicious for malignancy.  Heparin at this point I have placed consult for Surgical Oncology.  In case CT-directed biopsy unable to obtain tissue confirmation then biopsy and port placement.  Talk to patient about potential diagnosis.  With treatable but not curable malignancy last treated 18 months ago will most likely treat with FOLFOX unless additional information from next generation sequencing which was drawn today as well as from tissue  confirmation offered additional options.  Discussed implications and answered questions        Enzo Daley Jr, MD FACP

## 2024-04-25 ENCOUNTER — TELEPHONE (OUTPATIENT)
Dept: SURGICAL ONCOLOGY | Facility: CLINIC | Age: 59
End: 2024-04-25
Payer: COMMERCIAL

## 2024-04-25 NOTE — TELEPHONE ENCOUNTER
Called patient to discuss referral from Dr. Daley- we discussed that Bx date was 5/1/24. Pathology usually takes 7-10 days to result. Discusses next step would be to meet with a surgical oncologist to discuss further poc. Appointment made for Dr. Hernandez 5/10/24 anticipating path will be resulted. Patient encouraged to bring support person(s). All questions answered and pt denied any other needs at this time.

## 2024-04-26 ENCOUNTER — PATIENT MESSAGE (OUTPATIENT)
Dept: HEMATOLOGY/ONCOLOGY | Facility: CLINIC | Age: 59
End: 2024-04-26
Payer: COMMERCIAL

## 2024-04-26 ENCOUNTER — TUMOR BOARD CONFERENCE (OUTPATIENT)
Dept: HEMATOLOGY/ONCOLOGY | Facility: CLINIC | Age: 59
End: 2024-04-26
Payer: COMMERCIAL

## 2024-04-26 NOTE — PROGRESS NOTES
Tumor Board Documentation      Karen Gutierrez was presented by Enzo Daley MD at our Tumor Board on 4/26/2024, which included representatives from Medical Oncology, Radiation Oncology, Surgical Oncology, Pathology, Navigation, Radiology, Pulmonology, Urology.    Karen currently presents as a current patient with Other, with history of the following treatments: Surgical Intervention(s), Adjuvant Chemotherapy.    Additionally, we reviewed previous medical and familial history, history of present illness, and recent lab results along with all available histopathologic and imaging studies. The tumor board considered available treatment options and made the following recommendations:  Biopsy  IR to obtain biopsy of new abdominal mass scheduled for 5/2/24     The following procedures/referrals were also placed: No orders of the defined types were placed in this encounter.      Clinical Trial Status: Not discussed     National site-specific guidelines were discussed with respect to the case.    Tumor board is a meeting of clinicians from various specialty areas who evaluate and discuss patients for whom a multidisciplinary approach is being considered. Final determinations in the plan of care are those of the provider(s). The responsibility for follow up of recommendations given during tumor board is that of the provider.     Enzo Daley MD

## 2024-04-30 ENCOUNTER — PATIENT MESSAGE (OUTPATIENT)
Dept: HEMATOLOGY/ONCOLOGY | Facility: CLINIC | Age: 59
End: 2024-04-30
Payer: COMMERCIAL

## 2024-05-01 ENCOUNTER — TELEPHONE (OUTPATIENT)
Dept: RADIOLOGY | Facility: HOSPITAL | Age: 59
End: 2024-05-01
Payer: COMMERCIAL

## 2024-05-01 NOTE — TELEPHONE ENCOUNTER
Interventional Radiology:    Pre-procedure phone call made at this time. Informed pt to be NPO after midnight, show up to [facility] on [address] at 8am, either have a ride with them or have a phone number for the person driving them home so that we can get in contact with them to keep them updated. Pt states that she does not take any aspirin, fish oil, blood thinners, or diabetic/weight loss medications. Answered all questions that the pt had and pt verbalized understanding of all discussed.

## 2024-05-02 ENCOUNTER — HOSPITAL ENCOUNTER (OUTPATIENT)
Dept: RADIOLOGY | Facility: HOSPITAL | Age: 59
Discharge: HOME | End: 2024-05-02
Attending: INTERNAL MEDICINE
Payer: COMMERCIAL

## 2024-05-02 VITALS
RESPIRATION RATE: 16 BRPM | SYSTOLIC BLOOD PRESSURE: 127 MMHG | DIASTOLIC BLOOD PRESSURE: 67 MMHG | WEIGHT: 207 LBS | HEART RATE: 64 BPM | OXYGEN SATURATION: 98 % | HEIGHT: 72 IN | BODY MASS INDEX: 28.04 KG/M2

## 2024-05-02 DIAGNOSIS — C17.0 DUODENAL CANCER: ICD-10-CM

## 2024-05-02 LAB
INR PPP: 0.9 (ref 0.8–1.2)
PROTHROMBIN TIME: 10.4 SEC (ref 9–12.5)

## 2024-05-02 PROCEDURE — 77012 CT SCAN FOR NEEDLE BIOPSY: CPT | Mod: 26,,, | Performed by: RADIOLOGY

## 2024-05-02 PROCEDURE — 27200940 CT BIOPSY RETROPERITONEAL (XPD)

## 2024-05-02 PROCEDURE — 77012 CT SCAN FOR NEEDLE BIOPSY: CPT | Mod: TC

## 2024-05-02 PROCEDURE — 85610 PROTHROMBIN TIME: CPT | Performed by: RADIOLOGY

## 2024-05-02 PROCEDURE — 88305 TISSUE EXAM BY PATHOLOGIST: CPT | Performed by: PATHOLOGY

## 2024-05-02 PROCEDURE — 49180 BIOPSY ABDOMINAL MASS: CPT | Mod: ,,, | Performed by: RADIOLOGY

## 2024-05-02 PROCEDURE — 25000003 PHARM REV CODE 250: Performed by: RADIOLOGY

## 2024-05-02 PROCEDURE — 63600175 PHARM REV CODE 636 W HCPCS: Performed by: RADIOLOGY

## 2024-05-02 PROCEDURE — 88305 TISSUE EXAM BY PATHOLOGIST: CPT | Mod: 26,,, | Performed by: PATHOLOGY

## 2024-05-02 RX ORDER — LIDOCAINE HYDROCHLORIDE 10 MG/ML
INJECTION INFILTRATION; PERINEURAL CODE/TRAUMA/SEDATION MEDICATION
Status: COMPLETED | OUTPATIENT
Start: 2024-05-02 | End: 2024-05-02

## 2024-05-02 RX ORDER — MIDAZOLAM HYDROCHLORIDE 1 MG/ML
INJECTION, SOLUTION INTRAMUSCULAR; INTRAVENOUS CODE/TRAUMA/SEDATION MEDICATION
Status: COMPLETED | OUTPATIENT
Start: 2024-05-02 | End: 2024-05-02

## 2024-05-02 RX ORDER — FENTANYL CITRATE 50 UG/ML
INJECTION, SOLUTION INTRAMUSCULAR; INTRAVENOUS CODE/TRAUMA/SEDATION MEDICATION
Status: COMPLETED | OUTPATIENT
Start: 2024-05-02 | End: 2024-05-02

## 2024-05-02 RX ADMIN — LIDOCAINE HYDROCHLORIDE 5 ML: 10 INJECTION, SOLUTION INFILTRATION; PERINEURAL at 10:05

## 2024-05-02 RX ADMIN — FENTANYL CITRATE 25 MCG: 50 INJECTION, SOLUTION INTRAMUSCULAR; INTRAVENOUS at 10:05

## 2024-05-02 RX ADMIN — MIDAZOLAM HYDROCHLORIDE 0.5 MG: 1 INJECTION, SOLUTION INTRAMUSCULAR; INTRAVENOUS at 10:05

## 2024-05-02 NOTE — PLAN OF CARE
Band aid to abdomen C/D/I with no bleeding/redness/swelling noted. VSS, NADN, and pt meets criteria for discharge. Discharge instructions given to and reviewed with pt, and pt verbalized understanding of all. Pt discharged to home, taken out via wheelchair and driven home by friend.

## 2024-05-02 NOTE — DISCHARGE INSTRUCTIONS
Please return to ER if any of these symptoms occur:  Fever over 101 degrees,  Any purulent drainage from site (pus, yellow or has foul odor), or any redness or swelling to site  Bleeding from the puncture site not controlled, If bleeding occurs at site hold pressure for 5 mins.  If bleeding continues go to ER  Pain not controlled with Aleve or Tylenol,     No driving for 24 hours after procedure due to sedation given during procedure.      Do not submerge in standing water for 2 days after biopsy but you may shower.     May change bandage if it becomes soiled and bandage may be removed in 2 days.     Rest for the next couple of days. Do not lift any thing heavier than a gallon of milk.  Increase activity as tolerated.     Resume home medications and diet     Biopsy results will be with Dr. Tubbs in 5-7 days, please follow up with him for results and any other questions or concerns that you may have.

## 2024-05-02 NOTE — DISCHARGE SUMMARY
[facility]  Discharge Note  Short Stay    CT Biopsy Retroperitoneal (xpd)      OUTCOME: Patient tolerated treatment/procedure well without complication and is now ready for discharge.    DISPOSITION: Home or Self Care    FINAL DIAGNOSIS:  <principal problem not specified>    FOLLOWUP: In clinic    DISCHARGE INSTRUCTIONS:  No discharge procedures on file.     TIME SPENT ON DISCHARGE: 15 minutes    Pre Op Diagnosis: Abdominal wall mass     Post Op Diagnosis: same     Procedure:  Abdominal wall mass biopsy     Procedure performed by: Lexis JONES, Asim FRANZ     Written Informed Consent Obtained: Yes     Specimen Removed:  yes     Estimated Blood Loss:  minimal     Findings: Local anesthesia     Sedation:  pain control     The patient tolerated the procedure well and there were no complications.      Disposition:  F/U in clinic or with ordering physician    Discharge instructions:  Light activity for 24 hours.  Remove band aid in 24 hours.  No baths (showers are appropriate).      Sterile technique was performed in the anterior mid abdomen, lidocaine was used as a local anesthetic.  Multiple samples taken percutaneously from the abdominal wall mass.  Pt tolerated the procedure well without immediate complications.  Please see radiologist report for details. F/u with PCP and/or ordering physician.

## 2024-05-03 LAB
DNA RANGE(S) EXAMINED NAR: NORMAL
FINAL PATHOLOGIC DIAGNOSIS: NORMAL
GENE DIS ANL INTERP-IMP: POSITIVE
GENE DIS ASSESSED: NORMAL
GROSS: NORMAL
Lab: NORMAL
MICROSCOPIC EXAM: NORMAL
MSI CA SPEC-IMP: NOT DETECTED
REASON FOR STUDY: NORMAL
TEMPUS LCA: NORMAL
TEMPUS PORTAL: NORMAL
TEMPUS THERAPY1: NORMAL
TEMPUS THERAPY2: NORMAL
TEMPUS THERAPYCOUNT: 2
TEMPUS TRIAL1: NORMAL
TEMPUS TRIAL2: NORMAL
TEMPUS TRIAL3: NORMAL
TEMPUS TRIALCOUNT: 3

## 2024-05-06 ENCOUNTER — OFFICE VISIT (OUTPATIENT)
Dept: HEMATOLOGY/ONCOLOGY | Facility: CLINIC | Age: 59
End: 2024-05-06
Payer: COMMERCIAL

## 2024-05-06 VITALS
RESPIRATION RATE: 20 BRPM | HEIGHT: 72 IN | DIASTOLIC BLOOD PRESSURE: 79 MMHG | TEMPERATURE: 98 F | SYSTOLIC BLOOD PRESSURE: 123 MMHG | HEART RATE: 71 BPM | OXYGEN SATURATION: 97 % | WEIGHT: 212.31 LBS | BODY MASS INDEX: 28.76 KG/M2

## 2024-05-06 DIAGNOSIS — D50.9 IRON DEFICIENCY ANEMIA, UNSPECIFIED IRON DEFICIENCY ANEMIA TYPE: ICD-10-CM

## 2024-05-06 DIAGNOSIS — Z90.411 HISTORY OF PARTIAL PANCREATECTOMY: ICD-10-CM

## 2024-05-06 DIAGNOSIS — C17.0 DUODENAL CANCER: Primary | ICD-10-CM

## 2024-05-06 DIAGNOSIS — C79.89 SECONDARY MALIGNANCY OF SOFT TISSUES OF ABDOMEN: ICD-10-CM

## 2024-05-06 DIAGNOSIS — T45.1X5A PERIPHERAL NEUROPATHY DUE TO CHEMOTHERAPY: ICD-10-CM

## 2024-05-06 DIAGNOSIS — G62.0 PERIPHERAL NEUROPATHY DUE TO CHEMOTHERAPY: ICD-10-CM

## 2024-05-06 PROCEDURE — 1159F MED LIST DOCD IN RCRD: CPT | Mod: CPTII,S$GLB,, | Performed by: INTERNAL MEDICINE

## 2024-05-06 PROCEDURE — 3008F BODY MASS INDEX DOCD: CPT | Mod: CPTII,S$GLB,, | Performed by: INTERNAL MEDICINE

## 2024-05-06 PROCEDURE — 3078F DIAST BP <80 MM HG: CPT | Mod: CPTII,S$GLB,, | Performed by: INTERNAL MEDICINE

## 2024-05-06 PROCEDURE — 99999 PR PBB SHADOW E&M-EST. PATIENT-LVL V: CPT | Mod: PBBFAC,,, | Performed by: INTERNAL MEDICINE

## 2024-05-06 PROCEDURE — 4010F ACE/ARB THERAPY RXD/TAKEN: CPT | Mod: CPTII,S$GLB,, | Performed by: INTERNAL MEDICINE

## 2024-05-06 PROCEDURE — 3074F SYST BP LT 130 MM HG: CPT | Mod: CPTII,S$GLB,, | Performed by: INTERNAL MEDICINE

## 2024-05-06 PROCEDURE — 99215 OFFICE O/P EST HI 40 MIN: CPT | Mod: S$GLB,,, | Performed by: INTERNAL MEDICINE

## 2024-05-06 NOTE — PROGRESS NOTES
Subjective:       Patient ID: Karen Gutierrez is a 59 y.o. female.    Chief Complaint: Results and Cancer    HPI:  59-year-old female history of stage 4 recurrent duodenal carcinoma biopsy-proven intra-abdominal wall mass.  Patient returns with her sister for review ECOG status 1    Past Medical History:   Diagnosis Date    Cancer     duodenal cancer    Hypertension     Hypotension, iatrogenic     Mitral valve prolapse      Family History   Problem Relation Name Age of Onset    Ovarian cancer Mother      Thyroid cancer Mother      Atrial fibrillation Mother      Stroke Mother      Hyperlipidemia Mother      Diabetes Mother      Bladder Cancer Father      Hypertension Father      Diabetes Father      Stroke Maternal Grandfather       Social History     Socioeconomic History    Marital status:    Tobacco Use    Smoking status: Former    Smokeless tobacco: Never   Substance and Sexual Activity    Alcohol use: Not Currently    Drug use: Never     Past Surgical History:   Procedure Laterality Date    BUNIONECTOMY Left     HYSTERECTOMY  2000    INSERTION OF TUNNELED CENTRAL VENOUS CATHETER (CVC) WITH SUBCUTANEOUS PORT N/A 3/26/2021    Procedure: [id number];  Surgeon: Lauren Abraham MD;  Location: [facility] OR;  Service: General;  Laterality: N/A;    LITHOTRIPSY      PLACEMENT OF JEJUNOSTOMY TUBE  2/18/2021    Procedure: INSERTION, JEJUNOSTOMY TUBE;  Surgeon: Hitesh Díaz MD;  Location: [facility] OR [address] [place] [address];  Service: General;;    WHIPPLE PROCEDURE N/A 2/18/2021    Procedure: WHIPPLE PROCEDURE;  Surgeon: Hitesh Díaz MD;  Location: [facility] OR [facility] FLR;  Service: General;  Laterality: N/A;       Labs:  Lab Results   Component Value Date    WBC 5.71 04/22/2024    HGB 13.4 04/22/2024    HCT 41.1 04/22/2024    MCV 88 04/22/2024     04/22/2024     BMP  Lab Results   Component Value Date     04/22/2024    K 5.0 04/22/2024     04/22/2024    CO2 26 04/22/2024    BUN 16 04/22/2024     "CREATININE 1.2 04/22/2024    CALCIUM 9.6 04/22/2024    ANIONGAP 11 04/22/2024    ESTGFRAFRICA >60 03/07/2022    EGFRNONAA >60 03/07/2022     Lab Results   Component Value Date    ALT 47 (H) 04/22/2024    AST 38 04/22/2024    ALKPHOS 105 04/22/2024    BILITOT 0.5 04/22/2024       Lab Results   Component Value Date    IRON 42 05/10/2021    TIBC 404 05/10/2021    FERRITIN 345 (H) 05/10/2021     No results found for: "[id number]"  No results found for: "FOLATE"  No results found for: "TSH"      Review of Systems   Constitutional:  Negative for activity change, appetite change, chills, diaphoresis, fatigue, fever and unexpected weight change.   HENT:  Negative for congestion, dental problem, drooling, ear discharge, ear pain, facial swelling, hearing loss, mouth sores, nosebleeds, postnasal drip, rhinorrhea, sinus pressure, sneezing, sore throat, tinnitus, trouble swallowing and voice change.    Eyes:  Negative for photophobia, pain, discharge, redness, itching and visual disturbance.   Respiratory:  Negative for cough, choking, chest tightness, shortness of breath, wheezing and stridor.    Cardiovascular:  Negative for chest pain, palpitations and leg swelling.   Gastrointestinal:  Positive for abdominal pain. Negative for abdominal distention, anal bleeding, blood in stool, constipation, diarrhea, nausea, rectal pain and vomiting.   Endocrine: Negative for cold intolerance, heat intolerance, polydipsia, polyphagia and polyuria.   Genitourinary:  Negative for decreased urine volume, difficulty urinating, dyspareunia, dysuria, enuresis, flank pain, frequency, genital sores, hematuria, menstrual problem, pelvic pain, urgency, vaginal bleeding, vaginal discharge and vaginal pain.   Musculoskeletal:  Negative for arthralgias, back pain, gait problem, joint swelling, myalgias, neck pain and neck stiffness.   Skin:  Negative for color change, pallor and rash.   Allergic/Immunologic: Negative for environmental allergies, food " allergies and immunocompromised state.   Neurological:  Negative for dizziness, tremors, seizures, syncope, facial asymmetry, speech difficulty, weakness, light-headedness, numbness and headaches.   Hematological:  Negative for adenopathy. Does not bruise/bleed easily.   Psychiatric/Behavioral:  Negative for agitation, behavioral problems, confusion, decreased concentration, dysphoric mood, hallucinations, self-injury, sleep disturbance and suicidal ideas. The patient is not nervous/anxious and is not hyperactive.        Objective:      Physical Exam  Vitals reviewed.   Constitutional:       General: She is not in acute distress.     Appearance: Normal appearance. She is well-developed. She is not diaphoretic.   HENT:      Head: Normocephalic and atraumatic.      Right Ear: External ear normal.      Left Ear: External ear normal.      Nose: Nose normal.      Right Sinus: No maxillary sinus tenderness or frontal sinus tenderness.      Left Sinus: No maxillary sinus tenderness or frontal sinus tenderness.      Mouth/Throat:      Pharynx: No oropharyngeal exudate.   Eyes:      General: Lids are normal. No scleral icterus.        Right eye: No discharge.         Left eye: No discharge.      Conjunctiva/sclera: Conjunctivae normal.      Right eye: Right conjunctiva is not injected. No hemorrhage.     Left eye: Left conjunctiva is not injected. No hemorrhage.     Pupils: Pupils are equal, round, and reactive to light.   Neck:      Thyroid: No thyromegaly.      Vascular: No JVD.      Trachea: No tracheal deviation.   Cardiovascular:      Rate and Rhythm: Normal rate.   Pulmonary:      Effort: Pulmonary effort is normal. No respiratory distress.      Breath sounds: No stridor.   Chest:      Chest wall: No tenderness.   Abdominal:      General: Bowel sounds are normal. There is no distension.      Palpations: Abdomen is soft. There is no hepatomegaly, splenomegaly or mass.      Tenderness: There is abdominal tenderness.  There is no rebound.          Comments: Palpable mass   Musculoskeletal:         General: No tenderness. Normal range of motion.      Cervical back: Normal range of motion and neck supple.   Lymphadenopathy:      Cervical: No cervical adenopathy.      Upper Body:      Right upper body: No supraclavicular adenopathy.      Left upper body: No supraclavicular adenopathy.   Skin:     General: Skin is dry.      Findings: No erythema or rash.   Neurological:      Mental Status: She is alert and oriented to person, place, and time.      Cranial Nerves: No cranial nerve deficit.      Coordination: Coordination normal.   Psychiatric:         Behavior: Behavior normal.         Thought Content: Thought content normal.         Judgment: Judgment normal.             Assessment:      1. Duodenal cancer    2. Iron deficiency anemia, unspecified iron deficiency anemia type    3. Peripheral neuropathy due to chemotherapy    4. s/p partial pancreatectomy    5. Secondary malignancy of soft tissues of abdomen           Med Onc Chart Routing      Follow up with physician . Nurse navigation to coordinate set up initiation of chemotherapy after port placement   Follow up with [facility]    Infusion scheduling note    Injection scheduling note    Labs    Imaging    Pharmacy appointment    Other referrals                   Plan:     Extensive conversation with patient reviewed information imaging with her.  Results of peripheral blood next generation sequencing demonstrates potential actionable mutation.  Will present case at clinical trials group what will start patient on FOLFOX for retreatment.  Port to be placed after Marcus curable rgical Oncology review.  Discussed implications and answered questi malignancy referral for advanced care planningAdvance Care Planning Consented the patient to the treatment plan and the patient was educated on the planned duration of the treatment and schedule of the treatment administration.

## 2024-05-07 ENCOUNTER — PATIENT MESSAGE (OUTPATIENT)
Dept: ADMINISTRATIVE | Facility: OTHER | Age: 59
End: 2024-05-07
Payer: COMMERCIAL

## 2024-05-07 ENCOUNTER — PATIENT MESSAGE (OUTPATIENT)
Dept: HEMATOLOGY/ONCOLOGY | Facility: CLINIC | Age: 59
End: 2024-05-07
Payer: COMMERCIAL

## 2024-05-08 ENCOUNTER — TUMOR BOARD CONFERENCE (OUTPATIENT)
Dept: HEMATOLOGY/ONCOLOGY | Facility: CLINIC | Age: 59
End: 2024-05-08
Payer: COMMERCIAL

## 2024-05-08 ENCOUNTER — PATIENT MESSAGE (OUTPATIENT)
Dept: HEMATOLOGY/ONCOLOGY | Facility: CLINIC | Age: 59
End: 2024-05-08
Payer: COMMERCIAL

## 2024-05-08 NOTE — PROGRESS NOTES
[facility] Tumor Board    Date: 5/8/2024    Patient Name: Karen Gutierrez    MRN: [id number]    Diagnosis: Recurrent Duodenal Carcinoma - Diagnosed in 3/2021.status post Whipple with adjuvant treatment with FOLFOX. Patient now presents with local recurrence consistent with recurrent duodenal carcinoma.    Referring Provider: Dr. Daley    Present PCTP Providers:     Dr. Charlie Lim, Leyda Rodriguez NP, Clover Sebastian NP    Patient Summary:  Pathology:    Has failed       Current treatment(s):    ECOG: Fully active, able to carry on all pre-disease performance without restriction    Molecular Workup:    Other  Other Molecular Workup: Genetics testing: MUTHY mutation. Tempus: PIK3CA, LULA splice, PIK3CA, KRAS G12s, APC, TP53      Board Recommendations:    Standard of care recommendations: FOLFOX +/- Avastin   Trial recommendations: Late phase: no trials available.  Early phase: IGM no slots; unclear if duodenal accepted either.

## 2024-05-08 NOTE — Clinical Note
No trials for this patient right now. Please refer back at progression. Recommend FOLFOX +/- Avastin.  Thanks, Leyda

## 2024-05-09 DIAGNOSIS — C17.0 DUODENAL CANCER: Primary | ICD-10-CM

## 2024-05-09 RX ORDER — OLANZAPINE 5 MG/1
5 TABLET ORAL NIGHTLY
Qty: 6 TABLET | Refills: 11 | Status: SHIPPED | OUTPATIENT
Start: 2024-05-09

## 2024-05-09 RX ORDER — PROCHLORPERAZINE MALEATE 10 MG
10 TABLET ORAL EVERY 6 HOURS PRN
Qty: 20 TABLET | Refills: 11 | Status: SHIPPED | OUTPATIENT
Start: 2024-05-09

## 2024-05-09 RX ORDER — ONDANSETRON 4 MG/1
4 TABLET, ORALLY DISINTEGRATING ORAL 2 TIMES DAILY
Qty: 20 TABLET | Refills: 11 | Status: SHIPPED | OUTPATIENT
Start: 2024-05-09

## 2024-05-10 ENCOUNTER — TELEPHONE (OUTPATIENT)
Dept: PREADMISSION TESTING | Facility: HOSPITAL | Age: 59
End: 2024-05-10
Payer: COMMERCIAL

## 2024-05-10 ENCOUNTER — DOCUMENTATION ONLY (OUTPATIENT)
Dept: INTERNAL MEDICINE | Facility: CLINIC | Age: 59
End: 2024-05-10
Payer: COMMERCIAL

## 2024-05-10 ENCOUNTER — DOCUMENTATION ONLY (OUTPATIENT)
Dept: INFUSION THERAPY | Facility: HOSPITAL | Age: 59
End: 2024-05-10
Payer: COMMERCIAL

## 2024-05-10 ENCOUNTER — PATIENT MESSAGE (OUTPATIENT)
Dept: INTERNAL MEDICINE | Facility: CLINIC | Age: 59
End: 2024-05-10
Payer: COMMERCIAL

## 2024-05-10 ENCOUNTER — OFFICE VISIT (OUTPATIENT)
Dept: SURGICAL ONCOLOGY | Facility: CLINIC | Age: 59
End: 2024-05-10
Payer: COMMERCIAL

## 2024-05-10 VITALS
BODY MASS INDEX: 28.6 KG/M2 | TEMPERATURE: 98 F | HEART RATE: 73 BPM | HEIGHT: 72 IN | SYSTOLIC BLOOD PRESSURE: 132 MMHG | DIASTOLIC BLOOD PRESSURE: 84 MMHG | WEIGHT: 211.19 LBS

## 2024-05-10 DIAGNOSIS — C79.89 SECONDARY MALIGNANCY OF SOFT TISSUES OF ABDOMEN: ICD-10-CM

## 2024-05-10 DIAGNOSIS — C17.0 DUODENAL CANCER: Primary | ICD-10-CM

## 2024-05-10 DIAGNOSIS — C79.9 METASTATIC ADENOCARCINOMA: ICD-10-CM

## 2024-05-10 PROCEDURE — 3075F SYST BP GE 130 - 139MM HG: CPT | Mod: CPTII,S$GLB,, | Performed by: SURGERY

## 2024-05-10 PROCEDURE — 4010F ACE/ARB THERAPY RXD/TAKEN: CPT | Mod: CPTII,S$GLB,, | Performed by: SURGERY

## 2024-05-10 PROCEDURE — 99215 OFFICE O/P EST HI 40 MIN: CPT | Mod: S$GLB,,, | Performed by: SURGERY

## 2024-05-10 PROCEDURE — 99999 PR PBB SHADOW E&M-EST. PATIENT-LVL IV: CPT | Mod: PBBFAC,,, | Performed by: SURGERY

## 2024-05-10 PROCEDURE — 3079F DIAST BP 80-89 MM HG: CPT | Mod: CPTII,S$GLB,, | Performed by: SURGERY

## 2024-05-10 PROCEDURE — 3008F BODY MASS INDEX DOCD: CPT | Mod: CPTII,S$GLB,, | Performed by: SURGERY

## 2024-05-10 RX ORDER — AMLODIPINE BESYLATE 10 MG/1
10 TABLET ORAL DAILY
COMMUNITY

## 2024-05-10 RX ORDER — SODIUM CHLORIDE 9 MG/ML
INJECTION, SOLUTION INTRAVENOUS CONTINUOUS
Status: CANCELLED | OUTPATIENT
Start: 2024-05-10

## 2024-05-10 RX ORDER — CEFAZOLIN SODIUM 2 G/50ML
2 SOLUTION INTRAVENOUS
Status: CANCELLED | OUTPATIENT
Start: 2024-05-10

## 2024-05-10 NOTE — TELEPHONE ENCOUNTER
This patient is scheduled for a port placement on 5/13 with Dr. Hernandez. I have been trying to reach her to review her history/ instructions and time of arrival, but I've been unsuccessful. May you please assist me in reaching out to her? If you reach her can you have her call me at [phone]?

## 2024-05-10 NOTE — TELEPHONE ENCOUNTER
Pre op instructions reviewed with patient per phone.      To confirm, your doctor has instructed you: Surgery is scheduled for 5/13.  Your arrival time is 1030.       Surgery [facility] at [facility],  The address is [address]. [place] [address].      IMPORTANT INSTRUCTIONS!    Do not eat or drink after 12 midnight, including water. Do not smoke or use chewing tobacco after 12 midnight!  OK to brush teeth, but no gum, candy, or mints!      *Take only these medicines with a small swallow of water-morning of surgery*     Bystolic, Lexapro, Strattera, and Norvasc.      DO NOT TAKE OLMESARTAN MONDAY AM.       ____ Stop taking all vitamins, herbal supplements, Aspirin, & NSAIDS (Ibuprofen, Advil, Aleve) 7 days prior to surgery, as these can thin your blood.    ____ Weight loss medication, such as Adipex and Phentermine, must be stopped 14 days prior to surgery, no exceptions!    *Diabetic Patients: If you take diabetic or weight loss medication, do NOT take morning of surgery unless instructed by Doctor. Metformin to be stopped 24 hrs prior to surgery. DO NOT take short-acting insulin the day of surgery. Only take HALF of your regular dose of long-acting insulin the night before surgery, unless instructed otherwise. Blood sugars will be checked in pre-op.   ~Ozempic/Mounjaro/Wegovy/Trulicity/Semaglutide injections must be stopped 7 days prior to surgery.     Please notify MD office if you develop an active infection, are prescribed antibiotics by someone other than the surgeon doing your surgery, or visit urgent care/ER.      Bathing Instructions:   The night before surgery and the morning prior to coming to the hospital:    - Shower & rinse your body as usual with anti-bacterial Soap (Dial or Lever 2000)   -Hibiclens (if indicated) use AFTER anti-bacterial soap; 1 packet PM/1 packet in AM on surgical site only   -Do not use hibiclens on your head, face, or genitals.    -Do not wash with  anti-bacterial soap after you use the hibiclens.    -Do not shave surgical site 5-7 days prior to surgery.    -Pubic hair 7 days prior to surgery (OBGYN/Urology only).       Additional Instructions:   __ No powder, lotions, creams, or body spray to skin   __ No deodorant if you are having: breast procedure, PORT, or upper shoulder surgery!   __ No nail polish or artificial nails       **SURGERY WILL BE CANCELLED IF ARTIFICIAL/NAIL POLISH IS PRESENT!!!**  __ Please remove all piercings and leave all jewelry at home.    **SURGERY WILL BE CANCELLED IF PIERCINGS ARE PRESENT!!!**    __ Dentures, Hearing Aids and Contact Lens need to be removed prior to the start of surgery.    __ Avoid Alcoholic beverages 3 days prior to surgery, as it can thin the blood, unless told otherwise by pre-admit department.  __ Females: may need to give a urine sample the morning of surgery;   **Please ask  for a specimen cup if you need to use the restroom prior to being called into pre-op.**  __ Males: Stop ED medications (Viagra, Cialis) 24 hrs prior to surgery.  __ Wear clean, loose-fitting clothing. Allow for dressings/bandages/surgical equipment   __ You must have transportation, and they MUST stay the entire time.   __  Bring photo ID and insurance information to [facility]      [facility] Visitor/Ride Policy:   Only 1 adult allowed (over the age of 18) to accompany you and MUST STAY through the entire length of admission.     -Must have a ride home from a responsible adult that you know and trust.    -Medical Transport, Uber or Lyft can only be used if patient has a responsible adult to accompany them during ride home.    ~Your ride MUST STAY the entire time until you are discharged~        Post-Op Instructions: You will receive surgery post-op instructions by your Discharge Nurse prior to going home.   Surgical Site Infection:   Prevention of surgical site infections:   -Keep incisions clean and dry.   -Do not soak/submerge  incisions in water until completely healed.   -Do not apply lotions, powders, creams, or deodorants to site.   -Always make sure hands are cleaned with antibacterial soap/ alcohol-based  prior to touching the surgical site.       Signs and symptoms:               -Redness and pain around the area where you had surgery               -Drainage of cloudy fluid from your surgical wound               -Fever over 100.4 or chills     >>>Call Surgeon office/on-call Surgeon if you experience any of these signs & symptoms post-surgery @ [phone].    *If you are running late or have questions the morning of surgery before 7AM, please call the Pre-OP Department @ [phone].    *Please Call [facility] Pre-Admit Department for surgery instruction questions:  [phone]    *Payment questions:  [phone]    *Billing questions:  [phone]

## 2024-05-10 NOTE — PROGRESS NOTES
Pre op instructions reviewed with patient per phone.      To confirm, your doctor has instructed you: Surgery is scheduled for 5/13.  Your arrival time is 1030.       Surgery will be at [facility],  The address is [address]. [place] [address].      IMPORTANT INSTRUCTIONS!    Do not eat or drink after 12 midnight, including water. Do not smoke or use chewing tobacco after 12 midnight!  OK to brush teeth, but no gum, candy, or mints!      *Take only these medicines with a small swallow of water-morning of surgery*     Bystolic, Lexapro, Strattera, and Norvasc.      DO NOT TAKE OLMESARTAN MONDAY AM.       ____ Stop taking all vitamins, herbal supplements, Aspirin, & NSAIDS (Ibuprofen, Advil, Aleve) 7 days prior to surgery, as these can thin your blood.    ____ Weight loss medication, such as Adipex and Phentermine, must be stopped 14 days prior to surgery, no exceptions!    *Diabetic Patients: If you take diabetic or weight loss medication, do NOT take morning of surgery unless instructed by Doctor. Metformin to be stopped 24 hrs prior to surgery. DO NOT take short-acting insulin the day of surgery. Only take HALF of your regular dose of long-acting insulin the night before surgery, unless instructed otherwise. Blood sugars will be checked in pre-op.   ~Ozempic/Mounjaro/Wegovy/Trulicity/Semaglutide injections must be stopped 7 days prior to surgery.     Please notify MD office if you develop an active infection, are prescribed antibiotics by someone other than the surgeon doing your surgery, or visit urgent care/ER.      Bathing Instructions:   The night before surgery and the morning prior to coming to the hospital:    - Shower & rinse your body as usual with anti-bacterial Soap (Dial or Lever 2000)   -Hibiclens (if indicated) use AFTER anti-bacterial soap; 1 packet PM/1 packet in AM on surgical site only   -Do not use hibiclens on your head, face, or genitals.    -Do not wash with  anti-bacterial soap after you use the hibiclens.    -Do not shave surgical site 5-7 days prior to surgery.    -Pubic hair 7 days prior to surgery (OBGYN/Urology only).       Additional Instructions:   __ No powder, lotions, creams, or body spray to skin   __ No deodorant if you are having: breast procedure, PORT, or upper shoulder surgery!   __ No nail polish or artificial nails       **SURGERY WILL BE CANCELLED IF ARTIFICIAL/NAIL POLISH IS PRESENT!!!**  __ Please remove all piercings and leave all jewelry at home.    **SURGERY WILL BE CANCELLED IF PIERCINGS ARE PRESENT!!!**    __ Dentures, Hearing Aids and Contact Lens need to be removed prior to the start of surgery.    __ Avoid Alcoholic beverages 3 days prior to surgery, as it can thin the blood, unless told otherwise by pre-admit department.  __ Females: may need to give a urine sample the morning of surgery;   **Please ask  for a specimen cup if you need to use the restroom prior to being called into pre-op.**  __ Males: Stop ED medications (Viagra, Cialis) 24 hrs prior to surgery.  __ Wear clean, loose-fitting clothing. Allow for dressings/bandages/surgical equipment   __ You must have transportation, and they MUST stay the entire time.   __  Bring photo ID and insurance information to [facility]      [facility] Visitor/Ride Policy:   Only 1 adult allowed (over the age of 18) to accompany you and MUST STAY through the entire length of admission.     -Must have a ride home from a responsible adult that you know and trust.    -Medical Transport, Uber or Lyft can only be used if patient has a responsible adult to accompany them during ride home.    ~Your ride MUST STAY the entire time until you are discharged~        Post-Op Instructions: You will receive surgery post-op instructions by your Discharge Nurse prior to going home.   Surgical Site Infection:   Prevention of surgical site infections:   -Keep incisions clean and dry.   -Do not soak/submerge  incisions in water until completely healed.   -Do not apply lotions, powders, creams, or deodorants to site.   -Always make sure hands are cleaned with antibacterial soap/ alcohol-based  prior to touching the surgical site.       Signs and symptoms:               -Redness and pain around the area where you had surgery               -Drainage of cloudy fluid from your surgical wound               -Fever over 100.4 or chills     >>>Call Surgeon office/on-call Surgeon if you experience any of these signs & symptoms post-surgery @ [phone].    *If you are running late or have questions the morning of surgery before 7AM, please call the Pre-OP Department @ [phone].    *Please Call [facility] Pre-Admit Department for surgery instruction questions:  [phone]    *Payment questions:  [phone]    *Billing questions:  [phone]

## 2024-05-10 NOTE — TELEPHONE ENCOUNTER
This patient is scheduled for a port placement on 5/13 with Dr. Hernandez. I have been trying to reach her to review her history/ instructions and time of arrival, but I've been unsuccessful. May you please assist me in reaching out to her? If you reach her can you have her call me at [phone]?

## 2024-05-10 NOTE — PROGRESS NOTES
Attempted to contact patient's review preop instructions for upcoming port insertion on 05/13, no answer.  Left voicemail for patient to return call to discuss further.   Cosentyx Counseling:  I discussed with the patient the risks of Cosentyx including but not limited to worsening of Crohn's disease, immunosuppression, allergic reactions and infections.  The patient understands that monitoring is required including a PPD at baseline and must alert us or the primary physician if symptoms of infection or other concerning signs are noted.

## 2024-05-13 ENCOUNTER — HOSPITAL ENCOUNTER (OUTPATIENT)
Facility: HOSPITAL | Age: 59
Discharge: HOME | End: 2024-05-13
Attending: SURGERY | Admitting: SURGERY
Payer: COMMERCIAL

## 2024-05-13 ENCOUNTER — HOSPITAL ENCOUNTER (OUTPATIENT)
Dept: RADIOLOGY | Facility: HOSPITAL | Age: 59
Discharge: HOME | End: 2024-05-13
Attending: SURGERY | Admitting: SURGERY
Payer: COMMERCIAL

## 2024-05-13 ENCOUNTER — ANESTHESIA EVENT (OUTPATIENT)
Dept: SURGERY | Facility: HOSPITAL | Age: 59
End: 2024-05-13
Payer: COMMERCIAL

## 2024-05-13 ENCOUNTER — ANESTHESIA (OUTPATIENT)
Dept: SURGERY | Facility: HOSPITAL | Age: 59
End: 2024-05-13
Payer: COMMERCIAL

## 2024-05-13 ENCOUNTER — TELEPHONE (OUTPATIENT)
Dept: PSYCHIATRY | Facility: CLINIC | Age: 59
End: 2024-05-13
Payer: COMMERCIAL

## 2024-05-13 VITALS
HEIGHT: 72 IN | RESPIRATION RATE: 15 BRPM | HEART RATE: 62 BPM | TEMPERATURE: 98 F | SYSTOLIC BLOOD PRESSURE: 137 MMHG | OXYGEN SATURATION: 97 % | BODY MASS INDEX: 28.6 KG/M2 | DIASTOLIC BLOOD PRESSURE: 66 MMHG | WEIGHT: 211.19 LBS

## 2024-05-13 DIAGNOSIS — C17.0 DUODENAL CANCER: Primary | ICD-10-CM

## 2024-05-13 DIAGNOSIS — C79.89 SECONDARY MALIGNANCY OF SOFT TISSUES OF ABDOMEN: ICD-10-CM

## 2024-05-13 PROCEDURE — 36000707: Performed by: SURGERY

## 2024-05-13 PROCEDURE — 71045 X-RAY EXAM CHEST 1 VIEW: CPT | Mod: 26,,, | Performed by: RADIOLOGY

## 2024-05-13 PROCEDURE — 71045 X-RAY EXAM CHEST 1 VIEW: CPT | Mod: TC

## 2024-05-13 PROCEDURE — 71000033 HC RECOVERY, INTIAL HOUR: Performed by: SURGERY

## 2024-05-13 PROCEDURE — 25000003 PHARM REV CODE 250: Performed by: NURSE ANESTHETIST, CERTIFIED REGISTERED

## 2024-05-13 PROCEDURE — 36000706: Performed by: SURGERY

## 2024-05-13 PROCEDURE — 37000008 HC ANESTHESIA 1ST 15 MINUTES: Performed by: SURGERY

## 2024-05-13 PROCEDURE — 63600175 PHARM REV CODE 636 W HCPCS: Performed by: NURSE ANESTHETIST, CERTIFIED REGISTERED

## 2024-05-13 PROCEDURE — 71000015 HC POSTOP RECOV 1ST HR: Performed by: SURGERY

## 2024-05-13 PROCEDURE — 25000003 PHARM REV CODE 250: Performed by: SURGERY

## 2024-05-13 PROCEDURE — D9220A PRA ANESTHESIA: Mod: ,,, | Performed by: NURSE ANESTHETIST, CERTIFIED REGISTERED

## 2024-05-13 PROCEDURE — 77001 FLUOROGUIDE FOR VEIN DEVICE: CPT | Mod: 26,,, | Performed by: SURGERY

## 2024-05-13 PROCEDURE — 63600175 PHARM REV CODE 636 W HCPCS: Performed by: SURGERY

## 2024-05-13 PROCEDURE — C1788 PORT, INDWELLING, IMP: HCPCS | Performed by: SURGERY

## 2024-05-13 PROCEDURE — 36561 INSERT TUNNELED CV CATH: CPT | Mod: RT,,, | Performed by: SURGERY

## 2024-05-13 PROCEDURE — 37000009 HC ANESTHESIA EA ADD 15 MINS: Performed by: SURGERY

## 2024-05-13 PROCEDURE — 63600175 PHARM REV CODE 636 W HCPCS: Mod: JZ,JG | Performed by: SURGERY

## 2024-05-13 DEVICE — PORT POWER CLEAR VIEW: Type: IMPLANTABLE DEVICE | Site: OTHER (ADD COMMENT) | Status: FUNCTIONAL

## 2024-05-13 RX ORDER — BUPIVACAINE HYDROCHLORIDE 2.5 MG/ML
INJECTION, SOLUTION EPIDURAL; INFILTRATION; INTRACAUDAL
Status: DISCONTINUED
Start: 2024-05-13 | End: 2024-05-13 | Stop reason: HOSPADM

## 2024-05-13 RX ORDER — ONDANSETRON HYDROCHLORIDE 2 MG/ML
INJECTION, SOLUTION INTRAVENOUS
Status: DISCONTINUED | OUTPATIENT
Start: 2024-05-13 | End: 2024-05-13

## 2024-05-13 RX ORDER — ALBUTEROL SULFATE 0.83 MG/ML
2.5 SOLUTION RESPIRATORY (INHALATION) EVERY 4 HOURS PRN
Status: DISCONTINUED | OUTPATIENT
Start: 2024-05-13 | End: 2024-05-13 | Stop reason: HOSPADM

## 2024-05-13 RX ORDER — ACETAMINOPHEN 10 MG/ML
INJECTION, SOLUTION INTRAVENOUS
Status: DISCONTINUED | OUTPATIENT
Start: 2024-05-13 | End: 2024-05-13

## 2024-05-13 RX ORDER — SODIUM CHLORIDE 9 MG/ML
INJECTION, SOLUTION INTRAVENOUS CONTINUOUS
Status: DISCONTINUED | OUTPATIENT
Start: 2024-05-13 | End: 2024-05-13 | Stop reason: HOSPADM

## 2024-05-13 RX ORDER — BUPIVACAINE HYDROCHLORIDE 2.5 MG/ML
INJECTION, SOLUTION EPIDURAL; INFILTRATION; INTRACAUDAL
Status: DISCONTINUED | OUTPATIENT
Start: 2024-05-13 | End: 2024-05-13 | Stop reason: HOSPADM

## 2024-05-13 RX ORDER — PROPOFOL 10 MG/ML
VIAL (ML) INTRAVENOUS
Status: DISCONTINUED | OUTPATIENT
Start: 2024-05-13 | End: 2024-05-13

## 2024-05-13 RX ORDER — ONDANSETRON HYDROCHLORIDE 2 MG/ML
4 INJECTION, SOLUTION INTRAVENOUS ONCE AS NEEDED
Status: DISCONTINUED | OUTPATIENT
Start: 2024-05-13 | End: 2024-05-13 | Stop reason: HOSPADM

## 2024-05-13 RX ORDER — FENTANYL CITRATE 50 UG/ML
25 INJECTION, SOLUTION INTRAMUSCULAR; INTRAVENOUS EVERY 5 MIN PRN
Status: DISCONTINUED | OUTPATIENT
Start: 2024-05-13 | End: 2024-05-13 | Stop reason: HOSPADM

## 2024-05-13 RX ORDER — DEXAMETHASONE SODIUM PHOSPHATE 4 MG/ML
INJECTION, SOLUTION INTRA-ARTICULAR; INTRALESIONAL; INTRAMUSCULAR; INTRAVENOUS; SOFT TISSUE
Status: DISCONTINUED | OUTPATIENT
Start: 2024-05-13 | End: 2024-05-13

## 2024-05-13 RX ORDER — FENTANYL CITRATE 50 UG/ML
INJECTION, SOLUTION INTRAMUSCULAR; INTRAVENOUS
Status: DISCONTINUED | OUTPATIENT
Start: 2024-05-13 | End: 2024-05-13

## 2024-05-13 RX ORDER — LIDOCAINE HYDROCHLORIDE 20 MG/ML
INJECTION, SOLUTION EPIDURAL; INFILTRATION; INTRACAUDAL; PERINEURAL
Status: DISCONTINUED | OUTPATIENT
Start: 2024-05-13 | End: 2024-05-13

## 2024-05-13 RX ORDER — MEPERIDINE HYDROCHLORIDE 25 MG/ML
12.5 INJECTION INTRAMUSCULAR; INTRAVENOUS; SUBCUTANEOUS ONCE
Status: DISCONTINUED | OUTPATIENT
Start: 2024-05-13 | End: 2024-05-13 | Stop reason: HOSPADM

## 2024-05-13 RX ORDER — DIPHENHYDRAMINE HYDROCHLORIDE 50 MG/ML
25 INJECTION INTRAMUSCULAR; INTRAVENOUS EVERY 6 HOURS PRN
Status: DISCONTINUED | OUTPATIENT
Start: 2024-05-13 | End: 2024-05-13 | Stop reason: HOSPADM

## 2024-05-13 RX ORDER — HEPARIN 100 UNIT/ML
SYRINGE INTRAVENOUS
Status: DISCONTINUED | OUTPATIENT
Start: 2024-05-13 | End: 2024-05-13 | Stop reason: HOSPADM

## 2024-05-13 RX ORDER — IBUPROFEN 800 MG/1
800 TABLET ORAL EVERY 8 HOURS
COMMUNITY
Start: 2024-05-13

## 2024-05-13 RX ORDER — DIPHENHYDRAMINE HYDROCHLORIDE 50 MG/ML
INJECTION INTRAMUSCULAR; INTRAVENOUS
Status: DISCONTINUED | OUTPATIENT
Start: 2024-05-13 | End: 2024-05-13

## 2024-05-13 RX ORDER — ACETAMINOPHEN 500 MG
1000 TABLET ORAL EVERY 8 HOURS
COMMUNITY
Start: 2024-05-13

## 2024-05-13 RX ORDER — MIDAZOLAM HYDROCHLORIDE 1 MG/ML
INJECTION INTRAMUSCULAR; INTRAVENOUS
Status: DISCONTINUED | OUTPATIENT
Start: 2024-05-13 | End: 2024-05-13

## 2024-05-13 RX ORDER — HEPARIN 100 UNIT/ML
SYRINGE INTRAVENOUS
Status: DISCONTINUED
Start: 2024-05-13 | End: 2024-05-13 | Stop reason: HOSPADM

## 2024-05-13 RX ADMIN — FENTANYL CITRATE 25 MCG: 50 INJECTION, SOLUTION INTRAMUSCULAR; INTRAVENOUS at 01:05

## 2024-05-13 RX ADMIN — CEFAZOLIN 2 G: 2 INJECTION, POWDER, FOR SOLUTION INTRAMUSCULAR; INTRAVENOUS at 01:05

## 2024-05-13 RX ADMIN — ACETAMINOPHEN 1000 MG: 10 INJECTION, SOLUTION INTRAVENOUS at 01:05

## 2024-05-13 RX ADMIN — PROPOFOL 200 MG: 10 INJECTION, EMULSION INTRAVENOUS at 01:05

## 2024-05-13 RX ADMIN — ONDANSETRON 4 MG: 2 INJECTION INTRAMUSCULAR; INTRAVENOUS at 01:05

## 2024-05-13 RX ADMIN — LIDOCAINE HYDROCHLORIDE 60 MG: 20 INJECTION, SOLUTION EPIDURAL; INFILTRATION; INTRACAUDAL; PERINEURAL at 01:05

## 2024-05-13 RX ADMIN — DIPHENHYDRAMINE HYDROCHLORIDE 12.5 MG: 50 INJECTION INTRAMUSCULAR; INTRAVENOUS at 01:05

## 2024-05-13 RX ADMIN — MIDAZOLAM HYDROCHLORIDE 2 MG: 1 INJECTION INTRAMUSCULAR; INTRAVENOUS at 01:05

## 2024-05-13 RX ADMIN — DEXAMETHASONE SODIUM PHOSPHATE 8 MG: 4 INJECTION, SOLUTION INTRA-ARTICULAR; INTRALESIONAL; INTRAMUSCULAR; INTRAVENOUS; SOFT TISSUE at 01:05

## 2024-05-13 NOTE — ASSESSMENT & PLAN NOTE
Karen Gutierrez is a 59 y.o. female who presents with metastatic duodenal cancer, here for port.    -- to OR for port placement  -- Laterality marked?  No- n/a  -- Abx ppx:  Ancef   -- DVT ppx:  SCDs   -- Consent signed and in the chart.  All questions have been answered        Alicia Hernandez MD      Surgical Oncology      
Adult

## 2024-05-13 NOTE — DISCHARGE SUMMARY
[facility]  Discharge Note  Short Stay    Procedure(s) (LRB):  INSERTION, VENOUS ACCESS PORT (Right)  ULTRASOUND GUIDANCE (Right)      OUTCOME: Patient tolerated treatment/procedure well without complication and is now ready for discharge.    DISPOSITION: Home or Self Care    FINAL DIAGNOSIS:  metastatic duodenal cancer    FOLLOWUP: In clinic    DISCHARGE INSTRUCTIONS:    Discharge Procedure Orders   Diet general     Call MD for:  temperature >100.4     Call MD for:  severe uncontrolled pain     Call MD for:  redness, tenderness, or signs of infection (pain, swelling, redness, odor or green/yellow discharge around incision site)     Remove dressing in 48 hours     Activity as tolerated         Clinical Reference Documents Added to Patient Instructions         Document    PORTACAT DISCHARGE INSTRUCTIONS (ENGLISH)            TIME SPENT ON DISCHARGE: 20 minutes

## 2024-05-13 NOTE — DISCHARGE INSTRUCTIONS
-- No heavy lifting (nothing >10-15 lbs) for 4-6 weeks.   -- No driving while taking pain medications   -- Ok to shower with soap and water, no scrubbing incision.  No lotions or ointments to incisions.  Skin glue will begin to peel off in about a week, ok to remove it at that time.    -- No tub baths, swimming or submerging underwater for 4 weeks or until wound completely healed  -- Ok for Tylenol 1gram (2 extra strength Tylenol) every 8 hours and Ibuprofen 800mg every 8 hours alternating for pain.  Call for pain unrelieved by over the counter medications.

## 2024-05-13 NOTE — ANESTHESIA PREPROCEDURE EVALUATION
05/13/2024  Karen Gutierrez is a 59 y.o., female.  Past Medical History:   Diagnosis Date    Cancer     duodenal cancer    Hypertension     Hypotension, iatrogenic     Mitral valve prolapse      Past Surgical History:   Procedure Laterality Date    BUNIONECTOMY Left     HYSTERECTOMY  2000    INSERTION OF TUNNELED CENTRAL VENOUS CATHETER (CVC) WITH SUBCUTANEOUS PORT N/A 3/26/2021    Procedure: [id number];  Surgeon: Lauren Abraham MD;  Location: [facility] OR;  Service: General;  Laterality: N/A;    LITHOTRIPSY      PLACEMENT OF JEJUNOSTOMY TUBE  2/18/2021    Procedure: INSERTION, JEJUNOSTOMY TUBE;  Surgeon: Hitesh Díaz MD;  Location: [facility] OR [facility] FLR;  Service: General;;    WHIPPLE PROCEDURE N/A 2/18/2021    Procedure: WHIPPLE PROCEDURE;  Surgeon: Hitesh Díaz MD;  Location: [facility] OR [facility] FLR;  Service: General;  Laterality: N/A;         Anesthesia Evaluation    I have reviewed the Patient Summary Reports.    I have reviewed the NPO Status.   I have reviewed the Medications.     Review of Systems  Anesthesia Hx:  No problems with previous Anesthesia  Hypotension after anesthetic for whipple  History of prior surgery of interest to airway management or planning:  Previous anesthesia: General        Denies Family Hx of Anesthesia complications.    Denies Personal Hx of Anesthesia complications.                    Social:  Non-Smoker, No Alcohol Use       Hematology/Oncology:       -- Anemia:                  Current/Recent Cancer.         surgery   Oncology Comments: Duodenal cancer       Cardiovascular:     Hypertension Valvular problems/Murmurs, MVP                                        Renal/:  Chronic Renal Disease, ARF renal calculi               Hepatic/GI:        S/p whipple          Musculoskeletal:  Musculoskeletal Normal                Neurological:  Neurology Normal                                       Endocrine:  Endocrine Normal            Dermatological:  Skin Normal    Psych:  Psychiatric Normal                    Physical Exam  General:  Well nourished       Airway/Jaw/Neck:  Airway Findings: Mouth Opening: Small, but > 3cm     Tongue: Normal      General Airway Assessment: Adult      Mallampati: II   TM Distance: Normal, at least 6 cm   Jaw/Neck Findings:     Neck ROM: Normal ROM   Neck Findings:       Eyes/Ears/Nose:  Eyes/Ears/Nose Findings:     Dental:  Dental Findings: In tact      Chest/Lungs:  Chest/Lungs Findings:  Clear to auscultation, Normal Respiratory Rate       Heart/Vascular:  Heart Findings: Rate: Normal  Rhythm: Regular Rhythm  Sounds: Normal  Heart murmur: negative       Vascular Findings: Normal           Abdomen:  Abdomen Findings: Normal      Musculoskeletal:  Musculoskeletal Findings: Normal   Skin:  Skin Findings: Normal      Mental Status:  Mental Status Findings:  Cooperative, Alert and Oriented         Anesthesia Plan  Type of Anesthesia, risks & benefits discussed:  Anesthesia Type:  MAC, general    Patient's Preference:   Plan Factors:          Intra-op Monitoring Plan: standard ASA monitors  Intra-op Monitoring Plan Comments:   Post Op Pain Control Plan: per primary service following discharge from PACU and multimodal analgesia  Post Op Pain Control Plan Comments:     Induction:   IV  Beta Blocker:  Patient is not currently on a Beta-Blocker (No further documentation required).       Informed Consent: Informed consent signed with the Patient and all parties understand the risks and agree with anesthesia plan.  All questions answered.  Anesthesia consent signed with patient.  ASA Score: 2     Day of Surgery Review of History & Physical:              Ready For Surgery From Anesthesia Perspective.             Physical Exam  General: Well nourished    Airway:  Mallampati: II   Mouth Opening: Small, but > 3cm  TM Distance: Normal, at least 6 cm  Tongue:  Normal  Neck ROM: Normal ROM    Dental:  In tact    Chest/Lungs:  Clear to auscultation, Normal Respiratory Rate    Heart:  Rate: Normal  Rhythm: Regular Rhythm  Sounds: Normal        Anesthesia Plan  Type of Anesthesia, risks & benefits discussed:    Anesthesia Type: MAC, general  Intra-op Monitoring Plan: standard ASA monitors  Post Op Pain Control Plan: per primary service following discharge from PACU and multimodal analgesia  Induction:  IV  Informed Consent: Informed consent signed with the Patient and all parties understand the risks and agree with anesthesia plan.  All questions answered.   ASA Score: 2    Ready For Surgery From Anesthesia Perspective.     .

## 2024-05-13 NOTE — HPI
Karen Kennedy Gutierrez is a 59 y.o. 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  She presents today for port placement.

## 2024-05-13 NOTE — TRANSFER OF CARE
Anesthesia Transfer of Care Note    Patient: Karen Gutierrez    Procedure(s) Performed: Procedure(s) (LRB):  INSERTION, VENOUS ACCESS PORT (Right)  ULTRASOUND GUIDANCE (Right)    Patient location: PACU    Anesthesia Type: general    Transport from OR: Transported from OR on room air with adequate spontaneous ventilation    Post pain: adequate analgesia    Post assessment: no apparent anesthetic complications and tolerated procedure well    Post vital signs: stable    Level of consciousness: awake    Nausea/Vomiting: no nausea/vomiting    Complications: none    Transfer of care protocol was followed      Last vitals: Visit Vitals  BP (!) 145/79 (BP Location: Right arm, Patient Position: Sitting)   Pulse 76   Temp 35.6 °C (96 °F) (Temporal)   Resp 16   Ht 6' (1.829 m)   Wt 95.8 kg (211 lb 3.2 oz)   SpO2 96%   Breastfeeding No   BMI 28.64 kg/m²

## 2024-05-13 NOTE — OP NOTE
[facility]  Surgical Oncology  Operative Note           Date of Procedure: 5/13/2024   Time: 1:59 PM    Procedure: Procedure(s) (LRB):  INSERTION, VENOUS ACCESS PORT (Right)  ULTRASOUND GUIDANCE (Right)     Surgeons and Role:     * Alicia Hernandez MD - Primary  Assisting Surgeon: None    Pre-Operative Diagnosis:   Metastatic Duodenal cancer [C17.0]    Post-Operative Diagnosis:   Same    Anesthesia: General    Procedure:  Port-a-cath placement, right internal jugular vein  Fluoroscopic guidance for central venous access device placement    Operative Findings:   Right internal jugular vein identified and found to be compressible  Right internal jugular access obtained with ultrasound guidance   Insertion and final placement of port and attached catheter confirmed with fluoroscopic guidance          Indications:  Karen Gutierrez is a 59 y.o. year old female with a recent diagnosis of metastatic duodenal cancer who presents today for port-a-cath placement.     Risks and benefits were reviewed including bleeding, infection, pain, scar, damage to surrounding structures, cardiovascular and pulmonary complications, pneumothorax, damage to major vascular structures, need for additional procedures, death, and imponderables.  She expressed understanding and gave informed consent to proceed.    Procedure in Detail:  After informed consent was obtained and the patient was properly identified, the patient was taken to the operating room and placed in a supine position.  After the uneventful induction of general anesthesia, a shoulder roll was placed and the patient's bilateral neck and chest were prepped and draped in a standard surgical fashion. A timeout was performed according to the [facility] Guidelines. The patient was placed in Trendelenburg position and an ultrasound was used to identify the right internal jugular vein as a compressible, nonpulsatile structure lateral to the carotid artery.   Local anesthetic was injected at the planned access site and using intraoperative ultrasound guidance, the right internal jugular vein was accessed with the finder needle without incident with the aspiration of dark red, nonpulsatile blood.  The syringe was removed the guidewire was advanced slowly into the vessel without incident.  The needle was removed and the ultrasound was then used to identify the course of the guidewire, confirming its location within the lumen of the right internal jugular vein.  The guidewire was secured to the drape and attention was then turned to the right chest.    After injection of local anesthetic, a 3 cm incision was made on the right chest inferior to the clavicle and sharp dissection was carried down with Bovie electrocautery to the pectoralis fascia.  A pocket for the port device was created inferiorly along the pectoralis fascia.  The end of the port catheter was attached to the tunneling device and the catheter was tunneled retrograde from the port pocket, over the top of the clavicle, to the jugular venous access site.  Using the Seldinger technique, the dilator was passed over the wire without incident.  The dilator was then placed within the sheath and this was placed over the guidewire into the right internal jugular vein using fluoroscopic guidance without complication.  Once placement of the sheath was confirmed with fluoroscopy, the guidewire and internal dilator were removed leaving the sheath in place.  At this time, the port catheter was advanced through the sheath into the right internal jugular vein under under direct fluoroscopic visualization.  The sheath was then torn away leaving the catheter in place and the distal tip was positioned at the junction of the superior vena cava and right atrium with fluoroscopic guidance. The catheter was then trimmed and attached to the port device and locked into place. The port was flushed and secured within the port pocket  with 2-0 Prolene suture. It was aspirated demonstrating steady return of blood and flushed with normal saline without resistance. Heparinized saline was administered into the port as a final flush.    Fluoroscopy was used to confirm the location of the distal port catheter tip at the SVC/right atrial junction, a smooth course of the catheter and the proper location of the port device on the right chest wall and neck. The port pocket was closed in two layers and the venous access site was closed with a single interrupted 4-0 Monocryl. Dermabond was used for sterile dressing.      All lap, needle, and sponge counts were correct x2. The patient was awakened from anesthesia and taken to the recovery room in stable condition. A postoperative chest x-ray will be taken in the recovery room.      Portions of the record were created with [employer] voice recognition software. This may lead to occasional typographical errors due to the inherent limitations of the software. Read the chart carefully and recognize, using context, where substitutions have occurred. Please do not hesitate to contact me directly if clarification is needed.    Implants:   Implant Name Type Inv. Item Serial No.  Lot No. LRB No. Used Action   PORT POWER CLEAR VIEW - [id number]  PORT POWER CLEAR VIEW  [employer] [id number] 1 Implanted       Drains: None    Estimated Blood Loss (EBL):  Minimal    Specimens:   Specimen (24h ago, onward)      None                    Condition: Good    Disposition: PACU - hemodynamically stable.        Alicia Hernandez MD      Surgical Oncology      5/13/2024, 1:59 PM

## 2024-05-13 NOTE — ANESTHESIA POSTPROCEDURE EVALUATION
Anesthesia Post Evaluation    Patient: Karen Gutierrez    Procedure(s) Performed: Procedure(s) (LRB):  INSERTION, VENOUS ACCESS PORT (Right)  ULTRASOUND GUIDANCE (Right)    Final Anesthesia Type: general      Patient location during evaluation: PACU  Patient participation: Yes- Able to Participate  Level of consciousness: awake and alert and oriented  Post-procedure vital signs: reviewed and stable  Pain management: adequate  Airway patency: patent    PONV status at discharge: No PONV  Anesthetic complications: no      Cardiovascular status: blood pressure returned to baseline, stable and hemodynamically stable  Respiratory status: unassisted  Hydration status: euvolemic  Follow-up not needed.              Vitals Value Taken Time   /66 05/13/24 1445   Temp 36.5 °C (97.7 °F) 05/13/24 1406   Pulse 62 05/13/24 1445   Resp 15 05/13/24 1445   SpO2 97 % 05/13/24 1445         Event Time   Out of Recovery 14:30:00         Pain/Brody Score: Brody Score: 10 (5/13/2024  2:45 PM)

## 2024-05-13 NOTE — SUBJECTIVE & OBJECTIVE
No current facility-administered medications on file prior to encounter.     Current Outpatient Medications on File Prior to Encounter   Medication Sig    amLODIPine (NORVASC) 10 MG tablet Take 10 mg by mouth once daily.    atomoxetine (STRATTERA) 40 MG capsule Take 1 capsule (40 mg total) by mouth once daily.    BYSTOLIC 20 mg Tab Take 1 tablet by mouth once daily.    EScitalopram oxalate (LEXAPRO) 10 MG tablet Take 1 tablet (10 mg total) by mouth once daily.    olmesartan (BENICAR) 40 MG tablet TAKE 1 TABLET(40 MG) BY MOUTH EVERY DAY    amLODIPine (NORVASC) 5 MG tablet TAKE 1 TABLET(5 MG) BY MOUTH EVERY DAY    OLANZapine (ZYPREXA) 5 MG tablet Take 1 tablet (5 mg total) by mouth every evening. Take nightly on days 1-3 of each chemotherapy cycle.    ondansetron (ZOFRAN-ODT) 4 MG TbDL Take 1 tablet (4 mg total) by mouth 2 (two) times daily.    prochlorperazine (COMPAZINE) 10 MG tablet Take 1 tablet (10 mg total) by mouth every 6 (six) hours as needed for Nausea.    rosuvastatin (CRESTOR) 10 MG tablet        Review of patient's allergies indicates:   Allergen Reactions    Benzalkonium chloride Hives    Codeine Itching    Morphine Nausea Only    Neosporin [neomycin-bacitracin-polymyxin] Hives    Sulfa (sulfonamide antibiotics) Hives and Itching           Hydrocortisone Hives     Redness / can use bactroban         Past Medical History:   Diagnosis Date    Cancer     duodenal cancer    Hypertension     Hypotension, iatrogenic     Mitral valve prolapse      Past Surgical History:   Procedure Laterality Date    BUNIONECTOMY Left     HYSTERECTOMY  2000    INSERTION OF TUNNELED CENTRAL VENOUS CATHETER (CVC) WITH SUBCUTANEOUS PORT N/A 3/26/2021    Procedure: [id number];  Surgeon: Lauren Abraham MD;  Location: [facility];  Service: General;  Laterality: N/A;    LITHOTRIPSY      PLACEMENT OF JEJUNOSTOMY TUBE  2/18/2021    Procedure: INSERTION, JEJUNOSTOMY TUBE;  Surgeon: Hitesh Díaz MD;  Location: [address]  FLR;  Service: General;;    WHIPPLE PROCEDURE N/A 2/18/2021    Procedure: WHIPPLE PROCEDURE;  Surgeon: Hitesh Díaz MD;  Location: [facility] OR [facility] FLR;  Service: General;  Laterality: N/A;     Family History       Problem Relation (Age of Onset)    Atrial fibrillation Mother    Bladder Cancer Father    Colon cancer Maternal Grandmother    Diabetes Mother, Father, Maternal Grandmother, Paternal Grandmother    Hyperlipidemia Mother    Hypertension Father    No Known Problems Sister, Paternal Grandfather    Ovarian cancer Mother    Stroke Mother, Maternal Grandfather          Tobacco Use    Smoking status: Former    Smokeless tobacco: Never   Substance and Sexual Activity    Alcohol use: Not Currently    Drug use: Never    Sexual activity: Not on file     Review of Systems   Constitutional:  Negative for activity change, appetite change, chills, diaphoresis, fatigue and fever.   Respiratory:  Negative for chest tightness and shortness of breath.    Cardiovascular:  Negative for chest pain.   Gastrointestinal:  Negative for abdominal distention, abdominal pain, constipation and diarrhea.   All other systems reviewed and are negative.    Objective:     Vital Signs (Most Recent):  Temp: 96 °F (35.6 °C) (05/13/24 1039)  Pulse: 76 (05/13/24 1039)  Resp: 16 (05/13/24 1039)  BP: (!) 145/79 (05/13/24 1039)  SpO2: 96 % (05/13/24 1039) Vital Signs (24h Range):  Temp:  [96 °F (35.6 °C)] 96 °F (35.6 °C)  Pulse:  [76] 76  Resp:  [16] 16  SpO2:  [96 %] 96 %  BP: (145)/(79) 145/79     Weight: 95.8 kg (211 lb 3.2 oz)  Body mass index is 28.64 kg/m².     Physical Exam  Constitutional:       General: She is not in acute distress.     Appearance: Normal appearance.   HENT:      Head: Normocephalic and atraumatic.   Eyes:      Extraocular Movements: Extraocular movements intact.      Conjunctiva/sclera: Conjunctivae normal.   Cardiovascular:      Rate and Rhythm: Normal rate and regular rhythm.   Pulmonary:      Effort: Pulmonary effort is  normal.   Abdominal:      General: Abdomen is flat. There is no distension.      Palpations: Abdomen is soft. There is no mass.      Tenderness: There is no abdominal tenderness. There is no guarding or rebound.      Hernia: No hernia is present.   Musculoskeletal:         General: No swelling, tenderness, deformity or signs of injury. Normal range of motion.   Skin:     General: Skin is warm and dry.      Coloration: Skin is not jaundiced.   Neurological:      General: No focal deficit present.      Mental Status: She is alert and oriented to person, place, and time.   Psychiatric:         Mood and Affect: Mood normal.         Behavior: Behavior normal.         Thought Content: Thought content normal.            I have reviewed all pertinent lab results within the past 24 hours.    Significant Diagnostics:  I have reviewed all pertinent imaging results/findings within the past 24 hours.

## 2024-05-13 NOTE — H&P
[facility]  General Surgery  History & Physical    Patient Name: Karen Gutierrez  MRN: [id number]  Admission Date: 5/13/2024  Attending Physician: Alicia Hernandez MD   Primary Care Provider: Han Arshad MD    Patient information was obtained from patient and ER records.     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  She presents today for port placement.     No current facility-administered medications on file prior to encounter.     Current Outpatient Medications on File Prior to Encounter   Medication Sig    amLODIPine (NORVASC) 10 MG tablet Take 10 mg by mouth once daily.    atomoxetine (STRATTERA) 40 MG capsule Take 1 capsule (40 mg total) by mouth once daily.    BYSTOLIC 20 mg Tab Take 1 tablet by mouth once daily.    EScitalopram oxalate (LEXAPRO) 10 MG tablet Take 1 tablet (10 mg total) by mouth once daily.    olmesartan (BENICAR) 40 MG tablet TAKE 1 TABLET(40 MG) BY MOUTH EVERY DAY    amLODIPine (NORVASC) 5 MG tablet TAKE 1 TABLET(5 MG) BY MOUTH EVERY DAY    OLANZapine (ZYPREXA) 5 MG tablet Take 1 tablet (5 mg total) by mouth every evening. Take nightly on days 1-3 of each chemotherapy cycle.    ondansetron (ZOFRAN-ODT) 4 MG TbDL Take 1 tablet (4 mg total) by mouth 2 (two) times daily.    prochlorperazine (COMPAZINE) 10 MG tablet Take 1 tablet (10 mg total) by mouth every 6 (six) hours as needed for Nausea.    rosuvastatin (CRESTOR) 10 MG tablet        Review of patient's allergies indicates:   Allergen Reactions    Benzalkonium chloride Hives    Codeine Itching    Morphine Nausea Only    Neosporin [neomycin-bacitracin-polymyxin] Hives    Sulfa (sulfonamide antibiotics) Hives and Itching           Hydrocortisone Hives     Redness / can use bactroban         Past Medical History:   Diagnosis  Date    Cancer     duodenal cancer    Hypertension     Hypotension, iatrogenic     Mitral valve prolapse      Past Surgical History:   Procedure Laterality Date    BUNIONECTOMY Left     HYSTERECTOMY  2000    INSERTION OF TUNNELED CENTRAL VENOUS CATHETER (CVC) WITH SUBCUTANEOUS PORT N/A 3/26/2021    Procedure: [id number];  Surgeon: Lauren Abraham MD;  Location: [facility] OR;  Service: General;  Laterality: N/A;    LITHOTRIPSY      PLACEMENT OF JEJUNOSTOMY TUBE  2/18/2021    Procedure: INSERTION, JEJUNOSTOMY TUBE;  Surgeon: Hitesh Díaz MD;  Location: [facility] OR 2ND FLR;  Service: General;;    WHIPPLE PROCEDURE N/A 2/18/2021    Procedure: WHIPPLE PROCEDURE;  Surgeon: Hitesh Díaz MD;  Location: [facility] OR 2ND FLR;  Service: General;  Laterality: N/A;     Family History       Problem Relation (Age of Onset)    Atrial fibrillation Mother    Bladder Cancer Father    Colon cancer Maternal Grandmother    Diabetes Mother, Father, Maternal Grandmother, Paternal Grandmother    Hyperlipidemia Mother    Hypertension Father    No Known Problems Sister, Paternal Grandfather    Ovarian cancer Mother    Stroke Mother, Maternal Grandfather          Tobacco Use    Smoking status: Former    Smokeless tobacco: Never   Substance and Sexual Activity    Alcohol use: Not Currently    Drug use: Never    Sexual activity: Not on file     Review of Systems   Constitutional:  Negative for activity change, appetite change, chills, diaphoresis, fatigue and fever.   Respiratory:  Negative for chest tightness and shortness of breath.    Cardiovascular:  Negative for chest pain.   Gastrointestinal:  Negative for abdominal distention, abdominal pain, constipation and diarrhea.   All other systems reviewed and are negative.    Objective:     Vital Signs (Most Recent):  Temp: 96 °F (35.6 °C) (05/13/24 1039)  Pulse: 76 (05/13/24 1039)  Resp: 16 (05/13/24 1039)  BP: (!) 145/79 (05/13/24 1039)  SpO2: 96 % (05/13/24 1039) Vital Signs (24h  Range):  Temp:  [96 °F (35.6 °C)] 96 °F (35.6 °C)  Pulse:  [76] 76  Resp:  [16] 16  SpO2:  [96 %] 96 %  BP: (145)/(79) 145/79     Weight: 95.8 kg (211 lb 3.2 oz)  Body mass index is 28.64 kg/m².     Physical Exam  Constitutional:       General: She is not in acute distress.     Appearance: Normal appearance.   HENT:      Head: Normocephalic and atraumatic.   Eyes:      Extraocular Movements: Extraocular movements intact.      Conjunctiva/sclera: Conjunctivae normal.   Cardiovascular:      Rate and Rhythm: Normal rate and regular rhythm.   Pulmonary:      Effort: Pulmonary effort is normal.   Abdominal:      General: Abdomen is flat. There is no distension.      Palpations: Abdomen is soft. There is no mass.      Tenderness: There is no abdominal tenderness. There is no guarding or rebound.      Hernia: No hernia is present.   Musculoskeletal:         General: No swelling, tenderness, deformity or signs of injury. Normal range of motion.   Skin:     General: Skin is warm and dry.      Coloration: Skin is not jaundiced.   Neurological:      General: No focal deficit present.      Mental Status: She is alert and oriented to person, place, and time.   Psychiatric:         Mood and Affect: Mood normal.         Behavior: Behavior normal.         Thought Content: Thought content normal.            I have reviewed all pertinent lab results within the past 24 hours.    Significant Diagnostics:  I have reviewed all pertinent imaging results/findings within the past 24 hours.    Assessment/Plan:     Duodenal cancer  Karen Gutierrez is a 59 y.o. female who presents with metastatic duodenal cancer, here for port.    -- to OR for port placement  -- Laterality marked?  No- n/a  -- Abx ppx:  Ancef   -- DVT ppx:  SCDs   -- Consent signed and in the chart.  All questions have been answered        Alicia Hernandez MD      Surgical Oncology          VTE Risk Mitigation (From admission, onward)           Ordered     heparin,  porcine (PF) (heparin flush 100 units/mL) 100 unit/mL injection flush        Note to Pharmacy: Created by cabinet override    05/13/24 1035     IP VTE HIGH RISK PATIENT  Once         05/13/24 1024     Place sequential compression device  Until discontinued         05/13/24 1024                    Alicia Hernandez MD  General Surgery  [facility]

## 2024-05-14 ENCOUNTER — PATIENT MESSAGE (OUTPATIENT)
Dept: HEMATOLOGY/ONCOLOGY | Facility: CLINIC | Age: 59
End: 2024-05-14
Payer: COMMERCIAL

## 2024-05-14 ENCOUNTER — PATIENT MESSAGE (OUTPATIENT)
Dept: PSYCHIATRY | Facility: CLINIC | Age: 59
End: 2024-05-14
Payer: COMMERCIAL

## 2024-05-15 ENCOUNTER — OFFICE VISIT (OUTPATIENT)
Dept: PALLIATIVE MEDICINE | Facility: CLINIC | Age: 59
End: 2024-05-15
Payer: COMMERCIAL

## 2024-05-15 ENCOUNTER — PATIENT MESSAGE (OUTPATIENT)
Dept: HEMATOLOGY/ONCOLOGY | Facility: CLINIC | Age: 59
End: 2024-05-15
Payer: COMMERCIAL

## 2024-05-15 ENCOUNTER — PATIENT MESSAGE (OUTPATIENT)
Dept: PSYCHIATRY | Facility: CLINIC | Age: 59
End: 2024-05-15
Payer: COMMERCIAL

## 2024-05-15 VITALS — BODY MASS INDEX: 28.76 KG/M2 | WEIGHT: 212.31 LBS | HEIGHT: 72 IN | TEMPERATURE: 98 F

## 2024-05-15 DIAGNOSIS — Z51.5 ENCOUNTER FOR PALLIATIVE CARE: ICD-10-CM

## 2024-05-15 DIAGNOSIS — C17.0 DUODENAL CANCER: Primary | ICD-10-CM

## 2024-05-15 DIAGNOSIS — F19.982 DRUG-INDUCED INSOMNIA: ICD-10-CM

## 2024-05-15 PROCEDURE — 1159F MED LIST DOCD IN RCRD: CPT | Mod: CPTII,S$GLB,, | Performed by: NURSE PRACTITIONER

## 2024-05-15 PROCEDURE — 99215 OFFICE O/P EST HI 40 MIN: CPT | Mod: S$GLB,,, | Performed by: NURSE PRACTITIONER

## 2024-05-15 PROCEDURE — 3008F BODY MASS INDEX DOCD: CPT | Mod: CPTII,S$GLB,, | Performed by: NURSE PRACTITIONER

## 2024-05-15 PROCEDURE — 1160F RVW MEDS BY RX/DR IN RCRD: CPT | Mod: CPTII,S$GLB,, | Performed by: NURSE PRACTITIONER

## 2024-05-15 PROCEDURE — 4010F ACE/ARB THERAPY RXD/TAKEN: CPT | Mod: CPTII,S$GLB,, | Performed by: NURSE PRACTITIONER

## 2024-05-15 PROCEDURE — 99497 ADVNCD CARE PLAN 30 MIN: CPT | Mod: S$GLB,,, | Performed by: NURSE PRACTITIONER

## 2024-05-15 PROCEDURE — 99999 PR PBB SHADOW E&M-EST. PATIENT-LVL IV: CPT | Mod: PBBFAC,,, | Performed by: NURSE PRACTITIONER

## 2024-05-15 RX ORDER — LANOLIN ALCOHOL/MO/W.PET/CERES
1000 CREAM (GRAM) TOPICAL DAILY
COMMUNITY

## 2024-05-15 RX ORDER — CHOLECALCIFEROL (VITAMIN D3) 125 MCG
5000 CAPSULE ORAL DAILY
COMMUNITY

## 2024-05-15 RX ORDER — CALCIUM CARBONATE 600 MG
600 TABLET ORAL DAILY
COMMUNITY

## 2024-05-15 NOTE — Clinical Note
Good morning, I saw Ms Gutierrez today for palliative care. She is scheduled to begin chemotherapy next week for recurrent metastatic duodenal cancer. Last time she received steroids treatment given with chemotherapy she had significant insomnia. She was prescribed Klonopin 0.5 PRN for this. Would you mind if I prescribed this for her again or do you have other recommendations?   Thank you, Stephanie Valencia

## 2024-05-15 NOTE — ASSESSMENT & PLAN NOTE
Some baseline insomnia she anticipates will worsen with steroids given with chemotherapy.   Prescribed olanzapine HS for nausea prophylaxis on chemotherapy days. May be helpful.   Sleep hygiene.   Previously took Klonopin 0.5 mg PRN for similar sx that was effective.   Will message Dr Santiago for recommendations.

## 2024-05-15 NOTE — PROGRESS NOTES
Surgical Oncology Clinic Note      Referring Provider: Dr. Enzo Daley   PCP: Han Arshad MD    Reason For Visit: Gastroesophageal:  duodenal malignancy- recurrent/ metastatic     Oncologic History:   Oncology History   Duodenal cancer   2/18/2021 Initial Diagnosis    Duodenal cancer     2/18/2021 Surgery    Preop Dx: Duodenal cancer  Postop Dx: same  Surgeon: Dr. Hitesh Díaz    Operative Procedure: Pylorus-preserving Whipple resection; segmental small bowel resection, Witzel jejunostomy     3/12/2021 Cancer Staged    Staging form: Small Intestine - Adenocarcinoma, AJCC 8th Edition  - Pathologic stage from 3/12/2021: Stage IIIB (pT4, pN2, cM0)     3/15/2021 Tumor Conference       [facility] UGI MULTIDISCIPLINARY TUMOR BOARD  PATIENT REVIEW FORM   ____________________________________________________________    CLINIC #: [id number]  DATE: 3/15/2021    DIAGNOSIS: duodenal CA    PRESENTER: Hitesh Díaz    PATIENT SUMMARY:   This 54 y/o female presented with high grade obstructing duodenal mass. CT scan from 2/2021 revealed focal wall thickening of 3rd portion of duodenum. EGD with bx performed and pathology revealed adenoma. However, based on clinical condition, it was assumed she had invasive cancer.     She underwent upfront Whipple per Dr. Hitesh Díaz on 2/18/2021. Today's presentation is for pathology review.   pT4N2; 3cm moderately diff intestinal type JENNIFER, invading into mesocolon and involves pancreatic parenchyma; margins negative, 5 out of 11 lymph nodes positive for malignancy. LVI present; intact staining    BOARD RECOMMENDATIONS:   Proceed with systemic adj FOLFOX chemotherapy    CONSULT NEEDED:     [] Surgery    [x] Hem/Onc in [place]    [] Rad/Onc    [] Dietary                 [] Social Service    [] Psychology       [] AES  [] Radiology     Pathologic Stage: Tumor 4 Node(s) 2  Metastasis 0   5 nodes out of 11 nodes were positive for malignancy      GROUP STAGE:  [] O    [] 1A     [] IB    [] IIA    [] IIB     [] IIIA     [x] IIIB     [] IIIC    []IV  [] Local recurrence     [] Regional recurrence     [] Distant recurrence   Metastatic site(s): none         [x] Terri'l Treatment Guidelines reviewed and care planned is consistent with guidelines.         (i.e., NCCN, NCI, PD, ACO, AUA, etc.)    PRESENTATION AT CANCER CONFERENCE:         [] Prospective    [x] Retrospective     [] Follow-Up       3/30/2021 - 10/21/2021 Chemotherapy    Treatment Summary   Plan Name: OP FOLFOX 6 Q2W  Treatment Goal: Control  Status: Inactive  Start Date: 3/30/2021  End Date: 10/21/2021  Provider: Enzo Daley MD  Chemotherapy: dexAMETHasone (DECADRON) 4 MG Tab, 8 mg, Oral, Daily, 1 of 1 cycle, Start date: 3/24/2021, End date: --  prochlorperazine (COMPAZINE) 10 MG tablet, 10 mg, Oral, 3 times daily PRN, 1 of 1 cycle, Start date: 3/23/2021, End date: --  fluorouraciL injection 825 mg, 400 mg/m2 = 825 mg, Intravenous, Clinic/[facility] 1 time, 12 of 12 cycles  Administration: 825 mg (3/30/2021), 825 mg (4/13/2021), 825 mg (4/27/2021), 825 mg (5/11/2021), 825 mg (5/25/2021), 825 mg (6/22/2021), 825 mg (7/6/2021), 825 mg (8/3/2021), 825 mg (9/1/2021), 825 mg (9/14/2021), 825 mg (9/28/2021), 825 mg (10/19/2021)  fluorouracil (ADRUCIL) 2,400 mg/m2 = 4,945 mg in sodium chloride 0.9% 100 mL chemo infusion, 2,400 mg/m2 = 4,945 mg, Intravenous, Over 46 hours, 12 of 12 cycles  Administration: 4,945 mg (3/30/2021), 4,945 mg (4/13/2021), 4,945 mg (4/27/2021), 4,945 mg (5/11/2021), 4,945 mg (5/25/2021), 4,945 mg (6/22/2021), 4,945 mg (7/6/2021), 5,000 mg (8/3/2021), 4,945 mg (9/1/2021), 4,945 mg (9/14/2021), 5,000 mg (9/28/2021), 4,945 mg (10/19/2021)  leucovorin calcium 400 mg/m2 = 825 mg in dextrose 5 % 291.25 mL infusion, 400 mg/m2 = 825 mg, Intravenous, [facility] [place] 1 time, 12 of 12 cycles  Administration: 825 mg (3/30/2021), 825 mg (4/13/2021), 800 mg (4/27/2021), 800 mg (5/11/2021), 825 mg (5/25/2021), 825 mg (6/22/2021), 800 mg  (7/6/2021), 825 mg (8/3/2021), 800 mg (9/1/2021), 800 mg (9/14/2021), 825 mg (9/28/2021), 800 mg (10/19/2021)  oxaliplatin (ELOXATIN) 85 mg/m2 = 175 mg in dextrose 5 % 535 mL chemo infusion, 85 mg/m2 = 175 mg, Intravenous, Clinic/[facility] 1 time, 12 of 12 cycles  Administration: 175 mg (3/30/2021), 175 mg (4/13/2021), 175 mg (4/27/2021), 175 mg (5/11/2021), 175 mg (5/25/2021), 175 mg (6/22/2021), 175 mg (7/6/2021), 175 mg (8/3/2021), 175 mg (9/1/2021), 175 mg (9/14/2021), 175 mg (9/28/2021), 175 mg (10/19/2021)      Tumor Markers    Immunohistochemical studies for DNA mismatch repair proteins were performed on this tumor (block 4J)  and they demonstrate INTACT STAINING in all 4 proteins (MLH1, PMS2, MSH2, and MSH6). These results  indicate that this tumor is microsatellite stable (BERNA) and that Estrada syndrome (Hereditary Nonpolyposis  Colorectal Cancer, HNPCC) is unlikely     3/31/2021 Genetic Testing    Patient has genetic testing done for [employer] My Risk Germline Testing.                                           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 #[id number] date 03/31/2021     5/10/2024 -  Chemotherapy    Treatment Summary   Plan Name: OP GI mFOLFOX6 (oxaliplatin leucovorin fluorouracil) Q2W  Treatment Goal: Control  Status: Active  Start Date: 5/10/2024 (Planned)  End Date: 10/13/2024 (Planned)  Provider: Enzo Daley MD  Chemotherapy: fluorouraciL injection 885 mg, 400 mg/m2 = 885 mg, Intravenous, Clinic/[facility] 1 time, 0 of 12 cycles  oxaliplatin (ELOXATIN) 85 mg/m2 = 188 mg in dextrose 5 % (D5W) 537.6 mL chemo infusion, 85 mg/m2 = 188 mg, Intravenous, Clinic/[facility] 1 time, 0 of 12 cycles  fluorouracil (Adrucil) 2,400 mg/m2 = 5,305 mg in sodium chloride 0.9% 240 mL chemo infusion, 2,400 mg/m2 = 5,305 mg, Intravenous, Over 46 hours, 0 of 12 cycles           Staging:  Cancer Staging   Duodenal cancer  Staging form: Small Intestine -  Adenocarcinoma, AJCC 8th Edition  - Pathologic stage from 3/12/2021: Stage IIIB (pT4, pN2, cM0) - Signed by Velvet Faith APRN, ANP-C on 3/14/2021  - Pathologic stage from 5/15/2024: Stage IV (cM1) - Signed by Alicia Hernandez MD on 5/15/2024       HISTORY       Karen Gutierrez is a 59 y.o. female with history of duodenal adenocarcinoma s/p  who presents today for evaluation and management of recurrent duodenal carcinoma.    She originally presented in February of 2021 with a completely obstructing duodenal mass.  She underwent G-tube and J-tube placement it was then followed with Dr. Hitesh Díaz in [place].  She subsequently underwent a Whipple on 02/11/2021.  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  Post op course was c/b abscess requiring drainage.  She received 12 cycles of FOLFOX adjuvantly which she tolerated well, and which she completed in November 2021.   Her GB and her J-tube both came out and she has been on surveillance since that point in time.  Surveillance imaging in April of this year showed a new heterogeneous mass in the anterior abdominal wall measuring 6.8 x 5.1 x 4.6 cm concerning for tumor recurrence versus hematoma.  She underwent biopsy which did confirm metastatic disease.  She was discussed at tumor board with agreement for systemic chemotherapy and presents today for discussion of port placement.    Overall she feels well and is doing well.  She presents today with her sister.    Past Medical History:   Diagnosis Date    Cancer     duodenal cancer    Hypertension     Hypotension, iatrogenic     Mitral valve prolapse        Past Surgical History:   Procedure Laterality Date    BUNIONECTOMY Left     HYSTERECTOMY  2000    INSERTION OF TUNNELED CENTRAL VENOUS CATHETER (CVC) WITH SUBCUTANEOUS PORT N/A 3/26/2021    Procedure: [id number];  Surgeon: Lauren Abraham MD;  Location: [facility];  Service: General;  Laterality: N/A;    INSERTION OF VENOUS  ACCESS PORT Right 5/13/2024    Procedure: INSERTION, VENOUS ACCESS PORT;  Surgeon: Alicia Hernandez MD;  [facility];  Service: General;  Laterality: Right;    LITHOTRIPSY      PLACEMENT OF JEJUNOSTOMY TUBE  2/18/2021    Procedure: INSERTION, JEJUNOSTOMY TUBE;  Surgeon: Hitesh Díaz MD;  Location: [facility] OR 2ND FLR;  Service: General;;    ULTRASOUND GUIDANCE Right 5/13/2024    Procedure: ULTRASOUND GUIDANCE;  Surgeon: Alicia Hernandez MD;  [facility];  Service: General;  Laterality: Right;    WHIPPLE PROCEDURE N/A 2/18/2021    Procedure: WHIPPLE PROCEDURE;  Surgeon: Hitesh Díaz MD;  Location: [facility] 2[facility];  Service: General;  Laterality: N/A;       Family History   Problem Relation Name Age of Onset    Ovarian cancer Mother      Atrial fibrillation Mother      Stroke Mother      Hyperlipidemia Mother      Diabetes Mother      Bladder Cancer Father      Hypertension Father      Diabetes Father      No Known Problems Sister      Diabetes Maternal Grandmother      Colon cancer Maternal Grandmother      Stroke Maternal Grandfather      Diabetes Paternal Grandmother      No Known Problems Paternal Grandfather         Social History     Socioeconomic History    Marital status:    Tobacco Use    Smoking status: Former    Smokeless tobacco: Never   Substance and Sexual Activity    Alcohol use: Not Currently    Drug use: Never          Medication List            Accurate as of May 10, 2024 11:59 PM. If you have any questions, ask your nurse or doctor.                CONTINUE taking these medications      acetaminophen 500 MG tablet  Commonly known as: TYLENOL  Take 2 tablets (1,000 mg total) by mouth every 8 (eight) hours.     * amLODIPine 10 MG tablet  Commonly known as: NORVASC     * amLODIPine 5 MG tablet  Commonly known as: NORVASC  TAKE 1 TABLET(5 MG) BY MOUTH EVERY DAY     atomoxetine 40 MG capsule  Commonly known as: STRATTERA  Take 1 capsule (40 mg total) by mouth once daily.      BYSTOLIC 20 mg Tab  Generic drug: nebivoloL     EScitalopram oxalate 10 MG tablet  Commonly known as: LEXAPRO  Take 1 tablet (10 mg total) by mouth once daily.     ibuprofen 800 MG tablet  Commonly known as: ADVIL,MOTRIN  Take 1 tablet (800 mg total) by mouth every 8 (eight) hours.     OLANZapine 5 MG tablet  Commonly known as: ZyPREXA  Take 1 tablet (5 mg total) by mouth every evening. Take nightly on days 1-3 of each chemotherapy cycle.     olmesartan 40 MG tablet  Commonly known as: BENICAR  TAKE 1 TABLET(40 MG) BY MOUTH EVERY DAY     ondansetron 4 MG Tbdl  Commonly known as: ZOFRAN-ODT  Take 1 tablet (4 mg total) by mouth 2 (two) times daily.     prochlorperazine 10 MG tablet  Commonly known as: COMPAZINE  Take 1 tablet (10 mg total) by mouth every 6 (six) hours as needed for Nausea.           * This list has 2 medication(s) that are the same as other medications prescribed for you. Read the directions carefully, and ask your doctor or other care provider to review them with you.                  Review of patient's allergies indicates:   Allergen Reactions    Benzalkonium chloride Hives    Codeine Itching    Morphine Nausea Only    Neosporin [neomycin-bacitracin-polymyxin] Hives    Sulfa (sulfonamide antibiotics) Hives and Itching           Hydrocortisone Hives     Redness / can use bactroban         Review of Systems   Constitutional:  Negative for chills, fever, malaise/fatigue and weight loss.   Respiratory:  Negative for cough, shortness of breath and wheezing.    Cardiovascular:  Negative for chest pain and palpitations.   Gastrointestinal:  Negative for abdominal pain, constipation, diarrhea, nausea and vomiting.   Musculoskeletal:  Negative for back pain, falls and joint pain.   Neurological:  Negative for dizziness, sensory change, speech change, focal weakness, seizures, loss of consciousness and weakness.   Psychiatric/Behavioral:  Negative for depression and hallucinations. The patient is not  nervous/anxious.          Vitals:    05/10/24 1050   BP: 132/84   Pulse: 73   Temp: 98 °F (36.7 °C)     Body mass index is 28.64 kg/m².  ECOG SCORE             PHYSICAL EXAM     Physical Exam  Vitals reviewed.   Constitutional:       General: She is not in acute distress.     Appearance: Normal appearance.   HENT:      Head: Normocephalic and atraumatic.      Mouth/Throat:      Mouth: Mucous membranes are moist.   Eyes:      General: No scleral icterus.     Extraocular Movements: Extraocular movements intact.      Conjunctiva/sclera: Conjunctivae normal.   Pulmonary:      Effort: Pulmonary effort is normal.   Abdominal:      General: There is no distension.      Palpations: Abdomen is soft.      Tenderness: There is no abdominal tenderness.      Comments: Firm lesion along to the left of midline near the umbilicus with some associated skin thickening consistent with her known metastatic disease   Musculoskeletal:         General: No swelling. Normal range of motion.   Skin:     General: Skin is warm and dry.   Neurological:      General: No focal deficit present.      Mental Status: She is alert.   Psychiatric:         Mood and Affect: Mood normal.         Behavior: Behavior normal.         Thought Content: Thought content normal.         Judgment: Judgment normal.          DATA REVIEW:  I personally reviewed the following records for this visit: lab work from prior visit, notes from other physicians, computed tomography/CT imaging, surgical pathology results, radiographic study evaluation, and laboratory results done by primary care physician    LABS       Lab Results   Component Value Date    WBC 5.71 04/22/2024    HGB 13.4 04/22/2024    HCT 41.1 04/22/2024     04/22/2024     Lab Results   Component Value Date     (H) 04/22/2024    CALCIUM 9.6 04/22/2024    ALBUMIN 4.2 04/22/2024    PROT 7.5 04/22/2024     04/22/2024    K 5.0 04/22/2024    CO2 26 04/22/2024     04/22/2024    BUN 16  "04/22/2024    CREATININE 1.2 04/22/2024    ALKPHOS 105 04/22/2024    ALT 47 (H) 04/22/2024    AST 38 04/22/2024    BILITOT 0.5 04/22/2024       Nutritional:  Lab Results   Component Value Date    PREALBUMIN 11 (L) 03/04/2021       Tumor Markers:  No results found for: "CEA", "AMYLASE", "ASPIRATE", "GWZ294", "", "MG3062", "AFP", "AFPTM"  No results found for: ""    PATHOLOGY     2/18/2021- Whipple (Dr. Hitesh Díaz)      Component 3 yr ago   Final Pathologic Diagnosis 1.  GALLBLADDER, CHOLECYSTECTOMY:  - Gallbladder with changes suggestive of cholesterolosis  - Fragment of hepatic parenchyma with no significant histopathologic  abnormality  - One benign lymph node (0/1)  2.  LYMPH NODE, HEPATIC ARTERY, EXCISION:  - One benign lymph node, negative for metastatic disease (0/1)  3.  LYMPH NODE, NETTIE HEPATITIS, EXCISION:  - One benign lymph node, negative for metastatic disease (0/1)  4.  DUODENUM AND PANCREAS, PANCREATICODUODENECTOMY (WHIPPLE PROCEDURE)  ([id number]):  - Invasive duodneal adenocarcinoma, intestinal-type,  moderately-differentiated, measuring 3.0 in greatest dimension  - Carcinoma involves pancreatic parenchyma and mesentery (mesocolon) of  adjacent transverse colon, pT4  - Metastatic adenocarcinoma present in five of eleven lymph nodes (5/11), pN2  - Surgical resection margins negative for neoplasia  - Please see comments for synoptic report  5.  SMALL INTESTINE, PARTIAL RESECTION:  - Segment of small intestine with a full thickness defect associated with  mucosal ulceration and acute serositis  - Resection margins viable  - No evidence of dysplasia or malignancy (multiple deeper levels examined)         Immunohistochemical studies for DNA mismatch repair proteins were performed  on this tumor (block 4J) and they demonstrate INTACT STAINING in all 4  proteins (MLH1, PMS2, MSH2, and MSH6).  These results indicate that this  tumor is microsatellite stable (BERNA) and that Estrada syndrome " (Hereditary  Nonpolyposis Colorectal Cancer, HNPCC) is unlikely.  SMALL INTESTINE:    SPECIMEN      Procedure       Pancreaticoduodenectomy  (Whipple resection)    TUMOR      Tumor Site       Duodenum      Histologic Type:       Adenocarcinoma (not otherwise characterized)      Histologic Grade       G2: Moderately differentiated      Tumor Size       Greatest Dimension (Centimeters) 3.0 cm      Tumor Extension       Tumor invades other organs / structures  Other Organs / Structures           Mesentery of adjacent loops of bowel            Pancreas      Macroscopic Tumor Perforation       Not identified      Lymphovascular Invasion       Present          Margins Examined           Proximal            Distal            Uncinate            Bile duct            Pancreatic    LYMPH NODES      Regional Lymph Nodes         Number of Lymph Nodes  Involved           5          Number of Lymph Nodes Examined           11      Primary Tumor (pT)       pT4      Regional Lymph Nodes (pN)       pN2    ADDITIONAL FINDINGS      Additional Findings       Biliary intraepithelial  neoplasia, low-grade (BilIN 1) with foveolar metaplasia     05/02/2024:  IR biopsy abdominal wall mass      Component 13 d ago   Final Pathologic Diagnosis BIOPSY OF MASS FROM ABDOMINAL WALL:  METASTATIC ADENOCARCINOMA CONSISTENT WITH PANCREATIC ORIGIN       IMAGING     12/30/2020 CT Abdomen Pelvis Wwout Contrast (OLOL)  IMPRESSION:    1.  Arterial enhancing mass within the right hepatic lobe that is isodense on subsequent portal venous phase and delayed phase suggestive of a hemangioma. Follow-up CT of the liver three-phase or MRI of the abdomen with and without contrast in 6-12   months is recommended to confirm stability.    2.  Fluid distention of the stomach that may be secondary to ingestion of a recent meal or gastric bowel obstruction.   3. Nonspecific subcentimeter nodules in the right lung base. Fleischner Society 2017 guidelines for management of  incidentally detected solid pulmonary nodules in adults (does not apply to patients younger than 35 years, CT lung cancer screening,   patients with immunosuppression or patients with known primary cancer): Multiple solid nodules < 6 mm in diameter: For low risk patients no routine follow-up. For high risk patents optional CT at 12 months.   4. Postoperative changes of hysterectomy.   5. Nonobstructing left nephrolithiasis.   6. Possible reflux esophagitis.   7. Colonic diverticulosis.     02/11/2021  CT Chest, abdomen, and Pelvis  Impression:   1. Wall thickening and luminal narrowing of the 3rd portion of the duodenum concerning for neoplasm.  2. Small paraduodenal node measuring 7 mm.  3. Punctate nonobstructing left-sided nephrolith.  No hydronephrosis.     02/12/2021:  CT Abdomen/ Pelvis  Impression:   1. Focal wall thickening of the 3rd portion of duodenum, previously obstructing, now status post gastrostomy and jejunal tube placements.  This could represent duodenal carcinoma or lymphoma.  Pancreatic tumor thought less likely.  Minimal lymph node enlargement in the region.  No distant metastasis detected.  2. Status post hysterectomy and other findings as above.    02/21/2021  CTA Abdomen and Pelvis  Impression:   Postoperative changes of Whipple procedure and jejunostomy tube change as well as right quadrant terminating presumable drainage catheter.  Scattered free air, which may be postsurgical,  however unable to completely exclude bowel perforation.   Trace abdominal free fluid, small focal fluid collection without well-defined walls along the posterior aspect of the pancreas, mesenteric edema, and mild small bowel wall thickening near the bipin hepatis, possibly expected postsurgical changes.   Prominent loops of large bowel and distal small bowel measuring up to 3.0 cm.  No definite transition point.  Findings likely represent ileus.  No pneumatosis or portal venous gas.   Trace pleural effusions left  greater than right with associated atelectasis.   Hepatomegaly.   No pseudoaneurysm or convincing evidence of active bleeding or hematoma.     03/01/2021  CT Abdomen/ Pelvis  Impression:   1. Status post Whipple procedure.  Peripherally enhancing collection adjacent to the resection bed as above, nonspecific but concerning for abscess.  Small volume abdominal and pelvic free fluid.  2. Persistent haziness and wall thickening of small bowel loops in the bipin hepatis, possibly postoperative.  3. Trace pleural effusions with associated atelectasis, similar to prior.  4. Additional findings as above.    05/10/2021 CT Abdomen/ Pelvis  Impression:   Stable postoperative changes of a Whipple procedure interval resolution of the previously demonstrated fluid collection in the postoperative bed.  Resolution of previously noted trace left pleural effusion.  Other findings as discussed in the body of the report at above.    11/22/2021  CT CAP  Multiple small scattered bilateral pulmonary nodules all measuring less than 3 mm.  Findings may have been present on CT from 02/11/2021 although this is difficult to ascertain given differences in technique/slice acquisition.  At minimum, continued follow-up is suggested.   Stable postoperative changes of Whipple procedure without definitive evidence of residual/recurrent disease.    03/07/2022  CT  CAP  Stable CT examination of the chest, abdomen, and pelvis.  No definite evidence of recurrent or metastatic disease.     10/05/2022  CT CAP  Overall no detrimental change with findings as above.     04/12/2023  CT CAP  1.  No concerning interval change compared to the prior 2 examinations.  2.  Postoperative findings within the right upper quadrant likely with partial pancreatectomy and small bowel resection.  Loops of bowel are somewhat matted to the surface of the liver which is unchanged.  No definite masslike abnormality within this region.  3.  Hepatomegaly and fatty infiltration of  the liver.  4.  Punctate nonobstructing stones appear left kidney.     10/11/2023  CT CAP  Stable postsurgical changes in the right upper quadrant without evidence of recurrent or metastatic disease in the chest abdomen or pelvis.     04/19/2024:  CT CAP  1.  There is a new large heterogeneous mass identified in the anterior abdominal wall at the level of the umbilicus extending to the left of midline and measuring 6.8 x 5.1 x 4.6 cm.  New increased skin thickening is also visible in the umbilicus immediately anterior to this lesion.  Tumor recurrence is a consideration although rectus sheath hematoma or an inflammatory process are also considerations.  This lesion is easily amenable to percutaneous biopsy.  Characterization of this lesion by ultrasound should also be considered.  2.  Stable postsurgical changes following resection of the duodenum, gastric antrum and head of the pancreas with gastrojejunostomy.  3.  Hepatomegaly and generalized hepatic steatosis.  4.  Small nonobstructing intrarenal calculus on the left.  5.  A stable 6 mm faint nodule in the right upper lobe is unchanged since 04/12/2023.  6.  Status post hysterectomy.    ASSESSMENT & PLAN     1. Duodenal cancer       Karen Gutierrez is a 59 y.o. female with a history of stage III B duodenal adenocarcinoma status post Whipple in February of 2021, with subsequent 12 cycles adjuvant FOLFOX, who presents with newly diagnosed metastatic disease to the anterior abdominal wall.    We reviewed her history, as well as the natural history of metastatic duodenal adenocarcinoma.  I did discuss with her the results of our tumor board as well as my discussions with my surgical oncology partners.  We discussed that the standard of care for metastatic duodenal cancer is for systemic therapy and not for up-front surgical resection.  At this time she does only have evidence of disease in this 1 area which does actually appear like it would be amenable to  resection in the future however that is less helpful if she develops any additional sites of disease, and resection would certainly be off the standard pathway.    We discussed the risks and benefits of port placement.  And she is in agreement with proceeding with that on Monday.      Plan:  -- Port placement at the [facility] on Monday, 5/13/2024  -- Consent signed in clinic today   -- will discuss checking CA 19-9 and CEA with Dr. Daley   -- proceed with systemic chemotherapy.  Will re-evaluate after several cycles, may be a candidate for resection of metastatic disease in the future, however she understands that's not standard of care in the setting of metastatic disease.         Ms. Gutierrez expressed understanding in regards to our discussion today. Many good questions were asked on today's visit, all of which were answered to their satisfaction.    Follow-up: No follow-ups on file.                  Alicia Hernandez MD MS              Surgical Oncology              [facility] [place], [place]              Office: [phone]     Communications: 45 minutes were spent on today's visit in face-to-face and non face-to-face time with the patient. This patient was recently diagnosed with metastatic duodenal adenocarcinoma and the time was required to provide counseling and guidance regarding their new diagnosis. Time was spent reviewing all outside records and information pertaining to their work-up and formulating a treatment plan in line with standardized guidelines. Additional time was spent communicating with referring physicians and facilities to facilitate the efficient exchange of previous healthcare records and radiographic imaging pertinent to the diagnosis and disease management.       Orders Placed This Encounter   Procedures    Case Request Operating Room: INSERTION, VENOUS ACCESS PORT     Order Specific Question:   Requested Time     Answer:   12:00 PM     Order Specific  Question:   Medical Necessity:     Answer:   Medically Non-Urgent [100]     Order Specific Question:   CPT Code:     Answer:   IN INSERT TUNNELED CV CATH WITH PORT [57519]     Order Specific Question:   Case classification     Answer:   E - Elective [90]     Order Specific Question:   Is an on-site pathologist required for this procedure?     Answer:   N/A

## 2024-05-15 NOTE — ASSESSMENT & PLAN NOTE
Goals of care: Maintain independence, continue working. Understands CA incurable.   Advanced directive: Doesn't want to be kept alive on machines if no reasonable chance for recovery. Full code.   HC POA: Son Biju, sister Parvin mir. Documentation completed today.   Symptoms: Anticipates insomnia r/t chemotherapy. Message to psychiatry to coordinate care.  Follow up: PRN.

## 2024-05-15 NOTE — ASSESSMENT & PLAN NOTE
Followed by oncology, to start chemotherapy.   GOC and ACP discussions today, see notes.   Ms. Gutierrez understands her cancer is not curable but hopeful it is treatable with chemotherapy.

## 2024-05-15 NOTE — PROGRESS NOTES
Palliative Medicine Clinic Note        Consult Requested By: Dr. Enzo Daley      Reason for Consult: goals of care and advanced care planning    Chief Complaint:   Chief Complaint   Patient presents with    ACP    GOC          ASSESSMENT/PLAN:      Problem List Items Addressed This Visit          Psychiatric    Drug-induced insomnia     Some baseline insomnia she anticipates will worsen with steroids given with chemotherapy.   Prescribed olanzapine HS for nausea prophylaxis on chemotherapy days. May be helpful.   Sleep hygiene.   Previously took Klonopin 0.5 mg PRN for similar sx that was effective.   Will message Dr Santiago for recommendations.             Oncology    Duodenal cancer - Primary     Followed by oncology, to start chemotherapy.   GOC and ACP discussions today, see notes.   Ms. Gutierrez understands her cancer is not curable but hopeful it is treatable with chemotherapy.              Palliative Care    Encounter for palliative care     Goals of care: Maintain independence, continue working. Understands CA incurable.   Advanced directive: Doesn't want to be kept alive on machines if no reasonable chance for recovery. Full code.   HC POA: Son Biju, sister Parvin mir. Documentation completed today.   Symptoms: Anticipates insomnia r/t chemotherapy. Message to psychiatry to coordinate care.  Follow up: PRN.                  Advance Care Planning   Advance Directives:   Living Will: Yes (If she has no meaningful chance for recovery Ms Gutierrez would not want to be kept alive on machines or with tube feeding. She would want to die a natural death instead. She will discuss her wishes with her son and sister. Living will document completed.)        Copy on chart: Yes    [place]: No    Do Not Resuscitate Status: No    Medical Power of : Yes (completed today)    Agent's Name:  Biju Gutierrez   Agent's Contact Number:  [phone]    Decision Making:  Patient answered questions  Goals  of Care: The patient endorses that what is most important right now is to focus on remaining as independent as possible, continue working if possible. Ms. Gutierrez understands her cancer is not curable but hopeful it is treatable with chemotherapy. Accordingly, we have decided that the best plan to meet the patient's goals includes continuing with treatment.          Thank you for involving me in the care of this patient.   Will follow up as needed.       Follow up if symptoms worsen or fail to improve.       SUBJECTIVE:      History of Present Illness / Interval History:  Karen Gutierrez is 59 y.o. female with recurrent stage IV duodenal cancer, intra-abdominal wall mass, chemotherapy related neuropathy, ROMIE, s/p partial pancreatectomy.  Presents to Palliative Care Clinic for physical symptoms, advance care planning,, clarification of goals of care, and additional support.. Please see oncology note for more details on cancer treatment.    Seen virtually by palliative medicine in 2021, Dr Jean, for initial consult. Introduced HC POA and Living Will then. Was to follow up PRN.       5/15/24  History obtained from: Ms. Gutierrez and medical record.    Today Ms Gutierrez feels like she's doing well overall, ready to start chemotherapy. Tolerated same chemotherapy, Folfox, previously. She is hopeful she will tolerate again.     Oncology 4/24/24:  Extensive conversation with the patient reviewed images comparing October of 2023 with most recent demonstrating large mass anterior abdominal wall highly suspicious for malignancy. Heparin at this point I have placed consult for Surgical Oncology. In case CT-directed biopsy unable to obtain tissue confirmation then biopsy and port placement. Talk to patient about potential diagnosis. With treatable but not curable malignancy last treated 18 months ago will most likely treat with FOLFOX unless additional information from next generation sequencing which was drawn today as  well as from tissue confirmation offered additional options. Discussed implications and answered questions     Problems with insomnia r/t steroids. Keeps her awake and makes her feel crazy. Took Klonopin rx for insomnia last round of chemotherapy that was helpful.         ROS:  Review of Systems    Review of Symptoms      Symptom Assessment (ESAS 0-10 Scale)  Pain:  0  Dyspnea:  0  Anxiety:  2  Nausea:  0  Depression:  2  Anorexia:  0  Fatigue:  5  Insomnia:  8  Restlessness:  0  Agitation:  0         ECOG Performance Status stGstrstastdstest:st st1st Living Arrangements:  Lives alone    Psychosocial/Cultural:   See Palliative Psychosocial Note: Yes  Lives alone, very supportive sister. Father is also ill. [place]. Some anxiety issues and insomnia may worsen during cancer treatment.  **Primary  to Follow**  Palliative Care  Consult: No            Medications:    Current Outpatient Medications:     acetaminophen (TYLENOL) 500 MG tablet, Take 2 tablets (1,000 mg total) by mouth every 8 (eight) hours., Disp: , Rfl:     amLODIPine (NORVASC) 10 MG tablet, Take 10 mg by mouth once daily., Disp: , Rfl:     atomoxetine (STRATTERA) 40 MG capsule, Take 1 capsule (40 mg total) by mouth once daily., Disp: 60 capsule, Rfl: 2    BYSTOLIC 20 mg Tab, Take 1 tablet by mouth once daily., Disp: , Rfl:     EScitalopram oxalate (LEXAPRO) 10 MG tablet, Take 1 tablet (10 mg total) by mouth once daily., Disp: 90 tablet, Rfl: 1    ibuprofen (ADVIL,MOTRIN) 800 MG tablet, Take 1 tablet (800 mg total) by mouth every 8 (eight) hours., Disp: , Rfl:     OLANZapine (ZYPREXA) 5 MG tablet, Take 1 tablet (5 mg total) by mouth every evening. Take nightly on days 1-3 of each chemotherapy cycle., Disp: 6 tablet, Rfl: 11    olmesartan (BENICAR) 40 MG tablet, TAKE 1 TABLET(40 MG) BY MOUTH EVERY DAY, Disp: 90 tablet, Rfl: 3    ondansetron (ZOFRAN-ODT) 4 MG TbDL, Take 1 tablet (4 mg total) by mouth 2 (two) times  daily., Disp: 20 tablet, Rfl: 11    prochlorperazine (COMPAZINE) 10 MG tablet, Take 1 tablet (10 mg total) by mouth every 6 (six) hours as needed for Nausea., Disp: 20 tablet, Rfl: 11    calcium carbonate (OS-BAM) 600 mg calcium (1,500 mg) Tab, Take 600 mg by mouth once daily., Disp: , Rfl:     cholecalciferol, vitamin D3, 125 mcg (5,000 unit) capsule, Take 5,000 Units by mouth once daily., Disp: , Rfl:     cyanocobalamin (VITAMIN B-12) 1000 MCG tablet, Take 1,000 mcg by mouth once daily., Disp: , Rfl:     multivit-min/iron/FA/vit K/lut (CENTRUM SILVER WOMEN ORAL), Take 1 each by mouth once daily., Disp: , Rfl:       External  database queried on 05/15/2024  by Stephanie OSORIO :      Review of patient's allergies indicates:   Allergen Reactions    Benzalkonium chloride Hives    Codeine Itching    Morphine Nausea Only    Neosporin [neomycin-bacitracin-polymyxin] Hives    Sulfa (sulfonamide antibiotics) Hives and Itching           Hydrocortisone Hives     Redness / can use bactroban             OBJECTIVE:         Physical Exam:  Vitals: Temp: 97.8 °F (36.6 °C) (05/15/24 0936)    Physical Exam  Constitutional:       General: She is not in acute distress.     Appearance: She is not ill-appearing.   Eyes:      General: No scleral icterus.     Extraocular Movements: Extraocular movements intact.      Conjunctiva/sclera: Conjunctivae normal.   Pulmonary:      Effort: Pulmonary effort is normal.   Musculoskeletal:         General: No swelling.   Neurological:      Mental Status: She is alert. Mental status is at baseline.      Gait: Gait normal.   Psychiatric:         Mood and Affect: Mood normal.         Behavior: Behavior normal.         Thought Content: Thought content normal.           Labs:  Lab Results   Component Value Date    WBC 5.71 04/22/2024    HGB 13.4 04/22/2024    HCT 41.1 04/22/2024    MCV 88 04/22/2024     04/22/2024         Sodium   Date Value Ref Range Status   04/22/2024 140 136 - 145  mmol/L Final     Potassium   Date Value Ref Range Status   04/22/2024 5.0 3.5 - 5.1 mmol/L Final     CO2   Date Value Ref Range Status   04/22/2024 26 23 - 29 mmol/L Final     Glucose   Date Value Ref Range Status   04/22/2024 151 (H) 70 - 110 mg/dL Final     BUN   Date Value Ref Range Status   04/22/2024 16 6 - 20 mg/dL Final     Creatinine   Date Value Ref Range Status   04/22/2024 1.2 0.5 - 1.4 mg/dL Final     Calcium   Date Value Ref Range Status   04/22/2024 9.6 8.7 - 10.5 mg/dL Final     Total Protein   Date Value Ref Range Status   04/22/2024 7.5 6.0 - 8.4 g/dL Final     Albumin   Date Value Ref Range Status   04/22/2024 4.2 3.5 - 5.2 g/dL Final     Total Bilirubin   Date Value Ref Range Status   04/22/2024 0.5 0.1 - 1.0 mg/dL Final     Comment:     For infants and newborns, interpretation of results should be based  on gestational age, weight and in agreement with clinical  observations.    Premature Infant recommended reference ranges:  Up to 24 hours.............<8.0 mg/dL  Up to 48 hours............<12.0 mg/dL  3-5 days..................<15.0 mg/dL  6-29 days.................<15.0 mg/dL       AST   Date Value Ref Range Status   04/22/2024 38 10 - 40 U/L Final     ALT   Date Value Ref Range Status   04/22/2024 47 (H) 10 - 44 U/L Final     eGFR   Date Value Ref Range Status   04/22/2024 52 (A) >60 mL/min/1.73 m^2 Final         Imaging:   CT Chest/Abd/Pelvis   Impression:     1.  There is a new large heterogeneous mass identified in the anterior abdominal wall at the level of the umbilicus extending to the left of midline and measuring 6.8 x 5.1 x 4.6 cm.  New increased skin thickening is also visible in the umbilicus immediately anterior to this lesion.  Tumor recurrence is a consideration although rectus sheath hematoma or an inflammatory process are also considerations.  This lesion is easily amenable to percutaneous biopsy.  Characterization of this lesion by ultrasound should also be considered.  2.   Stable postsurgical changes following resection of the duodenum, gastric antrum and head of the pancreas with gastrojejunostomy.  3.  Hepatomegaly and generalized hepatic steatosis.  4.  Small nonobstructing intrarenal calculus on the left.  5.  A stable 6 mm faint nodule in the right upper lobe is unchanged since 04/12/2023.  6.  Status post hysterectomy.     All CT scans at [this location] are performed using dose modulation techniques as appropriate to a performed exam including the following:  Automated exposure control; adjustment of the mA and/or kV according to patient size (this includes techniques or standardized protocols for targeted exams where dose is matched to indication / reason for exam; i.e. extremities or head); use of iterative reconstruction technique.     Finalized on: 4/19/2024 4:20 PM By:  Alex Allred  [facility]# [id number]      2024-04-19 16:22:31.124    BRR           Exam Ended: 04/19/24 10:24 CDT             I spent a total of 42 minutes on the day of the visit. This includes face to face time in discussion of goals of care, symptom assessment, coordination of care and emotional support.  This also includes non-face to face time preparing to see the patient (eg, review of tests/imaging), obtaining and/or reviewing separately obtained history, documenting clinical information in the electronic or other health record, independently interpreting results and communicating results to the patient/family/caregiver, or care coordinator.     Additional 41 min time spent on a voluntary advance care planning and /or goals of care discussion, providing emotional support, formulating and communicating prognosis and exploring burden/benefit of various approaches of treatment.       Stephanie Valencia NP

## 2024-05-16 ENCOUNTER — OFFICE VISIT (OUTPATIENT)
Dept: PSYCHIATRY | Facility: CLINIC | Age: 59
End: 2024-05-16
Payer: COMMERCIAL

## 2024-05-16 VITALS
SYSTOLIC BLOOD PRESSURE: 149 MMHG | DIASTOLIC BLOOD PRESSURE: 86 MMHG | BODY MASS INDEX: 28.64 KG/M2 | WEIGHT: 211.19 LBS

## 2024-05-16 DIAGNOSIS — R41.840 INATTENTION: ICD-10-CM

## 2024-05-16 DIAGNOSIS — F41.1 GAD (GENERALIZED ANXIETY DISORDER): Primary | ICD-10-CM

## 2024-05-16 PROCEDURE — 4010F ACE/ARB THERAPY RXD/TAKEN: CPT | Mod: CPTII,S$GLB,, | Performed by: PSYCHIATRY & NEUROLOGY

## 2024-05-16 PROCEDURE — 3079F DIAST BP 80-89 MM HG: CPT | Mod: CPTII,S$GLB,, | Performed by: PSYCHIATRY & NEUROLOGY

## 2024-05-16 PROCEDURE — 3008F BODY MASS INDEX DOCD: CPT | Mod: CPTII,S$GLB,, | Performed by: PSYCHIATRY & NEUROLOGY

## 2024-05-16 PROCEDURE — 99214 OFFICE O/P EST MOD 30 MIN: CPT | Mod: S$GLB,,, | Performed by: PSYCHIATRY & NEUROLOGY

## 2024-05-16 PROCEDURE — 99999 PR PBB SHADOW E&M-EST. PATIENT-LVL II: CPT | Mod: PBBFAC,,, | Performed by: PSYCHIATRY & NEUROLOGY

## 2024-05-16 PROCEDURE — 3077F SYST BP >= 140 MM HG: CPT | Mod: CPTII,S$GLB,, | Performed by: PSYCHIATRY & NEUROLOGY

## 2024-05-16 RX ORDER — ESCITALOPRAM OXALATE 10 MG/1
10 TABLET ORAL DAILY
Qty: 90 TABLET | Refills: 1 | Status: SHIPPED | OUTPATIENT
Start: 2024-05-16

## 2024-05-16 RX ORDER — CLONAZEPAM 0.5 MG/1
0.5 TABLET ORAL NIGHTLY PRN
Qty: 30 TABLET | Refills: 3 | Status: SHIPPED | OUTPATIENT
Start: 2024-05-16 | End: 2025-05-16

## 2024-05-16 RX ORDER — ATOMOXETINE 40 MG/1
40 CAPSULE ORAL DAILY
Qty: 60 CAPSULE | Refills: 3 | Status: SHIPPED | OUTPATIENT
Start: 2024-05-16 | End: 2024-11-12

## 2024-05-16 NOTE — PROGRESS NOTES
Outpatient Psychiatry Follow-up Visit (MD/NP)    5/16/2024    Karen Gutierrez, a 59 y.o. female, presenting for follow-up visit. Met with patient.    Reason for Encounter: Patient complains of distractability, anxiety.     Interval Hx: Patient seen and interviewed for follow-up, last seen about three months ago. Since last visit, cancer recurrence identified.   Moods in ok place, has good support, feels confident in her medical team. Adherent to medication. Ongoing benefits. Denies side effects.     Background: Pt is a 56 y/o woman who presents for establishment of care, endorses chronic problems with anxiety , distractability, difficulty completing tasks. Reports having first brought this problem up to PCP Han Arshad, about five years ago following patient's niece's diagnosis with adhd and patient's recognition of similar attentional problems in herself. Reports no formal testing, no psych assessment, treated with adderall with benefit to both focus & moods (reduced levels of anxiety and depression, helping her worry less, catastrophize less).     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 Status post surgery (whipple), chemo. Follow-up going well.   Returned to work 2021. GELACIO-7 = 11;     Psych Hx: Endorses history of obsessions, no psychiatric diagnoses or assessments. No history of michael, hallucinations, delusions, self-harm behaviors, psych hospitalizations.    Family Hx: niece with adhd.  Sister with considerable irritability considerably helped by medication; father has similar symptoms.      Past Medical History:   Diagnosis Date    Cancer     duodenal cancer    Hypertension     Hypotension, iatrogenic     Mitral valve prolapse      Social Hx: born in [place], [place]. Born full-term, no complications.   No developmental or health problems. Active as a child. In lots of clubs. Always  highly active. Moved to  by first grade. No serious maltreatment. Dad was short-tempered. Older sister (lives in ). Childhood was happy. Both parents were in the home. Loved school. Loved school, performed well. No discipline problems. Good experience in school. Problems with bullying. Close to sister.     Graduated HS, went to [employer], majored in fashion design/merchandising. Then studied in program for medical administration. 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 Went back to work after extended medical leave, had a pay-cut, loss some of her responsibilities.     ,  for 7 years. Raised one son as a single mom. He's now 26 years old. Son is newly  and he and wife live with patient. He's been accepted to med school, he and his wife will move to [place] soon for school. Starts in January.     Review Of Systems:     GENERAL:  No weight gain or loss  SKIN:  No rashes or lacerations  HEAD:  No headaches  CHEST:  No shortness of breath, hyperventilation or cough  CARDIOVASCULAR:  No tachycardia or chest pain  ABDOMEN:  No nausea, vomiting, pain, constipation or diarrhea  URINARY:  No frequency, dysuria or sexual dysfunction  ENDOCRINE:  No polydipsia, polyuria  MUSCULOSKELETAL:  No pain or stiffness of the joints  NEUROLOGIC:  No weakness, sensory changes, seizures, confusion, memory loss, tremor or other abnormal movements    Current Evaluation:     Nutritional Screening: Considering the patient's height and weight, medications, medical history and preferences, should a referral be made to the dietitian? no    Constitutional  Vitals:  Most recent vital signs, dated less than 90 days prior to this appointment, were reviewed.    Vitals:    05/16/24 0827   BP: (!) 149/86   Weight: 95.8 kg (211 lb 3.2 oz)          General:  unremarkable, age appropriate     Musculoskeletal  Muscle Strength/Tone:  no tremor, no tic   Gait & Station:   non-ataxic     Psychiatric  Appearance: casually dressed & groomed;   Behavior: calm,   Cooperation: cooperative with assessment  Speech: normal rate, volume, tone  Thought Process: linear, goal-directed  Thought Content: No suicidal or homicidal ideation; no delusions  Affect: mildly anxious  Mood: mildly anxious  Perceptions: No auditory or visual hallucinations  Level of Consciousness: alert throughout interview  Insight: fair  Cognition: Oriented to person, place, time, & situation  Memory: no apparent deficits to general clinical interview; not formally assessed  Attention/Concentration: no apparent deficits to general clinical interview; not formally assessed  Fund of Knowledge: average by vocabulary/education    Laboratory Data  Hospital Outpatient Visit on 05/02/2024   Component Date Value Ref Range Status    Prothrombin Time 05/02/2024 10.4  9.0 - 12.5 sec Final    INR 05/02/2024 0.9  0.8 - 1.2 Final    Final Pathologic Diagnosis 05/02/2024    Final                    Value:BIOPSY OF MASS FROM ABDOMINAL WALL:  METASTATIC ADENOCARCINOMA CONSISTENT WITH PANCREATIC ORIGIN      Gross 05/02/2024    Final                    Value:Surgery ID:  [id number];  Pathology ID:  [id number]  1. Received in formalin labeled Lt abd wall mass, are multiple tan white and red fragments, ranging in measurements of 0.5-5 mm in greatest dimension. Submitted entirely.  NQM--1-A      Grossed by: Thais Camara      Microscopic Exam 05/02/2024 Concurring pathologist:  Dr. Younger   Final    Disclaimer 05/02/2024 Unless the case is a 'gross only' or additional testing only, the final diagnosis for each specimen is based on a microscopic examination of appropriate tissue sections.   Final   Lab Visit on 04/24/2024   Component Date Value Ref Range Status    [facility] BLOOD ADD-ON 04/24/2024 Blood collected,Sent to [facility]   Final   Office Visit on 04/24/2024   Component Date Value Ref Range Status    Reason for Study 05/03/2024 To identify  mutations relevant to patient's cancer.   Final    Genetic Diseases Assessed 05/03/2024 Cancer   Final    Description of Ranges of DNA Seque* 05/03/2024 105 gene liquid biopsy   Final    Overall Interpretation 05/03/2024 positive   Final    MSI 05/03/2024 Not detected   Final    [facility] Portal 05/03/2024 [email or web address]   Final    Low Coverage Regions 05/03/2024 ERRFI1, JAK1, MSH3, TERT   Final    Therapy Count 05/03/2024 2   Final    [facility]: Potential Therapy 1 05/03/2024    Final                    Value:Gene: 8975^PIK3CA^HGNC  Variant: p.I658fpl  Agent: Alpelisib  Tissue: Solid Tumors  Association: response  Evidence Status: Clinical research  Evidence ID: [id number]  Evidence URL: https://www.ncbi.nlm.nih.gov/pubmed/53045221  FDA Approved?: Yes  on label?: No      [facility]: Potential Therapy 2 05/03/2024    Final                    Value:Gene: 8975^PIK3CA^HGNC  Variant: p.[id number]  Agent: Alpelisib  Tissue: Solid Tumors  Association: response  Evidence Status: Clinical research  Evidence ID: [id number]  Evidence URL: https://www.ncbi.nlm.nih.gov/pubmed/36205774  FDA Approved?: Yes  on label?: No      Trial Count 05/03/2024 3   Final    [facility]: Clinical Trial Match 1 05/03/2024    Final                    Value:Clinical Trial NCT ID: [id number]  Clinical Trial Title: A Study of [id number] for the Treatment of Advanced or Metastatic Solid Tumors  Clinical Trial URL: https://clinicaltrials.gov/ct2/show/EZJ22421861  Clinical Phase: Phase 1/Phase 2  Matched criteria: LULA c.5763-1G>A mutation      [facility]: Clinical Trial Match 2 05/03/2024    Final                    Value:Clinical Trial NCT ID: [id number]  Clinical Trial Title: Testing the Combination of the Anti-cancer Drugs [id number] ([id number]) and Talazoparib in Patients With Advanced Solid Tumors, The ComBET Trial  Clinical Trial URL:  https://clinicaltrials.gov/ct2/show/IZZ25032420  Clinical Phase: Phase 2  Matched criteria: KRAS p.G12S mutation      [facility]: Clinical Trial Match 3 05/03/2024    Final                    Value:Clinical Trial NCT ID: [id number]  Clinical Trial Title: First-in-Human Study of Mutant-selective PI3K  Inhibitor, RLY-2608, as a Single Agent in Advanced Solid Tumor Patients and in Combination With Fulvestrant in Patients With Advanced Breast Cancer  Clinical Trial URL: https://clinicaltrials.gov/ct2/show/FXG18765963  Clinical Phase: Phase 1  Matched criteria: PIK3CA p.R421tuy mutation, PIK3CA p.E545K mutation     Lab Visit on 04/22/2024   Component Date Value Ref Range Status    Sodium 04/22/2024 140  136 - 145 mmol/L Final    Potassium 04/22/2024 5.0  3.5 - 5.1 mmol/L Final    Chloride 04/22/2024 103  95 - 110 mmol/L Final    CO2 04/22/2024 26  23 - 29 mmol/L Final    Glucose 04/22/2024 151 (H)  70 - 110 mg/dL Final    BUN 04/22/2024 16  6 - 20 mg/dL Final    Creatinine 04/22/2024 1.2  0.5 - 1.4 mg/dL Final    Calcium 04/22/2024 9.6  8.7 - 10.5 mg/dL Final    Total Protein 04/22/2024 7.5  6.0 - 8.4 g/dL Final    Albumin 04/22/2024 4.2  3.5 - 5.2 g/dL Final    Total Bilirubin 04/22/2024 0.5  0.1 - 1.0 mg/dL Final    Alkaline Phosphatase 04/22/2024 105  55 - 135 U/L Final    AST 04/22/2024 38  10 - 40 U/L Final    ALT 04/22/2024 47 (H)  10 - 44 U/L Final    eGFR 04/22/2024 52 (A)  >60 mL/min/1.73 m^2 Final    Anion Gap 04/22/2024 11  8 - 16 mmol/L Final    WBC 04/22/2024 5.71  3.90 - 12.70 K/uL Final    RBC 04/22/2024 4.66  4.00 - 5.40 M/uL Final    Hemoglobin 04/22/2024 13.4  12.0 - 16.0 g/dL Final    Hematocrit 04/22/2024 41.1  37.0 - 48.5 % Final    MCV 04/22/2024 88  82 - 98 fL Final    MCH 04/22/2024 28.8  27.0 - 31.0 pg Final    MCHC 04/22/2024 32.6  32.0 - 36.0 g/dL Final    RDW 04/22/2024 12.6  11.5 - 14.5 % Final    Platelets 04/22/2024 200  150 - 450 K/uL Final    MPV 04/22/2024 10.2  9.2 - 12.9 fL Final     Immature Granulocytes 04/22/2024 0.4  0.0 - 0.5 % Final    Gran # (ANC) 04/22/2024 3.1  1.8 - 7.7 K/uL Final    Immature Grans (Abs) 04/22/2024 0.02  0.00 - 0.04 K/uL Final    Lymph # 04/22/2024 1.9  1.0 - 4.8 K/uL Final    Mono # 04/22/2024 0.5  0.3 - 1.0 K/uL Final    Eos # 04/22/2024 0.2  0.0 - 0.5 K/uL Final    Baso # 04/22/2024 0.04  0.00 - 0.20 K/uL Final    nRBC 04/22/2024 0  0 /100 WBC Final    Gran % 04/22/2024 53.5  38.0 - 73.0 % Final    Lymph % 04/22/2024 33.5  18.0 - 48.0 % Final    Mono % 04/22/2024 9.1  4.0 - 15.0 % Final    Eosinophil % 04/22/2024 2.8  0.0 - 8.0 % Final    Basophil % 04/22/2024 0.7  0.0 - 1.9 % Final    Differential Method 04/22/2024 Automated   Final    LD 04/22/2024 208  110 - 260 U/L Final     Medications  Outpatient Encounter Medications as of 5/16/2024   Medication Sig Dispense Refill    acetaminophen (TYLENOL) 500 MG tablet Take 2 tablets (1,000 mg total) by mouth every 8 (eight) hours.      amLODIPine (NORVASC) 10 MG tablet Take 10 mg by mouth once daily.      atomoxetine (STRATTERA) 40 MG capsule Take 1 capsule (40 mg total) by mouth once daily. 60 capsule 2    BYSTOLIC 20 mg Tab Take 1 tablet by mouth once daily.      calcium carbonate (OS-BAM) 600 mg calcium (1,500 mg) Tab Take 600 mg by mouth once daily.      cholecalciferol, vitamin D3, 125 mcg (5,000 unit) capsule Take 5,000 Units by mouth once daily.      cyanocobalamin (VITAMIN B-12) 1000 MCG tablet Take 1,000 mcg by mouth once daily.      EScitalopram oxalate (LEXAPRO) 10 MG tablet Take 1 tablet (10 mg total) by mouth once daily. 90 tablet 1    ibuprofen (ADVIL,MOTRIN) 800 MG tablet Take 1 tablet (800 mg total) by mouth every 8 (eight) hours.      multivit-min/iron/FA/vit K/lut (CENTRUM SILVER WOMEN ORAL) Take 1 each by mouth once daily.      OLANZapine (ZYPREXA) 5 MG tablet Take 1 tablet (5 mg total) by mouth every evening. Take nightly on days 1-3 of each chemotherapy cycle. 6 tablet 11    olmesartan (BENICAR) 40 MG  tablet TAKE 1 TABLET(40 MG) BY MOUTH EVERY DAY 90 tablet 3    ondansetron (ZOFRAN-ODT) 4 MG TbDL Take 1 tablet (4 mg total) by mouth 2 (two) times daily. 20 tablet 11    prochlorperazine (COMPAZINE) 10 MG tablet Take 1 tablet (10 mg total) by mouth every 6 (six) hours as needed for Nausea. 20 tablet 11     No facility-administered encounter medications on file as of 5/16/2024.     Assessment - Diagnosis - Goals:     Impression: 59 year old woman with chronic anxiety problems, currently moderately anxious. Has also had problems with attention, task completion. Previously treated with some benefit with stimulants, not formally diagnosed with adhd. Duodenal CA in remission. Symptoms improved, mild. Treatment well tolerated. Continued to have some attention complaints, deferring working on this given upcoming chemotherapy treatment.     Dx: generalized anxiety disorder    Treatment Goals:  Specify outcomes written in observable, behavioral terms: reduce anxiety by self-report    Treatment Plan/Recommendations:   Continue escitalopram, atomoxetine   Information about self-care in recovery, how to access psychotherapy.   Discussed risks, benefits, and alternatives to treatment plan documented above with patient. I answered all patient questions related to this plan and patient expressed understanding and agreement.     Return to Clinic: 2 months    JAMES Rose MD  Psychiatry, [facility]

## 2024-05-17 ENCOUNTER — OFFICE VISIT (OUTPATIENT)
Dept: HEMATOLOGY/ONCOLOGY | Facility: CLINIC | Age: 59
End: 2024-05-17
Payer: COMMERCIAL

## 2024-05-17 ENCOUNTER — DOCUMENTATION ONLY (OUTPATIENT)
Dept: HEMATOLOGY/ONCOLOGY | Facility: CLINIC | Age: 59
End: 2024-05-17

## 2024-05-17 ENCOUNTER — LAB VISIT (OUTPATIENT)
Dept: LAB | Facility: HOSPITAL | Age: 59
End: 2024-05-17
Attending: INTERNAL MEDICINE
Payer: COMMERCIAL

## 2024-05-17 VITALS
DIASTOLIC BLOOD PRESSURE: 82 MMHG | WEIGHT: 210 LBS | HEIGHT: 72 IN | OXYGEN SATURATION: 97 % | BODY MASS INDEX: 28.44 KG/M2 | TEMPERATURE: 98 F | SYSTOLIC BLOOD PRESSURE: 129 MMHG | HEART RATE: 71 BPM

## 2024-05-17 DIAGNOSIS — Z90.411 HISTORY OF PARTIAL PANCREATECTOMY: ICD-10-CM

## 2024-05-17 DIAGNOSIS — Z79.899 IMMUNODEFICIENCY DUE TO CHEMOTHERAPY: ICD-10-CM

## 2024-05-17 DIAGNOSIS — Z15.89 MONOALLELIC MUTATION OF MUTYH GENE: ICD-10-CM

## 2024-05-17 DIAGNOSIS — D84.821 IMMUNODEFICIENCY DUE TO CHEMOTHERAPY: ICD-10-CM

## 2024-05-17 DIAGNOSIS — C79.89 SECONDARY MALIGNANCY OF SOFT TISSUES OF ABDOMEN: ICD-10-CM

## 2024-05-17 DIAGNOSIS — C79.9 METASTATIC ADENOCARCINOMA: Primary | ICD-10-CM

## 2024-05-17 DIAGNOSIS — C17.0 DUODENAL CANCER: ICD-10-CM

## 2024-05-17 DIAGNOSIS — T45.1X5A IMMUNODEFICIENCY DUE TO CHEMOTHERAPY: ICD-10-CM

## 2024-05-17 DIAGNOSIS — D50.9 IRON DEFICIENCY ANEMIA, UNSPECIFIED IRON DEFICIENCY ANEMIA TYPE: ICD-10-CM

## 2024-05-17 LAB
ALBUMIN SERPL BCP-MCNC: 4 G/DL (ref 3.5–5.2)
ALP SERPL-CCNC: 97 U/L (ref 55–135)
ALT SERPL W/O P-5'-P-CCNC: 31 U/L (ref 10–44)
ANION GAP SERPL CALC-SCNC: 10 MMOL/L (ref 8–16)
AST SERPL-CCNC: 30 U/L (ref 10–40)
BASOPHILS # BLD AUTO: 0.03 K/UL (ref 0–0.2)
BASOPHILS NFR BLD: 0.5 % (ref 0–1.9)
BILIRUB SERPL-MCNC: 0.7 MG/DL (ref 0.1–1)
BUN SERPL-MCNC: 16 MG/DL (ref 6–20)
CALCIUM SERPL-MCNC: 9.4 MG/DL (ref 8.7–10.5)
CEA SERPL-MCNC: 13.6 NG/ML (ref 0–5)
CHLORIDE SERPL-SCNC: 104 MMOL/L (ref 95–110)
CO2 SERPL-SCNC: 27 MMOL/L (ref 23–29)
CREAT SERPL-MCNC: 1 MG/DL (ref 0.5–1.4)
DIFFERENTIAL METHOD BLD: NORMAL
EOSINOPHIL # BLD AUTO: 0.2 K/UL (ref 0–0.5)
EOSINOPHIL NFR BLD: 3.4 % (ref 0–8)
ERYTHROCYTE [DISTWIDTH] IN BLOOD BY AUTOMATED COUNT: 12.4 % (ref 11.5–14.5)
EST. GFR  (NO RACE VARIABLE): >60 ML/MIN/1.73 M^2
GLUCOSE SERPL-MCNC: 144 MG/DL (ref 70–110)
HCT VFR BLD AUTO: 40.6 % (ref 37–48.5)
HGB BLD-MCNC: 13 G/DL (ref 12–16)
IMM GRANULOCYTES # BLD AUTO: 0.02 K/UL (ref 0–0.04)
IMM GRANULOCYTES NFR BLD AUTO: 0.3 % (ref 0–0.5)
LYMPHOCYTES # BLD AUTO: 1.5 K/UL (ref 1–4.8)
LYMPHOCYTES NFR BLD: 23.1 % (ref 18–48)
MAGNESIUM SERPL-MCNC: 2.2 MG/DL (ref 1.6–2.6)
MCH RBC QN AUTO: 28.4 PG (ref 27–31)
MCHC RBC AUTO-ENTMCNC: 32 G/DL (ref 32–36)
MCV RBC AUTO: 89 FL (ref 82–98)
MONOCYTES # BLD AUTO: 0.5 K/UL (ref 0.3–1)
MONOCYTES NFR BLD: 7.2 % (ref 4–15)
NEUTROPHILS # BLD AUTO: 4.3 K/UL (ref 1.8–7.7)
NEUTROPHILS NFR BLD: 65.5 % (ref 38–73)
NRBC BLD-RTO: 0 /100 WBC
PLATELET # BLD AUTO: 207 K/UL (ref 150–450)
PMV BLD AUTO: 9.8 FL (ref 9.2–12.9)
POTASSIUM SERPL-SCNC: 4.6 MMOL/L (ref 3.5–5.1)
PROT SERPL-MCNC: 7.1 G/DL (ref 6–8.4)
RBC # BLD AUTO: 4.57 M/UL (ref 4–5.4)
SODIUM SERPL-SCNC: 141 MMOL/L (ref 136–145)
WBC # BLD AUTO: 6.53 K/UL (ref 3.9–12.7)

## 2024-05-17 PROCEDURE — 36415 COLL VENOUS BLD VENIPUNCTURE: CPT | Performed by: INTERNAL MEDICINE

## 2024-05-17 PROCEDURE — 1160F RVW MEDS BY RX/DR IN RCRD: CPT | Mod: CPTII,S$GLB,, | Performed by: INTERNAL MEDICINE

## 2024-05-17 PROCEDURE — 86301 IMMUNOASSAY TUMOR CA 19-9: CPT | Performed by: INTERNAL MEDICINE

## 2024-05-17 PROCEDURE — 99999 PR PBB SHADOW E&M-EST. PATIENT-LVL IV: CPT | Mod: PBBFAC,,, | Performed by: INTERNAL MEDICINE

## 2024-05-17 PROCEDURE — 4010F ACE/ARB THERAPY RXD/TAKEN: CPT | Mod: CPTII,S$GLB,, | Performed by: INTERNAL MEDICINE

## 2024-05-17 PROCEDURE — 83735 ASSAY OF MAGNESIUM: CPT | Performed by: INTERNAL MEDICINE

## 2024-05-17 PROCEDURE — 85025 COMPLETE CBC W/AUTO DIFF WBC: CPT | Performed by: INTERNAL MEDICINE

## 2024-05-17 PROCEDURE — 3074F SYST BP LT 130 MM HG: CPT | Mod: CPTII,S$GLB,, | Performed by: INTERNAL MEDICINE

## 2024-05-17 PROCEDURE — 3079F DIAST BP 80-89 MM HG: CPT | Mod: CPTII,S$GLB,, | Performed by: INTERNAL MEDICINE

## 2024-05-17 PROCEDURE — 3008F BODY MASS INDEX DOCD: CPT | Mod: CPTII,S$GLB,, | Performed by: INTERNAL MEDICINE

## 2024-05-17 PROCEDURE — 1159F MED LIST DOCD IN RCRD: CPT | Mod: CPTII,S$GLB,, | Performed by: INTERNAL MEDICINE

## 2024-05-17 PROCEDURE — 99215 OFFICE O/P EST HI 40 MIN: CPT | Mod: S$GLB,,, | Performed by: INTERNAL MEDICINE

## 2024-05-17 PROCEDURE — 82378 CARCINOEMBRYONIC ANTIGEN: CPT | Performed by: INTERNAL MEDICINE

## 2024-05-17 PROCEDURE — 80053 COMPREHEN METABOLIC PANEL: CPT | Performed by: INTERNAL MEDICINE

## 2024-05-17 RX ORDER — HEPARIN 100 UNIT/ML
500 SYRINGE INTRAVENOUS
Status: CANCELLED | OUTPATIENT
Start: 2024-05-22

## 2024-05-17 RX ORDER — HEPARIN 100 UNIT/ML
500 SYRINGE INTRAVENOUS
Status: CANCELLED | OUTPATIENT
Start: 2024-05-20

## 2024-05-17 RX ORDER — PROCHLORPERAZINE EDISYLATE 5 MG/ML
10 INJECTION INTRAMUSCULAR; INTRAVENOUS ONCE AS NEEDED
Status: CANCELLED | OUTPATIENT
Start: 2024-05-20

## 2024-05-17 RX ORDER — FLUOROURACIL 50 MG/ML
400 INJECTION, SOLUTION INTRAVENOUS
Status: CANCELLED | OUTPATIENT
Start: 2024-05-20

## 2024-05-17 RX ORDER — EPINEPHRINE 0.3 MG/.3ML
0.3 INJECTION SUBCUTANEOUS ONCE AS NEEDED
Status: CANCELLED | OUTPATIENT
Start: 2024-05-20

## 2024-05-17 RX ORDER — PROCHLORPERAZINE EDISYLATE 5 MG/ML
10 INJECTION INTRAMUSCULAR; INTRAVENOUS ONCE AS NEEDED
Status: CANCELLED | OUTPATIENT
Start: 2024-05-22

## 2024-05-17 RX ORDER — SODIUM CHLORIDE 0.9 % (FLUSH) 0.9 %
10 SYRINGE (ML) INJECTION
Status: CANCELLED | OUTPATIENT
Start: 2024-05-22

## 2024-05-17 RX ORDER — DIPHENHYDRAMINE HYDROCHLORIDE 50 MG/ML
50 INJECTION INTRAMUSCULAR; INTRAVENOUS ONCE AS NEEDED
Status: CANCELLED | OUTPATIENT
Start: 2024-05-20

## 2024-05-17 RX ORDER — SODIUM CHLORIDE 0.9 % (FLUSH) 0.9 %
10 SYRINGE (ML) INJECTION
Status: CANCELLED | OUTPATIENT
Start: 2024-05-20

## 2024-05-17 RX ORDER — LIDOCAINE AND PRILOCAINE 25; 25 MG/G; MG/G
CREAM TOPICAL
Qty: 5 G | Refills: 11 | Status: SHIPPED | OUTPATIENT
Start: 2024-05-17

## 2024-05-17 NOTE — NURSING
PT contacted to advise of C1D1 Monday 5/20 at [facility] for 11:30. Explained my role at nurse navigator and gave patient my direct number to call with questions or concerns. Pt verbalized understanding. Asked who she could bring her FMLA paperwork to for work. Explained that if she brought it to her appt that SW or I would make sure it got to the correct person to take care of it. Pt verbalized understanding.     Oncology Navigation   Intake      Treatment  Date Presented to Tumor Board: 04/26/24                             Acuity  Stage: 2  Systemic Treatment - predicted or initiated: Chemotherapy Regimen with Multiple drugs (+1)  Treatment Tolerability: Has not started treatment yet/treatment fully completed and side effects resolved   Needed: 0  Support: 0  Verbalizes Financial Concerns: 1  Transportation: 0  History of noncompliance/frequent no shows and cancellations: 0  Verbalizes the need for more education: 0  Navigation Acuity: 4     Follow Up  No follow-ups on file.

## 2024-05-17 NOTE — PROGRESS NOTES
Subjective:       Patient ID: Karen Gutierrez is a 59 y.o. female.    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  Patient returns to start cycle 1 day 1 of FOLFOX last treated proximally 24 months ago in adjuvant setting status post pancreatectomy patient has new mutation.  [facility] results demonstrates potential actionable mutation with Pik 3 in LULA mutation ECOG status 1    Past Medical History:   Diagnosis Date    Cancer     duodenal cancer    Hypertension     Hypotension, iatrogenic     Mitral valve prolapse      Family History   Problem Relation Name Age of Onset    Ovarian cancer Mother      Atrial fibrillation Mother      Stroke Mother      Hyperlipidemia Mother      Diabetes Mother      Bladder Cancer Father      Hypertension Father      Diabetes Father      No Known Problems Sister      Diabetes Maternal Grandmother      Colon cancer Maternal Grandmother      Stroke Maternal Grandfather      Diabetes Paternal Grandmother      No Known Problems Paternal Grandfather       Social History     Socioeconomic History    Marital status:    Tobacco Use    Smoking status: Former    Smokeless tobacco: Never   Substance and Sexual Activity    Alcohol use: Not Currently    Drug use: Never     Past Surgical History:   Procedure Laterality Date    BUNIONECTOMY Left     HYSTERECTOMY  2000    INSERTION OF TUNNELED CENTRAL VENOUS CATHETER (CVC) WITH SUBCUTANEOUS PORT N/A 3/26/2021    Procedure: LHPNOEDLY-CATD-R-CATH;  Surgeon: Lauren Abraham MD;  Location: [facility] OR;  Service: General;  Laterality: N/A;    INSERTION OF VENOUS ACCESS PORT Right 5/13/2024    Procedure: INSERTION, VENOUS ACCESS PORT;  Surgeon: Alicia Hernandez MD;  Location: [facility] OR;  Service: General;  Laterality: Right;    LITHOTRIPSY      PLACEMENT OF JEJUNOSTOMY TUBE  2/18/2021    Procedure: INSERTION, JEJUNOSTOMY TUBE;  Surgeon: Hitesh CHAUDHARY  "MD Bre;  Location: [facility] OR 2ND FLR;  Service: General;;    ULTRASOUND GUIDANCE Right 5/13/2024    Procedure: ULTRASOUND GUIDANCE;  Surgeon: Alicia Hernandez MD;  Location: [facility] OR;  Service: General;  Laterality: Right;    WHIPPLE PROCEDURE N/A 2/18/2021    Procedure: WHIPPLE PROCEDURE;  Surgeon: Hitesh Díaz MD;  Location: [facility] OR 2ND FLR;  Service: General;  Laterality: N/A;       Labs:  Lab Results   Component Value Date    WBC 6.53 05/17/2024    HGB 13.0 05/17/2024    HCT 40.6 05/17/2024    MCV 89 05/17/2024     05/17/2024     BMP  Lab Results   Component Value Date     05/17/2024    K 4.6 05/17/2024     05/17/2024    CO2 27 05/17/2024    BUN 16 05/17/2024    CREATININE 1.0 05/17/2024    CALCIUM 9.4 05/17/2024    ANIONGAP 10 05/17/2024    ESTGFRAFRICA >60 03/07/2022    EGFRNONAA >60 03/07/2022     Lab Results   Component Value Date    ALT 31 05/17/2024    AST 30 05/17/2024    ALKPHOS 97 05/17/2024    BILITOT 0.7 05/17/2024       Lab Results   Component Value Date    IRON 42 05/10/2021    TIBC 404 05/10/2021    FERRITIN 345 (H) 05/10/2021     No results found for: "MEUFDVDI34"  No results found for: "FOLATE"  No results found for: "TSH"      Review of Systems   Constitutional:  Negative for activity change, appetite change, chills, diaphoresis, fatigue, fever and unexpected weight change.   HENT:  Negative for congestion, dental problem, drooling, ear discharge, ear pain, facial swelling, hearing loss, mouth sores, nosebleeds, postnasal drip, rhinorrhea, sinus pressure, sneezing, sore throat, tinnitus, trouble swallowing and voice change.    Eyes:  Negative for photophobia, pain, discharge, redness, itching and visual disturbance.   Respiratory:  Negative for cough, choking, chest tightness, shortness of breath, wheezing and stridor.    Cardiovascular:  Negative for chest pain, palpitations and leg swelling.   Gastrointestinal:  Negative for abdominal distention, abdominal pain, anal " bleeding, blood in stool, constipation, diarrhea, nausea, rectal pain and vomiting.   Endocrine: Negative for cold intolerance, heat intolerance, polydipsia, polyphagia and polyuria.   Genitourinary:  Negative for decreased urine volume, difficulty urinating, dyspareunia, dysuria, enuresis, flank pain, frequency, genital sores, hematuria, menstrual problem, pelvic pain, urgency, vaginal bleeding, vaginal discharge and vaginal pain.   Musculoskeletal:  Negative for arthralgias, back pain, gait problem, joint swelling, myalgias, neck pain and neck stiffness.   Skin:  Negative for color change, pallor and rash.   Allergic/Immunologic: Negative for environmental allergies, food allergies and immunocompromised state.   Neurological:  Negative for dizziness, tremors, seizures, syncope, facial asymmetry, speech difficulty, weakness, light-headedness, numbness and headaches.   Hematological:  Negative for adenopathy. Does not bruise/bleed easily.   Psychiatric/Behavioral:  Negative for agitation, behavioral problems, confusion, decreased concentration, dysphoric mood, hallucinations, self-injury, sleep disturbance and suicidal ideas. The patient is not nervous/anxious and is not hyperactive.        Objective:      Physical Exam  Vitals reviewed.   Constitutional:       General: She is not in acute distress.     Appearance: She is well-developed. She is not diaphoretic.   HENT:      Head: Normocephalic and atraumatic.      Right Ear: External ear normal.      Left Ear: External ear normal.      Nose: Nose normal.      Right Sinus: No maxillary sinus tenderness or frontal sinus tenderness.      Left Sinus: No maxillary sinus tenderness or frontal sinus tenderness.      Mouth/Throat:      Pharynx: No oropharyngeal exudate.   Eyes:      General: Lids are normal. No scleral icterus.        Right eye: No discharge.         Left eye: No discharge.      Conjunctiva/sclera: Conjunctivae normal.      Right eye: Right conjunctiva is  not injected. No hemorrhage.     Left eye: Left conjunctiva is not injected. No hemorrhage.     Pupils: Pupils are equal, round, and reactive to light.   Neck:      Thyroid: No thyromegaly.      Vascular: No JVD.      Trachea: No tracheal deviation.   Cardiovascular:      Rate and Rhythm: Normal rate.   Pulmonary:      Effort: Pulmonary effort is normal. No respiratory distress.      Breath sounds: No stridor.   Chest:      Chest wall: No tenderness.   Abdominal:      General: Bowel sounds are normal. There is no distension.      Palpations: Abdomen is soft. There is no hepatomegaly, splenomegaly or mass.      Tenderness: There is no abdominal tenderness. There is no rebound.   Musculoskeletal:         General: No tenderness. Normal range of motion.      Cervical back: Normal range of motion and neck supple.   Lymphadenopathy:      Cervical: No cervical adenopathy.      Upper Body:      Right upper body: No supraclavicular adenopathy.      Left upper body: No supraclavicular adenopathy.   Skin:     General: Skin is dry.      Findings: No erythema or rash.   Neurological:      Mental Status: She is alert and oriented to person, place, and time.      Cranial Nerves: No cranial nerve deficit.      Coordination: Coordination normal.   Psychiatric:         Behavior: Behavior normal.         Thought Content: Thought content normal.         Judgment: Judgment normal.             Assessment:      1. Metastatic adenocarcinoma    2. Duodenal cancer    3. 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 Iron deficiency anemia, unspecified iron deficiency anemia type    6. s/p partial pancreatectomy    7. Monoallelic mutation of MUTYH gene           Med Onc Chart Routing      Follow up with physician . Return to clinic in 2 weeks CBC CMP cycle 2 day 1 FOLFOX   Follow up with SANTIAGO    Infusion scheduling note    Injection scheduling note FOLFOX with clinical pharmacist review   Labs     Imaging    Pharmacy appointment    Other referrals                   Plan:     Recurrent duodenal carcinoma with potential actionable mutations of LULA and Pik 3.  Patient will begin FOLFOX consent for treatment has been signedConsented the patient to the treatment plan and the patient was educated on the planned duration of the treatment and schedule of the treatment administration.  Patient has advanced care planningAdvance Care Planning   Clinical pharmacist to review previous treatment most likely will need Neulasta support.  Prefers Neulasta over return 24 hours later.  Discussed implications and answered questions with her      Enzo Daley Jr, MD FACP

## 2024-05-18 LAB — CANCER AG19-9 SERPL-ACNC: 3381.3 U/ML (ref 0–40)

## 2024-05-20 ENCOUNTER — INFUSION (OUTPATIENT)
Dept: INFUSION THERAPY | Facility: HOSPITAL | Age: 59
End: 2024-05-20
Attending: INTERNAL MEDICINE
Payer: COMMERCIAL

## 2024-05-20 VITALS
HEART RATE: 65 BPM | RESPIRATION RATE: 16 BRPM | DIASTOLIC BLOOD PRESSURE: 77 MMHG | SYSTOLIC BLOOD PRESSURE: 120 MMHG | HEIGHT: 72 IN | WEIGHT: 209.19 LBS | TEMPERATURE: 98 F | OXYGEN SATURATION: 97 % | BODY MASS INDEX: 28.33 KG/M2

## 2024-05-20 DIAGNOSIS — C17.0 DUODENAL CANCER: Primary | ICD-10-CM

## 2024-05-20 PROCEDURE — 96415 CHEMO IV INFUSION ADDL HR: CPT

## 2024-05-20 PROCEDURE — 63600175 PHARM REV CODE 636 W HCPCS: Performed by: INTERNAL MEDICINE

## 2024-05-20 PROCEDURE — 96367 TX/PROPH/DG ADDL SEQ IV INF: CPT

## 2024-05-20 PROCEDURE — 96416 CHEMO PROLONG INFUSE W/PUMP: CPT

## 2024-05-20 PROCEDURE — 96411 CHEMO IV PUSH ADDL DRUG: CPT

## 2024-05-20 PROCEDURE — 25000003 PHARM REV CODE 250: Performed by: INTERNAL MEDICINE

## 2024-05-20 PROCEDURE — 96368 THER/DIAG CONCURRENT INF: CPT

## 2024-05-20 PROCEDURE — 96413 CHEMO IV INFUSION 1 HR: CPT

## 2024-05-20 RX ORDER — DIPHENHYDRAMINE HYDROCHLORIDE 50 MG/ML
50 INJECTION INTRAMUSCULAR; INTRAVENOUS ONCE AS NEEDED
Status: DISCONTINUED | OUTPATIENT
Start: 2024-05-20 | End: 2024-05-20 | Stop reason: HOSPADM

## 2024-05-20 RX ORDER — SODIUM CHLORIDE 0.9 % (FLUSH) 0.9 %
10 SYRINGE (ML) INJECTION
Status: DISCONTINUED | OUTPATIENT
Start: 2024-05-20 | End: 2024-05-20 | Stop reason: HOSPADM

## 2024-05-20 RX ORDER — HEPARIN 100 UNIT/ML
500 SYRINGE INTRAVENOUS
Status: DISCONTINUED | OUTPATIENT
Start: 2024-05-20 | End: 2024-05-20 | Stop reason: HOSPADM

## 2024-05-20 RX ORDER — PROCHLORPERAZINE EDISYLATE 5 MG/ML
10 INJECTION INTRAMUSCULAR; INTRAVENOUS ONCE AS NEEDED
Status: DISCONTINUED | OUTPATIENT
Start: 2024-05-20 | End: 2024-05-20 | Stop reason: HOSPADM

## 2024-05-20 RX ORDER — EPINEPHRINE 0.3 MG/.3ML
0.3 INJECTION SUBCUTANEOUS ONCE AS NEEDED
Status: DISCONTINUED | OUTPATIENT
Start: 2024-05-20 | End: 2024-05-20 | Stop reason: HOSPADM

## 2024-05-20 RX ORDER — FLUOROURACIL 50 MG/ML
400 INJECTION, SOLUTION INTRAVENOUS
Status: COMPLETED | OUTPATIENT
Start: 2024-05-20 | End: 2024-05-20

## 2024-05-20 RX ADMIN — DEXAMETHASONE SODIUM PHOSPHATE 0.25 MG: 4 INJECTION, SOLUTION INTRA-ARTICULAR; INTRALESIONAL; INTRAMUSCULAR; INTRAVENOUS; SOFT TISSUE at 12:05

## 2024-05-20 RX ADMIN — SODIUM CHLORIDE: 9 INJECTION, SOLUTION INTRAVENOUS at 11:05

## 2024-05-20 RX ADMIN — LEUCOVORIN CALCIUM 850 MG: 500 INJECTION, POWDER, LYOPHILIZED, FOR SOLUTION INTRAMUSCULAR; INTRAVENOUS at 12:05

## 2024-05-20 RX ADMIN — DEXTROSE MONOHYDRATE: 50 INJECTION, SOLUTION INTRAVENOUS at 12:05

## 2024-05-20 RX ADMIN — FLUOROURACIL 5305 MG: 50 INJECTION, SOLUTION INTRAVENOUS at 02:05

## 2024-05-20 RX ADMIN — FLUOROURACIL 885 MG: 50 INJECTION, SOLUTION INTRAVENOUS at 02:05

## 2024-05-20 RX ADMIN — OXALIPLATIN 188 MG: 5 INJECTION, SOLUTION INTRAVENOUS at 12:05

## 2024-05-20 NOTE — DISCHARGE INSTRUCTIONS
Thank you for letting me take care of YOU!!    Clair, RN  &  Olga, RN          [facility]  [address] [address]  [phone] phone     [phone] fax  Hours of Operation: Monday- Friday 8:00am- 5:00pm  After hours phone  [phone]  Hematology / Oncology Physicians on call:    Dr. Edi Cm        Nurse Practitioners:    Nory Bearden, KALINA Wilson, BRAULIO Gardner, KALINA James, KALINA Quigley, KALINA      Please don't hesitate to call if you have any concerns.

## 2024-05-20 NOTE — PLAN OF CARE
Patient tolerated chemotherapy well today; no adverse reaction noted.  No significant complaints voiced.   Has f/u appt(s) scheduled per MD request.  No questions or concerns voiced.  NAD noted upon discharge.  Spill kit given to patient.

## 2024-05-21 ENCOUNTER — PATIENT MESSAGE (OUTPATIENT)
Dept: INFUSION THERAPY | Facility: HOSPITAL | Age: 59
End: 2024-05-21
Payer: COMMERCIAL

## 2024-05-22 ENCOUNTER — INFUSION (OUTPATIENT)
Dept: INFUSION THERAPY | Facility: HOSPITAL | Age: 59
End: 2024-05-22
Attending: INTERNAL MEDICINE
Payer: COMMERCIAL

## 2024-05-22 VITALS
DIASTOLIC BLOOD PRESSURE: 83 MMHG | RESPIRATION RATE: 16 BRPM | TEMPERATURE: 98 F | SYSTOLIC BLOOD PRESSURE: 132 MMHG | OXYGEN SATURATION: 98 % | HEART RATE: 61 BPM

## 2024-05-22 DIAGNOSIS — C17.0 DUODENAL CANCER: Primary | ICD-10-CM

## 2024-05-22 PROCEDURE — 25000003 PHARM REV CODE 250: Performed by: INTERNAL MEDICINE

## 2024-05-22 PROCEDURE — A4216 STERILE WATER/SALINE, 10 ML: HCPCS | Performed by: INTERNAL MEDICINE

## 2024-05-22 PROCEDURE — 63600175 PHARM REV CODE 636 W HCPCS: Mod: JZ,JG | Performed by: INTERNAL MEDICINE

## 2024-05-22 PROCEDURE — 96377 APPLICATON ON-BODY INJECTOR: CPT

## 2024-05-22 RX ORDER — PROCHLORPERAZINE EDISYLATE 5 MG/ML
10 INJECTION INTRAMUSCULAR; INTRAVENOUS ONCE AS NEEDED
Status: DISCONTINUED | OUTPATIENT
Start: 2024-05-22 | End: 2024-05-22 | Stop reason: HOSPADM

## 2024-05-22 RX ORDER — HEPARIN 100 UNIT/ML
500 SYRINGE INTRAVENOUS
Status: DISCONTINUED | OUTPATIENT
Start: 2024-05-22 | End: 2024-05-22 | Stop reason: HOSPADM

## 2024-05-22 RX ORDER — SODIUM CHLORIDE 0.9 % (FLUSH) 0.9 %
10 SYRINGE (ML) INJECTION
Status: DISCONTINUED | OUTPATIENT
Start: 2024-05-22 | End: 2024-05-22 | Stop reason: HOSPADM

## 2024-05-22 RX ADMIN — PEGFILGRASTIM 6 MG: KIT SUBCUTANEOUS at 01:05

## 2024-05-22 RX ADMIN — HEPARIN 500 UNITS: 100 SYRINGE at 12:05

## 2024-05-22 RX ADMIN — Medication 10 ML: at 12:05

## 2024-05-24 ENCOUNTER — DOCUMENTATION ONLY (OUTPATIENT)
Dept: HEMATOLOGY/ONCOLOGY | Facility: CLINIC | Age: 59
End: 2024-05-24
Payer: COMMERCIAL

## 2024-05-24 NOTE — PROGRESS NOTES
Made contact with pt post 1st chemo treatment. She mentioned that she was doing well over all with just some slight nausea. We went over some strategies for helping her nausea. She verbalized understanding and had no further questions or concerns. Verified she had my direct number and let her know I was available for anything she needed going forward with treatment. Call ended well      Oncology Navigation   Intake      Treatment  Date Presented to Tumor Board: 04/26/24                             Acuity  Stage: 2  Systemic Treatment - predicted or initiated: Chemotherapy Regimen with Multiple drugs (+1)  Treatment Tolerability: Minimal symptoms   Needed: 0  Support: 0  Verbalizes Financial Concerns: 1  Transportation: 0  History of noncompliance/frequent no shows and cancellations: 0  Verbalizes the need for more education: 1  Navigation Acuity: 5     Follow Up  No follow-ups on file.

## 2024-05-24 NOTE — NURSING
Made contact with pt post 1st chemo treatment. She mentioned that she was doing well over all with just some slight nausea. We went over some strategies for helping her nausea. She verbalized understanding and had no further questions or concerns. Verified she had my direct number and let her know I was available for anything she needed going forward with treatment. Call ended well

## 2024-05-27 NOTE — PROGRESS NOTES
Met with patient and sister to discuss upcoming systemic therapy initiation.  Discussed patient diagnosis including staging information. Also discussed that therapy regimen prescribed involves the following drugs:Oxaliplatin, Leucovorin, Fluorouracil, and Neulasta OBI and timeline of therapy administration. Went over what to expect on first day of treatment, including what to bring with you, visitor policy, and infusion suite guidelines. Also discussed supportive and shared services available to patient, including social work, financial counseling, oncology nutrition, and oncology psychology.      Covered with patient and sister potential side effects and symptom management. Reviewed supportive medications that will be given before, during, and after treatment. Also stressed that other side effects are possible outside of those listed. Spent additional time on signs of infection, infection prevention, and proper nutrition/hydration.    Education provided to patient and sister via etouches specific drug information and chemotherapy education binder.  Also provided contact information for clinic and information related to [facility] communication. Discussed communication process for after-hours needs and some common scenarios in which patient and sister should call provider for guidance vs. immediately report to the emergency room.     Finally, patient and sister were given my contact information. Encouraged patient and sister to call with any questions, concerns, or needs. Patient and sister verbalized understanding of all above information.

## 2024-05-29 ENCOUNTER — PATIENT MESSAGE (OUTPATIENT)
Dept: HEMATOLOGY/ONCOLOGY | Facility: CLINIC | Age: 59
End: 2024-05-29
Payer: COMMERCIAL

## 2024-05-31 LAB
ONEOME COMMENT: NORMAL
ONEOME METHOD: NORMAL

## 2024-06-03 ENCOUNTER — LAB VISIT (OUTPATIENT)
Dept: LAB | Facility: HOSPITAL | Age: 59
End: 2024-06-03
Attending: INTERNAL MEDICINE
Payer: COMMERCIAL

## 2024-06-03 ENCOUNTER — OFFICE VISIT (OUTPATIENT)
Dept: HEMATOLOGY/ONCOLOGY | Facility: CLINIC | Age: 59
End: 2024-06-03
Payer: COMMERCIAL

## 2024-06-03 ENCOUNTER — INFUSION (OUTPATIENT)
Dept: INFUSION THERAPY | Facility: HOSPITAL | Age: 59
End: 2024-06-03
Attending: INTERNAL MEDICINE
Payer: COMMERCIAL

## 2024-06-03 VITALS
DIASTOLIC BLOOD PRESSURE: 86 MMHG | OXYGEN SATURATION: 97 % | WEIGHT: 208.13 LBS | TEMPERATURE: 99 F | SYSTOLIC BLOOD PRESSURE: 144 MMHG | HEART RATE: 69 BPM | BODY MASS INDEX: 28.19 KG/M2 | HEIGHT: 72 IN | RESPIRATION RATE: 16 BRPM

## 2024-06-03 VITALS
OXYGEN SATURATION: 99 % | TEMPERATURE: 98 F | SYSTOLIC BLOOD PRESSURE: 131 MMHG | HEIGHT: 72 IN | BODY MASS INDEX: 28.19 KG/M2 | HEART RATE: 73 BPM | DIASTOLIC BLOOD PRESSURE: 82 MMHG | WEIGHT: 208.13 LBS

## 2024-06-03 DIAGNOSIS — T45.1X5A IMMUNODEFICIENCY DUE TO CHEMOTHERAPY: ICD-10-CM

## 2024-06-03 DIAGNOSIS — C79.89 SECONDARY MALIGNANCY OF SOFT TISSUES OF ABDOMEN: ICD-10-CM

## 2024-06-03 DIAGNOSIS — C17.0 DUODENAL CANCER: ICD-10-CM

## 2024-06-03 DIAGNOSIS — C17.0 DUODENAL CANCER: Primary | ICD-10-CM

## 2024-06-03 DIAGNOSIS — Z79.899 IMMUNODEFICIENCY DUE TO CHEMOTHERAPY: ICD-10-CM

## 2024-06-03 DIAGNOSIS — D84.821 IMMUNODEFICIENCY DUE TO CHEMOTHERAPY: ICD-10-CM

## 2024-06-03 DIAGNOSIS — Z15.89 MONOALLELIC MUTATION OF MUTYH GENE: ICD-10-CM

## 2024-06-03 LAB
ALBUMIN SERPL BCP-MCNC: 4.1 G/DL (ref 3.5–5.2)
ALP SERPL-CCNC: 133 U/L (ref 55–135)
ALT SERPL W/O P-5'-P-CCNC: 33 U/L (ref 10–44)
ANION GAP SERPL CALC-SCNC: 11 MMOL/L (ref 8–16)
ANISOCYTOSIS BLD QL SMEAR: SLIGHT
AST SERPL-CCNC: 26 U/L (ref 10–40)
BASOPHILS NFR BLD: 0 % (ref 0–1.9)
BILIRUB SERPL-MCNC: 0.4 MG/DL (ref 0.1–1)
BUN SERPL-MCNC: 16 MG/DL (ref 6–20)
CALCIUM SERPL-MCNC: 9.3 MG/DL (ref 8.7–10.5)
CHLORIDE SERPL-SCNC: 105 MMOL/L (ref 95–110)
CO2 SERPL-SCNC: 26 MMOL/L (ref 23–29)
CREAT SERPL-MCNC: 1 MG/DL (ref 0.5–1.4)
DIFFERENTIAL METHOD BLD: ABNORMAL
EOSINOPHIL NFR BLD: 3 % (ref 0–8)
ERYTHROCYTE [DISTWIDTH] IN BLOOD BY AUTOMATED COUNT: 13.6 % (ref 11.5–14.5)
EST. GFR  (NO RACE VARIABLE): >60 ML/MIN/1.73 M^2
GLUCOSE SERPL-MCNC: 143 MG/DL (ref 70–110)
HCT VFR BLD AUTO: 39 % (ref 37–48.5)
HGB BLD-MCNC: 12.9 G/DL (ref 12–16)
IMM GRANULOCYTES # BLD AUTO: ABNORMAL K/UL (ref 0–0.04)
IMM GRANULOCYTES NFR BLD AUTO: ABNORMAL % (ref 0–0.5)
LYMPHOCYTES NFR BLD: 40 % (ref 18–48)
MCH RBC QN AUTO: 29.1 PG (ref 27–31)
MCHC RBC AUTO-ENTMCNC: 33.1 G/DL (ref 32–36)
MCV RBC AUTO: 88 FL (ref 82–98)
METAMYELOCYTES NFR BLD MANUAL: 1 %
MONOCYTES NFR BLD: 6 % (ref 4–15)
NEUTROPHILS NFR BLD: 45 % (ref 38–73)
NEUTS BAND NFR BLD MANUAL: 5 %
NRBC BLD-RTO: 0 /100 WBC
OVALOCYTES BLD QL SMEAR: ABNORMAL
PLATELET # BLD AUTO: 142 K/UL (ref 150–450)
PMV BLD AUTO: 10.1 FL (ref 9.2–12.9)
POIKILOCYTOSIS BLD QL SMEAR: SLIGHT
POTASSIUM SERPL-SCNC: 4.9 MMOL/L (ref 3.5–5.1)
PROT SERPL-MCNC: 7.1 G/DL (ref 6–8.4)
RBC # BLD AUTO: 4.43 M/UL (ref 4–5.4)
SODIUM SERPL-SCNC: 142 MMOL/L (ref 136–145)
WBC # BLD AUTO: 6.47 K/UL (ref 3.9–12.7)

## 2024-06-03 PROCEDURE — 1159F MED LIST DOCD IN RCRD: CPT | Mod: CPTII,S$GLB,, | Performed by: INTERNAL MEDICINE

## 2024-06-03 PROCEDURE — 4010F ACE/ARB THERAPY RXD/TAKEN: CPT | Mod: CPTII,S$GLB,, | Performed by: INTERNAL MEDICINE

## 2024-06-03 PROCEDURE — 96368 THER/DIAG CONCURRENT INF: CPT

## 2024-06-03 PROCEDURE — 99215 OFFICE O/P EST HI 40 MIN: CPT | Mod: 25,S$GLB,, | Performed by: INTERNAL MEDICINE

## 2024-06-03 PROCEDURE — 36415 COLL VENOUS BLD VENIPUNCTURE: CPT | Performed by: INTERNAL MEDICINE

## 2024-06-03 PROCEDURE — 85027 COMPLETE CBC AUTOMATED: CPT | Performed by: INTERNAL MEDICINE

## 2024-06-03 PROCEDURE — 96413 CHEMO IV INFUSION 1 HR: CPT

## 2024-06-03 PROCEDURE — 85007 BL SMEAR W/DIFF WBC COUNT: CPT | Performed by: INTERNAL MEDICINE

## 2024-06-03 PROCEDURE — 96416 CHEMO PROLONG INFUSE W/PUMP: CPT

## 2024-06-03 PROCEDURE — 3008F BODY MASS INDEX DOCD: CPT | Mod: CPTII,S$GLB,, | Performed by: INTERNAL MEDICINE

## 2024-06-03 PROCEDURE — 63600175 PHARM REV CODE 636 W HCPCS: Performed by: INTERNAL MEDICINE

## 2024-06-03 PROCEDURE — 96415 CHEMO IV INFUSION ADDL HR: CPT

## 2024-06-03 PROCEDURE — 80053 COMPREHEN METABOLIC PANEL: CPT | Performed by: INTERNAL MEDICINE

## 2024-06-03 PROCEDURE — 99999 PR PBB SHADOW E&M-EST. PATIENT-LVL IV: CPT | Mod: PBBFAC,,, | Performed by: INTERNAL MEDICINE

## 2024-06-03 PROCEDURE — 25000003 PHARM REV CODE 250: Performed by: INTERNAL MEDICINE

## 2024-06-03 PROCEDURE — 3079F DIAST BP 80-89 MM HG: CPT | Mod: CPTII,S$GLB,, | Performed by: INTERNAL MEDICINE

## 2024-06-03 PROCEDURE — 96367 TX/PROPH/DG ADDL SEQ IV INF: CPT

## 2024-06-03 PROCEDURE — 96411 CHEMO IV PUSH ADDL DRUG: CPT

## 2024-06-03 PROCEDURE — 3075F SYST BP GE 130 - 139MM HG: CPT | Mod: CPTII,S$GLB,, | Performed by: INTERNAL MEDICINE

## 2024-06-03 PROCEDURE — 1160F RVW MEDS BY RX/DR IN RCRD: CPT | Mod: CPTII,S$GLB,, | Performed by: INTERNAL MEDICINE

## 2024-06-03 PROCEDURE — 86301 IMMUNOASSAY TUMOR CA 19-9: CPT | Performed by: INTERNAL MEDICINE

## 2024-06-03 RX ORDER — FLUOROURACIL 50 MG/ML
400 INJECTION, SOLUTION INTRAVENOUS
Status: COMPLETED | OUTPATIENT
Start: 2024-06-03 | End: 2024-06-03

## 2024-06-03 RX ORDER — PROCHLORPERAZINE EDISYLATE 5 MG/ML
10 INJECTION INTRAMUSCULAR; INTRAVENOUS ONCE AS NEEDED
Status: CANCELLED | OUTPATIENT
Start: 2024-06-05

## 2024-06-03 RX ORDER — FLUOROURACIL 50 MG/ML
400 INJECTION, SOLUTION INTRAVENOUS
Status: CANCELLED | OUTPATIENT
Start: 2024-06-03

## 2024-06-03 RX ORDER — PROCHLORPERAZINE EDISYLATE 5 MG/ML
10 INJECTION INTRAMUSCULAR; INTRAVENOUS ONCE AS NEEDED
Status: CANCELLED | OUTPATIENT
Start: 2024-06-03

## 2024-06-03 RX ORDER — HEPARIN 100 UNIT/ML
500 SYRINGE INTRAVENOUS
Status: CANCELLED | OUTPATIENT
Start: 2024-06-03

## 2024-06-03 RX ORDER — EPINEPHRINE 0.3 MG/.3ML
0.3 INJECTION SUBCUTANEOUS ONCE AS NEEDED
Status: CANCELLED | OUTPATIENT
Start: 2024-06-03

## 2024-06-03 RX ORDER — DIPHENHYDRAMINE HYDROCHLORIDE 50 MG/ML
50 INJECTION INTRAMUSCULAR; INTRAVENOUS ONCE AS NEEDED
Status: CANCELLED | OUTPATIENT
Start: 2024-06-03

## 2024-06-03 RX ORDER — SODIUM CHLORIDE 0.9 % (FLUSH) 0.9 %
10 SYRINGE (ML) INJECTION
Status: CANCELLED | OUTPATIENT
Start: 2024-06-05

## 2024-06-03 RX ORDER — HEPARIN 100 UNIT/ML
500 SYRINGE INTRAVENOUS
Status: CANCELLED | OUTPATIENT
Start: 2024-06-05

## 2024-06-03 RX ORDER — HEPARIN 100 UNIT/ML
500 SYRINGE INTRAVENOUS
Status: DISCONTINUED | OUTPATIENT
Start: 2024-06-03 | End: 2024-06-03 | Stop reason: HOSPADM

## 2024-06-03 RX ORDER — SODIUM CHLORIDE 0.9 % (FLUSH) 0.9 %
10 SYRINGE (ML) INJECTION
Status: DISCONTINUED | OUTPATIENT
Start: 2024-06-03 | End: 2024-06-03 | Stop reason: HOSPADM

## 2024-06-03 RX ORDER — SODIUM CHLORIDE 0.9 % (FLUSH) 0.9 %
10 SYRINGE (ML) INJECTION
Status: CANCELLED | OUTPATIENT
Start: 2024-06-03

## 2024-06-03 RX ADMIN — DEXAMETHASONE SODIUM PHOSPHATE 0.25 MG: 4 INJECTION, SOLUTION INTRA-ARTICULAR; INTRALESIONAL; INTRAMUSCULAR; INTRAVENOUS; SOFT TISSUE at 10:06

## 2024-06-03 RX ADMIN — FLUOROURACIL 5305 MG: 50 INJECTION, SOLUTION INTRAVENOUS at 01:06

## 2024-06-03 RX ADMIN — FLUOROURACIL 885 MG: 50 INJECTION, SOLUTION INTRAVENOUS at 01:06

## 2024-06-03 RX ADMIN — OXALIPLATIN 188 MG: 5 INJECTION, SOLUTION INTRAVENOUS at 11:06

## 2024-06-03 RX ADMIN — LEUCOVORIN CALCIUM 850 MG: 350 INJECTION, POWDER, LYOPHILIZED, FOR SUSPENSION INTRAMUSCULAR; INTRAVENOUS at 11:06

## 2024-06-03 NOTE — PROGRESS NOTES
Subjective:       Patient ID: Karen Gutierrez is a 59 y.o. female.    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  Patient presents for cycle 2 day 1 of treatment with FOLFOX.  ECOG status 1 patient denies nausea vomiting fevers chills night sweats    Past Medical History:   Diagnosis Date    Cancer     duodenal cancer    Hypertension     Hypotension, iatrogenic     Mitral valve prolapse      Family History   Problem Relation Name Age of Onset    Ovarian cancer Mother      Atrial fibrillation Mother      Stroke Mother      Hyperlipidemia Mother      Diabetes Mother      Bladder Cancer Father      Hypertension Father      Diabetes Father      No Known Problems Sister      Diabetes Maternal Grandmother      Colon cancer Maternal Grandmother      Stroke Maternal Grandfather      Diabetes Paternal Grandmother      No Known Problems Paternal Grandfather       Social History     Socioeconomic History    Marital status:    Tobacco Use    Smoking status: Former    Smokeless tobacco: Never   Substance and Sexual Activity    Alcohol use: Not Currently    Drug use: Never     Past Surgical History:   Procedure Laterality Date    BUNIONECTOMY Left     HYSTERECTOMY  2000    INSERTION OF TUNNELED CENTRAL VENOUS CATHETER (CVC) WITH SUBCUTANEOUS PORT N/A 3/26/2021    Procedure: [id number];  Surgeon: Lauren Abraham MD;  Location: [facility] OR;  Service: General;  Laterality: N/A;    INSERTION OF VENOUS ACCESS PORT Right 5/13/2024    Procedure: INSERTION, VENOUS ACCESS PORT;  Surgeon: Alicia Hernandez MD;  Location: [facility] OR;  Service: General;  Laterality: Right;    LITHOTRIPSY      PLACEMENT OF JEJUNOSTOMY TUBE  2/18/2021    Procedure: INSERTION, JEJUNOSTOMY TUBE;  Surgeon: Hitesh Díaz MD;  Location: [facility] OR [address] [place] [address];  Service: General;;    ULTRASOUND GUIDANCE Right 5/13/2024    Procedure: ULTRASOUND GUIDANCE;  Surgeon:  "Alicia Hernandez MD;  Location: [facility];  Service: General;  Laterality: Right;    WHIPPLE PROCEDURE N/A 2/18/2021    Procedure: WHIPPLE PROCEDURE;  Surgeon: Hitesh Díaz MD;  Location: [address];  Service: General;  Laterality: N/A;       Labs:  Lab Results   Component Value Date    WBC 6.47 06/03/2024    HGB 12.9 06/03/2024    HCT 39.0 06/03/2024    MCV 88 06/03/2024     (L) 06/03/2024     BMP  Lab Results   Component Value Date     06/03/2024    K 4.9 06/03/2024     06/03/2024    CO2 26 06/03/2024    BUN 16 06/03/2024    CREATININE 1.0 06/03/2024    CALCIUM 9.3 06/03/2024    ANIONGAP 11 06/03/2024    ESTGFRAFRICA >60 03/07/2022    EGFRNONAA >60 03/07/2022     Lab Results   Component Value Date    ALT 33 06/03/2024    AST 26 06/03/2024    ALKPHOS 133 06/03/2024    BILITOT 0.4 06/03/2024       Lab Results   Component Value Date    IRON 42 05/10/2021    TIBC 404 05/10/2021    FERRITIN 345 (H) 05/10/2021     No results found for: "HSURDBUL29"  No results found for: "FOLATE"  No results found for: "TSH"      Review of Systems   Constitutional:  Negative for activity change, appetite change, chills, diaphoresis, fatigue, fever and unexpected weight change.   HENT:  Negative for congestion, dental problem, drooling, ear discharge, ear pain, facial swelling, hearing loss, mouth sores, nosebleeds, postnasal drip, rhinorrhea, sinus pressure, sneezing, sore throat, tinnitus, trouble swallowing and voice change.    Eyes:  Negative for photophobia, pain, discharge, redness, itching and visual disturbance.   Respiratory:  Negative for cough, choking, chest tightness, shortness of breath, wheezing and stridor.    Cardiovascular:  Negative for chest pain, palpitations and leg swelling.   Gastrointestinal:  Negative for abdominal distention, abdominal pain, anal bleeding, blood in stool, constipation, diarrhea, nausea, rectal pain and vomiting.   Endocrine: Negative for cold intolerance, heat " intolerance, polydipsia, polyphagia and polyuria.   Genitourinary:  Negative for decreased urine volume, difficulty urinating, dyspareunia, dysuria, enuresis, flank pain, frequency, genital sores, hematuria, menstrual problem, pelvic pain, urgency, vaginal bleeding, vaginal discharge and vaginal pain.   Musculoskeletal:  Negative for arthralgias, back pain, gait problem, joint swelling, myalgias, neck pain and neck stiffness.   Skin:  Negative for color change, pallor and rash.   Allergic/Immunologic: Negative for environmental allergies, food allergies and immunocompromised state.   Neurological:  Negative for dizziness, tremors, seizures, syncope, facial asymmetry, speech difficulty, weakness, light-headedness, numbness and headaches.   Hematological:  Negative for adenopathy. Does not bruise/bleed easily.   Psychiatric/Behavioral:  Negative for agitation, behavioral problems, confusion, decreased concentration, dysphoric mood, hallucinations, self-injury, sleep disturbance and suicidal ideas. The patient is not nervous/anxious and is not hyperactive.        Objective:      Physical Exam  Vitals reviewed.   Constitutional:       General: She is not in acute distress.     Appearance: She is well-developed. She is not diaphoretic.   HENT:      Head: Normocephalic and atraumatic.      Right Ear: External ear normal.      Left Ear: External ear normal.      Nose: Nose normal.      Right Sinus: No maxillary sinus tenderness or frontal sinus tenderness.      Left Sinus: No maxillary sinus tenderness or frontal sinus tenderness.      Mouth/Throat:      Pharynx: No oropharyngeal exudate.   Eyes:      General: Lids are normal. No scleral icterus.        Right eye: No discharge.         Left eye: No discharge.      Conjunctiva/sclera: Conjunctivae normal.      Right eye: Right conjunctiva is not injected. No hemorrhage.     Left eye: Left conjunctiva is not injected. No hemorrhage.     Pupils: Pupils are equal, round, and  reactive to light.   Neck:      Thyroid: No thyromegaly.      Vascular: No JVD.      Trachea: No tracheal deviation.   Cardiovascular:      Rate and Rhythm: Normal rate.   Pulmonary:      Effort: Pulmonary effort is normal. No respiratory distress.      Breath sounds: No stridor.   Chest:      Chest wall: No tenderness.   Abdominal:      General: Bowel sounds are normal. There is no distension.      Palpations: Abdomen is soft. There is no hepatomegaly, splenomegaly or mass.      Tenderness: There is no abdominal tenderness. There is no rebound.          Comments: Palpation reveals decreased size of mass over anterior abdominal wall   Musculoskeletal:         General: No tenderness. Normal range of motion.      Cervical back: Normal range of motion and neck supple.   Lymphadenopathy:      Cervical: No cervical adenopathy.      Upper Body:      Right upper body: No supraclavicular adenopathy.      Left upper body: No supraclavicular adenopathy.   Skin:     General: Skin is dry.      Findings: No erythema or rash.   Neurological:      Mental Status: She is alert and oriented to person, place, and time.      Cranial Nerves: No cranial nerve deficit.      Coordination: Coordination normal.   Psychiatric:         Behavior: Behavior normal.         Thought Content: Thought content normal.         Judgment: Judgment normal.             Assessment:      1. 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    4. Monoallelic mutation of MUTYH gene           Med Onc Chart Routing      Follow up with physician . Return to see me or APAP in 2 weeks CBC CMP CA 19 9 would like to have on 2 different days with D couple visits explain to patient   Follow up with [facility]    Infusion scheduling note    Injection scheduling note    Labs    Imaging    Pharmacy appointment    Other referrals                   Plan:     Clinical response appears to be with decrease abdominal wall  swelling.  Would like to have D couple visits CBC CMP CA 19 9.  Written reviewed.  Communicate results through portal.  Will reimage at the end of 4 cycles of therapy potential surgical resection if responsive disease in read presentation at conference        Enzo Daley Jr, MD FACP

## 2024-06-03 NOTE — PLAN OF CARE
Patient tolerated Folfox well today; no adverse reaction noted.  POC reviewed with pt.  NAD noted upon discharge.   Has f/u appt(s) scheduled per MD request.    Problem: Adult Inpatient Plan of Care  Goal: Plan of Care Review  Outcome: Progressing  Flowsheets (Taken 6/3/2024 1037)  Plan of Care Reviewed With: patient  Goal: Patient-Specific Goal (Individualized)  Outcome: Progressing  Flowsheets (Taken 6/3/2024 1037)  Individualized Care Needs: legs elevated, blanket, pillow, lights dimmed. refreshments provided.  Anxieties, Fears or Concerns: pt c/o fatigue  Goal: Optimal Comfort and Wellbeing  Outcome: Progressing  Intervention: Provide Person-Centered Care  Flowsheets (Taken 6/3/2024 1037)  Trust Relationship/Rapport:   care explained   questions encouraged   choices provided   reassurance provided   emotional support provided   thoughts/feelings acknowledged   empathic listening provided   questions answered

## 2024-06-04 LAB — CANCER AG19-9 SERPL-ACNC: 2446 U/ML (ref 0–40)

## 2024-06-05 ENCOUNTER — INFUSION (OUTPATIENT)
Dept: INFUSION THERAPY | Facility: HOSPITAL | Age: 59
End: 2024-06-05
Attending: INTERNAL MEDICINE
Payer: COMMERCIAL

## 2024-06-05 VITALS
OXYGEN SATURATION: 98 % | BODY MASS INDEX: 28.26 KG/M2 | SYSTOLIC BLOOD PRESSURE: 135 MMHG | WEIGHT: 208.31 LBS | HEART RATE: 67 BPM | TEMPERATURE: 97 F | RESPIRATION RATE: 16 BRPM | DIASTOLIC BLOOD PRESSURE: 78 MMHG

## 2024-06-05 DIAGNOSIS — C17.0 DUODENAL CANCER: Primary | ICD-10-CM

## 2024-06-05 PROCEDURE — 96377 APPLICATON ON-BODY INJECTOR: CPT

## 2024-06-05 PROCEDURE — 63600175 PHARM REV CODE 636 W HCPCS: Performed by: INTERNAL MEDICINE

## 2024-06-05 RX ORDER — HEPARIN 100 UNIT/ML
500 SYRINGE INTRAVENOUS
Status: DISCONTINUED | OUTPATIENT
Start: 2024-06-05 | End: 2024-06-05 | Stop reason: HOSPADM

## 2024-06-05 RX ORDER — SODIUM CHLORIDE 0.9 % (FLUSH) 0.9 %
10 SYRINGE (ML) INJECTION
Status: DISCONTINUED | OUTPATIENT
Start: 2024-06-05 | End: 2024-06-05 | Stop reason: HOSPADM

## 2024-06-05 RX ADMIN — HEPARIN 500 UNITS: 100 SYRINGE at 11:06

## 2024-06-05 RX ADMIN — PEGFILGRASTIM 6 MG: KIT SUBCUTANEOUS at 12:06

## 2024-06-05 NOTE — NURSING
Pt here for 5FU continuous pump d/c. Pump stopped and disconnected.  Existing dressing appeared clean and intact.  _R___ chestwall mediport  Flushed with 10ml NS and 5 ml heparin solution.  Needle D/C, site without redness, swelling, or drainage noted.  Dressing applied.  Patient tolerated well.  Patient to return to clinic in 2 weeks.

## 2024-06-05 NOTE — DISCHARGE INSTRUCTIONS
HOME CARE AFTER CHEMOTHERAPY   Meals   Many patients feel sick and lose their appetites during treatment. Eat small meals several times a day. Choose bland foods with little taste or smell if you have problems with nausea. Be sure to cook all food thoroughly. This kills bacteria and helps you avoid intestinal infection. Soft foods are easier to swallow and digest.   Activity   Exercise keeps you strong and keeps your heart and lungs active. Talk to your doctor about an appropriate exercise program for you.   Skin Care   To prevent a skin infection, bathe or shower once a day. Use a moisturizing soap and wash with warm water. Avoid very hot or cold water. Chemotherapy can make your skin dry . Apply moisturizing lotion to help relieve dry skin. Some drugs used in high doses can cause slight burns to appear (like sunburn). Ask for a special cream to help relieve the burn and protect your skin.   Prevent Mouth Sores   During chemotherapy, many people get mouth sores. Do the following to help prevent mouth sores or to ease discomfort.   Brush your teeth with a soft-bristle toothbrush after every meal.  Don't use dental floss if your platelet count is below 50,000. Your doctor or nurse will tell you if this is the case.  Use an oral swab or special soft toothbrush if your gums bleed during regular brushing.  Use mouthwash as directed. If you can't tolerate commercial mouthwash, use salt and baking soda to clean your mouth. Mix 1 teaspoon of salt and 1 teaspoon of baking soda into a glass of water. Swish and spit.  Call your doctor or return to this facility if you develop any of the following:   Sore throat   White patches in the mouth or throat   Fever of 100.4ºF (38ºC) or higher, or as directed by your healthcare provider  © 0053-7356 [employer], [address] [place] [address], [place] [address]. All rights reserved. This information is not intended as a substitute for professional medical care. Always follow your  healthcare professional's

## 2024-06-06 ENCOUNTER — PATIENT MESSAGE (OUTPATIENT)
Dept: HEMATOLOGY/ONCOLOGY | Facility: CLINIC | Age: 59
End: 2024-06-06
Payer: COMMERCIAL

## 2024-06-13 ENCOUNTER — LAB VISIT (OUTPATIENT)
Dept: LAB | Facility: HOSPITAL | Age: 59
End: 2024-06-13
Attending: INTERNAL MEDICINE
Payer: COMMERCIAL

## 2024-06-13 DIAGNOSIS — C17.0 DUODENAL CANCER: ICD-10-CM

## 2024-06-13 LAB
ALBUMIN SERPL BCP-MCNC: 3.9 G/DL (ref 3.5–5.2)
ALP SERPL-CCNC: 140 U/L (ref 55–135)
ALT SERPL W/O P-5'-P-CCNC: 38 U/L (ref 10–44)
ANION GAP SERPL CALC-SCNC: 7 MMOL/L (ref 8–16)
AST SERPL-CCNC: 40 U/L (ref 10–40)
BASOPHILS # BLD AUTO: 0.05 K/UL (ref 0–0.2)
BASOPHILS NFR BLD: 0.6 % (ref 0–1.9)
BILIRUB SERPL-MCNC: 0.3 MG/DL (ref 0.1–1)
BUN SERPL-MCNC: 13 MG/DL (ref 6–20)
CALCIUM SERPL-MCNC: 9.1 MG/DL (ref 8.7–10.5)
CANCER AG19-9 SERPL-ACNC: 443.3 U/ML (ref 0–40)
CHLORIDE SERPL-SCNC: 108 MMOL/L (ref 95–110)
CO2 SERPL-SCNC: 25 MMOL/L (ref 23–29)
CREAT SERPL-MCNC: 0.9 MG/DL (ref 0.5–1.4)
DIFFERENTIAL METHOD BLD: ABNORMAL
EOSINOPHIL # BLD AUTO: 0.1 K/UL (ref 0–0.5)
EOSINOPHIL NFR BLD: 0.7 % (ref 0–8)
ERYTHROCYTE [DISTWIDTH] IN BLOOD BY AUTOMATED COUNT: 14.1 % (ref 11.5–14.5)
EST. GFR  (NO RACE VARIABLE): >60 ML/MIN/1.73 M^2
GLUCOSE SERPL-MCNC: 165 MG/DL (ref 70–110)
HCT VFR BLD AUTO: 34.6 % (ref 37–48.5)
HGB BLD-MCNC: 11 G/DL (ref 12–16)
IMM GRANULOCYTES # BLD AUTO: 0.1 K/UL (ref 0–0.04)
IMM GRANULOCYTES NFR BLD AUTO: 1.2 % (ref 0–0.5)
LYMPHOCYTES # BLD AUTO: 1.6 K/UL (ref 1–4.8)
LYMPHOCYTES NFR BLD: 19.5 % (ref 18–48)
MCH RBC QN AUTO: 28.7 PG (ref 27–31)
MCHC RBC AUTO-ENTMCNC: 31.8 G/DL (ref 32–36)
MCV RBC AUTO: 90 FL (ref 82–98)
MONOCYTES # BLD AUTO: 1 K/UL (ref 0.3–1)
MONOCYTES NFR BLD: 11.4 % (ref 4–15)
NEUTROPHILS # BLD AUTO: 5.5 K/UL (ref 1.8–7.7)
NEUTROPHILS NFR BLD: 66.6 % (ref 38–73)
NRBC BLD-RTO: 0 /100 WBC
PLATELET # BLD AUTO: 110 K/UL (ref 150–450)
PMV BLD AUTO: 10.4 FL (ref 9.2–12.9)
POTASSIUM SERPL-SCNC: 4.6 MMOL/L (ref 3.5–5.1)
PROT SERPL-MCNC: 6.5 G/DL (ref 6–8.4)
RBC # BLD AUTO: 3.83 M/UL (ref 4–5.4)
SODIUM SERPL-SCNC: 140 MMOL/L (ref 136–145)
WBC # BLD AUTO: 8.3 K/UL (ref 3.9–12.7)

## 2024-06-13 PROCEDURE — 85025 COMPLETE CBC W/AUTO DIFF WBC: CPT | Performed by: INTERNAL MEDICINE

## 2024-06-13 PROCEDURE — 36415 COLL VENOUS BLD VENIPUNCTURE: CPT | Mod: PO | Performed by: INTERNAL MEDICINE

## 2024-06-13 PROCEDURE — 80053 COMPREHEN METABOLIC PANEL: CPT | Performed by: INTERNAL MEDICINE

## 2024-06-13 PROCEDURE — 86301 IMMUNOASSAY TUMOR CA 19-9: CPT | Performed by: INTERNAL MEDICINE

## 2024-06-14 ENCOUNTER — OFFICE VISIT (OUTPATIENT)
Dept: HEMATOLOGY/ONCOLOGY | Facility: CLINIC | Age: 59
End: 2024-06-14
Payer: COMMERCIAL

## 2024-06-14 DIAGNOSIS — D50.9 IRON DEFICIENCY ANEMIA, UNSPECIFIED IRON DEFICIENCY ANEMIA TYPE: ICD-10-CM

## 2024-06-14 DIAGNOSIS — Z79.899 IMMUNODEFICIENCY DUE TO CHEMOTHERAPY: ICD-10-CM

## 2024-06-14 DIAGNOSIS — Z15.89 MONOALLELIC MUTATION OF MUTYH GENE: ICD-10-CM

## 2024-06-14 DIAGNOSIS — Z86.018 HISTORY OF DYSPLASTIC NEVUS: ICD-10-CM

## 2024-06-14 DIAGNOSIS — C79.89 SECONDARY MALIGNANCY OF SOFT TISSUES OF ABDOMEN: ICD-10-CM

## 2024-06-14 DIAGNOSIS — D84.821 IMMUNODEFICIENCY DUE TO CHEMOTHERAPY: ICD-10-CM

## 2024-06-14 DIAGNOSIS — T45.1X5A IMMUNODEFICIENCY DUE TO CHEMOTHERAPY: ICD-10-CM

## 2024-06-14 DIAGNOSIS — Z90.411 HISTORY OF PARTIAL PANCREATECTOMY: ICD-10-CM

## 2024-06-14 DIAGNOSIS — C17.0 DUODENAL CANCER: Primary | ICD-10-CM

## 2024-06-14 PROCEDURE — 1159F MED LIST DOCD IN RCRD: CPT | Mod: CPTII,95,, | Performed by: INTERNAL MEDICINE

## 2024-06-14 PROCEDURE — 99214 OFFICE O/P EST MOD 30 MIN: CPT | Mod: 25,95,, | Performed by: INTERNAL MEDICINE

## 2024-06-14 PROCEDURE — 1160F RVW MEDS BY RX/DR IN RCRD: CPT | Mod: CPTII,95,, | Performed by: INTERNAL MEDICINE

## 2024-06-14 PROCEDURE — 4010F ACE/ARB THERAPY RXD/TAKEN: CPT | Mod: CPTII,95,, | Performed by: INTERNAL MEDICINE

## 2024-06-14 RX ORDER — PROCHLORPERAZINE EDISYLATE 5 MG/ML
10 INJECTION INTRAMUSCULAR; INTRAVENOUS ONCE AS NEEDED
Status: CANCELLED | OUTPATIENT
Start: 2024-06-19

## 2024-06-14 RX ORDER — SODIUM CHLORIDE 0.9 % (FLUSH) 0.9 %
10 SYRINGE (ML) INJECTION
Status: CANCELLED | OUTPATIENT
Start: 2024-06-19

## 2024-06-14 RX ORDER — HEPARIN 100 UNIT/ML
500 SYRINGE INTRAVENOUS
Status: CANCELLED | OUTPATIENT
Start: 2024-06-19

## 2024-06-14 RX ORDER — SODIUM CHLORIDE 0.9 % (FLUSH) 0.9 %
10 SYRINGE (ML) INJECTION
Status: CANCELLED | OUTPATIENT
Start: 2024-06-17

## 2024-06-14 RX ORDER — HEPARIN 100 UNIT/ML
500 SYRINGE INTRAVENOUS
Status: CANCELLED | OUTPATIENT
Start: 2024-06-17

## 2024-06-14 RX ORDER — DIPHENHYDRAMINE HYDROCHLORIDE 50 MG/ML
50 INJECTION INTRAMUSCULAR; INTRAVENOUS ONCE AS NEEDED
Status: CANCELLED | OUTPATIENT
Start: 2024-06-17

## 2024-06-14 RX ORDER — FLUOROURACIL 50 MG/ML
400 INJECTION, SOLUTION INTRAVENOUS
Status: CANCELLED | OUTPATIENT
Start: 2024-06-17

## 2024-06-14 RX ORDER — PROCHLORPERAZINE EDISYLATE 5 MG/ML
10 INJECTION INTRAMUSCULAR; INTRAVENOUS ONCE AS NEEDED
Status: CANCELLED | OUTPATIENT
Start: 2024-06-17

## 2024-06-14 RX ORDER — EPINEPHRINE 0.3 MG/.3ML
0.3 INJECTION SUBCUTANEOUS ONCE AS NEEDED
Status: CANCELLED | OUTPATIENT
Start: 2024-06-17

## 2024-06-14 NOTE — PROGRESS NOTES
Subjective:       Patient ID: Karen Gutierrez is a 59 y.o. female.    Chief Complaint: Results, Chemotherapy, and Cancer    HPI:  59-year-old female presents for cycle 3 day 1 FOLFOX for relapsed duodenal carcinoma with intra-abdominal wall mass.  Patient had dramatic fall in CA 19 9 ECOG status 1 seen in virtual visit planned treatment on Monday 06/17/2024    Past Medical History:   Diagnosis Date    Cancer     duodenal cancer    Hypertension     Hypotension, iatrogenic     Mitral valve prolapse      Family History   Problem Relation Name Age of Onset    Ovarian cancer Mother      Atrial fibrillation Mother      Stroke Mother      Hyperlipidemia Mother      Diabetes Mother      Bladder Cancer Father      Hypertension Father      Diabetes Father      No Known Problems Sister      Diabetes Maternal Grandmother      Colon cancer Maternal Grandmother      Stroke Maternal Grandfather      Diabetes Paternal Grandmother      No Known Problems Paternal Grandfather       Social History     Socioeconomic History    Marital status:    Tobacco Use    Smoking status: Former    Smokeless tobacco: Never   Substance and Sexual Activity    Alcohol use: Not Currently    Drug use: Never     Social Determinants of Health     Financial Resource Strain: Low Risk  (6/14/2024)    Overall Financial Resource Strain (CARDIA)     Difficulty of Paying Living Expenses: Not hard at all   Food Insecurity: No Food Insecurity (6/14/2024)    Hunger Vital Sign     Worried About Running Out of Food in the Last Year: Never true     Ran Out of Food in the Last Year: Never true   Physical Activity: Inactive (6/14/2024)    Exercise Vital Sign     Days of Exercise per Week: 0 days     Minutes of Exercise per Session: 0 min   Stress: No Stress Concern Present (6/14/2024)    [employer] - Occupational Stress Questionnaire     Feeling of Stress : Not at all   Housing Stability: Unknown (6/14/2024)    Housing  "Stability Vital Sign     Unable to Pay for Housing in the Last Year: No     Past Surgical History:   Procedure Laterality Date    BUNIONECTOMY Left     HYSTERECTOMY  2000    INSERTION OF TUNNELED CENTRAL VENOUS CATHETER (CVC) WITH SUBCUTANEOUS PORT N/A 3/26/2021    Procedure: [id number];  Surgeon: Lauren Abraham MD;  Location: [facility] OR;  Service: General;  Laterality: N/A;    INSERTION OF VENOUS ACCESS PORT Right 5/13/2024    Procedure: INSERTION, VENOUS ACCESS PORT;  Surgeon: Alicia Hernandez MD;  Location: [facility] OR;  Service: General;  Laterality: Right;    LITHOTRIPSY      PLACEMENT OF JEJUNOSTOMY TUBE  2/18/2021    Procedure: INSERTION, JEJUNOSTOMY TUBE;  Surgeon: Hitesh Díaz MD;  Location: [facility] OR 2ND FLR;  Service: General;;    ULTRASOUND GUIDANCE Right 5/13/2024    Procedure: ULTRASOUND GUIDANCE;  Surgeon: Alicia Hernandez MD;  Location: [facility] OR;  Service: General;  Laterality: Right;    WHIPPLE PROCEDURE N/A 2/18/2021    Procedure: WHIPPLE PROCEDURE;  Surgeon: Hitesh Díaz MD;  Location: [facility] OR 2ND FLR;  Service: General;  Laterality: N/A;       Labs:  Lab Results   Component Value Date    WBC 8.30 06/13/2024    HGB 11.0 (L) 06/13/2024    HCT 34.6 (L) 06/13/2024    MCV 90 06/13/2024     (L) 06/13/2024     BMP  Lab Results   Component Value Date     06/13/2024    K 4.6 06/13/2024     06/13/2024    CO2 25 06/13/2024    BUN 13 06/13/2024    CREATININE 0.9 06/13/2024    CALCIUM 9.1 06/13/2024    ANIONGAP 7 (L) 06/13/2024    ESTGFRAFRICA >60 03/07/2022    EGFRNONAA >60 03/07/2022     Lab Results   Component Value Date    ALT 38 06/13/2024    AST 40 06/13/2024    ALKPHOS 140 (H) 06/13/2024    BILITOT 0.3 06/13/2024       Lab Results   Component Value Date    IRON 42 05/10/2021    TIBC 404 05/10/2021    FERRITIN 345 (H) 05/10/2021     No results found for: "PLJVOXWI29"  No results found for: "FOLATE"  No results found for: "TSH"      Review of Systems   Constitutional:  " Negative for activity change, appetite change, chills, diaphoresis, fatigue, fever and unexpected weight change.   HENT:  Negative for congestion, dental problem, drooling, ear discharge, ear pain, facial swelling, hearing loss, mouth sores, nosebleeds, postnasal drip, rhinorrhea, sinus pressure, sneezing, sore throat, tinnitus, trouble swallowing and voice change.    Eyes:  Negative for photophobia, pain, discharge, redness, itching and visual disturbance.   Respiratory:  Negative for cough, choking, chest tightness, shortness of breath, wheezing and stridor.    Cardiovascular:  Negative for chest pain, palpitations and leg swelling.   Gastrointestinal:  Negative for abdominal distention, abdominal pain, anal bleeding, blood in stool, constipation, diarrhea, nausea, rectal pain and vomiting.   Endocrine: Negative for cold intolerance, heat intolerance, polydipsia, polyphagia and polyuria.   Genitourinary:  Negative for decreased urine volume, difficulty urinating, dyspareunia, dysuria, enuresis, flank pain, frequency, genital sores, hematuria, menstrual problem, pelvic pain, urgency, vaginal bleeding, vaginal discharge and vaginal pain.   Musculoskeletal:  Negative for arthralgias, back pain, gait problem, joint swelling, myalgias, neck pain and neck stiffness.   Skin:  Negative for color change, pallor and rash.   Allergic/Immunologic: Negative for environmental allergies, food allergies and immunocompromised state.   Neurological:  Negative for dizziness, tremors, seizures, syncope, facial asymmetry, speech difficulty, weakness, light-headedness, numbness and headaches.   Hematological:  Negative for adenopathy. Does not bruise/bleed easily.   Psychiatric/Behavioral:  Negative for agitation, behavioral problems, confusion, decreased concentration, dysphoric mood, hallucinations, self-injury, sleep disturbance and suicidal ideas. The patient is not nervous/anxious and is not hyperactive.        Objective:       Physical Exam  Constitutional:       Appearance: Normal appearance.             Assessment:      1. 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 History of dysplastic nevus    4. Iron deficiency anemia, unspecified iron deficiency anemia type    5. Secondary malignancy of soft tissues of abdomen    6. s/p partial pancreatectomy    7. Monoallelic mutation of MUTYH gene           Med Onc Chart Routing      Follow up with physician . Return to clinic in 2 weeks CBC CMP CEA 19 9 prior   Follow up with [facility]    Infusion scheduling note    Injection scheduling note FOLFOX q.2 weeks   Labs    Imaging    Pharmacy appointment    Other referrals                   Plan:   The patient location is:  Home  The chief complaint leading to consultation is:  Cancer    Visit type: audiovisual    Face to Face time with patient: 25 minutes of total time spent on the encounter, which includes face to face time and non-face to face time preparing to see the patient (eg, review of tests), Obtaining and/or reviewing separately obtained history, Documenting clinical information in the electronic or other health record, Independently interpreting results (not separately reported) and communicating results to the patient/family/caregiver, or Care coordination (not separately reported).         Each patient to whom he or she provides medical services by telemedicine is:  (1) informed of the relationship between the physician and patient and the respective role of any other health care provider with respect to management of the patient; and (2) notified that he or she may decline to receive medical services by telemedicine and may withdraw from such care at any time.    Notes:   Planned treatment on 06/17/2024 with FOLFOX cycle 3.  Complete 4 cycles reimage read present back for surgery for consideration of surgical resection of local recurrence.  Discussed implications answered questions        Enzo Daley Jr, MD FACP

## 2024-06-17 ENCOUNTER — INFUSION (OUTPATIENT)
Dept: INFUSION THERAPY | Facility: HOSPITAL | Age: 59
End: 2024-06-17
Attending: INTERNAL MEDICINE
Payer: COMMERCIAL

## 2024-06-17 VITALS
HEIGHT: 72 IN | HEART RATE: 68 BPM | OXYGEN SATURATION: 97 % | TEMPERATURE: 98 F | DIASTOLIC BLOOD PRESSURE: 76 MMHG | WEIGHT: 210.56 LBS | RESPIRATION RATE: 16 BRPM | BODY MASS INDEX: 28.52 KG/M2 | SYSTOLIC BLOOD PRESSURE: 128 MMHG

## 2024-06-17 DIAGNOSIS — C17.0 DUODENAL CANCER: Primary | ICD-10-CM

## 2024-06-17 PROCEDURE — 96416 CHEMO PROLONG INFUSE W/PUMP: CPT

## 2024-06-17 PROCEDURE — 63600175 PHARM REV CODE 636 W HCPCS: Performed by: INTERNAL MEDICINE

## 2024-06-17 PROCEDURE — 96415 CHEMO IV INFUSION ADDL HR: CPT

## 2024-06-17 PROCEDURE — 25000003 PHARM REV CODE 250: Performed by: INTERNAL MEDICINE

## 2024-06-17 PROCEDURE — 96367 TX/PROPH/DG ADDL SEQ IV INF: CPT

## 2024-06-17 PROCEDURE — 96368 THER/DIAG CONCURRENT INF: CPT

## 2024-06-17 PROCEDURE — 96411 CHEMO IV PUSH ADDL DRUG: CPT

## 2024-06-17 PROCEDURE — 96413 CHEMO IV INFUSION 1 HR: CPT

## 2024-06-17 RX ORDER — FLUOROURACIL 50 MG/ML
400 INJECTION, SOLUTION INTRAVENOUS
Status: COMPLETED | OUTPATIENT
Start: 2024-06-17 | End: 2024-06-17

## 2024-06-17 RX ADMIN — DEXTROSE MONOHYDRATE: 50 INJECTION, SOLUTION INTRAVENOUS at 09:06

## 2024-06-17 RX ADMIN — DEXAMETHASONE SODIUM PHOSPHATE 0.25 MG: 4 INJECTION, SOLUTION INTRA-ARTICULAR; INTRALESIONAL; INTRAMUSCULAR; INTRAVENOUS; SOFT TISSUE at 08:06

## 2024-06-17 RX ADMIN — OXALIPLATIN 188 MG: 5 INJECTION, SOLUTION INTRAVENOUS at 09:06

## 2024-06-17 RX ADMIN — FLUOROURACIL 5305 MG: 50 INJECTION, SOLUTION INTRAVENOUS at 11:06

## 2024-06-17 RX ADMIN — SODIUM CHLORIDE: 9 INJECTION, SOLUTION INTRAVENOUS at 08:06

## 2024-06-17 RX ADMIN — FLUOROURACIL 885 MG: 50 INJECTION, SOLUTION INTRAVENOUS at 11:06

## 2024-06-17 RX ADMIN — LEUCOVORIN CALCIUM 885 MG: 350 INJECTION, POWDER, LYOPHILIZED, FOR SOLUTION INTRAMUSCULAR; INTRAVENOUS at 09:06

## 2024-06-17 NOTE — DISCHARGE INSTRUCTIONS
Thank you for allowing me to care for you today,  DILLON ContiN, RN    [facility]  [address] [address]  [phone] phone     [phone] fax  Hours of Operation: Monday- Friday 7:00am- 5:30pm  After hours phone  [phone]  Hematology / Oncology Physicians on call      BRAULIO Orlando Dr., Dr., Dr., Dr., Dr., Dr., Dr., Dr., Dr., Dr., Dr., Dr., Dr., Dr., Dr., KALINA Shi, KALINA Hill, KALINA Quigley, NP  Please call with any concerns regarding your appointment today.   FALL PREVENTION   Falls often occur due to slipping, tripping or losing your balance. Here are ways to reduce your risk of falling again.   Was there anything that caused your fall that can be fixed, removed or replaced?   Make your home safe by keeping walkways clear of objects you may trip over.   Use non-slip pads under rugs.   Do not walk in poorly lit areas.   Do not stand on chairs or wobbly ladders.   Use caution when reaching overhead or looking upward. This position can cause a loss of balance.   Be sure your shoes fit properly, have non-slip bottoms and are in good condition.   Be cautious when going up and down stairs, curbs, and when walking on uneven sidewalks.   If your balance is poor, consider using a cane or walker.   If your fall was related to alcohol use, stop or limit alcohol intake.   If your fall was related to use of sleeping medicines, talk to your doctor about this. You may need to reduce your dosage at bedtime if you awaken during the night to go to the bathroom.   To reduce the need for nighttime bathroom trips:   Avoid drinking fluids for several hours before going to bed   Empty your bladder before going to bed   Men can keep a urinal at the bedside   © 2548-9021 Chris Gonzalez, [address] [place] [address], [place] [address]. All rights reserved. This information is not intended as a  substitute for professional medical care. Always follow your healthcare professional's instructions.

## 2024-06-17 NOTE — PLAN OF CARE
"Pt is stable, pt voiced she was doing "well," this morning. Pt administered Folfox. Pt will follow up in two weeks. Plan of care reviewed with patient. Discussed if there are any new or ongoing concerns.        Problem: Adult Inpatient Plan of Care  Goal: Plan of Care Review  Outcome: Progressing  Goal: Patient-Specific Goal (Individualized)  6/17/2024 0858 by Frieda Sy, RN  Outcome: Progressing  6/17/2024 0857 by Frieda Sy, RN  Flowsheets (Taken 6/17/2024 0857)  Individualized Care Needs: Pt proivded pillow, legs elevated in reclined position. Refreshments provided.  Anxieties, Fears or Concerns: pt c/o fatigue  Goal: Optimal Comfort and Wellbeing  Outcome: Progressing     Problem: Chemotherapy Effects  Goal: Anemia Symptom Improvement  Outcome: Progressing  Goal: Safety Maintained  Outcome: Progressing  Goal: Absence of Hematuria  Outcome: Progressing  Goal: Nausea and Vomiting Relief  Outcome: Progressing  Goal: Neurotoxicity Symptom Control  Outcome: Progressing  Goal: Absence of Infection  Outcome: Progressing  Goal: Absence of Bleeding  Outcome: Progressing     Problem: Fall Injury Risk  Goal: Absence of Fall and Fall-Related Injury  Outcome: Progressing     "

## 2024-06-19 ENCOUNTER — INFUSION (OUTPATIENT)
Dept: INFUSION THERAPY | Facility: HOSPITAL | Age: 59
End: 2024-06-19
Attending: INTERNAL MEDICINE
Payer: COMMERCIAL

## 2024-06-19 ENCOUNTER — PATIENT MESSAGE (OUTPATIENT)
Dept: HEMATOLOGY/ONCOLOGY | Facility: CLINIC | Age: 59
End: 2024-06-19
Payer: COMMERCIAL

## 2024-06-19 DIAGNOSIS — C17.0 DUODENAL CANCER: Primary | ICD-10-CM

## 2024-06-19 PROCEDURE — 63600175 PHARM REV CODE 636 W HCPCS: Performed by: INTERNAL MEDICINE

## 2024-06-19 PROCEDURE — 96377 APPLICATON ON-BODY INJECTOR: CPT

## 2024-06-19 PROCEDURE — A4216 STERILE WATER/SALINE, 10 ML: HCPCS | Performed by: INTERNAL MEDICINE

## 2024-06-19 PROCEDURE — 25000003 PHARM REV CODE 250: Performed by: INTERNAL MEDICINE

## 2024-06-19 RX ORDER — HEPARIN 100 UNIT/ML
500 SYRINGE INTRAVENOUS
Status: DISCONTINUED | OUTPATIENT
Start: 2024-06-19 | End: 2024-06-19 | Stop reason: HOSPADM

## 2024-06-19 RX ORDER — SODIUM CHLORIDE 0.9 % (FLUSH) 0.9 %
10 SYRINGE (ML) INJECTION
Status: DISCONTINUED | OUTPATIENT
Start: 2024-06-19 | End: 2024-06-19 | Stop reason: HOSPADM

## 2024-06-19 RX ADMIN — HEPARIN 500 UNITS: 100 SYRINGE at 09:06

## 2024-06-19 RX ADMIN — Medication 10 ML: at 09:06

## 2024-06-19 RX ADMIN — PEGFILGRASTIM 6 MG: KIT SUBCUTANEOUS at 10:06

## 2024-06-19 NOTE — DISCHARGE INSTRUCTIONS
Thank you for allowing me to care for you today,  DILLON ContiN, RN    [facility]  [address] [address]  [phone] phone     [phone] fax  Hours of Operation: Monday- Friday 7:00am- 5:30pm  After hours phone  [phone]  Hematology / Oncology Physicians on call      BRAULIO Orlando Dr., Dr., Dr., Dr., Dr., Dr., Dr., Dr., Dr., Dr., Dr., Dr., Dr., Dr., Dr., KALINA Shi, KALINA Hill, KALINA Quigley, NP  Please call with any concerns regarding your appointment today.   FALL PREVENTION   Falls often occur due to slipping, tripping or losing your balance. Here are ways to reduce your risk of falling again.   Was there anything that caused your fall that can be fixed, removed or replaced?   Make your home safe by keeping walkways clear of objects you may trip over.   Use non-slip pads under rugs.   Do not walk in poorly lit areas.   Do not stand on chairs or wobbly ladders.   Use caution when reaching overhead or looking upward. This position can cause a loss of balance.   Be sure your shoes fit properly, have non-slip bottoms and are in good condition.   Be cautious when going up and down stairs, curbs, and when walking on uneven sidewalks.   If your balance is poor, consider using a cane or walker.   If your fall was related to alcohol use, stop or limit alcohol intake.   If your fall was related to use of sleeping medicines, talk to your doctor about this. You may need to reduce your dosage at bedtime if you awaken during the night to go to the bathroom.   To reduce the need for nighttime bathroom trips:   Avoid drinking fluids for several hours before going to bed   Empty your bladder before going to bed   Men can keep a urinal at the bedside   © 0762-1110 Chris Gonzalez, [address] [place] [address], [place] [address]. All rights reserved. This information is not intended as a  substitute for professional medical care. Always follow your healthcare professional's instructions.

## 2024-06-19 NOTE — NURSING
0957: Pt here for 5FU continuous pump d/c. Pump stopped and disconnected.  Existing dressing appeared clean and intact.  Right chest wall mediport  Flushed with 10ml NS and 5 ml heparin solution.  Needle D/C, site without redness, swelling, or drainage noted.  Dressing applied.  Patient tolerated well.  Patient to return to clinic in 2 weeks.      1006: OBI placed to the left upper arm under aseptic technique.

## 2024-06-28 ENCOUNTER — LAB VISIT (OUTPATIENT)
Dept: LAB | Facility: HOSPITAL | Age: 59
End: 2024-06-28
Attending: INTERNAL MEDICINE
Payer: COMMERCIAL

## 2024-06-28 DIAGNOSIS — C17.0 DUODENAL CANCER: ICD-10-CM

## 2024-06-28 LAB
ALBUMIN SERPL BCP-MCNC: 3.9 G/DL (ref 3.5–5.2)
ALP SERPL-CCNC: 153 U/L (ref 55–135)
ALT SERPL W/O P-5'-P-CCNC: 31 U/L (ref 10–44)
ANION GAP SERPL CALC-SCNC: 9 MMOL/L (ref 8–16)
AST SERPL-CCNC: 31 U/L (ref 10–40)
BASOPHILS # BLD AUTO: 0.02 K/UL (ref 0–0.2)
BASOPHILS NFR BLD: 0.4 % (ref 0–1.9)
BILIRUB SERPL-MCNC: 0.4 MG/DL (ref 0.1–1)
BUN SERPL-MCNC: 17 MG/DL (ref 6–20)
CALCIUM SERPL-MCNC: 9.5 MG/DL (ref 8.7–10.5)
CANCER AG19-9 SERPL-ACNC: 110.2 U/ML (ref 0–40)
CEA SERPL-MCNC: 2.5 NG/ML (ref 0–5)
CHLORIDE SERPL-SCNC: 105 MMOL/L (ref 95–110)
CO2 SERPL-SCNC: 26 MMOL/L (ref 23–29)
CREAT SERPL-MCNC: 1 MG/DL (ref 0.5–1.4)
DIFFERENTIAL METHOD BLD: ABNORMAL
EOSINOPHIL # BLD AUTO: 0 K/UL (ref 0–0.5)
EOSINOPHIL NFR BLD: 0.4 % (ref 0–8)
ERYTHROCYTE [DISTWIDTH] IN BLOOD BY AUTOMATED COUNT: 15.6 % (ref 11.5–14.5)
EST. GFR  (NO RACE VARIABLE): >60 ML/MIN/1.73 M^2
GLUCOSE SERPL-MCNC: 198 MG/DL (ref 70–110)
HCT VFR BLD AUTO: 36.4 % (ref 37–48.5)
HGB BLD-MCNC: 11.6 G/DL (ref 12–16)
IMM GRANULOCYTES # BLD AUTO: 0.03 K/UL (ref 0–0.04)
IMM GRANULOCYTES NFR BLD AUTO: 0.6 % (ref 0–0.5)
LYMPHOCYTES # BLD AUTO: 1.4 K/UL (ref 1–4.8)
LYMPHOCYTES NFR BLD: 29.2 % (ref 18–48)
MCH RBC QN AUTO: 29 PG (ref 27–31)
MCHC RBC AUTO-ENTMCNC: 31.9 G/DL (ref 32–36)
MCV RBC AUTO: 91 FL (ref 82–98)
MONOCYTES # BLD AUTO: 0.6 K/UL (ref 0.3–1)
MONOCYTES NFR BLD: 12.7 % (ref 4–15)
NEUTROPHILS # BLD AUTO: 2.6 K/UL (ref 1.8–7.7)
NEUTROPHILS NFR BLD: 56.7 % (ref 38–73)
NRBC BLD-RTO: 0 /100 WBC
PLATELET # BLD AUTO: 86 K/UL (ref 150–450)
PMV BLD AUTO: 11 FL (ref 9.2–12.9)
POTASSIUM SERPL-SCNC: 4.4 MMOL/L (ref 3.5–5.1)
PROT SERPL-MCNC: 7 G/DL (ref 6–8.4)
RBC # BLD AUTO: 4 M/UL (ref 4–5.4)
SODIUM SERPL-SCNC: 140 MMOL/L (ref 136–145)
WBC # BLD AUTO: 4.63 K/UL (ref 3.9–12.7)

## 2024-06-28 PROCEDURE — 85025 COMPLETE CBC W/AUTO DIFF WBC: CPT | Performed by: INTERNAL MEDICINE

## 2024-06-28 PROCEDURE — 86301 IMMUNOASSAY TUMOR CA 19-9: CPT | Performed by: INTERNAL MEDICINE

## 2024-06-28 PROCEDURE — 36415 COLL VENOUS BLD VENIPUNCTURE: CPT | Mod: PO | Performed by: INTERNAL MEDICINE

## 2024-06-28 PROCEDURE — 82378 CARCINOEMBRYONIC ANTIGEN: CPT | Performed by: INTERNAL MEDICINE

## 2024-06-28 PROCEDURE — 80053 COMPREHEN METABOLIC PANEL: CPT | Performed by: INTERNAL MEDICINE

## 2024-07-01 ENCOUNTER — INFUSION (OUTPATIENT)
Dept: INFUSION THERAPY | Facility: HOSPITAL | Age: 59
End: 2024-07-01
Attending: INTERNAL MEDICINE
Payer: COMMERCIAL

## 2024-07-01 ENCOUNTER — TELEPHONE (OUTPATIENT)
Dept: SURGICAL ONCOLOGY | Facility: CLINIC | Age: 59
End: 2024-07-01
Payer: COMMERCIAL

## 2024-07-01 ENCOUNTER — OFFICE VISIT (OUTPATIENT)
Dept: HEMATOLOGY/ONCOLOGY | Facility: CLINIC | Age: 59
End: 2024-07-01
Payer: COMMERCIAL

## 2024-07-01 VITALS
BODY MASS INDEX: 29.09 KG/M2 | HEIGHT: 72 IN | DIASTOLIC BLOOD PRESSURE: 85 MMHG | OXYGEN SATURATION: 98 % | HEART RATE: 69 BPM | SYSTOLIC BLOOD PRESSURE: 137 MMHG | TEMPERATURE: 98 F | RESPIRATION RATE: 20 BRPM | WEIGHT: 214.75 LBS

## 2024-07-01 VITALS
SYSTOLIC BLOOD PRESSURE: 128 MMHG | HEART RATE: 68 BPM | DIASTOLIC BLOOD PRESSURE: 78 MMHG | OXYGEN SATURATION: 97 % | TEMPERATURE: 97 F | RESPIRATION RATE: 16 BRPM

## 2024-07-01 DIAGNOSIS — C79.89 SECONDARY MALIGNANCY OF SOFT TISSUES OF ABDOMEN: ICD-10-CM

## 2024-07-01 DIAGNOSIS — Z79.899 IMMUNODEFICIENCY DUE TO CHEMOTHERAPY: ICD-10-CM

## 2024-07-01 DIAGNOSIS — C17.0 DUODENAL CANCER: Primary | ICD-10-CM

## 2024-07-01 DIAGNOSIS — D84.821 IMMUNODEFICIENCY DUE TO CHEMOTHERAPY: ICD-10-CM

## 2024-07-01 DIAGNOSIS — Z90.411 HISTORY OF PARTIAL PANCREATECTOMY: ICD-10-CM

## 2024-07-01 DIAGNOSIS — T45.1X5A IMMUNODEFICIENCY DUE TO CHEMOTHERAPY: ICD-10-CM

## 2024-07-01 DIAGNOSIS — Z15.89 MONOALLELIC MUTATION OF MUTYH GENE: ICD-10-CM

## 2024-07-01 PROCEDURE — 1160F RVW MEDS BY RX/DR IN RCRD: CPT | Mod: CPTII,S$GLB,, | Performed by: INTERNAL MEDICINE

## 2024-07-01 PROCEDURE — 96368 THER/DIAG CONCURRENT INF: CPT

## 2024-07-01 PROCEDURE — 96367 TX/PROPH/DG ADDL SEQ IV INF: CPT

## 2024-07-01 PROCEDURE — 96411 CHEMO IV PUSH ADDL DRUG: CPT

## 2024-07-01 PROCEDURE — 96413 CHEMO IV INFUSION 1 HR: CPT

## 2024-07-01 PROCEDURE — 4010F ACE/ARB THERAPY RXD/TAKEN: CPT | Mod: CPTII,S$GLB,, | Performed by: INTERNAL MEDICINE

## 2024-07-01 PROCEDURE — 63600175 PHARM REV CODE 636 W HCPCS: Performed by: INTERNAL MEDICINE

## 2024-07-01 PROCEDURE — 96415 CHEMO IV INFUSION ADDL HR: CPT

## 2024-07-01 PROCEDURE — 1159F MED LIST DOCD IN RCRD: CPT | Mod: CPTII,S$GLB,, | Performed by: INTERNAL MEDICINE

## 2024-07-01 PROCEDURE — 3075F SYST BP GE 130 - 139MM HG: CPT | Mod: CPTII,S$GLB,, | Performed by: INTERNAL MEDICINE

## 2024-07-01 PROCEDURE — 3079F DIAST BP 80-89 MM HG: CPT | Mod: CPTII,S$GLB,, | Performed by: INTERNAL MEDICINE

## 2024-07-01 PROCEDURE — 96416 CHEMO PROLONG INFUSE W/PUMP: CPT

## 2024-07-01 PROCEDURE — 99999 PR PBB SHADOW E&M-EST. PATIENT-LVL V: CPT | Mod: PBBFAC,,, | Performed by: INTERNAL MEDICINE

## 2024-07-01 PROCEDURE — 25000003 PHARM REV CODE 250: Performed by: INTERNAL MEDICINE

## 2024-07-01 PROCEDURE — 3008F BODY MASS INDEX DOCD: CPT | Mod: CPTII,S$GLB,, | Performed by: INTERNAL MEDICINE

## 2024-07-01 PROCEDURE — 99215 OFFICE O/P EST HI 40 MIN: CPT | Mod: 25,S$GLB,, | Performed by: INTERNAL MEDICINE

## 2024-07-01 RX ORDER — PROCHLORPERAZINE EDISYLATE 5 MG/ML
10 INJECTION INTRAMUSCULAR; INTRAVENOUS ONCE AS NEEDED
Status: DISCONTINUED | OUTPATIENT
Start: 2024-07-01 | End: 2024-07-01 | Stop reason: HOSPADM

## 2024-07-01 RX ORDER — FLUOROURACIL 50 MG/ML
400 INJECTION, SOLUTION INTRAVENOUS
Status: COMPLETED | OUTPATIENT
Start: 2024-07-01 | End: 2024-07-01

## 2024-07-01 RX ORDER — DIPHENHYDRAMINE HYDROCHLORIDE 50 MG/ML
50 INJECTION INTRAMUSCULAR; INTRAVENOUS ONCE AS NEEDED
Status: CANCELLED | OUTPATIENT
Start: 2024-07-01

## 2024-07-01 RX ORDER — FLUOROURACIL 50 MG/ML
400 INJECTION, SOLUTION INTRAVENOUS
Status: CANCELLED | OUTPATIENT
Start: 2024-07-01

## 2024-07-01 RX ORDER — SODIUM CHLORIDE 0.9 % (FLUSH) 0.9 %
10 SYRINGE (ML) INJECTION
Status: CANCELLED | OUTPATIENT
Start: 2024-07-03

## 2024-07-01 RX ORDER — SODIUM CHLORIDE 0.9 % (FLUSH) 0.9 %
10 SYRINGE (ML) INJECTION
Status: CANCELLED | OUTPATIENT
Start: 2024-07-01

## 2024-07-01 RX ORDER — EPINEPHRINE 0.3 MG/.3ML
0.3 INJECTION SUBCUTANEOUS ONCE AS NEEDED
Status: CANCELLED | OUTPATIENT
Start: 2024-07-01

## 2024-07-01 RX ORDER — HEPARIN 100 UNIT/ML
500 SYRINGE INTRAVENOUS
Status: CANCELLED | OUTPATIENT
Start: 2024-07-01

## 2024-07-01 RX ORDER — PROCHLORPERAZINE EDISYLATE 5 MG/ML
10 INJECTION INTRAMUSCULAR; INTRAVENOUS ONCE AS NEEDED
Status: CANCELLED | OUTPATIENT
Start: 2024-07-03

## 2024-07-01 RX ORDER — SODIUM CHLORIDE 0.9 % (FLUSH) 0.9 %
10 SYRINGE (ML) INJECTION
Status: DISCONTINUED | OUTPATIENT
Start: 2024-07-01 | End: 2024-07-01 | Stop reason: HOSPADM

## 2024-07-01 RX ORDER — HEPARIN 100 UNIT/ML
500 SYRINGE INTRAVENOUS
Status: CANCELLED | OUTPATIENT
Start: 2024-07-03

## 2024-07-01 RX ORDER — HEPARIN 100 UNIT/ML
500 SYRINGE INTRAVENOUS
Status: DISCONTINUED | OUTPATIENT
Start: 2024-07-01 | End: 2024-07-01 | Stop reason: HOSPADM

## 2024-07-01 RX ORDER — PROCHLORPERAZINE EDISYLATE 5 MG/ML
10 INJECTION INTRAMUSCULAR; INTRAVENOUS ONCE AS NEEDED
Status: CANCELLED | OUTPATIENT
Start: 2024-07-01

## 2024-07-01 RX ADMIN — DEXAMETHASONE SODIUM PHOSPHATE 0.25 MG: 4 INJECTION, SOLUTION INTRA-ARTICULAR; INTRALESIONAL; INTRAMUSCULAR; INTRAVENOUS; SOFT TISSUE at 09:07

## 2024-07-01 RX ADMIN — LEUCOVORIN CALCIUM 890 MG: 350 INJECTION, POWDER, LYOPHILIZED, FOR SUSPENSION INTRAMUSCULAR; INTRAVENOUS at 09:07

## 2024-07-01 RX ADMIN — FLUOROURACIL 890 MG: 50 INJECTION, SOLUTION INTRAVENOUS at 11:07

## 2024-07-01 RX ADMIN — FLUOROURACIL 5330 MG: 50 INJECTION, SOLUTION INTRAVENOUS at 11:07

## 2024-07-01 RX ADMIN — OXALIPLATIN 189 MG: 5 INJECTION, SOLUTION INTRAVENOUS at 09:07

## 2024-07-01 RX ADMIN — DEXTROSE MONOHYDRATE: 50 INJECTION, SOLUTION INTRAVENOUS at 09:07

## 2024-07-01 NOTE — PLAN OF CARE
Problem: Adult Inpatient Plan of Care  Goal: Plan of Care Review  Outcome: Progressing  Flowsheets (Taken 7/1/2024 0951)  Plan of Care Reviewed With: patient  Goal: Patient-Specific Goal (Individualized)  Outcome: Progressing  Flowsheets (Taken 7/1/2024 0951)  Individualized Care Needs: feet elevated, warm blanket and snack provided  Anxieties, Fears or Concerns: denies  Goal: Optimal Comfort and Wellbeing  Outcome: Progressing  Intervention: Monitor Pain and Promote Comfort  Flowsheets (Taken 7/1/2024 0951)  Pain Management Interventions:   position adjusted   quiet environment facilitated   relaxation techniques promoted   warm blanket provided  Intervention: Provide Person-Centered Care  Flowsheets (Taken 7/1/2024 0951)  Trust Relationship/Rapport:   care explained   reassurance provided   choices provided   thoughts/feelings acknowledged   emotional support provided   empathic listening provided   questions answered   questions encouraged     Problem: Chemotherapy Effects  Goal: Anemia Symptom Improvement  Outcome: Progressing  Intervention: Monitor and Manage Anemia  Flowsheets (Taken 7/1/2024 0951)  Safety Promotion/Fall Prevention:   assistive device/personal item within reach   Fall Risk reviewed with patient/family   in recliner, wheels locked   medications reviewed   instructed to call staff for mobility  Fatigue Management: frequent rest breaks encouraged  Goal: Safety Maintained  Outcome: Progressing  Intervention: Promote Safe Chemotherapy Delivery  Flowsheets (Taken 7/1/2024 0951)  Infection Prevention:   environmental surveillance performed   equipment surfaces disinfected   hand hygiene promoted   personal protective equipment utilized  Chemotherapy Environmental Safety:   chemotherapy waste containers in room   meticulous hand hygiene promoted   patient environment monitored for safety   personal protective equipment utilized   protective environment maintained  Goal: Absence of  Hematuria  Outcome: Progressing  Intervention: Prevent or Manage Hemorrhagic Cystitis  Flowsheets (Taken 7/1/2024 0951)  Pain Management Interventions:   position adjusted   quiet environment facilitated   relaxation techniques promoted   warm blanket provided  Goal: Nausea and Vomiting Relief  Outcome: Progressing  Intervention: Prevent or Manage Nausea and Vomiting  Flowsheets (Taken 7/1/2024 0951)  Nausea/Vomiting Interventions: (premedicated) other (see comments)  Environmental Support: calm environment promoted  Goal: Neurotoxicity Symptom Control  Outcome: Progressing  Intervention: Prevent or Manage Neurotoxicity Effects  Flowsheets (Taken 7/1/2024 0951)  Sensation Impairment Protection: insensate areas closely monitored  Goal: Absence of Infection  Outcome: Progressing  Intervention: Prevent Infection and Maximize Resistance  Flowsheets (Taken 7/1/2024 0951)  Infection Prevention:   environmental surveillance performed   equipment surfaces disinfected   hand hygiene promoted   personal protective equipment utilized  Goal: Absence of Bleeding  Outcome: Progressing  Intervention: Minimize Bleeding Risk  Flowsheets (Taken 7/1/2024 0951)  Bleeding Precautions:   blood pressure closely monitored   monitored for signs of bleeding     Problem: Fall Injury Risk  Goal: Absence of Fall and Fall-Related Injury  Outcome: Progressing  Intervention: Identify and Manage Contributors  Flowsheets (Taken 7/1/2024 0951)  Self-Care Promotion:   independence encouraged   BADL personal objects within reach  Medication Review/Management: medications reviewed  Intervention: Promote Injury-Free Environment  Flowsheets (Taken 7/1/2024 0951)  Safety Promotion/Fall Prevention:   assistive device/personal item within reach   Fall Risk reviewed with patient/family   in recliner, wheels locked   medications reviewed   instructed to call staff for mobility

## 2024-07-01 NOTE — TELEPHONE ENCOUNTER
LVM for patient with direct contact number to schedule appointment with Dr. Hernandez.     ----- Message from Salma Stokes LPN sent at 7/1/2024  9:26 AM CDT -----  Regarding: FW: Oncology CC'd Chart  Please see Dr. Daley chart note.  ----- Message -----  From: Enzo Daley MD  Sent: 7/1/2024   8:14 AM CDT  To: [facility] Hematology/Oncology Scheduling Pool  Subject: Oncology CC'd Chart

## 2024-07-01 NOTE — PROGRESS NOTES
Subjective:       Patient ID: Karen Gutierrez is a 59 y.o. female.    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  Dramatic shrinkage in tumor clinically as well as dramatic fall in CA 19 9 as well as CEA.  Patient presents for cycle 4 day 1 of therapy ECOG status 1    Past Medical History:   Diagnosis Date    Cancer     duodenal cancer    Hypertension     Hypotension, iatrogenic     Mitral valve prolapse      Family History   Problem Relation Name Age of Onset    Ovarian cancer Mother      Atrial fibrillation Mother      Stroke Mother      Hyperlipidemia Mother      Diabetes Mother      Bladder Cancer Father      Hypertension Father      Diabetes Father      No Known Problems Sister      Diabetes Maternal Grandmother      Colon cancer Maternal Grandmother      Stroke Maternal Grandfather      Diabetes Paternal Grandmother      No Known Problems Paternal Grandfather       Social History     Socioeconomic History    Marital status:    Tobacco Use    Smoking status: Former    Smokeless tobacco: Never   Substance and Sexual Activity    Alcohol use: Not Currently    Drug use: Never     Social Determinants of Health     Financial Resource Strain: Low Risk  (6/14/2024)    Overall Financial Resource Strain (CARDIA)     Difficulty of Paying Living Expenses: Not hard at all   Food Insecurity: No Food Insecurity (6/14/2024)    Hunger Vital Sign     Worried About Running Out of Food in the Last Year: Never true     Ran Out of Food in the Last Year: Never true   Physical Activity: Inactive (6/14/2024)    Exercise Vital Sign     Days of Exercise per Week: 0 days     Minutes of Exercise per Session: 0 min   Stress: No Stress Concern Present (6/14/2024)    [employer] [place] - Occupational Stress Questionnaire     Feeling of Stress : Not at all   Housing Stability: Unknown (6/14/2024)     "Housing Stability Vital Sign     Unable to Pay for Housing in the Last Year: No     Past Surgical History:   Procedure Laterality Date    BUNIONECTOMY Left     HYSTERECTOMY  2000    INSERTION OF TUNNELED CENTRAL VENOUS CATHETER (CVC) WITH SUBCUTANEOUS PORT N/A 3/26/2021    Procedure: FQOITHQAG-DMLA-G-CATH;  Surgeon: Lauren Abraham MD;  Location: [facility] OR;  Service: General;  Laterality: N/A;    INSERTION OF VENOUS ACCESS PORT Right 5/13/2024    Procedure: INSERTION, VENOUS ACCESS PORT;  Surgeon: Alicia Hernandez MD;  Location: [facility] OR;  Service: General;  Laterality: Right;    LITHOTRIPSY      PLACEMENT OF JEJUNOSTOMY TUBE  2/18/2021    Procedure: INSERTION, JEJUNOSTOMY TUBE;  Surgeon: Hitesh Díaz MD;  Location: [facility] OR 2ND FLR;  Service: General;;    ULTRASOUND GUIDANCE Right 5/13/2024    Procedure: ULTRASOUND GUIDANCE;  Surgeon: Alicia Hernandez MD;  Location: [facility] OR;  Service: General;  Laterality: Right;    WHIPPLE PROCEDURE N/A 2/18/2021    Procedure: WHIPPLE PROCEDURE;  Surgeon: Hitesh Díaz MD;  Location: [facility] OR 2ND FLR;  Service: General;  Laterality: N/A;       Labs:  Lab Results   Component Value Date    WBC 4.63 06/28/2024    HGB 11.6 (L) 06/28/2024    HCT 36.4 (L) 06/28/2024    MCV 91 06/28/2024    PLT 86 (L) 06/28/2024     BMP  Lab Results   Component Value Date     06/28/2024    K 4.4 06/28/2024     06/28/2024    CO2 26 06/28/2024    BUN 17 06/28/2024    CREATININE 1.0 06/28/2024    CALCIUM 9.5 06/28/2024    ANIONGAP 9 06/28/2024    ESTGFRAFRICA >60 03/07/2022    EGFRNONAA >60 03/07/2022     Lab Results   Component Value Date    ALT 31 06/28/2024    AST 31 06/28/2024    ALKPHOS 153 (H) 06/28/2024    BILITOT 0.4 06/28/2024       Lab Results   Component Value Date    IRON 42 05/10/2021    TIBC 404 05/10/2021    FERRITIN 345 (H) 05/10/2021     No results found for: "UXLKTDHP05"  No results found for: "FOLATE"  No results found for: "TSH"      Review of Systems   Constitutional:  " Negative for activity change, appetite change, chills, diaphoresis, fatigue, fever and unexpected weight change.   HENT:  Negative for congestion, dental problem, drooling, ear discharge, ear pain, facial swelling, hearing loss, mouth sores, nosebleeds, postnasal drip, rhinorrhea, sinus pressure, sneezing, sore throat, tinnitus, trouble swallowing and voice change.    Eyes:  Negative for photophobia, pain, discharge, redness, itching and visual disturbance.   Respiratory:  Negative for cough, choking, chest tightness, shortness of breath, wheezing and stridor.    Cardiovascular:  Negative for chest pain, palpitations and leg swelling.   Gastrointestinal:  Negative for abdominal distention, abdominal pain, anal bleeding, blood in stool, constipation, diarrhea, nausea, rectal pain and vomiting.   Endocrine: Negative for cold intolerance, heat intolerance, polydipsia, polyphagia and polyuria.   Genitourinary:  Negative for decreased urine volume, difficulty urinating, dyspareunia, dysuria, enuresis, flank pain, frequency, genital sores, hematuria, menstrual problem, pelvic pain, urgency, vaginal bleeding, vaginal discharge and vaginal pain.   Musculoskeletal:  Negative for arthralgias, back pain, gait problem, joint swelling, myalgias, neck pain and neck stiffness.   Skin:  Negative for color change, pallor and rash.   Allergic/Immunologic: Negative for environmental allergies, food allergies and immunocompromised state.   Neurological:  Negative for dizziness, tremors, seizures, syncope, facial asymmetry, speech difficulty, weakness, light-headedness, numbness and headaches.   Hematological:  Negative for adenopathy. Does not bruise/bleed easily.   Psychiatric/Behavioral:  Negative for agitation, behavioral problems, confusion, decreased concentration, dysphoric mood, hallucinations, self-injury, sleep disturbance and suicidal ideas. The patient is not nervous/anxious and is not hyperactive.        Objective:       Physical Exam  Vitals reviewed.   Constitutional:       General: She is not in acute distress.     Appearance: She is well-developed. She is not diaphoretic.   HENT:      Head: Normocephalic and atraumatic.      Right Ear: External ear normal.      Left Ear: External ear normal.      Nose: Nose normal.      Right Sinus: No maxillary sinus tenderness or frontal sinus tenderness.      Left Sinus: No maxillary sinus tenderness or frontal sinus tenderness.      Mouth/Throat:      Pharynx: No oropharyngeal exudate.   Eyes:      General: Lids are normal. No scleral icterus.        Right eye: No discharge.         Left eye: No discharge.      Conjunctiva/sclera: Conjunctivae normal.      Right eye: Right conjunctiva is not injected. No hemorrhage.     Left eye: Left conjunctiva is not injected. No hemorrhage.     Pupils: Pupils are equal, round, and reactive to light.   Neck:      Thyroid: No thyromegaly.      Vascular: No JVD.      Trachea: No tracheal deviation.   Cardiovascular:      Rate and Rhythm: Normal rate.   Pulmonary:      Effort: Pulmonary effort is normal. No respiratory distress.      Breath sounds: No stridor.   Chest:      Chest wall: No tenderness.   Abdominal:      General: Bowel sounds are normal. There is no distension.      Palpations: Abdomen is soft. There is no hepatomegaly, splenomegaly or mass.      Tenderness: There is no abdominal tenderness. There is no rebound.   Musculoskeletal:         General: No tenderness. Normal range of motion.      Cervical back: Normal range of motion and neck supple.   Lymphadenopathy:      Cervical: No cervical adenopathy.      Upper Body:      Right upper body: No supraclavicular adenopathy.      Left upper body: No supraclavicular adenopathy.   Skin:     General: Skin is dry.      Findings: No erythema or rash.   Neurological:      Mental Status: She is alert and oriented to person, place, and time.      Cranial Nerves: No cranial nerve deficit.       Coordination: Coordination normal.   Psychiatric:         Behavior: Behavior normal.         Thought Content: Thought content normal.         Judgment: Judgment normal.             Assessment:      1. 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 Immunodeficiency due to chemotherapy    4. s/p partial pancreatectomy    5. Monoallelic mutation of MUTYH gene           Med Onc Chart Routing      Follow up with physician . Return to clinic in 2 weeks with CBC CMP P CEA and CA 19 9.  Along with CT chest abdomen pelvis   Follow up with SANTIAGO    Infusion scheduling note    Injection scheduling note FOLFOX today platelet count lower to 75,000   Labs    Imaging    Pharmacy appointment    Other referrals         Needs appointment with Dr. Alicia Gonzalez after CT chest abdomen pelvis done              Plan:     Excellent clinical response.  Will proceed with CT chest abdomen pelvis in proximally 2 weeks.  And be seen by Surgical Oncology for potential resection I have lowered the platelet count [id number].  Discussed implications and answered questions plan to see Surgical Oncology after CT chest abdomen pelvis and laboratory studies in proximally 2 weeks.  Will most likely wait for three-week before initiation of treatment but would like to have surgical oncology evaluation to see whether not potential surgical resection is a consideration with excellent clinical in laboratory response        Enzo Daley Jr, MD FACP

## 2024-07-03 ENCOUNTER — INFUSION (OUTPATIENT)
Dept: INFUSION THERAPY | Facility: HOSPITAL | Age: 59
End: 2024-07-03
Attending: INTERNAL MEDICINE
Payer: COMMERCIAL

## 2024-07-03 VITALS
RESPIRATION RATE: 16 BRPM | SYSTOLIC BLOOD PRESSURE: 143 MMHG | TEMPERATURE: 98 F | DIASTOLIC BLOOD PRESSURE: 86 MMHG | OXYGEN SATURATION: 97 % | HEART RATE: 76 BPM

## 2024-07-03 DIAGNOSIS — C17.0 DUODENAL CANCER: Primary | ICD-10-CM

## 2024-07-03 PROCEDURE — 96377 APPLICATON ON-BODY INJECTOR: CPT

## 2024-07-03 PROCEDURE — 63600175 PHARM REV CODE 636 W HCPCS: Mod: JZ,JG | Performed by: INTERNAL MEDICINE

## 2024-07-03 PROCEDURE — 25000003 PHARM REV CODE 250: Performed by: INTERNAL MEDICINE

## 2024-07-03 PROCEDURE — A4216 STERILE WATER/SALINE, 10 ML: HCPCS | Performed by: INTERNAL MEDICINE

## 2024-07-03 RX ORDER — SODIUM CHLORIDE 0.9 % (FLUSH) 0.9 %
10 SYRINGE (ML) INJECTION
Status: DISCONTINUED | OUTPATIENT
Start: 2024-07-03 | End: 2024-07-03 | Stop reason: HOSPADM

## 2024-07-03 RX ORDER — HEPARIN 100 UNIT/ML
500 SYRINGE INTRAVENOUS
Status: DISCONTINUED | OUTPATIENT
Start: 2024-07-03 | End: 2024-07-03 | Stop reason: HOSPADM

## 2024-07-03 RX ADMIN — Medication 10 ML: at 10:07

## 2024-07-03 RX ADMIN — HEPARIN 500 UNITS: 100 SYRINGE at 10:07

## 2024-07-03 RX ADMIN — PEGFILGRASTIM 6 MG: KIT SUBCUTANEOUS at 10:07

## 2024-07-03 NOTE — NURSING
Pt came in for pump D/C. Pt tolerated home 5FU well and had no complaints. PAC flushed w/ NS and heparin and deaccessed. Neulasta OBI placed to patients abdomen, info with date/time placed and date/time to remove given to pt.  Pt education reinforced and explained what to expect in the next couple of days. Pt verbalized understanding.

## 2024-07-05 ENCOUNTER — PATIENT MESSAGE (OUTPATIENT)
Dept: HEMATOLOGY/ONCOLOGY | Facility: CLINIC | Age: 59
End: 2024-07-05
Payer: COMMERCIAL

## 2024-07-08 ENCOUNTER — HOSPITAL ENCOUNTER (OUTPATIENT)
Dept: RADIOLOGY | Facility: HOSPITAL | Age: 59
Discharge: HOME | End: 2024-07-08
Attending: INTERNAL MEDICINE
Payer: COMMERCIAL

## 2024-07-08 DIAGNOSIS — C17.0 DUODENAL CANCER: ICD-10-CM

## 2024-07-08 PROCEDURE — 25500020 PHARM REV CODE 255: Performed by: INTERNAL MEDICINE

## 2024-07-08 PROCEDURE — 74177 CT ABD & PELVIS W/CONTRAST: CPT | Mod: 26,,, | Performed by: STUDENT IN AN ORGANIZED HEALTH CARE EDUCATION/TRAINING PROGRAM

## 2024-07-08 PROCEDURE — 71260 CT THORAX DX C+: CPT | Mod: TC

## 2024-07-08 PROCEDURE — 74177 CT ABD & PELVIS W/CONTRAST: CPT | Mod: TC

## 2024-07-08 PROCEDURE — 71260 CT THORAX DX C+: CPT | Mod: 26,,, | Performed by: STUDENT IN AN ORGANIZED HEALTH CARE EDUCATION/TRAINING PROGRAM

## 2024-07-08 PROCEDURE — A9698 NON-RAD CONTRAST MATERIALNOC: HCPCS | Performed by: INTERNAL MEDICINE

## 2024-07-08 RX ADMIN — IOHEXOL 1000 ML: 12 SOLUTION ORAL at 01:07

## 2024-07-08 RX ADMIN — IOHEXOL 100 ML: 350 INJECTION, SOLUTION INTRAVENOUS at 02:07

## 2024-07-10 ENCOUNTER — PATIENT MESSAGE (OUTPATIENT)
Dept: HEMATOLOGY/ONCOLOGY | Facility: CLINIC | Age: 59
End: 2024-07-10
Payer: COMMERCIAL

## 2024-07-11 ENCOUNTER — LAB VISIT (OUTPATIENT)
Dept: LAB | Facility: HOSPITAL | Age: 59
End: 2024-07-11
Attending: INTERNAL MEDICINE
Payer: COMMERCIAL

## 2024-07-11 DIAGNOSIS — C17.0 DUODENAL CANCER: ICD-10-CM

## 2024-07-11 LAB
ALBUMIN SERPL BCP-MCNC: 4.1 G/DL (ref 3.5–5.2)
ALP SERPL-CCNC: 189 U/L (ref 55–135)
ALT SERPL W/O P-5'-P-CCNC: 36 U/L (ref 10–44)
ANION GAP SERPL CALC-SCNC: 10 MMOL/L (ref 8–16)
AST SERPL-CCNC: 41 U/L (ref 10–40)
BASOPHILS # BLD AUTO: 0.07 K/UL (ref 0–0.2)
BASOPHILS NFR BLD: 0.7 % (ref 0–1.9)
BILIRUB SERPL-MCNC: 0.5 MG/DL (ref 0.1–1)
BUN SERPL-MCNC: 16 MG/DL (ref 6–20)
CALCIUM SERPL-MCNC: 9 MG/DL (ref 8.7–10.5)
CANCER AG19-9 SERPL-ACNC: 37.2 U/ML (ref 0–40)
CEA SERPL-MCNC: 2.1 NG/ML (ref 0–5)
CHLORIDE SERPL-SCNC: 106 MMOL/L (ref 95–110)
CO2 SERPL-SCNC: 24 MMOL/L (ref 23–29)
CREAT SERPL-MCNC: 1 MG/DL (ref 0.5–1.4)
DIFFERENTIAL METHOD BLD: ABNORMAL
EOSINOPHIL # BLD AUTO: 0.1 K/UL (ref 0–0.5)
EOSINOPHIL NFR BLD: 0.7 % (ref 0–8)
ERYTHROCYTE [DISTWIDTH] IN BLOOD BY AUTOMATED COUNT: 17.2 % (ref 11.5–14.5)
EST. GFR  (NO RACE VARIABLE): >60 ML/MIN/1.73 M^2
GLUCOSE SERPL-MCNC: 111 MG/DL (ref 70–110)
HCT VFR BLD AUTO: 36.1 % (ref 37–48.5)
HGB BLD-MCNC: 11.7 G/DL (ref 12–16)
IMM GRANULOCYTES # BLD AUTO: 0.14 K/UL (ref 0–0.04)
IMM GRANULOCYTES NFR BLD AUTO: 1.5 % (ref 0–0.5)
LYMPHOCYTES # BLD AUTO: 1.9 K/UL (ref 1–4.8)
LYMPHOCYTES NFR BLD: 19.5 % (ref 18–48)
MAGNESIUM SERPL-MCNC: 1.9 MG/DL (ref 1.6–2.6)
MCH RBC QN AUTO: 30 PG (ref 27–31)
MCHC RBC AUTO-ENTMCNC: 32.4 G/DL (ref 32–36)
MCV RBC AUTO: 93 FL (ref 82–98)
MONOCYTES # BLD AUTO: 1.1 K/UL (ref 0.3–1)
MONOCYTES NFR BLD: 11.7 % (ref 4–15)
NEUTROPHILS # BLD AUTO: 6.3 K/UL (ref 1.8–7.7)
NEUTROPHILS NFR BLD: 65.9 % (ref 38–73)
NRBC BLD-RTO: 0 /100 WBC
PLATELET # BLD AUTO: 101 K/UL (ref 150–450)
PMV BLD AUTO: 10.9 FL (ref 9.2–12.9)
POTASSIUM SERPL-SCNC: 4.2 MMOL/L (ref 3.5–5.1)
PROT SERPL-MCNC: 7 G/DL (ref 6–8.4)
RBC # BLD AUTO: 3.9 M/UL (ref 4–5.4)
SODIUM SERPL-SCNC: 140 MMOL/L (ref 136–145)
WBC # BLD AUTO: 9.52 K/UL (ref 3.9–12.7)

## 2024-07-11 PROCEDURE — 83735 ASSAY OF MAGNESIUM: CPT | Performed by: INTERNAL MEDICINE

## 2024-07-11 PROCEDURE — 82378 CARCINOEMBRYONIC ANTIGEN: CPT | Performed by: INTERNAL MEDICINE

## 2024-07-11 PROCEDURE — 86301 IMMUNOASSAY TUMOR CA 19-9: CPT | Performed by: INTERNAL MEDICINE

## 2024-07-11 PROCEDURE — 85025 COMPLETE CBC W/AUTO DIFF WBC: CPT | Performed by: INTERNAL MEDICINE

## 2024-07-11 PROCEDURE — 36415 COLL VENOUS BLD VENIPUNCTURE: CPT | Mod: PO | Performed by: INTERNAL MEDICINE

## 2024-07-11 PROCEDURE — 80053 COMPREHEN METABOLIC PANEL: CPT | Performed by: INTERNAL MEDICINE

## 2024-07-12 ENCOUNTER — OFFICE VISIT (OUTPATIENT)
Dept: HEMATOLOGY/ONCOLOGY | Facility: CLINIC | Age: 59
End: 2024-07-12
Payer: COMMERCIAL

## 2024-07-12 ENCOUNTER — PATIENT MESSAGE (OUTPATIENT)
Dept: HEMATOLOGY/ONCOLOGY | Facility: CLINIC | Age: 59
End: 2024-07-12

## 2024-07-12 DIAGNOSIS — C79.89 SECONDARY MALIGNANCY OF SOFT TISSUES OF ABDOMEN: ICD-10-CM

## 2024-07-12 DIAGNOSIS — Z79.899 IMMUNODEFICIENCY DUE TO CHEMOTHERAPY: ICD-10-CM

## 2024-07-12 DIAGNOSIS — C17.0 DUODENAL CANCER: Primary | ICD-10-CM

## 2024-07-12 DIAGNOSIS — Z15.89 MONOALLELIC MUTATION OF MUTYH GENE: ICD-10-CM

## 2024-07-12 DIAGNOSIS — Z90.411 HISTORY OF PARTIAL PANCREATECTOMY: ICD-10-CM

## 2024-07-12 DIAGNOSIS — T45.1X5A IMMUNODEFICIENCY DUE TO CHEMOTHERAPY: ICD-10-CM

## 2024-07-12 DIAGNOSIS — D84.821 IMMUNODEFICIENCY DUE TO CHEMOTHERAPY: ICD-10-CM

## 2024-07-12 RX ORDER — SODIUM CHLORIDE 0.9 % (FLUSH) 0.9 %
10 SYRINGE (ML) INJECTION
OUTPATIENT
Start: 2024-07-17

## 2024-07-12 RX ORDER — PROCHLORPERAZINE EDISYLATE 5 MG/ML
10 INJECTION INTRAMUSCULAR; INTRAVENOUS ONCE AS NEEDED
OUTPATIENT
Start: 2024-07-15

## 2024-07-12 RX ORDER — PROCHLORPERAZINE EDISYLATE 5 MG/ML
10 INJECTION INTRAMUSCULAR; INTRAVENOUS ONCE AS NEEDED
OUTPATIENT
Start: 2024-07-17

## 2024-07-12 RX ORDER — SODIUM CHLORIDE 0.9 % (FLUSH) 0.9 %
10 SYRINGE (ML) INJECTION
OUTPATIENT
Start: 2024-07-15

## 2024-07-12 RX ORDER — EPINEPHRINE 0.3 MG/.3ML
0.3 INJECTION SUBCUTANEOUS ONCE AS NEEDED
OUTPATIENT
Start: 2024-07-15

## 2024-07-12 RX ORDER — FLUOROURACIL 50 MG/ML
400 INJECTION, SOLUTION INTRAVENOUS
OUTPATIENT
Start: 2024-07-15

## 2024-07-12 RX ORDER — DIPHENHYDRAMINE HYDROCHLORIDE 50 MG/ML
50 INJECTION INTRAMUSCULAR; INTRAVENOUS ONCE AS NEEDED
OUTPATIENT
Start: 2024-07-15

## 2024-07-12 RX ORDER — HEPARIN 100 UNIT/ML
500 SYRINGE INTRAVENOUS
OUTPATIENT
Start: 2024-07-15

## 2024-07-12 RX ORDER — HEPARIN 100 UNIT/ML
500 SYRINGE INTRAVENOUS
OUTPATIENT
Start: 2024-07-17

## 2024-07-12 NOTE — PROGRESS NOTES
Subjective:       Patient ID: Karen Gutierrez is a 59 y.o. female.    Chief Complaint: Results, Chemotherapy, and Cancer    HPI:  59-year-old female presents after 4 cycles of FOLFOX scheduled for cycle 5 on 07/15/2024.  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  ECOG status 0 patient seen in virtual visit    Past Medical History:   Diagnosis Date    Cancer     duodenal cancer    Hypertension     Hypotension, iatrogenic     Mitral valve prolapse      Family History   Problem Relation Name Age of Onset    Ovarian cancer Mother      Atrial fibrillation Mother      Stroke Mother      Hyperlipidemia Mother      Diabetes Mother      Bladder Cancer Father      Hypertension Father      Diabetes Father      No Known Problems Sister      Diabetes Maternal Grandmother      Colon cancer Maternal Grandmother      Stroke Maternal Grandfather      Diabetes Paternal Grandmother      No Known Problems Paternal Grandfather       Social History     Socioeconomic History    Marital status:    Tobacco Use    Smoking status: Former    Smokeless tobacco: Never   Substance and Sexual Activity    Alcohol use: Not Currently    Drug use: Never     Social Determinants of Health     Financial Resource Strain: Low Risk  (6/14/2024)    Overall Financial Resource Strain (CARDIA)     Difficulty of Paying Living Expenses: Not hard at all   Food Insecurity: No Food Insecurity (6/14/2024)    Hunger Vital Sign     Worried About Running Out of Food in the Last Year: Never true     Ran Out of Food in the Last Year: Never true   Physical Activity: Inactive (6/14/2024)    Exercise Vital Sign     Days of Exercise per Week: 0 days     Minutes of Exercise per Session: 0 min   Stress: No Stress Concern Present (6/14/2024)    [facility] - Occupational Stress Questionnaire     Feeling of Stress : Not at all   Housing Stability: Unknown  "(6/14/2024)    Housing Stability Vital Sign     Unable to Pay for Housing in the Last Year: No     Past Surgical History:   Procedure Laterality Date    BUNIONECTOMY Left     HYSTERECTOMY  2000    INSERTION OF TUNNELED CENTRAL VENOUS CATHETER (CVC) WITH SUBCUTANEOUS PORT N/A 3/26/2021    Procedure: [id number]-S-CATH;  Surgeon: Lauren Abraham MD;  Location: [facility] OR;  Service: General;  Laterality: N/A;    INSERTION OF VENOUS ACCESS PORT Right 5/13/2024    Procedure: INSERTION, VENOUS ACCESS PORT;  Surgeon: Alicia Hernandez MD;  Location: [facility] OR;  Service: General;  Laterality: Right;    LITHOTRIPSY      PLACEMENT OF JEJUNOSTOMY TUBE  2/18/2021    Procedure: INSERTION, JEJUNOSTOMY TUBE;  Surgeon: Hitesh Díaz MD;  Location: [facility] OR 2ND FLR;  Service: General;;    ULTRASOUND GUIDANCE Right 5/13/2024    Procedure: ULTRASOUND GUIDANCE;  Surgeon: Alicia Hernandez MD;  Location: [facility] OR;  Service: General;  Laterality: Right;    WHIPPLE PROCEDURE N/A 2/18/2021    Procedure: WHIPPLE PROCEDURE;  Surgeon: Hitesh Díaz MD;  Location: [facility] OR 2ND FLR;  Service: General;  Laterality: N/A;       Labs:  Lab Results   Component Value Date    WBC 9.52 07/11/2024    HGB 11.7 (L) 07/11/2024    HCT 36.1 (L) 07/11/2024    MCV 93 07/11/2024     (L) 07/11/2024     BMP  Lab Results   Component Value Date     07/11/2024    K 4.2 07/11/2024     07/11/2024    CO2 24 07/11/2024    BUN 16 07/11/2024    CREATININE 1.0 07/11/2024    CALCIUM 9.0 07/11/2024    ANIONGAP 10 07/11/2024    ESTGFRAFRICA >60 03/07/2022    EGFRNONAA >60 03/07/2022     Lab Results   Component Value Date    ALT 36 07/11/2024    AST 41 (H) 07/11/2024    ALKPHOS 189 (H) 07/11/2024    BILITOT 0.5 07/11/2024       Lab Results   Component Value Date    IRON 42 05/10/2021    TIBC 404 05/10/2021    FERRITIN 345 (H) 05/10/2021     No results found for: "YFTNXSSY96"  No results found for: "FOLATE"  No results found for: "TSH"      Review of " Systems   Constitutional:  Negative for activity change, appetite change, chills, diaphoresis, fatigue, fever and unexpected weight change.   HENT:  Negative for congestion, dental problem, drooling, ear discharge, ear pain, facial swelling, hearing loss, mouth sores, nosebleeds, postnasal drip, rhinorrhea, sinus pressure, sneezing, sore throat, tinnitus, trouble swallowing and voice change.    Eyes:  Negative for photophobia, pain, discharge, redness, itching and visual disturbance.   Respiratory:  Negative for cough, choking, chest tightness, shortness of breath, wheezing and stridor.    Cardiovascular:  Negative for chest pain, palpitations and leg swelling.   Gastrointestinal:  Negative for abdominal distention, abdominal pain, anal bleeding, blood in stool, constipation, diarrhea, nausea, rectal pain and vomiting.   Endocrine: Negative for cold intolerance, heat intolerance, polydipsia, polyphagia and polyuria.   Genitourinary:  Negative for decreased urine volume, difficulty urinating, dyspareunia, dysuria, enuresis, flank pain, frequency, genital sores, hematuria, menstrual problem, pelvic pain, urgency, vaginal bleeding, vaginal discharge and vaginal pain.   Musculoskeletal:  Negative for arthralgias, back pain, gait problem, joint swelling, myalgias, neck pain and neck stiffness.   Skin:  Negative for color change, pallor and rash.   Allergic/Immunologic: Negative for environmental allergies, food allergies and immunocompromised state.   Neurological:  Negative for dizziness, tremors, seizures, syncope, facial asymmetry, speech difficulty, weakness, light-headedness, numbness and headaches.   Hematological:  Negative for adenopathy. Does not bruise/bleed easily.   Psychiatric/Behavioral:  Negative for agitation, behavioral problems, confusion, decreased concentration, dysphoric mood, hallucinations, self-injury, sleep disturbance and suicidal ideas. The patient is not nervous/anxious and is not hyperactive.         Objective:      Physical Exam  Constitutional:       Appearance: Normal appearance.   Neurological:      Mental Status: She is alert.             Assessment:      1. 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 Immunodeficiency due to chemotherapy    4. Monoallelic mutation of MUTYH gene    5. s/p partial pancreatectomy           Med Onc Chart Routing      Follow up with physician . Return in 2 weeks either see myself or [facility] for cycle 6 FOLFOX CBC CMP CA 19 9 and CEA   Follow up with [facility]    Infusion scheduling note    Injection scheduling note Proceed on 07/15/2024 with FOLFOX   Labs    Imaging    Pharmacy appointment    Other referrals                   Plan:     The patient location is:  Home  The chief complaint leading to consultation is:  Cancer    Visit type: audiovisual    Face to Face time with patient: 25 minutes of total time spent on the encounter, which includes face to face time and non-face to face time preparing to see the patient (eg, review of tests), Obtaining and/or reviewing separately obtained history, Documenting clinical information in the electronic or other health record, Independently interpreting results (not separately reported) and communicating results to the patient/family/caregiver, or Care coordination (not separately reported).         Each patient to whom he or she provides medical services by telemedicine is:  (1) informed of the relationship between the physician and patient and the respective role of any other health care provider with respect to management of the patient; and (2) notified that he or she may decline to receive medical services by telemedicine and may withdraw from such care at any time.    Notes:  Reviewed information dramatic response both radiographically as well as laboratory studies wise.  Continue with current plan treatment Surgical Oncology consultation awaiting will continue with FOLFOX until surgical consultation  has been review to see if recurrent disease could be resected.  Answered questions        Enzo Daley Jr, MD FACP

## 2024-07-15 ENCOUNTER — INFUSION (OUTPATIENT)
Dept: INFUSION THERAPY | Facility: HOSPITAL | Age: 59
End: 2024-07-15
Attending: INTERNAL MEDICINE
Payer: COMMERCIAL

## 2024-07-15 VITALS
RESPIRATION RATE: 15 BRPM | SYSTOLIC BLOOD PRESSURE: 136 MMHG | BODY MASS INDEX: 28.49 KG/M2 | DIASTOLIC BLOOD PRESSURE: 84 MMHG | WEIGHT: 210.13 LBS | OXYGEN SATURATION: 98 % | HEART RATE: 65 BPM | TEMPERATURE: 98 F

## 2024-07-15 DIAGNOSIS — C17.0 DUODENAL CANCER: Primary | ICD-10-CM

## 2024-07-15 PROCEDURE — 96416 CHEMO PROLONG INFUSE W/PUMP: CPT

## 2024-07-15 PROCEDURE — 96415 CHEMO IV INFUSION ADDL HR: CPT

## 2024-07-15 PROCEDURE — 96368 THER/DIAG CONCURRENT INF: CPT

## 2024-07-15 PROCEDURE — 25000003 PHARM REV CODE 250: Performed by: INTERNAL MEDICINE

## 2024-07-15 PROCEDURE — 96367 TX/PROPH/DG ADDL SEQ IV INF: CPT

## 2024-07-15 PROCEDURE — 96411 CHEMO IV PUSH ADDL DRUG: CPT

## 2024-07-15 PROCEDURE — 63600175 PHARM REV CODE 636 W HCPCS: Performed by: INTERNAL MEDICINE

## 2024-07-15 PROCEDURE — 96413 CHEMO IV INFUSION 1 HR: CPT

## 2024-07-15 RX ORDER — FLUOROURACIL 50 MG/ML
400 INJECTION, SOLUTION INTRAVENOUS
Status: COMPLETED | OUTPATIENT
Start: 2024-07-15 | End: 2024-07-15

## 2024-07-15 RX ORDER — HEPARIN 100 UNIT/ML
500 SYRINGE INTRAVENOUS
Status: DISCONTINUED | OUTPATIENT
Start: 2024-07-15 | End: 2024-07-15 | Stop reason: HOSPADM

## 2024-07-15 RX ADMIN — DEXAMETHASONE SODIUM PHOSPHATE 0.25 MG: 4 INJECTION, SOLUTION INTRA-ARTICULAR; INTRALESIONAL; INTRAMUSCULAR; INTRAVENOUS; SOFT TISSUE at 09:07

## 2024-07-15 RX ADMIN — LEUCOVORIN CALCIUM 880 MG: 100 INJECTION, POWDER, LYOPHILIZED, FOR SUSPENSION INTRAMUSCULAR; INTRAVENOUS at 09:07

## 2024-07-15 RX ADMIN — FLUOROURACIL 880 MG: 50 INJECTION, SOLUTION INTRAVENOUS at 12:07

## 2024-07-15 RX ADMIN — OXALIPLATIN 187 MG: 5 INJECTION, SOLUTION INTRAVENOUS at 10:07

## 2024-07-15 RX ADMIN — FLUOROURACIL 5280 MG: 50 INJECTION, SOLUTION INTRAVENOUS at 12:07

## 2024-07-17 ENCOUNTER — INFUSION (OUTPATIENT)
Dept: INFUSION THERAPY | Facility: HOSPITAL | Age: 59
End: 2024-07-17
Attending: INTERNAL MEDICINE
Payer: COMMERCIAL

## 2024-07-17 VITALS
HEART RATE: 64 BPM | SYSTOLIC BLOOD PRESSURE: 127 MMHG | TEMPERATURE: 98 F | RESPIRATION RATE: 17 BRPM | DIASTOLIC BLOOD PRESSURE: 86 MMHG | OXYGEN SATURATION: 100 %

## 2024-07-17 DIAGNOSIS — C17.0 DUODENAL CANCER: Primary | ICD-10-CM

## 2024-07-17 PROCEDURE — A4216 STERILE WATER/SALINE, 10 ML: HCPCS | Performed by: INTERNAL MEDICINE

## 2024-07-17 PROCEDURE — 25000003 PHARM REV CODE 250: Performed by: INTERNAL MEDICINE

## 2024-07-17 PROCEDURE — 63600175 PHARM REV CODE 636 W HCPCS: Mod: JZ,JG | Performed by: INTERNAL MEDICINE

## 2024-07-17 PROCEDURE — 96377 APPLICATON ON-BODY INJECTOR: CPT

## 2024-07-17 RX ORDER — SODIUM CHLORIDE 0.9 % (FLUSH) 0.9 %
10 SYRINGE (ML) INJECTION
Status: DISCONTINUED | OUTPATIENT
Start: 2024-07-17 | End: 2024-07-17 | Stop reason: HOSPADM

## 2024-07-17 RX ORDER — HEPARIN 100 UNIT/ML
500 SYRINGE INTRAVENOUS
Status: DISCONTINUED | OUTPATIENT
Start: 2024-07-17 | End: 2024-07-17 | Stop reason: HOSPADM

## 2024-07-17 RX ADMIN — PEGFILGRASTIM 6 MG: KIT SUBCUTANEOUS at 10:07

## 2024-07-17 RX ADMIN — SODIUM CHLORIDE, PRESERVATIVE FREE 10 ML: 5 INJECTION INTRAVENOUS at 10:07

## 2024-07-17 RX ADMIN — HEPARIN 500 UNITS: 100 SYRINGE at 10:07

## 2024-07-17 NOTE — NURSING
Pt came in for pump D/C. Pt tolerated home 5FU well and had no complaints. PAC flushed w/ NS and heparin and deaccessed. Pt education reinforced and explained what to expect in the next couple of days. Pt verbalized understanding.

## 2024-07-19 ENCOUNTER — TUMOR BOARD CONFERENCE (OUTPATIENT)
Dept: HEMATOLOGY/ONCOLOGY | Facility: CLINIC | Age: 59
End: 2024-07-19
Payer: COMMERCIAL

## 2024-07-19 ENCOUNTER — PATIENT MESSAGE (OUTPATIENT)
Dept: HEMATOLOGY/ONCOLOGY | Facility: CLINIC | Age: 59
End: 2024-07-19
Payer: COMMERCIAL

## 2024-07-19 NOTE — PROGRESS NOTES
Tumor Board Documentation      Karen Gutierrez was presented by Enzo Daley MD at our Tumor Board on 7/19/2024, which included representatives from Medical Oncology, Hematology, Radiation Oncology, Surgical Oncology, Pathology, Navigation, Radiology, Gastrointestinal, Pulmonology, Urology ([facility]).    Karen currently presents as a current patient with  , with history of the following treatments: (P) Adjuvant Chemotherapy, Surgical Intervention(s).    Additionally, we reviewed previous medical and familial history, history of present illness, and recent lab results along with all available histopathologic and imaging studies. The tumor board considered available treatment options and made the following recommendations:  Plan to continue Folfox treatment and keep f/u with surg/onc.          The following procedures/referrals were also placed: No orders of the defined types were placed in this encounter.      Clinical Trial Status:       National site-specific guidelines were discussed with respect to the case.    Tumor board is a meeting of clinicians from various specialty areas who evaluate and discuss patients for whom a multidisciplinary approach is being considered. Final determinations in the plan of care are those of the provider(s). The responsibility for follow up of recommendations given during tumor board is that of the provider.     Enzo Daley MD

## 2024-07-19 NOTE — PROGRESS NOTES
Tumor Board Documentation      Karen Gutierrez was presented by Enzo Daley MD at our Tumor Board on 7/19/2024, which included representatives from Medical Oncology, Hematology, Radiation Oncology, Surgical Oncology, Pathology, Navigation, Radiology, Gastrointestinal, Pulmonology, Urology.    Karen currently presents as a current patient with  , with history of the following treatments: Adjuvant Chemotherapy.    Additionally, we reviewed previous medical and familial history, history of present illness, and recent lab results along with all available histopathologic and imaging studies. The tumor board considered available treatment options and made the following recommendations:     will continue with already established systematic therapy and keep upcoming Aug appointment with Dr Hernandez for surgical evaluation        The following procedures/referrals were also placed: No orders of the defined types were placed in this encounter.      Clinical Trial Status:       National site-specific guidelines were discussed with respect to the case.    Tumor board is a meeting of clinicians from various specialty areas who evaluate and discuss patients for whom a multidisciplinary approach is being considered. Final determinations in the plan of care are those of the provider(s). The responsibility for follow up of recommendations given during tumor board is that of the provider.     Enzo Daley MD

## 2024-07-25 ENCOUNTER — LAB VISIT (OUTPATIENT)
Dept: LAB | Facility: HOSPITAL | Age: 59
End: 2024-07-25
Attending: INTERNAL MEDICINE
Payer: COMMERCIAL

## 2024-07-25 DIAGNOSIS — C17.0 DUODENAL CANCER: ICD-10-CM

## 2024-07-25 LAB
ALBUMIN SERPL BCP-MCNC: 3.8 G/DL (ref 3.5–5.2)
ALP SERPL-CCNC: 180 U/L (ref 55–135)
ALT SERPL W/O P-5'-P-CCNC: 36 U/L (ref 10–44)
ANION GAP SERPL CALC-SCNC: 8 MMOL/L (ref 8–16)
AST SERPL-CCNC: 37 U/L (ref 10–40)
BASOPHILS # BLD AUTO: 0.02 K/UL (ref 0–0.2)
BASOPHILS NFR BLD: 0.4 % (ref 0–1.9)
BILIRUB SERPL-MCNC: 0.3 MG/DL (ref 0.1–1)
BUN SERPL-MCNC: 17 MG/DL (ref 6–20)
CALCIUM SERPL-MCNC: 9 MG/DL (ref 8.7–10.5)
CANCER AG19-9 SERPL-ACNC: 17.9 U/ML (ref 0–40)
CEA SERPL-MCNC: 1.7 NG/ML (ref 0–5)
CHLORIDE SERPL-SCNC: 109 MMOL/L (ref 95–110)
CO2 SERPL-SCNC: 25 MMOL/L (ref 23–29)
CREAT SERPL-MCNC: 0.9 MG/DL (ref 0.5–1.4)
DIFFERENTIAL METHOD BLD: ABNORMAL
EOSINOPHIL # BLD AUTO: 0 K/UL (ref 0–0.5)
EOSINOPHIL NFR BLD: 0.6 % (ref 0–8)
ERYTHROCYTE [DISTWIDTH] IN BLOOD BY AUTOMATED COUNT: 17.3 % (ref 11.5–14.5)
EST. GFR  (NO RACE VARIABLE): >60 ML/MIN/1.73 M^2
GLUCOSE SERPL-MCNC: 157 MG/DL (ref 70–110)
HCT VFR BLD AUTO: 32.8 % (ref 37–48.5)
HGB BLD-MCNC: 10.6 G/DL (ref 12–16)
IMM GRANULOCYTES # BLD AUTO: 0.03 K/UL (ref 0–0.04)
IMM GRANULOCYTES NFR BLD AUTO: 0.6 % (ref 0–0.5)
LYMPHOCYTES # BLD AUTO: 1.2 K/UL (ref 1–4.8)
LYMPHOCYTES NFR BLD: 22.2 % (ref 18–48)
MCH RBC QN AUTO: 30.2 PG (ref 27–31)
MCHC RBC AUTO-ENTMCNC: 32.3 G/DL (ref 32–36)
MCV RBC AUTO: 93 FL (ref 82–98)
MONOCYTES # BLD AUTO: 0.8 K/UL (ref 0.3–1)
MONOCYTES NFR BLD: 15.1 % (ref 4–15)
NEUTROPHILS # BLD AUTO: 3.2 K/UL (ref 1.8–7.7)
NEUTROPHILS NFR BLD: 61.1 % (ref 38–73)
NRBC BLD-RTO: 0 /100 WBC
PLATELET # BLD AUTO: 68 K/UL (ref 150–450)
PMV BLD AUTO: 11.1 FL (ref 9.2–12.9)
POTASSIUM SERPL-SCNC: 3.9 MMOL/L (ref 3.5–5.1)
PROT SERPL-MCNC: 6.7 G/DL (ref 6–8.4)
RBC # BLD AUTO: 3.51 M/UL (ref 4–5.4)
SODIUM SERPL-SCNC: 142 MMOL/L (ref 136–145)
WBC # BLD AUTO: 5.17 K/UL (ref 3.9–12.7)

## 2024-07-25 PROCEDURE — 82378 CARCINOEMBRYONIC ANTIGEN: CPT | Performed by: INTERNAL MEDICINE

## 2024-07-25 PROCEDURE — 86301 IMMUNOASSAY TUMOR CA 19-9: CPT | Performed by: INTERNAL MEDICINE

## 2024-07-25 PROCEDURE — 85025 COMPLETE CBC W/AUTO DIFF WBC: CPT | Performed by: INTERNAL MEDICINE

## 2024-07-25 PROCEDURE — 80053 COMPREHEN METABOLIC PANEL: CPT | Performed by: INTERNAL MEDICINE

## 2024-07-25 PROCEDURE — 36415 COLL VENOUS BLD VENIPUNCTURE: CPT | Mod: PO | Performed by: INTERNAL MEDICINE

## 2024-07-26 ENCOUNTER — OFFICE VISIT (OUTPATIENT)
Dept: HEMATOLOGY/ONCOLOGY | Facility: CLINIC | Age: 59
End: 2024-07-26
Payer: COMMERCIAL

## 2024-07-26 DIAGNOSIS — C17.0 DUODENAL CANCER: ICD-10-CM

## 2024-07-26 DIAGNOSIS — T45.1X5A IMMUNODEFICIENCY DUE TO CHEMOTHERAPY: Primary | ICD-10-CM

## 2024-07-26 DIAGNOSIS — Z79.899 IMMUNODEFICIENCY DUE TO CHEMOTHERAPY: Primary | ICD-10-CM

## 2024-07-26 DIAGNOSIS — C79.89 SECONDARY MALIGNANCY OF SOFT TISSUES OF ABDOMEN: ICD-10-CM

## 2024-07-26 DIAGNOSIS — D84.821 IMMUNODEFICIENCY DUE TO CHEMOTHERAPY: Primary | ICD-10-CM

## 2024-07-26 NOTE — PROGRESS NOTES
Subjective:       Patient ID: Karen Gutierrez is a 59 y.o. female.    Chief Complaint: review labs. Chemo    The patient location is: home    The chief complaint leading to consultation is: lab review. chemo    Visit type: audiovisual    Face to Face time with patient: 5 minutes  30 minutes of total time spent on the encounter, which includes face to face time and non-face to face time preparing to see the patient (eg, review of tests), Obtaining and/or reviewing separately obtained history, Documenting clinical information in the electronic or other health record, Independently interpreting results (not separately reported) and communicating results to the patient/family/caregiver, or Care coordination (not separately reported).         Each patient to whom he or she provides medical services by telemedicine is:  (1) informed of the relationship between the physician and patient and the respective role of any other health care provider with respect to management of the patient; and (2) notified that he or she may decline to receive medical services by telemedicine and may withdraw from such care at any time.    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 She notes feeling well and denies clinical concerns. She notes tolerating treatments well overall outside of fatigue.    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 Patient  remained in surveillance until 4/2024. Unfortunately surveillance CT c/a/p at that time demonstrated abdominal wall mass 6.8 x 5.1 x 4.6 cm mass. Underwent biopsy with pathology resulting metastatic adenocarcinoma consistent with pancreatic origin. Patient resumed Q 2 weekly FOLFOX 5/2024. Most recent CT c/a/p 7/8/2024 reveals decreased size in abdominal mass (4.5 x 2.0 cm) and no new sites of metastatic disease.    Oncology History   Duodenal cancer   2/18/2021 Initial Diagnosis    Duodenal cancer     2/18/2021 Surgery    Preop Dx: Duodenal cancer  Postop Dx: same  Surgeon: Dr. Hitesh Díaz    Operative Procedure: Pylorus-preserving Whipple resection; segmental small bowel resection, Witzel jejunostomy     3/12/2021 Cancer Staged    Staging form: Small Intestine - Adenocarcinoma, AJCC 8th Edition  - Pathologic stage from 3/12/2021: Stage IIIB (pT4, pN2, cM0)     3/15/2021 Tumor Conference       [facility] UGI MULTIDISCIPLINARY TUMOR BOARD  PATIENT REVIEW FORM   ____________________________________________________________    CLINIC #: [id number]  DATE: 3/15/2021    DIAGNOSIS: duodenal CA    PRESENTER: Hitesh Díaz    PATIENT SUMMARY:   This 56 y/o female presented with high grade obstructing duodenal mass. CT scan from 2/2021 revealed focal wall thickening of 3rd portion of duodenum. EGD with bx performed and pathology revealed adenoma. However, based on clinical condition, it was assumed she had invasive cancer.     She underwent upfront Whipple per Dr. Hitesh Díaz on 2/18/2021. Today's presentation is for pathology review.   pT4N2; 3cm moderately diff intestinal type JENNIFER, invading into mesocolon and involves pancreatic parenchyma; margins negative, 5 out of 11 lymph nodes positive for malignancy. LVI present; intact staining    BOARD RECOMMENDATIONS:   Proceed with systemic adj FOLFOX chemotherapy    CONSULT NEEDED:     [] Surgery    [x] Hem/Onc in [place]    [] Rad/Onc    [] Dietary                 []  Social Service    [] Psychology       [] AES  [] Radiology     Pathologic Stage: Tumor 4 Node(s) 2  Metastasis 0   5 nodes out of 11 nodes were positive for malignancy      GROUP STAGE:  [] O    [] 1A    [] IB    [] IIA    [] IIB     [] IIIA     [x] IIIB     [] IIIC    []IV  [] Local recurrence     [] Regional recurrence     [] Distant recurrence   Metastatic site(s): none         [x] Terri'l Treatment Guidelines reviewed and care planned is consistent with guidelines.         (i.e., NCCN, NCI, PD, ACO, AUA, etc.)    PRESENTATION AT CANCER CONFERENCE:         [] Prospective    [x] Retrospective     [] Follow-Up       3/30/2021 - 10/21/2021 Chemotherapy    Treatment Summary   Plan Name: OP FOLFOX 6 Q2W  Treatment Goal: Control  Status: Inactive  Start Date: 3/30/2021  End Date: 10/21/2021  Provider: Enzo Daley MD  Chemotherapy: dexAMETHasone (DECADRON) 4 MG Tab, 8 mg, Oral, Daily, 1 of 1 cycle, Start date: 3/24/2021, End date: --  prochlorperazine (COMPAZINE) 10 MG tablet, 10 mg, Oral, 3 times daily PRN, 1 of 1 cycle, Start date: 3/23/2021, End date: --  fluorouraciL injection 825 mg, 400 mg/m2 = 825 mg, Intravenous, Clinic/HOD 1 time, 12 of 12 cycles  Administration: 825 mg (3/30/2021), 825 mg (4/13/2021), 825 mg (4/27/2021), 825 mg (5/11/2021), 825 mg (5/25/2021), 825 mg (6/22/2021), 825 mg (7/6/2021), 825 mg (8/3/2021), 825 mg (9/1/2021), 825 mg (9/14/2021), 825 mg (9/28/2021), 825 mg (10/19/2021)  fluorouracil (ADRUCIL) 2,400 mg/m2 = 4,945 mg in sodium chloride 0.9% 100 mL chemo infusion, 2,400 mg/m2 = 4,945 mg, Intravenous, Over 46 hours, 12 of 12 cycles  Administration: 4,945 mg (3/30/2021), 4,945 mg (4/13/2021), 4,945 mg (4/27/2021), 4,945 mg (5/11/2021), 4,945 mg (5/25/2021), 4,945 mg (6/22/2021), 4,945 mg (7/6/2021), 5,000 mg (8/3/2021), 4,945 mg (9/1/2021), 4,945 mg (9/14/2021), 5,000 mg (9/28/2021), 4,945 mg (10/19/2021)  leucovorin calcium 400 mg/m2 = 825 mg in dextrose 5 % 291.25 mL infusion, 400  mg/m2 = 825 mg, Intravenous, Clinic/HOD 1 time, 12 of 12 cycles  Administration: 825 mg (3/30/2021), 825 mg (4/13/2021), 800 mg (4/27/2021), 800 mg (5/11/2021), 825 mg (5/25/2021), 825 mg (6/22/2021), 800 mg (7/6/2021), 825 mg (8/3/2021), 800 mg (9/1/2021), 800 mg (9/14/2021), 825 mg (9/28/2021), 800 mg (10/19/2021)  oxaliplatin (ELOXATIN) 85 mg/m2 = 175 mg in dextrose 5 % 535 mL chemo infusion, 85 mg/m2 = 175 mg, Intravenous, Clinic/HOD 1 time, 12 of 12 cycles  Administration: 175 mg (3/30/2021), 175 mg (4/13/2021), 175 mg (4/27/2021), 175 mg (5/11/2021), 175 mg (5/25/2021), 175 mg (6/22/2021), 175 mg (7/6/2021), 175 mg (8/3/2021), 175 mg (9/1/2021), 175 mg (9/14/2021), 175 mg (9/28/2021), 175 mg (10/19/2021)      Tumor Markers    Immunohistochemical studies for DNA mismatch repair proteins were performed on this tumor (block 4J)  and they demonstrate INTACT STAINING in all 4 proteins (MLH1, PMS2, MSH2, and MSH6). These results  indicate that this tumor is microsatellite stable (BERNA) and that Estrada syndrome (Hereditary Nonpolyposis  Colorectal Cancer, HNPCC) is unlikely     3/31/2021 Genetic Testing    Patient has genetic testing done for [employer] My Risk Germline Testing.                                           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 #[id number] date 03/31/2021     5/15/2024 Cancer Staged    Staging form: Small Intestine - Adenocarcinoma, AJCC 8th Edition  - Pathologic stage from 5/15/2024: Stage IV (cM1)     5/20/2024 -  Chemotherapy    Treatment Summary   Plan Name: OP GI mFOLFOX6 (oxaliplatin leucovorin fluorouracil) Q2W  Treatment Goal: Control  Status: Active  Start Date: 5/20/2024  End Date: 10/23/2024 (Planned)  Provider: Enzo Daley MD  Chemotherapy: fluorouraciL injection 885 mg, 400 mg/m2 = 885 mg, Intravenous, Clinic/HOD 1 time, 5 of 12 cycles  Administration: 885 mg (5/20/2024), 885 mg (6/3/2024), 885 mg  (6/17/2024), 890 mg (7/1/2024), 880 mg (7/15/2024)  oxaliplatin (ELOXATIN) 85 mg/m2 = 188 mg in dextrose 5 % (D5W) 602.6 mL chemo infusion, 85 mg/m2 = 188 mg, Intravenous, Clinic/HOD 1 time, 5 of 12 cycles  Administration: 188 mg (5/20/2024), 188 mg (6/3/2024), 188 mg (6/17/2024), 189 mg (7/1/2024), 187 mg (7/15/2024)  fluorouracil (Adrucil) 2,400 mg/m2 = 5,305 mg in sodium chloride 0.9% 240 mL chemo infusion, 2,400 mg/m2 = 5,305 mg, Intravenous, Over 46 hours, 5 of 12 cycles  Administration: 5,305 mg (5/20/2024), 5,305 mg (6/3/2024), 5,305 mg (6/17/2024), 5,330 mg (7/1/2024), 5,280 mg (7/15/2024)         Social History     Socioeconomic History    Marital status:    Tobacco Use    Smoking status: Former    Smokeless tobacco: Never   Substance and Sexual Activity    Alcohol use: Not Currently    Drug use: Never     Social Determinants of Health     Financial Resource Strain: Low Risk  (6/14/2024)    Overall Financial Resource Strain (CARDIA)     Difficulty of Paying Living Expenses: Not hard at all   Food Insecurity: No Food Insecurity (6/14/2024)    Hunger Vital Sign     Worried About Running Out of Food in the Last Year: Never true     Ran Out of Food in the Last Year: Never true   Physical Activity: Inactive (6/14/2024)    Exercise Vital Sign     Days of Exercise per Week: 0 days     Minutes of Exercise per Session: 0 min   Stress: No Stress Concern Present (6/14/2024)    [employer] - Occupational Stress Questionnaire     Feeling of Stress : Not at all   Housing Stability: Unknown (6/14/2024)    Housing Stability Vital Sign     Unable to Pay for Housing in the Last Year: No       Past Medical History:   Diagnosis Date    Cancer     duodenal cancer    Hypertension     Hypotension, iatrogenic     Mitral valve prolapse        Family History   Problem Relation Name Age of Onset    Ovarian cancer Mother      Atrial fibrillation Mother      Stroke Mother      Hyperlipidemia Mother       Diabetes Mother      Bladder Cancer Father      Hypertension Father      Diabetes Father      No Known Problems Sister      Diabetes Maternal Grandmother      Colon cancer Maternal Grandmother      Stroke Maternal Grandfather      Diabetes Paternal Grandmother      No Known Problems Paternal Grandfather         Past Surgical History:   Procedure Laterality Date    BUNIONECTOMY Left     HYSTERECTOMY  2000    INSERTION OF TUNNELED CENTRAL VENOUS CATHETER (CVC) WITH SUBCUTANEOUS PORT N/A 3/26/2021    Procedure: [id number];  Surgeon: Lauren Abraham MD;  Location: [facility] OR;  Service: General;  Laterality: N/A;    INSERTION OF VENOUS ACCESS PORT Right 5/13/2024    Procedure: INSERTION, VENOUS ACCESS PORT;  Surgeon: Alicia Hernandez MD;  Location: [facility] OR;  Service: General;  Laterality: Right;    LITHOTRIPSY      PLACEMENT OF JEJUNOSTOMY TUBE  2/18/2021    Procedure: INSERTION, JEJUNOSTOMY TUBE;  Surgeon: Hitesh Díaz MD;  Location: [facility] OR 2ND FLR;  Service: General;;    ULTRASOUND GUIDANCE Right 5/13/2024    Procedure: ULTRASOUND GUIDANCE;  Surgeon: Alicia Hernandez MD;  Location: [facility] OR;  Service: General;  Laterality: Right;    WHIPPLE PROCEDURE N/A 2/18/2021    Procedure: WHIPPLE PROCEDURE;  Surgeon: Hitesh Díaz MD;  Location: [facility] OR 2ND FLR;  Service: General;  Laterality: N/A;       Review of Systems   Constitutional:  Positive for fatigue. Negative for activity change, appetite change, chills, fever and unexpected weight change.   HENT:  Negative for congestion, mouth sores, nosebleeds, sore throat, trouble swallowing and voice change.    Respiratory:  Negative for cough, chest tightness, shortness of breath and wheezing.    Cardiovascular:  Negative for chest pain, palpitations and leg swelling.   Gastrointestinal:  Negative for abdominal distention, abdominal pain, blood in stool, constipation, diarrhea, nausea and vomiting.   Genitourinary:  Negative for difficulty urinating,  dysuria and hematuria.   Musculoskeletal:  Negative for arthralgias, back pain and myalgias.   Skin:  Negative for pallor, rash and wound.   Neurological:  Negative for dizziness, syncope, weakness and headaches.   Hematological:  Negative for adenopathy. Does not bruise/bleed easily.   Psychiatric/Behavioral:  The patient is not nervous/anxious.          Medication List with Changes/Refills   Current Medications    ACETAMINOPHEN (TYLENOL) 500 MG TABLET    Take 2 tablets (1,000 mg total) by mouth every 8 (eight) hours.    AMLODIPINE (NORVASC) 10 MG TABLET    Take 10 mg by mouth once daily.    ATOMOXETINE (STRATTERA) 40 MG CAPSULE    Take 1 capsule (40 mg total) by mouth once daily.    BYSTOLIC 20 MG TAB    Take 1 tablet by mouth once daily.    CALCIUM CARBONATE (OS-BAM) 600 MG CALCIUM (1,500 MG) TAB    Take 600 mg by mouth once daily.    CHOLECALCIFEROL, VITAMIN D3, 125 MCG (5,000 UNIT) CAPSULE    Take 5,000 Units by mouth once daily.    CLONAZEPAM (KLONOPIN) 0.5 MG TABLET    Take 1 tablet (0.5 mg total) by mouth nightly as needed for Anxiety.    CYANOCOBALAMIN (VITAMIN B-12) 1000 MCG TABLET    Take 1,000 mcg by mouth once daily.    ESCITALOPRAM OXALATE (LEXAPRO) 10 MG TABLET    Take 1 tablet (10 mg total) by mouth once daily.    IBUPROFEN (ADVIL,MOTRIN) 800 MG TABLET    Take 1 tablet (800 mg total) by mouth every 8 (eight) hours.    LIDOCAINE-PRILOCAINE (EMLA) CREAM    Apply to affected area once    MULTIVIT-MIN/IRON/FA/VIT K/LUT (CENTRUM SILVER WOMEN ORAL)    Take 1 each by mouth once daily.    OLANZAPINE (ZYPREXA) 5 MG TABLET    Take 1 tablet (5 mg total) by mouth every evening. Take nightly on days 1-3 of each chemotherapy cycle.    OLMESARTAN (BENICAR) 40 MG TABLET    TAKE 1 TABLET(40 MG) BY MOUTH EVERY DAY    ONDANSETRON (ZOFRAN-ODT) 4 MG TBDL    Take 1 tablet (4 mg total) by mouth 2 (two) times daily.    PROCHLORPERAZINE (COMPAZINE) 10 MG TABLET    Take 1 tablet (10 mg total) by mouth every 6 (six) hours as  needed for Nausea.     Objective:   There were no vitals filed for this visit.  Lab Results   Component Value Date    WBC 5.17 07/25/2024    HGB 10.6 (L) 07/25/2024    HCT 32.8 (L) 07/25/2024    MCV 93 07/25/2024    PLT 68 (L) 07/25/2024       BMP  Lab Results   Component Value Date     07/25/2024    K 3.9 07/25/2024     07/25/2024    CO2 25 07/25/2024    BUN 17 07/25/2024    CREATININE 0.9 07/25/2024    CALCIUM 9.0 07/25/2024    ANIONGAP 8 07/25/2024    EGFRNORACEVR >60 07/25/2024     Lab Results   Component Value Date    ALT 36 07/25/2024    AST 37 07/25/2024    ALKPHOS 180 (H) 07/25/2024    BILITOT 0.3 07/25/2024       Physical Exam  Pulmonary:      Effort: Pulmonary effort is normal. No respiratory distress.   Neurological:      Mental Status: She is alert and oriented to person, place, and time.          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 Plts 68K, otherwise unremarkable. Hold chemo due to thrombocytopenia. F/u 1 week with repeat labs for consideration of delayed chemo         Secondary malignancy of soft tissues of abdomen     Hold chemo. F/u 1 week with repeat labs and consideration of delayed chemo              Plan:     Immunodeficiency due to chemotherapy    Duodenal cancer    Secondary malignancy of soft tissues of abdomen            Med Onc Chart Routing      Follow up with physician 1 week. Dr. Daley/[facility]   Follow up with [facility]    Infusion scheduling note    FOLFOX   Injection scheduling note    Labs CBC, CMP, magnesium, CEA and CA 19-9   Scheduling:  Preferred lab:  Lab interval:  decoupled   Imaging None      Pharmacy appointment No pharmacy appointment needed      Other referrals       No additional referrals needed               MARTY Puckett

## 2024-07-26 NOTE — ASSESSMENT & PLAN NOTE
Cancer Staging   Duodenal cancer  Staging form: Small Intestine - Adenocarcinoma, AJCC 8th Edition  - Pathologic stage from 3/12/2021: Stage IIIB (pT4, pN2, cM0) - Signed by Velvet Faith APRN, ANP-C on 3/14/2021  - Pathologic stage from 5/15/2024: Stage IV (cM1) - Signed by Alicia Hernandez MD on 5/15/2024    Laboratory review significant for thrombocytopenia. Plts 68K, otherwise unremarkable. Hold chemo due to thrombocytopenia. F/u 1 week with repeat labs for consideration of delayed chemo

## 2024-07-28 ENCOUNTER — PATIENT MESSAGE (OUTPATIENT)
Dept: HEMATOLOGY/ONCOLOGY | Facility: CLINIC | Age: 59
End: 2024-07-28
Payer: COMMERCIAL

## 2024-08-01 ENCOUNTER — LAB VISIT (OUTPATIENT)
Dept: LAB | Facility: HOSPITAL | Age: 59
End: 2024-08-01
Attending: INTERNAL MEDICINE
Payer: COMMERCIAL

## 2024-08-01 DIAGNOSIS — C17.0 DUODENAL CANCER: ICD-10-CM

## 2024-08-01 LAB
ALBUMIN SERPL BCP-MCNC: 4.2 G/DL (ref 3.5–5.2)
ALP SERPL-CCNC: 148 U/L (ref 55–135)
ALT SERPL W/O P-5'-P-CCNC: 39 U/L (ref 10–44)
ANION GAP SERPL CALC-SCNC: 10 MMOL/L (ref 8–16)
AST SERPL-CCNC: 48 U/L (ref 10–40)
BASOPHILS # BLD AUTO: 0.01 K/UL (ref 0–0.2)
BASOPHILS NFR BLD: 0.2 % (ref 0–1.9)
BILIRUB SERPL-MCNC: 0.7 MG/DL (ref 0.1–1)
BUN SERPL-MCNC: 16 MG/DL (ref 6–20)
CALCIUM SERPL-MCNC: 9.3 MG/DL (ref 8.7–10.5)
CANCER AG19-9 SERPL-ACNC: 12.1 U/ML (ref 0–40)
CEA SERPL-MCNC: 1.7 NG/ML (ref 0–5)
CHLORIDE SERPL-SCNC: 106 MMOL/L (ref 95–110)
CO2 SERPL-SCNC: 26 MMOL/L (ref 23–29)
CREAT SERPL-MCNC: 1 MG/DL (ref 0.5–1.4)
DIFFERENTIAL METHOD BLD: ABNORMAL
EOSINOPHIL # BLD AUTO: 0.1 K/UL (ref 0–0.5)
EOSINOPHIL NFR BLD: 1.7 % (ref 0–8)
ERYTHROCYTE [DISTWIDTH] IN BLOOD BY AUTOMATED COUNT: 18 % (ref 11.5–14.5)
EST. GFR  (NO RACE VARIABLE): >60 ML/MIN/1.73 M^2
GLUCOSE SERPL-MCNC: 127 MG/DL (ref 70–110)
HCT VFR BLD AUTO: 38.7 % (ref 37–48.5)
HGB BLD-MCNC: 12.4 G/DL (ref 12–16)
IMM GRANULOCYTES # BLD AUTO: 0.02 K/UL (ref 0–0.04)
IMM GRANULOCYTES NFR BLD AUTO: 0.5 % (ref 0–0.5)
LYMPHOCYTES # BLD AUTO: 1.3 K/UL (ref 1–4.8)
LYMPHOCYTES NFR BLD: 31 % (ref 18–48)
MAGNESIUM SERPL-MCNC: 2.2 MG/DL (ref 1.6–2.6)
MCH RBC QN AUTO: 31.2 PG (ref 27–31)
MCHC RBC AUTO-ENTMCNC: 32 G/DL (ref 32–36)
MCV RBC AUTO: 97 FL (ref 82–98)
MONOCYTES # BLD AUTO: 0.6 K/UL (ref 0.3–1)
MONOCYTES NFR BLD: 15.4 % (ref 4–15)
NEUTROPHILS # BLD AUTO: 2.1 K/UL (ref 1.8–7.7)
NEUTROPHILS NFR BLD: 51.2 % (ref 38–73)
NRBC BLD-RTO: 0 /100 WBC
PLATELET # BLD AUTO: 150 K/UL (ref 150–450)
PLATELET BLD QL SMEAR: ABNORMAL
PMV BLD AUTO: 10.4 FL (ref 9.2–12.9)
POTASSIUM SERPL-SCNC: 4.6 MMOL/L (ref 3.5–5.1)
PROT SERPL-MCNC: 7.2 G/DL (ref 6–8.4)
RBC # BLD AUTO: 3.98 M/UL (ref 4–5.4)
SODIUM SERPL-SCNC: 142 MMOL/L (ref 136–145)
WBC # BLD AUTO: 4.03 K/UL (ref 3.9–12.7)

## 2024-08-01 PROCEDURE — 82378 CARCINOEMBRYONIC ANTIGEN: CPT | Performed by: INTERNAL MEDICINE

## 2024-08-01 PROCEDURE — 85025 COMPLETE CBC W/AUTO DIFF WBC: CPT | Performed by: INTERNAL MEDICINE

## 2024-08-01 PROCEDURE — 36415 COLL VENOUS BLD VENIPUNCTURE: CPT | Mod: PO | Performed by: INTERNAL MEDICINE

## 2024-08-01 PROCEDURE — 86301 IMMUNOASSAY TUMOR CA 19-9: CPT | Performed by: INTERNAL MEDICINE

## 2024-08-01 PROCEDURE — 83735 ASSAY OF MAGNESIUM: CPT | Performed by: INTERNAL MEDICINE

## 2024-08-01 PROCEDURE — 80053 COMPREHEN METABOLIC PANEL: CPT | Performed by: INTERNAL MEDICINE

## 2024-08-02 ENCOUNTER — OFFICE VISIT (OUTPATIENT)
Dept: HEMATOLOGY/ONCOLOGY | Facility: CLINIC | Age: 59
End: 2024-08-02
Payer: COMMERCIAL

## 2024-08-02 DIAGNOSIS — C17.0 DUODENAL CANCER: Primary | ICD-10-CM

## 2024-08-02 DIAGNOSIS — C79.89 SECONDARY MALIGNANCY OF SOFT TISSUES OF ABDOMEN: ICD-10-CM

## 2024-08-02 RX ORDER — DIPHENHYDRAMINE HYDROCHLORIDE 50 MG/ML
50 INJECTION INTRAMUSCULAR; INTRAVENOUS ONCE AS NEEDED
OUTPATIENT
Start: 2024-08-05

## 2024-08-02 RX ORDER — HEPARIN 100 UNIT/ML
500 SYRINGE INTRAVENOUS
OUTPATIENT
Start: 2024-08-07

## 2024-08-02 RX ORDER — EPINEPHRINE 0.3 MG/.3ML
0.3 INJECTION SUBCUTANEOUS ONCE AS NEEDED
OUTPATIENT
Start: 2024-08-05

## 2024-08-02 RX ORDER — FLUOROURACIL 50 MG/ML
400 INJECTION, SOLUTION INTRAVENOUS
OUTPATIENT
Start: 2024-08-05

## 2024-08-02 RX ORDER — PROCHLORPERAZINE EDISYLATE 5 MG/ML
10 INJECTION INTRAMUSCULAR; INTRAVENOUS ONCE AS NEEDED
OUTPATIENT
Start: 2024-08-05

## 2024-08-02 RX ORDER — SODIUM CHLORIDE 0.9 % (FLUSH) 0.9 %
10 SYRINGE (ML) INJECTION
OUTPATIENT
Start: 2024-08-05

## 2024-08-02 RX ORDER — SODIUM CHLORIDE 0.9 % (FLUSH) 0.9 %
10 SYRINGE (ML) INJECTION
OUTPATIENT
Start: 2024-08-07

## 2024-08-02 RX ORDER — HEPARIN 100 UNIT/ML
500 SYRINGE INTRAVENOUS
OUTPATIENT
Start: 2024-08-05

## 2024-08-02 RX ORDER — PROCHLORPERAZINE EDISYLATE 5 MG/ML
10 INJECTION INTRAMUSCULAR; INTRAVENOUS ONCE AS NEEDED
OUTPATIENT
Start: 2024-08-07

## 2024-08-02 NOTE — PROGRESS NOTES
Subjective:       Patient ID: Karen Gutierrez is a 59 y.o. female.    Chief Complaint:   1. Duodenal cancer  Stage IV (cM1)       2. Secondary malignancy of soft tissues of abdomen          Current Treatment:  OP GI [id number] (oxaliplatin leucovorin fluorouracil) Q2W    Treatment History:  S/p Whipple by Dr. YOON Díaz on 2/18/2021        FOLFOX x 12 cycles completed in 10/2021    HPI: This is a 59 year old  woman with mitral valve prolapse, iatrogenic hypotension, and HTN who is seen in Hem/Onc for relapsed recurrent Stage IV duodenal cancer. 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 She remained in surveillance until 4/2024 when surveillance CT C/A/P demonstrated abdominal wall mass measuring 6.8 x 5.1 x 4.6 cm. She underwent biopsy which proved metastatic adenocarcinoma consistent with pancreatic origin.     She was resumed on FOLFOX every 2 weeks in 5/2024.     CT C/A/P in 7/2024 revealed a decrease in the size of her abdominal mass (4.5 x 2.0 cm)  and no new sites of metastatic disease.     Her primary Hematologist/Oncologist is Dr. Daley.    Interval History: Patient presents for follow up on FOLFOX; She is scheduled to receive C6D1 Monday. She presents alone at home and reports a good appetite despite taste changes. She also reports normal Bms. She has no complaints but admits to mild neuropathy in her fingers and toes. No neutropenia or anemia; plts WNL. CMP stable with improving Alk Phos and mildly elevated AST. Will proceed with treatment on Monday.     Reviewed labs with patient:   CBC:   Recent Labs   Lab 08/01/24  1118   WBC 4.03   RBC 3.98 L   Hemoglobin 12.4   Hematocrit 38.7   Platelets 150   MCV 97   MCH 31.2 H   MCHC 32.0     CMP:  Recent Labs   Lab 08/01/24  1118   Glucose 127 H   Calcium 9.3   Albumin 4.2   Total Protein 7.2   Sodium 142   Potassium 4.6   CO2 26    Chloride 106   BUN 16   Creatinine 1.0   Alkaline Phosphatase 148 H   ALT 39   AST 48 H   Total Bilirubin 0.7     The patient location is: [place]  The chief complaint leading to consultation is: duodenal cancer    Visit type: audiovisual    Face to Face time with patient: 9 minutes  73 minutes of total time spent on the encounter, which includes face to face time and non-face to face time preparing to see the patient (eg, review of tests), Obtaining and/or reviewing separately obtained history, Documenting clinical information in the electronic or other health record, Independently interpreting results (not separately reported) and communicating results to the patient/family/caregiver, or Care coordination (not separately reported).     Each patient to whom he or she provides medical services by telemedicine is:  (1) informed of the relationship between the physician and patient and the respective role of any other health care provider with respect to management of the patient; and (2) notified that he or she may decline to receive medical services by telemedicine and may withdraw from such care at any time.    Social History     Socioeconomic History    Marital status:    Tobacco Use    Smoking status: Former    Smokeless tobacco: Never   Substance and Sexual Activity    Alcohol use: Not Currently    Drug use: Never     Social Determinants of Health     Financial Resource Strain: Low Risk  (6/14/2024)    Overall Financial Resource Strain (CARDIA)     Difficulty of Paying Living Expenses: Not hard at all   Food Insecurity: No Food Insecurity (6/14/2024)    Hunger Vital Sign     Worried About Running Out of Food in the Last Year: Never true     Ran Out of Food in the Last Year: Never true   Physical Activity: Inactive (6/14/2024)    Exercise Vital Sign     Days of Exercise per Week: 0 days     Minutes of Exercise per Session: 0 min   Stress: No Stress Concern Present (6/14/2024)    [employer] - Occupational Stress Questionnaire     Feeling of Stress : Not at all   Housing Stability: Unknown (6/14/2024)    Housing Stability Vital Sign     Unable to Pay for Housing in the Last Year: No     Past Medical History:   Diagnosis Date    Cancer     duodenal cancer    Hypertension     Hypotension, iatrogenic     Mitral valve prolapse      Family History   Problem Relation Name Age of Onset    Ovarian cancer Mother      Atrial fibrillation Mother      Stroke Mother      Hyperlipidemia Mother      Diabetes Mother      Bladder Cancer Father      Hypertension Father      Diabetes Father      No Known Problems Sister      Diabetes Maternal Grandmother      Colon cancer Maternal Grandmother      Stroke Maternal Grandfather      Diabetes Paternal Grandmother      No Known Problems Paternal Grandfather       Past Surgical History:   Procedure Laterality Date    BUNIONECTOMY Left     HYSTERECTOMY  2000    INSERTION OF TUNNELED CENTRAL VENOUS CATHETER (CVC) WITH SUBCUTANEOUS PORT N/A 3/26/2021    Procedure: [id number];  Surgeon: Lauren Abraham MD;  Location: [facility] OR;  Service: General;  Laterality: N/A;    INSERTION OF VENOUS ACCESS PORT Right 5/13/2024    Procedure: INSERTION, VENOUS ACCESS PORT;  Surgeon: Alicia Hernandez MD;  Location: [facility] OR;  Service: General;  Laterality: Right;    LITHOTRIPSY      PLACEMENT OF JEJUNOSTOMY TUBE  2/18/2021    Procedure: INSERTION, JEJUNOSTOMY TUBE;  Surgeon: Hitesh Díaz MD;  Location: [facility] OR 2ND FLR;  Service: General;;    ULTRASOUND GUIDANCE Right 5/13/2024    Procedure: ULTRASOUND GUIDANCE;  Surgeon: Alicia Hernandez MD;  Location: [facility] OR;  Service: General;  Laterality: Right;    WHIPPLE PROCEDURE N/A 2/18/2021    Procedure: WHIPPLE PROCEDURE;  Surgeon: Hitesh Díaz MD;  Location: [facility] OR 2ND FLR;  Service: General;  Laterality: N/A;     Review of Systems   Constitutional:  Negative for appetite change and fatigue.   HENT:  Negative  for mouth sores, rhinorrhea and sore throat.    Eyes: Negative.    Respiratory: Negative.     Cardiovascular: Negative.    Gastrointestinal:  Negative for constipation, diarrhea, nausea and vomiting.   Endocrine: Negative.    Genitourinary: Negative.    Musculoskeletal: Negative.    Integumentary:  Negative.   Allergic/Immunologic: Negative.    Neurological:  Positive for numbness (mild in fingers and toes). Negative for weakness.   Hematological: Negative.    Psychiatric/Behavioral: Negative.         Medication List with Changes/Refills   Current Medications    ACETAMINOPHEN (TYLENOL) 500 MG TABLET    Take 2 tablets (1,000 mg total) by mouth every 8 (eight) hours.    AMLODIPINE (NORVASC) 10 MG TABLET    Take 10 mg by mouth once daily.    ATOMOXETINE (STRATTERA) 40 MG CAPSULE    Take 1 capsule (40 mg total) by mouth once daily.    BYSTOLIC 20 MG TAB    Take 1 tablet by mouth once daily.    CALCIUM CARBONATE (OS-BAM) 600 MG CALCIUM (1,500 MG) TAB    Take 600 mg by mouth once daily.    CHOLECALCIFEROL, VITAMIN D3, 125 MCG (5,000 UNIT) CAPSULE    Take 5,000 Units by mouth once daily.    CLONAZEPAM (KLONOPIN) 0.5 MG TABLET    Take 1 tablet (0.5 mg total) by mouth nightly as needed for Anxiety.    CYANOCOBALAMIN (VITAMIN B-12) 1000 MCG TABLET    Take 1,000 mcg by mouth once daily.    ESCITALOPRAM OXALATE (LEXAPRO) 10 MG TABLET    Take 1 tablet (10 mg total) by mouth once daily.    IBUPROFEN (ADVIL,MOTRIN) 800 MG TABLET    Take 1 tablet (800 mg total) by mouth every 8 (eight) hours.    LIDOCAINE-PRILOCAINE (EMLA) CREAM    Apply to affected area once    MULTIVIT-MIN/IRON/FA/VIT K/LUT (CENTRUM SILVER WOMEN ORAL)    Take 1 each by mouth once daily.    OLANZAPINE (ZYPREXA) 5 MG TABLET    Take 1 tablet (5 mg total) by mouth every evening. Take nightly on days 1-3 of each chemotherapy cycle.    OLMESARTAN (BENICAR) 40 MG TABLET    TAKE 1 TABLET(40 MG) BY MOUTH EVERY DAY    ONDANSETRON (ZOFRAN-ODT) 4 MG TBDL    Take 1 tablet (4  mg total) by mouth 2 (two) times daily.    PROCHLORPERAZINE (COMPAZINE) 10 MG TABLET    Take 1 tablet (10 mg total) by mouth every 6 (six) hours as needed for Nausea.     Objective:   There were no vitals filed for this visit.  Physical Exam     Unable to assess due to virtual visit.    (1) Restricted in physically strenuous activity, ambulatory and able to do work of light nature  Assessment:     Problem List Items Addressed This Visit          Oncology    Duodenal cancer - Primary     Stage IV (cM1) originally diagnosed as Stage IIIB and treated with resection followed by 12 cycles of FOLFOX completed in 10/2021. Surveillance CT C/A/P revealed an abdominal wall mass; biopsy proved metastatic adenocarcinoma of pancreatic origin. Currently on FOLFOX every 2 weeks.          Secondary malignancy of soft tissues of abdomen     Plan:     Duodenal cancer    Secondary malignancy of soft tissues of abdomen    Labs reviewed.   Ok to proceed with C6D1 of FOLFOX Monday.  Follow up in 2 weeks with Dr. Daley with  Mg, CBC, and Comprehensive Metabolic Panel prior to C7D1.    Route Chart for Scheduling    Med Onc Chart Routing      Follow up with physician 2 weeks. Dr. Daley with labs results prior to treatment   Follow up with [facility]    Infusion scheduling note   in 2 weeks for C7D1 FOLFOX   Injection scheduling note    Labs CBC, CMP and magnesium   Scheduling:  Preferred lab:  Lab interval:  in 2 weeks   Imaging None      Pharmacy appointment No pharmacy appointment needed      Other referrals       No additional referrals needed             I will review assessment/plan with collaborating physician.      SHAY Frey

## 2024-08-02 NOTE — ASSESSMENT & PLAN NOTE
Stage IV (cM1) originally diagnosed as Stage IIIB and treated with resection followed by 12 cycles of FOLFOX completed in 10/2021. Surveillance CT C/A/P revealed an abdominal wall mass; biopsy proved metastatic adenocarcinoma of pancreatic origin. Currently on FOLFOX every 2 weeks.

## 2024-08-05 ENCOUNTER — INFUSION (OUTPATIENT)
Dept: INFUSION THERAPY | Facility: HOSPITAL | Age: 59
End: 2024-08-05
Attending: INTERNAL MEDICINE
Payer: COMMERCIAL

## 2024-08-05 VITALS
HEART RATE: 68 BPM | WEIGHT: 208.13 LBS | SYSTOLIC BLOOD PRESSURE: 136 MMHG | DIASTOLIC BLOOD PRESSURE: 75 MMHG | TEMPERATURE: 98 F | BODY MASS INDEX: 28.19 KG/M2 | HEIGHT: 72 IN | RESPIRATION RATE: 16 BRPM | OXYGEN SATURATION: 97 %

## 2024-08-05 DIAGNOSIS — C17.0 DUODENAL CANCER: Primary | ICD-10-CM

## 2024-08-05 PROCEDURE — 96413 CHEMO IV INFUSION 1 HR: CPT

## 2024-08-05 PROCEDURE — 63600175 PHARM REV CODE 636 W HCPCS: Performed by: NURSE PRACTITIONER

## 2024-08-05 PROCEDURE — 96368 THER/DIAG CONCURRENT INF: CPT

## 2024-08-05 PROCEDURE — 25000003 PHARM REV CODE 250: Performed by: NURSE PRACTITIONER

## 2024-08-05 PROCEDURE — 96367 TX/PROPH/DG ADDL SEQ IV INF: CPT

## 2024-08-05 PROCEDURE — 96415 CHEMO IV INFUSION ADDL HR: CPT

## 2024-08-05 PROCEDURE — 96411 CHEMO IV PUSH ADDL DRUG: CPT

## 2024-08-05 PROCEDURE — 96416 CHEMO PROLONG INFUSE W/PUMP: CPT

## 2024-08-05 RX ORDER — SODIUM CHLORIDE 0.9 % (FLUSH) 0.9 %
10 SYRINGE (ML) INJECTION
Status: DISCONTINUED | OUTPATIENT
Start: 2024-08-05 | End: 2024-08-05 | Stop reason: HOSPADM

## 2024-08-05 RX ORDER — PROCHLORPERAZINE EDISYLATE 5 MG/ML
10 INJECTION INTRAMUSCULAR; INTRAVENOUS ONCE AS NEEDED
Status: DISCONTINUED | OUTPATIENT
Start: 2024-08-05 | End: 2024-08-05 | Stop reason: HOSPADM

## 2024-08-05 RX ORDER — FLUOROURACIL 50 MG/ML
400 INJECTION, SOLUTION INTRAVENOUS
Status: COMPLETED | OUTPATIENT
Start: 2024-08-05 | End: 2024-08-05

## 2024-08-05 RX ADMIN — FLUOROURACIL 880 MG: 50 INJECTION, SOLUTION INTRAVENOUS at 12:08

## 2024-08-05 RX ADMIN — FLUOROURACIL 5280 MG: 50 INJECTION, SOLUTION INTRAVENOUS at 12:08

## 2024-08-05 RX ADMIN — DEXAMETHASONE SODIUM PHOSPHATE 0.25 MG: 4 INJECTION, SOLUTION INTRA-ARTICULAR; INTRALESIONAL; INTRAMUSCULAR; INTRAVENOUS; SOFT TISSUE at 09:08

## 2024-08-05 RX ADMIN — LEUCOVORIN CALCIUM 880 MG: 350 INJECTION, POWDER, LYOPHILIZED, FOR SUSPENSION INTRAMUSCULAR; INTRAVENOUS at 10:08

## 2024-08-05 RX ADMIN — DEXTROSE MONOHYDRATE: 50 INJECTION, SOLUTION INTRAVENOUS at 09:08

## 2024-08-05 RX ADMIN — OXALIPLATIN 187 MG: 5 INJECTION, SOLUTION INTRAVENOUS at 10:08

## 2024-08-07 ENCOUNTER — INFUSION (OUTPATIENT)
Dept: INFUSION THERAPY | Facility: HOSPITAL | Age: 59
End: 2024-08-07
Attending: INTERNAL MEDICINE
Payer: COMMERCIAL

## 2024-08-07 VITALS
HEART RATE: 66 BPM | SYSTOLIC BLOOD PRESSURE: 112 MMHG | DIASTOLIC BLOOD PRESSURE: 71 MMHG | RESPIRATION RATE: 16 BRPM | TEMPERATURE: 97 F | OXYGEN SATURATION: 96 %

## 2024-08-07 DIAGNOSIS — C17.0 DUODENAL CANCER: Primary | ICD-10-CM

## 2024-08-07 PROCEDURE — A4216 STERILE WATER/SALINE, 10 ML: HCPCS | Performed by: NURSE PRACTITIONER

## 2024-08-07 PROCEDURE — 25000003 PHARM REV CODE 250: Performed by: NURSE PRACTITIONER

## 2024-08-07 PROCEDURE — 96377 APPLICATON ON-BODY INJECTOR: CPT

## 2024-08-07 PROCEDURE — 63600175 PHARM REV CODE 636 W HCPCS: Performed by: NURSE PRACTITIONER

## 2024-08-07 RX ORDER — SODIUM CHLORIDE 0.9 % (FLUSH) 0.9 %
10 SYRINGE (ML) INJECTION
Status: DISCONTINUED | OUTPATIENT
Start: 2024-08-07 | End: 2024-08-07 | Stop reason: HOSPADM

## 2024-08-07 RX ORDER — HEPARIN 100 UNIT/ML
500 SYRINGE INTRAVENOUS
Status: DISCONTINUED | OUTPATIENT
Start: 2024-08-07 | End: 2024-08-07 | Stop reason: HOSPADM

## 2024-08-07 RX ADMIN — HEPARIN 500 UNITS: 100 SYRINGE at 10:08

## 2024-08-07 RX ADMIN — Medication 10 ML: at 10:08

## 2024-08-07 RX ADMIN — PEGFILGRASTIM 6 MG: KIT SUBCUTANEOUS at 11:08

## 2024-08-14 ENCOUNTER — OFFICE VISIT (OUTPATIENT)
Dept: SURGICAL ONCOLOGY | Facility: CLINIC | Age: 59
End: 2024-08-14
Payer: COMMERCIAL

## 2024-08-14 VITALS
WEIGHT: 207.56 LBS | BODY MASS INDEX: 28.11 KG/M2 | OXYGEN SATURATION: 97 % | TEMPERATURE: 99 F | HEIGHT: 72 IN | HEART RATE: 70 BPM | DIASTOLIC BLOOD PRESSURE: 81 MMHG | SYSTOLIC BLOOD PRESSURE: 125 MMHG

## 2024-08-14 DIAGNOSIS — C17.0 DUODENAL CANCER: ICD-10-CM

## 2024-08-14 PROCEDURE — 99999 PR PBB SHADOW E&M-EST. PATIENT-LVL IV: CPT | Mod: PBBFAC,,, | Performed by: SURGERY

## 2024-08-14 PROCEDURE — 3008F BODY MASS INDEX DOCD: CPT | Mod: CPTII,S$GLB,, | Performed by: SURGERY

## 2024-08-14 PROCEDURE — 3079F DIAST BP 80-89 MM HG: CPT | Mod: CPTII,S$GLB,, | Performed by: SURGERY

## 2024-08-14 PROCEDURE — 3074F SYST BP LT 130 MM HG: CPT | Mod: CPTII,S$GLB,, | Performed by: SURGERY

## 2024-08-14 PROCEDURE — 99215 OFFICE O/P EST HI 40 MIN: CPT | Mod: S$GLB,,, | Performed by: SURGERY

## 2024-08-14 PROCEDURE — 4010F ACE/ARB THERAPY RXD/TAKEN: CPT | Mod: CPTII,S$GLB,, | Performed by: SURGERY

## 2024-08-14 NOTE — PROGRESS NOTES
Surgical Oncology Clinic Note      Referring Provider: Dr. Enzo Daley   PCP: Han Arshad MD    Reason For Visit: Gastroesophageal:  duodenal malignancy- recurrent/ metastatic     Oncologic History:   Oncology History   Duodenal cancer   2/18/2021 Initial Diagnosis    Duodenal cancer     2/18/2021 Surgery    Preop Dx: Duodenal cancer  Postop Dx: same  Surgeon: Dr. Hitesh Díaz    Operative Procedure: Pylorus-preserving Whipple resection; segmental small bowel resection, Witzel jejunostomy     3/12/2021 Cancer Staged    Staging form: Small Intestine - Adenocarcinoma, AJCC 8th Edition  - Pathologic stage from 3/12/2021: Stage IIIB (pT4, pN2, cM0)     3/15/2021 Tumor Conference       [facility] UGI MULTIDISCIPLINARY TUMOR BOARD  PATIENT REVIEW FORM   ____________________________________________________________    CLINIC #: [id number]  DATE: 3/15/2021    DIAGNOSIS: duodenal CA    PRESENTER: Hitesh Díaz    PATIENT SUMMARY:   This 54 y/o female presented with high grade obstructing duodenal mass. CT scan from 2/2021 revealed focal wall thickening of 3rd portion of duodenum. EGD with bx performed and pathology revealed adenoma. However, based on clinical condition, it was assumed she had invasive cancer.     She underwent upfront Whipple per Dr. Hitesh Díaz on 2/18/2021. Today's presentation is for pathology review.   pT4N2; 3cm moderately diff intestinal type JENNIFER, invading into mesocolon and involves pancreatic parenchyma; margins negative, 5 out of 11 lymph nodes positive for malignancy. LVI present; intact staining    BOARD RECOMMENDATIONS:   Proceed with systemic adj FOLFOX chemotherapy    CONSULT NEEDED:     [] Surgery    [x] Hem/Onc in [place]    [] Rad/Onc    [] Dietary                 [] Social Service    [] Psychology       [] AES  [] Radiology     Pathologic Stage: Tumor 4 Node(s) 2  Metastasis 0   5 nodes out of 11 nodes were positive for malignancy      GROUP STAGE:  [] O    [] 1A     [] IB    [] IIA    [] IIB     [] IIIA     [x] IIIB     [] IIIC    []IV  [] Local recurrence     [] Regional recurrence     [] Distant recurrence   Metastatic site(s): none         [x] Terri'l Treatment Guidelines reviewed and care planned is consistent with guidelines.         (i.e., NCCN, NCI, PD, ACO, AUA, etc.)    PRESENTATION AT CANCER CONFERENCE:         [] Prospective    [x] Retrospective     [] Follow-Up       3/30/2021 - 10/21/2021 Chemotherapy    Treatment Summary   Plan Name: OP FOLFOX 6 Q2W  Treatment Goal: Control  Status: Inactive  Start Date: 3/30/2021  End Date: 10/21/2021  Provider: Enzo Daley MD  Chemotherapy: dexAMETHasone (DECADRON) 4 MG Tab, 8 mg, Oral, Daily, 1 of 1 cycle, Start date: 3/24/2021, End date: --  prochlorperazine (COMPAZINE) 10 MG tablet, 10 mg, Oral, 3 times daily PRN, 1 of 1 cycle, Start date: 3/23/2021, End date: --  fluorouraciL injection 825 mg, 400 mg/m2 = 825 mg, Intravenous, Clinic/[facility] 1 time, 12 of 12 cycles  Administration: 825 mg (3/30/2021), 825 mg (4/13/2021), 825 mg (4/27/2021), 825 mg (5/11/2021), 825 mg (5/25/2021), 825 mg (6/22/2021), 825 mg (7/6/2021), 825 mg (8/3/2021), 825 mg (9/1/2021), 825 mg (9/14/2021), 825 mg (9/28/2021), 825 mg (10/19/2021)  fluorouracil (ADRUCIL) 2,400 mg/m2 = 4,945 mg in sodium chloride 0.9% 100 mL chemo infusion, 2,400 mg/m2 = 4,945 mg, Intravenous, Over 46 hours, 12 of 12 cycles  Administration: 4,945 mg (3/30/2021), 4,945 mg (4/13/2021), 4,945 mg (4/27/2021), 4,945 mg (5/11/2021), 4,945 mg (5/25/2021), 4,945 mg (6/22/2021), 4,945 mg (7/6/2021), 5,000 mg (8/3/2021), 4,945 mg (9/1/2021), 4,945 mg (9/14/2021), 5,000 mg (9/28/2021), 4,945 mg (10/19/2021)  leucovorin calcium 400 mg/m2 = 825 mg in dextrose 5 % 291.25 mL infusion, 400 mg/m2 = 825 mg, Intravenous, Clinic/[facility] 1 time, 12 of 12 cycles  Administration: 825 mg (3/30/2021), 825 mg (4/13/2021), 800 mg (4/27/2021), 800 mg (5/11/2021), 825 mg (5/25/2021), 825 mg (6/22/2021), 800 mg  (7/6/2021), 825 mg (8/3/2021), 800 mg (9/1/2021), 800 mg (9/14/2021), 825 mg (9/28/2021), 800 mg (10/19/2021)  oxaliplatin (ELOXATIN) 85 mg/m2 = 175 mg in dextrose 5 % 535 mL chemo infusion, 85 mg/m2 = 175 mg, Intravenous, Clinic/[facility] 1 time, 12 of 12 cycles  Administration: 175 mg (3/30/2021), 175 mg (4/13/2021), 175 mg (4/27/2021), 175 mg (5/11/2021), 175 mg (5/25/2021), 175 mg (6/22/2021), 175 mg (7/6/2021), 175 mg (8/3/2021), 175 mg (9/1/2021), 175 mg (9/14/2021), 175 mg (9/28/2021), 175 mg (10/19/2021)      Tumor Markers    Immunohistochemical studies for DNA mismatch repair proteins were performed on this tumor (block 4J)  and they demonstrate INTACT STAINING in all 4 proteins (MLH1, PMS2, MSH2, and MSH6). These results  indicate that this tumor is microsatellite stable (BERNA) and that Estrada syndrome (Hereditary Nonpolyposis  Colorectal Cancer, HNPCC) is unlikely     3/31/2021 Genetic Testing    Patient has genetic testing done for [facility] My Risk Germline Testing.                                           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 date 03/31/2021     5/15/2024 Cancer Staged    Staging form: Small Intestine - Adenocarcinoma, AJCC 8th Edition  - Pathologic stage from 5/15/2024: Stage IV (cM1)     5/20/2024 -  Chemotherapy    Treatment Summary   Plan Name: OP GI mFOLFOX6 (oxaliplatin leucovorin fluorouracil) Q2W  Treatment Goal: Control  Status: Active  Start Date: 5/20/2024  End Date: 10/30/2024 (Planned)  Provider: Enzo Daley MD  Chemotherapy: fluorouraciL injection 885 mg, 400 mg/m2 = 885 mg, Intravenous, Clinic/[facility] 1 time, 6 of 12 cycles  Administration: 885 mg (5/20/2024), 885 mg (6/3/2024), 885 mg (6/17/2024), 890 mg (7/1/2024), 880 mg (7/15/2024), 880 mg (8/5/2024)  oxaliplatin (ELOXATIN) 85 mg/m2 = 188 mg in dextrose 5 % (D5W) 602.6 mL chemo infusion, 85 mg/m2 = 188 mg, Intravenous, Clinic/[facility] 1 time, 6  of 12 cycles  Administration: 188 mg (5/20/2024), 188 mg (6/3/2024), 188 mg (6/17/2024), 189 mg (7/1/2024), 187 mg (7/15/2024), 187 mg (8/5/2024)  fluorouracil (Adrucil) 2,400 mg/m2 = 5,305 mg in sodium chloride 0.9% 240 mL chemo infusion, 2,400 mg/m2 = 5,305 mg, Intravenous, Over 46 hours, 6 of 12 cycles  Administration: 5,305 mg (5/20/2024), 5,305 mg (6/3/2024), 5,305 mg (6/17/2024), 5,330 mg (7/1/2024), 5,280 mg (7/15/2024), 5,280 mg (8/5/2024)           Staging:  Cancer Staging   Duodenal cancer  Staging form: Small Intestine - Adenocarcinoma, AJCC 8th Edition  - Pathologic stage from 3/12/2021: Stage IIIB (pT4, pN2, cM0) - Signed by Velvet Faith APRNANP-C on 3/14/2021  - Pathologic stage from 5/15/2024: Stage IV (cM1) - Signed by Alicia Hernandez MD on 5/15/2024       HISTORY       Karen Gutierrez is a 59 y.o. female with history of duodenal adenocarcinoma s/p  who presents today for evaluation and management of recurrent duodenal carcinoma.    She originally presented in February of 2021 with a completely obstructing duodenal mass.  She underwent G-tube and J-tube placement it was then followed with Dr. Hitesh Díaz in [place].  She subsequently underwent a Whipple on 02/11/2021.  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  Post op course was c/b abscess requiring drainage.  She received 12 cycles of FOLFOX adjuvantly which she tolerated well, and which she completed in November 2021.   Her GB and her J-tube both came out and she has been on surveillance since that point in time.  Surveillance imaging in April of this year showed a new heterogeneous mass in the anterior abdominal wall measuring 6.8 x 5.1 x 4.6 cm concerning for tumor recurrence versus hematoma.  She underwent biopsy which did confirm metastatic disease.  She was discussed at tumor board with agreement for systemic chemotherapy and presents today for discussion of port placement.    Overall she feels well and  is doing well.  She presents today with her sister.    INTERVAL HISTORY      08/14/2024:  She's had 4 cycles chemo so far.  She is tolerating it well.  Her tumor has shrunk significantly.       Past Medical History:   Diagnosis Date    Cancer     duodenal cancer    Hypertension     Hypotension, iatrogenic     Mitral valve prolapse        Past Surgical History:   Procedure Laterality Date    BUNIONECTOMY Left     HYSTERECTOMY  2000    INSERTION OF TUNNELED CENTRAL VENOUS CATHETER (CVC) WITH SUBCUTANEOUS PORT N/A 3/26/2021    Procedure: [id number]-A-CATH;  Surgeon: Lauren Abraham MD;  Location: [facility] OR;  Service: General;  Laterality: N/A;    INSERTION OF VENOUS ACCESS PORT Right 5/13/2024    Procedure: INSERTION, VENOUS ACCESS PORT;  Surgeon: Alicia Hernandez MD;  Location: [facility] OR;  Service: General;  Laterality: Right;    LITHOTRIPSY      PLACEMENT OF JEJUNOSTOMY TUBE  2/18/2021    Procedure: INSERTION, JEJUNOSTOMY TUBE;  Surgeon: Hitesh Díaz MD;  Location: [facility] OR 2ND FLR;  Service: General;;    ULTRASOUND GUIDANCE Right 5/13/2024    Procedure: ULTRASOUND GUIDANCE;  Surgeon: Alicia Hernandez MD;  Location: [facility] OR;  Service: General;  Laterality: Right;    WHIPPLE PROCEDURE N/A 2/18/2021    Procedure: WHIPPLE PROCEDURE;  Surgeon: Hitesh Díaz MD;  Location: [facility] OR 2ND FLR;  Service: General;  Laterality: N/A;       Family History   Problem Relation Name Age of Onset    Ovarian cancer Mother      Atrial fibrillation Mother      Stroke Mother      Hyperlipidemia Mother      Diabetes Mother      Bladder Cancer Father      Hypertension Father      Diabetes Father      No Known Problems Sister      Diabetes Maternal Grandmother      Colon cancer Maternal Grandmother      Stroke Maternal Grandfather      Diabetes Paternal Grandmother      No Known Problems Paternal Grandfather         Social History     Socioeconomic History    Marital status:    Tobacco Use    Smoking status: Former     Smokeless tobacco: Never   Substance and Sexual Activity    Alcohol use: Not Currently    Drug use: Never     Social Determinants of Health     Financial Resource Strain: Low Risk  (6/14/2024)    Overall Financial Resource Strain (CARDIA)     Difficulty of Paying Living Expenses: Not hard at all   Food Insecurity: No Food Insecurity (6/14/2024)    Hunger Vital Sign     Worried About Running Out of Food in the Last Year: Never true     Ran Out of Food in the Last Year: Never true   Physical Activity: Inactive (6/14/2024)    Exercise Vital Sign     Days of Exercise per Week: 0 days     Minutes of Exercise per Session: 0 min   Stress: No Stress Concern Present (6/14/2024)    [employer] - Occupational Stress Questionnaire     Feeling of Stress : Not at all   Housing Stability: Unknown (6/14/2024)    Housing Stability Vital Sign     Unable to Pay for Housing in the Last Year: No          Medication List            Accurate as of August 14, 2024 11:01 AM. If you have any questions, ask your nurse or doctor.                CONTINUE taking these medications      acetaminophen 500 MG tablet  Commonly known as: TYLENOL  Take 2 tablets (1,000 mg total) by mouth every 8 (eight) hours.     amLODIPine 10 MG tablet  Commonly known as: NORVASC     atomoxetine 40 MG capsule  Commonly known as: STRATTERA  Take 1 capsule (40 mg total) by mouth once daily.     BYSTOLIC 20 mg Tab  Generic drug: nebivoloL     calcium carbonate 600 mg calcium (1,500 mg) Tab  Commonly known as: OS-BAM     CENTRUM SILVER WOMEN ORAL     cholecalciferol (vitamin D3) 125 mcg (5,000 unit) capsule     clonazePAM 0.5 MG tablet  Commonly known as: KlonoPIN  Take 1 tablet (0.5 mg total) by mouth nightly as needed for Anxiety.     cyanocobalamin 1000 MCG tablet  Commonly known as: VITAMIN B-12     EScitalopram oxalate 10 MG tablet  Commonly known as: LEXAPRO  Take 1 tablet (10 mg total) by mouth once daily.     ibuprofen 800 MG  tablet  Commonly known as: ADVIL,MOTRIN  Take 1 tablet (800 mg total) by mouth every 8 (eight) hours.     LIDOcaine-prilocaine cream  Commonly known as: EMLA  Apply to affected area once     OLANZapine 5 MG tablet  Commonly known as: ZyPREXA  Take 1 tablet (5 mg total) by mouth every evening. Take nightly on days 1-3 of each chemotherapy cycle.     olmesartan 40 MG tablet  Commonly known as: BENICAR  TAKE 1 TABLET(40 MG) BY MOUTH EVERY DAY     ondansetron 4 MG Tbdl  Commonly known as: ZOFRAN-ODT  Take 1 tablet (4 mg total) by mouth 2 (two) times daily.     prochlorperazine 10 MG tablet  Commonly known as: COMPAZINE  Take 1 tablet (10 mg total) by mouth every 6 (six) hours as needed for Nausea.              Review of patient's allergies indicates:   Allergen Reactions    Benzalkonium chloride Hives    Codeine Itching    Morphine Nausea Only    Neosporin [neomycin-bacitracin-polymyxin] Hives    Sulfa (sulfonamide antibiotics) Hives and Itching           Morphine sulfate Other (See Comments)    Sulfamethoxazole-trimethoprim Other (See Comments)    Hydrocortisone Hives     Redness / can use bactroban         Review of Systems   Constitutional:  Negative for chills, fever, malaise/fatigue and weight loss.   Respiratory:  Negative for cough, shortness of breath and wheezing.    Cardiovascular:  Negative for chest pain and palpitations.   Gastrointestinal:  Negative for abdominal pain, constipation, diarrhea, nausea and vomiting.   Musculoskeletal:  Negative for back pain, falls and joint pain.   Neurological:  Negative for dizziness, sensory change, speech change, focal weakness, seizures, loss of consciousness and weakness.   Psychiatric/Behavioral:  Negative for depression and hallucinations. The patient is not nervous/anxious.          Vitals:    08/14/24 1046   BP: 125/81   Pulse: 70   Temp: 98.5 °F (36.9 °C)     Body mass index is 28.15 kg/m².  ECOG SCORE             PHYSICAL EXAM     Physical Exam  Vitals reviewed.    Constitutional:       General: She is not in acute distress.     Appearance: Normal appearance.   HENT:      Head: Normocephalic and atraumatic.      Mouth/Throat:      Mouth: Mucous membranes are moist.   Eyes:      General: No scleral icterus.     Extraocular Movements: Extraocular movements intact.      Conjunctiva/sclera: Conjunctivae normal.   Pulmonary:      Effort: Pulmonary effort is normal.   Abdominal:      General: There is no distension.      Palpations: Abdomen is soft.      Tenderness: There is no abdominal tenderness.      Comments: Firm lesion along to the left of midline near the umbilicus with some associated skin thickening consistent with her known metastatic disease- much smaller in size that previously.  Palpable hernia along upper midline    Musculoskeletal:         General: No swelling. Normal range of motion.   Skin:     General: Skin is warm and dry.   Neurological:      General: No focal deficit present.      Mental Status: She is alert.   Psychiatric:         Mood and Affect: Mood normal.         Behavior: Behavior normal.         Thought Content: Thought content normal.         Judgment: Judgment normal.          DATA REVIEW:  I personally reviewed the following records for this visit: lab work from prior visit, notes from other physicians, computed tomography/CT imaging, surgical pathology results, radiographic study evaluation, and laboratory results done by primary care physician    LABS       Lab Results   Component Value Date    WBC 4.03 08/01/2024    HGB 12.4 08/01/2024    HCT 38.7 08/01/2024     08/01/2024     Lab Results   Component Value Date     (H) 08/01/2024    CALCIUM 9.3 08/01/2024    ALBUMIN 4.2 08/01/2024    PROT 7.2 08/01/2024     08/01/2024    K 4.6 08/01/2024    CO2 26 08/01/2024     08/01/2024    BUN 16 08/01/2024    CREATININE 1.0 08/01/2024    ALKPHOS 148 (H) 08/01/2024    ALT 39 08/01/2024    AST 48 (H) 08/01/2024    BILITOT 0.7  08/01/2024       Nutritional:  Lab Results   Component Value Date    PREALBUMIN 11 (L) 03/04/2021       Tumor Markers:  Lab Results   Component Value Date    CEA 1.7 08/01/2024     Lab Results   Component Value Date     12.1 08/01/2024       PATHOLOGY     2/18/2021- Whipple (Dr. Hitesh Díaz)      Component 3 yr ago   Final Pathologic Diagnosis 1.  GALLBLADDER, CHOLECYSTECTOMY:  - Gallbladder with changes suggestive of cholesterolosis  - Fragment of hepatic parenchyma with no significant histopathologic  abnormality  - One benign lymph node (0/1)  2.  LYMPH NODE, HEPATIC ARTERY, EXCISION:  - One benign lymph node, negative for metastatic disease (0/1)  3.  LYMPH NODE, NETTIE HEPATITIS, EXCISION:  - One benign lymph node, negative for metastatic disease (0/1)  4.  DUODENUM AND PANCREAS, PANCREATICODUODENECTOMY (WHIPPLE PROCEDURE)  ([id number]):  - Invasive duodneal adenocarcinoma, intestinal-type,  moderately-differentiated, measuring 3.0 in greatest dimension  - Carcinoma involves pancreatic parenchyma and mesentery (mesocolon) of  adjacent transverse colon, pT4  - Metastatic adenocarcinoma present in five of eleven lymph nodes (5/11), pN2  - Surgical resection margins negative for neoplasia  - Please see comments for synoptic report  5.  SMALL INTESTINE, PARTIAL RESECTION:  - Segment of small intestine with a full thickness defect associated with  mucosal ulceration and acute serositis  - Resection margins viable  - No evidence of dysplasia or malignancy (multiple deeper levels examined)         Immunohistochemical studies for DNA mismatch repair proteins were performed  on this tumor (block 4J) and they demonstrate INTACT STAINING in all 4  proteins (MLH1, PMS2, MSH2, and MSH6).  These results indicate that this  tumor is microsatellite stable (BERNA) and that Estrada syndrome (Hereditary  Nonpolyposis Colorectal Cancer, HNPCC) is unlikely.  SMALL INTESTINE:    SPECIMEN      Procedure        Pancreaticoduodenectomy  (Whipple resection)    TUMOR      Tumor Site       Duodenum      Histologic Type:       Adenocarcinoma (not otherwise characterized)      Histologic Grade       G2: Moderately differentiated      Tumor Size       Greatest Dimension (Centimeters) 3.0 cm      Tumor Extension       Tumor invades other organs / structures  Other Organs / Structures           Mesentery of adjacent loops of bowel            Pancreas      Macroscopic Tumor Perforation       Not identified      Lymphovascular Invasion       Present          Margins Examined           Proximal            Distal            Uncinate            Bile duct            Pancreatic    LYMPH NODES      Regional Lymph Nodes         Number of Lymph Nodes  Involved           5          Number of Lymph Nodes Examined           11      Primary Tumor (pT)       pT4      Regional Lymph Nodes (pN)       pN2    ADDITIONAL FINDINGS      Additional Findings       Biliary intraepithelial  neoplasia, low-grade (BilIN 1) with foveolar metaplasia     05/02/2024:  IR biopsy abdominal wall mass      Component 13 d ago   Final Pathologic Diagnosis BIOPSY OF MASS FROM ABDOMINAL WALL:  METASTATIC ADENOCARCINOMA CONSISTENT WITH PANCREATIC ORIGIN       IMAGING     12/30/2020 CT Abdomen Pelvis Wwout Contrast (OLOL)  IMPRESSION:    1.  Arterial enhancing mass within the right hepatic lobe that is isodense on subsequent portal venous phase and delayed phase suggestive of a hemangioma. Follow-up CT of the liver three-phase or MRI of the abdomen with and without contrast in 6-12   months is recommended to confirm stability.    2.  Fluid distention of the stomach that may be secondary to ingestion of a recent meal or gastric bowel obstruction.   3. Nonspecific subcentimeter nodules in the right lung base. Fleischner Society 2017 guidelines for management of incidentally detected solid pulmonary nodules in adults (does not apply to patients younger than 35 years, CT lung  cancer screening,   patients with immunosuppression or patients with known primary cancer): Multiple solid nodules < 6 mm in diameter: For low risk patients no routine follow-up. For high risk patents optional CT at 12 months.   4. Postoperative changes of hysterectomy.   5. Nonobstructing left nephrolithiasis.   6. Possible reflux esophagitis.   7. Colonic diverticulosis.     02/11/2021  CT Chest, abdomen, and Pelvis  Impression:   1. Wall thickening and luminal narrowing of the 3rd portion of the duodenum concerning for neoplasm.  2. Small paraduodenal node measuring 7 mm.  3. Punctate nonobstructing left-sided nephrolith.  No hydronephrosis.     02/12/2021:  CT Abdomen/ Pelvis  Impression:   1. Focal wall thickening of the 3rd portion of duodenum, previously obstructing, now status post gastrostomy and jejunal tube placements.  This could represent duodenal carcinoma or lymphoma.  Pancreatic tumor thought less likely.  Minimal lymph node enlargement in the region.  No distant metastasis detected.  2. Status post hysterectomy and other findings as above.    02/21/2021  CTA Abdomen and Pelvis  Impression:   Postoperative changes of Whipple procedure and jejunostomy tube change as well as right quadrant terminating presumable drainage catheter.  Scattered free air, which may be postsurgical,  however unable to completely exclude bowel perforation.   Trace abdominal free fluid, small focal fluid collection without well-defined walls along the posterior aspect of the pancreas, mesenteric edema, and mild small bowel wall thickening near the bipin hepatis, possibly expected postsurgical changes.   Prominent loops of large bowel and distal small bowel measuring up to 3.0 cm.  No definite transition point.  Findings likely represent ileus.  No pneumatosis or portal venous gas.   Trace pleural effusions left greater than right with associated atelectasis.   Hepatomegaly.   No pseudoaneurysm or convincing evidence of active  bleeding or hematoma.     03/01/2021  CT Abdomen/ Pelvis  Impression:   1. Status post Whipple procedure.  Peripherally enhancing collection adjacent to the resection bed as above, nonspecific but concerning for abscess.  Small volume abdominal and pelvic free fluid.  2. Persistent haziness and wall thickening of small bowel loops in the bipin hepatis, possibly postoperative.  3. Trace pleural effusions with associated atelectasis, similar to prior.  4. Additional findings as above.    05/10/2021 CT Abdomen/ Pelvis  Impression:   Stable postoperative changes of a Whipple procedure interval resolution of the previously demonstrated fluid collection in the postoperative bed.  Resolution of previously noted trace left pleural effusion.  Other findings as discussed in the body of the report at above.    11/22/2021  CT CAP  Multiple small scattered bilateral pulmonary nodules all measuring less than 3 mm.  Findings may have been present on CT from 02/11/2021 although this is difficult to ascertain given differences in technique/slice acquisition.  At minimum, continued follow-up is suggested.   Stable postoperative changes of Whipple procedure without definitive evidence of residual/recurrent disease.    03/07/2022  CT  CAP  Stable CT examination of the chest, abdomen, and pelvis.  No definite evidence of recurrent or metastatic disease.     10/05/2022  CT CAP  Overall no detrimental change with findings as above.     04/12/2023  CT CAP  1.  No concerning interval change compared to the prior 2 examinations.  2.  Postoperative findings within the right upper quadrant likely with partial pancreatectomy and small bowel resection.  Loops of bowel are somewhat matted to the surface of the liver which is unchanged.  No definite masslike abnormality within this region.  3.  Hepatomegaly and fatty infiltration of the liver.  4.  Punctate nonobstructing stones appear left kidney.     10/11/2023  CT CAP  Stable postsurgical  changes in the right upper quadrant without evidence of recurrent or metastatic disease in the chest abdomen or pelvis.     04/19/2024:  CT CAP  1.  There is a new large heterogeneous mass identified in the anterior abdominal wall at the level of the umbilicus extending to the left of midline and measuring 6.8 x 5.1 x 4.6 cm.  New increased skin thickening is also visible in the umbilicus immediately anterior to this lesion.  Tumor recurrence is a consideration although rectus sheath hematoma or an inflammatory process are also considerations.  This lesion is easily amenable to percutaneous biopsy.  Characterization of this lesion by ultrasound should also be considered.  2.  Stable postsurgical changes following resection of the duodenum, gastric antrum and head of the pancreas with gastrojejunostomy.  3.  Hepatomegaly and generalized hepatic steatosis.  4.  Small nonobstructing intrarenal calculus on the left.  5.  A stable 6 mm faint nodule in the right upper lobe is unchanged since 04/12/2023.  6.  Status post hysterectomy.    07/08/2024:  CT CAP  Decreased size of a ventral abdominal wall mass measuring 4.5 x 2.0 cm (series 3, image 92), previously 6.8 x 4.5 cm.  No new lesion.  Diastasis of the rectus abdominus with protrusion of the abdominal fat and: Midline.     No acute fracture or aggressive bone lesion.  Mild degenerative changes of the spine.     Impression:     1. Decreased size of a ventral abdominal wall mass.  No evidence of new metastatic disease.  2. Postsurgical changes of Whipple procedure, duodenal resection, and gastrojejunostomy.  3. Stable 6 mm ground-glass nodule in the right upper lobe.  Additional stable bilateral pulmonary micro nodules.  4. Hepatomegaly and steatosis.  5. Splenomegaly.  6. Additional findings as above.  ASSESSMENT & PLAN     1. Duodenal cancer       Karen Gutierrez is a 59 y.o. 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    She is doing well with her chemotherapy.  She is having a good response with a decrease in her CA 19- 9 from 3381 in May of this year down to 12 on 08/01/2024.  We reviewed again that resection of this is really not in the standard of care for recurrent/metastatic disease.  She is however having an excellent response and based on the location of this lesion I think it is very reasonable to proceed with surgery.  I had a lengthy discussion with her and her sister however about the importance of doing as much of the chemotherapy upfront as we can.  Especially based on the location if this does represent drop lesion from where her J-tube was or anything of the such there is certainly chance to encounter bowel during this resection and there may be need for bowel resection which could certainly impair our ability to use mesh to repair the hernia that will be in place once we resect this portion of her fascia.  All that being said the longer she can be without chemotherapy following this repair the better.  For that reason I think she would benefit from doing the total of 8 cycles upfront followed by restaging and then plans for surgery.  She understands that this is somewhat of an arbitrary number choosing 8 however I think the if we can get the most benefit out of it as possible she will reap the benefits on the back end if we can continue to get this thing smaller.    Plan:  -- plan to return to clinic after 8th cycle of chemo w/ restaging  -- plan for PT and nutrition referrals at that time      Ms. Gutierrez expressed understanding in regards to our discussion today. Many good questions were asked on today's visit, all of which were answered to their satisfaction.    Follow-up: Follow up in about 27 days (around 9/10/2024).                  MD MS Sidney Barrientos  Oncology              [facility] [place], [place]              Office: [phone]     Communications: 45 minutes were spent on today's visit in face-to-face and non face-to-face time with the patient. This patient was recently diagnosed with metastatic duodenal adenocarcinoma and the time was required to provide counseling and guidance regarding their new diagnosis. Time was spent reviewing all outside records and information pertaining to their work-up and formulating a treatment plan in line with standardized guidelines. Additional time was spent communicating with referring physicians and facilities to facilitate the efficient exchange of previous healthcare records and radiographic imaging pertinent to the diagnosis and disease management.       No orders of the defined types were placed in this encounter.

## 2024-08-15 ENCOUNTER — LAB VISIT (OUTPATIENT)
Dept: LAB | Facility: HOSPITAL | Age: 59
End: 2024-08-15
Attending: INTERNAL MEDICINE
Payer: COMMERCIAL

## 2024-08-15 ENCOUNTER — TELEPHONE (OUTPATIENT)
Dept: PSYCHIATRY | Facility: CLINIC | Age: 59
End: 2024-08-15
Payer: COMMERCIAL

## 2024-08-15 DIAGNOSIS — C17.0 DUODENAL CANCER: ICD-10-CM

## 2024-08-15 LAB
ALBUMIN SERPL BCP-MCNC: 4.1 G/DL (ref 3.5–5.2)
ALP SERPL-CCNC: 169 U/L (ref 55–135)
ALT SERPL W/O P-5'-P-CCNC: 48 U/L (ref 10–44)
ANION GAP SERPL CALC-SCNC: 10 MMOL/L (ref 8–16)
AST SERPL-CCNC: 55 U/L (ref 10–40)
BASOPHILS # BLD AUTO: 0.03 K/UL (ref 0–0.2)
BASOPHILS NFR BLD: 0.5 % (ref 0–1.9)
BILIRUB SERPL-MCNC: 0.4 MG/DL (ref 0.1–1)
BUN SERPL-MCNC: 15 MG/DL (ref 6–20)
CALCIUM SERPL-MCNC: 9.1 MG/DL (ref 8.7–10.5)
CHLORIDE SERPL-SCNC: 109 MMOL/L (ref 95–110)
CO2 SERPL-SCNC: 23 MMOL/L (ref 23–29)
CREAT SERPL-MCNC: 0.9 MG/DL (ref 0.5–1.4)
DIFFERENTIAL METHOD BLD: ABNORMAL
EOSINOPHIL # BLD AUTO: 0.1 K/UL (ref 0–0.5)
EOSINOPHIL NFR BLD: 0.9 % (ref 0–8)
ERYTHROCYTE [DISTWIDTH] IN BLOOD BY AUTOMATED COUNT: 15.9 % (ref 11.5–14.5)
EST. GFR  (NO RACE VARIABLE): >60 ML/MIN/1.73 M^2
GLUCOSE SERPL-MCNC: 130 MG/DL (ref 70–110)
HCT VFR BLD AUTO: 36.1 % (ref 37–48.5)
HGB BLD-MCNC: 11.3 G/DL (ref 12–16)
IMM GRANULOCYTES # BLD AUTO: 0.03 K/UL (ref 0–0.04)
IMM GRANULOCYTES NFR BLD AUTO: 0.5 % (ref 0–0.5)
LYMPHOCYTES # BLD AUTO: 1.2 K/UL (ref 1–4.8)
LYMPHOCYTES NFR BLD: 20.6 % (ref 18–48)
MAGNESIUM SERPL-MCNC: 2.1 MG/DL (ref 1.6–2.6)
MCH RBC QN AUTO: 31 PG (ref 27–31)
MCHC RBC AUTO-ENTMCNC: 31.3 G/DL (ref 32–36)
MCV RBC AUTO: 99 FL (ref 82–98)
MONOCYTES # BLD AUTO: 0.6 K/UL (ref 0.3–1)
MONOCYTES NFR BLD: 11 % (ref 4–15)
NEUTROPHILS # BLD AUTO: 3.8 K/UL (ref 1.8–7.7)
NEUTROPHILS NFR BLD: 66.5 % (ref 38–73)
NRBC BLD-RTO: 0 /100 WBC
PLATELET # BLD AUTO: 77 K/UL (ref 150–450)
PMV BLD AUTO: 11.1 FL (ref 9.2–12.9)
POTASSIUM SERPL-SCNC: 4.6 MMOL/L (ref 3.5–5.1)
PROT SERPL-MCNC: 7.1 G/DL (ref 6–8.4)
RBC # BLD AUTO: 3.65 M/UL (ref 4–5.4)
SODIUM SERPL-SCNC: 142 MMOL/L (ref 136–145)
WBC # BLD AUTO: 5.72 K/UL (ref 3.9–12.7)

## 2024-08-15 PROCEDURE — 36415 COLL VENOUS BLD VENIPUNCTURE: CPT | Mod: PO | Performed by: INTERNAL MEDICINE

## 2024-08-15 PROCEDURE — 80053 COMPREHEN METABOLIC PANEL: CPT | Performed by: INTERNAL MEDICINE

## 2024-08-15 PROCEDURE — 83735 ASSAY OF MAGNESIUM: CPT | Performed by: INTERNAL MEDICINE

## 2024-08-15 PROCEDURE — 85025 COMPLETE CBC W/AUTO DIFF WBC: CPT | Performed by: INTERNAL MEDICINE

## 2024-08-16 ENCOUNTER — OFFICE VISIT (OUTPATIENT)
Dept: HEMATOLOGY/ONCOLOGY | Facility: CLINIC | Age: 59
End: 2024-08-16
Payer: COMMERCIAL

## 2024-08-16 DIAGNOSIS — Z79.899 IMMUNODEFICIENCY DUE TO CHEMOTHERAPY: ICD-10-CM

## 2024-08-16 DIAGNOSIS — D84.821 IMMUNODEFICIENCY DUE TO CHEMOTHERAPY: ICD-10-CM

## 2024-08-16 DIAGNOSIS — T45.1X5A IMMUNODEFICIENCY DUE TO CHEMOTHERAPY: ICD-10-CM

## 2024-08-16 DIAGNOSIS — C17.0 DUODENAL CANCER: Primary | ICD-10-CM

## 2024-08-16 DIAGNOSIS — C79.89 SECONDARY MALIGNANCY OF SOFT TISSUES OF ABDOMEN: ICD-10-CM

## 2024-08-16 DIAGNOSIS — I10 BENIGN HYPERTENSION: ICD-10-CM

## 2024-08-16 RX ORDER — HEPARIN 100 UNIT/ML
500 SYRINGE INTRAVENOUS
OUTPATIENT
Start: 2024-08-19

## 2024-08-16 RX ORDER — HEPARIN 100 UNIT/ML
500 SYRINGE INTRAVENOUS
OUTPATIENT
Start: 2024-08-21

## 2024-08-16 RX ORDER — SODIUM CHLORIDE 0.9 % (FLUSH) 0.9 %
10 SYRINGE (ML) INJECTION
OUTPATIENT
Start: 2024-08-21

## 2024-08-16 RX ORDER — SODIUM CHLORIDE 0.9 % (FLUSH) 0.9 %
10 SYRINGE (ML) INJECTION
OUTPATIENT
Start: 2024-08-19

## 2024-08-16 RX ORDER — EPINEPHRINE 0.3 MG/.3ML
0.3 INJECTION SUBCUTANEOUS ONCE AS NEEDED
OUTPATIENT
Start: 2024-08-19

## 2024-08-16 RX ORDER — PROCHLORPERAZINE EDISYLATE 5 MG/ML
10 INJECTION INTRAMUSCULAR; INTRAVENOUS ONCE AS NEEDED
OUTPATIENT
Start: 2024-08-21

## 2024-08-16 RX ORDER — DIPHENHYDRAMINE HYDROCHLORIDE 50 MG/ML
50 INJECTION INTRAMUSCULAR; INTRAVENOUS ONCE AS NEEDED
OUTPATIENT
Start: 2024-08-19

## 2024-08-16 RX ORDER — FLUOROURACIL 50 MG/ML
400 INJECTION, SOLUTION INTRAVENOUS
OUTPATIENT
Start: 2024-08-19

## 2024-08-16 RX ORDER — PROCHLORPERAZINE EDISYLATE 5 MG/ML
10 INJECTION INTRAMUSCULAR; INTRAVENOUS ONCE AS NEEDED
OUTPATIENT
Start: 2024-08-19

## 2024-08-16 NOTE — PROGRESS NOTES
Subjective:       Patient ID: Karen Gutierrez is a 59 y.o. female.    Chief Complaint: Results, Chemotherapy, and Cancer    HPI:  59-year-old female history of relapsed duodenal carcinoma on abdominal wall.  Patient presents for cycle 7 day 1 of FOLFOX relapsed setting tolerating therapy well denies nausea vomiting fevers chills seen by Surgical [facility]cology [facility] status 1 virtual visit    Past Medical History:   Diagnosis Date    Cancer     duodenal cancer    Hypertension     Hypotension, iatrogenic     Mitral valve prolapse      Family History   Problem Relation Name Age of Onset    Ovarian cancer Mother      Atrial fibrillation Mother      Stroke Mother      Hyperlipidemia Mother      Diabetes Mother      Bladder Cancer Father      Hypertension Father      Diabetes Father      No Known Problems Sister      Diabetes Maternal Grandmother      Colon cancer Maternal Grandmother      Stroke Maternal Grandfather      Diabetes Paternal Grandmother      No Known Problems Paternal Grandfather       Social History     Socioeconomic History    Marital status:    Tobacco Use    Smoking status: Former    Smokeless tobacco: Never   Substance and Sexual Activity    Alcohol use: Not Currently    Drug use: Never     Social Determinants of Health     Financial Resource Strain: Low Risk  (6/14/2024)    Overall Financial Resource Strain (CARDIA)     Difficulty of Paying Living Expenses: Not hard at all   Food Insecurity: No Food Insecurity (6/14/2024)    Hunger Vital Sign     Worried About Running Out of Food in the Last Year: Never true     Ran Out of Food in the Last Year: Never true   Physical Activity: Inactive (6/14/2024)    Exercise Vital Sign     Days of Exercise per Week: 0 days     Minutes of Exercise per Session: 0 min   Stress: No Stress Concern Present (6/14/2024)    [employer] - Occupational Stress Questionnaire     Feeling of Stress : Not at all   Housing Stability: Unknown  "(6/14/2024)    Housing Stability Vital Sign     Unable to Pay for Housing in the Last Year: No     Past Surgical History:   Procedure Laterality Date    BUNIONECTOMY Left     HYSTERECTOMY  2000    INSERTION OF TUNNELED CENTRAL VENOUS CATHETER (CVC) WITH SUBCUTANEOUS PORT N/A 3/26/2021    Procedure: CBUKSAASM-PAXB-D-CATH;  Surgeon: Lauren Abraham MD;  Location: [facility] OR;  Service: General;  Laterality: N/A;    INSERTION OF VENOUS ACCESS PORT Right 5/13/2024    Procedure: INSERTION, VENOUS ACCESS PORT;  Surgeon: Alicia Hernandez MD;  Location: [facility] OR;  Service: General;  Laterality: Right;    LITHOTRIPSY      PLACEMENT OF JEJUNOSTOMY TUBE  2/18/2021    Procedure: INSERTION, JEJUNOSTOMY TUBE;  Surgeon: Hitesh Díaz MD;  Location: [facility] OR 2ND FLR;  Service: General;;    ULTRASOUND GUIDANCE Right 5/13/2024    Procedure: ULTRASOUND GUIDANCE;  Surgeon: Alicia Hernandez MD;  Location: [facility] OR;  Service: General;  Laterality: Right;    WHIPPLE PROCEDURE N/A 2/18/2021    Procedure: WHIPPLE PROCEDURE;  Surgeon: Hitesh Díaz MD;  Location: [facility] OR 2ND FLR;  Service: General;  Laterality: N/A;       Labs:  Lab Results   Component Value Date    WBC 5.72 08/15/2024    HGB 11.3 (L) 08/15/2024    HCT 36.1 (L) 08/15/2024    MCV 99 (H) 08/15/2024    PLT 77 (L) 08/15/2024     BMP  Lab Results   Component Value Date     08/15/2024    K 4.6 08/15/2024     08/15/2024    CO2 23 08/15/2024    BUN 15 08/15/2024    CREATININE 0.9 08/15/2024    CALCIUM 9.1 08/15/2024    ANIONGAP 10 08/15/2024    ESTGFRAFRICA >60 03/07/2022    EGFRNONAA >60 03/07/2022     Lab Results   Component Value Date    ALT 48 (H) 08/15/2024    AST 55 (H) 08/15/2024    ALKPHOS 169 (H) 08/15/2024    BILITOT 0.4 08/15/2024       Lab Results   Component Value Date    IRON 42 05/10/2021    TIBC 404 05/10/2021    FERRITIN 345 (H) 05/10/2021     No results found for: "LZQOPHJV95"  No results found for: "FOLATE"  No results found for: "TSH"      Review " of Systems   Constitutional:  Negative for activity change, appetite change, chills, diaphoresis, fatigue, fever and unexpected weight change.   HENT:  Negative for congestion, dental problem, drooling, ear discharge, ear pain, facial swelling, hearing loss, mouth sores, nosebleeds, postnasal drip, rhinorrhea, sinus pressure, sneezing, sore throat, tinnitus, trouble swallowing and voice change.    Eyes:  Negative for photophobia, pain, discharge, redness, itching and visual disturbance.   Respiratory:  Negative for cough, choking, chest tightness, shortness of breath, wheezing and stridor.    Cardiovascular:  Negative for chest pain, palpitations and leg swelling.   Gastrointestinal:  Negative for abdominal distention, abdominal pain, anal bleeding, blood in stool, constipation, diarrhea, nausea, rectal pain and vomiting.   Endocrine: Negative for cold intolerance, heat intolerance, polydipsia, polyphagia and polyuria.   Genitourinary:  Negative for decreased urine volume, difficulty urinating, dyspareunia, dysuria, enuresis, flank pain, frequency, genital sores, hematuria, menstrual problem, pelvic pain, urgency, vaginal bleeding, vaginal discharge and vaginal pain.   Musculoskeletal:  Negative for arthralgias, back pain, gait problem, joint swelling, myalgias, neck pain and neck stiffness.   Skin:  Negative for color change, pallor and rash.   Allergic/Immunologic: Negative for environmental allergies, food allergies and immunocompromised state.   Neurological:  Negative for dizziness, tremors, seizures, syncope, facial asymmetry, speech difficulty, weakness, light-headedness, numbness and headaches.   Hematological:  Negative for adenopathy. Does not bruise/bleed easily.   Psychiatric/Behavioral:  Negative for agitation, behavioral problems, confusion, decreased concentration, dysphoric mood, hallucinations, self-injury, sleep disturbance and suicidal ideas. The patient is not nervous/anxious and is not  hyperactive.        Objective:      Physical Exam  Constitutional:       Appearance: Normal appearance.   Neurological:      Mental Status: She is alert.             Assessment:      1. 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 Immunodeficiency due to chemotherapy    4. Benign hypertension           Med Onc Chart Routing      Follow up with physician . Return 2 to three-week CBC CMP for cycle 8   Follow up with [facility]    Infusion scheduling note    Injection scheduling note Proceed with cycle 7 on 08/19/2024   Labs    Imaging    Pharmacy appointment    Other referrals                   Plan:     The patient location is:  Home  The chief complaint leading to consultation is:  Cancer    Visit type: audiovisual    Face to Face time with patient: 25 minutes of total time spent on the encounter, which includes face to face time and non-face to face time preparing to see the patient (eg, review of tests), Obtaining and/or reviewing separately obtained history, Documenting clinical information in the electronic or other health record, Independently interpreting results (not separately reported) and communicating results to the patient/family/caregiver, or Care coordination (not separately reported).         Each patient to whom he or she provides medical services by telemedicine is:  (1) informed of the relationship between the physician and patient and the respective role of any other health care provider with respect to management of the patient; and (2) notified that he or she may decline to receive medical services by telemedicine and may withdraw from such care at any time.    Notes:   Reviewed information with patient reviewed note from Dr. Gonzalez at this time agree with proceeding with a complete cycles of treatment reimage to determine whether not potential surgical resection would be a consideration discussed above with her patient is aware      Enzo Daley Jr, MD FACP

## 2024-08-19 ENCOUNTER — INFUSION (OUTPATIENT)
Dept: INFUSION THERAPY | Facility: HOSPITAL | Age: 59
End: 2024-08-19
Attending: INTERNAL MEDICINE
Payer: COMMERCIAL

## 2024-08-19 ENCOUNTER — OFFICE VISIT (OUTPATIENT)
Dept: PSYCHIATRY | Facility: CLINIC | Age: 59
End: 2024-08-19
Payer: COMMERCIAL

## 2024-08-19 ENCOUNTER — PATIENT MESSAGE (OUTPATIENT)
Dept: PSYCHIATRY | Facility: CLINIC | Age: 59
End: 2024-08-19

## 2024-08-19 VITALS
BODY MASS INDEX: 28.16 KG/M2 | HEART RATE: 71 BPM | DIASTOLIC BLOOD PRESSURE: 83 MMHG | WEIGHT: 207.88 LBS | HEIGHT: 72 IN | OXYGEN SATURATION: 98 % | TEMPERATURE: 98 F | RESPIRATION RATE: 18 BRPM | SYSTOLIC BLOOD PRESSURE: 148 MMHG

## 2024-08-19 DIAGNOSIS — F41.1 GAD (GENERALIZED ANXIETY DISORDER): Primary | ICD-10-CM

## 2024-08-19 DIAGNOSIS — C17.0 DUODENAL CANCER: Primary | ICD-10-CM

## 2024-08-19 DIAGNOSIS — R41.840 INATTENTION: ICD-10-CM

## 2024-08-19 PROCEDURE — 96375 TX/PRO/DX INJ NEW DRUG ADDON: CPT

## 2024-08-19 PROCEDURE — 96411 CHEMO IV PUSH ADDL DRUG: CPT

## 2024-08-19 PROCEDURE — 4010F ACE/ARB THERAPY RXD/TAKEN: CPT | Mod: CPTII,95,, | Performed by: PSYCHIATRY & NEUROLOGY

## 2024-08-19 PROCEDURE — 96367 TX/PROPH/DG ADDL SEQ IV INF: CPT

## 2024-08-19 PROCEDURE — 96415 CHEMO IV INFUSION ADDL HR: CPT

## 2024-08-19 PROCEDURE — 96368 THER/DIAG CONCURRENT INF: CPT

## 2024-08-19 PROCEDURE — 96413 CHEMO IV INFUSION 1 HR: CPT

## 2024-08-19 PROCEDURE — 25000003 PHARM REV CODE 250: Performed by: INTERNAL MEDICINE

## 2024-08-19 PROCEDURE — 63600175 PHARM REV CODE 636 W HCPCS: Performed by: INTERNAL MEDICINE

## 2024-08-19 PROCEDURE — 96416 CHEMO PROLONG INFUSE W/PUMP: CPT

## 2024-08-19 PROCEDURE — 99214 OFFICE O/P EST MOD 30 MIN: CPT | Mod: 95,,, | Performed by: PSYCHIATRY & NEUROLOGY

## 2024-08-19 RX ORDER — PROCHLORPERAZINE EDISYLATE 5 MG/ML
10 INJECTION INTRAMUSCULAR; INTRAVENOUS ONCE AS NEEDED
Status: COMPLETED | OUTPATIENT
Start: 2024-08-19 | End: 2024-08-19

## 2024-08-19 RX ORDER — FLUOROURACIL 50 MG/ML
400 INJECTION, SOLUTION INTRAVENOUS
Status: COMPLETED | OUTPATIENT
Start: 2024-08-19 | End: 2024-08-19

## 2024-08-19 RX ORDER — PROCHLORPERAZINE EDISYLATE 5 MG/ML
INJECTION INTRAMUSCULAR; INTRAVENOUS
Status: DISPENSED
Start: 2024-08-19 | End: 2024-08-20

## 2024-08-19 RX ORDER — ATOMOXETINE 40 MG/1
40 CAPSULE ORAL DAILY
Qty: 90 CAPSULE | Refills: 1 | Status: SHIPPED | OUTPATIENT
Start: 2024-08-19 | End: 2025-02-15

## 2024-08-19 RX ORDER — ESCITALOPRAM OXALATE 10 MG/1
10 TABLET ORAL DAILY
Qty: 90 TABLET | Refills: 1 | Status: SHIPPED | OUTPATIENT
Start: 2024-08-19

## 2024-08-19 RX ORDER — CLONAZEPAM 0.5 MG/1
0.5 TABLET ORAL NIGHTLY PRN
Qty: 30 TABLET | Refills: 1 | Status: SHIPPED | OUTPATIENT
Start: 2024-08-19 | End: 2025-08-19

## 2024-08-19 RX ADMIN — SODIUM CHLORIDE: 9 INJECTION, SOLUTION INTRAVENOUS at 10:08

## 2024-08-19 RX ADMIN — FLUOROURACIL 5280 MG: 50 INJECTION, SOLUTION INTRAVENOUS at 01:08

## 2024-08-19 RX ADMIN — LEUCOVORIN CALCIUM 880 MG: 500 INJECTION, POWDER, LYOPHILIZED, FOR SOLUTION INTRAMUSCULAR; INTRAVENOUS at 11:08

## 2024-08-19 RX ADMIN — OXALIPLATIN 187 MG: 5 INJECTION, SOLUTION INTRAVENOUS at 11:08

## 2024-08-19 RX ADMIN — FLUOROURACIL 880 MG: 50 INJECTION, SOLUTION INTRAVENOUS at 01:08

## 2024-08-19 RX ADMIN — DEXAMETHASONE SODIUM PHOSPHATE 0.25 MG: 4 INJECTION, SOLUTION INTRA-ARTICULAR; INTRALESIONAL; INTRAMUSCULAR; INTRAVENOUS; SOFT TISSUE at 10:08

## 2024-08-19 RX ADMIN — PROCHLORPERAZINE EDISYLATE 5 MG: 5 INJECTION INTRAMUSCULAR; INTRAVENOUS at 01:08

## 2024-08-19 RX ADMIN — DEXTROSE MONOHYDRATE: 50 INJECTION, SOLUTION INTRAVENOUS at 10:08

## 2024-08-19 NOTE — DISCHARGE INSTRUCTIONS
Thank you for letting me take care of YOU!!    ARNULFO [facility]  [address] [facility]  [id number] [place] [address] Drive  [phone] phone     [phone] fax  Hours of Operation: Monday- Friday 8:00am- 5:00pm  After hours phone  [phone]  Hematology / Oncology Physicians on call:    Dr. Edi Macario        Nurse Practitioners:    KALINA Brown, BRAULIO Gardner NP Khelsea Conley, KALINA Quigley, NP      Please don't hesitate to call if you have any concerns.

## 2024-08-19 NOTE — NURSING
Spoke with patient. CT scheduled for 9/10 at 1045 at the [facility]. NPO 4 hours prior. Patient verbalized understanding.       Oncology Navigation   Intake      Treatment  Date Presented to Tumor Board: 07/19/24                             Acuity  Stage: 2  Systemic Treatment - predicted or initiated: Chemotherapy Regimen with Multiple drugs (+1)  Treatment Tolerability: Minimal symptoms   Needed: 0  Support: 0  Verbalizes Financial Concerns: 1  Transportation: 0  History of noncompliance/frequent no shows and cancellations: 0  Verbalizes the need for more education: 1  Navigation Acuity: 5     Follow Up  No follow-ups on file.

## 2024-08-19 NOTE — PROGRESS NOTES
Outpatient Psychiatry Follow-up Visit (MD/NP)    8/19/2024    Karen Gutierrez, a 59 y.o. female, presenting for follow-up visit. Met with patient.    Reason for Encounter: Patient complains of distractability, anxiety.     Interval Hx: Patient seen and interviewed for follow-up, last seen about three months ago.   Reports having been getting regular chemotherapy treatments - and thus far has had a good response to treatment.   Treatments every other week. Feels ok on weeks not getting treatment. Anergia and constitutional symptoms during treatment weeks, low activity. Probably to end of September. Then to have surgical resection.  A few times with problems with sleep, but moods are mostly ok with ongoing treatment. No new mental health symptoms. Family life going well. Denies medication side effects.      Background: Pt is a 58 y/o woman who presents for establishment of care, endorses chronic problems with anxiety , distractability, difficulty completing tasks. Reports having first brought this problem up to PCP Han Arshad, about five years ago following patient's niece's diagnosis with adhd and patient's recognition of similar attentional problems in herself. Reports no formal testing, no psych assessment, treated with adderall with benefit to both focus & moods (reduced levels of anxiety and depression, helping her worry less, catastrophize less).     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 Status post surgery (whipple), chemo. Follow-up going well.   Returned to work 2021. GELACIO-7 = 11;     Psych Hx: Endorses history of obsessions, no psychiatric diagnoses or assessments. No history of michael, hallucinations, delusions, self-harm behaviors, psych hospitalizations.    Family Hx: niece with adhd.  Sister with considerable irritability considerably helped by medication; father has similar  symptoms.      Past Medical History:   Diagnosis Date    Cancer     duodenal cancer    Hypertension     Hypotension, iatrogenic     Mitral valve prolapse      Social Hx: born in [place], [place]. Born full-term, no complications.   No developmental or health problems. Active as a child. In lots of clubs. Always highly active. Moved to  by first grade. No serious maltreatment. Dad was short-tempered. Older sister (lives in ). Childhood was happy. Both parents were in the home. Loved school. Loved school, performed well. No discipline problems. Good experience in school. Problems with bullying. Close to sister.     Graduated HS, went to [employer], majored in fashion design/[employer]. Then studied in program for medical administration. 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 Went back to work after extended medical leave, had a pay-cut, loss some of her responsibilities.     ,  for 7 years. Raised one son as a single mom. He's now 26 years old. Son is newly  and he and wife live with patient. He's been accepted to med school, he and his wife will move to [place] soon for school. Starts in January.     Review Of Systems:     GENERAL:  No weight gain or loss  SKIN:  No rashes or lacerations  HEAD:  No headaches  CHEST:  No shortness of breath, hyperventilation or cough  CARDIOVASCULAR:  No tachycardia or chest pain  ABDOMEN:  No nausea, vomiting, pain, constipation or diarrhea  URINARY:  No frequency, dysuria or sexual dysfunction  ENDOCRINE:  No polydipsia, polyuria  MUSCULOSKELETAL:  No pain or stiffness of the joints  NEUROLOGIC:  No weakness, sensory changes, seizures, confusion, memory loss, tremor or other abnormal movements    Current Evaluation:     Nutritional Screening: Considering the patient's height and weight, medications, medical history and preferences, should a referral be made to the dietitian?  no    Constitutional  Vitals:  Most recent vital signs, dated less than 90 days prior to this appointment, were reviewed.    There were no vitals filed for this visit.         General:  unremarkable, age appropriate     Musculoskeletal  Muscle Strength/Tone:  no tremor, no tic   Gait & Station:  non-ataxic     Psychiatric  Appearance: casually dressed & groomed;   Behavior: calm,   Cooperation: cooperative with assessment  Speech: normal rate, volume, tone  Thought Process: linear, goal-directed  Thought Content: No suicidal or homicidal ideation; no delusions  Affect: mildly anxious  Mood: mildly anxious  Perceptions: No auditory or visual hallucinations  Level of Consciousness: alert throughout interview  Insight: fair  Cognition: Oriented to person, place, time, & situation  Memory: no apparent deficits to general clinical interview; not formally assessed  Attention/Concentration: no apparent deficits to general clinical interview; not formally assessed  Fund of Knowledge: average by vocabulary/education    Laboratory Data  Lab Visit on 08/15/2024   Component Date Value Ref Range Status    Sodium 08/15/2024 142  136 - 145 mmol/L Final    Potassium 08/15/2024 4.6  3.5 - 5.1 mmol/L Final    Chloride 08/15/2024 109  95 - 110 mmol/L Final    CO2 08/15/2024 23  23 - 29 mmol/L Final    Glucose 08/15/2024 130 (H)  70 - 110 mg/dL Final    BUN 08/15/2024 15  6 - 20 mg/dL Final    Creatinine 08/15/2024 0.9  0.5 - 1.4 mg/dL Final    Calcium 08/15/2024 9.1  8.7 - 10.5 mg/dL Final    Total Protein 08/15/2024 7.1  6.0 - 8.4 g/dL Final    Albumin 08/15/2024 4.1  3.5 - 5.2 g/dL Final    Total Bilirubin 08/15/2024 0.4  0.1 - 1.0 mg/dL Final    Alkaline Phosphatase 08/15/2024 169 (H)  55 - 135 U/L Final    AST 08/15/2024 55 (H)  10 - 40 U/L Final    ALT 08/15/2024 48 (H)  10 - 44 U/L Final    eGFR 08/15/2024 >60  >60 mL/min/1.73 m^2 Final    Anion Gap 08/15/2024 10  8 - 16 mmol/L Final    WBC 08/15/2024 5.72  3.90 - 12.70 K/uL  Final    RBC 08/15/2024 3.65 (L)  4.00 - 5.40 M/uL Final    Hemoglobin 08/15/2024 11.3 (L)  12.0 - 16.0 g/dL Final    Hematocrit 08/15/2024 36.1 (L)  37.0 - 48.5 % Final    MCV 08/15/2024 99 (H)  82 - 98 fL Final    MCH 08/15/2024 31.0  27.0 - 31.0 pg Final    MCHC 08/15/2024 31.3 (L)  32.0 - 36.0 g/dL Final    RDW 08/15/2024 15.9 (H)  11.5 - 14.5 % Final    Platelets 08/15/2024 77 (L)  150 - 450 K/uL Final    MPV 08/15/2024 11.1  9.2 - 12.9 fL Final    Immature Granulocytes 08/15/2024 0.5  0.0 - 0.5 % Final    Gran # (ANC) 08/15/2024 3.8  1.8 - 7.7 K/uL Final    Immature Grans (Abs) 08/15/2024 0.03  0.00 - 0.04 K/uL Final    Lymph # 08/15/2024 1.2  1.0 - 4.8 K/uL Final    Mono # 08/15/2024 0.6  0.3 - 1.0 K/uL Final    Eos # 08/15/2024 0.1  0.0 - 0.5 K/uL Final    Baso # 08/15/2024 0.03  0.00 - 0.20 K/uL Final    nRBC 08/15/2024 0  0 /100 WBC Final    Gran % 08/15/2024 66.5  38.0 - 73.0 % Final    Lymph % 08/15/2024 20.6  18.0 - 48.0 % Final    Mono % 08/15/2024 11.0  4.0 - 15.0 % Final    Eosinophil % 08/15/2024 0.9  0.0 - 8.0 % Final    Basophil % 08/15/2024 0.5  0.0 - 1.9 % Final    Differential Method 08/15/2024 Automated   Final    Magnesium 08/15/2024 2.1  1.6 - 2.6 mg/dL Final   Lab Visit on 08/01/2024   Component Date Value Ref Range Status    Sodium 08/01/2024 142  136 - 145 mmol/L Final    Potassium 08/01/2024 4.6  3.5 - 5.1 mmol/L Final    Chloride 08/01/2024 106  95 - 110 mmol/L Final    CO2 08/01/2024 26  23 - 29 mmol/L Final    Glucose 08/01/2024 127 (H)  70 - 110 mg/dL Final    BUN 08/01/2024 16  6 - 20 mg/dL Final    Creatinine 08/01/2024 1.0  0.5 - 1.4 mg/dL Final    Calcium 08/01/2024 9.3  8.7 - 10.5 mg/dL Final    Total Protein 08/01/2024 7.2  6.0 - 8.4 g/dL Final    Albumin 08/01/2024 4.2  3.5 - 5.2 g/dL Final    Total Bilirubin 08/01/2024 0.7  0.1 - 1.0 mg/dL Final    Alkaline Phosphatase 08/01/2024 148 (H)  55 - 135 U/L Final    AST 08/01/2024 48 (H)  10 - 40 U/L Final    ALT 08/01/2024 39  10  - 44 U/L Final    eGFR 08/01/2024 >60  >60 mL/min/1.73 m^2 Final    Anion Gap 08/01/2024 10  8 - 16 mmol/L Final    WBC 08/01/2024 4.03  3.90 - 12.70 K/uL Final    RBC 08/01/2024 3.98 (L)  4.00 - 5.40 M/uL Final    Hemoglobin 08/01/2024 12.4  12.0 - 16.0 g/dL Final    Hematocrit 08/01/2024 38.7  37.0 - 48.5 % Final    MCV 08/01/2024 97  82 - 98 fL Final    MCH 08/01/2024 31.2 (H)  27.0 - 31.0 pg Final    MCHC 08/01/2024 32.0  32.0 - 36.0 g/dL Final    RDW 08/01/2024 18.0 (H)  11.5 - 14.5 % Final    Platelets 08/01/2024 150  150 - 450 K/uL Final    MPV 08/01/2024 10.4  9.2 - 12.9 fL Final    Immature Granulocytes 08/01/2024 0.5  0.0 - 0.5 % Final    Gran # (ANC) 08/01/2024 2.1  1.8 - 7.7 K/uL Final    Immature Grans (Abs) 08/01/2024 0.02  0.00 - 0.04 K/uL Final    Lymph # 08/01/2024 1.3  1.0 - 4.8 K/uL Final    Mono # 08/01/2024 0.6  0.3 - 1.0 K/uL Final    Eos # 08/01/2024 0.1  0.0 - 0.5 K/uL Final    Baso # 08/01/2024 0.01  0.00 - 0.20 K/uL Final    nRBC 08/01/2024 0  0 /100 WBC Final    Gran % 08/01/2024 51.2  38.0 - 73.0 % Final    Lymph % 08/01/2024 31.0  18.0 - 48.0 % Final    Mono % 08/01/2024 15.4 (H)  4.0 - 15.0 % Final    Eosinophil % 08/01/2024 1.7  0.0 - 8.0 % Final    Basophil % 08/01/2024 0.2  0.0 - 1.9 % Final    Platelet Estimate 08/01/2024 Appears normal   Final    Differential Method 08/01/2024 Automated   Final    Magnesium 08/01/2024 2.2  1.6 - 2.6 mg/dL Final    CEA 08/01/2024 1.7  0.0 - 5.0 ng/mL Final    CA 19-9 08/01/2024 12.1  0.0 - 40.0 U/mL Final   Lab Visit on 07/25/2024   Component Date Value Ref Range Status    Sodium 07/25/2024 142  136 - 145 mmol/L Final    Potassium 07/25/2024 3.9  3.5 - 5.1 mmol/L Final    Chloride 07/25/2024 109  95 - 110 mmol/L Final    CO2 07/25/2024 25  23 - 29 mmol/L Final    Glucose 07/25/2024 157 (H)  70 - 110 mg/dL Final    BUN 07/25/2024 17  6 - 20 mg/dL Final    Creatinine 07/25/2024 0.9  0.5 - 1.4 mg/dL Final    Calcium 07/25/2024 9.0  8.7 - 10.5 mg/dL  Final    Total Protein 07/25/2024 6.7  6.0 - 8.4 g/dL Final    Albumin 07/25/2024 3.8  3.5 - 5.2 g/dL Final    Total Bilirubin 07/25/2024 0.3  0.1 - 1.0 mg/dL Final    Alkaline Phosphatase 07/25/2024 180 (H)  55 - 135 U/L Final    AST 07/25/2024 37  10 - 40 U/L Final    ALT 07/25/2024 36  10 - 44 U/L Final    eGFR 07/25/2024 >60  >60 mL/min/1.73 m^2 Final    Anion Gap 07/25/2024 8  8 - 16 mmol/L Final    WBC 07/25/2024 5.17  3.90 - 12.70 K/uL Final    RBC 07/25/2024 3.51 (L)  4.00 - 5.40 M/uL Final    Hemoglobin 07/25/2024 10.6 (L)  12.0 - 16.0 g/dL Final    Hematocrit 07/25/2024 32.8 (L)  37.0 - 48.5 % Final    MCV 07/25/2024 93  82 - 98 fL Final    MCH 07/25/2024 30.2  27.0 - 31.0 pg Final    MCHC 07/25/2024 32.3  32.0 - 36.0 g/dL Final    RDW 07/25/2024 17.3 (H)  11.5 - 14.5 % Final    Platelets 07/25/2024 68 (L)  150 - 450 K/uL Final    MPV 07/25/2024 11.1  9.2 - 12.9 fL Final    Immature Granulocytes 07/25/2024 0.6 (H)  0.0 - 0.5 % Final    Gran # (ANC) 07/25/2024 3.2  1.8 - 7.7 K/uL Final    Immature Grans (Abs) 07/25/2024 0.03  0.00 - 0.04 K/uL Final    Lymph # 07/25/2024 1.2  1.0 - 4.8 K/uL Final    Mono # 07/25/2024 0.8  0.3 - 1.0 K/uL Final    Eos # 07/25/2024 0.0  0.0 - 0.5 K/uL Final    Baso # 07/25/2024 0.02  0.00 - 0.20 K/uL Final    nRBC 07/25/2024 0  0 /100 WBC Final    Gran % 07/25/2024 61.1  38.0 - 73.0 % Final    Lymph % 07/25/2024 22.2  18.0 - 48.0 % Final    Mono % 07/25/2024 15.1 (H)  4.0 - 15.0 % Final    Eosinophil % 07/25/2024 0.6  0.0 - 8.0 % Final    Basophil % 07/25/2024 0.4  0.0 - 1.9 % Final    Differential Method 07/25/2024 Automated   Final    CEA 07/25/2024 1.7  0.0 - 5.0 ng/mL Final    CA 19-9 07/25/2024 17.9  0.0 - 40.0 U/mL Final     Medications  Outpatient Encounter Medications as of 8/19/2024   Medication Sig Dispense Refill    amLODIPine (NORVASC) 10 MG tablet Take 10 mg by mouth once daily.      atomoxetine (STRATTERA) 40 MG capsule Take 1 capsule (40 mg total) by mouth once  daily. 60 capsule 3    BYSTOLIC 20 mg Tab Take 1 tablet by mouth once daily.      cholecalciferol, vitamin D3, 125 mcg (5,000 unit) capsule Take 5,000 Units by mouth once daily.      clonazePAM (KLONOPIN) 0.5 MG tablet Take 1 tablet (0.5 mg total) by mouth nightly as needed for Anxiety. 30 tablet 3    cyanocobalamin (VITAMIN B-12) 1000 MCG tablet Take 1,000 mcg by mouth once daily.      EScitalopram oxalate (LEXAPRO) 10 MG tablet Take 1 tablet (10 mg total) by mouth once daily. 90 tablet 1    LIDOcaine-prilocaine (EMLA) cream Apply to affected area once 5 g 11    OLANZapine (ZYPREXA) 5 MG tablet Take 1 tablet (5 mg total) by mouth every evening. Take nightly on days 1-3 of each chemotherapy cycle. 6 tablet 11    olmesartan (BENICAR) 40 MG tablet TAKE 1 TABLET(40 MG) BY MOUTH EVERY DAY 90 tablet 3    ondansetron (ZOFRAN-ODT) 4 MG TbDL Take 1 tablet (4 mg total) by mouth 2 (two) times daily. 20 tablet 11    prochlorperazine (COMPAZINE) 10 MG tablet Take 1 tablet (10 mg total) by mouth every 6 (six) hours as needed for Nausea. 20 tablet 11    [DISCONTINUED] acetaminophen (TYLENOL) 500 MG tablet Take 2 tablets (1,000 mg total) by mouth every 8 (eight) hours.      [DISCONTINUED] calcium carbonate (OS-BAM) 600 mg calcium (1,500 mg) Tab Take 600 mg by mouth once daily.      [DISCONTINUED] ibuprofen (ADVIL,MOTRIN) 800 MG tablet Take 1 tablet (800 mg total) by mouth every 8 (eight) hours.      [DISCONTINUED] multivit-min/iron/FA/vit K/lut (CENTRUM SILVER WOMEN ORAL) Take 1 each by mouth once daily.       No facility-administered encounter medications on file as of 8/19/2024.     Assessment - Diagnosis - Goals:     Impression: 59 year old woman with chronic anxiety problems, currently moderately anxious. Has also had problems with attention, task completion. Previously treated with some benefit with stimulants, not formally diagnosed with adhd. Duodenal CA in remission then recurrence. Symptoms improved, mild. Treatment well  tolerated. Continued to have some attention complaints. Coping well through chemo.     Dx: generalized anxiety disorder    Treatment Goals:  Specify outcomes written in observable, behavioral terms: reduce anxiety by self-report    Treatment Plan/Recommendations:   Continue escitalopram, atomoxetine   Information about self-care in recovery, how to access psychotherapy.   Discussed risks, benefits, and alternatives to treatment plan documented above with patient. I answered all patient questions related to this plan and patient expressed understanding and agreement.     Return to Clinic: 2 months    JAMES Rose MD  Psychiatry, [facility]

## 2024-08-19 NOTE — PLAN OF CARE
Patient tolerated Folfox; no adverse reaction noted.  C/O nausea and back pain throughout the day (wax and wane); started at home.  Administered Compazine prior to discharge since patient has ~35 min drive home and sister drives her to appts.   IV discontinued with catheter intact and pressure dressing applied to the site.  Has f/u appt(s) scheduled per MD request.  No questions or concerns voiced.  NAD noted upon discharge.

## 2024-08-20 ENCOUNTER — TELEPHONE (OUTPATIENT)
Dept: HEMATOLOGY/ONCOLOGY | Facility: CLINIC | Age: 59
End: 2024-08-20
Payer: COMMERCIAL

## 2024-08-20 NOTE — TELEPHONE ENCOUNTER
LVM for 3 mo check in with pt. Direct call back numer, [phone], given for pt to return call.

## 2024-08-21 ENCOUNTER — INFUSION (OUTPATIENT)
Dept: INFUSION THERAPY | Facility: HOSPITAL | Age: 59
End: 2024-08-21
Attending: INTERNAL MEDICINE
Payer: COMMERCIAL

## 2024-08-21 VITALS
RESPIRATION RATE: 18 BRPM | DIASTOLIC BLOOD PRESSURE: 77 MMHG | TEMPERATURE: 98 F | SYSTOLIC BLOOD PRESSURE: 119 MMHG | HEART RATE: 74 BPM | OXYGEN SATURATION: 98 %

## 2024-08-21 DIAGNOSIS — C17.0 DUODENAL CANCER: Primary | ICD-10-CM

## 2024-08-21 PROCEDURE — 96377 APPLICATON ON-BODY INJECTOR: CPT

## 2024-08-21 PROCEDURE — 63600175 PHARM REV CODE 636 W HCPCS: Mod: JZ,JG | Performed by: INTERNAL MEDICINE

## 2024-08-21 RX ORDER — HEPARIN 100 UNIT/ML
500 SYRINGE INTRAVENOUS
Status: DISCONTINUED | OUTPATIENT
Start: 2024-08-21 | End: 2024-08-21 | Stop reason: HOSPADM

## 2024-08-21 RX ADMIN — PEGFILGRASTIM 6 MG: KIT SUBCUTANEOUS at 11:08

## 2024-08-21 RX ADMIN — HEPARIN 500 UNITS: 100 SYRINGE at 11:08

## 2024-08-21 NOTE — NURSING
Pt here for 5FU continuous pump d/c. Pump stopped and disconnected.  Existing dressing appeared clean and intact.  Right chestwall mediport  Flushed with 10ml NS and 5 ml heparin solution.  Needle D/C, site without redness, swelling, or drainage noted.  Dressing applied.  Patient tolerated well.

## 2024-08-23 ENCOUNTER — DOCUMENTATION ONLY (OUTPATIENT)
Dept: HEMATOLOGY/ONCOLOGY | Facility: CLINIC | Age: 59
End: 2024-08-23
Payer: COMMERCIAL

## 2024-08-23 NOTE — PROGRESS NOTES
"  3 Mo Check in with pt. Pt reports feeling "a little yucky" because it is chemo week but nothing unmanageable. Pt states that treatment is going well and she feels she is being well taken care of. Pt thanked ONN for checking in on her. ONN reminded the pt that the hem/onc team here are always available to her during her tx. Pt verbalized understanding. ONN to remain available     Oncology Navigation   Intake  Date of Diagnosis: 24  Cancer Type: GI; Other  Start of Treatment: 24  Diagnosis to Treat Timeline (days): 14 days     Treatment  Date Presented to Tumor Board: 24    Surgical Oncologist: YOON Hernandez                          Acuity  Stage: 2  Systemic Treatment - predicted or initiated: Chemotherapy Regimen with Multiple drugs (+1)  Treatment Tolerability: Minimal symptoms  ECO  Comorbidities in Medical History: 2   Needed: 0  Support: 0  Verbalizes Financial Concerns: 0  Transportation: 0  Psychological Factors (+1 each): Seeing oncology psychology  History of noncompliance/frequent no shows and cancellations: 0  Verbalizes the need for more education: 0  Navigation Acuity: 5     Follow Up  No follow-ups on file.       "

## 2024-08-29 ENCOUNTER — LAB VISIT (OUTPATIENT)
Dept: LAB | Facility: HOSPITAL | Age: 59
End: 2024-08-29
Attending: INTERNAL MEDICINE
Payer: COMMERCIAL

## 2024-08-29 DIAGNOSIS — Z90.411 HISTORY OF PARTIAL PANCREATECTOMY: ICD-10-CM

## 2024-08-29 DIAGNOSIS — C17.0 DUODENAL CANCER: ICD-10-CM

## 2024-08-29 LAB
ALBUMIN SERPL BCP-MCNC: 3.8 G/DL (ref 3.5–5.2)
ALP SERPL-CCNC: 174 U/L (ref 55–135)
ALT SERPL W/O P-5'-P-CCNC: 53 U/L (ref 10–44)
ANION GAP SERPL CALC-SCNC: 11 MMOL/L (ref 8–16)
AST SERPL-CCNC: 70 U/L (ref 10–40)
BASOPHILS # BLD AUTO: 0.02 K/UL (ref 0–0.2)
BASOPHILS NFR BLD: 0.5 % (ref 0–1.9)
BILIRUB SERPL-MCNC: 0.4 MG/DL (ref 0.1–1)
BUN SERPL-MCNC: 13 MG/DL (ref 6–20)
CALCIUM SERPL-MCNC: 8.9 MG/DL (ref 8.7–10.5)
CHLORIDE SERPL-SCNC: 108 MMOL/L (ref 95–110)
CO2 SERPL-SCNC: 24 MMOL/L (ref 23–29)
CREAT SERPL-MCNC: 0.9 MG/DL (ref 0.5–1.4)
DIFFERENTIAL METHOD BLD: ABNORMAL
EOSINOPHIL # BLD AUTO: 0 K/UL (ref 0–0.5)
EOSINOPHIL NFR BLD: 0.8 % (ref 0–8)
ERYTHROCYTE [DISTWIDTH] IN BLOOD BY AUTOMATED COUNT: 15.5 % (ref 11.5–14.5)
EST. GFR  (NO RACE VARIABLE): >60 ML/MIN/1.73 M^2
GLUCOSE SERPL-MCNC: 134 MG/DL (ref 70–110)
HCT VFR BLD AUTO: 32.3 % (ref 37–48.5)
HGB BLD-MCNC: 10.4 G/DL (ref 12–16)
IMM GRANULOCYTES # BLD AUTO: 0.02 K/UL (ref 0–0.04)
IMM GRANULOCYTES NFR BLD AUTO: 0.5 % (ref 0–0.5)
LYMPHOCYTES # BLD AUTO: 1 K/UL (ref 1–4.8)
LYMPHOCYTES NFR BLD: 26.6 % (ref 18–48)
MCH RBC QN AUTO: 31.5 PG (ref 27–31)
MCHC RBC AUTO-ENTMCNC: 32.2 G/DL (ref 32–36)
MCV RBC AUTO: 98 FL (ref 82–98)
MONOCYTES # BLD AUTO: 0.6 K/UL (ref 0.3–1)
MONOCYTES NFR BLD: 14.9 % (ref 4–15)
NEUTROPHILS # BLD AUTO: 2.2 K/UL (ref 1.8–7.7)
NEUTROPHILS NFR BLD: 56.7 % (ref 38–73)
NRBC BLD-RTO: 0 /100 WBC
PLATELET # BLD AUTO: 56 K/UL (ref 150–450)
PMV BLD AUTO: 11.1 FL (ref 9.2–12.9)
POTASSIUM SERPL-SCNC: 4.2 MMOL/L (ref 3.5–5.1)
PROT SERPL-MCNC: 6.7 G/DL (ref 6–8.4)
RBC # BLD AUTO: 3.3 M/UL (ref 4–5.4)
SODIUM SERPL-SCNC: 143 MMOL/L (ref 136–145)
WBC # BLD AUTO: 3.83 K/UL (ref 3.9–12.7)

## 2024-08-29 PROCEDURE — 80053 COMPREHEN METABOLIC PANEL: CPT | Performed by: INTERNAL MEDICINE

## 2024-08-29 PROCEDURE — 85025 COMPLETE CBC W/AUTO DIFF WBC: CPT | Performed by: INTERNAL MEDICINE

## 2024-08-29 PROCEDURE — 36415 COLL VENOUS BLD VENIPUNCTURE: CPT | Mod: PO | Performed by: INTERNAL MEDICINE

## 2024-08-30 ENCOUNTER — OFFICE VISIT (OUTPATIENT)
Dept: HEMATOLOGY/ONCOLOGY | Facility: CLINIC | Age: 59
End: 2024-08-30
Payer: COMMERCIAL

## 2024-08-30 DIAGNOSIS — C17.0 DUODENAL CANCER: Primary | ICD-10-CM

## 2024-08-30 DIAGNOSIS — D61.810 PANCYTOPENIA DUE TO ANTINEOPLASTIC CHEMOTHERAPY: ICD-10-CM

## 2024-08-30 DIAGNOSIS — G62.0 PERIPHERAL NEUROPATHY DUE TO CHEMOTHERAPY: ICD-10-CM

## 2024-08-30 DIAGNOSIS — T45.1X5A PERIPHERAL NEUROPATHY DUE TO CHEMOTHERAPY: ICD-10-CM

## 2024-08-30 DIAGNOSIS — T45.1X5A PANCYTOPENIA DUE TO ANTINEOPLASTIC CHEMOTHERAPY: ICD-10-CM

## 2024-08-30 DIAGNOSIS — C79.89 SECONDARY MALIGNANCY OF SOFT TISSUES OF ABDOMEN: ICD-10-CM

## 2024-08-30 NOTE — PROGRESS NOTES
Subjective:       Patient ID: Karen Gutierrez is a 59 y.o. female.    Chief Complaint: Results, Chemotherapy, and Cancer    HPI:  59-year-old female presents for cycle 8 day 1 of FOLFOX for local recurrence anterior abdominal wall.  Patient is scheduled for cycle 8 next week platelet count below 50,000 variant ECOG status 1 seen in virtual visit    Past Medical History:   Diagnosis Date    Cancer     duodenal cancer    Hypertension     Hypotension, iatrogenic     Mitral valve prolapse      Family History   Problem Relation Name Age of Onset    Ovarian cancer Mother      Atrial fibrillation Mother      Stroke Mother      Hyperlipidemia Mother      Diabetes Mother      Bladder Cancer Father      Hypertension Father      Diabetes Father      No Known Problems Sister      Diabetes Maternal Grandmother      Colon cancer Maternal Grandmother      Stroke Maternal Grandfather      Diabetes Paternal Grandmother      No Known Problems Paternal Grandfather       Social History     Socioeconomic History    Marital status:    Tobacco Use    Smoking status: Former    Smokeless tobacco: Never   Substance and Sexual Activity    Alcohol use: Not Currently    Drug use: Never     Social Determinants of Health     Financial Resource Strain: Low Risk  (6/14/2024)    Overall Financial Resource Strain (CARDIA)     Difficulty of Paying Living Expenses: Not hard at all   Food Insecurity: No Food Insecurity (6/14/2024)    Hunger Vital Sign     Worried About Running Out of Food in the Last Year: Never true     Ran Out of Food in the Last Year: Never true   Physical Activity: Inactive (6/14/2024)    Exercise Vital Sign     Days of Exercise per Week: 0 days     Minutes of Exercise per Session: 0 min   Stress: No Stress Concern Present (6/14/2024)    [employer] [place] [employer] - Occupational Stress Questionnaire     Feeling of Stress : Not at all   Housing Stability: Unknown (6/14/2024)    Housing Stability Vital Sign  "    Unable to Pay for Housing in the Last Year: No     Past Surgical History:   Procedure Laterality Date    BUNIONECTOMY Left     HYSTERECTOMY  2000    INSERTION OF TUNNELED CENTRAL VENOUS CATHETER (CVC) WITH SUBCUTANEOUS PORT N/A 3/26/2021    Procedure: [id number]-B-CATH;  Surgeon: Lauren Abraham MD;  Location: [facility] OR;  Service: General;  Laterality: N/A;    INSERTION OF VENOUS ACCESS PORT Right 5/13/2024    Procedure: INSERTION, VENOUS ACCESS PORT;  Surgeon: Alicia Hernandez MD;  Location: [facility] OR;  Service: General;  Laterality: Right;    LITHOTRIPSY      PLACEMENT OF JEJUNOSTOMY TUBE  2/18/2021    Procedure: INSERTION, JEJUNOSTOMY TUBE;  Surgeon: Hitesh Díaz MD;  Location: [facility] OR 2ND FLR;  Service: General;;    ULTRASOUND GUIDANCE Right 5/13/2024    Procedure: ULTRASOUND GUIDANCE;  Surgeon: Alicia Hernandez MD;  Location: [facility] OR;  Service: General;  Laterality: Right;    WHIPPLE PROCEDURE N/A 2/18/2021    Procedure: WHIPPLE PROCEDURE;  Surgeon: Hitesh Díaz MD;  Location: [facility] OR 2ND FLR;  Service: General;  Laterality: N/A;       Labs:  Lab Results   Component Value Date    WBC 3.83 (L) 08/29/2024    HGB 10.4 (L) 08/29/2024    HCT 32.3 (L) 08/29/2024    MCV 98 08/29/2024    PLT 56 (L) 08/29/2024     BMP  Lab Results   Component Value Date     08/29/2024    K 4.2 08/29/2024     08/29/2024    CO2 24 08/29/2024    BUN 13 08/29/2024    CREATININE 0.9 08/29/2024    CALCIUM 8.9 08/29/2024    ANIONGAP 11 08/29/2024    ESTGFRAFRICA >60 03/07/2022    EGFRNONAA >60 03/07/2022     Lab Results   Component Value Date    ALT 53 (H) 08/29/2024    AST 70 (H) 08/29/2024    ALKPHOS 174 (H) 08/29/2024    BILITOT 0.4 08/29/2024       Lab Results   Component Value Date    IRON 42 05/10/2021    TIBC 404 05/10/2021    FERRITIN 345 (H) 05/10/2021     No results found for: "YGMEOQMC46"  No results found for: "FOLATE"  No results found for: "TSH"      Review of Systems   Constitutional:  Negative for " activity change, appetite change, chills, diaphoresis, fatigue, fever and unexpected weight change.   HENT:  Negative for congestion, dental problem, drooling, ear discharge, ear pain, facial swelling, hearing loss, mouth sores, nosebleeds, postnasal drip, rhinorrhea, sinus pressure, sneezing, sore throat, tinnitus, trouble swallowing and voice change.    Eyes:  Negative for photophobia, pain, discharge, redness, itching and visual disturbance.   Respiratory:  Negative for cough, choking, chest tightness, shortness of breath, wheezing and stridor.    Cardiovascular:  Negative for chest pain, palpitations and leg swelling.   Gastrointestinal:  Negative for abdominal distention, abdominal pain, anal bleeding, blood in stool, constipation, diarrhea, nausea, rectal pain and vomiting.   Endocrine: Negative for cold intolerance, heat intolerance, polydipsia, polyphagia and polyuria.   Genitourinary:  Negative for decreased urine volume, difficulty urinating, dyspareunia, dysuria, enuresis, flank pain, frequency, genital sores, hematuria, menstrual problem, pelvic pain, urgency, vaginal bleeding, vaginal discharge and vaginal pain.   Musculoskeletal:  Negative for arthralgias, back pain, gait problem, joint swelling, myalgias, neck pain and neck stiffness.   Skin:  Positive for rash. Negative for color change and pallor.        Bruising secondary to thrombocytopenia   Allergic/Immunologic: Negative for environmental allergies, food allergies and immunocompromised state.   Neurological:  Negative for dizziness, tremors, seizures, syncope, facial asymmetry, speech difficulty, weakness, light-headedness, numbness and headaches.   Hematological:  Negative for adenopathy. Does not bruise/bleed easily.   Psychiatric/Behavioral:  Negative for agitation, behavioral problems, confusion, decreased concentration, dysphoric mood, hallucinations, self-injury, sleep disturbance and suicidal ideas. The patient is not nervous/anxious and  is not hyperactive.        Objective:      Physical Exam  Constitutional:       Appearance: Normal appearance.   Neurological:      Mental Status: She is alert.             Assessment:      1. 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 Peripheral neuropathy due to chemotherapy    4. Pancytopenia due to antineoplastic chemotherapy           Med Onc Chart Routing      Follow up with physician . Repeat CBC on day of chemotherapy to make sure platelet count greater than 75,000. From cycle 8 return in 3-4 weeks with CBC CMP and CT chest abdomen pelvis to see me   Follow up with [facility]    Infusion scheduling note    Injection scheduling note    Labs    Imaging    Pharmacy appointment    Other referrals                   Plan:     The patient location is:  Home  The chief complaint leading to consultation is:  Cancer    Visit type: audiovisual    Face to Face time with patient: 25 minutes of total time spent on the encounter, which includes face to face time and non-face to face time preparing to see the patient (eg, review of tests), Obtaining and/or reviewing separately obtained history, Documenting clinical information in the electronic or other health record, Independently interpreting results (not separately reported) and communicating results to the patient/family/caregiver, or Care coordination (not separately reported).         Each patient to whom he or she provides medical services by telemedicine is:  (1) informed of the relationship between the physician and patient and the respective role of any other health care provider with respect to management of the patient; and (2) notified that he or she may decline to receive medical services by telemedicine and may withdraw from such care at any time.    Notes:   Reviewed information with her platelet count is near 50,000 will have to repeat CBC prior to treatment next week to make sure adequate.  Otherwise postpone a week from last  cycle will see back in 3 weeks with repeat CBC CMP and CT chest abdomen pelvis for read presentation to Surgical Oncology for potential surgical resection of anterior abdominal wall recurrence      Enzo Daley Jr, MD FACP

## 2024-09-03 ENCOUNTER — INFUSION (OUTPATIENT)
Dept: INFUSION THERAPY | Facility: HOSPITAL | Age: 59
End: 2024-09-03
Attending: INTERNAL MEDICINE
Payer: COMMERCIAL

## 2024-09-03 ENCOUNTER — LAB VISIT (OUTPATIENT)
Dept: LAB | Facility: HOSPITAL | Age: 59
End: 2024-09-03
Attending: INTERNAL MEDICINE
Payer: COMMERCIAL

## 2024-09-03 VITALS
TEMPERATURE: 97 F | HEART RATE: 72 BPM | RESPIRATION RATE: 16 BRPM | WEIGHT: 203.94 LBS | BODY MASS INDEX: 27.62 KG/M2 | HEIGHT: 72 IN | OXYGEN SATURATION: 97 % | SYSTOLIC BLOOD PRESSURE: 123 MMHG | DIASTOLIC BLOOD PRESSURE: 77 MMHG

## 2024-09-03 DIAGNOSIS — C17.0 DUODENAL CANCER: ICD-10-CM

## 2024-09-03 DIAGNOSIS — C17.0 DUODENAL CANCER: Primary | ICD-10-CM

## 2024-09-03 LAB
BASOPHILS # BLD AUTO: 0.02 K/UL (ref 0–0.2)
BASOPHILS NFR BLD: 0.5 % (ref 0–1.9)
DIFFERENTIAL METHOD BLD: ABNORMAL
EOSINOPHIL # BLD AUTO: 0.1 K/UL (ref 0–0.5)
EOSINOPHIL NFR BLD: 1.3 % (ref 0–8)
ERYTHROCYTE [DISTWIDTH] IN BLOOD BY AUTOMATED COUNT: 16 % (ref 11.5–14.5)
HCT VFR BLD AUTO: 33.6 % (ref 37–48.5)
HGB BLD-MCNC: 11.4 G/DL (ref 12–16)
IMM GRANULOCYTES # BLD AUTO: 0.04 K/UL (ref 0–0.04)
IMM GRANULOCYTES NFR BLD AUTO: 1.1 % (ref 0–0.5)
LYMPHOCYTES # BLD AUTO: 1 K/UL (ref 1–4.8)
LYMPHOCYTES NFR BLD: 25.9 % (ref 18–48)
MCH RBC QN AUTO: 32.2 PG (ref 27–31)
MCHC RBC AUTO-ENTMCNC: 33.9 G/DL (ref 32–36)
MCV RBC AUTO: 95 FL (ref 82–98)
MONOCYTES # BLD AUTO: 0.6 K/UL (ref 0.3–1)
MONOCYTES NFR BLD: 14.8 % (ref 4–15)
NEUTROPHILS # BLD AUTO: 2.1 K/UL (ref 1.8–7.7)
NEUTROPHILS NFR BLD: 56.4 % (ref 38–73)
NRBC BLD-RTO: 0 /100 WBC
PLATELET # BLD AUTO: 94 K/UL (ref 150–450)
PLATELET BLD QL SMEAR: ABNORMAL
PMV BLD AUTO: 10.5 FL (ref 9.2–12.9)
RBC # BLD AUTO: 3.54 M/UL (ref 4–5.4)
WBC # BLD AUTO: 3.71 K/UL (ref 3.9–12.7)

## 2024-09-03 PROCEDURE — 85025 COMPLETE CBC W/AUTO DIFF WBC: CPT | Performed by: INTERNAL MEDICINE

## 2024-09-03 PROCEDURE — 96367 TX/PROPH/DG ADDL SEQ IV INF: CPT

## 2024-09-03 PROCEDURE — 96416 CHEMO PROLONG INFUSE W/PUMP: CPT

## 2024-09-03 PROCEDURE — 96413 CHEMO IV INFUSION 1 HR: CPT

## 2024-09-03 PROCEDURE — 63600175 PHARM REV CODE 636 W HCPCS: Performed by: INTERNAL MEDICINE

## 2024-09-03 PROCEDURE — 96415 CHEMO IV INFUSION ADDL HR: CPT

## 2024-09-03 PROCEDURE — 36415 COLL VENOUS BLD VENIPUNCTURE: CPT | Performed by: INTERNAL MEDICINE

## 2024-09-03 PROCEDURE — 25000003 PHARM REV CODE 250: Performed by: INTERNAL MEDICINE

## 2024-09-03 PROCEDURE — 96411 CHEMO IV PUSH ADDL DRUG: CPT

## 2024-09-03 PROCEDURE — 96368 THER/DIAG CONCURRENT INF: CPT

## 2024-09-03 PROCEDURE — 96375 TX/PRO/DX INJ NEW DRUG ADDON: CPT

## 2024-09-03 RX ORDER — SODIUM CHLORIDE 0.9 % (FLUSH) 0.9 %
10 SYRINGE (ML) INJECTION
Status: CANCELLED | OUTPATIENT
Start: 2024-09-03

## 2024-09-03 RX ORDER — HEPARIN 100 UNIT/ML
500 SYRINGE INTRAVENOUS
Status: CANCELLED | OUTPATIENT
Start: 2024-09-03

## 2024-09-03 RX ORDER — FLUOROURACIL 50 MG/ML
400 INJECTION, SOLUTION INTRAVENOUS
Status: COMPLETED | OUTPATIENT
Start: 2024-09-03 | End: 2024-09-03

## 2024-09-03 RX ORDER — EPINEPHRINE 0.3 MG/.3ML
0.3 INJECTION SUBCUTANEOUS ONCE AS NEEDED
Status: CANCELLED | OUTPATIENT
Start: 2024-09-03

## 2024-09-03 RX ORDER — FLUOROURACIL 50 MG/ML
400 INJECTION, SOLUTION INTRAVENOUS
Status: CANCELLED | OUTPATIENT
Start: 2024-09-03

## 2024-09-03 RX ORDER — HEPARIN 100 UNIT/ML
500 SYRINGE INTRAVENOUS
Status: CANCELLED | OUTPATIENT
Start: 2024-09-04

## 2024-09-03 RX ORDER — PROCHLORPERAZINE EDISYLATE 5 MG/ML
10 INJECTION INTRAMUSCULAR; INTRAVENOUS ONCE AS NEEDED
Status: COMPLETED | OUTPATIENT
Start: 2024-09-03 | End: 2024-09-03

## 2024-09-03 RX ORDER — DIPHENHYDRAMINE HYDROCHLORIDE 50 MG/ML
50 INJECTION INTRAMUSCULAR; INTRAVENOUS ONCE AS NEEDED
Status: CANCELLED | OUTPATIENT
Start: 2024-09-03

## 2024-09-03 RX ORDER — SODIUM CHLORIDE 0.9 % (FLUSH) 0.9 %
10 SYRINGE (ML) INJECTION
Status: CANCELLED | OUTPATIENT
Start: 2024-09-04

## 2024-09-03 RX ORDER — PROCHLORPERAZINE EDISYLATE 5 MG/ML
10 INJECTION INTRAMUSCULAR; INTRAVENOUS ONCE AS NEEDED
Status: CANCELLED | OUTPATIENT
Start: 2024-09-03

## 2024-09-03 RX ORDER — PROCHLORPERAZINE EDISYLATE 5 MG/ML
10 INJECTION INTRAMUSCULAR; INTRAVENOUS ONCE AS NEEDED
Status: CANCELLED | OUTPATIENT
Start: 2024-09-04

## 2024-09-03 RX ADMIN — FLUOROURACIL 5280 MG: 50 INJECTION, SOLUTION INTRAVENOUS at 02:09

## 2024-09-03 RX ADMIN — DEXAMETHASONE SODIUM PHOSPHATE 0.25 MG: 4 INJECTION, SOLUTION INTRA-ARTICULAR; INTRALESIONAL; INTRAMUSCULAR; INTRAVENOUS; SOFT TISSUE at 11:09

## 2024-09-03 RX ADMIN — LEUCOVORIN CALCIUM 880 MG: 500 INJECTION, POWDER, LYOPHILIZED, FOR SOLUTION INTRAMUSCULAR; INTRAVENOUS at 12:09

## 2024-09-03 RX ADMIN — OXALIPLATIN 187 MG: 5 INJECTION, SOLUTION INTRAVENOUS at 12:09

## 2024-09-03 RX ADMIN — FLUOROURACIL 880 MG: 50 INJECTION, SOLUTION INTRAVENOUS at 02:09

## 2024-09-03 RX ADMIN — PROCHLORPERAZINE EDISYLATE 10 MG: 5 INJECTION INTRAMUSCULAR; INTRAVENOUS at 01:09

## 2024-09-03 NOTE — PLAN OF CARE
POC reviewed with patient. All needs and concerns addressed.   Problem: Adult Inpatient Plan of Care  Goal: Plan of Care Review  Description: Patient here today for her chemotherapy treatment.  Outcome: Progressing  Flowsheets (Taken 9/3/2024 1222)  Plan of Care Reviewed With: patient  Goal: Patient-Specific Goal (Individualized)  Description: Patient to tolerate treatment well today with no adverse effects.  Outcome: Progressing  Flowsheets (Taken 9/3/2024 1222)  Individualized Care Needs: feet elevated, warm blanket and sprite provided  Anxieties, Fears or Concerns: None expressed today  Patient/Family-Specific Goals (Include Timeframe): Tolerate chemo today  Goal: Optimal Comfort and Wellbeing  Description: Patient likes feet elevated with warm blanket and pillow.  Refreshments given.  Lights dimmed.    Outcome: Progressing  Intervention: Provide Person-Centered Care  Flowsheets (Taken 9/3/2024 1222)  Trust Relationship/Rapport:   questions answered   care explained   choices provided   questions encouraged   emotional support provided   empathic listening provided   thoughts/feelings acknowledged   reassurance provided     Problem: Chemotherapy Effects  Goal: Anemia Symptom Improvement  Outcome: Progressing  Intervention: Monitor and Manage Anemia  Flowsheets (Taken 9/3/2024 1222)  Safety Promotion/Fall Prevention:   assistive device/personal item within reach   in recliner, wheels locked   room near unit station  Goal: Safety Maintained  Outcome: Progressing  Goal: Absence of Hematuria  Outcome: Progressing  Goal: Nausea and Vomiting Relief  Outcome: Progressing  Goal: Neurotoxicity Symptom Control  Outcome: Progressing  Goal: Absence of Infection  Outcome: Progressing  Intervention: Prevent Infection and Maximize Resistance  Flowsheets (Taken 9/3/2024 1222)  Infection Prevention:   environmental surveillance performed   equipment surfaces disinfected   hand hygiene promoted   personal protective equipment  utilized  Goal: Absence of Bleeding  Outcome: Progressing  Intervention: Minimize Bleeding Risk  Flowsheets (Taken 9/3/2024 1222)  Bleeding Precautions:   blood pressure closely monitored   monitored for signs of bleeding

## 2024-09-03 NOTE — PLAN OF CARE
POC reviewed with patient. All needs and concerns addressed.   Problem: Adult Inpatient Plan of Care  Goal: Plan of Care Review  Description: Patient here today for her chemotherapy treatment.  Outcome: Progressing  Flowsheets (Taken 9/3/2024 1222)  Plan of Care Reviewed With: patient  Goal: Patient-Specific Goal (Individualized)  Description: Patient to tolerate treatment well today with no adverse effects.  Outcome: Progressing  Flowsheets (Taken 9/3/2024 1222)  Individualized Care Needs: feet elevated, warm blanket and sprite provided  Anxieties, Fears or Concerns: None expressed today  Patient/Family-Specific Goals (Include Timeframe): Tolerate chemo today  Goal: Optimal Comfort and Wellbeing  Description: Patient likes feet elevated with warm blanket and pillow.  Refreshments given.  Lights dimmed.    Outcome: Progressing  Intervention: Provide Person-Centered Care  Flowsheets (Taken 9/3/2024 1222)  Trust Relationship/Rapport:   questions answered   care explained   choices provided   questions encouraged   emotional support provided   empathic listening provided   thoughts/feelings acknowledged   reassurance provided

## 2024-09-05 ENCOUNTER — INFUSION (OUTPATIENT)
Dept: INFUSION THERAPY | Facility: HOSPITAL | Age: 59
End: 2024-09-05
Attending: INTERNAL MEDICINE
Payer: COMMERCIAL

## 2024-09-05 VITALS
TEMPERATURE: 98 F | RESPIRATION RATE: 18 BRPM | OXYGEN SATURATION: 98 % | DIASTOLIC BLOOD PRESSURE: 86 MMHG | SYSTOLIC BLOOD PRESSURE: 148 MMHG | HEART RATE: 76 BPM

## 2024-09-05 DIAGNOSIS — C17.0 DUODENAL CANCER: Primary | ICD-10-CM

## 2024-09-05 PROCEDURE — 96377 APPLICATON ON-BODY INJECTOR: CPT

## 2024-09-05 PROCEDURE — 63600175 PHARM REV CODE 636 W HCPCS: Performed by: INTERNAL MEDICINE

## 2024-09-05 RX ORDER — SODIUM CHLORIDE 0.9 % (FLUSH) 0.9 %
10 SYRINGE (ML) INJECTION
Status: DISCONTINUED | OUTPATIENT
Start: 2024-09-05 | End: 2024-09-05 | Stop reason: HOSPADM

## 2024-09-05 RX ORDER — HEPARIN 100 UNIT/ML
500 SYRINGE INTRAVENOUS
Status: DISCONTINUED | OUTPATIENT
Start: 2024-09-05 | End: 2024-09-05 | Stop reason: HOSPADM

## 2024-09-05 RX ADMIN — PEGFILGRASTIM 6 MG: KIT SUBCUTANEOUS at 01:09

## 2024-09-05 RX ADMIN — HEPARIN 500 UNITS: 100 SYRINGE at 01:09

## 2024-09-05 NOTE — DISCHARGE INSTRUCTIONS
Thank you for letting me take care of YOU!!    ARNULFO Self [facility]  [address] [facility]  [id number] [place] [address] Drive  [phone] phone     [phone] fax  Hours of Operation: Monday- Friday 8:00am- 5:00pm  After hours phone  [phone]  Hematology / Oncology Physicians on call:    Dr. Edi Rollins        Nurse Practitioners:    Nory Bearden, KALINA Wilson, BRAULIO Gardner NP Khelsea Conley, KALINA Quigley, NP      Please don't hesitate to call if you have any concerns.

## 2024-09-05 NOTE — NURSING
Patient here today for her 5FU pump removal and OBI placement.  Pump at zero.  Stated she had nausea and chills during this cycle.  Pt normally tolerates Folfox treatment without any issues.  She will let Dr Daley know in 2 weeks about the new symptoms she is experiencing.  VSS.  No other complaints.  NAD noted upon discharge to home.  Denied need for antinausea medication today.

## 2024-09-09 ENCOUNTER — PATIENT MESSAGE (OUTPATIENT)
Dept: SURGICAL ONCOLOGY | Facility: CLINIC | Age: 59
End: 2024-09-09
Payer: COMMERCIAL

## 2024-09-09 ENCOUNTER — PATIENT MESSAGE (OUTPATIENT)
Dept: HEMATOLOGY/ONCOLOGY | Facility: CLINIC | Age: 59
End: 2024-09-09
Payer: COMMERCIAL

## 2024-09-10 ENCOUNTER — HOSPITAL ENCOUNTER (OUTPATIENT)
Dept: RADIOLOGY | Facility: HOSPITAL | Age: 59
Discharge: HOME | End: 2024-09-10
Attending: SURGERY
Payer: COMMERCIAL

## 2024-09-10 DIAGNOSIS — C17.0 DUODENAL CANCER: ICD-10-CM

## 2024-09-10 PROCEDURE — 25500020 PHARM REV CODE 255: Performed by: SURGERY

## 2024-09-10 PROCEDURE — 74177 CT ABD & PELVIS W/CONTRAST: CPT | Mod: TC

## 2024-09-10 PROCEDURE — 71260 CT THORAX DX C+: CPT | Mod: TC

## 2024-09-10 PROCEDURE — 71260 CT THORAX DX C+: CPT | Mod: 26,,, | Performed by: RADIOLOGY

## 2024-09-10 PROCEDURE — A9698 NON-RAD CONTRAST MATERIALNOC: HCPCS | Performed by: SURGERY

## 2024-09-10 PROCEDURE — 74177 CT ABD & PELVIS W/CONTRAST: CPT | Mod: 26,,, | Performed by: RADIOLOGY

## 2024-09-10 RX ADMIN — IOHEXOL 1000 ML: 12 SOLUTION ORAL at 10:09

## 2024-09-10 RX ADMIN — IOHEXOL 100 ML: 350 INJECTION, SOLUTION INTRAVENOUS at 11:09

## 2024-09-18 ENCOUNTER — OFFICE VISIT (OUTPATIENT)
Dept: SURGICAL ONCOLOGY | Facility: CLINIC | Age: 59
End: 2024-09-18
Payer: COMMERCIAL

## 2024-09-18 VITALS
SYSTOLIC BLOOD PRESSURE: 125 MMHG | TEMPERATURE: 98 F | DIASTOLIC BLOOD PRESSURE: 81 MMHG | HEART RATE: 69 BPM | WEIGHT: 197.63 LBS | BODY MASS INDEX: 26.77 KG/M2 | HEIGHT: 72 IN

## 2024-09-18 DIAGNOSIS — C17.0 DUODENAL CANCER: Primary | ICD-10-CM

## 2024-09-18 PROCEDURE — 99214 OFFICE O/P EST MOD 30 MIN: CPT | Mod: S$GLB,,, | Performed by: SURGERY

## 2024-09-18 PROCEDURE — 3079F DIAST BP 80-89 MM HG: CPT | Mod: CPTII,S$GLB,, | Performed by: SURGERY

## 2024-09-18 PROCEDURE — 4010F ACE/ARB THERAPY RXD/TAKEN: CPT | Mod: CPTII,S$GLB,, | Performed by: SURGERY

## 2024-09-18 PROCEDURE — 99999 PR PBB SHADOW E&M-EST. PATIENT-LVL III: CPT | Mod: PBBFAC,,, | Performed by: SURGERY

## 2024-09-18 PROCEDURE — 3008F BODY MASS INDEX DOCD: CPT | Mod: CPTII,S$GLB,, | Performed by: SURGERY

## 2024-09-18 PROCEDURE — 1159F MED LIST DOCD IN RCRD: CPT | Mod: CPTII,S$GLB,, | Performed by: SURGERY

## 2024-09-18 PROCEDURE — 3074F SYST BP LT 130 MM HG: CPT | Mod: CPTII,S$GLB,, | Performed by: SURGERY

## 2024-09-18 NOTE — PROGRESS NOTES
Surgical Oncology Clinic Note      Referring Provider: No ref. provider found   PCP: Han Arshad MD    Reason For Visit: Gastroesophageal:  duodenal malignancy- recurrent/ metastatic     Oncologic History:   Oncology History   Duodenal cancer   2/18/2021 Initial Diagnosis    Duodenal cancer     2/18/2021 Surgery    Preop Dx: Duodenal cancer  Postop Dx: same  Surgeon: Dr. Hitesh Díaz    Operative Procedure: Pylorus-preserving Whipple resection; segmental small bowel resection, Witzel jejunostomy     3/12/2021 Cancer Staged    Staging form: Small Intestine - Adenocarcinoma, AJCC 8th Edition  - Pathologic stage from 3/12/2021: Stage IIIB (pT4, pN2, cM0)     3/15/2021 Tumor Conference       [facility] UGI MULTIDISCIPLINARY TUMOR BOARD  PATIENT REVIEW FORM   ____________________________________________________________    CLINIC #: [id number]  DATE: 3/15/2021    DIAGNOSIS: duodenal CA    PRESENTER: Hitesh Díaz    PATIENT SUMMARY:   This 56 y/o female presented with high grade obstructing duodenal mass. CT scan from 2/2021 revealed focal wall thickening of 3rd portion of duodenum. EGD with bx performed and pathology revealed adenoma. However, based on clinical condition, it was assumed she had invasive cancer.     She underwent upfront Whipple per Dr. Hitesh Díaz on 2/18/2021. Today's presentation is for pathology review.   pT4N2; 3cm moderately diff intestinal type JENNIFER, invading into mesocolon and involves pancreatic parenchyma; margins negative, 5 out of 11 lymph nodes positive for malignancy. LVI present; intact staining    BOARD RECOMMENDATIONS:   Proceed with systemic adj FOLFOX chemotherapy    CONSULT NEEDED:     [] Surgery    [x] Hem/Onc in [place]    [] Rad/Onc    [] Dietary                 [] Social Service    [] Psychology       [] AES  [] Radiology     Pathologic Stage: Tumor 4 Node(s) 2  Metastasis 0   5 nodes out of 11 nodes were positive for malignancy      GROUP STAGE:  [] O    [] 1A     [] IB    [] IIA    [] IIB     [] IIIA     [x] IIIB     [] IIIC    []IV  [] Local recurrence     [] Regional recurrence     [] Distant recurrence   Metastatic site(s): none         [x] Terri'l Treatment Guidelines reviewed and care planned is consistent with guidelines.         (i.e., NCCN, NCI, PD, ACO, AUA, etc.)    PRESENTATION AT CANCER CONFERENCE:         [] Prospective    [x] Retrospective     [] Follow-Up       3/30/2021 - 10/21/2021 Chemotherapy    Treatment Summary   Plan Name: OP FOLFOX 6 Q2W  Treatment Goal: Control  Status: Inactive  Start Date: 3/30/2021  End Date: 10/21/2021  Provider: Enzo Daley MD  Chemotherapy: dexAMETHasone (DECADRON) 4 MG Tab, 8 mg, Oral, Daily, 1 of 1 cycle, Start date: 3/24/2021, End date: --  prochlorperazine (COMPAZINE) 10 MG tablet, 10 mg, Oral, 3 times daily PRN, 1 of 1 cycle, Start date: 3/23/2021, End date: --  fluorouraciL injection 825 mg, 400 mg/m2 = 825 mg, Intravenous, Clinic/[facility] 1 time, 12 of 12 cycles  Administration: 825 mg (3/30/2021), 825 mg (4/13/2021), 825 mg (4/27/2021), 825 mg (5/11/2021), 825 mg (5/25/2021), 825 mg (6/22/2021), 825 mg (7/6/2021), 825 mg (8/3/2021), 825 mg (9/1/2021), 825 mg (9/14/2021), 825 mg (9/28/2021), 825 mg (10/19/2021)  fluorouracil (ADRUCIL) 2,400 mg/m2 = 4,945 mg in sodium chloride 0.9% 100 mL chemo infusion, 2,400 mg/m2 = 4,945 mg, Intravenous, Over 46 hours, 12 of 12 cycles  Administration: 4,945 mg (3/30/2021), 4,945 mg (4/13/2021), 4,945 mg (4/27/2021), 4,945 mg (5/11/2021), 4,945 mg (5/25/2021), 4,945 mg (6/22/2021), 4,945 mg (7/6/2021), 5,000 mg (8/3/2021), 4,945 mg (9/1/2021), 4,945 mg (9/14/2021), 5,000 mg (9/28/2021), 4,945 mg (10/19/2021)  leucovorin calcium 400 mg/m2 = 825 mg in dextrose 5 % 291.25 mL infusion, 400 mg/m2 = 825 mg, Intravenous, Clinic/[facility] 1 time, 12 of 12 cycles  Administration: 825 mg (3/30/2021), 825 mg (4/13/2021), 800 mg (4/27/2021), 800 mg (5/11/2021), 825 mg (5/25/2021), 825 mg (6/22/2021), 800  mg (7/6/2021), 825 mg (8/3/2021), 800 mg (9/1/2021), 800 mg (9/14/2021), 825 mg (9/28/2021), 800 mg (10/19/2021)  oxaliplatin (ELOXATIN) 85 mg/m2 = 175 mg in dextrose 5 % 535 mL chemo infusion, 85 mg/m2 = 175 mg, Intravenous, Clinic/[facility] 1 time, 12 of 12 cycles  Administration: 175 mg (3/30/2021), 175 mg (4/13/2021), 175 mg (4/27/2021), 175 mg (5/11/2021), 175 mg (5/25/2021), 175 mg (6/22/2021), 175 mg (7/6/2021), 175 mg (8/3/2021), 175 mg (9/1/2021), 175 mg (9/14/2021), 175 mg (9/28/2021), 175 mg (10/19/2021)      Tumor Markers    Immunohistochemical studies for DNA mismatch repair proteins were performed on this tumor (block 4J)  and they demonstrate INTACT STAINING in all 4 proteins (MLH1, PMS2, MSH2, and MSH6). These results  indicate that this tumor is microsatellite stable (BERNA) and that Estrada syndrome (Hereditary Nonpolyposis  Colorectal Cancer, HNPCC) is unlikely     3/31/2021 Genetic Testing    Patient has genetic testing done for [place] My Risk Germline Testing.                                           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 #[id number] date 03/31/2021     5/15/2024 Cancer Staged    Staging form: Small Intestine - Adenocarcinoma, AJCC 8th Edition  - Pathologic stage from 5/15/2024: Stage IV (cM1)     5/20/2024 -  Chemotherapy    Treatment Summary   Plan Name: OP GI mFOLFOX6 (oxaliplatin leucovorin fluorouracil) Q2W  Treatment Goal: Control  Status: Active  Start Date: 5/20/2024  End Date: 10/30/2024 (Planned)  Provider: Enzo Daley MD  Chemotherapy: fluorouraciL injection 885 mg, 400 mg/m2 = 885 mg, Intravenous, Clinic/[facility] 1 time, 8 of 12 cycles  Administration: 885 mg (5/20/2024), 885 mg (6/3/2024), 885 mg (6/17/2024), 890 mg (7/1/2024), 880 mg (7/15/2024), 880 mg (8/5/2024), 880 mg (8/19/2024), 880 mg (9/3/2024)  oxaliplatin (ELOXATIN) 85 mg/m2 = 188 mg in dextrose 5 % (D5W) 602.6 mL chemo infusion, 85 mg/m2 =  188 mg, Intravenous, Clinic/[facility] 1 time, 8 of 12 cycles  Administration: 188 mg (5/20/2024), 188 mg (6/3/2024), 188 mg (6/17/2024), 189 mg (7/1/2024), 187 mg (7/15/2024), 187 mg (8/5/2024), 187 mg (8/19/2024), 187 mg (9/3/2024)  fluorouracil (Adrucil) 2,400 mg/m2 = 5,305 mg in sodium chloride 0.9% 240 mL chemo infusion, 2,400 mg/m2 = 5,305 mg, Intravenous, Over 46 hours, 8 of 12 cycles  Administration: 5,305 mg (5/20/2024), 5,305 mg (6/3/2024), 5,305 mg (6/17/2024), 5,330 mg (7/1/2024), 5,280 mg (7/15/2024), 5,280 mg (8/5/2024), 5,280 mg (8/19/2024), 5,280 mg (9/3/2024)           Staging:  Cancer Staging   Duodenal cancer  Staging form: Small Intestine - Adenocarcinoma, AJCC 8th Edition  - Pathologic stage from 3/12/2021: Stage IIIB (pT4, pN2, cM0) - Signed by Velvet Faith APRN, ANP-C on 3/14/2021  - Pathologic stage from 5/15/2024: Stage IV (cM1) - Signed by Alicia Hernandez MD on 5/15/2024       HISTORY       Karen Gutierrez is a 59 y.o. female with history of duodenal adenocarcinoma s/p  who presents today for evaluation and management of recurrent duodenal carcinoma.    She originally presented in February of 2021 with a completely obstructing duodenal mass.  She underwent G-tube and J-tube placement it was then followed with Dr. Hitesh Díaz in [place].  She subsequently underwent a Whipple on 02/11/2021.  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  Post op course was c/b abscess requiring drainage.  She received 12 cycles of FOLFOX adjuvantly which she tolerated well, and which she completed in November 2021.   Her GB and her J-tube both came out and she has been on surveillance since that point in time.  Surveillance imaging in April of this year showed a new heterogeneous mass in the anterior abdominal wall measuring 6.8 x 5.1 x 4.6 cm concerning for tumor recurrence versus hematoma.  She underwent biopsy which did confirm metastatic disease.  She was discussed at tumor board  with agreement for systemic chemotherapy and presents today for discussion of port placement.    Overall she feels well and is doing well.  She presents today with her sister.    INTERVAL HISTORY      08/14/2024:  She's had 4 cycles chemo so far.  She is tolerating it well.  Her tumor has shrunk significantly.       09/18/2024:  She's had 6 cycles so far.  She's doing very well.  She can no longer palpate her tumor at all.     Past Medical History:   Diagnosis Date    Cancer     duodenal cancer    Hypertension     Hypotension, iatrogenic     Mitral valve prolapse        Past Surgical History:   Procedure Laterality Date    BUNIONECTOMY Left     HYSTERECTOMY  2000    INSERTION OF TUNNELED CENTRAL VENOUS CATHETER (CVC) WITH SUBCUTANEOUS PORT N/A 3/26/2021    Procedure: [id number];  Surgeon: Lauren Abraham MD;  Location: [facility] OR;  Service: General;  Laterality: N/A;    INSERTION OF VENOUS ACCESS PORT Right 5/13/2024    Procedure: INSERTION, VENOUS ACCESS PORT;  Surgeon: Alicia Hernandez MD;  Location: [facility] OR;  Service: General;  Laterality: Right;    LITHOTRIPSY      PLACEMENT OF JEJUNOSTOMY TUBE  2/18/2021    Procedure: INSERTION, JEJUNOSTOMY TUBE;  Surgeon: Hitesh Díaz MD;  Location: [facility] OR 2ND FLR;  Service: General;;    ULTRASOUND GUIDANCE Right 5/13/2024    Procedure: ULTRASOUND GUIDANCE;  Surgeon: Alicia Hernandez MD;  Location: [facility] OR;  Service: General;  Laterality: Right;    WHIPPLE PROCEDURE N/A 2/18/2021    Procedure: WHIPPLE PROCEDURE;  Surgeon: Hitesh Díaz MD;  Location: [facility] OR 2ND FLR;  Service: General;  Laterality: N/A;       Family History   Problem Relation Name Age of Onset    Ovarian cancer Mother      Atrial fibrillation Mother      Stroke Mother      Hyperlipidemia Mother      Diabetes Mother      Bladder Cancer Father      Hypertension Father      Diabetes Father      No Known Problems Sister      Diabetes Maternal Grandmother      Colon cancer Maternal Grandmother       Stroke Maternal Grandfather      Diabetes Paternal Grandmother      No Known Problems Paternal Grandfather         Social History     Socioeconomic History    Marital status:    Tobacco Use    Smoking status: Former    Smokeless tobacco: Never   Substance and Sexual Activity    Alcohol use: Not Currently    Drug use: Never     Social Determinants of Health     Financial Resource Strain: Low Risk  (6/14/2024)    Overall Financial Resource Strain (CARDIA)     Difficulty of Paying Living Expenses: Not hard at all   Food Insecurity: No Food Insecurity (6/14/2024)    Hunger Vital Sign     Worried About Running Out of Food in the Last Year: Never true     Ran Out of Food in the Last Year: Never true   Physical Activity: Inactive (6/14/2024)    Exercise Vital Sign     Days of Exercise per Week: 0 days     Minutes of Exercise per Session: 0 min   Stress: No Stress Concern Present (6/14/2024)    [employer] [place] [employer] - Occupational Stress Questionnaire     Feeling of Stress : Not at all   Housing Stability: Unknown (6/14/2024)    Housing Stability Vital Sign     Unable to Pay for Housing in the Last Year: No          Medication List            Accurate as of September 18, 2024 11:40 AM. If you have any questions, ask your nurse or doctor.                CONTINUE taking these medications      amLODIPine 10 MG tablet  Commonly known as: NORVASC     atomoxetine 40 MG capsule  Commonly known as: STRATTERA  Take 1 capsule (40 mg total) by mouth once daily.     BYSTOLIC 20 mg Tab  Generic drug: nebivoloL     cholecalciferol (vitamin D3) 125 mcg (5,000 unit) capsule     clonazePAM 0.5 MG tablet  Commonly known as: KlonoPIN  Take 1 tablet (0.5 mg total) by mouth nightly as needed for Anxiety.     cyanocobalamin 1000 MCG tablet  Commonly known as: VITAMIN B-12     EScitalopram oxalate 10 MG tablet  Commonly known as: LEXAPRO  Take 1 tablet (10 mg total) by mouth once daily.     LIDOcaine-prilocaine  cream  Commonly known as: EMLA  Apply to affected area once     OLANZapine 5 MG tablet  Commonly known as: ZyPREXA  Take 1 tablet (5 mg total) by mouth every evening. Take nightly on days 1-3 of each chemotherapy cycle.     olmesartan 40 MG tablet  Commonly known as: BENICAR  TAKE 1 TABLET(40 MG) BY MOUTH EVERY DAY     ondansetron 4 MG Tbdl  Commonly known as: ZOFRAN-ODT  Take 1 tablet (4 mg total) by mouth 2 (two) times daily.     prochlorperazine 10 MG tablet  Commonly known as: COMPAZINE  Take 1 tablet (10 mg total) by mouth every 6 (six) hours as needed for Nausea.              Review of patient's allergies indicates:   Allergen Reactions    Benzalkonium chloride Hives    Codeine Itching    Morphine Nausea Only    Neosporin [neomycin-bacitracin-polymyxin] Hives    Sulfa (sulfonamide antibiotics) Hives and Itching           Morphine sulfate Other (See Comments)    Sulfamethoxazole-trimethoprim Other (See Comments)    Hydrocortisone Hives     Redness / can use bactroban         Review of Systems   Constitutional:  Negative for chills, fever, malaise/fatigue and weight loss.   Respiratory:  Negative for cough, shortness of breath and wheezing.    Cardiovascular:  Negative for chest pain and palpitations.   Gastrointestinal:  Negative for abdominal pain, constipation, diarrhea, nausea and vomiting.   Musculoskeletal:  Negative for back pain, falls and joint pain.   Neurological:  Negative for dizziness, sensory change, speech change, focal weakness, seizures, loss of consciousness and weakness.   Psychiatric/Behavioral:  Negative for depression and hallucinations. The patient is not nervous/anxious.          Vitals:    09/18/24 1105   BP: 125/81   Pulse: 69   Temp: 98.3 °F (36.8 °C)     Body mass index is 26.81 kg/m².  ECOG SCORE             PHYSICAL EXAM     Physical Exam  Vitals reviewed.   Constitutional:       General: She is not in acute distress.     Appearance: Normal appearance.   HENT:      Head:  Normocephalic and atraumatic.      Mouth/Throat:      Mouth: Mucous membranes are moist.   Eyes:      General: No scleral icterus.     Extraocular Movements: Extraocular movements intact.      Conjunctiva/sclera: Conjunctivae normal.   Pulmonary:      Effort: Pulmonary effort is normal.   Abdominal:      General: There is no distension.      Palpations: Abdomen is soft.      Tenderness: There is no abdominal tenderness.      Comments: No palpable lesion.  Palpable hernia along upper midline    Musculoskeletal:         General: No swelling. Normal range of motion.   Skin:     General: Skin is warm and dry.   Neurological:      General: No focal deficit present.      Mental Status: She is alert.   Psychiatric:         Mood and Affect: Mood normal.         Behavior: Behavior normal.         Thought Content: Thought content normal.         Judgment: Judgment normal.          DATA REVIEW:  I personally reviewed the following records for this visit: lab work from prior visit, notes from other physicians, computed tomography/CT imaging, surgical pathology results, radiographic study evaluation, and laboratory results done by primary care physician    LABS       Lab Results   Component Value Date    WBC 3.71 (L) 09/03/2024    HGB 11.4 (L) 09/03/2024    HCT 33.6 (L) 09/03/2024    PLT 94 (L) 09/03/2024     Lab Results   Component Value Date     (H) 08/29/2024    CALCIUM 8.9 08/29/2024    ALBUMIN 3.8 08/29/2024    PROT 6.7 08/29/2024     08/29/2024    K 4.2 08/29/2024    CO2 24 08/29/2024     08/29/2024    BUN 13 08/29/2024    CREATININE 0.9 08/29/2024    ALKPHOS 174 (H) 08/29/2024    ALT 53 (H) 08/29/2024    AST 70 (H) 08/29/2024    BILITOT 0.4 08/29/2024       Nutritional:  Lab Results   Component Value Date    PREALBUMIN 11 (L) 03/04/2021       Tumor Markers:  Lab Results   Component Value Date    CEA 1.7 08/01/2024     Lab Results   Component Value Date     12.1 08/01/2024       PATHOLOGY      2/18/2021- Whipple (Dr. Hitesh Díaz)      Component 3 yr ago   Final Pathologic Diagnosis 1.  GALLBLADDER, CHOLECYSTECTOMY:  - Gallbladder with changes suggestive of cholesterolosis  - Fragment of hepatic parenchyma with no significant histopathologic  abnormality  - One benign lymph node (0/1)  2.  LYMPH NODE, HEPATIC ARTERY, EXCISION:  - One benign lymph node, negative for metastatic disease (0/1)  3.  LYMPH NODE, NETTIE HEPATITIS, EXCISION:  - One benign lymph node, negative for metastatic disease (0/1)  4.  DUODENUM AND PANCREAS, PANCREATICODUODENECTOMY (WHIPPLE PROCEDURE)  ([id number]):  - Invasive duodneal adenocarcinoma, intestinal-type,  moderately-differentiated, measuring 3.0 in greatest dimension  - Carcinoma involves pancreatic parenchyma and mesentery (mesocolon) of  adjacent transverse colon, pT4  - Metastatic adenocarcinoma present in five of eleven lymph nodes (5/11), pN2  - Surgical resection margins negative for neoplasia  - Please see comments for synoptic report  5.  SMALL INTESTINE, PARTIAL RESECTION:  - Segment of small intestine with a full thickness defect associated with  mucosal ulceration and acute serositis  - Resection margins viable  - No evidence of dysplasia or malignancy (multiple deeper levels examined)         Immunohistochemical studies for DNA mismatch repair proteins were performed  on this tumor (block 4J) and they demonstrate INTACT STAINING in all 4  proteins (MLH1, PMS2, MSH2, and MSH6).  These results indicate that this  tumor is microsatellite stable (BERNA) and that Estrada syndrome (Hereditary  Nonpolyposis Colorectal Cancer, HNPCC) is unlikely.  SMALL INTESTINE:    SPECIMEN      Procedure       Pancreaticoduodenectomy  (Whipple resection)    TUMOR      Tumor Site       Duodenum      Histologic Type:       Adenocarcinoma (not otherwise characterized)      Histologic Grade       G2: Moderately differentiated      Tumor Size       Greatest Dimension (Centimeters) 3.0 cm       Tumor Extension       Tumor invades other organs / structures  Other Organs / Structures           Mesentery of adjacent loops of bowel            Pancreas      Macroscopic Tumor Perforation       Not identified      Lymphovascular Invasion       Present          Margins Examined           Proximal            Distal            Uncinate            Bile duct            Pancreatic    LYMPH NODES      Regional Lymph Nodes         Number of Lymph Nodes  Involved           5          Number of Lymph Nodes Examined           11      Primary Tumor (pT)       pT4      Regional Lymph Nodes (pN)       pN2    ADDITIONAL FINDINGS      Additional Findings       Biliary intraepithelial  neoplasia, low-grade (BilIN 1) with foveolar metaplasia     05/02/2024:  IR biopsy abdominal wall mass      Component 13 d ago   Final Pathologic Diagnosis BIOPSY OF MASS FROM ABDOMINAL WALL:  METASTATIC ADENOCARCINOMA CONSISTENT WITH PANCREATIC ORIGIN       IMAGING     12/30/2020 CT Abdomen Pelvis Wwout Contrast (OLOL)  IMPRESSION:    1.  Arterial enhancing mass within the right hepatic lobe that is isodense on subsequent portal venous phase and delayed phase suggestive of a hemangioma. Follow-up CT of the liver three-phase or MRI of the abdomen with and without contrast in 6-12   months is recommended to confirm stability.    2.  Fluid distention of the stomach that may be secondary to ingestion of a recent meal or gastric bowel obstruction.   3. Nonspecific subcentimeter nodules in the right lung base. Fleischner Society 2017 guidelines for management of incidentally detected solid pulmonary nodules in adults (does not apply to patients younger than 35 years, CT lung cancer screening,   patients with immunosuppression or patients with known primary cancer): Multiple solid nodules < 6 mm in diameter: For low risk patients no routine follow-up. For high risk patents optional CT at 12 months.   4. Postoperative changes of hysterectomy.   5.  Nonobstructing left nephrolithiasis.   6. Possible reflux esophagitis.   7. Colonic diverticulosis.     02/11/2021  CT Chest, abdomen, and Pelvis  Impression:   1. Wall thickening and luminal narrowing of the 3rd portion of the duodenum concerning for neoplasm.  2. Small paraduodenal node measuring 7 mm.  3. Punctate nonobstructing left-sided nephrolith.  No hydronephrosis.     02/12/2021:  CT Abdomen/ Pelvis  Impression:   1. Focal wall thickening of the 3rd portion of duodenum, previously obstructing, now status post gastrostomy and jejunal tube placements.  This could represent duodenal carcinoma or lymphoma.  Pancreatic tumor thought less likely.  Minimal lymph node enlargement in the region.  No distant metastasis detected.  2. Status post hysterectomy and other findings as above.    02/21/2021  CTA Abdomen and Pelvis  Impression:   Postoperative changes of Whipple procedure and jejunostomy tube change as well as right quadrant terminating presumable drainage catheter.  Scattered free air, which may be postsurgical,  however unable to completely exclude bowel perforation.   Trace abdominal free fluid, small focal fluid collection without well-defined walls along the posterior aspect of the pancreas, mesenteric edema, and mild small bowel wall thickening near the bipin hepatis, possibly expected postsurgical changes.   Prominent loops of large bowel and distal small bowel measuring up to 3.0 cm.  No definite transition point.  Findings likely represent ileus.  No pneumatosis or portal venous gas.   Trace pleural effusions left greater than right with associated atelectasis.   Hepatomegaly.   No pseudoaneurysm or convincing evidence of active bleeding or hematoma.     03/01/2021  CT Abdomen/ Pelvis  Impression:   1. Status post Whipple procedure.  Peripherally enhancing collection adjacent to the resection bed as above, nonspecific but concerning for abscess.  Small volume abdominal and pelvic free fluid.  2.  Persistent haziness and wall thickening of small bowel loops in the bipin hepatis, possibly postoperative.  3. Trace pleural effusions with associated atelectasis, similar to prior.  4. Additional findings as above.    05/10/2021 CT Abdomen/ Pelvis  Impression:   Stable postoperative changes of a Whipple procedure interval resolution of the previously demonstrated fluid collection in the postoperative bed.  Resolution of previously noted trace left pleural effusion.  Other findings as discussed in the body of the report at above.    11/22/2021  CT CAP  Multiple small scattered bilateral pulmonary nodules all measuring less than 3 mm.  Findings may have been present on CT from 02/11/2021 although this is difficult to ascertain given differences in technique/slice acquisition.  At minimum, continued follow-up is suggested.   Stable postoperative changes of Whipple procedure without definitive evidence of residual/recurrent disease.    03/07/2022  CT  CAP  Stable CT examination of the chest, abdomen, and pelvis.  No definite evidence of recurrent or metastatic disease.     10/05/2022  CT CAP  Overall no detrimental change with findings as above.     04/12/2023  CT CAP  1.  No concerning interval change compared to the prior 2 examinations.  2.  Postoperative findings within the right upper quadrant likely with partial pancreatectomy and small bowel resection.  Loops of bowel are somewhat matted to the surface of the liver which is unchanged.  No definite masslike abnormality within this region.  3.  Hepatomegaly and fatty infiltration of the liver.  4.  Punctate nonobstructing stones appear left kidney.     10/11/2023  CT CAP  Stable postsurgical changes in the right upper quadrant without evidence of recurrent or metastatic disease in the chest abdomen or pelvis.     04/19/2024:  CT CAP  1.  There is a new large heterogeneous mass identified in the anterior abdominal wall at the level of the umbilicus extending to the  left of midline and measuring 6.8 x 5.1 x 4.6 cm.  New increased skin thickening is also visible in the umbilicus immediately anterior to this lesion.  Tumor recurrence is a consideration although rectus sheath hematoma or an inflammatory process are also considerations.  This lesion is easily amenable to percutaneous biopsy.  Characterization of this lesion by ultrasound should also be considered.  2.  Stable postsurgical changes following resection of the duodenum, gastric antrum and head of the pancreas with gastrojejunostomy.  3.  Hepatomegaly and generalized hepatic steatosis.  4.  Small nonobstructing intrarenal calculus on the left.  5.  A stable 6 mm faint nodule in the right upper lobe is unchanged since 04/12/2023.  6.  Status post hysterectomy.    07/08/2024:  CT CAP  Decreased size of a ventral abdominal wall mass measuring 4.5 x 2.0 cm (series 3, image 92), previously 6.8 x 4.5 cm.  No new lesion.  Diastasis of the rectus abdominus with protrusion of the abdominal fat and: Midline.   No acute fracture or aggressive bone lesion.  Mild degenerative changes of the spine.   Impression:   1. Decreased size of a ventral abdominal wall mass.  No evidence of new metastatic disease.  2. Postsurgical changes of Whipple procedure, duodenal resection, and gastrojejunostomy.  3. Stable 6 mm ground-glass nodule in the right upper lobe.  Additional stable bilateral pulmonary micro nodules.  4. Hepatomegaly and steatosis.  5. Splenomegaly.  6. Additional findings as above.    09/10/2024:  CT Chest, Abdomen, and pelvis  Impression:   History of duodenal malignancy status post Whipple procedure without CT evidence of local tumor recurrence.  There is continued marked decrease size of the left rectus abdominis mass, umbilicus level, now with only minimal residual soft tissue thickening, markedly improved compared to April 2024.  Hepatosplenomegaly.  Fatty liver.  Stable multiple bilateral tiny pulmonary nodules, majority  "3 to 4 mm.  Stable right middle lobe groundglass nodule, 5 mm.  No worrisome new or enlarging pulmonary nodule.  Stable sclerotic lesion first thoracic vertebral body, 1.2 cm, and stable lucent lesion ninth thoracic vertebra body, 0.8 cm.    ASSESSMENT & PLAN     1. Duodenal cancer       Karen Gutierrez is a 59 y.o. 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    She has done extraordinarily well with chemotherapy and her tumor is no longer palpable or visible on imaging.  I discussed with her that in my mind there are 2 options moving forward although there was no standard of care" for this.  The 1st option would be to proceed with surgical intervention with no additional chemotherapy.  The concerns I have with this are the inability to find the lesion since it has shrunk so remarkably and is no longer palpable.  The other concern this is a potential injury to intestines and surrounding structures compromising the hernia repair.  I discussed with her the 2nd option would be to finish the last 2 cycles of chemotherapy and then to surveil her for any recurrence with imaging as well as CA 19-9.  If there is any evidence of recurrence then we can proceed with surgical resection at that time.  I discussed with her that I think it is best if she had her case was reviewed in our multidisciplinary tumor board again.  She is in agreement with that and I will plan to call her with those results after Friday.    Plan:  -- put on for tumor board Friday   -- options for stop chemo and do surgery vs finish out chemo and no surgery with surveillance        Ms. Gutierrez expressed understanding in regards to our discussion today. Many good questions were asked on today's visit, all of which were answered to their satisfaction.    Follow-up: Follow up in about 4 " weeks (around 10/16/2024).                  Alicia Hernandez MD MS              Surgical Oncology              [facility] [place], [place]              Office: [phone]     Communications: 30 minutes were spent on today's visit in face-to-face and non face-to-face time with the patient. This patient was recently diagnosed with metastatic duodenal adenocarcinoma and the time was required to provide counseling and guidance regarding their new diagnosis. Time was spent reviewing all outside records and information pertaining to their work-up and formulating a treatment plan in line with standardized guidelines. Additional time was spent communicating with referring physicians and facilities to facilitate the efficient exchange of previous healthcare records and radiographic imaging pertinent to the diagnosis and disease management.       No orders of the defined types were placed in this encounter.

## 2024-09-20 ENCOUNTER — TUMOR BOARD CONFERENCE (OUTPATIENT)
Dept: HEMATOLOGY/ONCOLOGY | Facility: CLINIC | Age: 59
End: 2024-09-20
Payer: COMMERCIAL

## 2024-09-20 NOTE — PROGRESS NOTES
Tumor Board Documentation      Karen Gutierrez was presented by Alicia Hernandez MD at our Tumor Board on 9/20/2024, which included representatives from Medical Oncology, Hematology, Radiation Oncology, Surgical Oncology, Pathology, Navigation, Integrative Oncology, Radiology, Pulmonology, Gastrointestinal.    Karen currently presents as a current patient with Gastric cancer, with history of the following treatments: Adjuvant Chemotherapy.    Additionally, we reviewed previous medical and familial history, history of present illness, and recent lab results along with all available histopathologic and imaging studies. The tumor board considered available treatment options and made the following recommendations:  Chemotherapy, Surgery       recommend finishing last 4 cycles of FOLFOX followed by consolidation with surgery     The following procedures/referrals were also placed: No orders of the defined types were placed in this encounter.      Clinical Trial Status: Not discussed     National site-specific guidelines were discussed with respect to the case.    Tumor board is a meeting of clinicians from various specialty areas who evaluate and discuss patients for whom a multidisciplinary approach is being considered. Final determinations in the plan of care are those of the provider(s). The responsibility for follow up of recommendations given during tumor board is that of the provider.     Alicia Hernandez MD

## 2024-09-23 ENCOUNTER — LAB VISIT (OUTPATIENT)
Dept: LAB | Facility: HOSPITAL | Age: 59
End: 2024-09-23
Attending: INTERNAL MEDICINE
Payer: COMMERCIAL

## 2024-09-23 ENCOUNTER — INFUSION (OUTPATIENT)
Dept: INFUSION THERAPY | Facility: HOSPITAL | Age: 59
End: 2024-09-23
Attending: INTERNAL MEDICINE
Payer: COMMERCIAL

## 2024-09-23 ENCOUNTER — OFFICE VISIT (OUTPATIENT)
Dept: HEMATOLOGY/ONCOLOGY | Facility: CLINIC | Age: 59
End: 2024-09-23
Payer: COMMERCIAL

## 2024-09-23 VITALS
TEMPERATURE: 97 F | SYSTOLIC BLOOD PRESSURE: 116 MMHG | HEART RATE: 67 BPM | HEIGHT: 72 IN | RESPIRATION RATE: 18 BRPM | DIASTOLIC BLOOD PRESSURE: 75 MMHG | WEIGHT: 198.63 LBS | BODY MASS INDEX: 26.9 KG/M2 | OXYGEN SATURATION: 97 %

## 2024-09-23 VITALS
HEIGHT: 72 IN | WEIGHT: 198.63 LBS | OXYGEN SATURATION: 98 % | BODY MASS INDEX: 26.9 KG/M2 | DIASTOLIC BLOOD PRESSURE: 81 MMHG | SYSTOLIC BLOOD PRESSURE: 128 MMHG | HEART RATE: 65 BPM | TEMPERATURE: 98 F

## 2024-09-23 DIAGNOSIS — C17.0 DUODENAL CANCER: ICD-10-CM

## 2024-09-23 DIAGNOSIS — T45.1X5A PERIPHERAL NEUROPATHY DUE TO CHEMOTHERAPY: ICD-10-CM

## 2024-09-23 DIAGNOSIS — C17.0 DUODENAL CANCER: Primary | ICD-10-CM

## 2024-09-23 DIAGNOSIS — G62.0 PERIPHERAL NEUROPATHY DUE TO CHEMOTHERAPY: ICD-10-CM

## 2024-09-23 DIAGNOSIS — C79.89 SECONDARY MALIGNANCY OF SOFT TISSUES OF ABDOMEN: ICD-10-CM

## 2024-09-23 DIAGNOSIS — T45.1X5A IMMUNODEFICIENCY DUE TO CHEMOTHERAPY: ICD-10-CM

## 2024-09-23 DIAGNOSIS — Z90.411 HISTORY OF PARTIAL PANCREATECTOMY: ICD-10-CM

## 2024-09-23 DIAGNOSIS — Z79.899 IMMUNODEFICIENCY DUE TO CHEMOTHERAPY: ICD-10-CM

## 2024-09-23 DIAGNOSIS — D84.821 IMMUNODEFICIENCY DUE TO CHEMOTHERAPY: ICD-10-CM

## 2024-09-23 LAB
ALBUMIN SERPL BCP-MCNC: 4 G/DL (ref 3.5–5.2)
ALP SERPL-CCNC: 165 U/L (ref 55–135)
ALT SERPL W/O P-5'-P-CCNC: 41 U/L (ref 10–44)
ANION GAP SERPL CALC-SCNC: 12 MMOL/L (ref 8–16)
AST SERPL-CCNC: 54 U/L (ref 10–40)
BASOPHILS # BLD AUTO: 0.02 K/UL (ref 0–0.2)
BASOPHILS NFR BLD: 0.6 % (ref 0–1.9)
BILIRUB SERPL-MCNC: 0.7 MG/DL (ref 0.1–1)
BUN SERPL-MCNC: 22 MG/DL (ref 6–20)
CALCIUM SERPL-MCNC: 9.2 MG/DL (ref 8.7–10.5)
CANCER AG19-9 SERPL-ACNC: 6.7 U/ML (ref 0–40)
CANCER AG19-9 SERPL-ACNC: 6.7 U/ML (ref 0–40)
CEA SERPL-MCNC: 1.8 NG/ML (ref 0–5)
CHLORIDE SERPL-SCNC: 108 MMOL/L (ref 95–110)
CO2 SERPL-SCNC: 23 MMOL/L (ref 23–29)
CREAT SERPL-MCNC: 1.1 MG/DL (ref 0.5–1.4)
DIFFERENTIAL METHOD BLD: ABNORMAL
EOSINOPHIL # BLD AUTO: 0 K/UL (ref 0–0.5)
EOSINOPHIL NFR BLD: 1.3 % (ref 0–8)
ERYTHROCYTE [DISTWIDTH] IN BLOOD BY AUTOMATED COUNT: 15.3 % (ref 11.5–14.5)
EST. GFR  (NO RACE VARIABLE): 58 ML/MIN/1.73 M^2
GLUCOSE SERPL-MCNC: 118 MG/DL (ref 70–110)
HCT VFR BLD AUTO: 35.5 % (ref 37–48.5)
HGB BLD-MCNC: 11.6 G/DL (ref 12–16)
IMM GRANULOCYTES # BLD AUTO: 0.02 K/UL (ref 0–0.04)
IMM GRANULOCYTES NFR BLD AUTO: 0.6 % (ref 0–0.5)
LYMPHOCYTES # BLD AUTO: 1.2 K/UL (ref 1–4.8)
LYMPHOCYTES NFR BLD: 37.7 % (ref 18–48)
MCH RBC QN AUTO: 32.1 PG (ref 27–31)
MCHC RBC AUTO-ENTMCNC: 32.7 G/DL (ref 32–36)
MCV RBC AUTO: 98 FL (ref 82–98)
MONOCYTES # BLD AUTO: 0.6 K/UL (ref 0.3–1)
MONOCYTES NFR BLD: 18.2 % (ref 4–15)
NEUTROPHILS # BLD AUTO: 1.3 K/UL (ref 1.8–7.7)
NEUTROPHILS NFR BLD: 41.6 % (ref 38–73)
NRBC BLD-RTO: 0 /100 WBC
PLATELET # BLD AUTO: 113 K/UL (ref 150–450)
PMV BLD AUTO: 9.8 FL (ref 9.2–12.9)
POTASSIUM SERPL-SCNC: 4.2 MMOL/L (ref 3.5–5.1)
PROT SERPL-MCNC: 7.2 G/DL (ref 6–8.4)
RBC # BLD AUTO: 3.61 M/UL (ref 4–5.4)
SODIUM SERPL-SCNC: 143 MMOL/L (ref 136–145)
WBC # BLD AUTO: 3.13 K/UL (ref 3.9–12.7)

## 2024-09-23 PROCEDURE — 3079F DIAST BP 80-89 MM HG: CPT | Mod: CPTII,S$GLB,, | Performed by: INTERNAL MEDICINE

## 2024-09-23 PROCEDURE — 99999 PR PBB SHADOW E&M-EST. PATIENT-LVL IV: CPT | Mod: PBBFAC,,, | Performed by: INTERNAL MEDICINE

## 2024-09-23 PROCEDURE — 86301 IMMUNOASSAY TUMOR CA 19-9: CPT | Performed by: INTERNAL MEDICINE

## 2024-09-23 PROCEDURE — 3008F BODY MASS INDEX DOCD: CPT | Mod: CPTII,S$GLB,, | Performed by: INTERNAL MEDICINE

## 2024-09-23 PROCEDURE — 80053 COMPREHEN METABOLIC PANEL: CPT | Performed by: INTERNAL MEDICINE

## 2024-09-23 PROCEDURE — 36415 COLL VENOUS BLD VENIPUNCTURE: CPT | Performed by: INTERNAL MEDICINE

## 2024-09-23 PROCEDURE — 96368 THER/DIAG CONCURRENT INF: CPT

## 2024-09-23 PROCEDURE — 99215 OFFICE O/P EST HI 40 MIN: CPT | Mod: 25,S$GLB,, | Performed by: INTERNAL MEDICINE

## 2024-09-23 PROCEDURE — 85025 COMPLETE CBC W/AUTO DIFF WBC: CPT | Performed by: INTERNAL MEDICINE

## 2024-09-23 PROCEDURE — 3074F SYST BP LT 130 MM HG: CPT | Mod: CPTII,S$GLB,, | Performed by: INTERNAL MEDICINE

## 2024-09-23 PROCEDURE — 1159F MED LIST DOCD IN RCRD: CPT | Mod: CPTII,S$GLB,, | Performed by: INTERNAL MEDICINE

## 2024-09-23 PROCEDURE — 96411 CHEMO IV PUSH ADDL DRUG: CPT

## 2024-09-23 PROCEDURE — 96416 CHEMO PROLONG INFUSE W/PUMP: CPT

## 2024-09-23 PROCEDURE — 63600175 PHARM REV CODE 636 W HCPCS: Performed by: INTERNAL MEDICINE

## 2024-09-23 PROCEDURE — 96366 THER/PROPH/DIAG IV INF ADDON: CPT

## 2024-09-23 PROCEDURE — 82378 CARCINOEMBRYONIC ANTIGEN: CPT | Performed by: INTERNAL MEDICINE

## 2024-09-23 PROCEDURE — 96367 TX/PROPH/DG ADDL SEQ IV INF: CPT

## 2024-09-23 PROCEDURE — 4010F ACE/ARB THERAPY RXD/TAKEN: CPT | Mod: CPTII,S$GLB,, | Performed by: INTERNAL MEDICINE

## 2024-09-23 PROCEDURE — 96415 CHEMO IV INFUSION ADDL HR: CPT

## 2024-09-23 PROCEDURE — 25000003 PHARM REV CODE 250: Performed by: INTERNAL MEDICINE

## 2024-09-23 PROCEDURE — 1160F RVW MEDS BY RX/DR IN RCRD: CPT | Mod: CPTII,S$GLB,, | Performed by: INTERNAL MEDICINE

## 2024-09-23 PROCEDURE — 96413 CHEMO IV INFUSION 1 HR: CPT

## 2024-09-23 RX ORDER — HEPARIN 100 UNIT/ML
500 SYRINGE INTRAVENOUS
Status: DISCONTINUED | OUTPATIENT
Start: 2024-09-23 | End: 2024-09-23 | Stop reason: HOSPADM

## 2024-09-23 RX ORDER — FLUOROURACIL 50 MG/ML
400 INJECTION, SOLUTION INTRAVENOUS
Status: CANCELLED | OUTPATIENT
Start: 2024-09-23

## 2024-09-23 RX ORDER — EPINEPHRINE 0.3 MG/.3ML
0.3 INJECTION SUBCUTANEOUS ONCE AS NEEDED
Status: CANCELLED | OUTPATIENT
Start: 2024-09-23

## 2024-09-23 RX ORDER — DIPHENHYDRAMINE HYDROCHLORIDE 50 MG/ML
50 INJECTION INTRAMUSCULAR; INTRAVENOUS ONCE AS NEEDED
Status: CANCELLED | OUTPATIENT
Start: 2024-09-23

## 2024-09-23 RX ORDER — LORAZEPAM 2 MG/ML
1 INJECTION INTRAMUSCULAR
Status: CANCELLED
Start: 2024-09-23

## 2024-09-23 RX ORDER — HEPARIN 100 UNIT/ML
500 SYRINGE INTRAVENOUS
Status: CANCELLED | OUTPATIENT
Start: 2024-09-25

## 2024-09-23 RX ORDER — SODIUM CHLORIDE 0.9 % (FLUSH) 0.9 %
10 SYRINGE (ML) INJECTION
Status: CANCELLED | OUTPATIENT
Start: 2024-09-25

## 2024-09-23 RX ORDER — SODIUM CHLORIDE 0.9 % (FLUSH) 0.9 %
10 SYRINGE (ML) INJECTION
Status: CANCELLED | OUTPATIENT
Start: 2024-09-23

## 2024-09-23 RX ORDER — SODIUM CHLORIDE 0.9 % (FLUSH) 0.9 %
10 SYRINGE (ML) INJECTION
Status: DISCONTINUED | OUTPATIENT
Start: 2024-09-23 | End: 2024-09-23 | Stop reason: HOSPADM

## 2024-09-23 RX ORDER — LORAZEPAM 0.5 MG/1
1 TABLET ORAL
Status: COMPLETED | OUTPATIENT
Start: 2024-09-23 | End: 2024-09-23

## 2024-09-23 RX ORDER — PROCHLORPERAZINE EDISYLATE 5 MG/ML
10 INJECTION INTRAMUSCULAR; INTRAVENOUS ONCE AS NEEDED
Status: DISCONTINUED | OUTPATIENT
Start: 2024-09-23 | End: 2024-09-23 | Stop reason: HOSPADM

## 2024-09-23 RX ORDER — PROCHLORPERAZINE EDISYLATE 5 MG/ML
10 INJECTION INTRAMUSCULAR; INTRAVENOUS ONCE AS NEEDED
Status: CANCELLED | OUTPATIENT
Start: 2024-09-23

## 2024-09-23 RX ORDER — PROCHLORPERAZINE EDISYLATE 5 MG/ML
10 INJECTION INTRAMUSCULAR; INTRAVENOUS ONCE AS NEEDED
Status: CANCELLED | OUTPATIENT
Start: 2024-09-25

## 2024-09-23 RX ORDER — HEPARIN 100 UNIT/ML
500 SYRINGE INTRAVENOUS
Status: CANCELLED | OUTPATIENT
Start: 2024-09-23

## 2024-09-23 RX ORDER — FLUOROURACIL 50 MG/ML
400 INJECTION, SOLUTION INTRAVENOUS
Status: COMPLETED | OUTPATIENT
Start: 2024-09-23 | End: 2024-09-23

## 2024-09-23 RX ADMIN — DEXAMETHASONE SODIUM PHOSPHATE 0.25 MG: 4 INJECTION, SOLUTION INTRA-ARTICULAR; INTRALESIONAL; INTRAMUSCULAR; INTRAVENOUS; SOFT TISSUE at 09:09

## 2024-09-23 RX ADMIN — FLUOROURACIL 5000 MG: 50 INJECTION, SOLUTION INTRAVENOUS at 12:09

## 2024-09-23 RX ADMIN — LEUCOVORIN CALCIUM 855 MG: 500 INJECTION, POWDER, LYOPHILIZED, FOR SOLUTION INTRAMUSCULAR; INTRAVENOUS at 10:09

## 2024-09-23 RX ADMIN — OXALIPLATIN 182 MG: 100 INJECTION, SOLUTION, CONCENTRATE INTRAVENOUS at 10:09

## 2024-09-23 RX ADMIN — FLUOROURACIL 855 MG: 50 INJECTION, SOLUTION INTRAVENOUS at 12:09

## 2024-09-23 RX ADMIN — LORAZEPAM 1 MG: 0.5 TABLET ORAL at 09:09

## 2024-09-23 NOTE — NURSING
Administered Folfox  per MD order via right upper chest Medi-port.   Given Ativan 1 mg PO prior to starting treatment per Dr Daley order.   Patient slept during treatment and denies any nausea.  Patient tolerated medication without complication.  Discharged with future appointments.

## 2024-09-23 NOTE — PROGRESS NOTES
Subjective:       Patient ID: Karen Gutierrez is a 59 y.o. female.    Chief Complaint: Results, Chemotherapy, and Cancer    HPI:  59-year-old female presents for cycle 9 day 1 of FOLFOX for relapsed intra-abdominal wall recurrence from duodenal carcinoma.  Patient was presented at multidisciplinary tumor conference last week and recommendation for continuation of treatment.  Prior to any consideration of surgical resection ECOG status 1 patient does report anticipatory nausea with treatment    Past Medical History:   Diagnosis Date    Cancer     duodenal cancer    Hypertension     Hypotension, iatrogenic     Mitral valve prolapse      Family History   Problem Relation Name Age of Onset    Ovarian cancer Mother      Atrial fibrillation Mother      Stroke Mother      Hyperlipidemia Mother      Diabetes Mother      Bladder Cancer Father      Hypertension Father      Diabetes Father      No Known Problems Sister      Diabetes Maternal Grandmother      Colon cancer Maternal Grandmother      Stroke Maternal Grandfather      Diabetes Paternal Grandmother      No Known Problems Paternal Grandfather       Social History     Socioeconomic History    Marital status:    Tobacco Use    Smoking status: Former    Smokeless tobacco: Never   Substance and Sexual Activity    Alcohol use: Not Currently    Drug use: Never     Social Determinants of Health     Financial Resource Strain: Low Risk  (6/14/2024)    Overall Financial Resource Strain (CARDIA)     Difficulty of Paying Living Expenses: Not hard at all   Food Insecurity: No Food Insecurity (6/14/2024)    Hunger Vital Sign     Worried About Running Out of Food in the Last Year: Never true     Ran Out of Food in the Last Year: Never true   Physical Activity: Inactive (6/14/2024)    Exercise Vital Sign     Days of Exercise per Week: 0 days     Minutes of Exercise per Session: 0 min   Stress: No Stress Concern Present (6/14/2024)    [employer] - Occupational Stress Questionnaire     Feeling of Stress : Not at all   Housing Stability: Unknown (6/14/2024)    Housing Stability Vital Sign     Unable to Pay for Housing in the Last Year: No     Past Surgical History:   Procedure Laterality Date    BUNIONECTOMY Left     HYSTERECTOMY  2000    INSERTION OF TUNNELED CENTRAL VENOUS CATHETER (CVC) WITH SUBCUTANEOUS PORT N/A 3/26/2021    Procedure: [id number];  Surgeon: Lauren Abraham MD;  Location: [facility] OR;  Service: General;  Laterality: N/A;    INSERTION OF VENOUS ACCESS PORT Right 5/13/2024    Procedure: INSERTION, VENOUS ACCESS PORT;  Surgeon: Alicia Hernandez MD;  Location: [facility] OR;  Service: General;  Laterality: Right;    LITHOTRIPSY      PLACEMENT OF JEJUNOSTOMY TUBE  2/18/2021    Procedure: INSERTION, JEJUNOSTOMY TUBE;  Surgeon: Hitesh Díaz MD;  Location: [facility] OR 2ND FLR;  Service: General;;    ULTRASOUND GUIDANCE Right 5/13/2024    Procedure: ULTRASOUND GUIDANCE;  Surgeon: Alicia Hernandez MD;  Location: [facility] OR;  Service: General;  Laterality: Right;    WHIPPLE PROCEDURE N/A 2/18/2021    Procedure: WHIPPLE PROCEDURE;  Surgeon: Hitesh Díaz MD;  Location: [facility] OR 2ND FLR;  Service: General;  Laterality: N/A;       Labs:  Lab Results   Component Value Date    WBC 3.13 (L) 09/23/2024    HGB 11.6 (L) 09/23/2024    HCT 35.5 (L) 09/23/2024    MCV 98 09/23/2024     (L) 09/23/2024     BMP  Lab Results   Component Value Date     08/29/2024    K 4.2 08/29/2024     08/29/2024    CO2 24 08/29/2024    BUN 13 08/29/2024    CREATININE 0.9 08/29/2024    CALCIUM 8.9 08/29/2024    ANIONGAP 11 08/29/2024    ESTGFRAFRICA >60 03/07/2022    EGFRNONAA >60 03/07/2022     Lab Results   Component Value Date    ALT 53 (H) 08/29/2024    AST 70 (H) 08/29/2024    ALKPHOS 174 (H) 08/29/2024    BILITOT 0.4 08/29/2024       Lab Results   Component Value Date    IRON 42 05/10/2021    TIBC 404 05/10/2021    FERRITIN 345 (H) 05/10/2021  "    No results found for: "GYHKKOHF66"  No results found for: "FOLATE"  No results found for: "TSH"      Review of Systems   Constitutional:  Negative for activity change, appetite change, chills, diaphoresis, fatigue, fever and unexpected weight change.   HENT:  Negative for congestion, dental problem, drooling, ear discharge, ear pain, facial swelling, hearing loss, mouth sores, nosebleeds, postnasal drip, rhinorrhea, sinus pressure, sneezing, sore throat, tinnitus, trouble swallowing and voice change.    Eyes:  Negative for photophobia, pain, discharge, redness, itching and visual disturbance.   Respiratory:  Negative for cough, choking, chest tightness, shortness of breath, wheezing and stridor.    Cardiovascular:  Negative for chest pain, palpitations and leg swelling.   Gastrointestinal:  Positive for nausea. Negative for abdominal distention, abdominal pain, anal bleeding, blood in stool, constipation, diarrhea, rectal pain and vomiting.        Anticipatory nausea   Endocrine: Negative for cold intolerance, heat intolerance, polydipsia, polyphagia and polyuria.   Genitourinary:  Negative for decreased urine volume, difficulty urinating, dyspareunia, dysuria, enuresis, flank pain, frequency, genital sores, hematuria, menstrual problem, pelvic pain, urgency, vaginal bleeding, vaginal discharge and vaginal pain.   Musculoskeletal:  Negative for arthralgias, back pain, gait problem, joint swelling, myalgias, neck pain and neck stiffness.   Skin:  Negative for color change, pallor and rash.   Allergic/Immunologic: Negative for environmental allergies, food allergies and immunocompromised state.   Neurological:  Positive for numbness. Negative for dizziness, tremors, seizures, syncope, facial asymmetry, speech difficulty, weakness, light-headedness and headaches.        Grade 1 peripheral neuropathy   Hematological:  Negative for adenopathy. Does not bruise/bleed easily.   Psychiatric/Behavioral:  Negative for " agitation, behavioral problems, confusion, decreased concentration, dysphoric mood, hallucinations, self-injury, sleep disturbance and suicidal ideas. The patient is not nervous/anxious and is not hyperactive.        Objective:      Physical Exam  Vitals reviewed.   Constitutional:       General: She is not in acute distress.     Appearance: She is well-developed. She is not diaphoretic.   HENT:      Head: Normocephalic and atraumatic.      Right Ear: External ear normal.      Left Ear: External ear normal.      Nose: Nose normal.      Right Sinus: No maxillary sinus tenderness or frontal sinus tenderness.      Left Sinus: No maxillary sinus tenderness or frontal sinus tenderness.      Mouth/Throat:      Pharynx: No oropharyngeal exudate.   Eyes:      General: Lids are normal. No scleral icterus.        Right eye: No discharge.         Left eye: No discharge.      Conjunctiva/sclera: Conjunctivae normal.      Right eye: Right conjunctiva is not injected. No hemorrhage.     Left eye: Left conjunctiva is not injected. No hemorrhage.     Pupils: Pupils are equal, round, and reactive to light.   Neck:      Thyroid: No thyromegaly.      Vascular: No JVD.      Trachea: No tracheal deviation.   Cardiovascular:      Rate and Rhythm: Normal rate.   Pulmonary:      Effort: Pulmonary effort is normal. No respiratory distress.      Breath sounds: No stridor.   Chest:      Chest wall: No tenderness.   Abdominal:      General: Bowel sounds are normal. There is no distension.      Palpations: Abdomen is soft. There is no hepatomegaly, splenomegaly or mass.      Tenderness: There is no abdominal tenderness. There is no rebound.   Musculoskeletal:         General: No tenderness. Normal range of motion.      Cervical back: Normal range of motion and neck supple.   Lymphadenopathy:      Cervical: No cervical adenopathy.      Upper Body:      Right upper body: No supraclavicular adenopathy.      Left upper body: No supraclavicular  adenopathy.   Skin:     General: Skin is dry.      Findings: No erythema or rash.   Neurological:      Mental Status: She is alert and oriented to person, place, and time.      Cranial Nerves: No cranial nerve deficit.      Motor: Weakness present.      Coordination: Coordination normal.   Psychiatric:         Behavior: Behavior normal.         Thought Content: Thought content normal.         Judgment: Judgment normal.             Assessment:      1. 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 Peripheral neuropathy due to chemotherapy    4. Secondary malignancy of soft tissues of abdomen    5. s/p partial pancreatectomy           Med Onc Chart Routing      Follow up with physician . Return in 3 weeks with standing labs CBC CMP CEA and CA 19 9 for cycle 10 of treatment   Follow up with [facility]    Infusion scheduling note    Injection scheduling note FOLFOX q. 3 weeks   Labs    Imaging    Pharmacy appointment    Other referrals                   Plan:      extensive conversation reviewed information with her results of multidisciplinary tumor conference discussed with her.  At this time continuation of current treatment the patient does have anticipatory nausea 1 mg of Ativan to be given prior to starting treatment.  Discussed implications of answered questions plans in course of action.  Discussed in detail results of multidisciplinary tumor conference Dr. Gonzalez spoken to the patient at this point continue with current plan course of action    Enzo Daley Jr, MD FACP

## 2024-09-25 ENCOUNTER — PATIENT MESSAGE (OUTPATIENT)
Dept: HEMATOLOGY/ONCOLOGY | Facility: CLINIC | Age: 59
End: 2024-09-25
Payer: COMMERCIAL

## 2024-09-25 ENCOUNTER — INFUSION (OUTPATIENT)
Dept: INFUSION THERAPY | Facility: HOSPITAL | Age: 59
End: 2024-09-25
Attending: INTERNAL MEDICINE
Payer: COMMERCIAL

## 2024-09-25 VITALS
SYSTOLIC BLOOD PRESSURE: 130 MMHG | WEIGHT: 199.75 LBS | DIASTOLIC BLOOD PRESSURE: 74 MMHG | HEART RATE: 71 BPM | BODY MASS INDEX: 27.09 KG/M2 | OXYGEN SATURATION: 99 % | TEMPERATURE: 97 F | RESPIRATION RATE: 16 BRPM

## 2024-09-25 DIAGNOSIS — C17.0 DUODENAL CANCER: Primary | ICD-10-CM

## 2024-09-25 PROCEDURE — 96377 APPLICATON ON-BODY INJECTOR: CPT

## 2024-09-25 PROCEDURE — A4216 STERILE WATER/SALINE, 10 ML: HCPCS | Performed by: INTERNAL MEDICINE

## 2024-09-25 PROCEDURE — 25000003 PHARM REV CODE 250: Performed by: INTERNAL MEDICINE

## 2024-09-25 PROCEDURE — 63600175 PHARM REV CODE 636 W HCPCS: Mod: JZ,JG | Performed by: INTERNAL MEDICINE

## 2024-09-25 RX ORDER — SODIUM CHLORIDE 0.9 % (FLUSH) 0.9 %
10 SYRINGE (ML) INJECTION
Status: DISCONTINUED | OUTPATIENT
Start: 2024-09-25 | End: 2024-09-25 | Stop reason: HOSPADM

## 2024-09-25 RX ORDER — HEPARIN 100 UNIT/ML
500 SYRINGE INTRAVENOUS
Status: DISCONTINUED | OUTPATIENT
Start: 2024-09-25 | End: 2024-09-25 | Stop reason: HOSPADM

## 2024-09-25 RX ADMIN — PEGFILGRASTIM 6 MG: KIT SUBCUTANEOUS at 10:09

## 2024-09-25 RX ADMIN — HEPARIN 500 UNITS: 100 SYRINGE at 10:09

## 2024-09-25 RX ADMIN — SODIUM CHLORIDE, PRESERVATIVE FREE 10 ML: 5 INJECTION INTRAVENOUS at 10:09

## 2024-09-25 NOTE — NURSING
Pt here for 5FU continuous pump d/c. Pump stopped and disconnected.  Existing dressing appeared clean and intact.  __R__ chestwall mediport  Flushed with 10ml NS and 5 ml heparin solution.  Needle D/C, site without redness, swelling, or drainage noted.  Dressing applied.  Patient tolerated well.  Patient to return to clinic in 2 weeks.      OBI applied to RLQ abdomen. Pt educated on what to expect and when infusion would begin and end. Pt verbalized understanding.

## 2024-09-26 ENCOUNTER — PATIENT MESSAGE (OUTPATIENT)
Dept: SURGICAL ONCOLOGY | Facility: CLINIC | Age: 59
End: 2024-09-26
Payer: COMMERCIAL

## 2024-09-27 ENCOUNTER — PATIENT MESSAGE (OUTPATIENT)
Dept: HEMATOLOGY/ONCOLOGY | Facility: CLINIC | Age: 59
End: 2024-09-27
Payer: COMMERCIAL

## 2024-10-14 ENCOUNTER — LAB VISIT (OUTPATIENT)
Dept: LAB | Facility: HOSPITAL | Age: 59
End: 2024-10-14
Attending: INTERNAL MEDICINE
Payer: COMMERCIAL

## 2024-10-14 DIAGNOSIS — C17.0 DUODENAL CANCER: ICD-10-CM

## 2024-10-14 LAB
ALBUMIN SERPL BCP-MCNC: 3.6 G/DL (ref 3.5–5.2)
ALP SERPL-CCNC: 165 U/L (ref 55–135)
ALT SERPL W/O P-5'-P-CCNC: 39 U/L (ref 10–44)
ANION GAP SERPL CALC-SCNC: 10 MMOL/L (ref 8–16)
AST SERPL-CCNC: 55 U/L (ref 10–40)
BASOPHILS # BLD AUTO: 0.01 K/UL (ref 0–0.2)
BASOPHILS NFR BLD: 0.4 % (ref 0–1.9)
BILIRUB SERPL-MCNC: 0.6 MG/DL (ref 0.1–1)
BUN SERPL-MCNC: 11 MG/DL (ref 6–20)
CALCIUM SERPL-MCNC: 8.6 MG/DL (ref 8.7–10.5)
CANCER AG19-9 SERPL-ACNC: 7.6 U/ML (ref 0–40)
CEA SERPL-MCNC: <1.7 NG/ML (ref 0–5)
CHLORIDE SERPL-SCNC: 110 MMOL/L (ref 95–110)
CO2 SERPL-SCNC: 24 MMOL/L (ref 23–29)
CREAT SERPL-MCNC: 0.9 MG/DL (ref 0.5–1.4)
DIFFERENTIAL METHOD BLD: ABNORMAL
EOSINOPHIL # BLD AUTO: 0 K/UL (ref 0–0.5)
EOSINOPHIL NFR BLD: 1.3 % (ref 0–8)
ERYTHROCYTE [DISTWIDTH] IN BLOOD BY AUTOMATED COUNT: 14.2 % (ref 11.5–14.5)
EST. GFR  (NO RACE VARIABLE): >60 ML/MIN/1.73 M^2
GLUCOSE SERPL-MCNC: 136 MG/DL (ref 70–110)
HCT VFR BLD AUTO: 34.6 % (ref 37–48.5)
HGB BLD-MCNC: 11.3 G/DL (ref 12–16)
IMM GRANULOCYTES # BLD AUTO: 0.01 K/UL (ref 0–0.04)
IMM GRANULOCYTES NFR BLD AUTO: 0.4 % (ref 0–0.5)
LYMPHOCYTES # BLD AUTO: 0.8 K/UL (ref 1–4.8)
LYMPHOCYTES NFR BLD: 33.6 % (ref 18–48)
MAGNESIUM SERPL-MCNC: 2.1 MG/DL (ref 1.6–2.6)
MCH RBC QN AUTO: 32.6 PG (ref 27–31)
MCHC RBC AUTO-ENTMCNC: 32.7 G/DL (ref 32–36)
MCV RBC AUTO: 100 FL (ref 82–98)
MONOCYTES # BLD AUTO: 0.4 K/UL (ref 0.3–1)
MONOCYTES NFR BLD: 16.6 % (ref 4–15)
NEUTROPHILS # BLD AUTO: 1.1 K/UL (ref 1.8–7.7)
NEUTROPHILS NFR BLD: 47.7 % (ref 38–73)
NRBC BLD-RTO: 0 /100 WBC
PLATELET # BLD AUTO: 85 K/UL (ref 150–450)
PMV BLD AUTO: 11.2 FL (ref 9.2–12.9)
POTASSIUM SERPL-SCNC: 4 MMOL/L (ref 3.5–5.1)
PROT SERPL-MCNC: 6.8 G/DL (ref 6–8.4)
RBC # BLD AUTO: 3.47 M/UL (ref 4–5.4)
SODIUM SERPL-SCNC: 144 MMOL/L (ref 136–145)
WBC # BLD AUTO: 2.29 K/UL (ref 3.9–12.7)

## 2024-10-14 PROCEDURE — 80053 COMPREHEN METABOLIC PANEL: CPT | Performed by: INTERNAL MEDICINE

## 2024-10-14 PROCEDURE — 83735 ASSAY OF MAGNESIUM: CPT | Performed by: INTERNAL MEDICINE

## 2024-10-14 PROCEDURE — 82378 CARCINOEMBRYONIC ANTIGEN: CPT | Performed by: INTERNAL MEDICINE

## 2024-10-14 PROCEDURE — 36415 COLL VENOUS BLD VENIPUNCTURE: CPT | Mod: PO | Performed by: INTERNAL MEDICINE

## 2024-10-14 PROCEDURE — 86301 IMMUNOASSAY TUMOR CA 19-9: CPT | Performed by: INTERNAL MEDICINE

## 2024-10-14 PROCEDURE — 85025 COMPLETE CBC W/AUTO DIFF WBC: CPT | Performed by: INTERNAL MEDICINE

## 2024-10-15 ENCOUNTER — OFFICE VISIT (OUTPATIENT)
Dept: HEMATOLOGY/ONCOLOGY | Facility: CLINIC | Age: 59
End: 2024-10-15
Payer: COMMERCIAL

## 2024-10-15 ENCOUNTER — PATIENT MESSAGE (OUTPATIENT)
Dept: HEMATOLOGY/ONCOLOGY | Facility: CLINIC | Age: 59
End: 2024-10-15

## 2024-10-15 DIAGNOSIS — D84.821 IMMUNODEFICIENCY DUE TO CHEMOTHERAPY: ICD-10-CM

## 2024-10-15 DIAGNOSIS — T45.1X5A PANCYTOPENIA DUE TO ANTINEOPLASTIC CHEMOTHERAPY: Primary | ICD-10-CM

## 2024-10-15 DIAGNOSIS — D61.810 PANCYTOPENIA DUE TO ANTINEOPLASTIC CHEMOTHERAPY: Primary | ICD-10-CM

## 2024-10-15 DIAGNOSIS — T45.1X5A IMMUNODEFICIENCY DUE TO CHEMOTHERAPY: ICD-10-CM

## 2024-10-15 DIAGNOSIS — Z79.899 IMMUNODEFICIENCY DUE TO CHEMOTHERAPY: ICD-10-CM

## 2024-10-15 DIAGNOSIS — C79.89 SECONDARY MALIGNANCY OF SOFT TISSUES OF ABDOMEN: ICD-10-CM

## 2024-10-15 DIAGNOSIS — C17.0 DUODENAL CANCER: ICD-10-CM

## 2024-10-15 PROCEDURE — 1160F RVW MEDS BY RX/DR IN RCRD: CPT | Mod: CPTII,95,, | Performed by: INTERNAL MEDICINE

## 2024-10-15 PROCEDURE — 4010F ACE/ARB THERAPY RXD/TAKEN: CPT | Mod: CPTII,95,, | Performed by: INTERNAL MEDICINE

## 2024-10-15 PROCEDURE — 99213 OFFICE O/P EST LOW 20 MIN: CPT | Mod: 95,,, | Performed by: INTERNAL MEDICINE

## 2024-10-15 PROCEDURE — 1159F MED LIST DOCD IN RCRD: CPT | Mod: CPTII,95,, | Performed by: INTERNAL MEDICINE

## 2024-10-15 NOTE — PROGRESS NOTES
Subjective:       Patient ID: Karen Gutierrez is a 59 y.o. female.    Chief Complaint: Results, Chemotherapy, and Cancer    HPI:  59-year-old female history of relapsed duodenal carcinoma.  Patient was scheduled for next dose of FOLFOX seen in virtual visit with laboratory review    Past Medical History:   Diagnosis Date    Cancer     duodenal cancer    Hypertension     Hypotension, iatrogenic     Mitral valve prolapse      Family History   Problem Relation Name Age of Onset    Ovarian cancer Mother      Atrial fibrillation Mother      Stroke Mother      Hyperlipidemia Mother      Diabetes Mother      Bladder Cancer Father      Hypertension Father      Diabetes Father      No Known Problems Sister      Diabetes Maternal Grandmother      Colon cancer Maternal Grandmother      Stroke Maternal Grandfather      Diabetes Paternal Grandmother      No Known Problems Paternal Grandfather       Social History     Socioeconomic History    Marital status:    Tobacco Use    Smoking status: Former    Smokeless tobacco: Never   Substance and Sexual Activity    Alcohol use: Not Currently    Drug use: Never     Social Drivers of Health     Financial Resource Strain: Low Risk  (6/14/2024)    Overall Financial Resource Strain (CARDIA)     Difficulty of Paying Living Expenses: Not hard at all   Food Insecurity: No Food Insecurity (6/14/2024)    Hunger Vital Sign     Worried About Running Out of Food in the Last Year: Never true     Ran Out of Food in the Last Year: Never true   Physical Activity: Inactive (6/14/2024)    Exercise Vital Sign     Days of Exercise per Week: 0 days     Minutes of Exercise per Session: 0 min   Stress: No Stress Concern Present (6/14/2024)    [employer] - Occupational Stress Questionnaire     Feeling of Stress : Not at all   Housing Stability: Unknown (6/14/2024)    Housing Stability Vital Sign     Unable to Pay for Housing in the Last Year: No     Past Surgical  "History:   Procedure Laterality Date    BUNIONECTOMY Left     HYSTERECTOMY  2000    INSERTION OF TUNNELED CENTRAL VENOUS CATHETER (CVC) WITH SUBCUTANEOUS PORT N/A 3/26/2021    Procedure: [id number];  Surgeon: Lauren Abraham MD;  Location: [facility] OR;  Service: General;  Laterality: N/A;    INSERTION OF VENOUS ACCESS PORT Right 5/13/2024    Procedure: INSERTION, VENOUS ACCESS PORT;  Surgeon: Alicia Hernandez MD;  Location: [facility] OR;  Service: General;  Laterality: Right;    LITHOTRIPSY      PLACEMENT OF JEJUNOSTOMY TUBE  2/18/2021    Procedure: INSERTION, JEJUNOSTOMY TUBE;  Surgeon: Hitesh Díaz MD;  Location: [facility] OR 2ND FLR;  Service: General;;    ULTRASOUND GUIDANCE Right 5/13/2024    Procedure: ULTRASOUND GUIDANCE;  Surgeon: Alicia Hernandez MD;  Location: [facility] OR;  Service: General;  Laterality: Right;    WHIPPLE PROCEDURE N/A 2/18/2021    Procedure: WHIPPLE PROCEDURE;  Surgeon: Hitesh Díaz MD;  Location: [facility] OR 2ND FLR;  Service: General;  Laterality: N/A;       Labs:  Lab Results   Component Value Date    WBC 2.29 (L) 10/14/2024    HGB 11.3 (L) 10/14/2024    HCT 34.6 (L) 10/14/2024     (H) 10/14/2024    PLT 85 (L) 10/14/2024     BMP  Lab Results   Component Value Date     10/14/2024    K 4.0 10/14/2024     10/14/2024    CO2 24 10/14/2024    BUN 11 10/14/2024    CREATININE 0.9 10/14/2024    CALCIUM 8.6 (L) 10/14/2024    ANIONGAP 10 10/14/2024    ESTGFRAFRICA >60 03/07/2022    EGFRNONAA >60 03/07/2022     Lab Results   Component Value Date    ALT 39 10/14/2024    AST 55 (H) 10/14/2024    ALKPHOS 165 (H) 10/14/2024    BILITOT 0.6 10/14/2024       Lab Results   Component Value Date    IRON 42 05/10/2021    TIBC 404 05/10/2021    FERRITIN 345 (H) 05/10/2021     No results found for: "[id number]"  No results found for: "FOLATE"  No results found for: "TSH"      Review of Systems   Constitutional:  Negative for activity change, appetite change, chills, diaphoresis, fatigue, " fever and unexpected weight change.   HENT:  Negative for congestion, dental problem, drooling, ear discharge, ear pain, facial swelling, hearing loss, mouth sores, nosebleeds, postnasal drip, rhinorrhea, sinus pressure, sneezing, sore throat, tinnitus, trouble swallowing and voice change.    Eyes:  Negative for photophobia, pain, discharge, redness, itching and visual disturbance.   Respiratory:  Negative for cough, choking, chest tightness, shortness of breath, wheezing and stridor.    Cardiovascular:  Negative for chest pain, palpitations and leg swelling.   Gastrointestinal:  Negative for abdominal distention, abdominal pain, anal bleeding, blood in stool, constipation, diarrhea, nausea, rectal pain and vomiting.   Endocrine: Negative for cold intolerance, heat intolerance, polydipsia, polyphagia and polyuria.   Genitourinary:  Negative for decreased urine volume, difficulty urinating, dyspareunia, dysuria, enuresis, flank pain, frequency, genital sores, hematuria, menstrual problem, pelvic pain, urgency, vaginal bleeding, vaginal discharge and vaginal pain.   Musculoskeletal:  Negative for arthralgias, back pain, gait problem, joint swelling, myalgias, neck pain and neck stiffness.   Skin:  Negative for color change, pallor and rash.   Allergic/Immunologic: Negative for environmental allergies, food allergies and immunocompromised state.   Neurological:  Negative for dizziness, tremors, seizures, syncope, facial asymmetry, speech difficulty, weakness, light-headedness, numbness and headaches.   Hematological:  Negative for adenopathy. Does not bruise/bleed easily.   Psychiatric/Behavioral:  Negative for agitation, behavioral problems, confusion, decreased concentration, dysphoric mood, hallucinations, self-injury, sleep disturbance and suicidal ideas. The patient is not nervous/anxious and is not hyperactive.        Objective:      Physical Exam  Constitutional:       Appearance: Normal appearance.              Assessment:      1. Pancytopenia due to antineoplastic chemotherapy    2. Duodenal cancer    3. Secondary malignancy of soft tissues of abdomen    4. Immunodeficiency due to chemotherapy           Med Onc Chart Routing      Follow up with physician . Return in 1 week CBC CMP for next cycle of treatment   Follow up with [facility]    Infusion scheduling note    Injection scheduling note Hold treatment today return in 1 week CBC CMP   Labs    Imaging    Pharmacy appointment    Other referrals                   Plan:     The patient location is:  Home  The chief complaint leading to consultation is:  Cancer    Visit type: audiovisual    Face to Face time with patient: 15 minutes of total time spent on the encounter, which includes face to face time and non-face to face time preparing to see the patient (eg, review of tests), Obtaining and/or reviewing separately obtained history, Documenting clinical information in the electronic or other health record, Independently interpreting results (not separately reported) and communicating results to the patient/family/caregiver, or Care coordination (not separately reported).         Each patient to whom he or she provides medical services by telemedicine is:  (1) informed of the relationship between the physician and patient and the respective role of any other health care provider with respect to management of the patient; and (2) notified that he or she may decline to receive medical services by telemedicine and may withdraw from such care at any time.    Notes:   Reviewed CBC.  At this point ANC 1.1 platelets 85 would like to hold treatment for 1 week return with CBC CMP      Enzo Daley Jr, MD FACP

## 2024-10-21 ENCOUNTER — INFUSION (OUTPATIENT)
Dept: INFUSION THERAPY | Facility: HOSPITAL | Age: 59
End: 2024-10-21
Attending: INTERNAL MEDICINE
Payer: COMMERCIAL

## 2024-10-21 ENCOUNTER — OFFICE VISIT (OUTPATIENT)
Dept: HEMATOLOGY/ONCOLOGY | Facility: CLINIC | Age: 59
End: 2024-10-21
Payer: COMMERCIAL

## 2024-10-21 ENCOUNTER — LAB VISIT (OUTPATIENT)
Dept: LAB | Facility: HOSPITAL | Age: 59
End: 2024-10-21
Attending: INTERNAL MEDICINE
Payer: COMMERCIAL

## 2024-10-21 ENCOUNTER — PATIENT MESSAGE (OUTPATIENT)
Dept: HEMATOLOGY/ONCOLOGY | Facility: CLINIC | Age: 59
End: 2024-10-21
Payer: COMMERCIAL

## 2024-10-21 VITALS
DIASTOLIC BLOOD PRESSURE: 85 MMHG | BODY MASS INDEX: 26.37 KG/M2 | TEMPERATURE: 98 F | WEIGHT: 194.69 LBS | OXYGEN SATURATION: 97 % | SYSTOLIC BLOOD PRESSURE: 133 MMHG | HEART RATE: 68 BPM | HEIGHT: 72 IN

## 2024-10-21 VITALS
BODY MASS INDEX: 26.37 KG/M2 | WEIGHT: 194.69 LBS | HEART RATE: 73 BPM | TEMPERATURE: 98 F | OXYGEN SATURATION: 97 % | DIASTOLIC BLOOD PRESSURE: 73 MMHG | HEIGHT: 72 IN | SYSTOLIC BLOOD PRESSURE: 119 MMHG | RESPIRATION RATE: 16 BRPM

## 2024-10-21 DIAGNOSIS — C17.0 DUODENAL CANCER: Primary | ICD-10-CM

## 2024-10-21 DIAGNOSIS — C17.0 DUODENAL CANCER: ICD-10-CM

## 2024-10-21 DIAGNOSIS — Z90.411 HISTORY OF PARTIAL PANCREATECTOMY: ICD-10-CM

## 2024-10-21 DIAGNOSIS — C79.89 SECONDARY MALIGNANCY OF SOFT TISSUES OF ABDOMEN: ICD-10-CM

## 2024-10-21 LAB
ALBUMIN SERPL BCP-MCNC: 4 G/DL (ref 3.5–5.2)
ALP SERPL-CCNC: 157 U/L (ref 40–150)
ALT SERPL W/O P-5'-P-CCNC: 48 U/L (ref 10–44)
ANION GAP SERPL CALC-SCNC: 11 MMOL/L (ref 8–16)
AST SERPL-CCNC: 65 U/L (ref 10–40)
BASOPHILS # BLD AUTO: 0.02 K/UL (ref 0–0.2)
BASOPHILS NFR BLD: 0.7 % (ref 0–1.9)
BILIRUB SERPL-MCNC: 0.7 MG/DL (ref 0.1–1)
BUN SERPL-MCNC: 13 MG/DL (ref 6–20)
CALCIUM SERPL-MCNC: 9.5 MG/DL (ref 8.7–10.5)
CHLORIDE SERPL-SCNC: 106 MMOL/L (ref 95–110)
CO2 SERPL-SCNC: 25 MMOL/L (ref 23–29)
CREAT SERPL-MCNC: 1 MG/DL (ref 0.5–1.4)
DIFFERENTIAL METHOD BLD: ABNORMAL
EOSINOPHIL # BLD AUTO: 0 K/UL (ref 0–0.5)
EOSINOPHIL NFR BLD: 1.5 % (ref 0–8)
ERYTHROCYTE [DISTWIDTH] IN BLOOD BY AUTOMATED COUNT: 13.3 % (ref 11.5–14.5)
EST. GFR  (NO RACE VARIABLE): >60 ML/MIN/1.73 M^2
GLUCOSE SERPL-MCNC: 128 MG/DL (ref 70–110)
HCT VFR BLD AUTO: 37.5 % (ref 37–48.5)
HGB BLD-MCNC: 12.4 G/DL (ref 12–16)
IMM GRANULOCYTES # BLD AUTO: 0 K/UL (ref 0–0.04)
IMM GRANULOCYTES NFR BLD AUTO: 0 % (ref 0–0.5)
LYMPHOCYTES # BLD AUTO: 0.8 K/UL (ref 1–4.8)
LYMPHOCYTES NFR BLD: 31.5 % (ref 18–48)
MCH RBC QN AUTO: 32.4 PG (ref 27–31)
MCHC RBC AUTO-ENTMCNC: 33.1 G/DL (ref 32–36)
MCV RBC AUTO: 98 FL (ref 82–98)
MONOCYTES # BLD AUTO: 0.3 K/UL (ref 0.3–1)
MONOCYTES NFR BLD: 12.4 % (ref 4–15)
NEUTROPHILS # BLD AUTO: 1.4 K/UL (ref 1.8–7.7)
NEUTROPHILS NFR BLD: 53.9 % (ref 38–73)
NRBC BLD-RTO: 0 /100 WBC
PLATELET # BLD AUTO: 87 K/UL (ref 150–450)
PMV BLD AUTO: 10 FL (ref 9.2–12.9)
POTASSIUM SERPL-SCNC: 4.6 MMOL/L (ref 3.5–5.1)
PROT SERPL-MCNC: 7.4 G/DL (ref 6–8.4)
RBC # BLD AUTO: 3.83 M/UL (ref 4–5.4)
SODIUM SERPL-SCNC: 142 MMOL/L (ref 136–145)
WBC # BLD AUTO: 2.67 K/UL (ref 3.9–12.7)

## 2024-10-21 PROCEDURE — 36415 COLL VENOUS BLD VENIPUNCTURE: CPT | Performed by: INTERNAL MEDICINE

## 2024-10-21 PROCEDURE — 96413 CHEMO IV INFUSION 1 HR: CPT

## 2024-10-21 PROCEDURE — 1160F RVW MEDS BY RX/DR IN RCRD: CPT | Mod: CPTII,S$GLB,, | Performed by: NURSE PRACTITIONER

## 2024-10-21 PROCEDURE — 99215 OFFICE O/P EST HI 40 MIN: CPT | Mod: 25,S$GLB,, | Performed by: NURSE PRACTITIONER

## 2024-10-21 PROCEDURE — 4010F ACE/ARB THERAPY RXD/TAKEN: CPT | Mod: CPTII,S$GLB,, | Performed by: NURSE PRACTITIONER

## 2024-10-21 PROCEDURE — 96415 CHEMO IV INFUSION ADDL HR: CPT

## 2024-10-21 PROCEDURE — 96367 TX/PROPH/DG ADDL SEQ IV INF: CPT

## 2024-10-21 PROCEDURE — 3008F BODY MASS INDEX DOCD: CPT | Mod: CPTII,S$GLB,, | Performed by: NURSE PRACTITIONER

## 2024-10-21 PROCEDURE — 96368 THER/DIAG CONCURRENT INF: CPT

## 2024-10-21 PROCEDURE — 25000003 PHARM REV CODE 250: Performed by: NURSE PRACTITIONER

## 2024-10-21 PROCEDURE — 63600175 PHARM REV CODE 636 W HCPCS: Performed by: NURSE PRACTITIONER

## 2024-10-21 PROCEDURE — 1159F MED LIST DOCD IN RCRD: CPT | Mod: CPTII,S$GLB,, | Performed by: NURSE PRACTITIONER

## 2024-10-21 PROCEDURE — 99999 PR PBB SHADOW E&M-EST. PATIENT-LVL IV: CPT | Mod: PBBFAC,,, | Performed by: NURSE PRACTITIONER

## 2024-10-21 PROCEDURE — 3075F SYST BP GE 130 - 139MM HG: CPT | Mod: CPTII,S$GLB,, | Performed by: NURSE PRACTITIONER

## 2024-10-21 PROCEDURE — 80053 COMPREHEN METABOLIC PANEL: CPT | Performed by: INTERNAL MEDICINE

## 2024-10-21 PROCEDURE — 3079F DIAST BP 80-89 MM HG: CPT | Mod: CPTII,S$GLB,, | Performed by: NURSE PRACTITIONER

## 2024-10-21 PROCEDURE — 96416 CHEMO PROLONG INFUSE W/PUMP: CPT

## 2024-10-21 PROCEDURE — 85025 COMPLETE CBC W/AUTO DIFF WBC: CPT | Performed by: INTERNAL MEDICINE

## 2024-10-21 PROCEDURE — 96411 CHEMO IV PUSH ADDL DRUG: CPT

## 2024-10-21 RX ORDER — FLUOROURACIL 50 MG/ML
400 INJECTION, SOLUTION INTRAVENOUS
Status: COMPLETED | OUTPATIENT
Start: 2024-10-21 | End: 2024-10-21

## 2024-10-21 RX ORDER — SODIUM CHLORIDE 0.9 % (FLUSH) 0.9 %
10 SYRINGE (ML) INJECTION
Status: CANCELLED | OUTPATIENT
Start: 2024-10-21

## 2024-10-21 RX ORDER — HEPARIN 100 UNIT/ML
500 SYRINGE INTRAVENOUS
Status: CANCELLED | OUTPATIENT
Start: 2024-10-23

## 2024-10-21 RX ORDER — FLUOROURACIL 50 MG/ML
400 INJECTION, SOLUTION INTRAVENOUS
Status: CANCELLED | OUTPATIENT
Start: 2024-10-21

## 2024-10-21 RX ORDER — SODIUM CHLORIDE 0.9 % (FLUSH) 0.9 %
10 SYRINGE (ML) INJECTION
Status: CANCELLED | OUTPATIENT
Start: 2024-10-23

## 2024-10-21 RX ORDER — LORAZEPAM 1 MG/1
1 TABLET ORAL
Status: CANCELLED | OUTPATIENT
Start: 2024-10-21 | End: 2024-10-21

## 2024-10-21 RX ORDER — LORAZEPAM 0.5 MG/1
1 TABLET ORAL
Status: COMPLETED | OUTPATIENT
Start: 2024-10-21 | End: 2024-10-21

## 2024-10-21 RX ORDER — DIPHENHYDRAMINE HYDROCHLORIDE 50 MG/ML
50 INJECTION INTRAMUSCULAR; INTRAVENOUS ONCE AS NEEDED
Status: CANCELLED | OUTPATIENT
Start: 2024-10-21

## 2024-10-21 RX ORDER — PROCHLORPERAZINE EDISYLATE 5 MG/ML
10 INJECTION INTRAMUSCULAR; INTRAVENOUS ONCE AS NEEDED
Status: CANCELLED | OUTPATIENT
Start: 2024-10-23

## 2024-10-21 RX ORDER — DIPHENHYDRAMINE HYDROCHLORIDE 50 MG/ML
50 INJECTION INTRAMUSCULAR; INTRAVENOUS ONCE AS NEEDED
Status: DISCONTINUED | OUTPATIENT
Start: 2024-10-21 | End: 2024-10-21 | Stop reason: HOSPADM

## 2024-10-21 RX ORDER — PROCHLORPERAZINE EDISYLATE 5 MG/ML
10 INJECTION INTRAMUSCULAR; INTRAVENOUS ONCE AS NEEDED
Status: CANCELLED | OUTPATIENT
Start: 2024-10-21

## 2024-10-21 RX ORDER — EPINEPHRINE 0.3 MG/.3ML
0.3 INJECTION SUBCUTANEOUS ONCE AS NEEDED
Status: DISCONTINUED | OUTPATIENT
Start: 2024-10-21 | End: 2024-10-21 | Stop reason: HOSPADM

## 2024-10-21 RX ORDER — EPINEPHRINE 0.3 MG/.3ML
0.3 INJECTION SUBCUTANEOUS ONCE AS NEEDED
Status: CANCELLED | OUTPATIENT
Start: 2024-10-21

## 2024-10-21 RX ORDER — HEPARIN 100 UNIT/ML
500 SYRINGE INTRAVENOUS
Status: CANCELLED | OUTPATIENT
Start: 2024-10-21

## 2024-10-21 RX ADMIN — FLUOROURACIL 850 MG: 50 INJECTION, SOLUTION INTRAVENOUS at 02:10

## 2024-10-21 RX ADMIN — DEXAMETHASONE SODIUM PHOSPHATE 0.25 MG: 4 INJECTION, SOLUTION INTRA-ARTICULAR; INTRALESIONAL; INTRAMUSCULAR; INTRAVENOUS; SOFT TISSUE at 12:10

## 2024-10-21 RX ADMIN — FLUOROURACIL 5000 MG: 50 INJECTION, SOLUTION INTRAVENOUS at 02:10

## 2024-10-21 RX ADMIN — OXALIPLATIN 180 MG: 5 INJECTION, SOLUTION INTRAVENOUS at 12:10

## 2024-10-21 RX ADMIN — LEUCOVORIN CALCIUM 850 MG: 500 INJECTION, POWDER, LYOPHILIZED, FOR SOLUTION INTRAMUSCULAR; INTRAVENOUS at 12:10

## 2024-10-21 RX ADMIN — LORAZEPAM 1 MG: 0.5 TABLET ORAL at 12:10

## 2024-10-21 NOTE — NURSING
Pt tolerated FOLFOX well. No adverse reaction noted. Pt education reinforced on chemo regimen, side effects, what to expect, and when to call her provider. Pt verbalized understanding. I reviewed pt calendar w/ pt and understanding verbalized.   Right chest PAC remains accessed with 5FU pump connected infusing at a continuous rate of 2.2 ml/hr.  Dressing clean, dry, and intact.

## 2024-10-21 NOTE — PROGRESS NOTES
Subjective:       Patient ID: Karen Gutierrez is a 59 y.o. female.    Chief Complaint:   1. Duodenal cancer  Stage IV (cM1)       2. Secondary malignancy of soft tissues of abdomen          Current Treatment:  OP GI mFOLFOX6 (oxaliplatin leucovorin fluorouracil) Q2W --Q3W    Treatment History:  S/p Whipple by Dr. YOON Díaz on 2/18/2021        FOLFOX x 12 cycles completed in 10/2021    HPI: This is a 59 year old  woman with mitral valve prolapse, iatrogenic hypotension, and HTN who is seen in Hem/Onc for relapsed recurrent Stage IV duodenal cancer. 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 She remained in surveillance until 4/2024 when surveillance CT C/A/P demonstrated abdominal wall mass measuring 6.8 x 5.1 x 4.6 cm. She underwent biopsy which proved metastatic adenocarcinoma consistent with pancreatic origin.     She was resumed on FOLFOX every 2 weeks in 5/2024.     CT C/A/P in 7/2024 revealed a decrease in the size of her abdominal mass (4.5 x 2.0 cm)  and no new sites of metastatic disease.     Her primary Hematologist/Oncologist is Dr. Daley.    Interval History: Patient presents for follow up on FOLFOX; She is scheduled to receive C10D1 today. She presents alone and reports ageusia during treatment; she states she still eats. She reports having diarrhea during treatment as well but states this resolves between treatments. She has more neuropathy now that is not associated with touching cold items. WBC improved to 2.67; ANC 1400. H&H WNL. Plts stable at 87k. CMP stable with increase in LFTs. She denies taking any new meds or OTC meds; will continue to monitor. She states she has been having more nausea and would like to receive the Ativan pill premed again.     Reviewed labs with patient:   CBC:   Recent Labs   Lab 10/21/24  1031   WBC 2.67 L   RBC 3.83 L   Hemoglobin 12.4    Hematocrit 37.5   Platelets 87 L   MCV 98   MCH 32.4 H   MCHC 33.1     CMP:  Recent Labs   Lab 10/21/24  1031   Glucose 128 H   Calcium 9.5   Albumin 4.0   Total Protein 7.4   Sodium 142   Potassium 4.6   CO2 25   Chloride 106   BUN 13   Creatinine 1.0   Alkaline Phosphatase 157 H   ALT 48 H   AST 65 H   Total Bilirubin 0.7     Social History     Socioeconomic History    Marital status:    Tobacco Use    Smoking status: Former    Smokeless tobacco: Never   Substance and Sexual Activity    Alcohol use: Not Currently    Drug use: Never     Social Drivers of Health     Financial Resource Strain: Low Risk  (6/14/2024)    Overall Financial Resource Strain (CARDIA)     Difficulty of Paying Living Expenses: Not hard at all   Food Insecurity: No Food Insecurity (6/14/2024)    Hunger Vital Sign     Worried About Running Out of Food in the Last Year: Never true     Ran Out of Food in the Last Year: Never true   Physical Activity: Inactive (6/14/2024)    Exercise Vital Sign     Days of Exercise per Week: 0 days     Minutes of Exercise per Session: 0 min   Stress: No Stress Concern Present (6/14/2024)    [employer] - Occupational Stress Questionnaire     Feeling of Stress : Not at all   Housing Stability: Unknown (6/14/2024)    Housing Stability Vital Sign     Unable to Pay for Housing in the Last Year: No     Past Medical History:   Diagnosis Date    Cancer     duodenal cancer    Hypertension     Hypotension, iatrogenic     Mitral valve prolapse      Family History   Problem Relation Name Age of Onset    Ovarian cancer Mother      Atrial fibrillation Mother      Stroke Mother      Hyperlipidemia Mother      Diabetes Mother      Bladder Cancer Father      Hypertension Father      Diabetes Father      No Known Problems Sister      Diabetes Maternal Grandmother      Colon cancer Maternal Grandmother      Stroke Maternal Grandfather      Diabetes Paternal Grandmother      No Known Problems  Paternal Grandfather       Past Surgical History:   Procedure Laterality Date    BUNIONECTOMY Left     HYSTERECTOMY  2000    INSERTION OF TUNNELED CENTRAL VENOUS CATHETER (CVC) WITH SUBCUTANEOUS PORT N/A 3/26/2021    Procedure: [id number];  Surgeon: Lauren Abraham MD;  Location: [facility] OR;  Service: General;  Laterality: N/A;    INSERTION OF VENOUS ACCESS PORT Right 5/13/2024    Procedure: INSERTION, VENOUS ACCESS PORT;  Surgeon: Alicia Hernandez MD;  Location: [facility] OR;  Service: General;  Laterality: Right;    LITHOTRIPSY      PLACEMENT OF JEJUNOSTOMY TUBE  2/18/2021    Procedure: INSERTION, JEJUNOSTOMY TUBE;  Surgeon: Hitesh Díaz MD;  Location: [facility] OR 2ND FLR;  Service: General;;    ULTRASOUND GUIDANCE Right 5/13/2024    Procedure: ULTRASOUND GUIDANCE;  Surgeon: Alicia Hernandez MD;  Location: [facility] OR;  Service: General;  Laterality: Right;    WHIPPLE PROCEDURE N/A 2/18/2021    Procedure: WHIPPLE PROCEDURE;  Surgeon: Hitesh Díaz MD;  Location: [facility] OR 2ND FLR;  Service: General;  Laterality: N/A;     Review of Systems   Constitutional:  Positive for appetite change (decreased during treatment due to ageusia) and fatigue.   HENT:  Negative for mouth sores, rhinorrhea and sore throat.    Eyes: Negative.    Respiratory: Negative.     Cardiovascular: Negative.    Gastrointestinal:  Negative for constipation, diarrhea, nausea and vomiting.   Endocrine: Negative.    Genitourinary: Negative.    Musculoskeletal: Negative.    Integumentary:  Negative.   Allergic/Immunologic: Negative.    Neurological:  Positive for numbness (mild in fingers and toes). Negative for weakness.   Hematological: Negative.    Psychiatric/Behavioral: Negative.         Medication List with Changes/Refills   Current Medications    AMLODIPINE (NORVASC) 10 MG TABLET    Take 10 mg by mouth once daily.    ATOMOXETINE (STRATTERA) 40 MG CAPSULE    Take 1 capsule (40 mg total) by mouth once daily.    BYSTOLIC 20 MG TAB    Take 1  tablet by mouth once daily.    CHOLECALCIFEROL, VITAMIN D3, 125 MCG (5,000 UNIT) CAPSULE    Take 5,000 Units by mouth once daily.    CLONAZEPAM (KLONOPIN) 0.5 MG TABLET    Take 1 tablet (0.5 mg total) by mouth nightly as needed for Anxiety.    CYANOCOBALAMIN (VITAMIN B-12) 1000 MCG TABLET    Take 1,000 mcg by mouth once daily.    ESCITALOPRAM OXALATE (LEXAPRO) 10 MG TABLET    Take 1 tablet (10 mg total) by mouth once daily.    LIDOCAINE-PRILOCAINE (EMLA) CREAM    Apply to affected area once    OLANZAPINE (ZYPREXA) 5 MG TABLET    Take 1 tablet (5 mg total) by mouth every evening. Take nightly on days 1-3 of each chemotherapy cycle.    OLMESARTAN (BENICAR) 40 MG TABLET    TAKE 1 TABLET(40 MG) BY MOUTH EVERY DAY    ONDANSETRON (ZOFRAN-ODT) 4 MG TBDL    Take 1 tablet (4 mg total) by mouth 2 (two) times daily.    PROCHLORPERAZINE (COMPAZINE) 10 MG TABLET    Take 1 tablet (10 mg total) by mouth every 6 (six) hours as needed for Nausea.     Objective:     Vitals:    10/21/24 1102   BP: 133/85   Pulse: 68   Temp: 97.8 °F (36.6 °C)     Physical Exam  Vitals reviewed.   Constitutional:       Appearance: Normal appearance.   HENT:      Head: Normocephalic.      Mouth/Throat:      Comments:     Eyes:      Extraocular Movements: Extraocular movements intact.      Pupils: Pupils are equal, round, and reactive to light.      Comments: Glasses       Cardiovascular:      Rate and Rhythm: Normal rate and regular rhythm.      Heart sounds: Normal heart sounds.   Pulmonary:      Effort: Pulmonary effort is normal.      Breath sounds: Normal breath sounds.   Abdominal:      General: Bowel sounds are normal.      Palpations: Abdomen is soft.      Comments: rounded     Genitourinary:     Comments: deferred    Musculoskeletal:         General: Normal range of motion.      Cervical back: Normal range of motion and neck supple.   Skin:     General: Skin is warm and dry.   Neurological:      Mental Status: She is alert and oriented to person,  place, and time.   Psychiatric:         Behavior: Behavior normal.         Thought Content: Thought content normal.          (1) Restricted in physically strenuous activity, ambulatory and able to do work of light nature  Assessment:     Problem List Items Addressed This Visit          Oncology    Duodenal cancer - Primary     Stage IV (cM1) originally diagnosed as Stage IIIB and treated with resection followed by 12 cycles of FOLFOX completed in 10/2021. Surveillance CT C/A/P revealed an abdominal wall mass; biopsy proved metastatic adenocarcinoma of pancreatic origin. Currently on FOLFOX every 2 weeks.          Secondary malignancy of soft tissues of abdomen     Plan:     Duodenal cancer    Secondary malignancy of soft tissues of abdomen    Labs reviewed.   Ok to proceed with C10D1 of FOLFOX today with Ativan po for nausea.  Follow up in 2 weeks with Dr. Daley with  Mg, CBC, and Comprehensive Metabolic Panel prior to C11D1.    Route Chart for Scheduling    Med Onc Chart Routing      Follow up with physician 2 weeks. Dr. Daley   Follow up with [facility]    Infusion scheduling note   in 2 weeks for C11D1 FOLFOX   Injection scheduling note    Labs CBC, CMP and magnesium   Scheduling:  Preferred lab:  Lab interval:  in 2 weeks   Imaging None      Pharmacy appointment No pharmacy appointment needed      Other referrals       No additional referrals needed             I will review assessment/plan with collaborating physician.      SHAY Frey

## 2024-10-21 NOTE — PLAN OF CARE
Problem: Adult Inpatient Plan of Care  Goal: Plan of Care Review  Outcome: Progressing  Flowsheets (Taken 10/21/2024 1227)  Plan of Care Reviewed With: patient  Goal: Patient-Specific Goal (Individualized)  Outcome: Progressing  Flowsheets (Taken 10/21/2024 1227)  Individualized Care Needs: blanket, pillow, ice for mediport access  Anxieties, Fears or Concerns: none today  Patient/Family-Specific Goals (Include Timeframe): tolerate chemo  Goal: Absence of Hospital-Acquired Illness or Injury  Outcome: Progressing  Intervention: Prevent Infection  Flowsheets (Taken 10/21/2024 1227)  Infection Prevention:   equipment surfaces disinfected   hand hygiene promoted   personal protective equipment utilized   rest/sleep promoted  Goal: Optimal Comfort and Wellbeing  Outcome: Progressing  Intervention: Provide Person-Centered Care  Flowsheets (Taken 10/21/2024 1227)  Trust Relationship/Rapport:   care explained   thoughts/feelings acknowledged   choices provided   emotional support provided   empathic listening provided   questions answered   questions encouraged   reassurance provided     Problem: Chemotherapy Effects  Goal: Safety Maintained  Outcome: Progressing  Intervention: Promote Safe Chemotherapy Delivery  Flowsheets (Taken 10/21/2024 1227)  Infection Prevention:   equipment surfaces disinfected   hand hygiene promoted   personal protective equipment utilized   rest/sleep promoted  Chemotherapy Environmental Safety:   chemotherapy waste containers in room   protective environment maintained   meticulous hand hygiene promoted   patient environment monitored for safety   personal protective equipment utilized  Goal: Absence of Infection  Outcome: Progressing  Intervention: Prevent Infection and Maximize Resistance  Flowsheets (Taken 10/21/2024 1227)  Infection Prevention:   equipment surfaces disinfected   hand hygiene promoted   personal protective equipment utilized   rest/sleep promoted     Problem: Fall Injury  Risk  Goal: Absence of Fall and Fall-Related Injury  Outcome: Progressing  Intervention: Promote Injury-Free Environment  Flowsheets (Taken 10/21/2024 1227)  Safety Promotion/Fall Prevention:   assistive device/personal item within reach   patient expresses understanding of fall risk and prevention   nonskid shoes/socks when out of bed   high risk medications identified   in recliner, wheels locked   instructed to call staff for mobility   medications reviewed

## 2024-10-21 NOTE — ASSESSMENT & PLAN NOTE
Speech Therapy    Visit Type: Treatment  Born at Gestational Age: 33w5d and now corrected age 34w 6d    Nursing comments: RN reports infant is ok to see for treatment.  Present at bedside: RN  Subjective reported by: RN    SUBJECTIVE  RN in agreement to work with patient for therapy session.  No caregivers at bedside. RN requesting assessment secondary to concerns with oral feedings- last feed she took a small volume and spit it all out.  Patient / Family Goals: maximize function    OBJECTIVE      Environment and Equipment:   - Bed type: giraffe bed   - Lights: low   - Sound: decrease auditory stimuli   - Current respiratory status: room air      Infant Feeding  Onset of Session   - Interventions prior to feeding: alerting techniques, organizing techniques, swaddle and non nutritive suck  Non-Nutritive Suck   - Interest in rooting: minimal   - Root to latch sequencing: minimal   - Drive to suck: delayed   - Tongue movement: flat with some cupping   - Suck strength: weak   - Suck endurance: intermittent suck bursts  Pre-Feeding Session   - Taste trial: dips to corner of mouth with pacifier remaining in    - Response: minimal sucks per burst  Oral Feeding Session  Trial 1   - Feeder: SLP   - Infant consumed        4 mls by mouth   - Mode: Dr. Brown's bottle system and ultra preemie flow   - Position: sidelying   - Performance: disinterested and sleepy   - Oral phase: oral residuals and uncoordinated suck-swallow-breathe pattern (oral hold)   - Pharyngeal phase: no overt signs or symptoms of aspiration   - Stopped due to: disinterested  Infant transitioned to clinician's lap and placed in elevated side-lying. This clinician presented thin liquids via Dr. Mayo's bottle and ultra-preemie nipple (concerns with preemie at earlier feeds). Infant with delayed root. Once rooted and latched, infant with intermittent suck bursts transitioning to isolate sucks and then pushing out nipple. Infant transferred 4 mLs, however oral  Stage IV (cM1) originally diagnosed as Stage IIIB and treated with resection followed by 12 cycles of FOLFOX completed in 10/2021. Surveillance CT C/A/P revealed an abdominal wall mass; biopsy proved metastatic adenocarcinoma of pancreatic origin. Currently on FOLFOX every 2 weeks.    hold appreciated and ultimately spit out liquids. Full feeding gavaged by RN.    Summary of Oral Feeding   - Interest in rooting: minimal and delayed   - Root to latch sequencing: minimal and delayed   - Drive/interest in PO: minimal   - Suck initiation: disorganized   - Suck strength: weak   - Suck endurance: isolated sucks and intermittent suck bursts   - Feeding pattern: arrhythmical, isolated sucks and transitional  Feeding Interventions   - Interventions: elevated sidelying positioning, swaddle, horizontal bottle holding, slow the flow and follow infant driven cues throughout feeding                     ASSESSMENT                                                                         Impairments: feeding   Functional Limitations: developmental feeding   Skilled therapy is required to facilitate transition to a safe oral feeding plan.   Infant seen for dysphagia follow-up. Infant accepted small volume of thin liquids via Dr. Mayo's bottle and ultra-preemie nipple, however infant transitioned to drowsy state and began spitting out liquids quickly. SLP to continue to follow for oral stimulation, safe oral feed progression,and parent education as warranted.     Discharge Recommendations        Rehab Potential: good    PLAN  SLP Frequency: 1-2 x per week       Interventions:  Patient/family education and assess tolerance of least restrictive oral diet    Plan/Goal Agreement: will attempt to meet with parents at bedside    RECOMMENDATIONS  Diet:    - Offer oral trial per strong feeding readiness cues  - Dr. Mayo's bottle with ultra-preemie nipple  - Elevated side-lying position  - Pace q 3-4 sucks  - stop oral trial per \"three strikes\" protocol, posted at bedside  - Continue supplemental non-oral nutrition     Aspiration risk: present.  Factors impacting feeding: prematurity, limited state stamina and limited endurance    GOALS    Short Term Goals:   1.  Infant will safely accept trials of thin liquids without  distress and/or overt clinical s/s of aspiration across 3/3 feedings.  2. Infant's parents/caregivers will participate in at least x1-2 education/training sessions to support generalization of feeding/swallowing recommendations in preparation for discharge to home.     Long Term Goals: (to be met by time of discharge from hospital)  1. Infant will safely accept the least restrictive diet orally without exhibiting overt clinical s/s of aspiration.  2. Parent/caregiver will implement the recommended supports/strategies to support safe and pleasurable oral feedings in the home environment.     Documented in the chart in the following areas:    Assessment.    Patient at End of Session:   Handoff to: nurse      Therapy procedure time and total treatment time can be found documented on the Time Entry flowsheet.

## 2024-10-23 ENCOUNTER — INFUSION (OUTPATIENT)
Dept: INFUSION THERAPY | Facility: HOSPITAL | Age: 59
End: 2024-10-23
Attending: INTERNAL MEDICINE
Payer: COMMERCIAL

## 2024-10-23 VITALS
RESPIRATION RATE: 18 BRPM | HEART RATE: 72 BPM | SYSTOLIC BLOOD PRESSURE: 100 MMHG | OXYGEN SATURATION: 97 % | DIASTOLIC BLOOD PRESSURE: 64 MMHG | TEMPERATURE: 97 F

## 2024-10-23 DIAGNOSIS — C17.0 DUODENAL CANCER: Primary | ICD-10-CM

## 2024-10-23 PROCEDURE — A4216 STERILE WATER/SALINE, 10 ML: HCPCS | Performed by: NURSE PRACTITIONER

## 2024-10-23 PROCEDURE — 25000003 PHARM REV CODE 250: Performed by: NURSE PRACTITIONER

## 2024-10-23 PROCEDURE — 63600175 PHARM REV CODE 636 W HCPCS: Performed by: NURSE PRACTITIONER

## 2024-10-23 PROCEDURE — 96377 APPLICATON ON-BODY INJECTOR: CPT

## 2024-10-23 RX ORDER — HEPARIN 100 UNIT/ML
500 SYRINGE INTRAVENOUS
Status: DISCONTINUED | OUTPATIENT
Start: 2024-10-23 | End: 2024-10-23 | Stop reason: HOSPADM

## 2024-10-23 RX ORDER — SODIUM CHLORIDE 0.9 % (FLUSH) 0.9 %
10 SYRINGE (ML) INJECTION
Status: DISCONTINUED | OUTPATIENT
Start: 2024-10-23 | End: 2024-10-23 | Stop reason: HOSPADM

## 2024-10-23 RX ADMIN — HEPARIN 500 UNITS: 100 SYRINGE at 01:10

## 2024-10-23 RX ADMIN — PEGFILGRASTIM 6 MG: KIT SUBCUTANEOUS at 01:10

## 2024-10-23 RX ADMIN — Medication 10 ML: at 01:10

## 2024-10-23 NOTE — NURSING
Neulasta OBI in place on LLQ abd.  verbal and written instructions given on when it will be ok to remove OBI, pt states understanding.      Pt came in for pump D/C. Pt tolerated home 5FU well and had no complaints. PAC flushed w/ NS and heparin and deaccessed. Pt education reinforced and explained what to expect in the next couple of days.     Confirmed with Dr. Daley-treatment q 3-4 weeks.  Sent to scheduling to have tx pushed to the 11.

## 2024-10-26 ENCOUNTER — PATIENT MESSAGE (OUTPATIENT)
Dept: HEMATOLOGY/ONCOLOGY | Facility: CLINIC | Age: 59
End: 2024-10-26

## 2024-11-04 ENCOUNTER — PATIENT MESSAGE (OUTPATIENT)
Dept: HEMATOLOGY/ONCOLOGY | Facility: CLINIC | Age: 59
End: 2024-11-04

## 2024-11-05 ENCOUNTER — OFFICE VISIT (OUTPATIENT)
Dept: HEMATOLOGY/ONCOLOGY | Facility: CLINIC | Age: 59
End: 2024-11-05

## 2024-11-05 ENCOUNTER — TELEPHONE (OUTPATIENT)
Dept: HEMATOLOGY/ONCOLOGY | Facility: CLINIC | Age: 59
End: 2024-11-05

## 2024-11-05 DIAGNOSIS — C79.89 SECONDARY MALIGNANCY OF SOFT TISSUES OF ABDOMEN: ICD-10-CM

## 2024-11-05 DIAGNOSIS — T45.1X5A IMMUNODEFICIENCY DUE TO CHEMOTHERAPY: ICD-10-CM

## 2024-11-05 DIAGNOSIS — D84.821 IMMUNODEFICIENCY DUE TO CHEMOTHERAPY: ICD-10-CM

## 2024-11-05 DIAGNOSIS — Z79.899 IMMUNODEFICIENCY DUE TO CHEMOTHERAPY: ICD-10-CM

## 2024-11-05 DIAGNOSIS — C17.0 DUODENAL CANCER: Primary | ICD-10-CM

## 2024-11-05 NOTE — PROGRESS NOTES
Subjective:       Patient ID: Karen Gutierrez is a 59 y.o. female.    Chief Complaint: Results and Cancer    HPI:  59-year-old female returns by video visit to discuss insurance status.  The patient had a CT scan on 10/11/2023 which was unremarkable subsequent follow-up CT on 04/19/2024 demonstrating recurrence in anterior abdominal wall no other visits between those dates.  Patient is stated that her insurance has been terminated because of the way she answered questions    Past Medical History:   Diagnosis Date    Cancer     duodenal cancer    Hypertension     Hypotension, iatrogenic     Mitral valve prolapse      Family History   Problem Relation Name Age of Onset    Ovarian cancer Mother      Atrial fibrillation Mother      Stroke Mother      Hyperlipidemia Mother      Diabetes Mother      Bladder Cancer Father      Hypertension Father      Diabetes Father      No Known Problems Sister      Diabetes Maternal Grandmother      Colon cancer Maternal Grandmother      Stroke Maternal Grandfather      Diabetes Paternal Grandmother      No Known Problems Paternal Grandfather       Social History     Socioeconomic History    Marital status:    Tobacco Use    Smoking status: Former    Smokeless tobacco: Never   Substance and Sexual Activity    Alcohol use: Not Currently    Drug use: Never     Social Drivers of Health     Financial Resource Strain: Low Risk  (6/14/2024)    Overall Financial Resource Strain (CARDIA)     Difficulty of Paying Living Expenses: Not hard at all   Food Insecurity: No Food Insecurity (6/14/2024)    Hunger Vital Sign     Worried About Running Out of Food in the Last Year: Never true     Ran Out of Food in the Last Year: Never true   Physical Activity: Inactive (6/14/2024)    Exercise Vital Sign     Days of Exercise per Week: 0 days     Minutes of Exercise per Session: 0 min   Stress: No Stress Concern Present (6/14/2024)    [employer] - Occupational  Stress Questionnaire     Feeling of Stress : Not at all   Housing Stability: Unknown (6/14/2024)    Housing Stability Vital Sign     Unable to Pay for Housing in the Last Year: No     Past Surgical History:   Procedure Laterality Date    BUNIONECTOMY Left     HYSTERECTOMY  2000    INSERTION OF TUNNELED CENTRAL VENOUS CATHETER (CVC) WITH SUBCUTANEOUS PORT N/A 3/26/2021    Procedure: [id number];  Surgeon: Lauren Abraham MD;  Location: [facility] OR;  Service: General;  Laterality: N/A;    INSERTION OF VENOUS ACCESS PORT Right 5/13/2024    Procedure: INSERTION, VENOUS ACCESS PORT;  Surgeon: Alicia Hernandez MD;  Location: [facility] OR;  Service: General;  Laterality: Right;    LITHOTRIPSY      PLACEMENT OF JEJUNOSTOMY TUBE  2/18/2021    Procedure: INSERTION, JEJUNOSTOMY TUBE;  Surgeon: Hitesh Díaz MD;  Location: [facility] OR 2ND FLR;  Service: General;;    ULTRASOUND GUIDANCE Right 5/13/2024    Procedure: ULTRASOUND GUIDANCE;  Surgeon: Alicia Hernandez MD;  Location: [facility] OR;  Service: General;  Laterality: Right;    WHIPPLE PROCEDURE N/A 2/18/2021    Procedure: WHIPPLE PROCEDURE;  Surgeon: Hitesh Díaz MD;  Location: [facility] OR 2ND FLR;  Service: General;  Laterality: N/A;       Labs:  Lab Results   Component Value Date    WBC 2.67 (L) 10/21/2024    HGB 12.4 10/21/2024    HCT 37.5 10/21/2024    MCV 98 10/21/2024    PLT 87 (L) 10/21/2024     BMP  Lab Results   Component Value Date     10/21/2024    K 4.6 10/21/2024     10/21/2024    CO2 25 10/21/2024    BUN 13 10/21/2024    CREATININE 1.0 10/21/2024    CALCIUM 9.5 10/21/2024    ANIONGAP 11 10/21/2024    ESTGFRAFRICA >60 03/07/2022    EGFRNONAA >60 03/07/2022     Lab Results   Component Value Date    ALT 48 (H) 10/21/2024    AST 65 (H) 10/21/2024    ALKPHOS 157 (H) 10/21/2024    BILITOT 0.7 10/21/2024       Lab Results   Component Value Date    IRON 42 05/10/2021    TIBC 404 05/10/2021    FERRITIN 345 (H) 05/10/2021     No results found for:  ""CSNSOAUM42"  No results found for: "FOLATE"  No results found for: "TSH"      Review of Systems   Constitutional:  Negative for activity change, appetite change, chills, diaphoresis, fatigue, fever and unexpected weight change.   HENT:  Negative for congestion, dental problem, drooling, ear discharge, ear pain, facial swelling, hearing loss, mouth sores, nosebleeds, postnasal drip, rhinorrhea, sinus pressure, sneezing, sore throat, tinnitus, trouble swallowing and voice change.    Eyes:  Negative for photophobia, pain, discharge, redness, itching and visual disturbance.   Respiratory:  Negative for cough, choking, chest tightness, shortness of breath, wheezing and stridor.    Cardiovascular:  Negative for chest pain, palpitations and leg swelling.   Gastrointestinal:  Negative for abdominal distention, abdominal pain, anal bleeding, blood in stool, constipation, diarrhea, nausea, rectal pain and vomiting.   Endocrine: Negative for cold intolerance, heat intolerance, polydipsia, polyphagia and polyuria.   Genitourinary:  Negative for decreased urine volume, difficulty urinating, dyspareunia, dysuria, enuresis, flank pain, frequency, genital sores, hematuria, menstrual problem, pelvic pain, urgency, vaginal bleeding, vaginal discharge and vaginal pain.   Musculoskeletal:  Negative for arthralgias, back pain, gait problem, joint swelling, myalgias, neck pain and neck stiffness.   Skin:  Negative for color change, pallor and rash.   Allergic/Immunologic: Negative for environmental allergies, food allergies and immunocompromised state.   Neurological:  Negative for dizziness, tremors, seizures, syncope, facial asymmetry, speech difficulty, weakness, light-headedness, numbness and headaches.   Hematological:  Negative for adenopathy. Does not bruise/bleed easily.   Psychiatric/Behavioral:  Negative for agitation, behavioral problems, confusion, decreased concentration, dysphoric mood, hallucinations, self-injury, sleep " disturbance and suicidal ideas. The patient is nervous/anxious. The patient is not hyperactive.        Objective:      Physical Exam  Constitutional:       Appearance: Normal appearance.   Neurological:      Mental Status: She is alert.             Assessment:      1. 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           Med Onc Chart Routing      Follow up with physician    Follow up with [facility]    Infusion scheduling note    Injection scheduling note    Labs    Imaging    Pharmacy appointment    Other referrals         Referral  to help with insurance status              Plan:     The patient location is:  Home  The chief complaint leading to consultation is:  Cancer    Visit type: audiovisual    Face to Face time with patient: 25 minutes of total time spent on the encounter, which includes face to face time and non-face to face time preparing to see the patient (eg, review of tests), Obtaining and/or reviewing separately obtained history, Documenting clinical information in the electronic or other health record, Independently interpreting results (not separately reported) and communicating results to the patient/family/caregiver, or Care coordination (not separately reported).         Each patient to whom he or she provides medical services by telemedicine is:  (1) informed of the relationship between the physician and patient and the respective role of any other health care provider with respect to management of the patient; and (2) notified that he or she may decline to receive medical services by telemedicine and may withdraw from such care at any time.    Notes:  Extensive conversation with patient reviewed dates demonstrating that she had a CT scan on 10/11/2023 in follow-up of resected duodenal cancer after adjuvant therapy with no evidence of disease.  Subsequent recurrence on 04/19/2024 with follow-up CT.  The patient has  been in remission for greater than 2 years with no evidence of malignancy.  Will ask  to help I will be happy to answer questions once I know the exact questions from her insurance company if they are directed toward me but with last visit of 10/11/2023 and subsequent CT on 04/19/2024 patient has been in remission up until date of 04/19/2024        Enzo Daley Jr, MD FACP

## 2024-11-07 ENCOUNTER — PATIENT MESSAGE (OUTPATIENT)
Dept: HEMATOLOGY/ONCOLOGY | Facility: CLINIC | Age: 59
End: 2024-11-07

## 2024-11-08 ENCOUNTER — TELEPHONE (OUTPATIENT)
Dept: HEMATOLOGY/ONCOLOGY | Facility: CLINIC | Age: 59
End: 2024-11-08

## 2024-11-08 NOTE — TELEPHONE ENCOUNTER
Contacted pt to advise that Dr Daley agreed with her request to hold off next tx for Dec1st when her insurance kicks in. Advised we would reschedule all her appts for 11/18 till after. Pt v/u

## 2024-11-21 ENCOUNTER — DOCUMENTATION ONLY (OUTPATIENT)
Dept: HEMATOLOGY/ONCOLOGY | Facility: CLINIC | Age: 59
End: 2024-11-21

## 2024-11-21 NOTE — PROGRESS NOTES
"6 mo check in. Pt reports that she is doing "quite well" and looking forward to getting going again at next appt on 12/3. Advised that we are still here for her should she need anything throughout her tx process. Pt v/u  Oncology Navigation   Intake  Date of Diagnosis: 24  Cancer Type: GI; Other  Start of Treatment: 24  Diagnosis to Treat Timeline (days): 14 days     Treatment  Current Status: Active  Date Presented to Tumor Board: 24    Surgical Oncologist: YOON Hernandez    Chemotherapy: Initiated (started 24)  Chemotherapy Regimen: oxaliplatin leucovorin fluorouraci  Delays/Reductions Due to:: Other (insurance issues)                       Acuity  Stage: 2  Systemic Treatment - predicted or initiated: Chemotherapy Regimen with Multiple drugs (+1)  Treatment Tolerability: Minimal symptoms  ECO  Comorbidities in Medical History: 2   Needed: 0  Support: 0  Verbalizes Financial Concerns: 0  Transportation: 0  Psychological Factors (+1 each): Seeing oncology psychology  History of noncompliance/frequent no shows and cancellations: 0  Verbalizes the need for more education: 0  Navigation Acuity: 5     Follow Up  No follow-ups on file.       "

## 2024-12-02 NOTE — PROGRESS NOTES
Subjective:       Patient ID: Karen Gutierrez is a 59 y.o. female.    Chief Complaint: Cancer and Chemotherapy    Primary Oncologist/Hematologist: Dr. Daley    HPI: Ms. Gutierrez is a 59 year old female who is following up for her Stage IV duodenal cancer. She is here for cycle 11 day 1 of FOLFOX q 3 weeks.   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 CT scan on 10/11/2023 in follow-up of resected duodenal cancer after adjuvant therapy with no evidence of disease. Subsequent recurrence on 04/19/2024 with follow-up CT. She was resumed on FOLFOX q 2 weeks since 5/2024. CT C/A/P in 7/2024 revealed a decrease in the size of her abdominal mass (4.5 x 2.0 cm) and no new sites of metastatic disease.     Today: She states she has been well. She denies any fevers, illnesses, n/v/c. She does have diarrhea at times, taking imodium. She states her appetite is good and she has been staying hydrated. She does notice more bruising.     Social History     Socioeconomic History    Marital status:    Tobacco Use    Smoking status: Former    Smokeless tobacco: Never   Substance and Sexual Activity    Alcohol use: Not Currently    Drug use: Never     Social Drivers of Health     Financial Resource Strain: Low Risk  (6/14/2024)    Overall Financial Resource Strain (CARDIA)     Difficulty of Paying Living Expenses: Not hard at all   Food Insecurity: No Food Insecurity (6/14/2024)    Hunger Vital Sign     Worried About Running Out of Food in the Last Year: Never true     Ran Out of Food in the Last Year: Never true   Physical Activity: Inactive (6/14/2024)    Exercise Vital Sign     Days of Exercise per Week: 0 days     Minutes of Exercise per Session: 0 min   Stress: No Stress Concern Present (6/14/2024)    [employer] - Occupational Stress Questionnaire     Feeling of Stress : Not at all   Housing  Stability: Unknown (6/14/2024)    Housing Stability Vital Sign     Unable to Pay for Housing in the Last Year: No       Past Medical History:   Diagnosis Date    Cancer     duodenal cancer    Hypertension     Hypotension, iatrogenic     Mitral valve prolapse        Family History   Problem Relation Name Age of Onset    Ovarian cancer Mother      Atrial fibrillation Mother      Stroke Mother      Hyperlipidemia Mother      Diabetes Mother      Bladder Cancer Father      Hypertension Father      Diabetes Father      No Known Problems Sister      Diabetes Maternal Grandmother      Colon cancer Maternal Grandmother      Stroke Maternal Grandfather      Diabetes Paternal Grandmother      No Known Problems Paternal Grandfather         Past Surgical History:   Procedure Laterality Date    BUNIONECTOMY Left     HYSTERECTOMY  2000    INSERTION OF TUNNELED CENTRAL VENOUS CATHETER (CVC) WITH SUBCUTANEOUS PORT N/A 3/26/2021    Procedure: [id number]-U-CATH;  Surgeon: Lauren Abraham MD;  Location: [facility] OR;  Service: General;  Laterality: N/A;    INSERTION OF VENOUS ACCESS PORT Right 5/13/2024    Procedure: INSERTION, VENOUS ACCESS PORT;  Surgeon: Alicia Hernandez MD;  Location: [facility] OR;  Service: General;  Laterality: Right;    LITHOTRIPSY      PLACEMENT OF JEJUNOSTOMY TUBE  2/18/2021    Procedure: INSERTION, JEJUNOSTOMY TUBE;  Surgeon: Hitesh Díaz MD;  Location: [facility] OR 2ND FLR;  Service: General;;    ULTRASOUND GUIDANCE Right 5/13/2024    Procedure: ULTRASOUND GUIDANCE;  Surgeon: Alicia Hernandez MD;  Location: [facility] OR;  Service: General;  Laterality: Right;    WHIPPLE PROCEDURE N/A 2/18/2021    Procedure: WHIPPLE PROCEDURE;  Surgeon: Hitesh Díaz MD;  Location: [facility] OR 2ND FLR;  Service: General;  Laterality: N/A;       Review of Systems   Constitutional:  Negative for activity change, appetite change, chills, diaphoresis, fatigue, fever and unexpected weight change.   HENT:  Negative for congestion,  nosebleeds and rhinorrhea.    Respiratory:  Negative for cough and shortness of breath.    Cardiovascular:  Negative for chest pain and leg swelling.   Gastrointestinal:  Positive for diarrhea. Negative for abdominal pain, anal bleeding, blood in stool, constipation, nausea and vomiting.   Genitourinary:  Negative for hematuria.   Allergic/Immunologic: Positive for immunocompromised state.   Neurological:  Negative for dizziness, light-headedness and headaches.   Hematological:  Bruises/bleeds easily.         Medication List with Changes/Refills   Current Medications    AMLODIPINE (NORVASC) 10 MG TABLET    Take 10 mg by mouth once daily.    ATOMOXETINE (STRATTERA) 40 MG CAPSULE    Take 1 capsule (40 mg total) by mouth once daily.    BYSTOLIC 20 MG TAB    Take 1 tablet by mouth once daily.    CHOLECALCIFEROL, VITAMIN D3, 125 MCG (5,000 UNIT) CAPSULE    Take 5,000 Units by mouth once daily.    CLONAZEPAM (KLONOPIN) 0.5 MG TABLET    Take 1 tablet (0.5 mg total) by mouth nightly as needed for Anxiety.    CYANOCOBALAMIN (VITAMIN B-12) 1000 MCG TABLET    Take 1,000 mcg by mouth once daily.    ESCITALOPRAM OXALATE (LEXAPRO) 10 MG TABLET    Take 1 tablet (10 mg total) by mouth once daily.    LIDOCAINE-PRILOCAINE (EMLA) CREAM    Apply to affected area once    OLANZAPINE (ZYPREXA) 5 MG TABLET    Take 1 tablet (5 mg total) by mouth every evening. Take nightly on days 1-3 of each chemotherapy cycle.    OLMESARTAN (BENICAR) 40 MG TABLET    TAKE 1 TABLET(40 MG) BY MOUTH EVERY DAY    ONDANSETRON (ZOFRAN-ODT) 4 MG TBDL    Take 1 tablet (4 mg total) by mouth 2 (two) times daily.    PROCHLORPERAZINE (COMPAZINE) 10 MG TABLET    Take 1 tablet (10 mg total) by mouth every 6 (six) hours as needed for Nausea.     Objective:   There were no vitals filed for this visit.    Physical Exam  Constitutional:       General: She is not in acute distress.     Appearance: She is not ill-appearing, toxic-appearing or diaphoretic.   Neurological:       Mental Status: She is alert.   Psychiatric:         Mood and Affect: Mood normal.          Physical exam limited due to video visit    Labs/Results:  Lab Results   Component Value Date    WBC 2.87 (L) 12/03/2024    RBC 3.76 (L) 12/03/2024    HGB 12.1 12/03/2024    HCT 35.9 (L) 12/03/2024    MCV 96 12/03/2024    MCH 32.2 (H) 12/03/2024    MCHC 33.7 12/03/2024    RDW 12.6 12/03/2024    PLT 99 (L) 12/03/2024    MPV 10.0 12/03/2024    GRAN 1.6 (L) 12/03/2024    GRAN 56.8 12/03/2024    LYMPH 0.8 (L) 12/03/2024    LYMPH 28.9 12/03/2024    MONO 0.3 12/03/2024    MONO 9.8 12/03/2024    EOS 0.1 12/03/2024    BASO 0.03 12/03/2024    EOSINOPHIL 3.5 12/03/2024    BASOPHIL 1.0 12/03/2024     CMP  Sodium   Date Value Ref Range Status   12/03/2024 142 136 - 145 mmol/L Final     Potassium   Date Value Ref Range Status   12/03/2024 4.4 3.5 - 5.1 mmol/L Final     Chloride   Date Value Ref Range Status   12/03/2024 108 95 - 110 mmol/L Final     CO2   Date Value Ref Range Status   12/03/2024 26 23 - 29 mmol/L Final     Glucose   Date Value Ref Range Status   12/03/2024 111 (H) 70 - 110 mg/dL Final     BUN   Date Value Ref Range Status   12/03/2024 16 6 - 20 mg/dL Final     Creatinine   Date Value Ref Range Status   12/03/2024 0.9 0.5 - 1.4 mg/dL Final     Calcium   Date Value Ref Range Status   12/03/2024 9.1 8.7 - 10.5 mg/dL Final     Total Protein   Date Value Ref Range Status   12/03/2024 7.1 6.0 - 8.4 g/dL Final     Albumin   Date Value Ref Range Status   12/03/2024 3.9 3.5 - 5.2 g/dL Final     Total Bilirubin   Date Value Ref Range Status   12/03/2024 0.7 0.1 - 1.0 mg/dL Final     Comment:     For infants and newborns, interpretation of results should be based  on gestational age, weight and in agreement with clinical  observations.    Premature Infant recommended reference ranges:  Up to 24 hours.............<8.0 mg/dL  Up to 48 hours............<12.0 mg/dL  3-5 days..................<15.0 mg/dL  6-29 days.................<15.0  "mg/dL       Alkaline Phosphatase   Date Value Ref Range Status   12/03/2024 154 (H) 40 - 150 U/L Final     AST   Date Value Ref Range Status   12/03/2024 56 (H) 10 - 40 U/L Final     ALT   Date Value Ref Range Status   12/03/2024 41 10 - 44 U/L Final     Anion Gap   Date Value Ref Range Status   12/03/2024 8 8 - 16 mmol/L Final     eGFR   Date Value Ref Range Status   12/03/2024 >60 >60 mL/min/1.73 m^2 Final       Ct c/a/p  9/10/24  Impression:   History of duodenal malignancy status post Whipple procedure without CT evidence of local tumor recurrence.  There is continued marked decrease size of the left rectus abdominis mass, umbilicus level, now with only minimal residual soft tissue thickening, markedly improved compared to April 2024.  Hepatosplenomegaly.  Fatty liver.  Stable multiple bilateral tiny pulmonary nodules, majority 3 to 4 mm.  Stable right middle lobe groundglass nodule, 5 mm.  No worrisome new or enlarging pulmonary nodule.  Stable sclerotic lesion first thoracic vertebral body, 1.2 cm, and stable lucent lesion ninth thoracic vertebra body, 0.8 cm.    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 CT scan on 10/11/2023 in follow-up of resected duodenal cancer after adjuvant therapy with no evidence of disease. Subsequent recurrence on 04/19/2024 with follow-up CT. She was resumed on FOLFOX q 2 weeks since 5/2024. CT C/A/P in 7/2024 revealed a decrease in the size of her abdominal mass (4.5 x 2.0 cm) and no new sites of metastatic disease.   --some cessation with chemo in the interim due to insurance reasons.   --tumor board rec "recommend finishing last 4 cycles of FOLFOX followed by " "consolidation with surgery"  --continue with cycle 11 day 1 of FOLFOX q 3 weeks.   --ANC:1.6 plts:99 tbili:0.7 crcl: 92ml/min    Follow-Up: 4 weeks with  (12/30/24)cbc cmp ca 19-9 cea prior with primary oncologist-standing orders in    Tonja Reddy PA-C  Hematology Oncology    The patient location is: the [place]  The chief complaint leading to consultation is: duodenal cx and chemo  Visit type:  Synchronous audio video   Face to Face time with patient: 15 minutes of total time spent on the encounter, which includes face to face time and non-face to face time preparing to see the patient (eg, review of tests), Obtaining and/or reviewing separately obtained history, Documenting clinical information in the electronic or other health record, Independently interpreting results (not separately reported) and communicating results to the patient/family/caregiver, or Care coordination (not separately reported).   Each patient to whom he or she provides medical services by telemedicine is:  (1) informed of the relationship between the provider and patient and the respective role of any other health care provider with respect to management of the patient; and (2) notified that he or she may decline to receive medical services     Route Chart for Scheduling    Med Onc Chart Routing      Follow up with physician . 4 weeksd (12/30/24) with cbc cmp ca 19-9 and mag prior   Follow up with [facility]    Infusion scheduling note   4 weeks (12/30/24)   Injection scheduling note    Labs CMP, CBC, CEA, CA 19-9 and magnesium   Scheduling:  Preferred lab:  Lab interval:  standing orders in   Imaging    Pharmacy appointment    Other referrals                  Treatment Plan Information   OP GI mFOLFOX6 (oxaliplatin leucovorin fluorouracil) Q2W --Q3W Enzo Daley MD   Associated diagnosis: Duodenal cancer Stage IIIB, Stage IV pT4, pN2, cM0, cM1 noted on 2/18/2021   Line of treatment: First Line  Treatment Goal: Control     Upcoming Treatment " Dates - OP GI [id number] (oxaliplatin leucovorin fluorouracil) Q2W --Q3W    11/11/2024       Chemotherapy       oxaliplatin (ELOXATIN) 85 mg/m2 = 180 mg in D5W 536 mL chemo infusion       leucovorin calcium 400 mg/m2 = 850 mg in D5W 250 mL infusion       fluorouraciL injection 850 mg       fluorouracil (Adrucil) 2,400 mg/m2 = 5,090 mg in 0.9% NaCl 240 mL chemo infusion       Antiemetics       palonosetron (ALOXI) 0.25 mg with Dexamethasone (DECADRON) 12 mg in NS 50 mL IVPB  11/13/2024       Growth Factor       pegfilgrastim (NEULASTA (ON BODY INJECTOR)) injection 6 mg  12/2/2024       Chemotherapy       oxaliplatin (ELOXATIN) in D5W 500 mL chemo infusion       leucovorin calcium in D5W 250 mL infusion       fluorouraciL injection       fluorouracil chemo infusion       Antiemetics       palonosetron (ALOXI) 0.25 mg with Dexamethasone (DECADRON) 12 mg in NS 50 mL IVPB  12/4/2024       Growth Factor       pegfilgrastim (NEULASTA (ON BODY INJECTOR)) injection 6 mg

## 2024-12-03 ENCOUNTER — LAB VISIT (OUTPATIENT)
Dept: LAB | Facility: HOSPITAL | Age: 59
End: 2024-12-03
Attending: INTERNAL MEDICINE
Payer: COMMERCIAL

## 2024-12-03 ENCOUNTER — OFFICE VISIT (OUTPATIENT)
Dept: HEMATOLOGY/ONCOLOGY | Facility: CLINIC | Age: 59
End: 2024-12-03
Payer: COMMERCIAL

## 2024-12-03 ENCOUNTER — INFUSION (OUTPATIENT)
Dept: INFUSION THERAPY | Facility: HOSPITAL | Age: 59
End: 2024-12-03
Attending: INTERNAL MEDICINE
Payer: COMMERCIAL

## 2024-12-03 VITALS
SYSTOLIC BLOOD PRESSURE: 94 MMHG | TEMPERATURE: 98 F | OXYGEN SATURATION: 96 % | BODY MASS INDEX: 25.98 KG/M2 | DIASTOLIC BLOOD PRESSURE: 56 MMHG | WEIGHT: 191.81 LBS | HEIGHT: 72 IN | RESPIRATION RATE: 16 BRPM | HEART RATE: 68 BPM

## 2024-12-03 DIAGNOSIS — C17.0 DUODENAL CANCER: ICD-10-CM

## 2024-12-03 DIAGNOSIS — Z79.899 IMMUNODEFICIENCY DUE TO CHEMOTHERAPY: ICD-10-CM

## 2024-12-03 DIAGNOSIS — D50.9 IRON DEFICIENCY ANEMIA, UNSPECIFIED IRON DEFICIENCY ANEMIA TYPE: ICD-10-CM

## 2024-12-03 DIAGNOSIS — T45.1X5A IMMUNODEFICIENCY DUE TO CHEMOTHERAPY: ICD-10-CM

## 2024-12-03 DIAGNOSIS — D84.821 IMMUNODEFICIENCY DUE TO CHEMOTHERAPY: ICD-10-CM

## 2024-12-03 DIAGNOSIS — C17.0 DUODENAL CANCER: Primary | ICD-10-CM

## 2024-12-03 LAB
ALBUMIN SERPL BCP-MCNC: 3.9 G/DL (ref 3.5–5.2)
ALP SERPL-CCNC: 154 U/L (ref 40–150)
ALT SERPL W/O P-5'-P-CCNC: 41 U/L (ref 10–44)
ANION GAP SERPL CALC-SCNC: 8 MMOL/L (ref 8–16)
AST SERPL-CCNC: 56 U/L (ref 10–40)
BASOPHILS # BLD AUTO: 0.03 K/UL (ref 0–0.2)
BASOPHILS NFR BLD: 1 % (ref 0–1.9)
BILIRUB SERPL-MCNC: 0.7 MG/DL (ref 0.1–1)
BUN SERPL-MCNC: 16 MG/DL (ref 6–20)
CALCIUM SERPL-MCNC: 9.1 MG/DL (ref 8.7–10.5)
CHLORIDE SERPL-SCNC: 108 MMOL/L (ref 95–110)
CO2 SERPL-SCNC: 26 MMOL/L (ref 23–29)
CREAT SERPL-MCNC: 0.9 MG/DL (ref 0.5–1.4)
DIFFERENTIAL METHOD BLD: ABNORMAL
EOSINOPHIL # BLD AUTO: 0.1 K/UL (ref 0–0.5)
EOSINOPHIL NFR BLD: 3.5 % (ref 0–8)
ERYTHROCYTE [DISTWIDTH] IN BLOOD BY AUTOMATED COUNT: 12.6 % (ref 11.5–14.5)
EST. GFR  (NO RACE VARIABLE): >60 ML/MIN/1.73 M^2
GLUCOSE SERPL-MCNC: 111 MG/DL (ref 70–110)
HCT VFR BLD AUTO: 35.9 % (ref 37–48.5)
HGB BLD-MCNC: 12.1 G/DL (ref 12–16)
IMM GRANULOCYTES # BLD AUTO: 0 K/UL (ref 0–0.04)
IMM GRANULOCYTES NFR BLD AUTO: 0 % (ref 0–0.5)
LYMPHOCYTES # BLD AUTO: 0.8 K/UL (ref 1–4.8)
LYMPHOCYTES NFR BLD: 28.9 % (ref 18–48)
MAGNESIUM SERPL-MCNC: 2 MG/DL (ref 1.6–2.6)
MCH RBC QN AUTO: 32.2 PG (ref 27–31)
MCHC RBC AUTO-ENTMCNC: 33.7 G/DL (ref 32–36)
MCV RBC AUTO: 96 FL (ref 82–98)
MONOCYTES # BLD AUTO: 0.3 K/UL (ref 0.3–1)
MONOCYTES NFR BLD: 9.8 % (ref 4–15)
NEUTROPHILS # BLD AUTO: 1.6 K/UL (ref 1.8–7.7)
NEUTROPHILS NFR BLD: 56.8 % (ref 38–73)
NRBC BLD-RTO: 0 /100 WBC
PLATELET # BLD AUTO: 99 K/UL (ref 150–450)
PMV BLD AUTO: 10 FL (ref 9.2–12.9)
POTASSIUM SERPL-SCNC: 4.4 MMOL/L (ref 3.5–5.1)
PROT SERPL-MCNC: 7.1 G/DL (ref 6–8.4)
RBC # BLD AUTO: 3.76 M/UL (ref 4–5.4)
SODIUM SERPL-SCNC: 142 MMOL/L (ref 136–145)
WBC # BLD AUTO: 2.87 K/UL (ref 3.9–12.7)

## 2024-12-03 PROCEDURE — 96413 CHEMO IV INFUSION 1 HR: CPT

## 2024-12-03 PROCEDURE — 96415 CHEMO IV INFUSION ADDL HR: CPT

## 2024-12-03 PROCEDURE — 96368 THER/DIAG CONCURRENT INF: CPT

## 2024-12-03 PROCEDURE — 80053 COMPREHEN METABOLIC PANEL: CPT | Performed by: INTERNAL MEDICINE

## 2024-12-03 PROCEDURE — 99215 OFFICE O/P EST HI 40 MIN: CPT | Mod: 95,,,

## 2024-12-03 PROCEDURE — 25000003 PHARM REV CODE 250

## 2024-12-03 PROCEDURE — 85025 COMPLETE CBC W/AUTO DIFF WBC: CPT | Performed by: INTERNAL MEDICINE

## 2024-12-03 PROCEDURE — 96375 TX/PRO/DX INJ NEW DRUG ADDON: CPT

## 2024-12-03 PROCEDURE — 96416 CHEMO PROLONG INFUSE W/PUMP: CPT

## 2024-12-03 PROCEDURE — 83735 ASSAY OF MAGNESIUM: CPT | Performed by: INTERNAL MEDICINE

## 2024-12-03 PROCEDURE — 96411 CHEMO IV PUSH ADDL DRUG: CPT

## 2024-12-03 PROCEDURE — 63600175 PHARM REV CODE 636 W HCPCS

## 2024-12-03 PROCEDURE — 96367 TX/PROPH/DG ADDL SEQ IV INF: CPT

## 2024-12-03 PROCEDURE — 36415 COLL VENOUS BLD VENIPUNCTURE: CPT | Performed by: INTERNAL MEDICINE

## 2024-12-03 PROCEDURE — 4010F ACE/ARB THERAPY RXD/TAKEN: CPT | Mod: CPTII,95,,

## 2024-12-03 RX ORDER — FLUOROURACIL 50 MG/ML
400 INJECTION, SOLUTION INTRAVENOUS
Status: CANCELLED | OUTPATIENT
Start: 2024-12-03

## 2024-12-03 RX ORDER — SODIUM CHLORIDE 0.9 % (FLUSH) 0.9 %
10 SYRINGE (ML) INJECTION
Status: CANCELLED | OUTPATIENT
Start: 2024-12-04

## 2024-12-03 RX ORDER — PROCHLORPERAZINE EDISYLATE 5 MG/ML
10 INJECTION INTRAMUSCULAR; INTRAVENOUS ONCE AS NEEDED
Status: COMPLETED | OUTPATIENT
Start: 2024-12-03 | End: 2024-12-03

## 2024-12-03 RX ORDER — LORAZEPAM 1 MG/1
1 TABLET ORAL
Status: CANCELLED | OUTPATIENT
Start: 2024-12-03 | End: 2024-12-03

## 2024-12-03 RX ORDER — PROCHLORPERAZINE EDISYLATE 5 MG/ML
10 INJECTION INTRAMUSCULAR; INTRAVENOUS ONCE AS NEEDED
Status: CANCELLED | OUTPATIENT
Start: 2024-12-03

## 2024-12-03 RX ORDER — HEPARIN 100 UNIT/ML
500 SYRINGE INTRAVENOUS
Status: CANCELLED | OUTPATIENT
Start: 2024-12-04

## 2024-12-03 RX ORDER — PROCHLORPERAZINE EDISYLATE 5 MG/ML
10 INJECTION INTRAMUSCULAR; INTRAVENOUS ONCE AS NEEDED
Status: CANCELLED | OUTPATIENT
Start: 2024-12-04

## 2024-12-03 RX ORDER — DIPHENHYDRAMINE HYDROCHLORIDE 50 MG/ML
50 INJECTION INTRAMUSCULAR; INTRAVENOUS ONCE AS NEEDED
Status: CANCELLED | OUTPATIENT
Start: 2024-12-03

## 2024-12-03 RX ORDER — HEPARIN 100 UNIT/ML
500 SYRINGE INTRAVENOUS
Status: DISCONTINUED | OUTPATIENT
Start: 2024-12-03 | End: 2024-12-03 | Stop reason: HOSPADM

## 2024-12-03 RX ORDER — HEPARIN 100 UNIT/ML
500 SYRINGE INTRAVENOUS
Status: CANCELLED | OUTPATIENT
Start: 2024-12-03

## 2024-12-03 RX ORDER — FLUOROURACIL 50 MG/ML
2400 INJECTION, SOLUTION INTRAVENOUS
Status: CANCELLED | OUTPATIENT
Start: 2024-12-03

## 2024-12-03 RX ORDER — FLUOROURACIL 50 MG/ML
5000 INJECTION, SOLUTION INTRAVENOUS
Status: DISCONTINUED | OUTPATIENT
Start: 2024-12-03 | End: 2024-12-03 | Stop reason: HOSPADM

## 2024-12-03 RX ORDER — LORAZEPAM 0.5 MG/1
1 TABLET ORAL
Status: COMPLETED | OUTPATIENT
Start: 2024-12-03 | End: 2024-12-03

## 2024-12-03 RX ORDER — FLUOROURACIL 50 MG/ML
400 INJECTION, SOLUTION INTRAVENOUS
Status: COMPLETED | OUTPATIENT
Start: 2024-12-03 | End: 2024-12-03

## 2024-12-03 RX ORDER — SODIUM CHLORIDE 0.9 % (FLUSH) 0.9 %
10 SYRINGE (ML) INJECTION
Status: CANCELLED | OUTPATIENT
Start: 2024-12-03

## 2024-12-03 RX ORDER — EPINEPHRINE 0.3 MG/.3ML
0.3 INJECTION SUBCUTANEOUS ONCE AS NEEDED
Status: CANCELLED | OUTPATIENT
Start: 2024-12-03

## 2024-12-03 RX ADMIN — FLUOROURACIL 840 MG: 50 INJECTION, SOLUTION INTRAVENOUS at 12:12

## 2024-12-03 RX ADMIN — LEUCOVORIN CALCIUM 840 MG: 350 INJECTION, POWDER, LYOPHILIZED, FOR SUSPENSION INTRAMUSCULAR; INTRAVENOUS at 10:12

## 2024-12-03 RX ADMIN — LORAZEPAM 1 MG: 0.5 TABLET ORAL at 10:12

## 2024-12-03 RX ADMIN — PROCHLORPERAZINE EDISYLATE 10 MG: 5 INJECTION INTRAMUSCULAR; INTRAVENOUS at 12:12

## 2024-12-03 RX ADMIN — DEXTROSE MONOHYDRATE 179 MG: 50 INJECTION, SOLUTION INTRAVENOUS at 10:12

## 2024-12-03 RX ADMIN — DEXAMETHASONE SODIUM PHOSPHATE 0.25 MG: 4 INJECTION, SOLUTION INTRA-ARTICULAR; INTRALESIONAL; INTRAMUSCULAR; INTRAVENOUS; SOFT TISSUE at 09:12

## 2024-12-03 RX ADMIN — FLUOROURACIL 5000 MG: 50 INJECTION, SOLUTION INTRAVENOUS at 12:12

## 2024-12-03 NOTE — PLAN OF CARE
Problem: Adult Inpatient Plan of Care  Goal: Plan of Care Review  Outcome: Progressing  Goal: Patient-Specific Goal (Individualized)  Outcome: Progressing  Goal: Absence of Hospital-Acquired Illness or Injury  Outcome: Progressing  Goal: Optimal Comfort and Wellbeing  Outcome: Progressing     Problem: Chemotherapy Effects  Goal: Safety Maintained  Outcome: Progressing  Goal: Absence of Infection  Outcome: Progressing     Problem: Fall Injury Risk  Goal: Absence of Fall and Fall-Related Injury  Outcome: Progressing

## 2024-12-03 NOTE — NURSING
Infusion: Folfox q4wks    Recent Labs:  Recent Hematology provider visit: conrad Tonja on 12/3/24.    Premeds: Aloxi 0.25mg/Dex12 mg administered at a 20 minute rate per orders. Lorazepam 1mg PO.    Oxaliplatin 179 mg administered via port at a 2 hour rate per orders. Leucovorin 840 mg administered concurrently with Oxaliplatin at a 120 minute rate per orders. 5FU 840 mg injection given at a 5 minute rate per oders. 5FU pump connected and set to run over 46 hours at a 2.2 ml/hr rate per orders. Pt to return in 46 hours for pump removal. Pt discharged from clinic without complaints.    Compazine 10 mg IVP given due to vomiting towards end of treatment.

## 2024-12-05 ENCOUNTER — INFUSION (OUTPATIENT)
Dept: INFUSION THERAPY | Facility: HOSPITAL | Age: 59
End: 2024-12-05
Attending: INTERNAL MEDICINE
Payer: COMMERCIAL

## 2024-12-05 VITALS
RESPIRATION RATE: 16 BRPM | HEART RATE: 70 BPM | SYSTOLIC BLOOD PRESSURE: 122 MMHG | TEMPERATURE: 98 F | DIASTOLIC BLOOD PRESSURE: 74 MMHG | OXYGEN SATURATION: 98 %

## 2024-12-05 DIAGNOSIS — C17.0 DUODENAL CANCER: Primary | ICD-10-CM

## 2024-12-05 PROCEDURE — 96377 APPLICATON ON-BODY INJECTOR: CPT

## 2024-12-05 PROCEDURE — 63600175 PHARM REV CODE 636 W HCPCS

## 2024-12-05 RX ORDER — HEPARIN 100 UNIT/ML
500 SYRINGE INTRAVENOUS
Status: DISCONTINUED | OUTPATIENT
Start: 2024-12-05 | End: 2024-12-05 | Stop reason: HOSPADM

## 2024-12-05 RX ADMIN — HEPARIN 500 UNITS: 100 SYRINGE at 11:12

## 2024-12-05 RX ADMIN — PEGFILGRASTIM 6 MG: KIT SUBCUTANEOUS at 11:12

## 2024-12-17 ENCOUNTER — OFFICE VISIT (OUTPATIENT)
Dept: PSYCHIATRY | Facility: CLINIC | Age: 59
End: 2024-12-17
Payer: COMMERCIAL

## 2024-12-17 DIAGNOSIS — R41.840 INATTENTION: ICD-10-CM

## 2024-12-17 DIAGNOSIS — F41.1 GAD (GENERALIZED ANXIETY DISORDER): Primary | ICD-10-CM

## 2024-12-17 PROCEDURE — 99214 OFFICE O/P EST MOD 30 MIN: CPT | Mod: 95,,, | Performed by: PSYCHIATRY & NEUROLOGY

## 2024-12-17 RX ORDER — CLONAZEPAM 0.5 MG/1
0.5 TABLET ORAL NIGHTLY PRN
Qty: 30 TABLET | Refills: 1 | Status: SHIPPED | OUTPATIENT
Start: 2024-12-17 | End: 2025-12-17

## 2024-12-17 RX ORDER — ATOMOXETINE 40 MG/1
40 CAPSULE ORAL DAILY
Qty: 90 CAPSULE | Refills: 1 | Status: SHIPPED | OUTPATIENT
Start: 2024-12-17 | End: 2025-06-15

## 2024-12-17 RX ORDER — ESCITALOPRAM OXALATE 10 MG/1
10 TABLET ORAL DAILY
Qty: 90 TABLET | Refills: 1 | Status: SHIPPED | OUTPATIENT
Start: 2024-12-17

## 2024-12-17 NOTE — PROGRESS NOTES
Outpatient Psychiatry Follow-up Visit (MD/NP)    12/17/2024    Karen Gutierrez, a 59 y.o. female, presenting for follow-up visit. Met with patient.    Reason for Encounter: Patient complains of distractability, anxiety.     Interval Hx: Patient seen and interviewed for follow-up, last seen about three months ago. Reports having gotten through almost all of her chemo treatments (just one remaining) then to have surgery. Dealing with grief for father's death. Will come and live with her. Working through grief ok. Moods have been mostly ok. Sleep mostly ok. Occasionally takes clonazepam. Adherent to prescribed medication, reports ongoing benefit. Denies new mental health symptoms. No side effects.     Background: Pt is a 56 y/o woman who presents for establishment of care, endorses chronic problems with anxiety , distractability, difficulty completing tasks. Reports having first brought this problem up to PCP Han Arshad, about five years ago following patient's niece's diagnosis with adhd and patient's recognition of similar attentional problems in herself. Reports no formal testing, no psych assessment, treated with adderall with benefit to both focus & moods (reduced levels of anxiety and depression, helping her worry less, catastrophize less).     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 Status post surgery (whipple), chemo. Follow-up going well.   Returned to work 2021. GELACIO-7 = 11;     Psych Hx: Endorses history of obsessions, no psychiatric diagnoses or assessments. No history of michael, hallucinations, delusions, self-harm behaviors, psych hospitalizations.    Family Hx: niece with adhd.  Sister with considerable irritability considerably helped by medication; father has similar symptoms.      Past Medical History:   Diagnosis Date    Cancer     duodenal cancer    Hypertension      Hypotension, iatrogenic     Mitral valve prolapse      Social Hx: born in [place], [place]. Born full-term, no complications.   No developmental or health problems. Active as a child. In lots of clubs. Always highly active. Moved to  by first grade. No serious maltreatment. Dad was short-tempered. Older sister (lives in ). Childhood was happy. Both parents were in the home. Loved school. Loved school, performed well. No discipline problems. Good experience in school. Problems with bullying. Close to sister.     Graduated HS, went to [employer], majored in fashion design/merchandising. Then studied in program for medical administration. 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 Went back to work after extended medical leave, had a pay-cut, loss some of her responsibilities.     ,  for 7 years. Raised one son as a single mom. He's now 26 years old. Son is newly  and he and wife live with patient. He's been accepted to med school, he and his wife will move to [place] soon for school. Starts in January.     Review Of Systems:     GENERAL:  No weight gain or loss  SKIN:  No rashes or lacerations  HEAD:  No headaches  CHEST:  No shortness of breath, hyperventilation or cough  CARDIOVASCULAR:  No tachycardia or chest pain  ABDOMEN:  No nausea, vomiting, pain, constipation or diarrhea  URINARY:  No frequency, dysuria or sexual dysfunction  ENDOCRINE:  No polydipsia, polyuria  MUSCULOSKELETAL:  No pain or stiffness of the joints  NEUROLOGIC:  No weakness, sensory changes, seizures, confusion, memory loss, tremor or other abnormal movements    Current Evaluation:     Nutritional Screening: Considering the patient's height and weight, medications, medical history and preferences, should a referral be made to the dietitian? no    Constitutional  Vitals:  Most recent vital signs, dated less than 90 days prior to this appointment, were reviewed.     There were no vitals filed for this visit.         General:  unremarkable, age appropriate     Musculoskeletal  Muscle Strength/Tone:  no tremor, no tic   Gait & Station:  non-ataxic     Psychiatric  Appearance: casually dressed & groomed;   Behavior: calm,   Cooperation: cooperative with assessment  Speech: normal rate, volume, tone  Thought Process: linear, goal-directed  Thought Content: No suicidal or homicidal ideation; no delusions  Affect: mildly anxious  Mood: mildly anxious  Perceptions: No auditory or visual hallucinations  Level of Consciousness: alert throughout interview  Insight: fair  Cognition: Oriented to person, place, time, & situation  Memory: no apparent deficits to general clinical interview; not formally assessed  Attention/Concentration: no apparent deficits to general clinical interview; not formally assessed  Fund of Knowledge: average by vocabulary/education    Laboratory Data  Lab Visit on 12/03/2024   Component Date Value Ref Range Status    Sodium 12/03/2024 142  136 - 145 mmol/L Final    Potassium 12/03/2024 4.4  3.5 - 5.1 mmol/L Final    Chloride 12/03/2024 108  95 - 110 mmol/L Final    CO2 12/03/2024 26  23 - 29 mmol/L Final    Glucose 12/03/2024 111 (H)  70 - 110 mg/dL Final    BUN 12/03/2024 16  6 - 20 mg/dL Final    Creatinine 12/03/2024 0.9  0.5 - 1.4 mg/dL Final    Calcium 12/03/2024 9.1  8.7 - 10.5 mg/dL Final    Total Protein 12/03/2024 7.1  6.0 - 8.4 g/dL Final    Albumin 12/03/2024 3.9  3.5 - 5.2 g/dL Final    Total Bilirubin 12/03/2024 0.7  0.1 - 1.0 mg/dL Final    Alkaline Phosphatase 12/03/2024 154 (H)  40 - 150 U/L Final    AST 12/03/2024 56 (H)  10 - 40 U/L Final    ALT 12/03/2024 41  10 - 44 U/L Final    eGFR 12/03/2024 >60  >60 mL/min/1.73 m^2 Final    Anion Gap 12/03/2024 8  8 - 16 mmol/L Final    WBC 12/03/2024 2.87 (L)  3.90 - 12.70 K/uL Final    RBC 12/03/2024 3.76 (L)  4.00 - 5.40 M/uL Final    Hemoglobin 12/03/2024 12.1  12.0 - 16.0 g/dL Final    Hematocrit  12/03/2024 35.9 (L)  37.0 - 48.5 % Final    MCV 12/03/2024 96  82 - 98 fL Final    MCH 12/03/2024 32.2 (H)  27.0 - 31.0 pg Final    MCHC 12/03/2024 33.7  32.0 - 36.0 g/dL Final    RDW 12/03/2024 12.6  11.5 - 14.5 % Final    Platelets 12/03/2024 99 (L)  150 - 450 K/uL Final    MPV 12/03/2024 10.0  9.2 - 12.9 fL Final    Immature Granulocytes 12/03/2024 0.0  0.0 - 0.5 % Final    Gran # (ANC) 12/03/2024 1.6 (L)  1.8 - 7.7 K/uL Final    Immature Grans (Abs) 12/03/2024 0.00  0.00 - 0.04 K/uL Final    Lymph # 12/03/2024 0.8 (L)  1.0 - 4.8 K/uL Final    Mono # 12/03/2024 0.3  0.3 - 1.0 K/uL Final    Eos # 12/03/2024 0.1  0.0 - 0.5 K/uL Final    Baso # 12/03/2024 0.03  0.00 - 0.20 K/uL Final    nRBC 12/03/2024 0  0 /100 WBC Final    Gran % 12/03/2024 56.8  38.0 - 73.0 % Final    Lymph % 12/03/2024 28.9  18.0 - 48.0 % Final    Mono % 12/03/2024 9.8  4.0 - 15.0 % Final    Eosinophil % 12/03/2024 3.5  0.0 - 8.0 % Final    Basophil % 12/03/2024 1.0  0.0 - 1.9 % Final    Differential Method 12/03/2024 Automated   Final    Magnesium 12/03/2024 2.0  1.6 - 2.6 mg/dL Final     Medications  Outpatient Encounter Medications as of 12/17/2024   Medication Sig Dispense Refill    amLODIPine (NORVASC) 10 MG tablet Take 10 mg by mouth once daily.      atomoxetine (STRATTERA) 40 MG capsule Take 1 capsule (40 mg total) by mouth once daily. 90 capsule 1    BYSTOLIC 20 mg Tab Take 1 tablet by mouth once daily.      cholecalciferol, vitamin D3, 125 mcg (5,000 unit) capsule Take 5,000 Units by mouth once daily.      clonazePAM (KLONOPIN) 0.5 MG tablet Take 1 tablet (0.5 mg total) by mouth nightly as needed for Anxiety. 30 tablet 1    cyanocobalamin (VITAMIN B-12) 1000 MCG tablet Take 1,000 mcg by mouth once daily.      EScitalopram oxalate (LEXAPRO) 10 MG tablet Take 1 tablet (10 mg total) by mouth once daily. 90 tablet 1    LIDOcaine-prilocaine (EMLA) cream Apply to affected area once 5 g 11    OLANZapine (ZYPREXA) 5 MG tablet Take 1 tablet (5  mg total) by mouth every evening. Take nightly on days 1-3 of each chemotherapy cycle. 6 tablet 11    olmesartan (BENICAR) 40 MG tablet TAKE 1 TABLET(40 MG) BY MOUTH EVERY DAY 90 tablet 3    ondansetron (ZOFRAN-ODT) 4 MG TbDL Take 1 tablet (4 mg total) by mouth 2 (two) times daily. 20 tablet 11    prochlorperazine (COMPAZINE) 10 MG tablet Take 1 tablet (10 mg total) by mouth every 6 (six) hours as needed for Nausea. 20 tablet 11     No facility-administered encounter medications on file as of 12/17/2024.     Assessment - Diagnosis - Goals:     Impression: 59 year old woman with chronic anxiety problems, currently moderately anxious. Has also had problems with attention, task completion. Previously treated with some benefit with stimulants, not formally diagnosed with adhd. Duodenal CA in remission then recurrence. Symptoms improved, mild. Treatment well tolerated. Continued to have some attention complaints. Coping well through chemo, now close to surgery.     Dx: generalized anxiety disorder    Treatment Goals:  Specify outcomes written in observable, behavioral terms: reduce anxiety by self-report    Treatment Plan/Recommendations:   Continue escitalopram, atomoxetine   Information about self-care in recovery, how to access psychotherapy.   Discussed risks, benefits, and alternatives to treatment plan documented above with patient. I answered all patient questions related to this plan and patient expressed understanding and agreement.     Return to Clinic: 2 months    JAMES Rose MD  Psychiatry, [facility]

## 2024-12-19 ENCOUNTER — TELEPHONE (OUTPATIENT)
Dept: HEMATOLOGY/ONCOLOGY | Facility: CLINIC | Age: 59
End: 2024-12-19
Payer: COMMERCIAL

## 2024-12-19 NOTE — TELEPHONE ENCOUNTER
Left detailed VM regarding change of appt days. Let her know it would all be in mychart and direct number to reach me back for questions or concerns.

## 2024-12-30 ENCOUNTER — LAB VISIT (OUTPATIENT)
Dept: LAB | Facility: HOSPITAL | Age: 59
End: 2024-12-30
Attending: INTERNAL MEDICINE
Payer: COMMERCIAL

## 2024-12-30 ENCOUNTER — OFFICE VISIT (OUTPATIENT)
Dept: HEMATOLOGY/ONCOLOGY | Facility: CLINIC | Age: 59
End: 2024-12-30
Payer: COMMERCIAL

## 2024-12-30 VITALS
HEART RATE: 74 BPM | RESPIRATION RATE: 20 BRPM | OXYGEN SATURATION: 96 % | TEMPERATURE: 97 F | HEIGHT: 72 IN | BODY MASS INDEX: 25.2 KG/M2 | WEIGHT: 186.06 LBS | SYSTOLIC BLOOD PRESSURE: 122 MMHG | DIASTOLIC BLOOD PRESSURE: 79 MMHG

## 2024-12-30 DIAGNOSIS — C17.0 DUODENAL CANCER: ICD-10-CM

## 2024-12-30 DIAGNOSIS — C17.0 DUODENAL CANCER: Primary | ICD-10-CM

## 2024-12-30 DIAGNOSIS — D84.821 IMMUNODEFICIENCY DUE TO CHEMOTHERAPY: ICD-10-CM

## 2024-12-30 DIAGNOSIS — C79.89 SECONDARY MALIGNANCY OF SOFT TISSUES OF ABDOMEN: ICD-10-CM

## 2024-12-30 DIAGNOSIS — T45.1X5A IMMUNODEFICIENCY DUE TO CHEMOTHERAPY: ICD-10-CM

## 2024-12-30 DIAGNOSIS — Z79.899 IMMUNODEFICIENCY DUE TO CHEMOTHERAPY: ICD-10-CM

## 2024-12-30 LAB
ALBUMIN SERPL BCP-MCNC: 4.1 G/DL (ref 3.5–5.2)
ALP SERPL-CCNC: 199 U/L (ref 40–150)
ALT SERPL W/O P-5'-P-CCNC: 40 U/L (ref 10–44)
ANION GAP SERPL CALC-SCNC: 10 MMOL/L (ref 8–16)
AST SERPL-CCNC: 54 U/L (ref 10–40)
BASOPHILS # BLD AUTO: 0.02 K/UL (ref 0–0.2)
BASOPHILS NFR BLD: 0.6 % (ref 0–1.9)
BILIRUB SERPL-MCNC: 0.8 MG/DL (ref 0.1–1)
BUN SERPL-MCNC: 15 MG/DL (ref 6–20)
CALCIUM SERPL-MCNC: 9.4 MG/DL (ref 8.7–10.5)
CANCER AG19-9 SERPL-ACNC: 6.7 U/ML (ref 0–40)
CEA SERPL-MCNC: 1.7 NG/ML (ref 0–5)
CHLORIDE SERPL-SCNC: 106 MMOL/L (ref 95–110)
CO2 SERPL-SCNC: 25 MMOL/L (ref 23–29)
CREAT SERPL-MCNC: 1 MG/DL (ref 0.5–1.4)
DIFFERENTIAL METHOD BLD: ABNORMAL
EOSINOPHIL # BLD AUTO: 0 K/UL (ref 0–0.5)
EOSINOPHIL NFR BLD: 0.3 % (ref 0–8)
ERYTHROCYTE [DISTWIDTH] IN BLOOD BY AUTOMATED COUNT: 13.9 % (ref 11.5–14.5)
EST. GFR  (NO RACE VARIABLE): >60 ML/MIN/1.73 M^2
GLUCOSE SERPL-MCNC: 136 MG/DL (ref 70–110)
HCT VFR BLD AUTO: 39.7 % (ref 37–48.5)
HGB BLD-MCNC: 13.2 G/DL (ref 12–16)
IMM GRANULOCYTES # BLD AUTO: 0.01 K/UL (ref 0–0.04)
IMM GRANULOCYTES NFR BLD AUTO: 0.3 % (ref 0–0.5)
LYMPHOCYTES # BLD AUTO: 1 K/UL (ref 1–4.8)
LYMPHOCYTES NFR BLD: 30.3 % (ref 18–48)
MAGNESIUM SERPL-MCNC: 2.2 MG/DL (ref 1.6–2.6)
MCH RBC QN AUTO: 31.7 PG (ref 27–31)
MCHC RBC AUTO-ENTMCNC: 33.2 G/DL (ref 32–36)
MCV RBC AUTO: 95 FL (ref 82–98)
MONOCYTES # BLD AUTO: 0.7 K/UL (ref 0.3–1)
MONOCYTES NFR BLD: 20.7 % (ref 4–15)
NEUTROPHILS # BLD AUTO: 1.5 K/UL (ref 1.8–7.7)
NEUTROPHILS NFR BLD: 47.8 % (ref 38–73)
NRBC BLD-RTO: 0 /100 WBC
PLATELET # BLD AUTO: 131 K/UL (ref 150–450)
PMV BLD AUTO: 9.9 FL (ref 9.2–12.9)
POTASSIUM SERPL-SCNC: 4.2 MMOL/L (ref 3.5–5.1)
PROT SERPL-MCNC: 7.7 G/DL (ref 6–8.4)
RBC # BLD AUTO: 4.17 M/UL (ref 4–5.4)
SODIUM SERPL-SCNC: 141 MMOL/L (ref 136–145)
WBC # BLD AUTO: 3.14 K/UL (ref 3.9–12.7)

## 2024-12-30 PROCEDURE — 85025 COMPLETE CBC W/AUTO DIFF WBC: CPT | Performed by: INTERNAL MEDICINE

## 2024-12-30 PROCEDURE — 99215 OFFICE O/P EST HI 40 MIN: CPT | Mod: S$GLB,,, | Performed by: INTERNAL MEDICINE

## 2024-12-30 PROCEDURE — 1159F MED LIST DOCD IN RCRD: CPT | Mod: CPTII,S$GLB,, | Performed by: INTERNAL MEDICINE

## 2024-12-30 PROCEDURE — 1160F RVW MEDS BY RX/DR IN RCRD: CPT | Mod: CPTII,S$GLB,, | Performed by: INTERNAL MEDICINE

## 2024-12-30 PROCEDURE — 83735 ASSAY OF MAGNESIUM: CPT | Performed by: INTERNAL MEDICINE

## 2024-12-30 PROCEDURE — 36415 COLL VENOUS BLD VENIPUNCTURE: CPT | Performed by: INTERNAL MEDICINE

## 2024-12-30 PROCEDURE — 99999 PR PBB SHADOW E&M-EST. PATIENT-LVL IV: CPT | Mod: PBBFAC,,, | Performed by: INTERNAL MEDICINE

## 2024-12-30 PROCEDURE — 4010F ACE/ARB THERAPY RXD/TAKEN: CPT | Mod: CPTII,S$GLB,, | Performed by: INTERNAL MEDICINE

## 2024-12-30 PROCEDURE — 82378 CARCINOEMBRYONIC ANTIGEN: CPT | Performed by: INTERNAL MEDICINE

## 2024-12-30 PROCEDURE — 3074F SYST BP LT 130 MM HG: CPT | Mod: CPTII,S$GLB,, | Performed by: INTERNAL MEDICINE

## 2024-12-30 PROCEDURE — 3008F BODY MASS INDEX DOCD: CPT | Mod: CPTII,S$GLB,, | Performed by: INTERNAL MEDICINE

## 2024-12-30 PROCEDURE — 80053 COMPREHEN METABOLIC PANEL: CPT | Performed by: INTERNAL MEDICINE

## 2024-12-30 PROCEDURE — 86301 IMMUNOASSAY TUMOR CA 19-9: CPT | Performed by: INTERNAL MEDICINE

## 2024-12-30 PROCEDURE — 3078F DIAST BP <80 MM HG: CPT | Mod: CPTII,S$GLB,, | Performed by: INTERNAL MEDICINE

## 2024-12-30 RX ORDER — FLUOROURACIL 50 MG/ML
400 INJECTION, SOLUTION INTRAVENOUS
Status: CANCELLED | OUTPATIENT
Start: 2024-12-30

## 2024-12-30 RX ORDER — PROCHLORPERAZINE EDISYLATE 5 MG/ML
10 INJECTION INTRAMUSCULAR; INTRAVENOUS ONCE AS NEEDED
Status: CANCELLED | OUTPATIENT
Start: 2024-12-30

## 2024-12-30 RX ORDER — EPINEPHRINE 0.3 MG/.3ML
0.3 INJECTION SUBCUTANEOUS ONCE AS NEEDED
Status: CANCELLED | OUTPATIENT
Start: 2024-12-30

## 2024-12-30 RX ORDER — DIPHENHYDRAMINE HYDROCHLORIDE 50 MG/ML
50 INJECTION INTRAMUSCULAR; INTRAVENOUS ONCE AS NEEDED
Status: CANCELLED | OUTPATIENT
Start: 2024-12-30

## 2024-12-30 RX ORDER — HEPARIN 100 UNIT/ML
500 SYRINGE INTRAVENOUS
Status: CANCELLED | OUTPATIENT
Start: 2025-01-01

## 2024-12-30 RX ORDER — SODIUM CHLORIDE 0.9 % (FLUSH) 0.9 %
10 SYRINGE (ML) INJECTION
Status: CANCELLED | OUTPATIENT
Start: 2025-01-01

## 2024-12-30 RX ORDER — LORAZEPAM 1 MG/1
1 TABLET ORAL
Status: CANCELLED | OUTPATIENT
Start: 2024-12-30 | End: 2024-12-30

## 2024-12-30 RX ORDER — SODIUM CHLORIDE 0.9 % (FLUSH) 0.9 %
10 SYRINGE (ML) INJECTION
Status: CANCELLED | OUTPATIENT
Start: 2024-12-30

## 2024-12-30 RX ORDER — PROCHLORPERAZINE EDISYLATE 5 MG/ML
10 INJECTION INTRAMUSCULAR; INTRAVENOUS ONCE AS NEEDED
Status: CANCELLED | OUTPATIENT
Start: 2025-01-01

## 2024-12-30 RX ORDER — HEPARIN 100 UNIT/ML
500 SYRINGE INTRAVENOUS
Status: CANCELLED | OUTPATIENT
Start: 2024-12-30

## 2024-12-30 NOTE — PROGRESS NOTES
Subjective:       Patient ID: Karen Gutierrez is a 59 y.o. female.    Chief Complaint: Results, Chemotherapy, and Cancer    HPI:  59-year-old female presents for final cycle of FOLFOX relapsed duodenal cancer as did 2 week delay because of insurance issues planned for this cycle and repeat imaging in reconsideration by Surgical Oncology for potential resection ECOG status 1    Past Medical History:   Diagnosis Date    Cancer     duodenal cancer    Hypertension     Hypotension, iatrogenic     Mitral valve prolapse      Family History   Problem Relation Name Age of Onset    Ovarian cancer Mother      Atrial fibrillation Mother      Stroke Mother      Hyperlipidemia Mother      Diabetes Mother      Bladder Cancer Father      Hypertension Father      Diabetes Father      No Known Problems Sister      Diabetes Maternal Grandmother      Colon cancer Maternal Grandmother      Stroke Maternal Grandfather      Diabetes Paternal Grandmother      No Known Problems Paternal Grandfather       Social History     Socioeconomic History    Marital status:    Tobacco Use    Smoking status: Former    Smokeless tobacco: Never   Substance and Sexual Activity    Alcohol use: Not Currently    Drug use: Never     Social Drivers of Health     Financial Resource Strain: Low Risk  (6/14/2024)    Overall Financial Resource Strain (CARDIA)     Difficulty of Paying Living Expenses: Not hard at all   Food Insecurity: No Food Insecurity (6/14/2024)    Hunger Vital Sign     Worried About Running Out of Food in the Last Year: Never true     Ran Out of Food in the Last Year: Never true   Physical Activity: Inactive (6/14/2024)    Exercise Vital Sign     Days of Exercise per Week: 0 days     Minutes of Exercise per Session: 0 min   Stress: No Stress Concern Present (6/14/2024)    [employer] - Occupational Stress Questionnaire     Feeling of Stress : Not at all   Housing Stability: Unknown (6/14/2024)     "Housing Stability Vital Sign     Unable to Pay for Housing in the Last Year: No     Past Surgical History:   Procedure Laterality Date    BUNIONECTOMY Left     HYSTERECTOMY  2000    INSERTION OF TUNNELED CENTRAL VENOUS CATHETER (CVC) WITH SUBCUTANEOUS PORT N/A 3/26/2021    Procedure: [id number];  Surgeon: Lauren Abraham MD;  Location: [facility] OR;  Service: General;  Laterality: N/A;    INSERTION OF VENOUS ACCESS PORT Right 5/13/2024    Procedure: INSERTION, VENOUS ACCESS PORT;  Surgeon: Alicia Hernandez MD;  Location: [facility] OR;  Service: General;  Laterality: Right;    LITHOTRIPSY      PLACEMENT OF JEJUNOSTOMY TUBE  2/18/2021    Procedure: INSERTION, JEJUNOSTOMY TUBE;  Surgeon: Hitesh Díaz MD;  Location: [facility] OR 2ND FLR;  Service: General;;    ULTRASOUND GUIDANCE Right 5/13/2024    Procedure: ULTRASOUND GUIDANCE;  Surgeon: Alicia Hernandez MD;  Location: [facility] OR;  Service: General;  Laterality: Right;    WHIPPLE PROCEDURE N/A 2/18/2021    Procedure: WHIPPLE PROCEDURE;  Surgeon: Hitesh Díaz MD;  Location: [facility] OR 2ND FLR;  Service: General;  Laterality: N/A;       Labs:  Lab Results   Component Value Date    WBC 3.14 (L) 12/30/2024    HGB 13.2 12/30/2024    HCT 39.7 12/30/2024    MCV 95 12/30/2024     (L) 12/30/2024     BMP  Lab Results   Component Value Date     12/30/2024    K 4.2 12/30/2024     12/30/2024    CO2 25 12/30/2024    BUN 15 12/30/2024    CREATININE 1.0 12/30/2024    CALCIUM 9.4 12/30/2024    ANIONGAP 10 12/30/2024    ESTGFRAFRICA >60 03/07/2022    EGFRNONAA >60 03/07/2022     Lab Results   Component Value Date    ALT 40 12/30/2024    AST 54 (H) 12/30/2024    ALKPHOS 199 (H) 12/30/2024    BILITOT 0.8 12/30/2024       Lab Results   Component Value Date    IRON 42 05/10/2021    TIBC 404 05/10/2021    FERRITIN 345 (H) 05/10/2021     No results found for: "CCDEXJRM60"  No results found for: "FOLATE"  No results found for: "TSH"      Review of Systems "   Constitutional:  Negative for activity change, appetite change, chills, diaphoresis, fatigue, fever and unexpected weight change.   HENT:  Negative for congestion, dental problem, drooling, ear discharge, ear pain, facial swelling, hearing loss, mouth sores, nosebleeds, postnasal drip, rhinorrhea, sinus pressure, sneezing, sore throat, tinnitus, trouble swallowing and voice change.    Eyes:  Negative for photophobia, pain, discharge, redness, itching and visual disturbance.   Respiratory:  Negative for cough, choking, chest tightness, shortness of breath, wheezing and stridor.    Cardiovascular:  Negative for chest pain, palpitations and leg swelling.   Gastrointestinal:  Negative for abdominal distention, abdominal pain, anal bleeding, blood in stool, constipation, diarrhea, nausea, rectal pain and vomiting.   Endocrine: Negative for cold intolerance, heat intolerance, polydipsia, polyphagia and polyuria.   Genitourinary:  Negative for decreased urine volume, difficulty urinating, dyspareunia, dysuria, enuresis, flank pain, frequency, genital sores, hematuria, menstrual problem, pelvic pain, urgency, vaginal bleeding, vaginal discharge and vaginal pain.   Musculoskeletal:  Negative for arthralgias, back pain, gait problem, joint swelling, myalgias, neck pain and neck stiffness.   Skin:  Negative for color change, pallor and rash.   Allergic/Immunologic: Negative for environmental allergies, food allergies and immunocompromised state.   Neurological:  Negative for dizziness, tremors, seizures, syncope, facial asymmetry, speech difficulty, weakness, light-headedness, numbness and headaches.   Hematological:  Negative for adenopathy. Does not bruise/bleed easily.   Psychiatric/Behavioral:  Negative for agitation, behavioral problems, confusion, decreased concentration, dysphoric mood, hallucinations, self-injury, sleep disturbance and suicidal ideas. The patient is not nervous/anxious and is not hyperactive.         Objective:      Physical Exam  Vitals reviewed.   Constitutional:       General: She is not in acute distress.     Appearance: She is well-developed. She is not diaphoretic.   HENT:      Head: Normocephalic and atraumatic.      Right Ear: External ear normal.      Left Ear: External ear normal.      Nose: Nose normal.      Right Sinus: No maxillary sinus tenderness or frontal sinus tenderness.      Left Sinus: No maxillary sinus tenderness or frontal sinus tenderness.      Mouth/Throat:      Pharynx: No oropharyngeal exudate.   Eyes:      General: Lids are normal. No scleral icterus.        Right eye: No discharge.         Left eye: No discharge.      Conjunctiva/sclera: Conjunctivae normal.      Right eye: Right conjunctiva is not injected. No hemorrhage.     Left eye: Left conjunctiva is not injected. No hemorrhage.     Pupils: Pupils are equal, round, and reactive to light.   Neck:      Thyroid: No thyromegaly.      Vascular: No JVD.      Trachea: No tracheal deviation.   Cardiovascular:      Rate and Rhythm: Normal rate.   Pulmonary:      Effort: Pulmonary effort is normal. No respiratory distress.      Breath sounds: No stridor.   Chest:      Chest wall: No tenderness.   Abdominal:      General: Bowel sounds are normal. There is no distension.      Palpations: Abdomen is soft. There is no hepatomegaly, splenomegaly or mass.      Tenderness: There is no abdominal tenderness. There is no rebound.   Musculoskeletal:         General: No tenderness. Normal range of motion.      Cervical back: Normal range of motion and neck supple.   Lymphadenopathy:      Cervical: No cervical adenopathy.      Upper Body:      Right upper body: No supraclavicular adenopathy.      Left upper body: No supraclavicular adenopathy.   Skin:     General: Skin is dry.      Findings: No erythema or rash.   Neurological:      Mental Status: She is alert and oriented to person, place, and time.      Cranial Nerves: No cranial nerve deficit.       Coordination: Coordination normal.   Psychiatric:         Behavior: Behavior normal.         Thought Content: Thought content normal.         Judgment: Judgment normal.             Assessment:      1. 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 Immunodeficiency due to chemotherapy           Med Onc Chart Routing      Follow up with physician . Return to see me after evaluation by Surgical Oncology with CT chest abdomen pelvis done prior to Surgical Oncology review   Follow up with [facility]    Infusion scheduling note    Injection scheduling note Proceed with FOLFOX today   Labs    Imaging    Pharmacy appointment    Other referrals                   Plan:     Reviewed information with patient at this time will proceed with FOLFOX final cycle prior to repeat imaging in his Surgical Oncology review.  Discussed implications and answered questions with her.  Orders written reviewed 2 week delay because of insurance questions in issues resolved        Enzo Daley Jr, MD FACP

## 2024-12-31 ENCOUNTER — INFUSION (OUTPATIENT)
Dept: INFUSION THERAPY | Facility: HOSPITAL | Age: 59
End: 2024-12-31
Attending: INTERNAL MEDICINE
Payer: COMMERCIAL

## 2024-12-31 ENCOUNTER — OFFICE VISIT (OUTPATIENT)
Dept: HEMATOLOGY/ONCOLOGY | Facility: CLINIC | Age: 59
End: 2024-12-31
Payer: COMMERCIAL

## 2024-12-31 ENCOUNTER — HOSPITAL ENCOUNTER (EMERGENCY)
Facility: HOSPITAL | Age: 59
Discharge: HOME | End: 2025-01-01
Attending: EMERGENCY MEDICINE
Payer: COMMERCIAL

## 2024-12-31 VITALS
RESPIRATION RATE: 19 BRPM | OXYGEN SATURATION: 97 % | TEMPERATURE: 99 F | WEIGHT: 190 LBS | BODY MASS INDEX: 25.77 KG/M2 | HEART RATE: 78 BPM | DIASTOLIC BLOOD PRESSURE: 59 MMHG | SYSTOLIC BLOOD PRESSURE: 115 MMHG

## 2024-12-31 VITALS
HEART RATE: 64 BPM | TEMPERATURE: 98 F | DIASTOLIC BLOOD PRESSURE: 49 MMHG | WEIGHT: 186.06 LBS | RESPIRATION RATE: 16 BRPM | SYSTOLIC BLOOD PRESSURE: 72 MMHG | OXYGEN SATURATION: 96 % | HEIGHT: 72 IN | BODY MASS INDEX: 25.2 KG/M2

## 2024-12-31 DIAGNOSIS — T45.1X5A IMMUNODEFICIENCY DUE TO CHEMOTHERAPY: ICD-10-CM

## 2024-12-31 DIAGNOSIS — D84.821 IMMUNODEFICIENCY DUE TO CHEMOTHERAPY: ICD-10-CM

## 2024-12-31 DIAGNOSIS — T45.1X5A ADVERSE EFFECT OF CHEMOTHERAPY, INITIAL ENCOUNTER: ICD-10-CM

## 2024-12-31 DIAGNOSIS — R11.2 CHEMOTHERAPY INDUCED NAUSEA AND VOMITING: ICD-10-CM

## 2024-12-31 DIAGNOSIS — Z79.899 IMMUNODEFICIENCY DUE TO CHEMOTHERAPY: ICD-10-CM

## 2024-12-31 DIAGNOSIS — C17.0 DUODENAL CANCER: Primary | ICD-10-CM

## 2024-12-31 DIAGNOSIS — I95.9 HYPOTENSION, UNSPECIFIED HYPOTENSION TYPE: Primary | ICD-10-CM

## 2024-12-31 DIAGNOSIS — T45.1X5A CHEMOTHERAPY INDUCED NAUSEA AND VOMITING: ICD-10-CM

## 2024-12-31 DIAGNOSIS — B34.2 CORONAVIRUS INFECTION: ICD-10-CM

## 2024-12-31 LAB
ADENOVIRUS: NOT DETECTED
ALBUMIN SERPL BCP-MCNC: 4 G/DL (ref 3.5–5.2)
ALP SERPL-CCNC: 242 U/L (ref 40–150)
ALT SERPL W/O P-5'-P-CCNC: 56 U/L (ref 10–44)
AMORPH CRY URNS QL MICRO: NORMAL
ANION GAP SERPL CALC-SCNC: 17 MMOL/L (ref 8–16)
AST SERPL-CCNC: 102 U/L (ref 10–40)
BACTERIA #/AREA URNS HPF: NORMAL /HPF
BASOPHILS # BLD AUTO: 0.01 K/UL (ref 0–0.2)
BASOPHILS NFR BLD: 0.1 % (ref 0–1.9)
BILIRUB SERPL-MCNC: 0.5 MG/DL (ref 0.1–1)
BILIRUB UR QL STRIP: NEGATIVE
BNP SERPL-MCNC: 22 PG/ML (ref 0–99)
BORDETELLA PARAPERTUSSIS (IS1001): NOT DETECTED
BORDETELLA PERTUSSIS (PTXP): NOT DETECTED
BUN SERPL-MCNC: 23 MG/DL (ref 6–20)
CALCIUM SERPL-MCNC: 8.8 MG/DL (ref 8.7–10.5)
CHLAMYDIA PNEUMONIAE: NOT DETECTED
CHLORIDE SERPL-SCNC: 105 MMOL/L (ref 95–110)
CLARITY UR: ABNORMAL
CO2 SERPL-SCNC: 15 MMOL/L (ref 23–29)
COLOR UR: YELLOW
CORONAVIRUS 229E, COMMON COLD VIRUS: NOT DETECTED
CORONAVIRUS HKU1, COMMON COLD VIRUS: DETECTED
CORONAVIRUS NL63, COMMON COLD VIRUS: NOT DETECTED
CORONAVIRUS OC43, COMMON COLD VIRUS: NOT DETECTED
CREAT SERPL-MCNC: 1.3 MG/DL (ref 0.5–1.4)
DIFFERENTIAL METHOD BLD: ABNORMAL
EOSINOPHIL # BLD AUTO: 0 K/UL (ref 0–0.5)
EOSINOPHIL NFR BLD: 0 % (ref 0–8)
ERYTHROCYTE [DISTWIDTH] IN BLOOD BY AUTOMATED COUNT: 14.2 % (ref 11.5–14.5)
EST. GFR  (NO RACE VARIABLE): 47 ML/MIN/1.73 M^2
FLUBV RNA NPH QL NAA+NON-PROBE: NOT DETECTED
GLUCOSE SERPL-MCNC: 131 MG/DL (ref 70–110)
GLUCOSE UR QL STRIP: ABNORMAL
HCT VFR BLD AUTO: 40.8 % (ref 37–48.5)
HCV AB SERPL QL IA: NEGATIVE
HEP C VIRUS HOLD SPECIMEN: NORMAL
HGB BLD-MCNC: 13.3 G/DL (ref 12–16)
HGB UR QL STRIP: NEGATIVE
HIV 1+2 AB+HIV1 P24 AG SERPL QL IA: NEGATIVE
HPIV1 RNA NPH QL NAA+NON-PROBE: NOT DETECTED
HPIV2 RNA NPH QL NAA+NON-PROBE: NOT DETECTED
HPIV3 RNA NPH QL NAA+NON-PROBE: NOT DETECTED
HPIV4 RNA NPH QL NAA+NON-PROBE: NOT DETECTED
HUMAN METAPNEUMOVIRUS: NOT DETECTED
HYALINE CASTS #/AREA URNS LPF: 0 /LPF
IMM GRANULOCYTES # BLD AUTO: 0.03 K/UL (ref 0–0.04)
IMM GRANULOCYTES NFR BLD AUTO: 0.3 % (ref 0–0.5)
INFLUENZA A (SUBTYPES H1,H1-2009,H3): NOT DETECTED
INFLUENZA A, MOLECULAR: NEGATIVE
INFLUENZA B, MOLECULAR: NEGATIVE
KETONES UR QL STRIP: NEGATIVE
LACTATE SERPL-SCNC: 2.6 MMOL/L (ref 0.5–2.2)
LACTATE SERPL-SCNC: 2.9 MMOL/L (ref 0.5–2.2)
LACTATE SERPL-SCNC: 3.2 MMOL/L (ref 0.5–2.2)
LEUKOCYTE ESTERASE UR QL STRIP: NEGATIVE
LYMPHOCYTES # BLD AUTO: 0.1 K/UL (ref 1–4.8)
LYMPHOCYTES NFR BLD: 1.2 % (ref 18–48)
MCH RBC QN AUTO: 31.1 PG (ref 27–31)
MCHC RBC AUTO-ENTMCNC: 32.6 G/DL (ref 32–36)
MCV RBC AUTO: 96 FL (ref 82–98)
MICROSCOPIC COMMENT: NORMAL
MONOCYTES # BLD AUTO: 0.2 K/UL (ref 0.3–1)
MONOCYTES NFR BLD: 1.5 % (ref 4–15)
MYCOPLASMA PNEUMONIAE: NOT DETECTED
NEUTROPHILS # BLD AUTO: 9.6 K/UL (ref 1.8–7.7)
NEUTROPHILS NFR BLD: 96.9 % (ref 38–73)
NITRITE UR QL STRIP: NEGATIVE
NRBC BLD-RTO: 0 /100 WBC
PH UR STRIP: 6 [PH] (ref 5–8)
PLATELET # BLD AUTO: 77 K/UL (ref 150–450)
PLATELET BLD QL SMEAR: ABNORMAL
PMV BLD AUTO: 11.2 FL (ref 9.2–12.9)
POTASSIUM SERPL-SCNC: 4 MMOL/L (ref 3.5–5.1)
PROCALCITONIN SERPL IA-MCNC: 6.52 NG/ML
PROT SERPL-MCNC: 6.9 G/DL (ref 6–8.4)
PROT UR QL STRIP: ABNORMAL
RBC # BLD AUTO: 4.27 M/UL (ref 4–5.4)
RBC #/AREA URNS HPF: 0 /HPF (ref 0–4)
RESPIRATORY INFECTION PANEL SOURCE: ABNORMAL
RSV RNA NPH QL NAA+NON-PROBE: NOT DETECTED
RV+EV RNA NPH QL NAA+NON-PROBE: NOT DETECTED
SARS-COV-2 RNA RESP QL NAA+PROBE: NOT DETECTED
SODIUM SERPL-SCNC: 137 MMOL/L (ref 136–145)
SP GR UR STRIP: 1.02 (ref 1–1.03)
SPECIMEN SOURCE: NORMAL
TROPONIN I SERPL DL<=0.01 NG/ML-MCNC: <0.006 NG/ML (ref 0–0.03)
UNIDENT CRYS URNS QL MICRO: NORMAL
URN SPEC COLLECT METH UR: ABNORMAL
UROBILINOGEN UR STRIP-ACNC: NEGATIVE EU/DL
WBC # BLD AUTO: 9.92 K/UL (ref 3.9–12.7)
WBC #/AREA URNS HPF: 1 /HPF (ref 0–5)

## 2024-12-31 PROCEDURE — 85025 COMPLETE CBC W/AUTO DIFF WBC: CPT | Performed by: REGISTERED NURSE

## 2024-12-31 PROCEDURE — 93010 ELECTROCARDIOGRAM REPORT: CPT | Mod: ,,, | Performed by: STUDENT IN AN ORGANIZED HEALTH CARE EDUCATION/TRAINING PROGRAM

## 2024-12-31 PROCEDURE — 96368 THER/DIAG CONCURRENT INF: CPT

## 2024-12-31 PROCEDURE — 84484 ASSAY OF TROPONIN QUANT: CPT | Performed by: REGISTERED NURSE

## 2024-12-31 PROCEDURE — 99999 PR PBB SHADOW E&M-EST. PATIENT-LVL I: CPT | Mod: PBBFAC,,, | Performed by: INTERNAL MEDICINE

## 2024-12-31 PROCEDURE — 93005 ELECTROCARDIOGRAM TRACING: CPT

## 2024-12-31 PROCEDURE — 4010F ACE/ARB THERAPY RXD/TAKEN: CPT | Mod: CPTII,S$GLB,, | Performed by: INTERNAL MEDICINE

## 2024-12-31 PROCEDURE — 99285 EMERGENCY DEPT VISIT HI MDM: CPT | Mod: 25

## 2024-12-31 PROCEDURE — 81000 URINALYSIS NONAUTO W/SCOPE: CPT | Performed by: REGISTERED NURSE

## 2024-12-31 PROCEDURE — 83880 ASSAY OF NATRIURETIC PEPTIDE: CPT | Performed by: REGISTERED NURSE

## 2024-12-31 PROCEDURE — 25000003 PHARM REV CODE 250: Performed by: INTERNAL MEDICINE

## 2024-12-31 PROCEDURE — 96416 CHEMO PROLONG INFUSE W/PUMP: CPT

## 2024-12-31 PROCEDURE — 25000003 PHARM REV CODE 250: Performed by: REGISTERED NURSE

## 2024-12-31 PROCEDURE — 83605 ASSAY OF LACTIC ACID: CPT | Performed by: REGISTERED NURSE

## 2024-12-31 PROCEDURE — 87040 BLOOD CULTURE FOR BACTERIA: CPT | Performed by: REGISTERED NURSE

## 2024-12-31 PROCEDURE — 80053 COMPREHEN METABOLIC PANEL: CPT | Performed by: REGISTERED NURSE

## 2024-12-31 PROCEDURE — 87502 INFLUENZA DNA AMP PROBE: CPT | Performed by: REGISTERED NURSE

## 2024-12-31 PROCEDURE — 96360 HYDRATION IV INFUSION INIT: CPT

## 2024-12-31 PROCEDURE — 63600175 PHARM REV CODE 636 W HCPCS: Performed by: INTERNAL MEDICINE

## 2024-12-31 PROCEDURE — 86803 HEPATITIS C AB TEST: CPT | Performed by: EMERGENCY MEDICINE

## 2024-12-31 PROCEDURE — 96367 TX/PROPH/DG ADDL SEQ IV INF: CPT

## 2024-12-31 PROCEDURE — 83605 ASSAY OF LACTIC ACID: CPT | Mod: 91 | Performed by: EMERGENCY MEDICINE

## 2024-12-31 PROCEDURE — 84145 PROCALCITONIN (PCT): CPT | Performed by: REGISTERED NURSE

## 2024-12-31 PROCEDURE — 87389 HIV-1 AG W/HIV-1&-2 AB AG IA: CPT | Performed by: EMERGENCY MEDICINE

## 2024-12-31 PROCEDURE — 96375 TX/PRO/DX INJ NEW DRUG ADDON: CPT

## 2024-12-31 PROCEDURE — 96413 CHEMO IV INFUSION 1 HR: CPT

## 2024-12-31 PROCEDURE — 87581 M.PNEUMON DNA AMP PROBE: CPT | Performed by: EMERGENCY MEDICINE

## 2024-12-31 PROCEDURE — 96411 CHEMO IV PUSH ADDL DRUG: CPT

## 2024-12-31 PROCEDURE — 96415 CHEMO IV INFUSION ADDL HR: CPT

## 2024-12-31 PROCEDURE — 99214 OFFICE O/P EST MOD 30 MIN: CPT | Mod: 25,S$GLB,, | Performed by: INTERNAL MEDICINE

## 2024-12-31 RX ORDER — ONDANSETRON HYDROCHLORIDE 2 MG/ML
8 INJECTION, SOLUTION INTRAVENOUS
Status: COMPLETED | OUTPATIENT
Start: 2024-12-31 | End: 2024-12-31

## 2024-12-31 RX ORDER — FLUOROURACIL 50 MG/ML
400 INJECTION, SOLUTION INTRAVENOUS
Status: COMPLETED | OUTPATIENT
Start: 2024-12-31 | End: 2024-12-31

## 2024-12-31 RX ORDER — LORAZEPAM 0.5 MG/1
1 TABLET ORAL
Status: COMPLETED | OUTPATIENT
Start: 2024-12-31 | End: 2024-12-31

## 2024-12-31 RX ADMIN — LEUCOVORIN CALCIUM 830 MG: 350 INJECTION, POWDER, LYOPHILIZED, FOR SOLUTION INTRAMUSCULAR; INTRAVENOUS at 09:12

## 2024-12-31 RX ADMIN — FLUOROURACIL 830 MG: 50 INJECTION, SOLUTION INTRAVENOUS at 12:12

## 2024-12-31 RX ADMIN — FLUOROURACIL 4970 MG: 50 INJECTION, SOLUTION INTRAVENOUS at 01:12

## 2024-12-31 RX ADMIN — ONDANSETRON 8 MG: 2 INJECTION INTRAMUSCULAR; INTRAVENOUS at 01:12

## 2024-12-31 RX ADMIN — SODIUM CHLORIDE 500 ML: 0.9 INJECTION, SOLUTION INTRAVENOUS at 12:12

## 2024-12-31 RX ADMIN — SODIUM CHLORIDE 2586 ML: 9 INJECTION, SOLUTION INTRAVENOUS at 03:12

## 2024-12-31 RX ADMIN — DEXTROSE MONOHYDRATE 176 MG: 50 INJECTION, SOLUTION INTRAVENOUS at 10:12

## 2024-12-31 RX ADMIN — LORAZEPAM 1 MG: 0.5 TABLET ORAL at 09:12

## 2024-12-31 RX ADMIN — SODIUM CHLORIDE 0.25 MG: 9 INJECTION, SOLUTION INTRAVENOUS at 09:12

## 2024-12-31 NOTE — NURSING
1224: Patient's BP 81/50. Notified Dr. Daley. Orders received for 500 ml NaCl bolus STAT. Will continue to monitor.

## 2024-12-31 NOTE — NURSING
1345: Pt still hypotensive and dizzy after NaCl bolus. Dr. Daley notifed. Pt to report to ER per Dr. Daley due to clinic closing early for holiday hours.

## 2024-12-31 NOTE — DISCHARGE INSTRUCTIONS
HOME CARE AFTER CHEMOTHERAPY   Meals   Many patients feel sick and lose their appetites during treatment. Eat small meals several times a day. Choose bland foods with little taste or smell if you have problems with nausea. Be sure to cook all food thoroughly. This kills bacteria and helps you avoid intestinal infection. Soft foods are easier to swallow and digest.   Activity   Exercise keeps you strong and keeps your heart and lungs active. Talk to your doctor about an appropriate exercise program for you.   Skin Care   To prevent a skin infection, bathe or shower once a day. Use a moisturizing soap and wash with warm water. Avoid very hot or cold water. Chemotherapy can make your skin dry . Apply moisturizing lotion to help relieve dry skin. Some drugs used in high doses can cause slight burns to appear (like sunburn). Ask for a special cream to help relieve the burn and protect your skin.   Prevent Mouth Sores   During chemotherapy, many people get mouth sores. Do the following to help prevent mouth sores or to ease discomfort.   Brush your teeth with a soft-bristle toothbrush after every meal.  Don't use dental floss if your platelet count is below 50,000. Your doctor or nurse will tell you if this is the case.  Use an oral swab or special soft toothbrush if your gums bleed during regular brushing.  Use mouthwash as directed. If you can't tolerate commercial mouthwash, use salt and baking soda to clean your mouth. Mix 1 teaspoon of salt and 1 teaspoon of baking soda into a glass of water. Swish and spit.  Call your doctor or return to this facility if you develop any of the following:   Sore throat   White patches in the mouth or throat   Fever of 100.4ºF (38ºC) or higher, or as directed by your healthcare provider  © 5445-0829 [employer], [address] [place] [address], [place] [address]. All rights reserved. This information is not intended as a substitute for professional medical care. Always follow your  healthcare professional's

## 2024-12-31 NOTE — PLAN OF CARE
POC reviewed with patient. All needs and concerns addressed.   Problem: Adult Inpatient Plan of Care  Goal: Plan of Care Review  Outcome: Progressing  Flowsheets (Taken 12/31/2024 1449)  Plan of Care Reviewed With: patient  Goal: Patient-Specific Goal (Individualized)  Outcome: Progressing  Flowsheets (Taken 12/31/2024 1449)  Individualized Care Needs: In recliner with warm blankets and pillow. Pt likes ice to port before access.  Anxieties, Fears or Concerns: Pt stated she's tired and thinks she will have n/v today  Patient/Family-Specific Goals (Include Timeframe): Tolerate tx today  Goal: Absence of Hospital-Acquired Illness or Injury  Outcome: Progressing  Intervention: Prevent Infection  Flowsheets (Taken 12/31/2024 1449)  Infection Prevention:   equipment surfaces disinfected   hand hygiene promoted   personal protective equipment utilized  Goal: Optimal Comfort and Wellbeing  Outcome: Progressing  Intervention: Provide Person-Centered Care  Flowsheets (Taken 12/31/2024 1449)  Trust Relationship/Rapport:   questions encouraged   care explained   choices provided   reassurance provided   emotional support provided   thoughts/feelings acknowledged   empathic listening provided   questions answered     Problem: Chemotherapy Effects  Goal: Safety Maintained  Outcome: Progressing  Intervention: Promote Safe Chemotherapy Delivery  Flowsheets (Taken 12/31/2024 1449)  Infection Prevention:   equipment surfaces disinfected   hand hygiene promoted   personal protective equipment utilized  Goal: Absence of Infection  Outcome: Progressing  Intervention: Prevent Infection and Maximize Resistance  Flowsheets (Taken 12/31/2024 1449)  Infection Prevention:   equipment surfaces disinfected   hand hygiene promoted   personal protective equipment utilized

## 2024-12-31 NOTE — ED PROVIDER NOTES
SCRIBE #1 NOTE: I, Aquiles Masterson, am scribing for, and in the presence of, Abdon Su MD. I have scribed the entire note.       History     Chief Complaint   Patient presents with    Hypotension     Pt of Dr. Daley went for chemotherapy but was sent here for low pressure.  Pt c/o feeling lightheaded on standing too long and c/o nausea.  Pt was given nausea medicine and fluids prior to arrival at the ED.     Review of patient's allergies indicates:   Allergen Reactions    Benzalkonium chloride Hives    Codeine Itching    Morphine Nausea Only    Neosporin [neomycin-bacitracin-polymyxin] Hives    Sulfa (sulfonamide antibiotics) Hives and Itching           Morphine sulfate Other (See Comments)    Sulfamethoxazole-trimethoprim Other (See Comments)    Hydrocortisone Hives     Redness / can use bactroban           History of Present Illness     HPI    12/31/2024, 4:27 PM  History obtained from the patient      History of Present Illness: Karen Gutierrez is a 59 y.o. female patient with a PMHx of hypertension, mitral valve prolapse, cancer, and hypotension (iatrogenic) who presents to the Emergency Department for evaluation of hypotension which onset today. Pt was at her oncologist for chemotherapy, when they recorded a low blood pressure after treatment. Pt also complains of a cold which onset 2 weeks ago. Pt has not been seen for her cold symptoms. Symptoms are constant and moderate in severity. No mitigating or exacerbating factors reported. Associated sxs include vomiting (2x), nausea, dizziness, and nasal congestion. Patient denies any fever, cough, sore throat, SOB, ear pain, ABD pain, rash, redness, dysuria, and all other sxs at this time. Prior Tx includes Zofran and 500 in fluid. No further complaints or concerns at this time.       Arrival mode: Personal vehicle    PCP: Han Arshad MD        Past Medical History:  Past Medical History:   Diagnosis Date    Cancer     duodenal cancer     Hypertension     Hypotension, iatrogenic     Mitral valve prolapse        Past Surgical History:  Past Surgical History:   Procedure Laterality Date    BUNIONECTOMY Left     HYSTERECTOMY  2000    INSERTION OF TUNNELED CENTRAL VENOUS CATHETER (CVC) WITH SUBCUTANEOUS PORT N/A 3/26/2021    Procedure: [id number];  Surgeon: Lauren Abraham MD;  Location: [facility] OR;  Service: General;  Laterality: N/A;    INSERTION OF VENOUS ACCESS PORT Right 5/13/2024    Procedure: INSERTION, VENOUS ACCESS PORT;  Surgeon: Alicia Hernandez MD;  Location: [facility] OR;  Service: General;  Laterality: Right;    LITHOTRIPSY      PLACEMENT OF JEJUNOSTOMY TUBE  2/18/2021    Procedure: INSERTION, JEJUNOSTOMY TUBE;  Surgeon: Hitesh Díaz MD;  Location: [facility] OR 2ND FLR;  Service: General;;    ULTRASOUND GUIDANCE Right 5/13/2024    Procedure: ULTRASOUND GUIDANCE;  Surgeon: Alicia Hernandez MD;  Location: [facility] OR;  Service: General;  Laterality: Right;    WHIPPLE PROCEDURE N/A 2/18/2021    Procedure: WHIPPLE PROCEDURE;  Surgeon: Hitesh Díaz MD;  Location: [facility] OR 2ND FLR;  Service: General;  Laterality: N/A;         Family History:  Family History   Problem Relation Name Age of Onset    Ovarian cancer Mother      Atrial fibrillation Mother      Stroke Mother      Hyperlipidemia Mother      Diabetes Mother      Bladder Cancer Father      Hypertension Father      Diabetes Father      No Known Problems Sister      Diabetes Maternal Grandmother      Colon cancer Maternal Grandmother      Stroke Maternal Grandfather      Diabetes Paternal Grandmother      No Known Problems Paternal Grandfather         Social History:  Social History     Tobacco Use    Smoking status: Former    Smokeless tobacco: Never   Substance and Sexual Activity    Alcohol use: Not Currently    Drug use: Never    Sexual activity: Not on file        Review of Systems     Review of Systems   Constitutional:  Negative for fever.   HENT:  Positive for congestion.  Negative for ear pain and sore throat.    Respiratory:  Negative for cough and shortness of breath.    Cardiovascular:  Negative for chest pain.   Gastrointestinal:  Positive for nausea and vomiting. Negative for abdominal pain.   Genitourinary:  Negative for dysuria.   Musculoskeletal:  Negative for back pain.   Skin:  Negative for rash.   Neurological:  Positive for dizziness. Negative for weakness.   Hematological:  Does not bruise/bleed easily.   All other systems reviewed and are negative.       Physical Exam     Initial Vitals [12/31/24 1435]   BP Pulse Resp Temp SpO2   (S) (!) 87/52 78 16 100 °F (37.8 °C) 96 %      MAP       --          Physical Exam  Nursing Notes and Vital Signs Reviewed.  Constitutional: Patient is in no acute distress. Well-developed and well-nourished.  Head: Atraumatic. Normocephalic.  Eyes: PERRL. EOM intact. Conjunctivae are not pale. No scleral icterus.  ENT: Mucous membranes are moist.   Neck: Supple. Full ROM.   Cardiovascular: Regular rate. Regular rhythm. No murmurs, rubs, or gallops. Distal pulses are 2+ and symmetric.  Pulmonary/Chest: No respiratory distress. Clear to auscultation bilaterally. No wheezing or rales.  Abdominal: Soft and non-distended.  There is no tenderness.  No rebound, guarding, or rigidity.   Genitourinary: No CVA tenderness  Musculoskeletal: Moves all extremities. No obvious deformities. No edema.  Skin: Warm and dry.  Neurological:  Alert, awake, and appropriate.  Normal speech.  No acute focal neurological deficits are appreciated.  Psychiatric: Normal affect. Good eye contact. Appropriate in content.     ED Course   Critical Care    Date/Time: 12/31/2024 11:34 PM    Performed by: Abdon Su MD  Authorized by: Abdon Su MD  Direct patient critical care time: 20 minutes  Additional history critical care time: 5 minutes  Ordering / reviewing critical care time: 5 minutes  Documentation critical care time: 5 minutes  Consulting other physicians  critical care time: 5 minutes  Total critical care time (exclusive of procedural time) : 40 minutes  Critical care time was exclusive of separately billable procedures and treating other patients and teaching time.  Critical care was necessary to treat or prevent imminent or life-threatening deterioration of the following conditions: hypotension.  Critical care was time spent personally by me on the following activities: blood draw for specimens, development of treatment plan with patient or surrogate, discussions with consultants, interpretation of cardiac output measurements, evaluation of patient's response to treatment, examination of patient, obtaining history from patient or surrogate, ordering and performing treatments and interventions, ordering and review of laboratory studies, ordering and review of radiographic studies, pulse oximetry, re-evaluation of patient's condition and review of old charts.        ED Vital Signs:  Vitals:    12/31/24 1435 12/31/24 1625 12/31/24 1645 12/31/24 2110   BP: (S) (!) 87/52 (!) 87/51 (!) 101/55 (S) (!) 109/59   Pulse: 78 72  (S) 75   Resp: 16 16     Temp: 100 °F (37.8 °C) 98.5 °F (36.9 °C)     TempSrc: Oral      SpO2: 96% 97%     Weight: 86.2 kg (190 lb)       12/31/24 2111 12/31/24 2112 12/31/24 2132 12/31/24 2217   BP: (S) (!) 114/94 (S) 115/65 (!) 112/55 (!) 115/59   Pulse: (S) 74 (S) 76 72 78   Resp:   19 19   Temp:       TempSrc:       SpO2:   98% 97%   Weight:           Abnormal Lab Results:  Labs Reviewed   RESPIRATORY INFECTION PANEL (PCR), NASOPHARYNGEAL - Abnormal       Result Value    Respiratory Infection Panel Source NP Swab      Adenovirus Not Detected      Coronavirus 229E, Common Cold Virus Not Detected      Coronavirus HKU1, Common Cold Virus Detected (*)     Coronavirus NL63, Common Cold Virus Not Detected      Coronavirus OC43, Common Cold Virus Not Detected      SARS-CoV2 (COVID-19) Qualitative PCR Not Detected      Human Metapneumovirus Not Detected       Human Rhinovirus/Enterovirus Not Detected      Influenza A (subtypes H1, H1-2009,H3) Not Detected      Influenza B Not Detected      Parainfluenza Virus 1 Not Detected      Parainfluenza Virus 2 Not Detected      Parainfluenza Virus 3 Not Detected      Parainfluenza Virus 4 Not Detected      Respiratory Syncytial Virus Not Detected      Bordetella Parapertussis ([id number]) Not Detected      Bordetella pertussis (ptxP) Not Detected      Chlamydia pneumoniae Not Detected      Mycoplasma pneumoniae Not Detected      Narrative:     Assay not valid for lower respiratory specimens, alternate  testing required.   CBC W/ AUTO DIFFERENTIAL - Abnormal    WBC 9.92      RBC 4.27      Hemoglobin 13.3      Hematocrit 40.8      MCV 96      MCH 31.1 (*)     MCHC 32.6      RDW 14.2      Platelets 77 (*)     MPV 11.2      Immature Granulocytes 0.3      Gran # (ANC) 9.6 (*)     Immature Grans (Abs) 0.03      Lymph # 0.1 (*)     Mono # 0.2 (*)     Eos # 0.0      Baso # 0.01      nRBC 0      Gran % 96.9 (*)     Lymph % 1.2 (*)     Mono % 1.5 (*)     Eosinophil % 0.0      Basophil % 0.1      Platelet Estimate Decreased (*)     Differential Method Automated      Narrative:     Release to patient->Immediate   COMPREHENSIVE METABOLIC PANEL - Abnormal    Sodium 137      Potassium 4.0      Chloride 105      CO2 15 (*)     Glucose 131 (*)     BUN 23 (*)     Creatinine 1.3      Calcium 8.8      Total Protein 6.9      Albumin 4.0      Total Bilirubin 0.5      Alkaline Phosphatase 242 (*)      (*)     ALT 56 (*)     eGFR 47 (*)     Anion Gap 17 (*)     Narrative:     Release to patient->Immediate   LACTIC ACID, PLASMA - Abnormal    Lactate (Lactic Acid) 2.9 (*)    URINALYSIS, REFLEX TO URINE CULTURE - Abnormal    Specimen UA Urine, Clean Catch      Color, UA Yellow      Appearance, UA Hazy (*)     pH, UA 6.0      Specific Gravity, UA 1.025      Protein, UA 1+ (*)     Glucose, UA Trace (*)     Ketones, UA Negative      Bilirubin (UA)  Negative      Occult Blood UA Negative      Nitrite, UA Negative      Urobilinogen, UA Negative      Leukocytes, UA Negative      Narrative:     Specimen Source->Urine   PROCALCITONIN - Abnormal    Procalcitonin 6.52 (*)     Narrative:     Release to patient->Immediate   LACTIC ACID, PLASMA - Abnormal    Lactate (Lactic Acid) 3.2 (*)    LACTIC ACID, PLASMA - Abnormal    Lactate (Lactic Acid) 2.6 (*)    INFLUENZA A & B BY MOLECULAR    Influenza A, Molecular Negative      Influenza B, Molecular Negative      Flu A & B Source Nasal swab     CULTURE, BLOOD   CULTURE, BLOOD   HEPATITIS C ANTIBODY    Hepatitis C Ab Negative      Narrative:     Release to patient->Immediate   HEP C VIRUS HOLD SPECIMEN    HEP C Virus Hold Specimen Hold for HCV sendout      Narrative:     Release to patient->Immediate   HIV 1 / 2 ANTIBODY    HIV 1/2 Ag/Ab Negative      Narrative:     Release to patient->Immediate   B-TYPE NATRIURETIC PEPTIDE    BNP 22      Narrative:     Release to patient->Immediate   TROPONIN I    Troponin I <0.006      Narrative:     Release to patient->Immediate   URINALYSIS MICROSCOPIC    RBC, UA 0      WBC, UA 1      Bacteria None      Hyaline Casts, UA 0      Amorphous, UA Rare      Unclass Carmen UA Occasional      Microscopic Comment SEE COMMENT      Narrative:     Specimen Source->Urine        All Lab Results:  Results for orders placed or performed during the hospital encounter of 12/31/24   Hepatitis C Antibody    Collection Time: 12/31/24  3:20 PM   Result Value Ref Range    Hepatitis C Ab Negative Negative   HCV Virus Hold Specimen    Collection Time: 12/31/24  3:20 PM   Result Value Ref Range    HEP C Virus Hold Specimen Hold for HCV sendout    HIV 1/2 Ag/Ab (4th Gen)    Collection Time: 12/31/24  3:20 PM   Result Value Ref Range    HIV 1/2 Ag/Ab Negative Negative   CBC auto differential    Collection Time: 12/31/24  3:20 PM   Result Value Ref Range    WBC 9.92 3.90 - 12.70 K/uL    RBC 4.27 4.00 - 5.40 M/uL     Hemoglobin 13.3 12.0 - 16.0 g/dL    Hematocrit 40.8 37.0 - 48.5 %    MCV 96 82 - 98 fL    MCH 31.1 (H) 27.0 - 31.0 pg    MCHC 32.6 32.0 - 36.0 g/dL    RDW 14.2 11.5 - 14.5 %    Platelets 77 (L) 150 - 450 K/uL    MPV 11.2 9.2 - 12.9 fL    Immature Granulocytes 0.3 0.0 - 0.5 %    Gran # (ANC) 9.6 (H) 1.8 - 7.7 K/uL    Immature Grans (Abs) 0.03 0.00 - 0.04 K/uL    Lymph # 0.1 (L) 1.0 - 4.8 K/uL    Mono # 0.2 (L) 0.3 - 1.0 K/uL    Eos # 0.0 0.0 - 0.5 K/uL    Baso # 0.01 0.00 - 0.20 K/uL    nRBC 0 0 /100 WBC    Gran % 96.9 (H) 38.0 - 73.0 %    Lymph % 1.2 (L) 18.0 - 48.0 %    Mono % 1.5 (L) 4.0 - 15.0 %    Eosinophil % 0.0 0.0 - 8.0 %    Basophil % 0.1 0.0 - 1.9 %    Platelet Estimate Decreased (A)     Differential Method Automated    Comprehensive metabolic panel    Collection Time: 12/31/24  3:20 PM   Result Value Ref Range    Sodium 137 136 - 145 mmol/L    Potassium 4.0 3.5 - 5.1 mmol/L    Chloride 105 95 - 110 mmol/L    CO2 15 (L) 23 - 29 mmol/L    Glucose 131 (H) 70 - 110 mg/dL    BUN 23 (H) 6 - 20 mg/dL    Creatinine 1.3 0.5 - 1.4 mg/dL    Calcium 8.8 8.7 - 10.5 mg/dL    Total Protein 6.9 6.0 - 8.4 g/dL    Albumin 4.0 3.5 - 5.2 g/dL    Total Bilirubin 0.5 0.1 - 1.0 mg/dL    Alkaline Phosphatase 242 (H) 40 - 150 U/L     (H) 10 - 40 U/L    ALT 56 (H) 10 - 44 U/L    eGFR 47 (A) >60 mL/min/1.73 m^2    Anion Gap 17 (H) 8 - 16 mmol/L   Lactic acid, plasma #1    Collection Time: 12/31/24  3:20 PM   Result Value Ref Range    Lactate (Lactic Acid) 2.9 (H) 0.5 - 2.2 mmol/L   Brain natriuretic peptide    Collection Time: 12/31/24  3:20 PM   Result Value Ref Range    BNP 22 0 - 99 pg/mL   Troponin I    Collection Time: 12/31/24  3:20 PM   Result Value Ref Range    Troponin I <0.006 0.000 - 0.026 ng/mL   Procalcitonin    Collection Time: 12/31/24  3:20 PM   Result Value Ref Range    Procalcitonin 6.52 (H) <0.25 ng/mL   Influenza A & B by Molecular    Collection Time: 12/31/24  4:26 PM    Specimen: Nasopharyngeal Swab    Result Value Ref Range    Influenza A, Molecular Negative Negative    Influenza B, Molecular Negative Negative    Flu A & B Source Nasal swab    Lactic acid, plasma    Collection Time: 12/31/24  6:31 PM   Result Value Ref Range    Lactate (Lactic Acid) 3.2 (H) 0.5 - 2.2 mmol/L   Respiratory Infection Panel (PCR), Nasopharyngeal    Collection Time: 12/31/24  7:20 PM    Specimen: Nasopharyngeal Swab   Result Value Ref Range    Respiratory Infection Panel Source NP Swab Not Detected    Adenovirus Not Detected Not Detected    Coronavirus 229E, Common Cold Virus Not Detected Not Detected    Coronavirus HKU1, Common Cold Virus Detected (A) Not Detected    Coronavirus NL63, Common Cold Virus Not Detected Not Detected    Coronavirus OC43, Common Cold Virus Not Detected Not Detected    SARS-CoV2 (COVID-19) Qualitative PCR Not Detected Not Detected    Human Metapneumovirus Not Detected Not Detected    Human Rhinovirus/Enterovirus Not Detected Not Detected    Influenza A (subtypes H1, H1-2009,H3) Not Detected Not Detected    Influenza B Not Detected Not Detected    Parainfluenza Virus 1 Not Detected Not Detected    Parainfluenza Virus 2 Not Detected Not Detected    Parainfluenza Virus 3 Not Detected Not Detected    Parainfluenza Virus 4 Not Detected Not Detected    Respiratory Syncytial Virus Not Detected Not Detected    Bordetella Parapertussis (PI0012) Not Detected Not Detected    Bordetella pertussis (ptxP) Not Detected Not Detected    Chlamydia pneumoniae Not Detected Not Detected    Mycoplasma pneumoniae Not Detected Not Detected   Urinalysis, Reflex to Urine Culture Urine, Clean Catch    Collection Time: 12/31/24  7:48 PM    Specimen: Urine   Result Value Ref Range    Specimen UA Urine, Clean Catch     Color, UA Yellow Yellow, Straw, Naima    Appearance, UA Hazy (A) Clear    pH, UA 6.0 5.0 - 8.0    Specific Gravity, UA 1.025 1.005 - 1.030    Protein, UA 1+ (A) Negative    Glucose, UA Trace (A) Negative    Ketones, UA  Negative Negative    Bilirubin (UA) Negative Negative    Occult Blood UA Negative Negative    Nitrite, UA Negative Negative    Urobilinogen, UA Negative <2.0 EU/dL    Leukocytes, UA Negative Negative   Urinalysis Microscopic    Collection Time: 12/31/24  7:48 PM   Result Value Ref Range    RBC, UA 0 0 - 4 /hpf    WBC, UA 1 0 - 5 /hpf    Bacteria None None-Occ /hpf    Hyaline Casts, UA 0 0-1/lpf /lpf    Amorphous, UA Rare None-Moderate    Unclass Carmen UA Occasional None-Moderate    Microscopic Comment SEE COMMENT    Lactic acid, plasma    Collection Time: 12/31/24  9:00 PM   Result Value Ref Range    Lactate (Lactic Acid) 2.6 (H) 0.5 - 2.2 mmol/L        Imaging Results:  Imaging Results              X-Ray Chest AP Portable (Final result)  Result time 12/31/24 15:03:38      Final result by Hao [facility]anon[facility]), MD (12/31/24 15:03:38)                   Impression:      Lungs are clear.      Electronically signed by: Hao Castanon MD  Date:    12/31/2024  Time:    15:03               Narrative:    EXAMINATION:  XR CHEST AP PORTABLE    CLINICAL HISTORY:  , Sepsis;    COMPARISON:  Chest, 03/13/2024    FINDINGS:  One view.  MediPort catheter tip in good position.  Heart is normal in size.  Lungs are clear.                                       The EKG was ordered, reviewed, and independently interpreted by the ED provider.  Interpretation time: 16:39  Rate: 77 BPM  Rhythm: normal sinus rhythm  Interpretation: No acute ST changes. No STEMI.      The Emergency Provider reviewed the vital signs and test results, which are outlined above.     ED Discussion       10:11 PM: Discussed pt's case with Dr. Camara (Hematology and Oncology) who recommends discharge.     10:15 PM: Reassessed pt at this time. Discussed with pt all pertinent ED information and results. Discussed pt dx and plan of tx. Gave pt all f/u and return to the ED instructions. All questions and concerns were addressed at this time. Pt expresses understanding  "of information and instructions, and is comfortable with plan to discharge. Pt is stable for discharge.    I discussed with patient and/or family/caretaker that evaluation in the ED does not suggest any emergent or life threatening medical conditions requiring immediate intervention beyond what was provided in the ED, and I believe patient is safe for discharge.  Regardless, an unremarkable evaluation in the ED does not preclude the development or presence of a serious of life threatening condition. As such, patient was instructed to return immediately for any worsening or change in current symptoms.     ED Course as of 12/31/24 2332   Tue Dec 31, 2024   2125 [employer] after the IV fluids are normal.  Repeating lactic acid now that all the fluids are finished.  Repeat lactic pending [DP]      ED Course User Index  [DP] Abdon Su MD     Medical Decision Making  59 y.o. F who was getting chemo today when she started vomiting and feeling weak.  Chart was reviewed.  Oncology notes reviewed.  She was hypotensive.  She was given IV fluids and zofran and then sent to the emergency department.  In triage she had a temp of 100°.  She says she has been dealing with a "cold." She tested positive for coronavirus on the respiratory viral panel. Not COVID, but another common cold type coronavirus.  Chest x-ray, urinalysis, white blood cell count normal.  She is not neutropenic.  She is not toxic-appearing.  Her initial lactic was 2.9.  After 30 mls per kg of IV fluids, it is 2.6.  She is no longer hypotensive.  Her nausea and vomiting has improved. VSS.  Discussed case with Oncology.  Patient is stable for discharge with outpatient follow-up instructions and ER return precautions    Amount and/or Complexity of Data Reviewed  External Data Reviewed: notes.     Details: Past medical history, medications, allergies reviewed  Labs: ordered. Decision-making details documented in ED Course.  Radiology: ordered. Decision-making " details documented in ED Course.  ECG/medicine tests: ordered and independent interpretation performed. Decision-making details documented in ED Course.    Risk  OTC drugs.  Prescription drug management.  Decision regarding hospitalization.                ED Medication(s):  Medications   sodium chloride 0.9% bolus 2,586 mL 2,586 mL (0 mLs Intravenous Stopped 12/31/24 1710)       New Prescriptions    No medications on file        Follow-up Information       Follow up with your oncologist.               [facility] - Emergency Dept..    Specialty: Emergency Medicine  Why: As needed, If symptoms worsen  Contact information:  [address] [facility] [address] [phone]                               Scribe Attestation:   Scribe #1: I performed the above scribed service and the documentation accurately describes the services I performed. I attest to the accuracy of the note.     Attending:   Physician Attestation Statement for Scribe #1: I, Abdon Su MD, personally performed the services described in this documentation, as scribed by Aquiles Masterson, in my presence, and it is both accurate and complete.           Clinical Impression       ICD-10-CM ICD-9-CM   1. Hypotension, unspecified hypotension type  I95.9 458.9   2. Adverse effect of chemotherapy, initial encounter  T45.1X5A E933.1   3. Coronavirus infection  B34.2 079.89       Disposition:   Disposition: Discharged  Condition: Stable        Abdon Su MD  12/31/24 0776

## 2024-12-31 NOTE — PROGRESS NOTES
Subjective:       Patient ID: Karen Gutierrez is a 59 y.o. female.    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  Patient reports at the end of her infusion hypotension and nausea.  Patient was given leave a L of fluids Zofran with relief.  Brother is here for review    Past Medical History:   Diagnosis Date    Cancer     duodenal cancer    Hypertension     Hypotension, iatrogenic     Mitral valve prolapse      Family History   Problem Relation Name Age of Onset    Ovarian cancer Mother      Atrial fibrillation Mother      Stroke Mother      Hyperlipidemia Mother      Diabetes Mother      Bladder Cancer Father      Hypertension Father      Diabetes Father      No Known Problems Sister      Diabetes Maternal Grandmother      Colon cancer Maternal Grandmother      Stroke Maternal Grandfather      Diabetes Paternal Grandmother      No Known Problems Paternal Grandfather       Social History     Socioeconomic History    Marital status:    Tobacco Use    Smoking status: Former    Smokeless tobacco: Never   Substance and Sexual Activity    Alcohol use: Not Currently    Drug use: Never     Social Drivers of Health     Financial Resource Strain: Low Risk  (6/14/2024)    Overall Financial Resource Strain (CARDIA)     Difficulty of Paying Living Expenses: Not hard at all   Food Insecurity: No Food Insecurity (6/14/2024)    Hunger Vital Sign     Worried About Running Out of Food in the Last Year: Never true     Ran Out of Food in the Last Year: Never true   Physical Activity: Inactive (6/14/2024)    Exercise Vital Sign     Days of Exercise per Week: 0 days     Minutes of Exercise per Session: 0 min   Stress: No Stress Concern Present (6/14/2024)    [employer] - Occupational Stress Questionnaire     Feeling of Stress : Not at all   Housing  "Stability: Unknown (6/14/2024)    Housing Stability Vital Sign     Unable to Pay for Housing in the Last Year: No     Past Surgical History:   Procedure Laterality Date    BUNIONECTOMY Left     HYSTERECTOMY  2000    INSERTION OF TUNNELED CENTRAL VENOUS CATHETER (CVC) WITH SUBCUTANEOUS PORT N/A 3/26/2021    Procedure: [id number];  Surgeon: Lauren Abraham MD;  Location: [facility] OR;  Service: General;  Laterality: N/A;    INSERTION OF VENOUS ACCESS PORT Right 5/13/2024    Procedure: INSERTION, VENOUS ACCESS PORT;  Surgeon: Alicia Hernandez MD;  Location: [facility] OR;  Service: General;  Laterality: Right;    LITHOTRIPSY      PLACEMENT OF JEJUNOSTOMY TUBE  2/18/2021    Procedure: INSERTION, JEJUNOSTOMY TUBE;  Surgeon: Hitesh Díaz MD;  Location: [facility] OR 2ND FLR;  Service: General;;    ULTRASOUND GUIDANCE Right 5/13/2024    Procedure: ULTRASOUND GUIDANCE;  Surgeon: Alicia Hernandez MD;  Location: [facility] OR;  Service: General;  Laterality: Right;    WHIPPLE PROCEDURE N/A 2/18/2021    Procedure: WHIPPLE PROCEDURE;  Surgeon: Hitesh Díaz MD;  Location: [facility] OR 2ND FLR;  Service: General;  Laterality: N/A;       Labs:  Lab Results   Component Value Date    WBC 3.14 (L) 12/30/2024    HGB 13.2 12/30/2024    HCT 39.7 12/30/2024    MCV 95 12/30/2024     (L) 12/30/2024     BMP  Lab Results   Component Value Date     12/30/2024    K 4.2 12/30/2024     12/30/2024    CO2 25 12/30/2024    BUN 15 12/30/2024    CREATININE 1.0 12/30/2024    CALCIUM 9.4 12/30/2024    ANIONGAP 10 12/30/2024    ESTGFRAFRICA >60 03/07/2022    EGFRNONAA >60 03/07/2022     Lab Results   Component Value Date    ALT 40 12/30/2024    AST 54 (H) 12/30/2024    ALKPHOS 199 (H) 12/30/2024    BILITOT 0.8 12/30/2024       Lab Results   Component Value Date    IRON 42 05/10/2021    TIBC 404 05/10/2021    FERRITIN 345 (H) 05/10/2021     No results found for: "RYZMUTAC72"  No results found for: "FOLATE"  No results found for: " ""TSH"      Review of Systems   Constitutional:  Positive for activity change, appetite change and fatigue. Negative for chills, diaphoresis, fever and unexpected weight change.   HENT:  Negative for congestion, dental problem, drooling, ear discharge, ear pain, facial swelling, hearing loss, mouth sores, nosebleeds, postnasal drip, rhinorrhea, sinus pressure, sneezing, sore throat, tinnitus, trouble swallowing and voice change.    Eyes:  Negative for photophobia, pain, discharge, redness, itching and visual disturbance.   Respiratory:  Negative for cough, choking, chest tightness, shortness of breath, wheezing and stridor.    Cardiovascular:  Negative for chest pain, palpitations and leg swelling.   Gastrointestinal:  Negative for abdominal distention, abdominal pain, anal bleeding, blood in stool, constipation, diarrhea, nausea, rectal pain and vomiting.   Endocrine: Negative for cold intolerance, heat intolerance, polydipsia, polyphagia and polyuria.   Genitourinary:  Negative for decreased urine volume, difficulty urinating, dyspareunia, dysuria, enuresis, flank pain, frequency, genital sores, hematuria, menstrual problem, pelvic pain, urgency, vaginal bleeding, vaginal discharge and vaginal pain.   Musculoskeletal:  Negative for arthralgias, back pain, gait problem, joint swelling, myalgias, neck pain and neck stiffness.   Skin:  Negative for color change, pallor and rash.   Allergic/Immunologic: Negative for environmental allergies, food allergies and immunocompromised state.   Neurological:  Positive for weakness. Negative for dizziness, tremors, seizures, syncope, facial asymmetry, speech difficulty, light-headedness, numbness and headaches.   Hematological:  Negative for adenopathy. Does not bruise/bleed easily.   Psychiatric/Behavioral:  Positive for dysphoric mood. Negative for agitation, behavioral problems, confusion, decreased concentration, hallucinations, self-injury, sleep disturbance and suicidal " ideas. The patient is not nervous/anxious and is not hyperactive.        Objective:      Physical Exam  Vitals reviewed.   Constitutional:       General: She is not in acute distress.     Appearance: Normal appearance. She is well-developed. She is ill-appearing. She is not diaphoretic.   HENT:      Head: Normocephalic and atraumatic.      Right Ear: External ear normal.      Left Ear: External ear normal.      Nose: Nose normal.      Right Sinus: No maxillary sinus tenderness or frontal sinus tenderness.      Left Sinus: No maxillary sinus tenderness or frontal sinus tenderness.      Mouth/Throat:      Pharynx: No oropharyngeal exudate.   Eyes:      General: Lids are normal. No scleral icterus.        Right eye: No discharge.         Left eye: No discharge.      Conjunctiva/sclera: Conjunctivae normal.      Right eye: Right conjunctiva is not injected. No hemorrhage.     Left eye: Left conjunctiva is not injected. No hemorrhage.     Pupils: Pupils are equal, round, and reactive to light.   Neck:      Thyroid: No thyromegaly.      Vascular: No JVD.      Trachea: No tracheal deviation.   Cardiovascular:      Rate and Rhythm: Normal rate.   Pulmonary:      Effort: Pulmonary effort is normal. No respiratory distress.      Breath sounds: No stridor.   Chest:      Chest wall: No tenderness.   Abdominal:      General: Bowel sounds are normal. There is no distension.      Palpations: Abdomen is soft. There is no hepatomegaly, splenomegaly or mass.      Tenderness: There is no abdominal tenderness. There is no rebound.   Musculoskeletal:         General: No tenderness. Normal range of motion.      Cervical back: Normal range of motion and neck supple.   Lymphadenopathy:      Cervical: No cervical adenopathy.      Upper Body:      Right upper body: No supraclavicular adenopathy.      Left upper body: No supraclavicular adenopathy.   Skin:     General: Skin is dry.      Findings: No erythema or rash.   Neurological:       Mental Status: She is alert and oriented to person, place, and time.      Cranial Nerves: No cranial nerve deficit.      Coordination: Coordination normal.   Psychiatric:         Behavior: Behavior normal.         Thought Content: Thought content normal.         Judgment: Judgment normal.             Assessment:      1. 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 Chemotherapy induced nausea and vomiting           Med Onc Chart Routing      Follow up with physician . Referral emergency room [facility] Aundrea Brewer   Follow up with [facility]    Infusion scheduling note    Injection scheduling note    Labs    Imaging    Pharmacy appointment    Other referrals                   Plan:     Patient is seen emergently during infusion.  At this time experienced nausea vomiting treated conservatively with 500 mL of normal saline given additional dose of Zofran.  Patient still has persistent nausea vomiting would blood pressure of 80/60.  At this time I have talked to her about options the infusion suite is closing.  At this time I have spoken to the emergency room would recommend evaluation I have placed her infusional port I believe this is related to her chemotherapy.  Hopefully she will have resolution as this initial treatment resolves.  In her blood pressure escalate.  Would even leave infusional port in on at this time use peripheral IV access.  Discussed implications and answered questions with her spoke directly to charge nurse emergency room [facility] Aundrea Daley Jr, MD FACP

## 2025-01-01 ENCOUNTER — PATIENT MESSAGE (OUTPATIENT)
Dept: HEMATOLOGY/ONCOLOGY | Facility: CLINIC | Age: 60
End: 2025-01-01
Payer: COMMERCIAL

## 2025-01-02 ENCOUNTER — INFUSION (OUTPATIENT)
Dept: INFUSION THERAPY | Facility: HOSPITAL | Age: 60
End: 2025-01-02
Attending: INTERNAL MEDICINE
Payer: COMMERCIAL

## 2025-01-02 VITALS
OXYGEN SATURATION: 98 % | TEMPERATURE: 97 F | DIASTOLIC BLOOD PRESSURE: 81 MMHG | RESPIRATION RATE: 18 BRPM | HEART RATE: 64 BPM | SYSTOLIC BLOOD PRESSURE: 133 MMHG

## 2025-01-02 DIAGNOSIS — C17.0 DUODENAL CANCER: Primary | ICD-10-CM

## 2025-01-02 PROCEDURE — 63600175 PHARM REV CODE 636 W HCPCS: Performed by: INTERNAL MEDICINE

## 2025-01-02 PROCEDURE — 96377 APPLICATON ON-BODY INJECTOR: CPT

## 2025-01-02 RX ORDER — HEPARIN 100 UNIT/ML
500 SYRINGE INTRAVENOUS
Status: DISCONTINUED | OUTPATIENT
Start: 2025-01-02 | End: 2025-01-02 | Stop reason: HOSPADM

## 2025-01-02 RX ADMIN — PEGFILGRASTIM 6 MG: KIT SUBCUTANEOUS at 11:01

## 2025-01-02 RX ADMIN — HEPARIN 500 UNITS: 100 SYRINGE at 11:01

## 2025-01-05 LAB
OHS QRS DURATION: 100 MS
OHS QTC CALCULATION: 466 MS

## 2025-01-06 LAB — BACTERIA BLD CULT: NORMAL

## 2025-01-10 ENCOUNTER — HOSPITAL ENCOUNTER (OUTPATIENT)
Dept: RADIOLOGY | Facility: HOSPITAL | Age: 60
Discharge: HOME | End: 2025-01-10
Attending: INTERNAL MEDICINE
Payer: COMMERCIAL

## 2025-01-10 ENCOUNTER — TELEPHONE (OUTPATIENT)
Dept: HEMATOLOGY/ONCOLOGY | Facility: CLINIC | Age: 60
End: 2025-01-10
Payer: COMMERCIAL

## 2025-01-10 DIAGNOSIS — C17.0 DUODENAL CANCER: ICD-10-CM

## 2025-01-10 PROCEDURE — 25500020 PHARM REV CODE 255: Performed by: INTERNAL MEDICINE

## 2025-01-10 PROCEDURE — 74177 CT ABD & PELVIS W/CONTRAST: CPT | Mod: 26,,, | Performed by: RADIOLOGY

## 2025-01-10 PROCEDURE — 71260 CT THORAX DX C+: CPT | Mod: 26,,, | Performed by: RADIOLOGY

## 2025-01-10 PROCEDURE — 71260 CT THORAX DX C+: CPT | Mod: TC

## 2025-01-10 RX ADMIN — IOHEXOL 100 ML: 350 INJECTION, SOLUTION INTRAVENOUS at 03:01

## 2025-01-10 RX ADMIN — IOHEXOL 30 ML: 350 INJECTION, SOLUTION INTRAVENOUS at 03:01

## 2025-01-10 NOTE — TELEPHONE ENCOUNTER
Spoke to Miky Lebron  in the radiology scheduling dept: Patient is currently clearing her financial obligations, therefore the CT will be performed toady.  Patient notified.    Patient at the 1 st floor registration desk

## 2025-01-14 ENCOUNTER — OFFICE VISIT (OUTPATIENT)
Dept: SURGICAL ONCOLOGY | Facility: CLINIC | Age: 60
End: 2025-01-14
Payer: COMMERCIAL

## 2025-01-14 VITALS
HEART RATE: 74 BPM | BODY MASS INDEX: 25.41 KG/M2 | WEIGHT: 187.63 LBS | HEIGHT: 72 IN | DIASTOLIC BLOOD PRESSURE: 87 MMHG | TEMPERATURE: 98 F | SYSTOLIC BLOOD PRESSURE: 137 MMHG

## 2025-01-14 DIAGNOSIS — C17.0 DUODENAL CANCER: Primary | ICD-10-CM

## 2025-01-14 DIAGNOSIS — K43.2 INCISIONAL HERNIA, WITHOUT OBSTRUCTION OR GANGRENE: ICD-10-CM

## 2025-01-14 DIAGNOSIS — C17.0 DUODENAL ADENOCARCINOMA: ICD-10-CM

## 2025-01-14 PROCEDURE — 3079F DIAST BP 80-89 MM HG: CPT | Mod: CPTII,S$GLB,, | Performed by: SURGERY

## 2025-01-14 PROCEDURE — 3008F BODY MASS INDEX DOCD: CPT | Mod: CPTII,S$GLB,, | Performed by: SURGERY

## 2025-01-14 PROCEDURE — 3075F SYST BP GE 130 - 139MM HG: CPT | Mod: CPTII,S$GLB,, | Performed by: SURGERY

## 2025-01-14 PROCEDURE — 99999 PR PBB SHADOW E&M-EST. PATIENT-LVL V: CPT | Mod: PBBFAC,,, | Performed by: SURGERY

## 2025-01-14 PROCEDURE — 99214 OFFICE O/P EST MOD 30 MIN: CPT | Mod: S$GLB,,, | Performed by: SURGERY

## 2025-01-14 RX ORDER — METRONIDAZOLE 500 MG/100ML
500 INJECTION, SOLUTION INTRAVENOUS
OUTPATIENT
Start: 2025-01-14

## 2025-01-14 RX ORDER — SODIUM CHLORIDE 9 MG/ML
INJECTION, SOLUTION INTRAVENOUS CONTINUOUS
OUTPATIENT
Start: 2025-01-14

## 2025-01-14 NOTE — PROGRESS NOTES
Surgical Oncology Clinic Note      Referring Provider: Dr. Enzo Daley   PCP: Han Arshad MD    Reason For Visit: Gastroesophageal:  duodenal malignancy- recurrent/ metastatic     Oncologic History:   Oncology History   Duodenal cancer   2/18/2021 Initial Diagnosis    Duodenal cancer     2/18/2021 Surgery    Preop Dx: Duodenal cancer  Postop Dx: same  Surgeon: Dr. Hitesh Díaz    Operative Procedure: Pylorus-preserving Whipple resection; segmental small bowel resection, Witzel jejunostomy     3/12/2021 Cancer Staged    Staging form: Small Intestine - Adenocarcinoma, AJCC 8th Edition  - Pathologic stage from 3/12/2021: Stage IIIB (pT4, pN2, cM0)     3/15/2021 Tumor Conference       [facility] UGI MULTIDISCIPLINARY TUMOR BOARD  PATIENT REVIEW FORM   ____________________________________________________________    CLINIC #: [id number]  DATE: 3/15/2021    DIAGNOSIS: duodenal CA    PRESENTER: Hitesh Díaz    PATIENT SUMMARY:   This 54 y/o female presented with high grade obstructing duodenal mass. CT scan from 2/2021 revealed focal wall thickening of 3rd portion of duodenum. EGD with bx performed and pathology revealed adenoma. However, based on clinical condition, it was assumed she had invasive cancer.     She underwent upfront Whipple per Dr. Hitesh Díaz on 2/18/2021. Today's presentation is for pathology review.   pT4N2; 3cm moderately diff intestinal type JENNIFER, invading into mesocolon and involves pancreatic parenchyma; margins negative, 5 out of 11 lymph nodes positive for malignancy. LVI present; intact staining    BOARD RECOMMENDATIONS:   Proceed with systemic adj FOLFOX chemotherapy    CONSULT NEEDED:     [] Surgery    [x] Hem/Onc in [place]    [] Rad/Onc    [] Dietary                 [] Social Service    [] Psychology       [] AES  [] Radiology     Pathologic Stage: Tumor 4 Node(s) 2  Metastasis 0   5 nodes out of 11 nodes were positive for malignancy      GROUP STAGE:  [] O    [] 1A     [] IB    [] IIA    [] IIB     [] IIIA     [x] IIIB     [] IIIC    []IV  [] Local recurrence     [] Regional recurrence     [] Distant recurrence   Metastatic site(s): none         [x] Terri'l Treatment Guidelines reviewed and care planned is consistent with guidelines.         (i.e., NCCN, NCI, PD, ACO, AUA, etc.)    PRESENTATION AT CANCER CONFERENCE:         [] Prospective    [x] Retrospective     [] Follow-Up       3/30/2021 - 10/21/2021 Chemotherapy    Treatment Summary   Plan Name: OP FOLFOX 6 Q2W  Treatment Goal: Control  Status: Inactive  Start Date: 3/30/2021  End Date: 10/21/2021  Provider: Enzo Daley MD  Chemotherapy: dexAMETHasone (DECADRON) 4 MG Tab, 8 mg, Oral, Daily, 1 of 1 cycle, Start date: 3/24/2021, End date: --  prochlorperazine (COMPAZINE) 10 MG tablet, 10 mg, Oral, 3 times daily PRN, 1 of 1 cycle, Start date: 3/23/2021, End date: --  fluorouraciL injection 825 mg, 400 mg/m2 = 825 mg, Intravenous, Clinic/[facility] 1 time, 12 of 12 cycles  Administration: 825 mg (3/30/2021), 825 mg (4/13/2021), 825 mg (4/27/2021), 825 mg (5/11/2021), 825 mg (5/25/2021), 825 mg (6/22/2021), 825 mg (7/6/2021), 825 mg (8/3/2021), 825 mg (9/1/2021), 825 mg (9/14/2021), 825 mg (9/28/2021), 825 mg (10/19/2021)  fluorouracil (ADRUCIL) 2,400 mg/m2 = 4,945 mg in sodium chloride 0.9% 100 mL chemo infusion, 2,400 mg/m2 = 4,945 mg, Intravenous, Over 46 hours, 12 of 12 cycles  Administration: 4,945 mg (3/30/2021), 4,945 mg (4/13/2021), 4,945 mg (4/27/2021), 4,945 mg (5/11/2021), 4,945 mg (5/25/2021), 4,945 mg (6/22/2021), 4,945 mg (7/6/2021), 5,000 mg (8/3/2021), 4,945 mg (9/1/2021), 4,945 mg (9/14/2021), 5,000 mg (9/28/2021), 4,945 mg (10/19/2021)  leucovorin calcium 400 mg/m2 = 825 mg in dextrose 5 % 291.25 mL infusion, 400 mg/m2 = 825 mg, Intravenous, Clinic/[facility] 1 time, 12 of 12 cycles  Administration: 825 mg (3/30/2021), 825 mg (4/13/2021), 800 mg (4/27/2021), 800 mg (5/11/2021), 825 mg (5/25/2021), 825 mg (6/22/2021), 800 mg  (7/6/2021), 825 mg (8/3/2021), 800 mg (9/1/2021), 800 mg (9/14/2021), 825 mg (9/28/2021), 800 mg (10/19/2021)  oxaliplatin (ELOXATIN) 85 mg/m2 = 175 mg in dextrose 5 % 535 mL chemo infusion, 85 mg/m2 = 175 mg, Intravenous, Clinic/[facility] 1 time, 12 of 12 cycles  Administration: 175 mg (3/30/2021), 175 mg (4/13/2021), 175 mg (4/27/2021), 175 mg (5/11/2021), 175 mg (5/25/2021), 175 mg (6/22/2021), 175 mg (7/6/2021), 175 mg (8/3/2021), 175 mg (9/1/2021), 175 mg (9/14/2021), 175 mg (9/28/2021), 175 mg (10/19/2021)      Tumor Markers    Immunohistochemical studies for DNA mismatch repair proteins were performed on this tumor (block 4J)  and they demonstrate INTACT STAINING in all 4 proteins (MLH1, PMS2, MSH2, and MSH6). These results  indicate that this tumor is microsatellite stable (BERNA) and that Estrada syndrome (Hereditary Nonpolyposis  Colorectal Cancer, HNPCC) is unlikely     3/31/2021 Genetic Testing    Patient has genetic testing done for [employer] My Risk Germline Testing.                                           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 #[id number] date 03/31/2021     5/15/2024 Cancer Staged    Staging form: Small Intestine - Adenocarcinoma, AJCC 8th Edition  - Pathologic stage from 5/15/2024: Stage IV (cM1)     5/20/2024 -  Chemotherapy    Treatment Summary   Plan Name: OP GI mFOLFOX6 (oxaliplatin leucovorin fluorouracil) Q2W --Q3W  Treatment Goal: Control  Status: Active  Start Date: 5/20/2024  End Date: 1/2/2025  Provider: Enzo Daley MD  Chemotherapy: fluorouraciL injection 885 mg, 400 mg/m2 = 885 mg, Intravenous, Clinic/[facility] 1 time, 12 of 12 cycles  Administration: 885 mg (5/20/2024), 885 mg (6/3/2024), 885 mg (6/17/2024), 890 mg (7/1/2024), 880 mg (7/15/2024), 880 mg (8/5/2024), 880 mg (8/19/2024), 880 mg (9/3/2024), 855 mg (9/23/2024), 850 mg (10/21/2024), 840 mg (12/3/2024), 830 mg (12/31/2024)  oxaliplatin (ELOXATIN) 85  mg/m2 = 188 mg in dextrose 5 % (D5W) 602.6 mL chemo infusion, 85 mg/m2 = 188 mg, Intravenous, Clinic/[facility] 1 time, 12 of 12 cycles  Administration: 188 mg (5/20/2024), 188 mg (6/3/2024), 188 mg (6/17/2024), 189 mg (7/1/2024), 187 mg (7/15/2024), 187 mg (8/5/2024), 187 mg (8/19/2024), 187 mg (9/3/2024), 182 mg (9/23/2024), 180 mg (10/21/2024), 179 mg (12/3/2024), 176 mg (12/31/2024)  fluorouracil (Adrucil) 2,400 mg/m2 = 5,305 mg in sodium chloride 0.9% 240 mL chemo infusion, 2,400 mg/m2 = 5,305 mg, Intravenous, Over 46 hours, 12 of 12 cycles  Administration: 5,305 mg (5/20/2024), 5,305 mg (6/3/2024), 5,305 mg (6/17/2024), 5,330 mg (7/1/2024), 5,280 mg (7/15/2024), 5,280 mg (8/5/2024), 5,280 mg (8/19/2024), 5,280 mg (9/3/2024), 5,000 mg (9/23/2024), 5,000 mg (10/21/2024), 5,000 mg (12/3/2024), 4,970 mg (12/31/2024)           Staging:  Cancer Staging   Duodenal cancer  Staging form: Small Intestine - Adenocarcinoma, AJCC 8th Edition  - Pathologic stage from 3/12/2021: Stage IIIB (pT4, pN2, cM0) - Signed by Velvet Faith APRN, ANP-C on 3/14/2021  - Pathologic stage from 5/15/2024: Stage IV (cM1) - Signed by Alicia Hernandez MD on 5/15/2024       HISTORY       Karen Hernandezousmane is a 59 y.o. female with history of duodenal adenocarcinoma s/p  who presents today for evaluation and management of recurrent duodenal carcinoma.    She originally presented in February of 2021 with a completely obstructing duodenal mass.  She underwent G-tube and J-tube placement it was then followed with Dr. Hitesh Díaz in [place].  She subsequently underwent a Whipple on 02/11/2021.  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  Post op course was c/b abscess requiring drainage.  She received 12 cycles of FOLFOX adjuvantly which she tolerated well, and which she completed in November 2021.   Her GB and her J-tube both came out and she has been on surveillance since that point in time.  Surveillance imaging in April  of this year showed a new heterogeneous mass in the anterior abdominal wall measuring 6.8 x 5.1 x 4.6 cm concerning for tumor recurrence versus hematoma.  She underwent biopsy which did confirm metastatic disease.  She was discussed at tumor board with agreement for systemic chemotherapy and presents today for discussion of port placement.    Overall she feels well and is doing well.  She presents today with her sister.    INTERVAL HISTORY      08/14/2024:  She's had 4 cycles chemo so far.  She is tolerating it well.  Her tumor has shrunk significantly.       09/18/2024:  She's had 6 cycles so far.  She's doing very well.  She can no longer palpate her tumor at all.     1/14/2025:  she's had total of 12 cycles of chemo and done well.  Last cycle was 1/2/25.  Repeat imaging shows continued decrease in size/ stability of the rectus mass.  No new issues.        Past Medical History:   Diagnosis Date    Cancer     duodenal cancer    Hypertension     Hypotension, iatrogenic     Mitral valve prolapse        Past Surgical History:   Procedure Laterality Date    BUNIONECTOMY Left     HYSTERECTOMY  2000    INSERTION OF TUNNELED CENTRAL VENOUS CATHETER (CVC) WITH SUBCUTANEOUS PORT N/A 3/26/2021    Procedure: [id number];  Surgeon: Lauren Abraham MD;  Location: [facility] OR;  Service: General;  Laterality: N/A;    INSERTION OF VENOUS ACCESS PORT Right 5/13/2024    Procedure: INSERTION, VENOUS ACCESS PORT;  Surgeon: Alicia Hernandez MD;  Location: [facility] OR;  Service: General;  Laterality: Right;    LITHOTRIPSY      PLACEMENT OF JEJUNOSTOMY TUBE  2/18/2021    Procedure: INSERTION, JEJUNOSTOMY TUBE;  Surgeon: Hitesh Díaz MD;  Location: [facility] OR [address] [place] [address];  Service: General;;    ULTRASOUND GUIDANCE Right 5/13/2024    Procedure: ULTRASOUND GUIDANCE;  Surgeon: Alicia Hernandez MD;  Location: [facility] OR;  Service: General;  Laterality: Right;    WHIPPLE PROCEDURE N/A 2/18/2021    Procedure: WHIPPLE PROCEDURE;  Surgeon: Hitesh  JET Díaz MD;  Location: [facility] OR [address] [place] [address];  Service: General;  Laterality: N/A;       Family History   Problem Relation Name Age of Onset    Ovarian cancer Mother      Atrial fibrillation Mother      Stroke Mother      Hyperlipidemia Mother      Diabetes Mother      Bladder Cancer Father      Hypertension Father      Diabetes Father      No Known Problems Sister      Diabetes Maternal Grandmother      Colon cancer Maternal Grandmother      Stroke Maternal Grandfather      Diabetes Paternal Grandmother      No Known Problems Paternal Grandfather         Social History     Socioeconomic History    Marital status:    Tobacco Use    Smoking status: Former    Smokeless tobacco: Never   Substance and Sexual Activity    Alcohol use: Not Currently    Drug use: Never     Social Drivers of Health     Financial Resource Strain: Low Risk  (6/14/2024)    Overall Financial Resource Strain (CARDIA)     Difficulty of Paying Living Expenses: Not hard at all   Food Insecurity: No Food Insecurity (6/14/2024)    Hunger Vital Sign     Worried About Running Out of Food in the Last Year: Never true     Ran Out of Food in the Last Year: Never true   Physical Activity: Inactive (6/14/2024)    Exercise Vital Sign     Days of Exercise per Week: 0 days     Minutes of Exercise per Session: 0 min   Stress: No Stress Concern Present (6/14/2024)    [place] [employer] - Occupational Stress Questionnaire     Feeling of Stress : Not at all   Housing Stability: Unknown (6/14/2024)    Housing Stability Vital Sign     Unable to Pay for Housing in the Last Year: No          Medication List            Accurate as of January 14, 2025 10:56 AM. If you have any questions, ask your nurse or doctor.                CONTINUE taking these medications      amLODIPine 10 MG tablet  Commonly known as: NORVASC     atomoxetine 40 MG capsule  Commonly known as: STRATTERA  Take 1 capsule (40 mg total) by mouth once daily.     BYSTOLIC 20 mg  Tab  Generic drug: nebivoloL     cholecalciferol (vitamin D3) 125 mcg (5,000 unit) capsule     clonazePAM 0.5 MG tablet  Commonly known as: KlonoPIN  Take 1 tablet (0.5 mg total) by mouth nightly as needed for Anxiety.     cyanocobalamin 1000 MCG tablet  Commonly known as: VITAMIN B-12     EScitalopram oxalate 10 MG tablet  Commonly known as: LEXAPRO  Take 1 tablet (10 mg total) by mouth once daily.     LIDOcaine-prilocaine cream  Commonly known as: EMLA  Apply to affected area once     OLANZapine 5 MG tablet  Commonly known as: ZyPREXA  Take 1 tablet (5 mg total) by mouth every evening. Take nightly on days 1-3 of each chemotherapy cycle.     olmesartan 40 MG tablet  Commonly known as: BENICAR  TAKE 1 TABLET(40 MG) BY MOUTH EVERY DAY     ondansetron 4 MG Tbdl  Commonly known as: ZOFRAN-ODT  Take 1 tablet (4 mg total) by mouth 2 (two) times daily.     prochlorperazine 10 MG tablet  Commonly known as: COMPAZINE  Take 1 tablet (10 mg total) by mouth every 6 (six) hours as needed for Nausea.              Review of patient's allergies indicates:   Allergen Reactions    Benzalkonium chloride Hives    Codeine Itching    Morphine Nausea Only    Neosporin [neomycin-bacitracin-polymyxin] Hives    Sulfa (sulfonamide antibiotics) Hives and Itching           Morphine sulfate Other (See Comments)    Sulfamethoxazole-trimethoprim Other (See Comments)    Hydrocortisone Hives     Redness / can use bactroban         Review of Systems   Constitutional:  Negative for chills, fever, malaise/fatigue and weight loss.   Respiratory:  Negative for cough, shortness of breath and wheezing.    Cardiovascular:  Negative for chest pain and palpitations.   Gastrointestinal:  Negative for abdominal pain, constipation, diarrhea, nausea and vomiting.   Musculoskeletal:  Negative for back pain, falls and joint pain.   Neurological:  Negative for dizziness, sensory change, speech change, focal weakness, seizures, loss of consciousness and weakness.    Psychiatric/Behavioral:  Negative for depression and hallucinations. The patient is not nervous/anxious.          Vitals:    01/14/25 1013   BP: 137/87   Pulse: 74   Temp: 97.7 °F (36.5 °C)     Body mass index is 25.44 kg/m².  ECOG SCORE    0 - Fully active-able to carry on all pre-disease performance without restriction         PHYSICAL EXAM     Physical Exam  Vitals reviewed.   Constitutional:       General: She is not in acute distress.     Appearance: Normal appearance.   HENT:      Head: Normocephalic and atraumatic.      Mouth/Throat:      Mouth: Mucous membranes are moist.   Eyes:      General: No scleral icterus.     Extraocular Movements: Extraocular movements intact.      Conjunctiva/sclera: Conjunctivae normal.   Pulmonary:      Effort: Pulmonary effort is normal.   Abdominal:      General: There is no distension.      Palpations: Abdomen is soft.      Tenderness: There is no abdominal tenderness.      Comments: No palpable lesion.  Palpable hernia along upper midline    Musculoskeletal:         General: No swelling. Normal range of motion.   Skin:     General: Skin is warm and dry.   Neurological:      General: No focal deficit present.      Mental Status: She is alert.   Psychiatric:         Mood and Affect: Mood normal.         Behavior: Behavior normal.         Thought Content: Thought content normal.         Judgment: Judgment normal.          DATA REVIEW:  I personally reviewed the following records for this visit: lab work from prior visit, notes from other physicians, computed tomography/CT imaging, surgical pathology results, radiographic study evaluation, and laboratory results done by primary care physician    LABS       Lab Results   Component Value Date    WBC 9.92 12/31/2024    HGB 13.3 12/31/2024    HCT 40.8 12/31/2024    PLT 77 (L) 12/31/2024     Lab Results   Component Value Date     (H) 12/31/2024    CALCIUM 8.8 12/31/2024    ALBUMIN 4.0 12/31/2024    PROT 6.9 12/31/2024    NA  137 12/31/2024    K 4.0 12/31/2024    CO2 15 (L) 12/31/2024     12/31/2024    BUN 23 (H) 12/31/2024    CREATININE 1.3 12/31/2024    ALKPHOS 242 (H) 12/31/2024    ALT 56 (H) 12/31/2024     (H) 12/31/2024    BILITOT 0.5 12/31/2024       Nutritional:  Lab Results   Component Value Date    PREALBUMIN 11 (L) 03/04/2021       Tumor Markers:  Lab Results   Component Value Date    CEA 1.7 12/30/2024     Lab Results   Component Value Date     6.7 12/30/2024       PATHOLOGY     2/18/2021- Whipple (Dr. Hitesh Díaz)      Component 3 yr ago   Final Pathologic Diagnosis 1.  GALLBLADDER, CHOLECYSTECTOMY:  - Gallbladder with changes suggestive of cholesterolosis  - Fragment of hepatic parenchyma with no significant histopathologic  abnormality  - One benign lymph node (0/1)  2.  LYMPH NODE, HEPATIC ARTERY, EXCISION:  - One benign lymph node, negative for metastatic disease (0/1)  3.  LYMPH NODE, NETTIE HEPATITIS, EXCISION:  - One benign lymph node, negative for metastatic disease (0/1)  4.  DUODENUM AND PANCREAS, PANCREATICODUODENECTOMY (WHIPPLE PROCEDURE)  ([id number]):  - Invasive duodneal adenocarcinoma, intestinal-type,  moderately-differentiated, measuring 3.0 in greatest dimension  - Carcinoma involves pancreatic parenchyma and mesentery (mesocolon) of  adjacent transverse colon, pT4  - Metastatic adenocarcinoma present in five of eleven lymph nodes (5/11), pN2  - Surgical resection margins negative for neoplasia  - Please see comments for synoptic report  5.  SMALL INTESTINE, PARTIAL RESECTION:  - Segment of small intestine with a full thickness defect associated with  mucosal ulceration and acute serositis  - Resection margins viable  - No evidence of dysplasia or malignancy (multiple deeper levels examined)         Immunohistochemical studies for DNA mismatch repair proteins were performed  on this tumor (block 4J) and they demonstrate INTACT STAINING in all 4  proteins (MLH1, PMS2, MSH2, and MSH6).  These  results indicate that this  tumor is microsatellite stable (BERNA) and that Estrada syndrome (Hereditary  Nonpolyposis Colorectal Cancer, HNPCC) is unlikely.  SMALL INTESTINE:    SPECIMEN      Procedure       Pancreaticoduodenectomy  (Whipple resection)    TUMOR      Tumor Site       Duodenum      Histologic Type:       Adenocarcinoma (not otherwise characterized)      Histologic Grade       G2: Moderately differentiated      Tumor Size       Greatest Dimension (Centimeters) 3.0 cm      Tumor Extension       Tumor invades other organs / structures  Other Organs / Structures           Mesentery of adjacent loops of bowel            Pancreas      Macroscopic Tumor Perforation       Not identified      Lymphovascular Invasion       Present          Margins Examined           Proximal            Distal            Uncinate            Bile duct            Pancreatic    LYMPH NODES      Regional Lymph Nodes         Number of Lymph Nodes  Involved           5          Number of Lymph Nodes Examined           11      Primary Tumor (pT)       pT4      Regional Lymph Nodes (pN)       pN2    ADDITIONAL FINDINGS      Additional Findings       Biliary intraepithelial  neoplasia, low-grade (BilIN 1) with foveolar metaplasia     05/02/2024:  IR biopsy abdominal wall mass      Component 13 d ago   Final Pathologic Diagnosis BIOPSY OF MASS FROM ABDOMINAL WALL:  METASTATIC ADENOCARCINOMA CONSISTENT WITH PANCREATIC ORIGIN       IMAGING     12/30/2020 CT Abdomen Pelvis Wwout Contrast (OLOL)  IMPRESSION:    1.  Arterial enhancing mass within the right hepatic lobe that is isodense on subsequent portal venous phase and delayed phase suggestive of a hemangioma. Follow-up CT of the liver three-phase or MRI of the abdomen with and without contrast in 6-12   months is recommended to confirm stability.    2.  Fluid distention of the stomach that may be secondary to ingestion of a recent meal or gastric bowel obstruction.   3. Nonspecific  subcentimeter nodules in the right lung base. Fleischner Society 2017 guidelines for management of incidentally detected solid pulmonary nodules in adults (does not apply to patients younger than 35 years, CT lung cancer screening,   patients with immunosuppression or patients with known primary cancer): Multiple solid nodules < 6 mm in diameter: For low risk patients no routine follow-up. For high risk patents optional CT at 12 months.   4. Postoperative changes of hysterectomy.   5. Nonobstructing left nephrolithiasis.   6. Possible reflux esophagitis.   7. Colonic diverticulosis.     02/11/2021  CT Chest, abdomen, and Pelvis  Impression:   1. Wall thickening and luminal narrowing of the 3rd portion of the duodenum concerning for neoplasm.  2. Small paraduodenal node measuring 7 mm.  3. Punctate nonobstructing left-sided nephrolith.  No hydronephrosis.     02/12/2021:  CT Abdomen/ Pelvis  Impression:   1. Focal wall thickening of the 3rd portion of duodenum, previously obstructing, now status post gastrostomy and jejunal tube placements.  This could represent duodenal carcinoma or lymphoma.  Pancreatic tumor thought less likely.  Minimal lymph node enlargement in the region.  No distant metastasis detected.  2. Status post hysterectomy and other findings as above.    02/21/2021  CTA Abdomen and Pelvis  Impression:   Postoperative changes of Whipple procedure and jejunostomy tube change as well as right quadrant terminating presumable drainage catheter.  Scattered free air, which may be postsurgical,  however unable to completely exclude bowel perforation.   Trace abdominal free fluid, small focal fluid collection without well-defined walls along the posterior aspect of the pancreas, mesenteric edema, and mild small bowel wall thickening near the bipin hepatis, possibly expected postsurgical changes.   Prominent loops of large bowel and distal small bowel measuring up to 3.0 cm.  No definite transition point.   Findings likely represent ileus.  No pneumatosis or portal venous gas.   Trace pleural effusions left greater than right with associated atelectasis.   Hepatomegaly.   No pseudoaneurysm or convincing evidence of active bleeding or hematoma.     03/01/2021  CT Abdomen/ Pelvis  Impression:   1. Status post Whipple procedure.  Peripherally enhancing collection adjacent to the resection bed as above, nonspecific but concerning for abscess.  Small volume abdominal and pelvic free fluid.  2. Persistent haziness and wall thickening of small bowel loops in the bipin hepatis, possibly postoperative.  3. Trace pleural effusions with associated atelectasis, similar to prior.  4. Additional findings as above.    05/10/2021 CT Abdomen/ Pelvis  Impression:   Stable postoperative changes of a Whipple procedure interval resolution of the previously demonstrated fluid collection in the postoperative bed.  Resolution of previously noted trace left pleural effusion.  Other findings as discussed in the body of the report at above.    11/22/2021  CT CAP  Multiple small scattered bilateral pulmonary nodules all measuring less than 3 mm.  Findings may have been present on CT from 02/11/2021 although this is difficult to ascertain given differences in technique/slice acquisition.  At minimum, continued follow-up is suggested.   Stable postoperative changes of Whipple procedure without definitive evidence of residual/recurrent disease.    03/07/2022  CT  CAP  Stable CT examination of the chest, abdomen, and pelvis.  No definite evidence of recurrent or metastatic disease.     10/05/2022  CT CAP  Overall no detrimental change with findings as above.     04/12/2023  CT CAP  1.  No concerning interval change compared to the prior 2 examinations.  2.  Postoperative findings within the right upper quadrant likely with partial pancreatectomy and small bowel resection.  Loops of bowel are somewhat matted to the surface of the liver which is  unchanged.  No definite masslike abnormality within this region.  3.  Hepatomegaly and fatty infiltration of the liver.  4.  Punctate nonobstructing stones appear left kidney.     10/11/2023  CT CAP  Stable postsurgical changes in the right upper quadrant without evidence of recurrent or metastatic disease in the chest abdomen or pelvis.     04/19/2024:  CT CAP  1.  There is a new large heterogeneous mass identified in the anterior abdominal wall at the level of the umbilicus extending to the left of midline and measuring 6.8 x 5.1 x 4.6 cm.  New increased skin thickening is also visible in the umbilicus immediately anterior to this lesion.  Tumor recurrence is a consideration although rectus sheath hematoma or an inflammatory process are also considerations.  This lesion is easily amenable to percutaneous biopsy.  Characterization of this lesion by ultrasound should also be considered.  2.  Stable postsurgical changes following resection of the duodenum, gastric antrum and head of the pancreas with gastrojejunostomy.  3.  Hepatomegaly and generalized hepatic steatosis.  4.  Small nonobstructing intrarenal calculus on the left.  5.  A stable 6 mm faint nodule in the right upper lobe is unchanged since 04/12/2023.  6.  Status post hysterectomy.    07/08/2024:  CT CAP  Decreased size of a ventral abdominal wall mass measuring 4.5 x 2.0 cm (series 3, image 92), previously 6.8 x 4.5 cm.  No new lesion.  Diastasis of the rectus abdominus with protrusion of the abdominal fat and: Midline.   No acute fracture or aggressive bone lesion.  Mild degenerative changes of the spine.   Impression:   1. Decreased size of a ventral abdominal wall mass.  No evidence of new metastatic disease.  2. Postsurgical changes of Whipple procedure, duodenal resection, and gastrojejunostomy.  3. Stable 6 mm ground-glass nodule in the right upper lobe.  Additional stable bilateral pulmonary micro nodules.  4. Hepatomegaly and steatosis.  5.  Splenomegaly.  6. Additional findings as above.    09/10/2024:  CT Chest, Abdomen, and pelvis  Impression:   History of duodenal malignancy status post Whipple procedure without CT evidence of local tumor recurrence.  There is continued marked decrease size of the left rectus abdominis mass, umbilicus level, now with only minimal residual soft tissue thickening, markedly improved compared to April 2024.  Hepatosplenomegaly.  Fatty liver.  Stable multiple bilateral tiny pulmonary nodules, majority 3 to 4 mm.  Stable right middle lobe groundglass nodule, 5 mm.  No worrisome new or enlarging pulmonary nodule.  Stable sclerotic lesion first thoracic vertebral body, 1.2 cm, and stable lucent lesion ninth thoracic vertebra body, 0.8 cm.    01/10/2025:  CT Chest, Abdomen, and Pelvis  Stomach/Bowel/abdominal wall: Status post Whipple. Remaining stomach unremarkable. Small bowel nonobstructive. No acute large bowel abnormality. Large bowel containing upper ventral hernia again noted. No detrimental change at the site of previously noted ventral abdominal wall mass. Left rectus muscle thickening is slightly less prominent.     ASSESSMENT & PLAN     1. Duodenal cancer    2. Incisional hernia, without obstruction or gangrene    3. Duodenal adenocarcinoma       Karen Gutierrez is a 59 y.o. 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    She has now completed her chemotherapy for her recurrent disease.  Her tumor has shrunk significantly and she is ready for surgery.  We discussed plans for surgery today which will include resection of the entire metastatic disease likely also including the posterior rectus sheath and peritoneum as well as her fascia along that area.  We also discussed the potential need for resection of bowel as it does look like there was  some intestine closely adherent in this area.  I discussed with her that the resection of this we will likely leave a fascial defect that will require hernia repair she already had a hernia in this location.  Since we will be resecting the fascia she will need some sort of mesh for hernia repair and at this time we will plan to use a Phasix underlay mesh.  We discussed the possibility of needing to do a component separation if were not able to get any sort of closure on the top of that.  Risks and benefits of surgery were reviewed including risk of recurrent disease, bleeding, infection, damage to surrounding structures including the small bowel, colon, stomach, and other internal organs.  Additional risks of mesh infection, hernia recurrence, need for mesh explantation, seroma, and other imponderables were all discussed.  Patient has reviewed and is in agreement with proceeding forward.        Plan:  -- plan for resection on Thursday, Feb 6th - metastatectomy and ventral hernia repair with mesh, possible bowel resection   -- [facility] clinic evaluation      Ms. Gutierrez expressed understanding in regards to our discussion today. Many good questions were asked on today's visit, all of which were answered to their satisfaction.    Follow-up: Follow up in about 23 days (around 2/6/2025).                  Alicia Hernandez MD MS              Surgical Oncology              [facility] [place], [place]              Office: [phone]     Communications: 30 minutes were spent on today's visit in face-to-face and non face-to-face time with the patient. This patient was recently diagnosed with metastatic duodenal adenocarcinoma and the time was required to provide counseling and guidance regarding their new diagnosis. Time was spent reviewing all outside records and information pertaining to their work-up and formulating a treatment plan in line with standardized guidelines. Additional time was spent  communicating with referring physicians and facilities to facilitate the efficient exchange of previous healthcare records and radiographic imaging pertinent to the diagnosis and disease management.       Orders Placed This Encounter   Procedures    Ambulatory referral/consult to Pre-Admit     Standing Status:   Future     Standing Expiration Date:   2/14/2026     Referral Priority:   Routine     Referral Type:   Consultation     Referral Reason:   Specialty Services Required     Requested Specialty:   Internal Medicine     Number of Visits Requested:   1    Case Request Operating Room: LAPAROTOMY, EXPLORATORY, REPAIR, HERNIA, VENTRAL, EXCISION, SMALL INTESTINE     Order Specific Question:   Requested Time     Answer:   8:00 AM     Order Specific Question:   Medical Necessity:     Answer:   Medically Urgent [101]     Order Specific Question:   Case classification     Answer:   E - Elective [90]     Order Specific Question:   Is an on-site pathologist required for this procedure?     Answer:   [place]

## 2025-01-14 NOTE — H&P (VIEW-ONLY)
Surgical Oncology Clinic Note      Referring Provider: Dr. Enzo Daley   PCP: Han Arshad MD    Reason For Visit: Gastroesophageal:  duodenal malignancy- recurrent/ metastatic     Oncologic History:   Oncology History   Duodenal cancer   2/18/2021 Initial Diagnosis    Duodenal cancer     2/18/2021 Surgery    Preop Dx: Duodenal cancer  Postop Dx: same  Surgeon: Dr. Hitesh Díaz    Operative Procedure: Pylorus-preserving Whipple resection; segmental small bowel resection, Witzel jejunostomy     3/12/2021 Cancer Staged    Staging form: Small Intestine - Adenocarcinoma, AJCC 8th Edition  - Pathologic stage from 3/12/2021: Stage IIIB (pT4, pN2, cM0)     3/15/2021 Tumor Conference       [facility] UGI MULTIDISCIPLINARY TUMOR BOARD  PATIENT REVIEW FORM   ____________________________________________________________    CLINIC #: [id number]  DATE: 3/15/2021    DIAGNOSIS: duodenal CA    PRESENTER: Hitesh Díaz    PATIENT SUMMARY:   This 54 y/o female presented with high grade obstructing duodenal mass. CT scan from 2/2021 revealed focal wall thickening of 3rd portion of duodenum. EGD with bx performed and pathology revealed adenoma. However, based on clinical condition, it was assumed she had invasive cancer.     She underwent upfront Whipple per Dr. Hitesh Díaz on 2/18/2021. Today's presentation is for pathology review.   pT4N2; 3cm moderately diff intestinal type JENNIFER, invading into mesocolon and involves pancreatic parenchyma; margins negative, 5 out of 11 lymph nodes positive for malignancy. LVI present; intact staining    BOARD RECOMMENDATIONS:   Proceed with systemic adj FOLFOX chemotherapy    CONSULT NEEDED:     [] Surgery    [x] Hem/Onc in [place]    [] Rad/Onc    [] Dietary                 [] Social Service    [] Psychology       [] AES  [] Radiology     Pathologic Stage: Tumor 4 Node(s) 2  Metastasis 0   5 nodes out of 11 nodes were positive for malignancy      GROUP STAGE:  [] O    [] 1A     [] IB    [] IIA    [] IIB     [] IIIA     [x] IIIB     [] IIIC    []IV  [] Local recurrence     [] Regional recurrence     [] Distant recurrence   Metastatic site(s): none         [x] Terri'l Treatment Guidelines reviewed and care planned is consistent with guidelines.         (i.e., NCCN, NCI, PD, ACO, AUA, etc.)    PRESENTATION AT CANCER CONFERENCE:         [] Prospective    [x] Retrospective     [] Follow-Up       3/30/2021 - 10/21/2021 Chemotherapy    Treatment Summary   Plan Name: OP FOLFOX 6 Q2W  Treatment Goal: Control  Status: Inactive  Start Date: 3/30/2021  End Date: 10/21/2021  Provider: Enzo Daley MD  Chemotherapy: dexAMETHasone (DECADRON) 4 MG Tab, 8 mg, Oral, Daily, 1 of 1 cycle, Start date: 3/24/2021, End date: --  prochlorperazine (COMPAZINE) 10 MG tablet, 10 mg, Oral, 3 times daily PRN, 1 of 1 cycle, Start date: 3/23/2021, End date: --  fluorouraciL injection 825 mg, 400 mg/m2 = 825 mg, Intravenous, Clinic/HOD 1 time, 12 of 12 cycles  Administration: 825 mg (3/30/2021), 825 mg (4/13/2021), 825 mg (4/27/2021), 825 mg (5/11/2021), 825 mg (5/25/2021), 825 mg (6/22/2021), 825 mg (7/6/2021), 825 mg (8/3/2021), 825 mg (9/1/2021), 825 mg (9/14/2021), 825 mg (9/28/2021), 825 mg (10/19/2021)  fluorouracil (ADRUCIL) 2,400 mg/m2 = 4,945 mg in sodium chloride 0.9% 100 mL chemo infusion, 2,400 mg/m2 = 4,945 mg, Intravenous, Over 46 hours, 12 of 12 cycles  Administration: 4,945 mg (3/30/2021), 4,945 mg (4/13/2021), 4,945 mg (4/27/2021), 4,945 mg (5/11/2021), 4,945 mg (5/25/2021), 4,945 mg (6/22/2021), 4,945 mg (7/6/2021), 5,000 mg (8/3/2021), 4,945 mg (9/1/2021), 4,945 mg (9/14/2021), 5,000 mg (9/28/2021), 4,945 mg (10/19/2021)  leucovorin calcium 400 mg/m2 = 825 mg in dextrose 5 % 291.25 mL infusion, 400 mg/m2 = 825 mg, Intravenous, Clinic/\[facility]\"" 1 time, 12 of 12 cycles  Administration: 825 mg (3/30/2021), 825 mg (4/13/2021), 800 mg (4/27/2021), 800 mg (5/11/2021), 825 mg (5/25/2021), 825 mg (6/22/2021), 800 mg  (7/6/2021), 825 mg (8/3/2021), 800 mg (9/1/2021), 800 mg (9/14/2021), 825 mg (9/28/2021), 800 mg (10/19/2021)  oxaliplatin (ELOXATIN) 85 mg/m2 = 175 mg in dextrose 5 % 535 mL chemo infusion, 85 mg/m2 = 175 mg, Intravenous, Clinic/HOD 1 time, 12 of 12 cycles  Administration: 175 mg (3/30/2021), 175 mg (4/13/2021), 175 mg (4/27/2021), 175 mg (5/11/2021), 175 mg (5/25/2021), 175 mg (6/22/2021), 175 mg (7/6/2021), 175 mg (8/3/2021), 175 mg (9/1/2021), 175 mg (9/14/2021), 175 mg (9/28/2021), 175 mg (10/19/2021)      Tumor Markers    Immunohistochemical studies for DNA mismatch repair proteins were performed on this tumor (block 4J)  and they demonstrate INTACT STAINING in all 4 proteins (MLH1, PMS2, MSH2, and MSH6). These results  indicate that this tumor is microsatellite stable (BERNA) and that Estrada syndrome (Hereditary Nonpolyposis  Colorectal Cancer, HNPCC) is unlikely     3/31/2021 Genetic Testing    Patient has genetic testing done for [employer] My Risk Germline Testing.                                           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 #[id number] date 03/31/2021     5/15/2024 Cancer Staged    Staging form: Small Intestine - Adenocarcinoma, AJCC 8th Edition  - Pathologic stage from 5/15/2024: Stage IV (cM1)     5/20/2024 -  Chemotherapy    Treatment Summary   Plan Name: OP GI mFOLFOX6 (oxaliplatin leucovorin fluorouracil) Q2W --Q3W  Treatment Goal: Control  Status: Active  Start Date: 5/20/2024  End Date: 1/2/2025  Provider: Enzo Daley MD  Chemotherapy: fluorouraciL injection 885 mg, 400 mg/m2 = 885 mg, Intravenous, Clinic/HOD 1 time, 12 of 12 cycles  Administration: 885 mg (5/20/2024), 885 mg (6/3/2024), 885 mg (6/17/2024), 890 mg (7/1/2024), 880 mg (7/15/2024), 880 mg (8/5/2024), 880 mg (8/19/2024), 880 mg (9/3/2024), 855 mg (9/23/2024), 850 mg (10/21/2024), 840 mg (12/3/2024), 830 mg (12/31/2024)  oxaliplatin (ELOXATIN) 85  mg/m2 = 188 mg in dextrose 5 % (D5W) 602.6 mL chemo infusion, 85 mg/m2 = 188 mg, Intravenous, Clinic/HOD 1 time, 12 of 12 cycles  Administration: 188 mg (5/20/2024), 188 mg (6/3/2024), 188 mg (6/17/2024), 189 mg (7/1/2024), 187 mg (7/15/2024), 187 mg (8/5/2024), 187 mg (8/19/2024), 187 mg (9/3/2024), 182 mg (9/23/2024), 180 mg (10/21/2024), 179 mg (12/3/2024), 176 mg (12/31/2024)  fluorouracil (Adrucil) 2,400 mg/m2 = 5,305 mg in sodium chloride 0.9% 240 mL chemo infusion, 2,400 mg/m2 = 5,305 mg, Intravenous, Over 46 hours, 12 of 12 cycles  Administration: 5,305 mg (5/20/2024), 5,305 mg (6/3/2024), 5,305 mg (6/17/2024), 5,330 mg (7/1/2024), 5,280 mg (7/15/2024), 5,280 mg (8/5/2024), 5,280 mg (8/19/2024), 5,280 mg (9/3/2024), 5,000 mg (9/23/2024), 5,000 mg (10/21/2024), 5,000 mg (12/3/2024), 4,970 mg (12/31/2024)           Staging:  Cancer Staging   Duodenal cancer  Staging form: Small Intestine - Adenocarcinoma, AJCC 8th Edition  - Pathologic stage from 3/12/2021: Stage IIIB (pT4, pN2, cM0) - Signed by Velvet Faith APRN, ANP-C on 3/14/2021  - Pathologic stage from 5/15/2024: Stage IV (cM1) - Signed by Alicia Hernandez MD on 5/15/2024       HISTORY       Karen Hernandezousmane is a 59 y.o. female with history of duodenal adenocarcinoma s/p  who presents today for evaluation and management of recurrent duodenal carcinoma.    She originally presented in February of 2021 with a completely obstructing duodenal mass.  She underwent G-tube and J-tube placement it was then followed with Dr. Hitesh Díaz in [place].  She subsequently underwent a Whipple on 02/11/2021.  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  Post op course was c/b abscess requiring drainage.  She received 12 cycles of FOLFOX adjuvantly which she tolerated well, and which she completed in November 2021.   Her GB and her J-tube both came out and she has been on surveillance since that point in time.  Surveillance imaging in April  of this year showed a new heterogeneous mass in the anterior abdominal wall measuring 6.8 x 5.1 x 4.6 cm concerning for tumor recurrence versus hematoma.  She underwent biopsy which did confirm metastatic disease.  She was discussed at tumor board with agreement for systemic chemotherapy and presents today for discussion of port placement.    Overall she feels well and is doing well.  She presents today with her sister.    INTERVAL HISTORY      08/14/2024:  She's had 4 cycles chemo so far.  She is tolerating it well.  Her tumor has shrunk significantly.       09/18/2024:  She's had 6 cycles so far.  She's doing very well.  She can no longer palpate her tumor at all.     1/14/2025:  she's had total of 12 cycles of chemo and done well.  Last cycle was 1/2/25.  Repeat imaging shows continued decrease in size/ stability of the rectus mass.  No new issues.        Past Medical History:   Diagnosis Date    Cancer     duodenal cancer    Hypertension     Hypotension, iatrogenic     Mitral valve prolapse        Past Surgical History:   Procedure Laterality Date    BUNIONECTOMY Left     HYSTERECTOMY  2000    INSERTION OF TUNNELED CENTRAL VENOUS CATHETER (CVC) WITH SUBCUTANEOUS PORT N/A 3/26/2021    Procedure: [id number];  Surgeon: Lauren Abraham MD;  Location: [facility] OR;  Service: General;  Laterality: N/A;    INSERTION OF VENOUS ACCESS PORT Right 5/13/2024    Procedure: INSERTION, VENOUS ACCESS PORT;  Surgeon: Alicia Hernandez MD;  Location: [facility] OR;  Service: General;  Laterality: Right;    LITHOTRIPSY      PLACEMENT OF JEJUNOSTOMY TUBE  2/18/2021    Procedure: INSERTION, JEJUNOSTOMY TUBE;  Surgeon: Hitesh Díaz MD;  Location: [facility] OR [address] [place] [address];  Service: General;;    ULTRASOUND GUIDANCE Right 5/13/2024    Procedure: ULTRASOUND GUIDANCE;  Surgeon: Alicia Hernandez MD;  Location: [facility] OR;  Service: General;  Laterality: Right;    WHIPPLE PROCEDURE N/A 2/18/2021    Procedure: WHIPPLE PROCEDURE;  Surgeon: Hitesh  JET Díaz MD;  Location: [facility] OR [address] [place] [address];  Service: General;  Laterality: N/A;       Family History   Problem Relation Name Age of Onset    Ovarian cancer Mother      Atrial fibrillation Mother      Stroke Mother      Hyperlipidemia Mother      Diabetes Mother      Bladder Cancer Father      Hypertension Father      Diabetes Father      No Known Problems Sister      Diabetes Maternal Grandmother      Colon cancer Maternal Grandmother      Stroke Maternal Grandfather      Diabetes Paternal Grandmother      No Known Problems Paternal Grandfather         Social History     Socioeconomic History    Marital status:    Tobacco Use    Smoking status: Former    Smokeless tobacco: Never   Substance and Sexual Activity    Alcohol use: Not Currently    Drug use: Never     Social Drivers of Health     Financial Resource Strain: Low Risk  (6/14/2024)    Overall Financial Resource Strain (CARDIA)     Difficulty of Paying Living Expenses: Not hard at all   Food Insecurity: No Food Insecurity (6/14/2024)    Hunger Vital Sign     Worried About Running Out of Food in the Last Year: Never true     Ran Out of Food in the Last Year: Never true   Physical Activity: Inactive (6/14/2024)    Exercise Vital Sign     Days of Exercise per Week: 0 days     Minutes of Exercise per Session: 0 min   Stress: No Stress Concern Present (6/14/2024)    [employer] [place] [employer] - Occupational Stress Questionnaire     Feeling of Stress : Not at all   Housing Stability: Unknown (6/14/2024)    Housing Stability Vital Sign     Unable to Pay for Housing in the Last Year: No          Medication List            Accurate as of January 14, 2025 10:56 AM. If you have any questions, ask your nurse or doctor.                CONTINUE taking these medications      amLODIPine 10 MG tablet  Commonly known as: NORVASC     atomoxetine 40 MG capsule  Commonly known as: STRATTERA  Take 1 capsule (40 mg total) by mouth once daily.     BYSTOLIC 20 mg  Tab  Generic drug: nebivoloL     cholecalciferol (vitamin D3) 125 mcg (5,000 unit) capsule     clonazePAM 0.5 MG tablet  Commonly known as: KlonoPIN  Take 1 tablet (0.5 mg total) by mouth nightly as needed for Anxiety.     cyanocobalamin 1000 MCG tablet  Commonly known as: VITAMIN B-12     EScitalopram oxalate 10 MG tablet  Commonly known as: LEXAPRO  Take 1 tablet (10 mg total) by mouth once daily.     LIDOcaine-prilocaine cream  Commonly known as: EMLA  Apply to affected area once     OLANZapine 5 MG tablet  Commonly known as: ZyPREXA  Take 1 tablet (5 mg total) by mouth every evening. Take nightly on days 1-3 of each chemotherapy cycle.     olmesartan 40 MG tablet  Commonly known as: BENICAR  TAKE 1 TABLET(40 MG) BY MOUTH EVERY DAY     ondansetron 4 MG Tbdl  Commonly known as: ZOFRAN-ODT  Take 1 tablet (4 mg total) by mouth 2 (two) times daily.     prochlorperazine 10 MG tablet  Commonly known as: COMPAZINE  Take 1 tablet (10 mg total) by mouth every 6 (six) hours as needed for Nausea.              Review of patient's allergies indicates:   Allergen Reactions    Benzalkonium chloride Hives    Codeine Itching    Morphine Nausea Only    Neosporin [neomycin-bacitracin-polymyxin] Hives    Sulfa (sulfonamide antibiotics) Hives and Itching           Morphine sulfate Other (See Comments)    Sulfamethoxazole-trimethoprim Other (See Comments)    Hydrocortisone Hives     Redness / can use bactroban         Review of Systems   Constitutional:  Negative for chills, fever, malaise/fatigue and weight loss.   Respiratory:  Negative for cough, shortness of breath and wheezing.    Cardiovascular:  Negative for chest pain and palpitations.   Gastrointestinal:  Negative for abdominal pain, constipation, diarrhea, nausea and vomiting.   Musculoskeletal:  Negative for back pain, falls and joint pain.   Neurological:  Negative for dizziness, sensory change, speech change, focal weakness, seizures, loss of consciousness and weakness.    Psychiatric/Behavioral:  Negative for depression and hallucinations. The patient is not nervous/anxious.          Vitals:    01/14/25 1013   BP: 137/87   Pulse: 74   Temp: 97.7 °F (36.5 °C)     Body mass index is 25.44 kg/m².  ECOG SCORE    0 - Fully active-able to carry on all pre-disease performance without restriction         PHYSICAL EXAM     Physical Exam  Vitals reviewed.   Constitutional:       General: She is not in acute distress.     Appearance: Normal appearance.   HENT:      Head: Normocephalic and atraumatic.      Mouth/Throat:      Mouth: Mucous membranes are moist.   Eyes:      General: No scleral icterus.     Extraocular Movements: Extraocular movements intact.      Conjunctiva/sclera: Conjunctivae normal.   Pulmonary:      Effort: Pulmonary effort is normal.   Abdominal:      General: There is no distension.      Palpations: Abdomen is soft.      Tenderness: There is no abdominal tenderness.      Comments: No palpable lesion.  Palpable hernia along upper midline    Musculoskeletal:         General: No swelling. Normal range of motion.   Skin:     General: Skin is warm and dry.   Neurological:      General: No focal deficit present.      Mental Status: She is alert.   Psychiatric:         Mood and Affect: Mood normal.         Behavior: Behavior normal.         Thought Content: Thought content normal.         Judgment: Judgment normal.          DATA REVIEW:  I personally reviewed the following records for this visit: lab work from prior visit, notes from other physicians, computed tomography/CT imaging, surgical pathology results, radiographic study evaluation, and laboratory results done by primary care physician    LABS       Lab Results   Component Value Date    WBC 9.92 12/31/2024    HGB 13.3 12/31/2024    HCT 40.8 12/31/2024    PLT 77 (L) 12/31/2024     Lab Results   Component Value Date     (H) 12/31/2024    CALCIUM 8.8 12/31/2024    ALBUMIN 4.0 12/31/2024    PROT 6.9 12/31/2024    NA  137 12/31/2024    K 4.0 12/31/2024    CO2 15 (L) 12/31/2024     12/31/2024    BUN 23 (H) 12/31/2024    CREATININE 1.3 12/31/2024    ALKPHOS 242 (H) 12/31/2024    ALT 56 (H) 12/31/2024     (H) 12/31/2024    BILITOT 0.5 12/31/2024       Nutritional:  Lab Results   Component Value Date    PREALBUMIN 11 (L) 03/04/2021       Tumor Markers:  Lab Results   Component Value Date    CEA 1.7 12/30/2024     Lab Results   Component Value Date     6.7 12/30/2024       PATHOLOGY     2/18/2021- Whipple (Dr. Hitesh Díaz)      Component 3 yr ago   Final Pathologic Diagnosis 1.  GALLBLADDER, CHOLECYSTECTOMY:  - Gallbladder with changes suggestive of cholesterolosis  - Fragment of hepatic parenchyma with no significant histopathologic  abnormality  - One benign lymph node (0/1)  2.  LYMPH NODE, HEPATIC ARTERY, EXCISION:  - One benign lymph node, negative for metastatic disease (0/1)  3.  LYMPH NODE, NETTIE HEPATITIS, EXCISION:  - One benign lymph node, negative for metastatic disease (0/1)  4.  DUODENUM AND PANCREAS, PANCREATICODUODENECTOMY (WHIPPLE PROCEDURE)  (JM2N-0A):  - Invasive duodneal adenocarcinoma, intestinal-type,  moderately-differentiated, measuring 3.0 in greatest dimension  - Carcinoma involves pancreatic parenchyma and mesentery (mesocolon) of  adjacent transverse colon, pT4  - Metastatic adenocarcinoma present in five of eleven lymph nodes (5/11), pN2  - Surgical resection margins negative for neoplasia  - Please see comments for synoptic report  5.  SMALL INTESTINE, PARTIAL RESECTION:  - Segment of small intestine with a full thickness defect associated with  mucosal ulceration and acute serositis  - Resection margins viable  - No evidence of dysplasia or malignancy (multiple deeper levels examined)         Immunohistochemical studies for DNA mismatch repair proteins were performed  on this tumor (block 4J) and they demonstrate INTACT STAINING in all 4  proteins (MLH1, PMS2, MSH2, and MSH6).  These  results indicate that this  tumor is microsatellite stable (BERNA) and that Estrada syndrome (Hereditary  Nonpolyposis Colorectal Cancer, HNPCC) is unlikely.  SMALL INTESTINE:    SPECIMEN      Procedure       Pancreaticoduodenectomy  (Whipple resection)    TUMOR      Tumor Site       Duodenum      Histologic Type:       Adenocarcinoma (not otherwise characterized)      Histologic Grade       G2: Moderately differentiated      Tumor Size       Greatest Dimension (Centimeters) 3.0 cm      Tumor Extension       Tumor invades other organs / structures  Other Organs / Structures           Mesentery of adjacent loops of bowel            Pancreas      Macroscopic Tumor Perforation       Not identified      Lymphovascular Invasion       Present          Margins Examined           Proximal            Distal            Uncinate            Bile duct            Pancreatic    LYMPH NODES      Regional Lymph Nodes         Number of Lymph Nodes  Involved           5          Number of Lymph Nodes Examined           11      Primary Tumor (pT)       pT4      Regional Lymph Nodes (pN)       pN2    ADDITIONAL FINDINGS      Additional Findings       Biliary intraepithelial  neoplasia, low-grade (BilIN 1) with foveolar metaplasia     05/02/2024:  IR biopsy abdominal wall mass      Component 13 d ago   Final Pathologic Diagnosis BIOPSY OF MASS FROM ABDOMINAL WALL:  METASTATIC ADENOCARCINOMA CONSISTENT WITH PANCREATIC ORIGIN       IMAGING     12/30/2020 CT Abdomen Pelvis Wwout Contrast (OLOL)  IMPRESSION:    1.  Arterial enhancing mass within the right hepatic lobe that is isodense on subsequent portal venous phase and delayed phase suggestive of a hemangioma. Follow-up CT of the liver three-phase or MRI of the abdomen with and without contrast in 6-12   months is recommended to confirm stability.    2.  Fluid distention of the stomach that may be secondary to ingestion of a recent meal or gastric bowel obstruction.   3. Nonspecific  subcentimeter nodules in the right lung base. Fleischner Society 2017 guidelines for management of incidentally detected solid pulmonary nodules in adults (does not apply to patients younger than 35 years, CT lung cancer screening,   patients with immunosuppression or patients with known primary cancer): Multiple solid nodules < 6 mm in diameter: For low risk patients no routine follow-up. For high risk patents optional CT at 12 months.   4. Postoperative changes of hysterectomy.   5. Nonobstructing left nephrolithiasis.   6. Possible reflux esophagitis.   7. Colonic diverticulosis.     02/11/2021  CT Chest, abdomen, and Pelvis  Impression:   1. Wall thickening and luminal narrowing of the 3rd portion of the duodenum concerning for neoplasm.  2. Small paraduodenal node measuring 7 mm.  3. Punctate nonobstructing left-sided nephrolith.  No hydronephrosis.     02/12/2021:  CT Abdomen/ Pelvis  Impression:   1. Focal wall thickening of the 3rd portion of duodenum, previously obstructing, now status post gastrostomy and jejunal tube placements.  This could represent duodenal carcinoma or lymphoma.  Pancreatic tumor thought less likely.  Minimal lymph node enlargement in the region.  No distant metastasis detected.  2. Status post hysterectomy and other findings as above.    02/21/2021  CTA Abdomen and Pelvis  Impression:   Postoperative changes of Whipple procedure and jejunostomy tube change as well as right quadrant terminating presumable drainage catheter.  Scattered free air, which may be postsurgical,  however unable to completely exclude bowel perforation.   Trace abdominal free fluid, small focal fluid collection without well-defined walls along the posterior aspect of the pancreas, mesenteric edema, and mild small bowel wall thickening near the bipin hepatis, possibly expected postsurgical changes.   Prominent loops of large bowel and distal small bowel measuring up to 3.0 cm.  No definite transition point.   Findings likely represent ileus.  No pneumatosis or portal venous gas.   Trace pleural effusions left greater than right with associated atelectasis.   Hepatomegaly.   No pseudoaneurysm or convincing evidence of active bleeding or hematoma.     03/01/2021  CT Abdomen/ Pelvis  Impression:   1. Status post Whipple procedure.  Peripherally enhancing collection adjacent to the resection bed as above, nonspecific but concerning for abscess.  Small volume abdominal and pelvic free fluid.  2. Persistent haziness and wall thickening of small bowel loops in the bipin hepatis, possibly postoperative.  3. Trace pleural effusions with associated atelectasis, similar to prior.  4. Additional findings as above.    05/10/2021 CT Abdomen/ Pelvis  Impression:   Stable postoperative changes of a Whipple procedure interval resolution of the previously demonstrated fluid collection in the postoperative bed.  Resolution of previously noted trace left pleural effusion.  Other findings as discussed in the body of the report at above.    11/22/2021  CT CAP  Multiple small scattered bilateral pulmonary nodules all measuring less than 3 mm.  Findings may have been present on CT from 02/11/2021 although this is difficult to ascertain given differences in technique/slice acquisition.  At minimum, continued follow-up is suggested.   Stable postoperative changes of Whipple procedure without definitive evidence of residual/recurrent disease.    03/07/2022  CT  CAP  Stable CT examination of the chest, abdomen, and pelvis.  No definite evidence of recurrent or metastatic disease.     10/05/2022  CT CAP  Overall no detrimental change with findings as above.     04/12/2023  CT CAP  1.  No concerning interval change compared to the prior 2 examinations.  2.  Postoperative findings within the right upper quadrant likely with partial pancreatectomy and small bowel resection.  Loops of bowel are somewhat matted to the surface of the liver which is  unchanged.  No definite masslike abnormality within this region.  3.  Hepatomegaly and fatty infiltration of the liver.  4.  Punctate nonobstructing stones appear left kidney.     10/11/2023  CT CAP  Stable postsurgical changes in the right upper quadrant without evidence of recurrent or metastatic disease in the chest abdomen or pelvis.     04/19/2024:  CT CAP  1.  There is a new large heterogeneous mass identified in the anterior abdominal wall at the level of the umbilicus extending to the left of midline and measuring 6.8 x 5.1 x 4.6 cm.  New increased skin thickening is also visible in the umbilicus immediately anterior to this lesion.  Tumor recurrence is a consideration although rectus sheath hematoma or an inflammatory process are also considerations.  This lesion is easily amenable to percutaneous biopsy.  Characterization of this lesion by ultrasound should also be considered.  2.  Stable postsurgical changes following resection of the duodenum, gastric antrum and head of the pancreas with gastrojejunostomy.  3.  Hepatomegaly and generalized hepatic steatosis.  4.  Small nonobstructing intrarenal calculus on the left.  5.  A stable 6 mm faint nodule in the right upper lobe is unchanged since 04/12/2023.  6.  Status post hysterectomy.    07/08/2024:  CT CAP  Decreased size of a ventral abdominal wall mass measuring 4.5 x 2.0 cm (series 3, image 92), previously 6.8 x 4.5 cm.  No new lesion.  Diastasis of the rectus abdominus with protrusion of the abdominal fat and: Midline.   No acute fracture or aggressive bone lesion.  Mild degenerative changes of the spine.   Impression:   1. Decreased size of a ventral abdominal wall mass.  No evidence of new metastatic disease.  2. Postsurgical changes of Whipple procedure, duodenal resection, and gastrojejunostomy.  3. Stable 6 mm ground-glass nodule in the right upper lobe.  Additional stable bilateral pulmonary micro nodules.  4. Hepatomegaly and steatosis.  5.  Splenomegaly.  6. Additional findings as above.    09/10/2024:  CT Chest, Abdomen, and pelvis  Impression:   History of duodenal malignancy status post Whipple procedure without CT evidence of local tumor recurrence.  There is continued marked decrease size of the left rectus abdominis mass, umbilicus level, now with only minimal residual soft tissue thickening, markedly improved compared to April 2024.  Hepatosplenomegaly.  Fatty liver.  Stable multiple bilateral tiny pulmonary nodules, majority 3 to 4 mm.  Stable right middle lobe groundglass nodule, 5 mm.  No worrisome new or enlarging pulmonary nodule.  Stable sclerotic lesion first thoracic vertebral body, 1.2 cm, and stable lucent lesion ninth thoracic vertebra body, 0.8 cm.    01/10/2025:  CT Chest, Abdomen, and Pelvis  Stomach/Bowel/abdominal wall: Status post Whipple. Remaining stomach unremarkable. Small bowel nonobstructive. No acute large bowel abnormality. Large bowel containing upper ventral hernia again noted. No detrimental change at the site of previously noted ventral abdominal wall mass. Left rectus muscle thickening is slightly less prominent.     ASSESSMENT & PLAN     1. Duodenal cancer    2. Incisional hernia, without obstruction or gangrene    3. Duodenal adenocarcinoma       Karen Gutierrez is a 59 y.o. 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    She has now completed her chemotherapy for her recurrent disease.  Her tumor has shrunk significantly and she is ready for surgery.  We discussed plans for surgery today which will include resection of the entire metastatic disease likely also including the posterior rectus sheath and peritoneum as well as her fascia along that area.  We also discussed the potential need for resection of bowel as it does look like there was  some intestine closely adherent in this area.  I discussed with her that the resection of this we will likely leave a fascial defect that will require hernia repair she already had a hernia in this location.  Since we will be resecting the fascia she will need some sort of mesh for hernia repair and at this time we will plan to use a Phasix underlay mesh.  We discussed the possibility of needing to do a component separation if were not able to get any sort of closure on the top of that.  Risks and benefits of surgery were reviewed including risk of recurrent disease, bleeding, infection, damage to surrounding structures including the small bowel, colon, stomach, and other internal organs.  Additional risks of mesh infection, hernia recurrence, need for mesh explantation, seroma, and other imponderables were all discussed.  Patient has reviewed and is in agreement with proceeding forward.        Plan:  -- plan for resection on Thursday, Feb 6th - metastatectomy and ventral hernia repair with mesh, possible bowel resection   -- [facility] clinic evaluation      Ms. Gutierrez expressed understanding in regards to our discussion today. Many good questions were asked on today's visit, all of which were answered to their satisfaction.    Follow-up: Follow up in about 23 days (around 2/6/2025).                  Alicia Hernandez MD MS              Surgical Oncology              [facility] [place], [place]              Office: [phone]     Communications: 30 minutes were spent on today's visit in face-to-face and non face-to-face time with the patient. This patient was recently diagnosed with metastatic duodenal adenocarcinoma and the time was required to provide counseling and guidance regarding their new diagnosis. Time was spent reviewing all outside records and information pertaining to their work-up and formulating a treatment plan in line with standardized guidelines. Additional time was spent  communicating with referring physicians and facilities to facilitate the efficient exchange of previous healthcare records and radiographic imaging pertinent to the diagnosis and disease management.       Orders Placed This Encounter   Procedures    Ambulatory referral/consult to Pre-Admit     Standing Status:   Future     Standing Expiration Date:   2/14/2026     Referral Priority:   Routine     Referral Type:   Consultation     Referral Reason:   Specialty Services Required     Requested Specialty:   Internal Medicine     Number of Visits Requested:   1    Case Request Operating Room: LAPAROTOMY, EXPLORATORY, REPAIR, HERNIA, VENTRAL, EXCISION, SMALL INTESTINE     Order Specific Question:   Requested Time     Answer:   8:00 AM     Order Specific Question:   Medical Necessity:     Answer:   Medically Urgent [101]     Order Specific Question:   Case classification     Answer:   E - Elective [90]     Order Specific Question:   Is an on-site pathologist required for this procedure?     Answer:   [place]

## 2025-01-15 ENCOUNTER — LAB VISIT (OUTPATIENT)
Dept: LAB | Facility: HOSPITAL | Age: 60
End: 2025-01-15
Attending: NURSE PRACTITIONER
Payer: COMMERCIAL

## 2025-01-15 ENCOUNTER — TELEPHONE (OUTPATIENT)
Dept: SURGICAL ONCOLOGY | Facility: CLINIC | Age: 60
End: 2025-01-15
Payer: COMMERCIAL

## 2025-01-15 ENCOUNTER — OFFICE VISIT (OUTPATIENT)
Dept: INTERNAL MEDICINE | Facility: CLINIC | Age: 60
End: 2025-01-15
Payer: COMMERCIAL

## 2025-01-15 ENCOUNTER — ANESTHESIA EVENT (OUTPATIENT)
Dept: SURGERY | Facility: HOSPITAL | Age: 60
DRG: 354 | End: 2025-01-15
Payer: COMMERCIAL

## 2025-01-15 VITALS
HEART RATE: 80 BPM | OXYGEN SATURATION: 97 % | TEMPERATURE: 98 F | SYSTOLIC BLOOD PRESSURE: 154 MMHG | DIASTOLIC BLOOD PRESSURE: 84 MMHG

## 2025-01-15 DIAGNOSIS — I10 BENIGN HYPERTENSION: ICD-10-CM

## 2025-01-15 DIAGNOSIS — C17.0 DUODENAL CANCER: ICD-10-CM

## 2025-01-15 DIAGNOSIS — C17.0 DUODENAL CANCER: Primary | ICD-10-CM

## 2025-01-15 DIAGNOSIS — F41.1 GENERALIZED ANXIETY DISORDER: ICD-10-CM

## 2025-01-15 DIAGNOSIS — F32.A DEPRESSION, UNSPECIFIED DEPRESSION TYPE: ICD-10-CM

## 2025-01-15 DIAGNOSIS — M43.22 CERVICAL VERTEBRAL FUSION: ICD-10-CM

## 2025-01-15 DIAGNOSIS — F90.8 OTHER SPECIFIED ATTENTION DEFICIT HYPERACTIVITY DISORDER (ADHD): ICD-10-CM

## 2025-01-15 DIAGNOSIS — D69.6 THROMBOCYTOPENIA: ICD-10-CM

## 2025-01-15 DIAGNOSIS — K43.2 INCISIONAL HERNIA WITHOUT OBSTRUCTION OR GANGRENE: ICD-10-CM

## 2025-01-15 PROBLEM — F90.9 ADHD: Status: ACTIVE | Noted: 2025-01-15

## 2025-01-15 LAB
ALBUMIN SERPL BCP-MCNC: 4.1 G/DL (ref 3.5–5.2)
ALP SERPL-CCNC: 193 U/L (ref 40–150)
ALT SERPL W/O P-5'-P-CCNC: 42 U/L (ref 10–44)
ANION GAP SERPL CALC-SCNC: 9 MMOL/L (ref 8–16)
AST SERPL-CCNC: 47 U/L (ref 10–40)
BILIRUB SERPL-MCNC: 0.6 MG/DL (ref 0.1–1)
BUN SERPL-MCNC: 12 MG/DL (ref 6–20)
CALCIUM SERPL-MCNC: 8.9 MG/DL (ref 8.7–10.5)
CHLORIDE SERPL-SCNC: 108 MMOL/L (ref 95–110)
CO2 SERPL-SCNC: 26 MMOL/L (ref 23–29)
CREAT SERPL-MCNC: 0.9 MG/DL (ref 0.5–1.4)
EST. GFR  (NO RACE VARIABLE): >60 ML/MIN/1.73 M^2
GLUCOSE SERPL-MCNC: 104 MG/DL (ref 70–110)
POTASSIUM SERPL-SCNC: 4.1 MMOL/L (ref 3.5–5.1)
PROT SERPL-MCNC: 6.9 G/DL (ref 6–8.4)
SODIUM SERPL-SCNC: 143 MMOL/L (ref 136–145)

## 2025-01-15 PROCEDURE — 99999 PR PBB SHADOW E&M-EST. PATIENT-LVL III: CPT | Mod: PBBFAC,,,

## 2025-01-15 PROCEDURE — 80053 COMPREHEN METABOLIC PANEL: CPT | Performed by: NURSE PRACTITIONER

## 2025-01-15 PROCEDURE — 36415 COLL VENOUS BLD VENIPUNCTURE: CPT | Performed by: NURSE PRACTITIONER

## 2025-01-15 NOTE — PROGRESS NOTES
Preoperative History and Physical  Pre-Admit Testing                                                                   Chief Complaint: Preoperative evaluation     History of Present Illness:      Karen Gutierrez is a 59 y.o. female who presents to the office today for a preoperative consultation at the request of Dr. Hernandez and Dr. SHAWN Story who plans on performing LAPAROTOMY, EXPLORATORY, REPAIR, HERNIA, VENTRAL, and EXCISION, SMALL INTESTINE on February 6.     Functional Status:      The patient is able to climb a flight of stairs. The patient is able to ambulate independently without difficulty. The patient's functional status is not affected by the surgical problem. Patient's functional status is affected by fatigue. The patient's functional status is not affected by shortness of breath, chest pain, and dyspnea on exertion.    MET score greater than 4    Patient Anesthesia History:      History of Malignant Hyperthermia: no  History of Pseudocholinesterase Deficiency: no  History of PONV: no  History of difficult intubation: no  History of delayed emergence: yes- patient reports 1 episode post Lithotripsy  History of high fever after anesthesia: no    Family Anesthesia History:      History of Malignant Hyperthermia: no  History of Pseudocholinesterase Deficiency: no    Past Medical History:      Past Medical History:   Diagnosis Date    Anxiety     Cancer     duodenal cancer    Depression     Hypertension     Hypotension, iatrogenic     Mitral valve prolapse         Past Surgical History:      Past Surgical History:   Procedure Laterality Date    BUNIONECTOMY Left     HYSTERECTOMY  2000    INSERTION OF TUNNELED CENTRAL VENOUS CATHETER (CVC) WITH SUBCUTANEOUS PORT N/A 03/26/2021    Procedure: [id number];  Surgeon: Lauren Abraham MD;  Location: [facility];  Service: General;  Laterality: N/A;    INSERTION OF VENOUS ACCESS PORT Right 05/13/2024     Procedure: INSERTION, VENOUS ACCESS PORT;  Surgeon: Alicia Hernandez MD;  Location: [facility] OR;  Service: General;  Laterality: Right;    LITHOTRIPSY      PLACEMENT OF JEJUNOSTOMY TUBE  02/18/2021    Procedure: INSERTION, JEJUNOSTOMY TUBE;  Surgeon: Hitesh Díaz MD;  Location: [facility] OR 2ND FLR;  Service: General;;    ULTRASOUND GUIDANCE Right 05/13/2024    Procedure: ULTRASOUND GUIDANCE;  Surgeon: Alicia Hernandez MD;  Location: [facility] OR;  Service: General;  Laterality: Right;    VARICOSE VEIN STRIPPING  2015    WHIPPLE PROCEDURE N/A 02/18/2021    Procedure: WHIPPLE PROCEDURE;  Surgeon: Hitesh Díaz MD;  Location: [facility] OR 2ND FLR;  Service: General;  Laterality: N/A;        Social History:      Social History     Socioeconomic History    Marital status:    Tobacco Use    Smoking status: Former    Smokeless tobacco: Never   Substance and Sexual Activity    Alcohol use: Not Currently    Drug use: Never     Social Drivers of Health     Financial Resource Strain: Low Risk  (6/14/2024)    Overall Financial Resource Strain (CARDIA)     Difficulty of Paying Living Expenses: Not hard at all   Food Insecurity: No Food Insecurity (6/14/2024)    Hunger Vital Sign     Worried About Running Out of Food in the Last Year: Never true     Ran Out of Food in the Last Year: Never true   Physical Activity: Inactive (6/14/2024)    Exercise Vital Sign     Days of Exercise per Week: 0 days     Minutes of Exercise per Session: 0 min   Stress: No Stress Concern Present (6/14/2024)    [employer] - Occupational Stress Questionnaire     Feeling of Stress : Not at all   Housing Stability: Unknown (6/14/2024)    Housing Stability Vital Sign     Unable to Pay for Housing in the Last Year: No        Family History:      Family History   Problem Relation Name Age of Onset    Ovarian cancer Mother      Atrial fibrillation Mother      Stroke Mother      Hyperlipidemia Mother      Diabetes Mother      Bladder  Cancer Father      Hypertension Father      Diabetes Father      No Known Problems Sister      Diabetes Maternal Grandmother      Colon cancer Maternal Grandmother      Stroke Maternal Grandfather      Diabetes Paternal Grandmother      No Known Problems Paternal Grandfather         Allergies:      Review of patient's allergies indicates:   Allergen Reactions    Benzalkonium chloride Hives    Codeine Itching    Morphine Nausea Only    Neosporin [neomycin-bacitracin-polymyxin] Hives    Sulfa (sulfonamide antibiotics) Hives and Itching           Morphine sulfate Other (See Comments)    Sulfamethoxazole-trimethoprim Other (See Comments)    Hydrocortisone Hives     Redness / can use bactroban         Medications:      Current Outpatient Medications   Medication Sig    amLODIPine (NORVASC) 10 MG tablet Take 10 mg by mouth once daily.    atomoxetine (STRATTERA) 40 MG capsule Take 1 capsule (40 mg total) by mouth once daily.    BYSTOLIC 20 mg Tab Take 1 tablet by mouth once daily.    cholecalciferol, vitamin D3, 125 mcg (5,000 unit) capsule Take 5,000 Units by mouth once daily.    clonazePAM (KLONOPIN) 0.5 MG tablet Take 1 tablet (0.5 mg total) by mouth nightly as needed for Anxiety.    cyanocobalamin (VITAMIN B-12) 1000 MCG tablet Take 1,000 mcg by mouth once daily.    EScitalopram oxalate (LEXAPRO) 10 MG tablet Take 1 tablet (10 mg total) by mouth once daily.    LIDOcaine-prilocaine (EMLA) cream Apply to affected area once    OLANZapine (ZYPREXA) 5 MG tablet Take 1 tablet (5 mg total) by mouth every evening. Take nightly on days 1-3 of each chemotherapy cycle.    olmesartan (BENICAR) 40 MG tablet TAKE 1 TABLET(40 MG) BY MOUTH EVERY DAY    ondansetron (ZOFRAN-ODT) 4 MG TbDL Take 1 tablet (4 mg total) by mouth 2 (two) times daily.    prochlorperazine (COMPAZINE) 10 MG tablet Take 1 tablet (10 mg total) by mouth every 6 (six) hours as needed for Nausea.     No current facility-administered medications for this visit.        Vitals:      Vitals:    01/15/25 1027   BP: (!) 154/84   Pulse: 80   Temp: 97.5 °F (36.4 °C)       Review of Systems:        Constitutional: Negative for fever, chills, weight loss, malaise/fatigue and diaphoresis.   HENT: Negative for hearing loss, ear pain, nosebleeds, congestion, sore throat, neck pain, tinnitus and ear discharge.    Eyes: Negative for blurred vision, double vision, photophobia, pain, discharge and redness.   Respiratory: Negative for cough, hemoptysis, sputum production, shortness of breath, wheezing and stridor.    Cardiovascular: Negative for chest pain, palpitations, orthopnea, claudication, leg swelling and PND.   Gastrointestinal: Negative for heartburn, nausea, vomiting, abdominal pain, diarrhea, constipation, blood in stool and melena.   Genitourinary: Negative for dysuria, urgency, frequency, hematuria and flank pain.   Musculoskeletal: Negative for myalgias, back pain, joint pain and falls.   Skin: Negative for itching and rash.   Neurological: Negative for dizziness, tingling, tremors, sensory change, speech change, focal weakness, seizures, loss of consciousness, weakness and headaches.   Endo/Heme/Allergies: Negative for environmental allergies and polydipsia. Does not bruise/bleed easily.   Psychiatric/Behavioral: Negative for depression, suicidal ideas, hallucinations, memory loss and substance abuse. The patient is not nervous/anxious and does not have insomnia.    All 14 systems reviewed and negative except as noted above.    Physical Exam:      Constitutional: Appears well-developed, well-nourished and in no acute distress.  Patient is oriented to person, place, and time.   Head: Normocephalic and atraumatic. Mucous membranes moist.  Neck: Neck supple no mass. Abnormal ROM to cervical spine with decreased hyperextension   Cardiovascular: Normal rate and regular rhythm.  S1 S2 appreciated by ascultation.  Pulmonary/Chest: Effort normal and clear to auscultation bilaterally.  No respiratory distress.   Abdomen: Soft. Non-tender and non-distended. Bowel sounds are normal.   Neurological: Patient is alert and oriented to person, place and time. Moves all extremities.  Skin: Warm and dry. No lesions.  Extremities: No clubbing, cyanosis or edema.    Laboratory data:      Reviewed and noted in plan where applicable. Please see chart for full laboratory data.    Lab Results   Component Value Date    INR 0.9 05/02/2024    INR 1.0 03/02/2021       Lab Results   Component Value Date    WBC 9.92 12/31/2024    HGB 13.3 12/31/2024    HCT 40.8 12/31/2024    MCV 96 12/31/2024    PLT 77 (L) 12/31/2024     Comprehensive Metabolic Panel  Order: [id number]  Status: Final result  Dx: Duodenal cancer               Component Ref Range & Units 2 d ago  (1/15/25) 2 wk ago  (12/31/24) 2 wk ago  (12/30/24) 1 mo ago  (12/3/24) 2 mo ago  (10/21/24) 3 mo ago  (10/14/24) 3 mo ago  (9/23/24)   Sodium 136 - 145 mmol/L 143 137 141 142 142 144 143   Potassium 3.5 - 5.1 mmol/L 4.1 4.0 4.2 4.4 4.6 4.0 4.2   Chloride 95 - 110 mmol/L 108 105 106 108 106 110 108   CO2 23 - 29 mmol/L 26 15 Low  25 26 25 24 23   Glucose 70 - 110 mg/dL 104 131 High  136 High  111 High  128 High  136 High  118 High    BUN 6 - 20 mg/dL 12 23 High  15 16 13 11 22 High    Creatinine 0.5 - 1.4 mg/dL 0.9 1.3 1.0 0.9 1.0 0.9 1.1   Calcium 8.7 - 10.5 mg/dL 8.9 8.8 9.4 9.1 9.5 8.6 Low  9.2   Total Protein 6.0 - 8.4 g/dL 6.9 6.9 7.7 7.1 7.4 6.8 7.2   Albumin 3.5 - 5.2 g/dL 4.1 4.0 4.1 3.9 4.0 3.6 4.0   Total Bilirubin 0.1 - 1.0 mg/dL 0.6 0.5 CM 0.8 CM 0.7 CM 0.7 CM 0.6 CM 0.7 CM   Comment: For infants and newborns, interpretation of results should be based  on gestational age, weight and in agreement with clinical  observations.    Premature Infant recommended reference ranges:  Up to 24 hours.............<8.0 mg/dL  Up to 48 hours............<12.0 mg/dL  3-5 days..................<15.0 mg/dL  6-29 days.................<15.0 mg/dL   Alkaline Phosphatase  40 - 150 U/L 193 High  242 High  199 High  154 High  157 High  165 High  R 165 High  R   AST 10 - 40 U/L 47 High  102 High  54 High  56 High  65 High  55 High  54 High    ALT 10 - 44 U/L 42 56 High  40 41 48 High  39 41   eGFR >60 mL/min/1.73 m^2 >60 47 Abnormal  >60 >60 >60 >60 58 Abnormal    Anion Gap 8 - 16 mmol/L 9 17 High  10 8 11 10 12   Resulting Agency  [facility]              Narrative  Performed by: [facility]  Send normal result to authorizing provider's In Basket if  patient is active on MyChart:->Yes   Specimen Collected: 01/15/25 11:00 CST Last Resulted: 01/15/25 13:54 CST     Predictors of intubation difficulty:       Morbid obesity? no   Anatomically abnormal facies? no   Prominent incisors? no   Receding mandible? no   Short, thick neck? no   Neck range of motion: abnormal- pain with hyperextension due to s/p cervical fusion   Dentition: No chipped, loose, or missing teeth.  Based on the Modified Mallampati, patient is a mallampati score: II (hard and soft palate, upper portion of tonsils anduvula visible)    Cardiographics:      ECG: normal sinus rhythm    Echocardiogram on 9/21/2021: The left ventricle is normal in size with concentric remodeling and normal systolic function.  Normal left ventricular diastolic function.  Normal right ventricular size with normal right ventricular systolic function.  The estimated ejection fraction is 65%.    Imaging:      Chest x-ray: MediPort catheter tip in good position. Heart is normal in size. Lungs are clear. Lungs are clear per imaging on 12/31/2024.     Assessment and Plan:      1. Duodenal cancer  Assessment & Plan:  LAPAROTOMY, EXPLORATORY per Dr. Hernandez and  REPAIR, HERNIA, VENTRAL, with EXCISION, SMALL INTESTINE planned per Dr. Story on 2/6/2025     Known risk factors for perioperative complications: Anemia   ADHD   Difficulty with intubation is not anticipated.    Cardiac Risk Estimation: Based on the Revised Cardiac Risk index,  patient is a Class I risk with a 3.9 % risk of a major cardiac event in a low risk procedure.    1.) Preoperative workup as follows: chest x-ray, ECG, hemoglobin, hematocrit, electrolytes, creatinine, glucose.  2.) Change in medication regimen before surgery: discontinue ASA 6 days before surgery, discontinue NSAIDs 5 days before surgery, hold Metformin 24 hours prior to surgery. Hold all vitamins/supplements for 7 days prior to surgery, with the exception of Potassium and Iron supplementation. Hold semaglutide/tirzepatide for 7 days prior to surgery.   3.) Prophylaxis for cardiac events with perioperative beta-blockers: Bystolic continued  4.) Invasive hemodynamic monitoring perioperatively: at the discretion of anesthesiologist.  5.) Deep vein thrombosis prophylaxis postoperatively: regimen to be chosen by surgical team.  6.) Surveillance for postoperative MI with ECG immediately postoperatively and on postoperati ve days 1 and 2 AND troponin levels 24 hours postoperatively and on day 4 or hospital discharge (whichever comes first): at the discretion of anesthesiologist.  7.) Current medications which may produce withdrawal symptoms if withheld perioperatively: None  8.) Other measures: Postoperative hypertension management with IV hydralazine until able to take oral medications.  Postoperative incentive spirometry to prevent pneumonia.     Orders:  -     Ambulatory referral/consult to Pre-Admit  -     Comprehensive Metabolic Panel; Future; Expected date: 01/15/2025  -     CBC Auto Differential; Future; Expected date: 01/15/2025    2. Other specified attention deficit hyperactivity disorder (ADHD)  Assessment & Plan:  -Stratera continued  -patient advised to hold medication for 48 hours prior to procedure   -followed outpatient by Psychiatry       3. Benign hypertension  Assessment & Plan:  -moderate control   -BP elevated at 154/84 upon assessment in PAT  -Norvasc and Bystolic continued- take morning of surgery    -Olmesartan continued- hold morning of surgery   -followed outpatient by PCP      4. Generalized anxiety disorder  Assessment & Plan:  -improved on prescribed regime   -Klonopin continued nightly         5. Incisional hernia without obstruction or gangrene  Assessment & Plan:  -procedure planned per Dr. Hernandez and Dr. Story on 2/6/2025   -see plan for Duodenal cancer       6. Cervical vertebral fusion  Assessment & Plan:  -hx of cervical fusion   -Abnormal ROM to cervical spine with decreased hyperextension   -Tylenol continued as needed for pain   -careful attention to positioning during procedure       7. Depression, unspecified depression type  Assessment & Plan:  -improved on prescribed regime   -patient denies any symptoms upon exam   -Lexapro continued- take morning of surgery       8. Thrombocytopenia  Assessment & Plan:  -in the setting of Duodenal cancer   -PLT 77                  Electronically signed by Taylor Combs NP on 1/17/2025 at 10:29 AM.

## 2025-01-15 NOTE — PRE ADMISSION SCREENING
Pre op instructions reviewed with patient during Clinic Visit with Provider, verbalized understanding.    To confirm, Surgery is scheduled on 2/06/25. We will call you late afternoon the business day prior to surgery with your arrival time.    *Please report to the [facility] (1st Floor) located off of [facility] hospital (2nd Entrance/Building on the left, in front of the flag pole). Address: [address] [place] [address] [place]. [address]    INSTRUCTIONS IMPORTANT!!!  !!!NO FOOD after midnight! You may have clear liquids up to 3 hrs before your arrival to the Hospital!!!  Clear liquids include Gatorade, water, soda, black coffee or tea (no milk or creamer), and clear juices.  Clear liquids do NOT include anything with pulp or food particles (Chicken broth, ice cream, yogurt, Jello, etc.)  - NOTHING RED OR PURPLE    MORNING OF SURGERY, drink small sip of water with the following medications instructed by Pre-Admit Provider:  Norvasc, Bystolic, and Lexapro     *ADHD Medication: Stop taking Strattera 48 hrs prior to surgery, as this can affect the anesthesia used. Bariatric surgeries must HOLD 7 Days prior to surgery!    If you take Ozempic/ Mounjaro / Wegovy / Trulicity / Semaglutide, any weight loss injections OR PHENTERMINE --->>> PLEASE LET US KNOW IMMEDIATELY, as these medications need to be stopped 7 days prior to surgery!    *Patients should HOLD all vitamins, herbal supplements, weight loss medication, aspirin products & NSAIDS 7 days prior to surgery, as these can thin the blood. Ok to take Tylenol.    ____  Avoid Alcoholic beverages 3 days prior to surgery, as it can thin the blood.  ____  NO Acrylic/fake nails or nail polish worn day of surgery (specifically hand/arm & foot surgeries).  ____  NO powder, lotions, deodorants, oils or cream on body.  ____  Remove all jewelry, piercings, & foreign objects prior to arrival and leave at home.  ____  Remove Dentures, Hearing Aids & Contact Lens prior to  surgery.  ____  Bring photo ID and insurance information to hospital (Leave Valuables at Home).  ____  If going home the same day, arrange for a ride home. You will not be able to drive for 24 hrs if Anesthesia was used.   ____  Females (ages 11-60): may need to give a urine sample the morning of surgery; please see Pre op Nurse prior to using the restroom.  ____  Wear clean, loose fitting clothing to allow for dressings/ bandages.    Bathing Instructions:    -Shower with anti-bacterial Soap (ex: Hibiclens or Dial) the night before surgery and the morning of.   -Do not use Hibiclens on your face or genitals.   -Apply clean clothes after shower.  -Do not shave your face morning of surgery   -Do not shave your body 3 days prior to surgery unless instructed otherwise by your Surgeon.    [facility]Ride Policy:  Only 2 adults allowed in pre op/recovery area during your procedure. You MUST HAVE A RIDE HOME from a responsible adult that you know and trust. Medical Transport, Uber or Lyft can ONLY be used if patient has a responsible adult to accompany them during ride home.       *Signs and symptoms of Infection Before or After Surgery:               !!!If you experience any fever, chills, nausea/ vomiting, foul odor/ excessive drainage from surgical site, flu-like symptoms, new wounds or cuts, PLEASE CALL THE SURGEON OFFICE at [phone] or SEND MESSAGE THROUGH [employer] PORTAL!!!     *If you are running late day of surgery, please call the Surgery Dept @ [phone].    *Billing question, please call  [phone]     Thank you,  -[facility] Pre [facility].  [phone]-[phone]   or [phone]  M-F 7:30 am-4:00 pm (Closed Major Holidays)

## 2025-01-15 NOTE — ASSESSMENT & PLAN NOTE
-Stratera continued  -patient advised to hold medication for 48 hours prior to procedure   -followed outpatient by Psychiatry

## 2025-01-15 NOTE — DISCHARGE INSTRUCTIONS
Pre op instructions reviewed with patient during Clinic Visit with Provider, verbalized understanding.    To confirm, Surgery is scheduled on 2/06/25. We will call you late afternoon the business day prior to surgery with your arrival time.    *Please report to the [facility] (1st Floor) located off of [facility] (2nd Entrance/Building on the left, in front of the flag pole). Address: [address] [place] [address] [place]. [address]    INSTRUCTIONS IMPORTANT!!!  !!!NO FOOD after midnight! You may have clear liquids up to 3 hrs before your arrival to the Hospital!!!  Clear liquids include Gatorade, water, soda, black coffee or tea (no milk or creamer), and clear juices.  Clear liquids do NOT include anything with pulp or food particles (Chicken broth, ice cream, yogurt, Jello, etc.)  - NOTHING RED OR PURPLE    MORNING OF SURGERY, drink small sip of water with the following medications instructed by Pre-Admit Provider:  Norvasc, Bystolic, and Lexapro     *ADHD Medication: Stop taking Strattera 48 hrs prior to surgery, as this can affect the anesthesia used. Bariatric surgeries must HOLD 7 Days prior to surgery!    If you take Ozempic/ Mounjaro / Wegovy / Trulicity / Semaglutide, any weight loss injections OR PHENTERMINE --->>> PLEASE LET US KNOW IMMEDIATELY, as these medications need to be stopped 7 days prior to surgery!    *Patients should HOLD all vitamins, herbal supplements, weight loss medication, aspirin products & NSAIDS 7 days prior to surgery, as these can thin the blood. Ok to take Tylenol.    ____  Avoid Alcoholic beverages 3 days prior to surgery, as it can thin the blood.  ____  NO Acrylic/fake nails or nail polish worn day of surgery (specifically hand/arm & foot surgeries).  ____  NO powder, lotions, deodorants, oils or cream on body.  ____  Remove all jewelry, piercings, & foreign objects prior to arrival and leave at home.  ____  Remove Dentures, Hearing Aids & Contact Lens prior to  surgery.  ____  Bring photo ID and insurance information to hospital (Leave Valuables at Home).  ____  If going home the same day, arrange for a ride home. You will not be able to drive for 24 hrs if Anesthesia was used.   ____  Females (ages 11-60): may need to give a urine sample the morning of surgery; please see Pre op Nurse prior to using the restroom.  ____  Wear clean, loose fitting clothing to allow for dressings/ bandages.    Bathing Instructions:    -Shower with anti-bacterial Soap (ex: Hibiclens or Dial) the night before surgery and the morning of.   -Do not use Hibiclens on your face or genitals.   -Apply clean clothes after shower.  -Do not shave your face morning of surgery   -Do not shave your body 3 days prior to surgery unless instructed otherwise by your Surgeon.    [facility] Policy:  Only 2 adults allowed in pre op/recovery area during your procedure. You MUST HAVE A RIDE HOME from a responsible adult that you know and trust. Medical Transport, Uber or Lyft can ONLY be used if patient has a responsible adult to accompany them during ride home.       *Signs and symptoms of Infection Before or After Surgery:               !!!If you experience any fever, chills, nausea/ vomiting, foul odor/ excessive drainage from surgical site, flu-like symptoms, new wounds or cuts, PLEASE CALL THE SURGEON OFFICE at [phone] or SEND MESSAGE THROUGH [employer] PORTAL!!!     *If you are running late day of surgery, please call the Surgery Dept @ [phone].    *Billing question, please call  [phone]     Thank you,  -[facility] Pre [facility].  [phone]-[facility]   or [phone]  M-F 7:30 am-4:00 pm (Closed Major Holidays)

## 2025-01-15 NOTE — ASSESSMENT & PLAN NOTE
-moderate control   -BP elevated at 154/84 upon assessment in PAT  -Norvasc and Bystolic continued- take morning of surgery   -Olmesartan continued- hold morning of surgery   -followed outpatient by PCP

## 2025-01-15 NOTE — ASSESSMENT & PLAN NOTE
LAPAROTOMY, EXPLORATORY per Dr. Hernandez and  REPAIR, HERNIA, VENTRAL, with EXCISION, SMALL INTESTINE planned per Dr. Story on 2/6/2025     Known risk factors for perioperative complications: Anemia   ADHD   Difficulty with intubation is not anticipated.    Cardiac Risk Estimation: Based on the Revised Cardiac Risk index, patient is a Class I risk with a 3.9 % risk of a major cardiac event in a low risk procedure.    1.) Preoperative workup as follows: chest x-ray, ECG, hemoglobin, hematocrit, electrolytes, creatinine, glucose.  2.) Change in medication regimen before surgery: discontinue ASA 6 days before surgery, discontinue NSAIDs 5 days before surgery, hold Metformin 24 hours prior to surgery. Hold all vitamins/supplements for 7 days prior to surgery, with the exception of Potassium and Iron supplementation. Hold semaglutide/tirzepatide for 7 days prior to surgery.   3.) Prophylaxis for cardiac events with perioperative beta-blockers: Bystolic continued  4.) Invasive hemodynamic monitoring perioperatively: at the discretion of anesthesiologist.  5.) Deep vein thrombosis prophylaxis postoperatively: regimen to be chosen by surgical team.  6.) Surveillance for postoperative MI with ECG immediately postoperatively and on postoperati ve days 1 and 2 AND troponin levels 24 hours postoperatively and on day 4 or hospital discharge (whichever comes first): at the discretion of anesthesiologist.  7.) Current medications which may produce withdrawal symptoms if withheld perioperatively: None  8.) Other measures: Postoperative hypertension management with IV hydralazine until able to take oral medications.  Postoperative incentive spirometry to prevent pneumonia.

## 2025-01-17 PROBLEM — F41.1 GENERALIZED ANXIETY DISORDER: Status: ACTIVE | Noted: 2025-01-17

## 2025-01-17 PROBLEM — F32.A DEPRESSION: Status: ACTIVE | Noted: 2025-01-17

## 2025-01-17 PROBLEM — K43.2 INCISIONAL HERNIA WITHOUT OBSTRUCTION OR GANGRENE: Status: ACTIVE | Noted: 2025-01-17

## 2025-01-17 PROBLEM — M43.22 CERVICAL VERTEBRAL FUSION: Status: ACTIVE | Noted: 2025-01-17

## 2025-01-17 PROBLEM — D69.6 THROMBOCYTOPENIA: Status: ACTIVE | Noted: 2025-01-17

## 2025-01-17 NOTE — ASSESSMENT & PLAN NOTE
-improved on prescribed regime   -patient denies any symptoms upon exam   -Lexapro continued- take morning of surgery

## 2025-01-17 NOTE — ASSESSMENT & PLAN NOTE
-hx of cervical fusion   -Abnormal ROM to cervical spine with decreased hyperextension   -Tylenol continued as needed for pain   -careful attention to positioning during procedure

## 2025-01-28 ENCOUNTER — OFFICE VISIT (OUTPATIENT)
Dept: HEMATOLOGY/ONCOLOGY | Facility: CLINIC | Age: 60
End: 2025-01-28
Payer: COMMERCIAL

## 2025-01-28 ENCOUNTER — INFUSION (OUTPATIENT)
Dept: INFUSION THERAPY | Facility: HOSPITAL | Age: 60
End: 2025-01-28
Attending: INTERNAL MEDICINE
Payer: COMMERCIAL

## 2025-01-28 VITALS
HEART RATE: 75 BPM | WEIGHT: 184.31 LBS | OXYGEN SATURATION: 97 % | DIASTOLIC BLOOD PRESSURE: 84 MMHG | SYSTOLIC BLOOD PRESSURE: 129 MMHG | BODY MASS INDEX: 24.96 KG/M2 | RESPIRATION RATE: 18 BRPM | HEIGHT: 72 IN | TEMPERATURE: 98 F

## 2025-01-28 DIAGNOSIS — C79.89 SECONDARY MALIGNANCY OF SOFT TISSUES OF ABDOMEN: ICD-10-CM

## 2025-01-28 DIAGNOSIS — Z15.89 MONOALLELIC MUTATION OF MUTYH GENE: ICD-10-CM

## 2025-01-28 DIAGNOSIS — Z86.018 HISTORY OF DYSPLASTIC NEVUS: ICD-10-CM

## 2025-01-28 DIAGNOSIS — C17.0 DUODENAL CANCER: Primary | ICD-10-CM

## 2025-01-28 DIAGNOSIS — T45.1X5A PANCYTOPENIA DUE TO ANTINEOPLASTIC CHEMOTHERAPY: ICD-10-CM

## 2025-01-28 DIAGNOSIS — D61.810 PANCYTOPENIA DUE TO ANTINEOPLASTIC CHEMOTHERAPY: ICD-10-CM

## 2025-01-28 DIAGNOSIS — D50.9 IRON DEFICIENCY ANEMIA, UNSPECIFIED IRON DEFICIENCY ANEMIA TYPE: ICD-10-CM

## 2025-01-28 LAB
ALBUMIN SERPL BCP-MCNC: 4 G/DL (ref 3.5–5.2)
ALP SERPL-CCNC: 168 U/L (ref 40–150)
ALT SERPL W/O P-5'-P-CCNC: 43 U/L (ref 10–44)
ANION GAP SERPL CALC-SCNC: 10 MMOL/L (ref 8–16)
AST SERPL-CCNC: 51 U/L (ref 10–40)
BASOPHILS # BLD AUTO: 0.02 K/UL (ref 0–0.2)
BASOPHILS NFR BLD: 0.6 % (ref 0–1.9)
BILIRUB SERPL-MCNC: 0.7 MG/DL (ref 0.1–1)
BUN SERPL-MCNC: 16 MG/DL (ref 6–20)
CALCIUM SERPL-MCNC: 8.8 MG/DL (ref 8.7–10.5)
CHLORIDE SERPL-SCNC: 111 MMOL/L (ref 95–110)
CO2 SERPL-SCNC: 20 MMOL/L (ref 23–29)
CREAT SERPL-MCNC: 0.8 MG/DL (ref 0.5–1.4)
DIFFERENTIAL METHOD BLD: ABNORMAL
EOSINOPHIL # BLD AUTO: 0 K/UL (ref 0–0.5)
EOSINOPHIL NFR BLD: 0.3 % (ref 0–8)
ERYTHROCYTE [DISTWIDTH] IN BLOOD BY AUTOMATED COUNT: 14.2 % (ref 11.5–14.5)
EST. GFR  (NO RACE VARIABLE): >60 ML/MIN/1.73 M^2
GLUCOSE SERPL-MCNC: 107 MG/DL (ref 70–110)
HCT VFR BLD AUTO: 35.8 % (ref 37–48.5)
HGB BLD-MCNC: 12 G/DL (ref 12–16)
IMM GRANULOCYTES # BLD AUTO: 0 K/UL (ref 0–0.04)
IMM GRANULOCYTES NFR BLD AUTO: 0 % (ref 0–0.5)
LYMPHOCYTES # BLD AUTO: 0.9 K/UL (ref 1–4.8)
LYMPHOCYTES NFR BLD: 26.2 % (ref 18–48)
MCH RBC QN AUTO: 31.7 PG (ref 27–31)
MCHC RBC AUTO-ENTMCNC: 33.5 G/DL (ref 32–36)
MCV RBC AUTO: 95 FL (ref 82–98)
MONOCYTES # BLD AUTO: 0.4 K/UL (ref 0.3–1)
MONOCYTES NFR BLD: 11.7 % (ref 4–15)
NEUTROPHILS # BLD AUTO: 2.1 K/UL (ref 1.8–7.7)
NEUTROPHILS NFR BLD: 61.2 % (ref 38–73)
NRBC BLD-RTO: 0 /100 WBC
PLATELET # BLD AUTO: 116 K/UL (ref 150–450)
PMV BLD AUTO: 10.6 FL (ref 9.2–12.9)
POTASSIUM SERPL-SCNC: 4.5 MMOL/L (ref 3.5–5.1)
PROT SERPL-MCNC: 6.9 G/DL (ref 6–8.4)
RBC # BLD AUTO: 3.78 M/UL (ref 4–5.4)
SODIUM SERPL-SCNC: 141 MMOL/L (ref 136–145)
WBC # BLD AUTO: 3.43 K/UL (ref 3.9–12.7)

## 2025-01-28 PROCEDURE — 1159F MED LIST DOCD IN RCRD: CPT | Mod: CPTII,S$GLB,, | Performed by: INTERNAL MEDICINE

## 2025-01-28 PROCEDURE — 80053 COMPREHEN METABOLIC PANEL: CPT | Performed by: INTERNAL MEDICINE

## 2025-01-28 PROCEDURE — 3008F BODY MASS INDEX DOCD: CPT | Mod: CPTII,S$GLB,, | Performed by: INTERNAL MEDICINE

## 2025-01-28 PROCEDURE — 63600175 PHARM REV CODE 636 W HCPCS: Performed by: INTERNAL MEDICINE

## 2025-01-28 PROCEDURE — 84466 ASSAY OF TRANSFERRIN: CPT | Performed by: INTERNAL MEDICINE

## 2025-01-28 PROCEDURE — 82728 ASSAY OF FERRITIN: CPT | Performed by: INTERNAL MEDICINE

## 2025-01-28 PROCEDURE — 99999 PR PBB SHADOW E&M-EST. PATIENT-LVL IV: CPT | Mod: PBBFAC,,, | Performed by: INTERNAL MEDICINE

## 2025-01-28 PROCEDURE — 3079F DIAST BP 80-89 MM HG: CPT | Mod: CPTII,S$GLB,, | Performed by: INTERNAL MEDICINE

## 2025-01-28 PROCEDURE — 25000003 PHARM REV CODE 250: Performed by: INTERNAL MEDICINE

## 2025-01-28 PROCEDURE — A4216 STERILE WATER/SALINE, 10 ML: HCPCS | Performed by: INTERNAL MEDICINE

## 2025-01-28 PROCEDURE — 99214 OFFICE O/P EST MOD 30 MIN: CPT | Mod: S$GLB,,, | Performed by: INTERNAL MEDICINE

## 2025-01-28 PROCEDURE — 3074F SYST BP LT 130 MM HG: CPT | Mod: CPTII,S$GLB,, | Performed by: INTERNAL MEDICINE

## 2025-01-28 PROCEDURE — 36591 DRAW BLOOD OFF VENOUS DEVICE: CPT

## 2025-01-28 PROCEDURE — 85025 COMPLETE CBC W/AUTO DIFF WBC: CPT | Performed by: INTERNAL MEDICINE

## 2025-01-28 RX ORDER — HEPARIN 100 UNIT/ML
500 SYRINGE INTRAVENOUS
OUTPATIENT
Start: 2025-01-28

## 2025-01-28 RX ORDER — HEPARIN 100 UNIT/ML
500 SYRINGE INTRAVENOUS
Status: CANCELLED | OUTPATIENT
Start: 2025-01-28

## 2025-01-28 RX ORDER — SODIUM CHLORIDE 0.9 % (FLUSH) 0.9 %
10 SYRINGE (ML) INJECTION
OUTPATIENT
Start: 2025-01-28

## 2025-01-28 RX ORDER — HEPARIN 100 UNIT/ML
500 SYRINGE INTRAVENOUS
Status: DISCONTINUED | OUTPATIENT
Start: 2025-01-28 | End: 2025-01-28 | Stop reason: HOSPADM

## 2025-01-28 RX ORDER — SODIUM CHLORIDE 0.9 % (FLUSH) 0.9 %
10 SYRINGE (ML) INJECTION
Status: CANCELLED | OUTPATIENT
Start: 2025-01-28

## 2025-01-28 RX ORDER — SODIUM CHLORIDE 0.9 % (FLUSH) 0.9 %
10 SYRINGE (ML) INJECTION
Status: DISCONTINUED | OUTPATIENT
Start: 2025-01-28 | End: 2025-01-28 | Stop reason: HOSPADM

## 2025-01-28 RX ADMIN — HEPARIN 500 UNITS: 100 SYRINGE at 10:01

## 2025-01-28 RX ADMIN — Medication 10 ML: at 10:01

## 2025-01-28 NOTE — NURSING
"Pt here for labs from port and Mediport Flush. right chestwall mediport accessed with a 20g 1" layton via sterile technique.  Excellent blood return noted.  Flushed with 10ml NS and 5 ml heparin solution.  Needle D/C, site without redness, swelling, or drainage noted.  Dressing applied.  Patient tolerated well.   "

## 2025-01-28 NOTE — PROGRESS NOTES
Subjective:       Patient ID: Karen Gutierrez is a 59 y.o. female.    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  In his scheduled for abdominal wall resection.  ECOG status 1    Past Medical History:   Diagnosis Date    Anxiety     Cancer     duodenal cancer    Depression     Hypertension     Hypotension, iatrogenic     Mitral valve prolapse      Family History   Problem Relation Name Age of Onset    Ovarian cancer Mother      Atrial fibrillation Mother      Stroke Mother      Hyperlipidemia Mother      Diabetes Mother      Bladder Cancer Father      Hypertension Father      Diabetes Father      No Known Problems Sister      Diabetes Maternal Grandmother      Colon cancer Maternal Grandmother      Stroke Maternal Grandfather      Diabetes Paternal Grandmother      No Known Problems Paternal Grandfather       Social History     Socioeconomic History    Marital status:    Tobacco Use    Smoking status: Former    Smokeless tobacco: Never   Substance and Sexual Activity    Alcohol use: Not Currently    Drug use: Never     Social Drivers of Health     Financial Resource Strain: Low Risk  (6/14/2024)    Overall Financial Resource Strain (CARDIA)     Difficulty of Paying Living Expenses: Not hard at all   Food Insecurity: No Food Insecurity (6/14/2024)    Hunger Vital Sign     Worried About Running Out of Food in the Last Year: Never true     Ran Out of Food in the Last Year: Never true   Physical Activity: Inactive (6/14/2024)    Exercise Vital Sign     Days of Exercise per Week: 0 days     Minutes of Exercise per Session: 0 min   Stress: No Stress Concern Present (6/14/2024)    [employer] - Occupational Stress Questionnaire     Feeling of Stress : Not at all   Housing Stability: Unknown (6/14/2024)    Housing Stability Vital Sign      "Unable to Pay for Housing in the Last Year: No     Past Surgical History:   Procedure Laterality Date    BUNIONECTOMY Left     HYSTERECTOMY  2000    INSERTION OF TUNNELED CENTRAL VENOUS CATHETER (CVC) WITH SUBCUTANEOUS PORT N/A 03/26/2021    Procedure: [id number];  Surgeon: Lauren Abraham MD;  Location: [facility] OR;  Service: General;  Laterality: N/A;    INSERTION OF VENOUS ACCESS PORT Right 05/13/2024    Procedure: INSERTION, VENOUS ACCESS PORT;  Surgeon: Alicia Hernandez MD;  Location: [facility] OR;  Service: General;  Laterality: Right;    LITHOTRIPSY      PLACEMENT OF JEJUNOSTOMY TUBE  02/18/2021    Procedure: INSERTION, JEJUNOSTOMY TUBE;  Surgeon: Hitesh Díaz MD;  Location: [facility] OR 2ND FLR;  Service: General;;    ULTRASOUND GUIDANCE Right 05/13/2024    Procedure: ULTRASOUND GUIDANCE;  Surgeon: Alicia Hernandez MD;  Location: [facility] OR;  Service: General;  Laterality: Right;    VARICOSE VEIN STRIPPING  2015    WHIPPLE PROCEDURE N/A 02/18/2021    Procedure: WHIPPLE PROCEDURE;  Surgeon: Hitesh Díaz MD;  Location: [facility] OR 2ND FLR;  Service: General;  Laterality: N/A;       Labs:  Lab Results   Component Value Date    WBC 9.92 12/31/2024    HGB 13.3 12/31/2024    HCT 40.8 12/31/2024    MCV 96 12/31/2024    PLT 77 (L) 12/31/2024     BMP  Lab Results   Component Value Date     01/15/2025    K 4.1 01/15/2025     01/15/2025    CO2 26 01/15/2025    BUN 12 01/15/2025    CREATININE 0.9 01/15/2025    CALCIUM 8.9 01/15/2025    ANIONGAP 9 01/15/2025    ESTGFRAFRICA >60 03/07/2022    EGFRNONAA >60 03/07/2022     Lab Results   Component Value Date    ALT 42 01/15/2025    AST 47 (H) 01/15/2025    ALKPHOS 193 (H) 01/15/2025    BILITOT 0.6 01/15/2025       Lab Results   Component Value Date    IRON 42 05/10/2021    TIBC 404 05/10/2021    FERRITIN 345 (H) 05/10/2021     No results found for: "LDWSWEPU29"  No results found for: "FOLATE"  No results found for: "TSH"      Review of Systems   Constitutional: "  Negative for activity change, appetite change, chills, diaphoresis, fatigue, fever and unexpected weight change.   HENT:  Negative for congestion, dental problem, drooling, ear discharge, ear pain, facial swelling, hearing loss, mouth sores, nosebleeds, postnasal drip, rhinorrhea, sinus pressure, sneezing, sore throat, tinnitus, trouble swallowing and voice change.    Eyes:  Negative for photophobia, pain, discharge, redness, itching and visual disturbance.   Respiratory:  Negative for cough, choking, chest tightness, shortness of breath, wheezing and stridor.    Cardiovascular:  Negative for chest pain, palpitations and leg swelling.   Gastrointestinal:  Negative for abdominal distention, abdominal pain, anal bleeding, blood in stool, constipation, diarrhea, nausea, rectal pain and vomiting.   Endocrine: Negative for cold intolerance, heat intolerance, polydipsia, polyphagia and polyuria.   Genitourinary:  Negative for decreased urine volume, difficulty urinating, dyspareunia, dysuria, enuresis, flank pain, frequency, genital sores, hematuria, menstrual problem, pelvic pain, urgency, vaginal bleeding, vaginal discharge and vaginal pain.   Musculoskeletal:  Negative for arthralgias, back pain, gait problem, joint swelling, myalgias, neck pain and neck stiffness.   Skin:  Negative for color change, pallor and rash.   Allergic/Immunologic: Negative for environmental allergies, food allergies and immunocompromised state.   Neurological:  Negative for dizziness, tremors, seizures, syncope, facial asymmetry, speech difficulty, weakness, light-headedness, numbness and headaches.   Hematological:  Negative for adenopathy. Does not bruise/bleed easily.   Psychiatric/Behavioral:  Negative for agitation, behavioral problems, confusion, decreased concentration, dysphoric mood, hallucinations, self-injury, sleep disturbance and suicidal ideas. The patient is not nervous/anxious and is not hyperactive.        Objective:       Physical Exam  Vitals reviewed.   Constitutional:       General: She is not in acute distress.     Appearance: She is well-developed. She is not diaphoretic.   HENT:      Head: Normocephalic and atraumatic.      Right Ear: External ear normal.      Left Ear: External ear normal.      Nose: Nose normal.      Right Sinus: No maxillary sinus tenderness or frontal sinus tenderness.      Left Sinus: No maxillary sinus tenderness or frontal sinus tenderness.      Mouth/Throat:      Pharynx: No oropharyngeal exudate.   Eyes:      General: Lids are normal. No scleral icterus.        Right eye: No discharge.         Left eye: No discharge.      Conjunctiva/sclera: Conjunctivae normal.      Right eye: Right conjunctiva is not injected. No hemorrhage.     Left eye: Left conjunctiva is not injected. No hemorrhage.     Pupils: Pupils are equal, round, and reactive to light.   Neck:      Thyroid: No thyromegaly.      Vascular: No JVD.      Trachea: No tracheal deviation.   Cardiovascular:      Rate and Rhythm: Normal rate.   Pulmonary:      Effort: Pulmonary effort is normal. No respiratory distress.      Breath sounds: No stridor.   Chest:      Chest wall: No tenderness.   Abdominal:      General: Bowel sounds are normal. There is no distension.      Palpations: Abdomen is soft. There is no hepatomegaly, splenomegaly or mass.      Tenderness: There is no abdominal tenderness. There is no rebound.   Musculoskeletal:         General: No tenderness. Normal range of motion.      Cervical back: Normal range of motion and neck supple.   Lymphadenopathy:      Cervical: No cervical adenopathy.      Upper Body:      Right upper body: No supraclavicular adenopathy.      Left upper body: No supraclavicular adenopathy.   Skin:     General: Skin is dry.      Findings: No erythema or rash.   Neurological:      Mental Status: She is alert and oriented to person, place, and time.      Cranial Nerves: No cranial nerve deficit.       Coordination: Coordination normal.   Psychiatric:         Behavior: Behavior normal.         Thought Content: Thought content normal.         Judgment: Judgment normal.             Assessment:      1. 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 Pancytopenia due to antineoplastic chemotherapy    4. Iron deficiency anemia, unspecified iron deficiency anemia type    5. History of dysplastic nevus    6. Monoallelic mutation of MUTYH gene           Med Onc Chart Routing      Follow up with physician . CBC CMP serum iron TIBC ferritin   Follow up with [facility]    Infusion scheduling note    Injection scheduling note    Labs    Imaging    Pharmacy appointment    Other referrals                   Plan:     Will check blood counts today to see if adequate recovered from chemotherapy if iron deficient will treat with intravenous iron prior to surgical resection of 02/06/2025 orders written reviewed communicate through portal port flushed okay to use port in postoperative setting        Enzo Daley Jr, MD FACP

## 2025-01-29 ENCOUNTER — TELEPHONE (OUTPATIENT)
Dept: HEMATOLOGY/ONCOLOGY | Facility: CLINIC | Age: 60
End: 2025-01-29
Payer: COMMERCIAL

## 2025-01-29 LAB
FERRITIN SERPL-MCNC: 205 NG/ML (ref 20–300)
IRON SERPL-MCNC: 74 UG/DL (ref 30–160)
SATURATED IRON: 17 % (ref 20–50)
TOTAL IRON BINDING CAPACITY: 438 UG/DL (ref 250–450)
TRANSFERRIN SERPL-MCNC: 296 MG/DL (ref 200–375)

## 2025-01-29 RX ORDER — HEPARIN 100 UNIT/ML
500 SYRINGE INTRAVENOUS
Status: CANCELLED | OUTPATIENT
Start: 2025-01-29

## 2025-01-29 RX ORDER — DIPHENHYDRAMINE HYDROCHLORIDE 50 MG/ML
50 INJECTION INTRAMUSCULAR; INTRAVENOUS ONCE AS NEEDED
Status: CANCELLED | OUTPATIENT
Start: 2025-01-29

## 2025-01-29 RX ORDER — EPINEPHRINE 0.3 MG/.3ML
0.3 INJECTION SUBCUTANEOUS ONCE AS NEEDED
Status: CANCELLED | OUTPATIENT
Start: 2025-01-29

## 2025-01-29 RX ORDER — DIPHENHYDRAMINE HYDROCHLORIDE 50 MG/ML
50 INJECTION INTRAMUSCULAR; INTRAVENOUS ONCE AS NEEDED
OUTPATIENT
Start: 2025-02-05

## 2025-01-29 RX ORDER — EPINEPHRINE 0.3 MG/.3ML
0.3 INJECTION SUBCUTANEOUS ONCE AS NEEDED
OUTPATIENT
Start: 2025-02-05

## 2025-01-29 RX ORDER — HEPARIN 100 UNIT/ML
500 SYRINGE INTRAVENOUS
OUTPATIENT
Start: 2025-02-05

## 2025-01-29 RX ORDER — SODIUM CHLORIDE 0.9 % (FLUSH) 0.9 %
10 SYRINGE (ML) INJECTION
OUTPATIENT
Start: 2025-02-05

## 2025-01-29 RX ORDER — SODIUM CHLORIDE 0.9 % (FLUSH) 0.9 %
10 SYRINGE (ML) INJECTION
Status: CANCELLED | OUTPATIENT
Start: 2025-01-29

## 2025-01-29 NOTE — TELEPHONE ENCOUNTER
Contacted pt to advise of Iron infusion appt on 1/31 per Dr Daley request. Pt v/u. No questions or concerns at this time.

## 2025-01-31 ENCOUNTER — INFUSION (OUTPATIENT)
Dept: INFUSION THERAPY | Facility: HOSPITAL | Age: 60
End: 2025-01-31
Attending: INTERNAL MEDICINE
Payer: COMMERCIAL

## 2025-01-31 VITALS
SYSTOLIC BLOOD PRESSURE: 118 MMHG | TEMPERATURE: 98 F | RESPIRATION RATE: 18 BRPM | OXYGEN SATURATION: 97 % | DIASTOLIC BLOOD PRESSURE: 69 MMHG | HEART RATE: 65 BPM

## 2025-01-31 DIAGNOSIS — D50.9 IRON DEFICIENCY ANEMIA, UNSPECIFIED IRON DEFICIENCY ANEMIA TYPE: Primary | ICD-10-CM

## 2025-01-31 PROCEDURE — 25000003 PHARM REV CODE 250: Performed by: INTERNAL MEDICINE

## 2025-01-31 PROCEDURE — 63600175 PHARM REV CODE 636 W HCPCS: Mod: JZ,TB | Performed by: INTERNAL MEDICINE

## 2025-01-31 PROCEDURE — 96365 THER/PROPH/DIAG IV INF INIT: CPT

## 2025-01-31 RX ORDER — EPINEPHRINE 0.3 MG/.3ML
0.3 INJECTION SUBCUTANEOUS ONCE AS NEEDED
Status: DISCONTINUED | OUTPATIENT
Start: 2025-01-31 | End: 2025-01-31 | Stop reason: HOSPADM

## 2025-01-31 RX ORDER — DIPHENHYDRAMINE HYDROCHLORIDE 50 MG/ML
50 INJECTION INTRAMUSCULAR; INTRAVENOUS ONCE AS NEEDED
Status: DISCONTINUED | OUTPATIENT
Start: 2025-01-31 | End: 2025-01-31 | Stop reason: HOSPADM

## 2025-01-31 RX ORDER — HEPARIN 100 UNIT/ML
500 SYRINGE INTRAVENOUS
Status: DISCONTINUED | OUTPATIENT
Start: 2025-01-31 | End: 2025-01-31 | Stop reason: HOSPADM

## 2025-01-31 RX ADMIN — FERRIC CARBOXYMALTOSE INJECTION 750 MG: 50 INJECTION, SOLUTION INTRAVENOUS at 03:01

## 2025-01-31 RX ADMIN — HEPARIN 500 UNITS: 100 SYRINGE at 04:01

## 2025-01-31 NOTE — NURSING
Infusion # Injectafer - 750 mg      Recent labs? 1/28/25  Last Hem/Onc provider visit- Seen by Edi on 1/28/25     Premeds-N/A     Injectafer 750 mg administered IV at a 16 minute rate per orders; see MAR and vitals for more details. Monitored for 45 minutes post infusion for any s/sx of reaction. Tolerated well without adverse events, discharged and ambulatory out of clinic.

## 2025-02-04 ENCOUNTER — TELEPHONE (OUTPATIENT)
Dept: SURGICAL ONCOLOGY | Facility: CLINIC | Age: 60
End: 2025-02-04
Payer: COMMERCIAL

## 2025-02-04 NOTE — TELEPHONE ENCOUNTER
Spoke with patient regarding my chart message sent. Explained to patient she would be unable to drive for about 2-3 weeks. Would discuss return to work at PO appointment. Patient is still waiting to hear from [facility] regarding auth for sx on Thursday. Discussed with patient that dental cleaning would need to be cleared by medical oncologist. Patient verbalized understanding.       ---- Message from Med Assistant Ruiz sent at 2/3/2025  8:18 AM CST -----  Regarding: FW: new insurance  Good morning, Dee Dee. Please advise. Thank you!  ----- Message -----  From: Lani Page PA-C  Sent: 2/3/2025   8:07 AM CST  To: Dee Dee Camp RN; Sara Mack MA  Subject: FW: new insurance                                Perone is primary on this case. Please touch base with her about this.  ----- Message -----  From: Karely Belle RN  Sent: 2/3/2025   7:36 AM CST  To: Mary Vargas Staff; Jez Paniagua Staff  Subject: new insurance                                    Good morning, this pt left me a voice msg stating that she has new insurance coverage effective 2/01/25. Her sx date is 2/06. Also, pls note she would like permission to have her teeth cleaned on 2/04/25. Her phone number: [phone].  Pls reach out to the pt. Thank you

## 2025-02-05 ENCOUNTER — TELEPHONE (OUTPATIENT)
Dept: PREADMISSION TESTING | Facility: HOSPITAL | Age: 60
End: 2025-02-05
Payer: COMMERCIAL

## 2025-02-06 ENCOUNTER — ANESTHESIA (OUTPATIENT)
Dept: SURGERY | Facility: HOSPITAL | Age: 60
DRG: 354 | End: 2025-02-06
Payer: COMMERCIAL

## 2025-02-06 ENCOUNTER — HOSPITAL ENCOUNTER (INPATIENT)
Facility: HOSPITAL | Age: 60
LOS: 5 days | Discharge: HOME | DRG: 354 | End: 2025-02-11
Attending: SURGERY | Admitting: SURGERY
Payer: COMMERCIAL

## 2025-02-06 DIAGNOSIS — K43.2 INCISIONAL HERNIA, WITHOUT OBSTRUCTION OR GANGRENE: ICD-10-CM

## 2025-02-06 DIAGNOSIS — C17.0 DUODENAL ADENOCARCINOMA: Primary | ICD-10-CM

## 2025-02-06 DIAGNOSIS — C17.0 DUODENAL CANCER: ICD-10-CM

## 2025-02-06 PROCEDURE — 25000003 PHARM REV CODE 250: Performed by: NURSE ANESTHETIST, CERTIFIED REGISTERED

## 2025-02-06 PROCEDURE — 63600175 PHARM REV CODE 636 W HCPCS: Mod: JZ,TB | Performed by: ANESTHESIOLOGY

## 2025-02-06 PROCEDURE — C1781 MESH (IMPLANTABLE): HCPCS | Performed by: SURGERY

## 2025-02-06 PROCEDURE — 25000003 PHARM REV CODE 250: Performed by: SURGERY

## 2025-02-06 PROCEDURE — 0WBF0ZZ EXCISION OF ABDOMINAL WALL, OPEN APPROACH: ICD-10-PCS | Performed by: SURGERY

## 2025-02-06 PROCEDURE — 0WUF0JZ SUPPLEMENT ABDOMINAL WALL WITH SYNTHETIC SUBSTITUTE, OPEN APPROACH: ICD-10-PCS | Performed by: SURGERY

## 2025-02-06 PROCEDURE — 0KNK0ZZ RELEASE RIGHT ABDOMEN MUSCLE, OPEN APPROACH: ICD-10-PCS | Performed by: SURGERY

## 2025-02-06 PROCEDURE — 63600175 PHARM REV CODE 636 W HCPCS: Performed by: SURGERY

## 2025-02-06 PROCEDURE — 36000707: Performed by: SURGERY

## 2025-02-06 PROCEDURE — 71000039 HC RECOVERY, EACH ADD'L HOUR: Performed by: SURGERY

## 2025-02-06 PROCEDURE — 49189 OPN EXC/DST NTRA-ABD 20.1-30: CPT | Mod: ,,, | Performed by: SURGERY

## 2025-02-06 PROCEDURE — 15734 MUSCLE-SKIN GRAFT TRUNK: CPT | Mod: 51,,, | Performed by: SURGERY

## 2025-02-06 PROCEDURE — 37000009 HC ANESTHESIA EA ADD 15 MINS: Performed by: SURGERY

## 2025-02-06 PROCEDURE — 27201423 OPTIME MED/SURG SUP & DEVICES STERILE SUPPLY: Performed by: SURGERY

## 2025-02-06 PROCEDURE — 71000033 HC RECOVERY, INTIAL HOUR: Performed by: SURGERY

## 2025-02-06 PROCEDURE — 88342 IMHCHEM/IMCYTCHM 1ST ANTB: CPT | Performed by: PATHOLOGY

## 2025-02-06 PROCEDURE — 0KNL0ZZ RELEASE LEFT ABDOMEN MUSCLE, OPEN APPROACH: ICD-10-PCS | Performed by: SURGERY

## 2025-02-06 PROCEDURE — 63600175 PHARM REV CODE 636 W HCPCS: Mod: JZ,TB | Performed by: SURGERY

## 2025-02-06 PROCEDURE — 36000706: Performed by: SURGERY

## 2025-02-06 PROCEDURE — 63600175 PHARM REV CODE 636 W HCPCS: Performed by: NURSE ANESTHETIST, CERTIFIED REGISTERED

## 2025-02-06 PROCEDURE — 37000008 HC ANESTHESIA 1ST 15 MINUTES: Performed by: SURGERY

## 2025-02-06 PROCEDURE — 15734 MUSCLE-SKIN GRAFT TRUNK: CPT | Mod: 51,80,, | Performed by: SURGERY

## 2025-02-06 PROCEDURE — 49189 OPN EXC/DST NTRA-ABD 20.1-30: CPT | Mod: 80,,, | Performed by: SURGERY

## 2025-02-06 PROCEDURE — C1729 CATH, DRAINAGE: HCPCS | Performed by: SURGERY

## 2025-02-06 PROCEDURE — 88305 TISSUE EXAM BY PATHOLOGIST: CPT | Mod: 59 | Performed by: PATHOLOGY

## 2025-02-06 DEVICE — PHASIX ST MESH WITH OPEN POSITIONING SYSTEM, 20 CM X 25 CM (8" X 10"), OVAL
Type: IMPLANTABLE DEVICE | Site: ABDOMEN | Status: FUNCTIONAL
Brand: PHASIX

## 2025-02-06 RX ORDER — OXYCODONE HYDROCHLORIDE 5 MG/1
5 TABLET ORAL EVERY 4 HOURS PRN
Status: DISCONTINUED | OUTPATIENT
Start: 2025-02-06 | End: 2025-02-11 | Stop reason: HOSPADM

## 2025-02-06 RX ORDER — OXYCODONE HYDROCHLORIDE 5 MG/1
10 TABLET ORAL EVERY 6 HOURS PRN
Status: DISCONTINUED | OUTPATIENT
Start: 2025-02-06 | End: 2025-02-07

## 2025-02-06 RX ORDER — CLONAZEPAM 0.5 MG/1
0.5 TABLET ORAL NIGHTLY PRN
Status: DISCONTINUED | OUTPATIENT
Start: 2025-02-06 | End: 2025-02-11 | Stop reason: HOSPADM

## 2025-02-06 RX ORDER — ESCITALOPRAM OXALATE 10 MG/1
10 TABLET ORAL DAILY
Status: DISCONTINUED | OUTPATIENT
Start: 2025-02-07 | End: 2025-02-11 | Stop reason: HOSPADM

## 2025-02-06 RX ORDER — BUPIVACAINE 13.3 MG/ML
INJECTION, SUSPENSION, LIPOSOMAL INFILTRATION
Status: DISCONTINUED | OUTPATIENT
Start: 2025-02-06 | End: 2025-02-06 | Stop reason: HOSPADM

## 2025-02-06 RX ORDER — ONDANSETRON HYDROCHLORIDE 2 MG/ML
INJECTION, SOLUTION INTRAVENOUS
Status: DISCONTINUED | OUTPATIENT
Start: 2025-02-06 | End: 2025-02-06

## 2025-02-06 RX ORDER — OXYCODONE AND ACETAMINOPHEN 5; 325 MG/1; MG/1
1 TABLET ORAL
Status: DISCONTINUED | OUTPATIENT
Start: 2025-02-06 | End: 2025-02-06 | Stop reason: HOSPADM

## 2025-02-06 RX ORDER — PROPOFOL 10 MG/ML
VIAL (ML) INTRAVENOUS
Status: DISCONTINUED | OUTPATIENT
Start: 2025-02-06 | End: 2025-02-06

## 2025-02-06 RX ORDER — HYDROMORPHONE HYDROCHLORIDE 1 MG/ML
1 INJECTION, SOLUTION INTRAMUSCULAR; INTRAVENOUS; SUBCUTANEOUS EVERY 6 HOURS PRN
Status: DISCONTINUED | OUTPATIENT
Start: 2025-02-06 | End: 2025-02-11 | Stop reason: HOSPADM

## 2025-02-06 RX ORDER — ONDANSETRON HYDROCHLORIDE 2 MG/ML
4 INJECTION, SOLUTION INTRAVENOUS EVERY 6 HOURS PRN
Status: DISCONTINUED | OUTPATIENT
Start: 2025-02-06 | End: 2025-02-11 | Stop reason: HOSPADM

## 2025-02-06 RX ORDER — LIDOCAINE HYDROCHLORIDE 20 MG/ML
INJECTION INTRAVENOUS
Status: DISCONTINUED | OUTPATIENT
Start: 2025-02-06 | End: 2025-02-06

## 2025-02-06 RX ORDER — CEFTRIAXONE 1 G/1
2 INJECTION, POWDER, FOR SOLUTION INTRAMUSCULAR; INTRAVENOUS
Status: COMPLETED | OUTPATIENT
Start: 2025-02-06 | End: 2025-02-06

## 2025-02-06 RX ORDER — ROCURONIUM BROMIDE 10 MG/ML
INJECTION, SOLUTION INTRAVENOUS
Status: DISCONTINUED | OUTPATIENT
Start: 2025-02-06 | End: 2025-02-06

## 2025-02-06 RX ORDER — METRONIDAZOLE 500 MG/100ML
500 INJECTION, SOLUTION INTRAVENOUS
Status: COMPLETED | OUTPATIENT
Start: 2025-02-06 | End: 2025-02-06

## 2025-02-06 RX ORDER — HYDROMORPHONE HYDROCHLORIDE 1 MG/ML
0.2 INJECTION, SOLUTION INTRAMUSCULAR; INTRAVENOUS; SUBCUTANEOUS EVERY 5 MIN PRN
Status: DISCONTINUED | OUTPATIENT
Start: 2025-02-06 | End: 2025-02-06 | Stop reason: HOSPADM

## 2025-02-06 RX ORDER — FENTANYL CITRATE 50 UG/ML
INJECTION, SOLUTION INTRAMUSCULAR; INTRAVENOUS
Status: DISCONTINUED | OUTPATIENT
Start: 2025-02-06 | End: 2025-02-06

## 2025-02-06 RX ORDER — ACETAMINOPHEN 10 MG/ML
1000 INJECTION, SOLUTION INTRAVENOUS EVERY 8 HOURS
Status: COMPLETED | OUTPATIENT
Start: 2025-02-06 | End: 2025-02-07

## 2025-02-06 RX ORDER — SODIUM CHLORIDE 9 MG/ML
INJECTION, SOLUTION INTRAVENOUS CONTINUOUS
Status: DISCONTINUED | OUTPATIENT
Start: 2025-02-06 | End: 2025-02-06

## 2025-02-06 RX ORDER — SODIUM CHLORIDE 9 MG/ML
INJECTION, SOLUTION INTRAVENOUS CONTINUOUS
Status: DISCONTINUED | OUTPATIENT
Start: 2025-02-06 | End: 2025-02-07

## 2025-02-06 RX ORDER — CHLORHEXIDINE GLUCONATE ORAL RINSE 1.2 MG/ML
10 SOLUTION DENTAL 2 TIMES DAILY
Status: COMPLETED | OUTPATIENT
Start: 2025-02-06 | End: 2025-02-11

## 2025-02-06 RX ORDER — ENOXAPARIN SODIUM 100 MG/ML
40 INJECTION SUBCUTANEOUS EVERY 24 HOURS
Status: DISCONTINUED | OUTPATIENT
Start: 2025-02-07 | End: 2025-02-11 | Stop reason: HOSPADM

## 2025-02-06 RX ORDER — FENTANYL CITRATE 50 UG/ML
25 INJECTION, SOLUTION INTRAMUSCULAR; INTRAVENOUS EVERY 5 MIN PRN
Status: DISCONTINUED | OUTPATIENT
Start: 2025-02-06 | End: 2025-02-06 | Stop reason: HOSPADM

## 2025-02-06 RX ORDER — EPHEDRINE SULFATE 50 MG/ML
INJECTION, SOLUTION INTRAVENOUS
Status: DISCONTINUED | OUTPATIENT
Start: 2025-02-06 | End: 2025-02-06

## 2025-02-06 RX ORDER — ONDANSETRON HYDROCHLORIDE 2 MG/ML
4 INJECTION, SOLUTION INTRAVENOUS DAILY PRN
Status: DISCONTINUED | OUTPATIENT
Start: 2025-02-06 | End: 2025-02-06 | Stop reason: HOSPADM

## 2025-02-06 RX ORDER — ONDANSETRON HYDROCHLORIDE 2 MG/ML
4 INJECTION, SOLUTION INTRAVENOUS ONCE AS NEEDED
Status: DISCONTINUED | OUTPATIENT
Start: 2025-02-06 | End: 2025-02-06 | Stop reason: HOSPADM

## 2025-02-06 RX ORDER — BUPIVACAINE HYDROCHLORIDE 2.5 MG/ML
INJECTION, SOLUTION EPIDURAL; INFILTRATION; INTRACAUDAL
Status: DISCONTINUED | OUTPATIENT
Start: 2025-02-06 | End: 2025-02-06 | Stop reason: HOSPADM

## 2025-02-06 RX ADMIN — HYDROMORPHONE HYDROCHLORIDE 0.2 MG: 1 INJECTION, SOLUTION INTRAMUSCULAR; INTRAVENOUS; SUBCUTANEOUS at 01:02

## 2025-02-06 RX ADMIN — ONDANSETRON 4 MG: 2 INJECTION INTRAMUSCULAR; INTRAVENOUS at 11:02

## 2025-02-06 RX ADMIN — ROCURONIUM BROMIDE 20 MG: 10 INJECTION, SOLUTION INTRAVENOUS at 09:02

## 2025-02-06 RX ADMIN — CEFTRIAXONE SODIUM 2 G: 1 INJECTION, POWDER, FOR SOLUTION INTRAMUSCULAR; INTRAVENOUS at 08:02

## 2025-02-06 RX ADMIN — OXYCODONE HYDROCHLORIDE 10 MG: 5 TABLET ORAL at 06:02

## 2025-02-06 RX ADMIN — SODIUM CHLORIDE: 9 INJECTION, SOLUTION INTRAVENOUS at 04:02

## 2025-02-06 RX ADMIN — METRONIDAZOLE 500 MG: 5 INJECTION, SOLUTION INTRAVENOUS at 08:02

## 2025-02-06 RX ADMIN — EPHEDRINE SULFATE 12.5 MG: 50 INJECTION INTRAVENOUS at 08:02

## 2025-02-06 RX ADMIN — EPHEDRINE SULFATE 12.5 MG: 50 INJECTION INTRAVENOUS at 11:02

## 2025-02-06 RX ADMIN — HYDROMORPHONE HYDROCHLORIDE 0.2 MG: 1 INJECTION, SOLUTION INTRAMUSCULAR; INTRAVENOUS; SUBCUTANEOUS at 12:02

## 2025-02-06 RX ADMIN — FENTANYL CITRATE 50 MCG: 50 INJECTION, SOLUTION INTRAMUSCULAR; INTRAVENOUS at 08:02

## 2025-02-06 RX ADMIN — HYDROMORPHONE HYDROCHLORIDE 0.2 MG: 1 INJECTION, SOLUTION INTRAMUSCULAR; INTRAVENOUS; SUBCUTANEOUS at 03:02

## 2025-02-06 RX ADMIN — ROCURONIUM BROMIDE 50 MG: 10 INJECTION, SOLUTION INTRAVENOUS at 08:02

## 2025-02-06 RX ADMIN — SUGAMMADEX 200 MG: 100 INJECTION, SOLUTION INTRAVENOUS at 11:02

## 2025-02-06 RX ADMIN — HYDROMORPHONE HYDROCHLORIDE 1 MG: 1 INJECTION, SOLUTION INTRAMUSCULAR; INTRAVENOUS; SUBCUTANEOUS at 09:02

## 2025-02-06 RX ADMIN — ACETAMINOPHEN 1000 MG: 10 INJECTION, SOLUTION INTRAVENOUS at 09:02

## 2025-02-06 RX ADMIN — SODIUM CHLORIDE, SODIUM LACTATE, POTASSIUM CHLORIDE, AND CALCIUM CHLORIDE: .6; .31; .03; .02 INJECTION, SOLUTION INTRAVENOUS at 08:02

## 2025-02-06 RX ADMIN — FENTANYL CITRATE 50 MCG: 50 INJECTION, SOLUTION INTRAMUSCULAR; INTRAVENOUS at 11:02

## 2025-02-06 RX ADMIN — PROPOFOL 150 MG: 10 INJECTION, EMULSION INTRAVENOUS at 08:02

## 2025-02-06 RX ADMIN — CHLORHEXIDINE GLUCONATE 0.12% ORAL RINSE 10 ML: 1.2 LIQUID ORAL at 09:02

## 2025-02-06 RX ADMIN — LIDOCAINE HYDROCHLORIDE 100 MG: 20 INJECTION INTRAVENOUS at 08:02

## 2025-02-06 NOTE — OP NOTE
[facility]  Surgical Oncology  Operative Note           Date of Procedure: 2/6/2025   Time: 11:43 AM    Procedure: Procedure(s) (LRB):  LAPAROTOMY, EXPLORATORY (N/A)  REPAIR, HERNIA, INCISIONAL (N/A)  EXCISION, SMALL INTESTINE (N/A)  BLOCK, TRANSVERSUS ABDOMINIS PLANE (N/A)  EXCISION, LESION, ABDOMINAL WALL (N/A)     Surgeons and Role:     * Alicia Hernandez MD - Primary     * Siva Story MD - Assisting    Pre-Operative Diagnosis:  Duodenal cancer [C17.0]  Incisional hernia, without obstruction or gangrene [K43.2]    Post-Operative Diagnosis:   Same    Pre-Operative Variables:  Stage: IV  Chemotherapy within 90 Days: Yes  Radiation Therapy within 90 Days: No    Anesthesia: General    Procedure:  Exploratory laparotomy   Resection of abdominal wall metastasis measuring 13 x 6.5 x 1.5cm and 8 x4 x1 cm  Ventral hernia repair with mesh  Bilateral anterior component separation   NICO Block    Operative Findings:   Small pigmented lesion in peritoneal wall in location of previous metastatic disease  and some pigmented fibrotic fat in the subcutaneous tissues  Excellent response to chemo with minimal to no disease remaining   Resection status:  R0 pending final pathology.  No gross disease remaining post dissection.  Ventral hernia repair- measuring 20cm in length          Indications:  Karen Gutierrez is a 59 y.o. 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 Hitesh Díaz with adjuvant FOLFOX, who was found to have disease recurrence in the anterior abdominal wall in May of 2024 manifested on imaging as a 6.8 x 5.1 x 4.6 cm mass, as well as clinically with skin puckering and a palpable mass.  Biopsy confirmed the presence of recurrent adenocarcinoma.  Additional imaging showed no other sites of disease.  Her case was reviewed in our tumor board and the consensus was for neoadjuvant chemotherapy for evaluation of disease biology as well as  with the hopes of reducing the size of the mass.  She underwent a total of 12 cycles of chemotherapy with FOLFOX with an excellent response on imaging as well as clinically.  She then presented for metastasectomy and hernia repair.     Risks and benefits were reviewed including bleeding, infection, pain, scar, damage to surrounding structures, cardiovascular and pulmonary complications, disease recurrence, need for additional procedures, death, and imponderables.  She expressed understanding and gave informed consent to proceed.    Procedure in Detail:    Following written informed consent the patient was taken to the operating room positioned in the supine position.  General endotracheal anesthesia was induced and a Chavez catheter was placed sterilely.  A time out was performed confirming the correct patient, procedure, and other pertinent information in keeping with [facility] guidelines.      A midline laparotomy was made through her previous incision midline laparotomy was made through her previous incision carried down through the level of fascia using a 10 blade.  The fascia was sharply incised in the abdomen was entered sharply under direct visualization.  There was no injury to underlying structures.  There were a few adhesions noted intra-abdominally however there was not extensive disease.  There was no small bowel adherent to the anterior abdominal wall.  The right posterior peritoneum was evaluated as this was the location of the disease when previously seen.  There were no large lesions or masses however there was a small area of nodular pigmentation measuring only a few mm in size.  There was some scattered pigmentation out from this as well and some soft tissue nodularity in the same area.  This was marked with a Bovie and then the peritoneum was removed and the medial fascia and the subcutaneous tissues.  There was a small amount of muscle removed as well however there was no dense adhesive disease in  his area so rectus muscle was predominantly left intact.  The removal of the subcutaneous nodularity we did go up to the level of the skin and so the skin was very thin at this area.  This specimen measured 13 x 6.5 x 1.5 cm in height by width by depth.  The specimen was marked with a short stitch superior and long stitch medial.  And was passed off.  We then turned our attention to the left anterior abdominal wall where there was also some pigmentation noted and this was also marked and then excised containing the posterior peritoneum and some of the midline fascia and subcutaneous tissues.  This was similarly marked with a short stitch superior and a long stitch medial.  The specimen measured 8 cm length x 4 cm width by 1 cm depth.  Some additional anterior abdominal wall was also removed and sent off with anterior right anterior abdominal wall fascia in 2 additional anterior abdominal wall specimens.  The right anterior abdominal skin that had previously been denuded with removal of the original specimen was then excised and also sent off for permanent.  The abdomen was then evaluated further and there was no evidence of any additional disease or nodularity.  Hemostasis was then achieved and skin flaps were raised above the fascia to start preparation for ventral hernia repair.  At this time Dr. Story came in to assist with a ventral hernia repair.    Wide skin flaps were created and the fascia was approximated.  There was still a fair amount of tension so we made the decision to do an anterior component separation bilaterally.  The fascia was incised lateral to the rectus muscle on both sides releasing the anterior fascia to allow for closure without tension.  Hemostasis was again checked and we then set about with placement of mesh and closure the fascia.  At this time a large Phasix mesh measuring 20 x 25 cm was placed.  The hernia itself measured approximately 20 cm in length.  Tacking stitches were placed  with 0 PDS circumferentially and tied down in the fish was removed.  The midline fascia was then closed with interrupted figure-of-eight 0 PDS stitches.  Tap blocks were performed with the Exparel.  Two 15Fr JUDIE drains were then placed bilaterally in the subcutaneous space and sutured into place with 2-0 nylon stitches.  The wound was irrigated copiously and hemostasis was again assured.  Her umbilicus was densely adherent to the fascia when the skin flaps were raised button hole had been created.  This was reapproximated with 3-0 Vicryl and a 4-0 Monocryl and then reattached down to the anterior abdominal wall to recreate the belly button.  The wound was then closed in multiple layers with 3-0 Vicryl followed by 4-0 Monocryl and Dermabond and drain dressings placed.  An abdominal binder was then placed and she was awoken from general anesthesia and extubated.  She tolerated the procedure well.  All counts were correct x2 at the end of the case.    Portions of the record were created with [employer] Direct voice recognition software. This may lead to occasional typographical errors due to the inherent limitations of the software. Read the chart carefully and recognize, using context, where substitutions have occurred. Please do not hesitate to contact me directly if clarification is needed.    Implants:   Implant Name Type Inv. Item Serial No.  Lot No. LRB No. Used Action   PHASIX ST MESH WITH OPEN POSITIONING SYSTEM [id number] (8'X10')    [employer] [id number] N/A 1 Implanted   MESH HERNIA PHASIX 8X10IN RND - [id number]  MESH HERNIA PHASIX 8X10IN RND  C.RFab[employer]io [employer] [id number] N/A 1 Wasted       Drains:  15 Fr JUDIE x2 in subcutaneous tissues    Estimated Blood Loss (EBL):   100 mL    UOP:  300cc     Specimens:   Specimen (24h ago, onward)       Start     Ordered    02/06/25 1103  Specimen to Pathology, Surgery General Surgery  Once        Comments: Pre-op Diagnosis: Duodenal cancer [C17.0]Incisional  hernia, without obstruction or gangrene [K43.2]Procedure(s):LAPAROTOMY, EXPLORATORYREPAIR, HERNIA, INCISIONALEXCISION, SMALL INTESTINE Number of specimens: 6Name of specimens: 1. Right anterior abdominal wall metastasis - short stitch superior, long stitch medial (perm) 2. Additional right anterior abdominal wall implant (perm) 3. Additional right anterior abdominal wall fascia (perm) 4. Left anterior abdominal wall - short stitch superior, long stitch medial (perm) 5. Additional left anterior abdominal wall (perm)6. Right anterior abdominal wall skin (perm)     References:    Click here for ordering Quick Tip   Question Answer Comment   Procedure Type: General Surgery    Specimen Class: Routine/Screening    Release to patient Immediate        02/06/25 1103    02/06/25 1100  Specimen to Pathology, Surgery General Surgery  Once        Comments: Pre-op Diagnosis: Duodenal cancer [C17.0]Incisional hernia, without obstruction or gangrene [K43.2]Procedure(s):LAPAROTOMY, EXPLORATORYREPAIR, HERNIA, INCISIONALEXCISION, SMALL INTESTINE Number of specimens: 5Name of specimens: 1. Right anterior abdominal wall metastasis - short stitch superior, long stitch medial (perm)2. Additional right anterior abdominal wall implant (perm)3. Additional right anterior abdominal wall fascia (perm)4. Left anterior abdominal wall - short stitch superior, long stitch medial (perm)5. Additional left anterior abdominal wall (perm)     References:    Click here for ordering Quick Tip   Question Answer Comment   Procedure Type: General Surgery    Specimen Class: Routine/Screening    Release to patient Immediate        02/06/25 1100                            Condition: Good    Disposition: PACU - hemodynamically stable.               Alicia Hernandez MD      Surgical Oncology      2/6/2025, 11:43 AM

## 2025-02-06 NOTE — CONSULTS
[facility]  Consult Note    Patient Name: Karen Gutierrez  MRN: [id number]  Admission Date: 2/6/2025  Hospital Length of Stay: 1 days  Attending Physician: Alicia Hernandez MD   Primary Care Provider: Han Arshad MD           Patient information was obtained from patient, ER records, and primary team.     Consults  Subjective:     Principal Problem: Duodenal cancer    Chief Complaint: No chief complaint on file.       HPI: 59F  has a past medical history of Anxiety, Cancer, Depression, Hypertension, Hypotension, iatrogenic, and Mitral valve prolapse.  Admitted for surgical resection of duodenal cancer mass by primary. Tolerated procedure well. Pain adequately controlled. Denies fever, chest pain, dyspnea. Takes strattera for ADHD.     Initial review of chart reveals hypotensive to normotensive episodes postoperatively. On room air.     Hospital medicine consulted for medical management.    Past Medical History:   Diagnosis Date    Anxiety     Cancer     duodenal cancer    Depression     Hypertension     Hypotension, iatrogenic     Mitral valve prolapse        Past Surgical History:   Procedure Laterality Date    BUNIONECTOMY Left     HYSTERECTOMY  2000    INSERTION OF TUNNELED CENTRAL VENOUS CATHETER (CVC) WITH SUBCUTANEOUS PORT N/A 03/26/2021    Procedure: [id number];  Surgeon: Lauren Abraham MD;  Location: [facility];  Service: General;  Laterality: N/A;    INSERTION OF VENOUS ACCESS PORT Right 05/13/2024    Procedure: INSERTION, VENOUS ACCESS PORT;  Surgeon: Alicia Hernandez MD;  Location: [facility] OR;  Service: General;  Laterality: Right;    LITHOTRIPSY      PLACEMENT OF JEJUNOSTOMY TUBE  02/18/2021    Procedure: INSERTION, JEJUNOSTOMY TUBE;  Surgeon: Hitesh Díaz MD;  Location: [address];  Service: General;;    ULTRASOUND GUIDANCE Right 05/13/2024    Procedure: ULTRASOUND GUIDANCE;  Surgeon: Alicia Hernandez MD;  Location: [facility] OR;  Service:  General;  Laterality: Right;    VARICOSE VEIN STRIPPING  2015    WHIPPLE PROCEDURE N/A 02/18/2021    Procedure: WHIPPLE PROCEDURE;  Surgeon: Hitesh Díaz MD;  Location: [facility] OR [address] [place] [address];  Service: General;  Laterality: N/A;       Review of patient's allergies indicates:   Allergen Reactions    Benzalkonium chloride Hives    Codeine Itching    Morphine Nausea Only    Neosporin [neomycin-bacitracin-polymyxin] Hives    Sulfa (sulfonamide antibiotics) Hives and Itching           Morphine sulfate Other (See Comments)    Sulfamethoxazole-trimethoprim Other (See Comments)    Hydrocortisone Hives     Redness / can use bactroban         No current facility-administered medications on file prior to encounter.     Current Outpatient Medications on File Prior to Encounter   Medication Sig    amLODIPine (NORVASC) 10 MG tablet Take 10 mg by mouth once daily.    BYSTOLIC 20 mg Tab Take 1 tablet by mouth once daily.    EScitalopram oxalate (LEXAPRO) 10 MG tablet Take 1 tablet (10 mg total) by mouth once daily.    olmesartan (BENICAR) 40 MG tablet TAKE 1 TABLET(40 MG) BY MOUTH EVERY DAY    atomoxetine (STRATTERA) 40 MG capsule Take 1 capsule (40 mg total) by mouth once daily.    cholecalciferol, vitamin D3, 125 mcg (5,000 unit) capsule Take 5,000 Units by mouth once daily.    clonazePAM (KLONOPIN) 0.5 MG tablet Take 1 tablet (0.5 mg total) by mouth nightly as needed for Anxiety.    cyanocobalamin (VITAMIN B-12) 1000 MCG tablet Take 1,000 mcg by mouth once daily.    LIDOcaine-prilocaine (EMLA) cream Apply to affected area once    OLANZapine (ZYPREXA) 5 MG tablet Take 1 tablet (5 mg total) by mouth every evening. Take nightly on days 1-3 of each chemotherapy cycle.    ondansetron (ZOFRAN-ODT) 4 MG TbDL Take 1 tablet (4 mg total) by mouth 2 (two) times daily.    prochlorperazine (COMPAZINE) 10 MG tablet Take 1 tablet (10 mg total) by mouth every 6 (six) hours as needed for Nausea.     Family History       Problem Relation (Age of  Onset)    Atrial fibrillation Mother    Bladder Cancer Father    Colon cancer Maternal Grandmother    Diabetes Mother, Father, Maternal Grandmother, Paternal Grandmother    Hyperlipidemia Mother    Hypertension Father    No Known Problems Sister, Paternal Grandfather    Ovarian cancer Mother    Stroke Mother, Maternal Grandfather          Tobacco Use    Smoking status: Former    Smokeless tobacco: Never   Substance and Sexual Activity    Alcohol use: Not Currently    Drug use: Never    Sexual activity: Not on file     Review of Systems   Constitutional:  Positive for activity change, appetite change and fatigue. Negative for fever.   Respiratory:  Negative for shortness of breath.    Cardiovascular:  Negative for chest pain.   Gastrointestinal:  Positive for abdominal pain. Negative for constipation, nausea and vomiting.        Not passing flatus   Musculoskeletal:  Positive for myalgias.   Skin:  Positive for wound.   Allergic/Immunologic: Positive for immunocompromised state.   Neurological:  Positive for weakness.   Psychiatric/Behavioral:  Negative for agitation, behavioral problems, confusion, decreased concentration and dysphoric mood. The patient is not nervous/anxious.      Objective:     Vital Signs (Most Recent):  Temp: 99.8 °F (37.7 °C) (02/06/25 1548)  Pulse: 85 (02/06/25 1548)  Resp: 16 (02/06/25 1548)  BP: 123/69 (02/06/25 1548)  SpO2: 96 % (02/06/25 1548) Vital Signs (24h Range):  Temp:  [97.8 °F (36.6 °C)-99.8 °F (37.7 °C)] 99.8 °F (37.7 °C)  Pulse:  [64-85] 85  Resp:  [10-20] 16  SpO2:  [92 %-100 %] 96 %  BP: (100-130)/(56-74) 123/69     Weight: 83.3 kg (183 lb 12.1 oz)  Body mass index is 24.92 kg/m².     Physical Exam  Vitals and nursing note reviewed.   Constitutional:       General: She is not in acute distress.     Appearance: She is ill-appearing. She is not toxic-appearing.   HENT:      Head: Normocephalic and atraumatic.   Cardiovascular:      Rate and Rhythm: Normal rate.   Pulmonary:       Effort: Pulmonary effort is normal. No respiratory distress.   Abdominal:      Palpations: Abdomen is soft.      Comments: Abdominal surgical incision with abdominal binder in place  2 anthony drains with sanguinous output in collection bulbs   Musculoskeletal:      Right lower leg: No edema.      Left lower leg: No edema.   Skin:     General: Skin is warm.   Neurological:      Mental Status: She is alert and oriented to person, place, and time.      Motor: Weakness present.   Psychiatric:         Mood and Affect: Mood normal.         Behavior: Behavior normal.          Significant Labs: All pertinent labs within the past 24 hours have been reviewed.    Significant Imaging: I have reviewed all pertinent imaging results/findings within the past 24 hours.  Assessment/Plan:     * Duodenal cancer  Status post ex lap with surgical resection of duodenal mass  Per primary       Thrombocytopenia  Monitor with cbc in am  On lovenox per primary  Discontinue if persists    Depression  Patient has persistent depression which is unknown and is currently controlled. Will Continue anti-depressant medications. We will not consult psychiatry at this time. Patient does not display psychosis at this time. Continue to monitor closely and adjust plan of care as needed.        Generalized anxiety disorder  Resume home medication(s)       ADHD  Holding home medication(s)   Can resume on discharge       Benign hypertension  Patient's blood pressure range in the last 24 hours was: BP  Min: 100/56  Max: 130/74.The patient's inpatient anti-hypertensive regimen is listed below:  Current Antihypertensives       Plan  - BP is uncontrolled, will adjust as follows: hold home blood pressure medication(s) due to hypotension from anesthesia  - resume as indicated      VTE Risk Mitigation (From admission, onward)           Ordered     enoxaparin injection 40 mg  Daily         02/06/25 1539     IP VTE HIGH RISK PATIENT  Once         02/06/25 1539     [facility]  sequential compression device  Until discontinued         02/06/25 1536                        Thank you for your consult. I will follow-up with patient. Please contact us if you have any additional questions.    Chantel Mcmanus MD  Department of Hospital Medicine   [facility]

## 2025-02-06 NOTE — NURSING TRANSFER
Received patient from PACU. On Room Air    Chavez in place.   Abdominal binder In place.   x2 drains in place - Abd , Midline abdomen incision     SCD pumps applied.   x2 IV site (see LDA)   IS introduced, educated on usage and importance of utilizing IS throughout day.     Sign & Held Orders released.

## 2025-02-06 NOTE — TRANSFER OF CARE
Anesthesia Transfer of Care Note    Patient: Karen Gutierrez    Procedure(s) Performed: Procedure(s) (LRB):  LAPAROTOMY, EXPLORATORY (N/A)  REPAIR, HERNIA, INCISIONAL (N/A)  BLOCK, TRANSVERSUS ABDOMINIS PLANE (N/A)  EXCISION, LESION, ABDOMINAL WALL (N/A)    Patient location: PACU    Anesthesia Type: general    Transport from OR: Transported from OR on room air with adequate spontaneous ventilation    Post pain: adequate analgesia    Post assessment: no apparent anesthetic complications and tolerated procedure well    Post vital signs: stable    Level of consciousness: sedated    Nausea/Vomiting: no nausea/vomiting    Complications: none    Transfer of care protocol was followed      Last vitals: Visit Vitals  /74 (BP Location: Right arm, Patient Position: Sitting)   Pulse 64   Temp 36.8 °C (98.2 °F) (Temporal)   Resp 20   Ht 6' (1.829 m)   Wt 83.3 kg (183 lb 12.1 oz)   SpO2 97%   Breastfeeding No   BMI 24.92 kg/m²

## 2025-02-06 NOTE — ASSESSMENT & PLAN NOTE
Patient's blood pressure range in the last 24 hours was: BP  Min: 100/56  Max: 130/74.The patient's inpatient anti-hypertensive regimen is listed below:  Current Antihypertensives       Plan  - BP is uncontrolled, will adjust as follows: hold home blood pressure medication(s) due to hypotension from anesthesia  - resume as indicated

## 2025-02-06 NOTE — ANESTHESIA PROCEDURE NOTES
Intubation    Date/Time: 2/6/2025 8:15 AM    Performed by: Ralf Lucas CRNA  Authorized by: Jesus Castanon MD    Intubation:     Induction:  Intravenous    Intubated:  Postinduction    Mask Ventilation:  Easy mask    Attempts:  1    Attempted By:  CRNA    Method of Intubation:  Direct    Blade:  Waqas 3    Laryngeal View Grade: Grade IIA - cords partially seen      Difficult Airway Encountered?: No      Complications:  None    Airway Device:  Oral endotracheal tube    Airway Device Size:  7.5    Style/Cuff Inflation:  Cuffed (inflated to minimal occlusive pressure)    Tube secured:  22    Secured at:  The lips    Placement Verified By:  Capnometry    Complicating Factors:  None    Findings Post-Intubation:  BS equal bilateral and atraumatic/condition of teeth unchanged

## 2025-02-06 NOTE — INTERVAL H&P NOTE
Karen Gutierrez is a 59 y.o. 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    The patient has been examined and the H&P has been reviewed:    I concur with the findings and no changes have occurred since H&P was written.    Surgery risks, benefits and alternative options discussed and understood by patient/family.    -- to OR for exploratory laparotomy, metastatectomy, possible bowel resection, and ventral hernia repair, possible use of mesh and any other indicated procedures  -- Laterality marked?  No- n/a  -- Abx ppx:  Ancef + flagyl  -- DVT ppx:  SCDs   -- Consent signed and in the chart.  All questions have been answered        Alicia Hernandez MD      Surgical Oncology            There are no hospital problems to display for this patient.

## 2025-02-06 NOTE — HPI
59F  has a past medical history of Anxiety, Cancer, Depression, Hypertension, Hypotension, iatrogenic, and Mitral valve prolapse.  Admitted for surgical resection of duodenal cancer mass by primary. Tolerated procedure well. Pain adequately controlled. Denies fever, chest pain, dyspnea. Takes strattera for ADHD.     Initial review of chart reveals hypotensive to normotensive episodes postoperatively. On room air.     Hospital medicine consulted for medical management.

## 2025-02-06 NOTE — SUBJECTIVE & OBJECTIVE
Past Medical History:   Diagnosis Date    Anxiety     Cancer     duodenal cancer    Depression     Hypertension     Hypotension, iatrogenic     Mitral valve prolapse        Past Surgical History:   Procedure Laterality Date    BUNIONECTOMY Left     HYSTERECTOMY  2000    INSERTION OF TUNNELED CENTRAL VENOUS CATHETER (CVC) WITH SUBCUTANEOUS PORT N/A 03/26/2021    Procedure: [id number];  Surgeon: Lauren Abraham MD;  Location: [facility] OR;  Service: General;  Laterality: N/A;    INSERTION OF VENOUS ACCESS PORT Right 05/13/2024    Procedure: INSERTION, VENOUS ACCESS PORT;  Surgeon: Alicia Hernandez MD;  Location: [facility] OR;  Service: General;  Laterality: Right;    LITHOTRIPSY      PLACEMENT OF JEJUNOSTOMY TUBE  02/18/2021    Procedure: INSERTION, JEJUNOSTOMY TUBE;  Surgeon: Hitesh Díaz MD;  Location: [facility] OR 2ND FLR;  Service: General;;    ULTRASOUND GUIDANCE Right 05/13/2024    Procedure: ULTRASOUND GUIDANCE;  Surgeon: Alicia Hernandez MD;  Location: [facility] OR;  Service: General;  Laterality: Right;    VARICOSE VEIN STRIPPING  2015    WHIPPLE PROCEDURE N/A 02/18/2021    Procedure: WHIPPLE PROCEDURE;  Surgeon: Hitesh Díaz MD;  Location: [facility] OR 2ND FLR;  Service: General;  Laterality: N/A;       Review of patient's allergies indicates:   Allergen Reactions    Benzalkonium chloride Hives    Codeine Itching    Morphine Nausea Only    Neosporin [neomycin-bacitracin-polymyxin] Hives    Sulfa (sulfonamide antibiotics) Hives and Itching           Morphine sulfate Other (See Comments)    Sulfamethoxazole-trimethoprim Other (See Comments)    Hydrocortisone Hives     Redness / can use bactroban         No current facility-administered medications on file prior to encounter.     Current Outpatient Medications on File Prior to Encounter   Medication Sig    amLODIPine (NORVASC) 10 MG tablet Take 10 mg by mouth once daily.    BYSTOLIC 20 mg Tab Take 1 tablet by mouth once daily.    EScitalopram oxalate (LEXAPRO) 10  MG tablet Take 1 tablet (10 mg total) by mouth once daily.    olmesartan (BENICAR) 40 MG tablet TAKE 1 TABLET(40 MG) BY MOUTH EVERY DAY    atomoxetine (STRATTERA) 40 MG capsule Take 1 capsule (40 mg total) by mouth once daily.    cholecalciferol, vitamin D3, 125 mcg (5,000 unit) capsule Take 5,000 Units by mouth once daily.    clonazePAM (KLONOPIN) 0.5 MG tablet Take 1 tablet (0.5 mg total) by mouth nightly as needed for Anxiety.    cyanocobalamin (VITAMIN B-12) 1000 MCG tablet Take 1,000 mcg by mouth once daily.    LIDOcaine-prilocaine (EMLA) cream Apply to affected area once    OLANZapine (ZYPREXA) 5 MG tablet Take 1 tablet (5 mg total) by mouth every evening. Take nightly on days 1-3 of each chemotherapy cycle.    ondansetron (ZOFRAN-ODT) 4 MG TbDL Take 1 tablet (4 mg total) by mouth 2 (two) times daily.    prochlorperazine (COMPAZINE) 10 MG tablet Take 1 tablet (10 mg total) by mouth every 6 (six) hours as needed for Nausea.     Family History       Problem Relation (Age of Onset)    Atrial fibrillation Mother    Bladder Cancer Father    Colon cancer Maternal Grandmother    Diabetes Mother, Father, Maternal Grandmother, Paternal Grandmother    Hyperlipidemia Mother    Hypertension Father    No Known Problems Sister, Paternal Grandfather    Ovarian cancer Mother    Stroke Mother, Maternal Grandfather          Tobacco Use    Smoking status: Former    Smokeless tobacco: Never   Substance and Sexual Activity    Alcohol use: Not Currently    Drug use: Never    Sexual activity: Not on file     Review of Systems   Constitutional:  Positive for activity change, appetite change and fatigue. Negative for fever.   Respiratory:  Negative for shortness of breath.    Cardiovascular:  Negative for chest pain.   Gastrointestinal:  Positive for abdominal pain. Negative for constipation, nausea and vomiting.        Not passing flatus   Musculoskeletal:  Positive for myalgias.   Skin:  Positive for wound.   Allergic/Immunologic:  Positive for immunocompromised state.   Neurological:  Positive for weakness.   Psychiatric/Behavioral:  Negative for agitation, behavioral problems, confusion, decreased concentration and dysphoric mood. The patient is not nervous/anxious.      Objective:     Vital Signs (Most Recent):  Temp: 99.8 °F (37.7 °C) (02/06/25 1548)  Pulse: 85 (02/06/25 1548)  Resp: 16 (02/06/25 1548)  BP: 123/69 (02/06/25 1548)  SpO2: 96 % (02/06/25 1548) Vital Signs (24h Range):  Temp:  [97.8 °F (36.6 °C)-99.8 °F (37.7 °C)] 99.8 °F (37.7 °C)  Pulse:  [64-85] 85  Resp:  [10-20] 16  SpO2:  [92 %-100 %] 96 %  BP: (100-130)/(56-74) 123/69     Weight: 83.3 kg (183 lb 12.1 oz)  Body mass index is 24.92 kg/m².     Physical Exam  Vitals and nursing note reviewed.   Constitutional:       General: She is not in acute distress.     Appearance: She is ill-appearing. She is not toxic-appearing.   HENT:      Head: Normocephalic and atraumatic.   Cardiovascular:      Rate and Rhythm: Normal rate.   Pulmonary:      Effort: Pulmonary effort is normal. No respiratory distress.   Abdominal:      Palpations: Abdomen is soft.      Comments: Abdominal surgical incision with abdominal binder in place  2 nathony drains with sanguinous output in collection bulbs   Musculoskeletal:      Right lower leg: No edema.      Left lower leg: No edema.   Skin:     General: Skin is warm.   Neurological:      Mental Status: She is alert and oriented to person, place, and time.      Motor: Weakness present.   Psychiatric:         Mood and Affect: Mood normal.         Behavior: Behavior normal.          Significant Labs: All pertinent labs within the past 24 hours have been reviewed.    Significant Imaging: I have reviewed all pertinent imaging results/findings within the past 24 hours.

## 2025-02-06 NOTE — ANESTHESIA PREPROCEDURE EVALUATION
02/06/2025  Karen Gutierrez is a 59 y.o., female.      Pre-op Assessment    I have reviewed the Patient Summary Reports.     I have reviewed the Nursing Notes. I have reviewed the NPO Status.      Review of Systems  Anesthesia Hx:  No problems with previous Anesthesia                Hematology/Oncology:  Hematology Normal   Oncology Normal                                   Cardiovascular:     Hypertension                                          Renal/:  Chronic Renal Disease                Hepatic/GI:  Hepatic/GI Normal                    Neurological:    Neuromuscular Disease,                                   Endocrine:  Endocrine Normal            Dermatological:  Skin Normal    Psych:  Psychiatric History                  Physical Exam  General: Well nourished, Cooperative, Alert and Oriented    Airway:  Mallampati: II / II  Mouth Opening: Normal  TM Distance: Normal  Tongue: Normal  Neck ROM: Normal ROM    Dental:  Intact      Patient Active Problem List   Diagnosis    Benign hypertension    Kidney stone    Mitral valve prolapse    Pure hypercholesterolemia    History of dysplastic nevus    Malnutrition of moderate degree    Duodenal cancer    s/p partial pancreatectomy    Encounter for palliative care    ACP (advance care planning)    Iron deficiency anemia    Monoallelic mutation of MUTYH gene    Immunodeficiency due to chemotherapy    Peripheral neuropathy due to chemotherapy    Secondary malignancy of soft tissues of abdomen    Drug-induced insomnia    Pancytopenia due to antineoplastic chemotherapy    ADHD    Generalized anxiety disorder    Incisional hernia without obstruction or gangrene    Cervical vertebral fusion    Depression    Thrombocytopenia     Results for orders placed during the hospital encounter of 09/21/21    Echo    Interpretation Summary  · The left ventricle is normal in  size with concentric remodeling and normal systolic function.  · Normal left ventricular diastolic function.  · Normal right ventricular size with normal right ventricular systolic function.  · The estimated ejection fraction is 65%.      Anesthesia Plan  Type of Anesthesia, risks & benefits discussed:    Anesthesia Type: Gen ETT  Intra-op Monitoring Plan: Standard ASA Monitors, Art Line and Central Line  Post Op Pain Control Plan: multimodal analgesia  Induction:  IV  Airway Plan: Direct  Informed Consent: Informed consent signed with the Patient and all parties understand the risks and agree with anesthesia plan.  All questions answered.   ASA Score: 3  Day of Surgery Review of History & Physical: H&P Update referred to the surgeon/provider.I have interviewed and examined the patient. I have reviewed the patient's H&P dated: There are no significant changes. H&P completed by Anesthesiologist.    Ready For Surgery From Anesthesia Perspective.     .

## 2025-02-07 LAB
ANION GAP SERPL CALC-SCNC: 7 MMOL/L (ref 8–16)
BUN SERPL-MCNC: 12 MG/DL (ref 6–20)
CALCIUM SERPL-MCNC: 8.5 MG/DL (ref 8.7–10.5)
CHLORIDE SERPL-SCNC: 106 MMOL/L (ref 95–110)
CO2 SERPL-SCNC: 25 MMOL/L (ref 23–29)
CREAT SERPL-MCNC: 0.8 MG/DL (ref 0.5–1.4)
ERYTHROCYTE [DISTWIDTH] IN BLOOD BY AUTOMATED COUNT: 14.2 % (ref 11.5–14.5)
EST. GFR  (NO RACE VARIABLE): >60 ML/MIN/1.73 M^2
GLUCOSE SERPL-MCNC: 150 MG/DL (ref 70–110)
HCT VFR BLD AUTO: 34 % (ref 37–48.5)
HGB BLD-MCNC: 11 G/DL (ref 12–16)
MCH RBC QN AUTO: 31.7 PG (ref 27–31)
MCHC RBC AUTO-ENTMCNC: 32.4 G/DL (ref 32–36)
MCV RBC AUTO: 98 FL (ref 82–98)
PLATELET # BLD AUTO: 91 K/UL (ref 150–450)
PMV BLD AUTO: 9.9 FL (ref 9.2–12.9)
POTASSIUM SERPL-SCNC: 4.4 MMOL/L (ref 3.5–5.1)
RBC # BLD AUTO: 3.47 M/UL (ref 4–5.4)
SODIUM SERPL-SCNC: 138 MMOL/L (ref 136–145)
WBC # BLD AUTO: 8.99 K/UL (ref 3.9–12.7)

## 2025-02-07 PROCEDURE — 85027 COMPLETE CBC AUTOMATED: CPT | Performed by: SURGERY

## 2025-02-07 PROCEDURE — 63600175 PHARM REV CODE 636 W HCPCS: Performed by: SURGERY

## 2025-02-07 PROCEDURE — 94799 UNLISTED PULMONARY SVC/PX: CPT

## 2025-02-07 PROCEDURE — 97165 OT EVAL LOW COMPLEX 30 MIN: CPT

## 2025-02-07 PROCEDURE — 25000003 PHARM REV CODE 250: Performed by: SURGERY

## 2025-02-07 PROCEDURE — 99900035 HC TECH TIME PER 15 MIN (STAT)

## 2025-02-07 PROCEDURE — 11000001 HC ACUTE MED/SURG PRIVATE ROOM

## 2025-02-07 PROCEDURE — 97116 GAIT TRAINING THERAPY: CPT

## 2025-02-07 PROCEDURE — 97535 SELF CARE MNGMENT TRAINING: CPT

## 2025-02-07 PROCEDURE — 80048 BASIC METABOLIC PNL TOTAL CA: CPT | Performed by: SURGERY

## 2025-02-07 PROCEDURE — 97162 PT EVAL MOD COMPLEX 30 MIN: CPT

## 2025-02-07 RX ORDER — METOPROLOL TARTRATE 25 MG/1
25 TABLET, FILM COATED ORAL 2 TIMES DAILY
Status: DISCONTINUED | OUTPATIENT
Start: 2025-02-07 | End: 2025-02-11 | Stop reason: HOSPADM

## 2025-02-07 RX ORDER — OXYCODONE HYDROCHLORIDE 5 MG/1
TABLET ORAL
Qty: 30 TABLET | Refills: 0 | Status: SHIPPED | OUTPATIENT
Start: 2025-02-07

## 2025-02-07 RX ORDER — FAMOTIDINE 20 MG/1
20 TABLET, FILM COATED ORAL 2 TIMES DAILY
Status: DISCONTINUED | OUTPATIENT
Start: 2025-02-07 | End: 2025-02-11 | Stop reason: HOSPADM

## 2025-02-07 RX ORDER — OXYCODONE HYDROCHLORIDE 5 MG/1
10 TABLET ORAL EVERY 4 HOURS PRN
Status: DISCONTINUED | OUTPATIENT
Start: 2025-02-07 | End: 2025-02-11 | Stop reason: HOSPADM

## 2025-02-07 RX ADMIN — ENOXAPARIN SODIUM 40 MG: 40 INJECTION SUBCUTANEOUS at 05:02

## 2025-02-07 RX ADMIN — SODIUM CHLORIDE: 9 INJECTION, SOLUTION INTRAVENOUS at 08:02

## 2025-02-07 RX ADMIN — HYDROMORPHONE HYDROCHLORIDE 1 MG: 1 INJECTION, SOLUTION INTRAMUSCULAR; INTRAVENOUS; SUBCUTANEOUS at 08:02

## 2025-02-07 RX ADMIN — OXYCODONE HYDROCHLORIDE 10 MG: 5 TABLET ORAL at 06:02

## 2025-02-07 RX ADMIN — ONDANSETRON 4 MG: 2 INJECTION INTRAMUSCULAR; INTRAVENOUS at 10:02

## 2025-02-07 RX ADMIN — SODIUM CHLORIDE: 9 INJECTION, SOLUTION INTRAVENOUS at 12:02

## 2025-02-07 RX ADMIN — CHLORHEXIDINE GLUCONATE 0.12% ORAL RINSE 10 ML: 1.2 LIQUID ORAL at 08:02

## 2025-02-07 RX ADMIN — FAMOTIDINE 20 MG: 20 TABLET, FILM COATED ORAL at 08:02

## 2025-02-07 RX ADMIN — METOPROLOL TARTRATE 25 MG: 25 TABLET, FILM COATED ORAL at 10:02

## 2025-02-07 RX ADMIN — CHLORHEXIDINE GLUCONATE 0.12% ORAL RINSE 10 ML: 1.2 LIQUID ORAL at 10:02

## 2025-02-07 RX ADMIN — ONDANSETRON 4 MG: 2 INJECTION INTRAMUSCULAR; INTRAVENOUS at 05:02

## 2025-02-07 RX ADMIN — ONDANSETRON 4 MG: 2 INJECTION INTRAMUSCULAR; INTRAVENOUS at 08:02

## 2025-02-07 RX ADMIN — ACETAMINOPHEN 1000 MG: 10 INJECTION, SOLUTION INTRAVENOUS at 01:02

## 2025-02-07 RX ADMIN — ESCITALOPRAM OXALATE 10 MG: 10 TABLET ORAL at 10:02

## 2025-02-07 RX ADMIN — HYDROMORPHONE HYDROCHLORIDE 1 MG: 1 INJECTION, SOLUTION INTRAMUSCULAR; INTRAVENOUS; SUBCUTANEOUS at 01:02

## 2025-02-07 RX ADMIN — ACETAMINOPHEN 1000 MG: 10 INJECTION, SOLUTION INTRAVENOUS at 05:02

## 2025-02-07 RX ADMIN — OXYCODONE HYDROCHLORIDE 10 MG: 5 TABLET ORAL at 03:02

## 2025-02-07 RX ADMIN — FAMOTIDINE 20 MG: 20 TABLET, FILM COATED ORAL at 10:02

## 2025-02-07 RX ADMIN — METOPROLOL TARTRATE 25 MG: 25 TABLET, FILM COATED ORAL at 08:02

## 2025-02-07 RX ADMIN — HYDROMORPHONE HYDROCHLORIDE 1 MG: 1 INJECTION, SOLUTION INTRAMUSCULAR; INTRAVENOUS; SUBCUTANEOUS at 05:02

## 2025-02-07 RX ADMIN — OXYCODONE HYDROCHLORIDE 10 MG: 5 TABLET ORAL at 10:02

## 2025-02-07 NOTE — PROGRESS NOTES
[facility] [place] Medicine  Progress Note    Patient Name: Karen Gutierrez  MRN: [id number]  Patient Class: OP- Outpatient Recovery   Admission Date: 2/6/2025  Length of Stay: 1 days  Attending Physician: Alicia Hernandez MD  Primary Care Provider: Han Arshad MD        Subjective     Principal Problem:Duodenal cancer        HPI:  59F  has a past medical history of Anxiety, Cancer, Depression, Hypertension, Hypotension, iatrogenic, and Mitral valve prolapse.  Admitted for surgical resection of duodenal cancer mass by primary. Tolerated procedure well. Pain adequately controlled. Denies fever, chest pain, dyspnea. Takes strattera for ADHD.     Initial review of chart reveals hypotensive to normotensive episodes postoperatively. On room air.     Hospital medicine consulted for medical management.    Overview/Hospital Course:  2/7 admitted for surgical intervention, ex lap from duodenal metastasis and hernia repair per primary. No acute events overnight. Ambulated with Physical/occupational therapy. Blood pressure mildly elevated. Asymptomatic. Pain adequately controlled. Chavez removed. Not passing flatus.     Interval History: See hospital course for today      Review of Systems   Constitutional:  Positive for activity change (improved). Negative for fever.   Respiratory:  Negative for shortness of breath.    Cardiovascular:  Negative for leg swelling.   Gastrointestinal:  Positive for abdominal pain and constipation. Negative for nausea and vomiting.        Not passing flatus   Genitourinary:         Chavez removed, has not urinated   Skin:  Positive for wound.   Neurological:  Positive for weakness.   Psychiatric/Behavioral:  Negative for agitation, behavioral problems, confusion, decreased concentration and dysphoric mood. The patient is not nervous/anxious.      Objective:     Vital Signs (Most Recent):  Temp: 98.5 °F (36.9 °C) (02/07/25 0738)  Pulse: 77 (02/07/25 0738)  Resp: 18 (02/07/25  1008)  BP: (!) 142/61 (02/07/25 0738)  SpO2: (!) 93 % (02/07/25 0738) Vital Signs (24h Range):  Temp:  [97.8 °F (36.6 °C)-99.8 °F (37.7 °C)] 98.5 °F (36.9 °C)  Pulse:  [65-85] 77  Resp:  [10-20] 18  SpO2:  [91 %-100 %] 93 %  BP: (100-145)/(56-73) 142/61     Weight: 83.3 kg (183 lb 12.1 oz)  Body mass index is 24.92 kg/m².    Intake/Output Summary (Last 24 hours) at 2/7/2025 1100  Last data filed at 2/7/2025 0730  Gross per 24 hour   Intake 3000 ml   Output 2570 ml   Net 430 ml         Physical Exam  Vitals and nursing note reviewed.   Constitutional:       General: She is not in acute distress.     Appearance: She is ill-appearing. She is not toxic-appearing.   HENT:      Head: Normocephalic and atraumatic.   Cardiovascular:      Rate and Rhythm: Normal rate.   Pulmonary:      Effort: Pulmonary effort is normal. No respiratory distress.   Abdominal:      Palpations: Abdomen is soft.      Tenderness: There is abdominal tenderness.      Comments: Abdominal binder in place  Isaak drains with serosanguinous fluid in collection bulbs   Musculoskeletal:      Right lower leg: No edema.      Left lower leg: No edema.   Skin:     General: Skin is warm.      Coloration: Skin is pale.   Neurological:      Mental Status: She is alert and oriented to person, place, and time.      Motor: Weakness present.   Psychiatric:         Mood and Affect: Mood normal.         Behavior: Behavior normal.             Significant Labs: All pertinent labs within the past 24 hours have been reviewed.  CBC:   Recent Labs   Lab 02/07/25  0631   WBC 8.99   HGB 11.0*   HCT 34.0*   PLT 91*     CMP:   Recent Labs   Lab 02/07/25  0631      K 4.4      CO2 25   *   BUN 12   CREATININE 0.8   CALCIUM 8.5*   ANIONGAP 7*       Significant Imaging: I have reviewed all pertinent imaging results/findings within the past 24 hours.    Assessment and Plan     * Duodenal cancer  Status post ex lap with surgical intervention per  primary        Thrombocytopenia  Trending down  On lovenox per primary  Self ambulating and wearing scds  Discontinue if persists  Defer to primary    Depression  Patient has persistent depression which is unknown and is currently controlled. Will Continue anti-depressant medications. We will not consult psychiatry at this time. Patient does not display psychosis at this time. Continue to monitor closely and adjust plan of care as needed.        Generalized anxiety disorder  Resume home medication(s)       ADHD  Holding home medication(s)   Can resume on discharge       Benign hypertension  Patient's blood pressure range in the last 24 hours was: BP  Min: 100/56  Max: 145/73.The patient's inpatient anti-hypertensive regimen is listed below:  Current Antihypertensives  metoprolol tartrate (LOPRESSOR) tablet 25 mg, 2 times daily, Oral    Plan  - BP is uncontrolled, will adjust as follows: hold home blood pressure medication(s) due to hypotension from anesthesia  - resume as indicated      VTE Risk Mitigation (From admission, onward)           Ordered     enoxaparin injection 40 mg  Daily         02/06/25 1539     IP VTE HIGH RISK PATIENT  Once         02/06/25 1539     Place sequential compression device  Until discontinued         02/06/25 1539                    Discharge Planning   ANNI: 2/7/2025     Code Status: Full Code   Medical Readiness for Discharge Date:                          Chantel Mcmanus MD  Department of Hospital Medicine   [facility] S[facility]

## 2025-02-07 NOTE — ASSESSMENT & PLAN NOTE
-- holding home lisinopril  -- on metoprolol  -- will resume home meds on discharge  -- appreciate medicine assistance with management

## 2025-02-07 NOTE — SUBJECTIVE & OBJECTIVE
Interval History: AFVSS.  JOSE RAMON.  Tolerating CLD,  no nausea/ vomiting.  No flatus or BM.  Pain controlled.     Medications:  Continuous Infusions:   0.9% NaCl   Intravenous Continuous 125 mL/hr at 02/07/25 0818 New Bag at 02/07/25 0818     Scheduled Meds:   acetaminophen  1,000 mg Intravenous Q8H    chlorhexidine  10 mL Mouth/Throat BID    enoxparin  40 mg Subcutaneous Daily    EScitalopram oxalate  10 mg Oral Daily     PRN Meds:  Current Facility-Administered Medications:     clonazePAM, 0.5 mg, Oral, Nightly PRN    HYDROmorphone, 1 mg, Intravenous, Q6H PRN    ondansetron, 4 mg, Intravenous, Q6H PRN    oxyCODONE, 10 mg, Oral, Q6H PRN    oxyCODONE, 5 mg, Oral, Q4H PRN     Review of patient's allergies indicates:   Allergen Reactions    Benzalkonium chloride Hives    Codeine Itching    Morphine Nausea Only    Neosporin [neomycin-bacitracin-polymyxin] Hives    Sulfa (sulfonamide antibiotics) Hives and Itching           Morphine sulfate Other (See Comments)    Sulfamethoxazole-trimethoprim Other (See Comments)    Hydrocortisone Hives     Redness / can use bactroban       Objective:     Vital Signs (Most Recent):  Temp: 98.5 °F (36.9 °C) (02/07/25 0738)  Pulse: 77 (02/07/25 0738)  Resp: 18 (02/07/25 0738)  BP: (!) 142/61 (02/07/25 0738)  SpO2: (!) 93 % (02/07/25 0738) Vital Signs (24h Range):  Temp:  [97.8 °F (36.6 °C)-99.8 °F (37.7 °C)] 98.5 °F (36.9 °C)  Pulse:  [65-85] 77  Resp:  [10-20] 18  SpO2:  [91 %-100 %] 93 %  BP: (100-145)/(56-73) 142/61     Weight: 83.3 kg (183 lb 12.1 oz)  Body mass index is 24.92 kg/m².    Intake/Output - Last 3 Shifts         02/05 0700  02/06 0659 02/06 0700  02/07 0659 02/07 0700  02/08 0659    I.V. (mL/kg)  500 (6)     IV Piggyback  2600     Total Intake(mL/kg)  3100 (37.2)     Urine (mL/kg/hr)  750 (0.4) 1500 (6.8)    Drains  105 115    Blood  100     Total Output  955 1615    Net  +2145 -1615                    Physical Exam  Vitals and nursing note reviewed.   Constitutional:        General: She is not in acute distress.     Appearance: Normal appearance. She is not ill-appearing or toxic-appearing.   HENT:      Head: Normocephalic and atraumatic.   Eyes:      Extraocular Movements: Extraocular movements intact.      Conjunctiva/sclera: Conjunctivae normal.   Cardiovascular:      Rate and Rhythm: Normal rate and regular rhythm.   Pulmonary:      Effort: Pulmonary effort is normal.   Abdominal:      Palpations: Abdomen is soft.      Comments: Appropriately TTP, incision c/d/I, JUDIE drains bilaterally serosanguinous    Musculoskeletal:      Right lower leg: No edema.      Left lower leg: No edema.   Skin:     General: Skin is warm and dry.      Coloration: Skin is not jaundiced.   Neurological:      Mental Status: She is alert and oriented to person, place, and time.      Motor: No weakness.   Psychiatric:         Mood and Affect: Mood normal.         Behavior: Behavior normal.     --      Significant Labs:  I have reviewed all pertinent lab results within the past 24 hours.  CBC:   Recent Labs   Lab 02/07/25  0631   WBC 8.99   RBC 3.47*   HGB 11.0*   HCT 34.0*   PLT 91*   MCV 98   MCH 31.7*   MCHC 32.4     BMP:   Recent Labs   Lab 02/07/25  0631   *      K 4.4      CO2 25   BUN 12   CREATININE 0.8   CALCIUM 8.5*     CMP:   Recent Labs   Lab 02/07/25  0631   *   CALCIUM 8.5*      K 4.4   CO2 25      BUN 12   CREATININE 0.8       Significant Diagnostics:  I have reviewed all pertinent imaging results/findings within the past 24 hours.

## 2025-02-07 NOTE — PLAN OF CARE
Discussed poc with pt, pt verbalized understanding  VS wnl  Chavez in place.   Abdominal binder  No Cardiac monitoring in use.   Fall precautions in place, remains injury free        Accurate I&Os  Bed locked at lowest position  Call light within reach     Chart check complete  Will cont with POC

## 2025-02-07 NOTE — PROGRESS NOTES
[facility] Surg  General Surgery  Progress Note    Subjective:     History of Present Illness:  No notes on file    Post-Op Info:  Procedure(s) (LRB):  LAPAROTOMY, EXPLORATORY (N/A)  REPAIR, HERNIA, VENTRAL (N/A)  BLOCK, TRANSVERSUS ABDOMINIS PLANE (N/A)  EXCISION, LESION, ABDOMINAL WALL (N/A)   1 Day Post-Op     Interval History: AFVSS.  JOSE RAMON.  Tolerating CLD,  no nausea/ vomiting.  No flatus or BM.  Pain controlled.     Medications:  Continuous Infusions:   0.9% NaCl   Intravenous Continuous 125 mL/hr at 02/07/25 0818 New Bag at 02/07/25 0818     Scheduled Meds:   acetaminophen  1,000 mg Intravenous Q8H    chlorhexidine  10 mL Mouth/Throat BID    enoxparin  40 mg Subcutaneous Daily    EScitalopram oxalate  10 mg Oral Daily     PRN Meds:  Current Facility-Administered Medications:     clonazePAM, 0.5 mg, Oral, Nightly PRN    HYDROmorphone, 1 mg, Intravenous, Q6H PRN    ondansetron, 4 mg, Intravenous, Q6H PRN    oxyCODONE, 10 mg, Oral, Q6H PRN    oxyCODONE, 5 mg, Oral, Q4H PRN     Review of patient's allergies indicates:   Allergen Reactions    Benzalkonium chloride Hives    Codeine Itching    Morphine Nausea Only    Neosporin [neomycin-bacitracin-polymyxin] Hives    Sulfa (sulfonamide antibiotics) Hives and Itching           Morphine sulfate Other (See Comments)    Sulfamethoxazole-trimethoprim Other (See Comments)    Hydrocortisone Hives     Redness / can use bactroban       Objective:     Vital Signs (Most Recent):  Temp: 98.5 °F (36.9 °C) (02/07/25 0738)  Pulse: 77 (02/07/25 0738)  Resp: 18 (02/07/25 0738)  BP: (!) 142/61 (02/07/25 0738)  SpO2: (!) 93 % (02/07/25 0738) Vital Signs (24h Range):  Temp:  [97.8 °F (36.6 °C)-99.8 °F (37.7 °C)] 98.5 °F (36.9 °C)  Pulse:  [65-85] 77  Resp:  [10-20] 18  SpO2:  [91 %-100 %] 93 %  BP: (100-145)/(56-73) 142/61     Weight: 83.3 kg (183 lb 12.1 oz)  Body mass index is 24.92 kg/m².    Intake/Output - Last 3 Shifts         02/05 0700  02/06 0659 02/06 0700  02/07 0659 02/07  0700  02/08 0659    I.V. (mL/kg)  500 (6)     IV Piggyback  2600     Total Intake(mL/kg)  3100 (37.2)     Urine (mL/kg/hr)  750 (0.4) 1500 (6.8)    Drains  105 115    Blood  100     Total Output  955 1615    Net  +2145 -1615                    Physical Exam  Vitals and nursing note reviewed.   Constitutional:       General: She is not in acute distress.     Appearance: Normal appearance. She is not ill-appearing or toxic-appearing.   HENT:      Head: Normocephalic and atraumatic.   Eyes:      Extraocular Movements: Extraocular movements intact.      Conjunctiva/sclera: Conjunctivae normal.   Cardiovascular:      Rate and Rhythm: Normal rate and regular rhythm.   Pulmonary:      Effort: Pulmonary effort is normal.   Abdominal:      Palpations: Abdomen is soft.      Comments: Appropriately TTP, incision c/d/I, JUDIE drains bilaterally serosanguinous    Musculoskeletal:      Right lower leg: No edema.      Left lower leg: No edema.   Skin:     General: Skin is warm and dry.      Coloration: Skin is not jaundiced.   Neurological:      Mental Status: She is alert and oriented to person, place, and time.      Motor: No weakness.   Psychiatric:         Mood and Affect: Mood normal.         Behavior: Behavior normal.     --      Significant Labs:  I have reviewed all pertinent lab results within the past 24 hours.  CBC:   Recent Labs   Lab 02/07/25  0631   WBC 8.99   RBC 3.47*   HGB 11.0*   HCT 34.0*   PLT 91*   MCV 98   MCH 31.7*   MCHC 32.4     BMP:   Recent Labs   Lab 02/07/25  0631   *      K 4.4      CO2 25   BUN 12   CREATININE 0.8   CALCIUM 8.5*     CMP:   Recent Labs   Lab 02/07/25  0631   *   CALCIUM 8.5*      K 4.4   CO2 25      BUN 12   CREATININE 0.8       Significant Diagnostics:  I have reviewed all pertinent imaging results/findings within the past 24 hours.  Assessment/Plan:     * Duodenal cancer  POD #1- s/p excision peritoneal metastasis and VHR with mesh    Doing well.   Pain controlled.  Tolerating CLD, awaiting return of bowel function.      -- CLD  -- multimodal analgesia- IV tylenol and oxycodone with IV for breakthrough  -- bacon removed today   -- encourage ambulation and IS  -- OT/PT consulted   Dispo: continued med/ surg    Lovenox and SCDs for DVT ppx, Famotidine for GI ppx     Thrombocytopenia  Chemo induced  -- continue to monitor     Depression  -- continue home escitalopram      Generalized anxiety disorder  -- continue home clonazepam    Benign hypertension  -- holding home lisinopril  -- on metoprolol  -- will resume home meds on discharge  -- appreciate medicine assistance with management         Alicia Hernandez MD  General Surgery  [facility] - Med S[facility]

## 2025-02-07 NOTE — PT/OT/SLP EVAL
Physical Therapy Evaluation    Patient Name:  Karen Gutierrez   MRN:  [id number]    Recommendations:     Discharge Recommendations: No Therapy Indicated   Discharge Equipment Recommendations: none   Barriers to discharge: None    Assessment:     Karen Gutierrez is a 59 y.o. female admitted with a medical diagnosis of Duodenal cancer.  She presents with the following impairments/functional limitations: impaired endurance, impaired functional mobility, gait instability, pain .    Rehab Prognosis: Good; patient would benefit from acute skilled PT services to address these deficits and reach maximum level of function.    Recent Surgery: Procedure(s) (LRB):  LAPAROTOMY, EXPLORATORY (N/A)  REPAIR, HERNIA, VENTRAL (N/A)  BLOCK, TRANSVERSUS ABDOMINIS PLANE (N/A)  EXCISION, LESION, ABDOMINAL WALL (N/A) 1 Day Post-Op    Plan:     During this hospitalization, patient to be seen 3 x/week to address the identified rehab impairments via gait training, therapeutic activities, therapeutic exercises and progress toward the following goals:    Plan of Care Expires:  02/21/25    Subjective     Chief Complaint: ABD PAIN   Patient/Family Comments/goals: GO HOME   Pain/Comfort:  Pain Rating 1: 3/10  Location 1: abdomen  Pain Addressed 1: Pre-medicate for activity  Pain Rating Post-Intervention 1: 3/10    Patients cultural, spiritual, Evangelical conflicts given the current situation:      Living Environment:   PT LIVES AT HOME ALONE IN A H WITH NO STEPS TO ENTER HOME   Prior to admission, patients level of function was IND, DRIVES AND WORKS IN ADMINISTRATION .  Equipment used at home: none.  DME owned (not currently used): none.  Upon discharge, patient will have assistance from UNKNOWN .    Objective:     Communicated with NURSE WASHBURN AND EPIC CHART REVIEW  prior to session.  Patient found supine with peripheral IV  upon PT entry to room.    General Precautions: Standard,    Orthopedic Precautions:N/A   Braces:  "N/A  Respiratory Status: Room air    Exams:  Cognitive Exam:  Patient is oriented to Person, Place, Time, and Situation  Postural Exam:  Patient presented with the following abnormalities:    -       No postural abnormalities identified  RLE ROM: WNL  RLE Strength: WNL  LLE ROM: WNL  LLE Strength: WNL    Functional Mobility:  Bed Mobility:     Rolling Left:  supervision  Scooting: supervision  Supine to Sit: supervision  Transfers:     Sit to Stand:  supervision with rolling walker  Bed to Chair: supervision with  rolling walker  using  Stand Pivot  Gait: PT GT TRAINED X 160' WITH RW AND S.      AM-PAC 6 CLICK MOBILITY  Total Score:21       Treatment & Education:  GT. BELT AND  SOCKS DONNED PRIOR TO OOB MOBILITY.  PT GT TRAINED X 160' WITH NO LOB. PT INDEPENDENTLY CUES SELF FOR UPRIGHT POSTURE.   PT RETURNED TO RM T/F TO CHAIR WITH S. PT EDUCATED ON ROLE OF ACUTE CARE P.T. AND REC FOR OOB TO CHAIR FOR ALL MEALS.   PT EDUCATED ON "CALL DON'T FALL", ENCOURAGED TO CALL FOR ASSISTANCE WITH ALL NEEDS FOR OOB MOBILITY.      Patient left up in chair with call button in reach.    GOALS:   Multidisciplinary Problems       Physical Therapy Goals          Problem: Physical Therapy    Goal Priority Disciplines Outcome Interventions   Physical Therapy Goal     PT, PT/OT     Description: LT25  1. PT WILL COMPLETE BED MOBILITY IND  2. PT WILL GT TRAIN X 450' IND TO PROGRESS GT.   3. PT WILL T/F TO CHAIR IND FOR OOB TOLERANCE  4. PT WILL INC AMPAC SCORE BY 2 POINTS TO PROGRESS GROSS FUNC MOBILITY.                          DME Justifications:  No DME recommended requiring DME justifications    History:     Past Medical History:   Diagnosis Date    Anxiety     Cancer     duodenal cancer    Depression     Hypertension     Hypotension, iatrogenic     Mitral valve prolapse        Past Surgical History:   Procedure Laterality Date    BUNIONECTOMY Left     HYSTERECTOMY      INJECTION OF ANESTHETIC AGENT INTO TISSUE PLANE " DEFINED BY TRANSVERSUS ABDOMINIS MUSCLE N/A 2/6/2025    Procedure: BLOCK, TRANSVERSUS ABDOMINIS PLANE;  Surgeon: Alicia Hernandez MD;  Location: [facility] OR;  Service: General;  Laterality: N/A;    INSERTION OF TUNNELED CENTRAL VENOUS CATHETER (CVC) WITH SUBCUTANEOUS PORT N/A 03/26/2021    Procedure: [id number];  Surgeon: Lauren Abraham MD;  Location: [facility] OR;  Service: General;  Laterality: N/A;    INSERTION OF VENOUS ACCESS PORT Right 05/13/2024    Procedure: INSERTION, VENOUS ACCESS PORT;  Surgeon: Alicia Hernandez MD;  Location: [facility] OR;  Service: General;  Laterality: Right;    LAPAROTOMY, EXPLORATORY N/A 2/6/2025    Procedure: LAPAROTOMY, EXPLORATORY;  Surgeon: Alicia Hernandez MD;  Location: [facility] OR;  Service: General;  Laterality: N/A;    LITHOTRIPSY      PLACEMENT OF JEJUNOSTOMY TUBE  02/18/2021    Procedure: INSERTION, JEJUNOSTOMY TUBE;  Surgeon: Hitesh Díaz MD;  Location: [facility] OR 2ND FLR;  Service: General;;    REPAIR, HERNIA, VENTRAL N/A 2/6/2025    Procedure: REPAIR, HERNIA, VENTRAL;  Surgeon: Alicia Hernandez MD;  Location: [facility] OR;  Service: General;  Laterality: N/A;  phasis ST mesh    SURGICAL REMOVAL OF LESION OF ABDOMINAL WALL N/A 2/6/2025    Procedure: EXCISION, LESION, ABDOMINAL WALL;  Surgeon: Alicia Hernandez MD;  Location: [facility] OR;  Service: General;  Laterality: N/A;    ULTRASOUND GUIDANCE Right 05/13/2024    Procedure: ULTRASOUND GUIDANCE;  Surgeon: Alicia Hernandez MD;  Location: [facility] OR;  Service: General;  Laterality: Right;    VARICOSE VEIN STRIPPING  2015    WHIPPLE PROCEDURE N/A 02/18/2021    Procedure: WHIPPLE PROCEDURE;  Surgeon: Hitesh Díaz MD;  Location: [facility] OR 2ND FLR;  Service: General;  Laterality: N/A;       Time Tracking:     PT Received On: 02/07/25  PT Start Time: 0750     PT Stop Time: 0815  PT Total Time (min): 25 min     Billable Minutes: Evaluation 15 and Gait Training 10      02/07/2025

## 2025-02-07 NOTE — SUBJECTIVE & OBJECTIVE
Interval History: See hospital course for today      Review of Systems   Constitutional:  Positive for activity change (improved). Negative for fever.   Respiratory:  Negative for shortness of breath.    Cardiovascular:  Negative for leg swelling.   Gastrointestinal:  Positive for abdominal pain and constipation. Negative for nausea and vomiting.        Not passing flatus   Genitourinary:         Chavez removed, has not urinated   Skin:  Positive for wound.   Neurological:  Positive for weakness.   Psychiatric/Behavioral:  Negative for agitation, behavioral problems, confusion, decreased concentration and dysphoric mood. The patient is not nervous/anxious.      Objective:     Vital Signs (Most Recent):  Temp: 98.5 °F (36.9 °C) (02/07/25 0738)  Pulse: 77 (02/07/25 0738)  Resp: 18 (02/07/25 1008)  BP: (!) 142/61 (02/07/25 0738)  SpO2: (!) 93 % (02/07/25 0738) Vital Signs (24h Range):  Temp:  [97.8 °F (36.6 °C)-99.8 °F (37.7 °C)] 98.5 °F (36.9 °C)  Pulse:  [65-85] 77  Resp:  [10-20] 18  SpO2:  [91 %-100 %] 93 %  BP: (100-145)/(56-73) 142/61     Weight: 83.3 kg (183 lb 12.1 oz)  Body mass index is 24.92 kg/m².    Intake/Output Summary (Last 24 hours) at 2/7/2025 1100  Last data filed at 2/7/2025 0730  Gross per 24 hour   Intake 3000 ml   Output 2570 ml   Net 430 ml         Physical Exam  Vitals and nursing note reviewed.   Constitutional:       General: She is not in acute distress.     Appearance: She is ill-appearing. She is not toxic-appearing.   HENT:      Head: Normocephalic and atraumatic.   Cardiovascular:      Rate and Rhythm: Normal rate.   Pulmonary:      Effort: Pulmonary effort is normal. No respiratory distress.   Abdominal:      Palpations: Abdomen is soft.      Tenderness: There is abdominal tenderness.      Comments: Abdominal binder in place  Isaak drains with serosanguinous fluid in collection bulbs   Musculoskeletal:      Right lower leg: No edema.      Left lower leg: No edema.   Skin:     General: Skin  is warm.      Coloration: Skin is pale.   Neurological:      Mental Status: She is alert and oriented to person, place, and time.      Motor: Weakness present.   Psychiatric:         Mood and Affect: Mood normal.         Behavior: Behavior normal.             Significant Labs: All pertinent labs within the past 24 hours have been reviewed.  CBC:   Recent Labs   Lab 02/07/25  0631   WBC 8.99   HGB 11.0*   HCT 34.0*   PLT 91*     CMP:   Recent Labs   Lab 02/07/25  0631      K 4.4      CO2 25   *   BUN 12   CREATININE 0.8   CALCIUM 8.5*   ANIONGAP 7*       Significant Imaging: I have reviewed all pertinent imaging results/findings within the past 24 hours.

## 2025-02-07 NOTE — ASSESSMENT & PLAN NOTE
Patient's blood pressure range in the last 24 hours was: BP  Min: 100/56  Max: 145/73.The patient's inpatient anti-hypertensive regimen is listed below:  Current Antihypertensives  metoprolol tartrate (LOPRESSOR) tablet 25 mg, 2 times daily, Oral    Plan  - BP is uncontrolled, will adjust as follows: hold home blood pressure medication(s) due to hypotension from anesthesia  - resume as indicated

## 2025-02-07 NOTE — ASSESSMENT & PLAN NOTE
POD #1- s/p excision peritoneal metastasis and VHR with mesh    Doing well.  Pain controlled.  Tolerating CLD, awaiting return of bowel function.      -- CLD  -- multimodal analgesia- IV tylenol and oxycodone with IV for breakthrough  -- bacon removed today   -- encourage ambulation and IS  -- OT/PT consulted   Dispo: continued med/ surg    Lovenox and SCDs for DVT ppx, Famotidine for GI ppx

## 2025-02-07 NOTE — PLAN OF CARE
Pain main concern but managed well with PRN. Slept well. Turns q2. Ready for more mobility.       Problem: Adult Inpatient Plan of Care  Goal: Plan of Care Review  Outcome: Progressing  Goal: Patient-Specific Goal (Individualized)  Outcome: Progressing  Goal: Absence of Hospital-Acquired Illness or Injury  Outcome: Progressing  Goal: Optimal Comfort and Wellbeing  Outcome: Progressing  Goal: Readiness for Transition of Care  Outcome: Progressing     Problem: Infection  Goal: Absence of Infection Signs and Symptoms  Outcome: Progressing     Problem: Wound  Goal: Optimal Coping  Outcome: Progressing  Goal: Optimal Functional Ability  Outcome: Progressing  Goal: Absence of Infection Signs and Symptoms  Outcome: Progressing  Goal: Improved Oral Intake  Outcome: Progressing  Goal: Optimal Pain Control and Function  Outcome: Progressing  Goal: Skin Health and Integrity  Outcome: Progressing  Goal: Optimal Wound Healing  Outcome: Progressing     Problem: Fall Injury Risk  Goal: Absence of Fall and Fall-Related Injury  Outcome: Progressing

## 2025-02-07 NOTE — PLAN OF CARE
Discussed poc with pt, pt verbalized understanding    Purposeful rounding every 2hours    VS wnl  Fall precautions in place, remains injury free  Pt denies c/o any nausea or vomiting at this time and patient will continue to be monitored.  Pain and nausea under control with PRN meds    IVFs  Accurate I&Os  Abx given as prescribed  Bed locked at lowest position  Call light within reach    Chart check complete  Will cont with POC

## 2025-02-07 NOTE — HOSPITAL COURSE
2/6/2025:  surgery     2/7/2025:  POD #1- doing well.  Tolerating CLD.  Awaiting return of bowel function.  Chavez removed     02/08/2025: POD #2. Tolerating clear liquids well. No BM/flatus yet. Pain control improving.    02/09/2025: POD #3. Small amount of flatus, no BM. Tolerating clear liquids without nausea or vomiting. Pain well controlled.    2/10/2025:  POD #4- more flatus.  Still no BM.  Malou regular diet. No nausea/ vomiting.  Still requiring o2 intermittently.     2/11/2025:  POD #5- AFVSS.  Several Bms.  Tolerating regular diet.  Appropriate for d/c home.  Drains still serosanguinous, approx 30cc / day

## 2025-02-07 NOTE — HOSPITAL COURSE
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 No acute events overnight. Ambulated with Physical/occupational therapy. Blood pressure mildly elevated. Asymptomatic. Pain adequately controlled. Chavez removed. Not passing flatus.   2/8 not passing flatus or bowel movement. On clear liquid diet per primary. Pain adequately controlled. Self ambulating. Incentive spirometer encouraged  2/9 passing flatus but no bowel movement. Pain remains controlled. Hb/hct downtrending. Judie drains with serosanguinous fluid in collection bulbs. Diet advanced per primary  2/10 no bowel movement. Tolerated regular diet without nausea/vomiting. Pain adequately controlled. Blood pressure normotensive to hypertensive.  2/11/25: VSS. Pt feels well +BM and flatus. Tolerating po diet well. No n/v. Pain controlled. JUDIE drain with 60ml serosanguinous output. General surgery planning to discharged pt today.

## 2025-02-07 NOTE — ASSESSMENT & PLAN NOTE
Trending down  On lovenox per primary  Self ambulating and wearing scds  Discontinue if persists  Defer to primary

## 2025-02-07 NOTE — NURSING TRANSFER
Nursing Transfer Note      2/6/2025   6:15 PM    Nurse giving handoff:rusty  Nurse receiving handoff:Penny    Reason patient is being transferred: post op    Transfer To: 507 from pacu    Transfer via stretcher    Transfer with none    Transported by Rusty    Transfer Vital Signs:  Blood Pressure:123 69  Heart Rate:73  O2:96  Temperature:99.8  Respirations:14    Telemetry: Rate not on tele  Order for Tele Monitor? No    Additional Lines: none    Medicines sent: none    Any special needs or follow-up needed: no    Patient belongings transferred with patient: Yes    Chart send with patient: Yes    Notified: sister    Patient reassessed at: 1542 2/6/2025 (date, time)  1  Upon arrival to floor: patient oriented to room, call bell in reach, and bed in lowest position

## 2025-02-07 NOTE — PT/OT/SLP EVAL
Occupational Therapy   Evaluation    Name: Karen Gutierrez  MRN: [id number]  Admitting Diagnosis: Duodenal cancer  Recent Surgery: Procedure(s) (LRB):  LAPAROTOMY, EXPLORATORY (N/A)  REPAIR, HERNIA, VENTRAL (N/A)  BLOCK, TRANSVERSUS ABDOMINIS PLANE (N/A)  EXCISION, LESION, ABDOMINAL WALL (N/A) 1 Day Post-Op    Recommendations:     Discharge Recommendations: No Therapy Indicated  Discharge Equipment Recommendations:  none  Barriers to discharge:  None    Assessment:     Karen Gutierrez is a 59 y.o. female with a medical diagnosis of Duodenal cancer.  She presents with the following performance deficits affecting function: weakness, impaired endurance, impaired self care skills, gait instability, impaired balance, decreased ROM.      Rehab Prognosis: Good; patient would benefit from acute skilled OT services to address these deficits and reach maximum level of function.       Plan:     Patient to be seen 2 x/week to address the above listed problems via self-care/home management, therapeutic activities, therapeutic exercises  Plan of Care Expires: 02/21/25  Plan of Care Reviewed with: patient    Subjective     Chief Complaint: abdominal discomfort  Patient/Family Comments/goals: to return home    Occupational Profile:  Living Environment: Lives alone in [facility], Walk in shower configuration.   Previous level of function: Independent, driving, working  Roles and Routines: works in administration  Equipment Used at Home: none  Assistance upon Discharge: family    Pain/Comfort:  Pain Rating 1: 10/10  Location 1: abdomen  Pain Addressed 1: Reposition, Nurse notified (Patient agreeable to continue evaluation.)    Patients cultural, spiritual, Mandaeism conflicts given the current situation: no    Objective:     Communicated with: nurse prior to session.  Patient found up in chair with JUDIE drain, peripheral IV upon OT entry to room.    General Precautions: Standard, fall  Orthopedic Precautions: N/A  Braces:   (abdominal binder)  Respiratory Status: Room air    Occupational Performance:    Bed Mobility:    Not assessed.     Functional Mobility/Transfers:  Patient completed Sit <> Stand Transfer with contact guard assistance  with  no assistive device   Patient completed Bed <> Chair Transfer using Step Transfer technique with contact guard assistance with no assistive device  Functional Mobility: Patient ambulated in room with no AD and CGA to sink for grooming. Patient reaching for furniture, though no LOB.     Activities of Daily Living:  Feeding:  independence    Grooming: stand by assistance to perform oral care  Lower Body Dressing: stand by assistance to don/doff B socks    Cognitive/Visual Perceptual:  Cognitive/Psychosocial Skills:     -       Oriented to: Person, Place, Time, and Situation   -       Follows Commands/attention:Follows multistep  commands  -       Safety awareness/insight to disability: intact     Physical Exam:  Balance:    -       Impaired, requires CGA to correct  Upper Extremity Range of Motion:     -       Right Upper Extremity: WNL  -       Left Upper Extremity: WNL  Upper Extremity Strength:    -       Right Upper Extremity: WNL  -       Left Upper Extremity: WNL   Strength:    -       Right Upper Extremity: WNL  -       Left Upper Extremity: WNL    AMPAC 6 Click ADL:  AMPAC Total Score: 18    Treatment & Education:  Patient educated on role of OT in acute care and POC. Patient encouraged to utilize progressive mobility staff available in hospital to ambulate often.     Patient left up in chair with all lines intact and call button in reach    GOALS:   Multidisciplinary Problems       Occupational Therapy Goals          Problem: Occupational Therapy    Goal Priority Disciplines Outcome Interventions   Occupational Therapy Goal     OT, PT/OT     Description: Goals to be met by: 2/21/25     Patient will increase functional independence with ADLs by performing:    UE Dressing with  [place].  Toileting from toilet with [place] for hygiene and clothing management.   Toilet transfer to toilet with [place].                         DME Justifications:  No DME recommended requiring DME justifications    History:     Past Medical History:   Diagnosis Date    Anxiety     Cancer     duodenal cancer    Depression     Hypertension     Hypotension, iatrogenic     Mitral valve prolapse          Past Surgical History:   Procedure Laterality Date    BUNIONECTOMY Left     HYSTERECTOMY  2000    INJECTION OF ANESTHETIC AGENT INTO TISSUE PLANE DEFINED BY TRANSVERSUS ABDOMINIS MUSCLE N/A 2/6/2025    Procedure: BLOCK, TRANSVERSUS ABDOMINIS PLANE;  Surgeon: Alicia Hernandez MD;  Location: [facility] OR;  Service: General;  Laterality: N/A;    INSERTION OF TUNNELED CENTRAL VENOUS CATHETER (CVC) WITH SUBCUTANEOUS PORT N/A 03/26/2021    Procedure: [id number];  Surgeon: Lauren Abraham MD;  Location: [facility] OR;  Service: General;  Laterality: N/A;    INSERTION OF VENOUS ACCESS PORT Right 05/13/2024    Procedure: INSERTION, VENOUS ACCESS PORT;  Surgeon: Alicia Hernandez MD;  Location: [facility] OR;  Service: General;  Laterality: Right;    LAPAROTOMY, EXPLORATORY N/A 2/6/2025    Procedure: LAPAROTOMY, EXPLORATORY;  Surgeon: Alicia Hernandez MD;  Location: [facility] OR;  Service: General;  Laterality: N/A;    LITHOTRIPSY      PLACEMENT OF JEJUNOSTOMY TUBE  02/18/2021    Procedure: INSERTION, JEJUNOSTOMY TUBE;  Surgeon: Hitesh Díaz MD;  Location: [address];  Service: General;;    REPAIR, HERNIA, VENTRAL N/A 2/6/2025    Procedure: REPAIR, HERNIA, VENTRAL;  Surgeon: Alicia Hernandez MD;  Location: [facility] OR;  Service: General;  Laterality: N/A;  phasis ST mesh    SURGICAL REMOVAL OF LESION OF ABDOMINAL WALL N/A 2/6/2025    Procedure: EXCISION, LESION, ABDOMINAL WALL;  Surgeon: Alicia Hernandez MD;  Location: [facility] OR;  Service: General;  Laterality: N/A;    ULTRASOUND GUIDANCE Right 05/13/2024     Procedure: ULTRASOUND GUIDANCE;  Surgeon: Alicia Hernandez MD;  Location: [facility] OR;  Service: General;  Laterality: Right;    VARICOSE VEIN STRIPPING  2015    WHIPPLE PROCEDURE N/A 02/18/2021    Procedure: WHIPPLE PROCEDURE;  Surgeon: Hitesh Díaz MD;  Location: [facility] OR [address] [place] [address];  Service: General;  Laterality: N/A;       Time Tracking:     OT Date of Treatment: 02/07/25  OT Start Time: 0925  OT Stop Time: 0950  OT Total Time (min): 25 min    Billable Minutes:Evaluation 10  Self Care/Home Management 15    2/7/2025

## 2025-02-08 LAB
ANION GAP SERPL CALC-SCNC: 6 MMOL/L (ref 8–16)
BUN SERPL-MCNC: 11 MG/DL (ref 6–20)
CALCIUM SERPL-MCNC: 8.6 MG/DL (ref 8.7–10.5)
CHLORIDE SERPL-SCNC: 105 MMOL/L (ref 95–110)
CO2 SERPL-SCNC: 27 MMOL/L (ref 23–29)
CREAT SERPL-MCNC: 0.8 MG/DL (ref 0.5–1.4)
ERYTHROCYTE [DISTWIDTH] IN BLOOD BY AUTOMATED COUNT: 14.4 % (ref 11.5–14.5)
EST. GFR  (NO RACE VARIABLE): >60 ML/MIN/1.73 M^2
GLUCOSE SERPL-MCNC: 91 MG/DL (ref 70–110)
HCT VFR BLD AUTO: 34.9 % (ref 37–48.5)
HGB BLD-MCNC: 10.9 G/DL (ref 12–16)
MCH RBC QN AUTO: 30.7 PG (ref 27–31)
MCHC RBC AUTO-ENTMCNC: 31.2 G/DL (ref 32–36)
MCV RBC AUTO: 98 FL (ref 82–98)
PLATELET # BLD AUTO: 96 K/UL (ref 150–450)
PMV BLD AUTO: 10.6 FL (ref 9.2–12.9)
POTASSIUM SERPL-SCNC: 4.3 MMOL/L (ref 3.5–5.1)
RBC # BLD AUTO: 3.55 M/UL (ref 4–5.4)
SODIUM SERPL-SCNC: 138 MMOL/L (ref 136–145)
WBC # BLD AUTO: 7.05 K/UL (ref 3.9–12.7)

## 2025-02-08 PROCEDURE — 99024 POSTOP FOLLOW-UP VISIT: CPT | Mod: ,,, | Performed by: COLON & RECTAL SURGERY

## 2025-02-08 PROCEDURE — 80048 BASIC METABOLIC PNL TOTAL CA: CPT | Performed by: SURGERY

## 2025-02-08 PROCEDURE — 25000003 PHARM REV CODE 250: Performed by: SURGERY

## 2025-02-08 PROCEDURE — 63600175 PHARM REV CODE 636 W HCPCS: Performed by: SURGERY

## 2025-02-08 PROCEDURE — 94799 UNLISTED PULMONARY SVC/PX: CPT

## 2025-02-08 PROCEDURE — 99900035 HC TECH TIME PER 15 MIN (STAT)

## 2025-02-08 PROCEDURE — 11000001 HC ACUTE MED/SURG PRIVATE ROOM

## 2025-02-08 PROCEDURE — 25000003 PHARM REV CODE 250: Performed by: FAMILY MEDICINE

## 2025-02-08 PROCEDURE — 85027 COMPLETE CBC AUTOMATED: CPT | Performed by: SURGERY

## 2025-02-08 RX ORDER — AMLODIPINE BESYLATE 5 MG/1
5 TABLET ORAL DAILY
Status: DISCONTINUED | OUTPATIENT
Start: 2025-02-08 | End: 2025-02-11 | Stop reason: HOSPADM

## 2025-02-08 RX ADMIN — HYDROMORPHONE HYDROCHLORIDE 1 MG: 1 INJECTION, SOLUTION INTRAMUSCULAR; INTRAVENOUS; SUBCUTANEOUS at 02:02

## 2025-02-08 RX ADMIN — OXYCODONE HYDROCHLORIDE 10 MG: 5 TABLET ORAL at 01:02

## 2025-02-08 RX ADMIN — OXYCODONE HYDROCHLORIDE 10 MG: 5 TABLET ORAL at 09:02

## 2025-02-08 RX ADMIN — OXYCODONE HYDROCHLORIDE 10 MG: 5 TABLET ORAL at 08:02

## 2025-02-08 RX ADMIN — METOPROLOL TARTRATE 25 MG: 25 TABLET, FILM COATED ORAL at 09:02

## 2025-02-08 RX ADMIN — ESCITALOPRAM OXALATE 10 MG: 10 TABLET ORAL at 09:02

## 2025-02-08 RX ADMIN — FAMOTIDINE 20 MG: 20 TABLET, FILM COATED ORAL at 09:02

## 2025-02-08 RX ADMIN — FAMOTIDINE 20 MG: 20 TABLET, FILM COATED ORAL at 08:02

## 2025-02-08 RX ADMIN — CHLORHEXIDINE GLUCONATE 0.12% ORAL RINSE 10 ML: 1.2 LIQUID ORAL at 09:02

## 2025-02-08 RX ADMIN — METOPROLOL TARTRATE 25 MG: 25 TABLET, FILM COATED ORAL at 08:02

## 2025-02-08 RX ADMIN — AMLODIPINE BESYLATE 5 MG: 5 TABLET ORAL at 03:02

## 2025-02-08 RX ADMIN — ENOXAPARIN SODIUM 40 MG: 40 INJECTION SUBCUTANEOUS at 06:02

## 2025-02-08 RX ADMIN — CHLORHEXIDINE GLUCONATE 0.12% ORAL RINSE 10 ML: 1.2 LIQUID ORAL at 08:02

## 2025-02-08 RX ADMIN — OXYCODONE HYDROCHLORIDE 10 MG: 5 TABLET ORAL at 03:02

## 2025-02-08 NOTE — ASSESSMENT & PLAN NOTE
POD #2- s/p excision peritoneal metastasis and VHR with mesh    Doing well.  Pain controlled.  Tolerating CLD, awaiting return of bowel function.      -- CLD  -- multimodal analgesia  -- encourage ambulation and IS  -- OT/PT consulted   Dispo: continued med/ surg    Lovenox and SCDs for DVT ppx, Famotidine for GI ppx

## 2025-02-08 NOTE — ANESTHESIA POSTPROCEDURE EVALUATION
Anesthesia Post Evaluation    Patient: Karen Gutierrez    Procedure(s) Performed: Procedure(s) (LRB):  LAPAROTOMY, EXPLORATORY (N/A)  REPAIR, HERNIA, VENTRAL (N/A)  BLOCK, TRANSVERSUS ABDOMINIS PLANE (N/A)  EXCISION, LESION, ABDOMINAL WALL (N/A)    Final Anesthesia Type: general      Patient location during evaluation: PACU  Patient participation: Yes- Able to Participate  Level of consciousness: awake and alert, oriented and awake  Post-procedure vital signs: reviewed and stable  Pain management: adequate  Airway patency: patent    PONV status at discharge: No PONV  Anesthetic complications: no      Cardiovascular status: blood pressure returned to baseline  Respiratory status: unassisted  Hydration status: euvolemic  Follow-up not needed.              Vitals Value Taken Time   /72 02/08/25 1222   Temp 36.8 °C (98.3 °F) 02/08/25 1222   Pulse 77 02/08/25 1222   Resp 20 02/08/25 1222   SpO2 97 % 02/08/25 1222         Event Time   Out of Recovery 15:25:06         Pain/Brody Score: Pain Rating Prior to Med Admin: 9 (2/8/2025  9:30 AM)  Pain Rating Post Med Admin: 3 (2/8/2025 10:30 AM)

## 2025-02-08 NOTE — PLAN OF CARE
Drain output less overnight. Able to get up and use restroom now. Pain main concern. Low O2 but refused NC. Last vitals showed 85% and patient agreed to NC. No other complaints.      Problem: Adult Inpatient Plan of Care  Goal: Plan of Care Review  Outcome: Progressing  Goal: Patient-Specific Goal (Individualized)  Outcome: Progressing  Goal: Absence of Hospital-Acquired Illness or Injury  Outcome: Progressing  Goal: Optimal Comfort and Wellbeing  Outcome: Progressing  Goal: Readiness for Transition of Care  Outcome: Progressing     Problem: Infection  Goal: Absence of Infection Signs and Symptoms  Outcome: Progressing     Problem: Wound  Goal: Optimal Coping  Outcome: Progressing  Goal: Optimal Functional Ability  Outcome: Progressing  Goal: Absence of Infection Signs and Symptoms  Outcome: Progressing  Goal: Improved Oral Intake  Outcome: Progressing  Goal: Optimal Pain Control and Function  Outcome: Progressing  Goal: Skin Health and Integrity  Outcome: Progressing  Goal: Optimal Wound Healing  Outcome: Progressing     Problem: Fall Injury Risk  Goal: Absence of Fall and Fall-Related Injury  Outcome: Progressing

## 2025-02-08 NOTE — PLAN OF CARE
----- Message from Wilfredo Verma DO sent at 3/28/2023 10:43 AM EDT -----  I reviewed patient's labs  He has evidence of worsening kidney disease  I recommend that he see nephrology at this time  He should also continue to take his lisinopril  He should avoid medications like ibuprofen, advil, aleve, naproxen  I will place   referral  I would also like to increase his thyroid medication dose  I will send new rx for this as well  Please inform the patient  Thank you! Discussed poc with pt, pt verbalized understanding    Purposeful rounding every 2hours    VS wnl  Fall precautions in place, remains injury free  Pt denies c/o any nausea or vomiting at this time and patient will continue to be monitored.  Pain and nausea under control with PRN meds    IVFs  Accurate I&Os  Abx given as prescribed  Bed locked at lowest position  Call light within reach    Chart check complete  Will cont with POC

## 2025-02-08 NOTE — SUBJECTIVE & OBJECTIVE
Interval History: Tolerating clear liquids well. No BM/flatus yet. Pain control improving.    Medications:  Continuous Infusions:  Scheduled Meds:   amLODIPine  5 mg Oral Daily    chlorhexidine  10 mL Mouth/Throat BID    enoxparin  40 mg Subcutaneous Daily    EScitalopram oxalate  10 mg Oral Daily    famotidine  20 mg Oral BID    metoprolol tartrate  25 mg Oral BID     PRN Meds:  Current Facility-Administered Medications:     clonazePAM, 0.5 mg, Oral, Nightly PRN    HYDROmorphone, 1 mg, Intravenous, Q6H PRN    ondansetron, 4 mg, Intravenous, Q6H PRN    oxyCODONE, 10 mg, Oral, Q4H PRN    oxyCODONE, 5 mg, Oral, Q4H PRN     Review of patient's allergies indicates:   Allergen Reactions    Benzalkonium chloride Hives    Codeine Itching    Morphine Nausea Only    Neosporin [neomycin-bacitracin-polymyxin] Hives    Sulfa (sulfonamide antibiotics) Hives and Itching           Morphine sulfate Other (See Comments)    Sulfamethoxazole-trimethoprim Other (See Comments)    Hydrocortisone Hives     Redness / can use bactroban       Objective:     Vital Signs (Most Recent):  Temp: 98.3 °F (36.8 °C) (02/08/25 1222)  Pulse: 77 (02/08/25 1222)  Resp: 20 (02/08/25 1222)  BP: 133/72 (02/08/25 1222)  SpO2: 97 % (02/08/25 1222) Vital Signs (24h Range):  Temp:  [98.3 °F (36.8 °C)-100.3 °F (37.9 °C)] 98.3 °F (36.8 °C)  Pulse:  [72-84] 77  Resp:  [18-20] 20  SpO2:  [85 %-97 %] 97 %  BP: (116-161)/(59-76) 133/72     Weight: 83.3 kg (183 lb 12.1 oz)  Body mass index is 24.92 kg/m².    Intake/Output - Last 3 Shifts         02/06 0700  02/07 0659 02/07 0700 02/08 0659 02/08 0700  02/09 0659    I.V. (mL/kg) 500 (6) 2181.9 (26.2)     IV Piggyback 2600 280.3     Total Intake(mL/kg) 3100 (37.2) 2462.1 (29.6)     Urine (mL/kg/hr) 750 (0.4) 1500 (0.8) 1 (0)    Emesis/NG output   0    Drains 105 215 40    Blood 100      Total Output 955 1715 41    Net +2145 +747.1 -41           Urine Occurrence  1 x              Physical Exam  Vitals and nursing note  reviewed.   Constitutional:       General: She is not in acute distress.     Appearance: Normal appearance. She is not ill-appearing or toxic-appearing.   HENT:      Head: Normocephalic and atraumatic.   Eyes:      Extraocular Movements: Extraocular movements intact.      Conjunctiva/sclera: Conjunctivae normal.   Cardiovascular:      Rate and Rhythm: Normal rate and regular rhythm.   Pulmonary:      Effort: Pulmonary effort is normal.   Abdominal:      Comments: Soft, nondistended, appropriately TTP, incision c/d/I, JUDIE drains bilaterally serosanguinous    Musculoskeletal:      Right lower leg: No edema.      Left lower leg: No edema.   Skin:     General: Skin is warm and dry.      Coloration: Skin is not jaundiced.   Neurological:      Mental Status: She is alert and oriented to person, place, and time.      Motor: No weakness.   Psychiatric:         Mood and Affect: Mood normal.         Behavior: Behavior normal.          Significant Labs:  I have reviewed all pertinent lab results within the past 24 hours.  CBC:   Recent Labs   Lab 02/08/25  0553   WBC 7.05   RBC 3.55*   HGB 10.9*   HCT 34.9*   PLT 96*   MCV 98   MCH 30.7   MCHC 31.2*     BMP:   Recent Labs   Lab 02/08/25  0553   GLU 91      K 4.3      CO2 27   BUN 11   CREATININE 0.8   CALCIUM 8.6*     CMP:   Recent Labs   Lab 02/08/25  0553   GLU 91   CALCIUM 8.6*      K 4.3   CO2 27      BUN 11   CREATININE 0.8       Significant Diagnostics:  I have reviewed all pertinent imaging results/findings within the past 24 hours.

## 2025-02-08 NOTE — ASSESSMENT & PLAN NOTE
Stable   On lovenox per primary  Self ambulating and wearing scds  Discontinue if persists  Defer to primary

## 2025-02-08 NOTE — PROGRESS NOTES
[facility]  General Surgery  Progress Note    Subjective:     Interval History: Tolerating clear liquids well. No BM/flatus yet. Pain control improving.    Medications:  Continuous Infusions:  Scheduled Meds:   amLODIPine  5 mg Oral Daily    chlorhexidine  10 mL Mouth/Throat BID    enoxparin  40 mg Subcutaneous Daily    EScitalopram oxalate  10 mg Oral Daily    famotidine  20 mg Oral BID    metoprolol tartrate  25 mg Oral BID     PRN Meds:  Current Facility-Administered Medications:     clonazePAM, 0.5 mg, Oral, Nightly PRN    HYDROmorphone, 1 mg, Intravenous, Q6H PRN    ondansetron, 4 mg, Intravenous, Q6H PRN    oxyCODONE, 10 mg, Oral, Q4H PRN    oxyCODONE, 5 mg, Oral, Q4H PRN     Review of patient's allergies indicates:   Allergen Reactions    Benzalkonium chloride Hives    Codeine Itching    Morphine Nausea Only    Neosporin [neomycin-bacitracin-polymyxin] Hives    Sulfa (sulfonamide antibiotics) Hives and Itching           Morphine sulfate Other (See Comments)    Sulfamethoxazole-trimethoprim Other (See Comments)    Hydrocortisone Hives     Redness / can use bactroban       Objective:     Vital Signs (Most Recent):  Temp: 98.3 °F (36.8 °C) (02/08/25 1222)  Pulse: 77 (02/08/25 1222)  Resp: 20 (02/08/25 1222)  BP: 133/72 (02/08/25 1222)  SpO2: 97 % (02/08/25 1222) Vital Signs (24h Range):  Temp:  [98.3 °F (36.8 °C)-100.3 °F (37.9 °C)] 98.3 °F (36.8 °C)  Pulse:  [72-84] 77  Resp:  [18-20] 20  SpO2:  [85 %-97 %] 97 %  BP: (116-161)/(59-76) 133/72     Weight: 83.3 kg (183 lb 12.1 oz)  Body mass index is 24.92 kg/m².    Intake/Output - Last 3 Shifts         02/06 0700  02/07 0659 02/07 0700  02/08 0659 02/08 0700 02/09 0659    I.V. (mL/kg) 500 (6) 2181.9 (26.2)     IV Piggyback 2600 280.3     Total Intake(mL/kg) 3100 (37.2) 2462.1 (29.6)     Urine (mL/kg/hr) 750 (0.4) 1500 (0.8) 1 (0)    Emesis/NG output   0    Drains 105 215 40    Blood 100      Total Output 955 1715 41    Net +2145 +747.1 -41           Urine  Occurrence  1 x              Physical Exam  Vitals and nursing note reviewed.   Constitutional:       General: She is not in acute distress.     Appearance: Normal appearance. She is not ill-appearing or toxic-appearing.   HENT:      Head: Normocephalic and atraumatic.   Eyes:      Extraocular Movements: Extraocular movements intact.      Conjunctiva/sclera: Conjunctivae normal.   Cardiovascular:      Rate and Rhythm: Normal rate and regular rhythm.   Pulmonary:      Effort: Pulmonary effort is normal.   Abdominal:      Comments: Soft, nondistended, appropriately TTP, incision c/d/I, JUDIE drains bilaterally serosanguinous    Musculoskeletal:      Right lower leg: No edema.      Left lower leg: No edema.   Skin:     General: Skin is warm and dry.      Coloration: Skin is not jaundiced.   Neurological:      Mental Status: She is alert and oriented to person, place, and time.      Motor: No weakness.   Psychiatric:         Mood and Affect: Mood normal.         Behavior: Behavior normal.          Significant Labs:  I have reviewed all pertinent lab results within the past 24 hours.  CBC:   Recent Labs   Lab 02/08/25  0553   WBC 7.05   RBC 3.55*   HGB 10.9*   HCT 34.9*   PLT 96*   MCV 98   MCH 30.7   MCHC 31.2*     BMP:   Recent Labs   Lab 02/08/25  0553   GLU 91      K 4.3      CO2 27   BUN 11   CREATININE 0.8   CALCIUM 8.6*     CMP:   Recent Labs   Lab 02/08/25  0553   GLU 91   CALCIUM 8.6*      K 4.3   CO2 27      BUN 11   CREATININE 0.8       Significant Diagnostics:  I have reviewed all pertinent imaging results/findings within the past 24 hours.  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  Pain controlled.  Tolerating CLD, awaiting return of bowel function.      -- CLD  -- multimodal analgesia  -- encourage ambulation and IS  -- OT/PT consulted   Dispo: continued med/ surg    Lovenox and SCDs for DVT ppx, Famotidine for  GI ppx     Thrombocytopenia  Chemo induced  -- continue to monitor     Depression  -- continue home escitalopram      Generalized anxiety disorder  -- continue home clonazepam    Benign hypertension  Care per hospital medicine      Ralf Quintana MD  General Surgery  '[facility]

## 2025-02-08 NOTE — SUBJECTIVE & OBJECTIVE
Interval History: See hospital course for today      Review of Systems   Constitutional:  Positive for activity change (improving) and fatigue. Negative for fever.   Respiratory:  Negative for shortness of breath.    Cardiovascular:  Negative for leg swelling.   Gastrointestinal:  Positive for abdominal pain and constipation. Negative for nausea and vomiting.   Genitourinary:  Negative for difficulty urinating.   Musculoskeletal:  Positive for myalgias.   Neurological:  Positive for weakness.   Psychiatric/Behavioral:  Negative for agitation, behavioral problems, confusion, decreased concentration and dysphoric mood. The patient is not nervous/anxious.      Objective:     Vital Signs (Most Recent):  Temp: 99.7 °F (37.6 °C) (02/08/25 0835)  Pulse: 84 (02/08/25 0835)  Resp: 18 (02/08/25 0930)  BP: (!) 161/76 (02/08/25 0835)  SpO2: 96 % (02/08/25 0835) Vital Signs (24h Range):  Temp:  [98.4 °F (36.9 °C)-100.3 °F (37.9 °C)] 99.7 °F (37.6 °C)  Pulse:  [72-84] 84  Resp:  [17-20] 18  SpO2:  [85 %-96 %] 96 %  BP: (116-161)/(59-76) 161/76     Weight: 83.3 kg (183 lb 12.1 oz)  Body mass index is 24.92 kg/m².    Intake/Output Summary (Last 24 hours) at 2/8/2025 1141  Last data filed at 2/8/2025 0835  Gross per 24 hour   Intake 2462.12 ml   Output 141 ml   Net 2321.12 ml         Physical Exam  Vitals and nursing note reviewed.   Constitutional:       General: She is not in acute distress.     Appearance: She is ill-appearing. She is not toxic-appearing.      Interventions: Nasal cannula in place.   HENT:      Head: Normocephalic and atraumatic.   Cardiovascular:      Rate and Rhythm: Normal rate.   Pulmonary:      Effort: Pulmonary effort is normal. No respiratory distress.   Abdominal:      Palpations: Abdomen is soft.      Tenderness: There is abdominal tenderness.      Comments: Abdominal binder  2 anthony drains with serosanguinous fluid in collection bulbs   Musculoskeletal:      Right lower leg: No edema.      Left lower leg:  No edema.   Skin:     General: Skin is warm.      Coloration: Skin is pale.   Neurological:      Mental Status: She is alert and oriented to person, place, and time.      Motor: Weakness present.   Psychiatric:         Mood and Affect: Mood normal.         Behavior: Behavior normal. Behavior is cooperative.             Significant Labs: All pertinent labs within the past 24 hours have been reviewed.  CBC:   Recent Labs   Lab 02/07/25  0631 02/08/25  0553   WBC 8.99 7.05   HGB 11.0* 10.9*   HCT 34.0* 34.9*   PLT 91* 96*     CMP:   Recent Labs   Lab 02/07/25  0631 02/08/25  0553    138   K 4.4 4.3    105   CO2 25 27   * 91   BUN 12 11   CREATININE 0.8 0.8   CALCIUM 8.5* 8.6*   ANIONGAP 7* 6*       Significant Imaging: I have reviewed all pertinent imaging results/findings within the past 24 hours.

## 2025-02-08 NOTE — ASSESSMENT & PLAN NOTE
Patient's blood pressure range in the last 24 hours was: BP  Min: 116/65  Max: 161/76.The patient's inpatient anti-hypertensive regimen is listed below:  Current Antihypertensives  metoprolol tartrate (LOPRESSOR) tablet 25 mg, 2 times daily, Oral    Plan  - BP is uncontrolled, will adjust as follows: hypotension resolved  - elevated blood pressure   On beta blocker  Can resume amlodipine   Continue holding arb

## 2025-02-08 NOTE — PLAN OF CARE
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]    Admission Date: 2/6/2025  Expected Discharge Date: 2/7/2025    Transition of Care Barriers: None    Payor: BLUE CROSS BLUE SHIELD / Plan: [place] PPO / Product Type: PPO /     Extended Emergency Contact Information  Primary Emergency Contact: Biju Gutierrez  Home Phone: [phone]  Relation: Son  Secondary Emergency Contact: Parvin Lopez   [place]  Mobile Phone: [phone]  Relation: Sister    Discharge Plan A: Home         [id number] - [place], [place] - [address] AT [facility]  [address] [facility]  [address]  [facility] [address]  Phone: [phone] Fax: [phone]    [facility] [address] [address]  Phone: [phone] Fax: [phone]      Initial Assessment (most recent)       Adult Discharge Assessment - 02/08/25 0915          Discharge Assessment    Assessment Type Discharge Planning Assessment     Confirmed/corrected address, phone number and insurance Yes     Confirmed Demographics Correct on Facesheet     Source of Information patient     Does patient/caregiver understand observation status Yes     Communicated ANNI with patient/caregiver Date not available/Unable to determine     People in Home alone     Do you expect to return to your current living situation? Yes     Do you have help at home or someone to help you manage your care at home? No     Prior to hospitilization cognitive status: Alert/Oriented     Current cognitive status: Alert/Oriented     Walking or Climbing Stairs Difficulty no     Dressing/Bathing Difficulty no     Equipment Currently Used at Home none     Readmission within 30 days? No     Patient currently being followed by outpatient case  management? No     Do you currently have service(s) that help you manage your care at home? No     Do you take prescription medications? Yes     Do you have prescription coverage? Yes     Do you have any problems affording any of your prescribed medications? No     Is the patient taking medications as prescribed? yes     Who is going to help you get home at discharge? family     How do you get to doctors appointments? car, drives self     Are you on dialysis? No     Do you take coumadin? No     Discharge Plan A Home     DME Needed Upon Discharge  none     Discharge Plan discussed with: Patient     Transition of Care Barriers None

## 2025-02-08 NOTE — PROGRESS NOTES
[facility] Medicine  Progress Note    Patient Name: Karen Gutierrez  MRN: [id number]  Patient Class: IP- Inpatient   Admission Date: 2/6/2025  Length of Stay: 2 days  Attending Physician: Alicia Hernandez MD  Primary Care Provider: Han Arshad MD        Subjective     Principal Problem:Duodenal cancer        HPI:  59F  has a past medical history of Anxiety, Cancer, Depression, Hypertension, Hypotension, iatrogenic, and Mitral valve prolapse.  Admitted for surgical resection of duodenal cancer mass by primary. Tolerated procedure well. Pain adequately controlled. Denies fever, chest pain, dyspnea. Takes strattera for ADHD.     Initial review of chart reveals hypotensive to normotensive episodes postoperatively. On room air.     Hospital medicine consulted for medical management.    Overview/Hospital Course:  2/7 admitted for surgical intervention, ex lap from duodenal metastasis and hernia repair per primary. No acute events overnight. Ambulated with Physical/occupational therapy. Blood pressure mildly elevated. Asymptomatic. Pain adequately controlled. Chavez removed. Not passing flatus.   2/8 not passing flatus or bowel movement. On clear liquid diet per primary. Pain adequately controlled. Self ambulating. Incentive spirometer encouraged    Interval History: See hospital course for today      Review of Systems   Constitutional:  Positive for activity change (improving) and fatigue. Negative for fever.   Respiratory:  Negative for shortness of breath.    Cardiovascular:  Negative for leg swelling.   Gastrointestinal:  Positive for abdominal pain and constipation. Negative for nausea and vomiting.   Genitourinary:  Negative for difficulty urinating.   Musculoskeletal:  Positive for myalgias.   Neurological:  Positive for weakness.   Psychiatric/Behavioral:  Negative for agitation, behavioral problems, confusion, decreased concentration and dysphoric mood. The patient is not  nervous/anxious.      Objective:     Vital Signs (Most Recent):  Temp: 99.7 °F (37.6 °C) (02/08/25 0835)  Pulse: 84 (02/08/25 0835)  Resp: 18 (02/08/25 0930)  BP: (!) 161/76 (02/08/25 0835)  SpO2: 96 % (02/08/25 0835) Vital Signs (24h Range):  Temp:  [98.4 °F (36.9 °C)-100.3 °F (37.9 °C)] 99.7 °F (37.6 °C)  Pulse:  [72-84] 84  Resp:  [17-20] 18  SpO2:  [85 %-96 %] 96 %  BP: (116-161)/(59-76) 161/76     Weight: 83.3 kg (183 lb 12.1 oz)  Body mass index is 24.92 kg/m².    Intake/Output Summary (Last 24 hours) at 2/8/2025 1141  Last data filed at 2/8/2025 0835  Gross per 24 hour   Intake 2462.12 ml   Output 141 ml   Net 2321.12 ml         Physical Exam  Vitals and nursing note reviewed.   Constitutional:       General: She is not in acute distress.     Appearance: She is ill-appearing. She is not toxic-appearing.      Interventions: Nasal cannula in place.   HENT:      Head: Normocephalic and atraumatic.   Cardiovascular:      Rate and Rhythm: Normal rate.   Pulmonary:      Effort: Pulmonary effort is normal. No respiratory distress.   Abdominal:      Palpations: Abdomen is soft.      Tenderness: There is abdominal tenderness.      Comments: Abdominal binder  2 anthony drains with serosanguinous fluid in collection bulbs   Musculoskeletal:      Right lower leg: No edema.      Left lower leg: No edema.   Skin:     General: Skin is warm.      Coloration: Skin is pale.   Neurological:      Mental Status: She is alert and oriented to person, place, and time.      Motor: Weakness present.   Psychiatric:         Mood and Affect: Mood normal.         Behavior: Behavior normal. Behavior is cooperative.             Significant Labs: All pertinent labs within the past 24 hours have been reviewed.  CBC:   Recent Labs   Lab 02/07/25  0631 02/08/25  0553   WBC 8.99 7.05   HGB 11.0* 10.9*   HCT 34.0* 34.9*   PLT 91* 96*     CMP:   Recent Labs   Lab 02/07/25  0631 02/08/25  0553    138   K 4.4 4.3    105   CO2 25 27   *  91   BUN 12 11   CREATININE 0.8 0.8   CALCIUM 8.5* 8.6*   ANIONGAP 7* 6*       Significant Imaging: I have reviewed all pertinent imaging results/findings within the past 24 hours.    Assessment and Plan     * Duodenal cancer  Status post ex lap with surgical intervention per primary        Thrombocytopenia  Stable   On lovenox per primary  Self ambulating and wearing scds  Discontinue if persists  Defer to primary    Depression  Patient has persistent depression which is unknown and is currently controlled. Will Continue anti-depressant medications. We will not consult psychiatry at this time. Patient does not display psychosis at this time. Continue to monitor closely and adjust plan of care as needed.        Generalized anxiety disorder  Resume home medication(s)       ADHD  Holding home medication(s)   Can resume on discharge       Benign hypertension  Patient's blood pressure range in the last 24 hours was: BP  Min: 116/65  Max: 161/76.The patient's inpatient anti-hypertensive regimen is listed below:  Current Antihypertensives  metoprolol tartrate (LOPRESSOR) tablet 25 mg, 2 times daily, Oral    Plan  - BP is uncontrolled, will adjust as follows: hypotension resolved  - elevated blood pressure   On beta blocker  Can resume amlodipine   Continue holding arb        VTE Risk Mitigation (From admission, onward)           Ordered     enoxaparin injection 40 mg  Daily         02/06/25 1539     IP VTE HIGH RISK PATIENT  Once         02/06/25 1539     Place sequential compression device  Until discontinued         02/06/25 1539                    Discharge Planning   ANNI: 2/7/2025     Code Status: Full Code   Medical Readiness for Discharge Date:   Discharge Plan A: Home                      Chantel Mcmanus MD  Department of Hospital Medicine   '[facility]

## 2025-02-09 PROBLEM — D64.9 ANEMIA: Status: ACTIVE | Noted: 2025-02-09

## 2025-02-09 LAB
ANION GAP SERPL CALC-SCNC: 8 MMOL/L (ref 8–16)
BUN SERPL-MCNC: 9 MG/DL (ref 6–20)
CALCIUM SERPL-MCNC: 8.4 MG/DL (ref 8.7–10.5)
CHLORIDE SERPL-SCNC: 107 MMOL/L (ref 95–110)
CO2 SERPL-SCNC: 26 MMOL/L (ref 23–29)
CREAT SERPL-MCNC: 0.7 MG/DL (ref 0.5–1.4)
ERYTHROCYTE [DISTWIDTH] IN BLOOD BY AUTOMATED COUNT: 13.7 % (ref 11.5–14.5)
EST. GFR  (NO RACE VARIABLE): >60 ML/MIN/1.73 M^2
GLUCOSE SERPL-MCNC: 77 MG/DL (ref 70–110)
HCT VFR BLD AUTO: 31.4 % (ref 37–48.5)
HGB BLD-MCNC: 9.9 G/DL (ref 12–16)
MCH RBC QN AUTO: 31.2 PG (ref 27–31)
MCHC RBC AUTO-ENTMCNC: 31.5 G/DL (ref 32–36)
MCV RBC AUTO: 99 FL (ref 82–98)
PLATELET # BLD AUTO: 88 K/UL (ref 150–450)
PMV BLD AUTO: 9.9 FL (ref 9.2–12.9)
POTASSIUM SERPL-SCNC: 3.8 MMOL/L (ref 3.5–5.1)
RBC # BLD AUTO: 3.17 M/UL (ref 4–5.4)
SODIUM SERPL-SCNC: 141 MMOL/L (ref 136–145)
WBC # BLD AUTO: 4.33 K/UL (ref 3.9–12.7)

## 2025-02-09 PROCEDURE — 80048 BASIC METABOLIC PNL TOTAL CA: CPT | Performed by: SURGERY

## 2025-02-09 PROCEDURE — 25000003 PHARM REV CODE 250: Performed by: SURGERY

## 2025-02-09 PROCEDURE — 25000003 PHARM REV CODE 250: Performed by: FAMILY MEDICINE

## 2025-02-09 PROCEDURE — 99024 POSTOP FOLLOW-UP VISIT: CPT | Mod: ,,, | Performed by: COLON & RECTAL SURGERY

## 2025-02-09 PROCEDURE — 97530 THERAPEUTIC ACTIVITIES: CPT | Mod: CQ

## 2025-02-09 PROCEDURE — 11000001 HC ACUTE MED/SURG PRIVATE ROOM

## 2025-02-09 PROCEDURE — 63600175 PHARM REV CODE 636 W HCPCS: Performed by: SURGERY

## 2025-02-09 PROCEDURE — 85027 COMPLETE CBC AUTOMATED: CPT | Performed by: SURGERY

## 2025-02-09 PROCEDURE — 94799 UNLISTED PULMONARY SVC/PX: CPT

## 2025-02-09 PROCEDURE — 36415 COLL VENOUS BLD VENIPUNCTURE: CPT | Performed by: SURGERY

## 2025-02-09 PROCEDURE — 97116 GAIT TRAINING THERAPY: CPT | Mod: CQ

## 2025-02-09 RX ADMIN — OXYCODONE HYDROCHLORIDE 10 MG: 5 TABLET ORAL at 06:02

## 2025-02-09 RX ADMIN — AMLODIPINE BESYLATE 5 MG: 5 TABLET ORAL at 09:02

## 2025-02-09 RX ADMIN — METOPROLOL TARTRATE 25 MG: 25 TABLET, FILM COATED ORAL at 09:02

## 2025-02-09 RX ADMIN — FAMOTIDINE 20 MG: 20 TABLET, FILM COATED ORAL at 09:02

## 2025-02-09 RX ADMIN — ESCITALOPRAM OXALATE 10 MG: 10 TABLET ORAL at 09:02

## 2025-02-09 RX ADMIN — ENOXAPARIN SODIUM 40 MG: 40 INJECTION SUBCUTANEOUS at 04:02

## 2025-02-09 RX ADMIN — CHLORHEXIDINE GLUCONATE 0.12% ORAL RINSE 10 ML: 1.2 LIQUID ORAL at 09:02

## 2025-02-09 RX ADMIN — OXYCODONE HYDROCHLORIDE 10 MG: 5 TABLET ORAL at 01:02

## 2025-02-09 RX ADMIN — OXYCODONE HYDROCHLORIDE 10 MG: 5 TABLET ORAL at 10:02

## 2025-02-09 NOTE — SUBJECTIVE & OBJECTIVE
Interval History: See hospital course for today      Review of Systems   Constitutional:  Negative for activity change, appetite change, fatigue and fever.   Respiratory:  Negative for shortness of breath.    Gastrointestinal:  Positive for abdominal pain and constipation. Negative for nausea and vomiting.   Skin:  Positive for wound.   Allergic/Immunologic: Positive for immunocompromised state.   Neurological:  Negative for weakness.   Psychiatric/Behavioral:  Negative for agitation, behavioral problems, confusion, decreased concentration and dysphoric mood. The patient is not nervous/anxious.      Objective:     Vital Signs (Most Recent):  Temp: 98.5 °F (36.9 °C) (02/09/25 1224)  Pulse: 74 (02/09/25 1224)  Resp: 19 (02/09/25 1224)  BP: (!) 157/76 (02/09/25 1224)  SpO2: (!) 94 % (02/09/25 1224) Vital Signs (24h Range):  Temp:  [97.9 °F (36.6 °C)-100.3 °F (37.9 °C)] 98.5 °F (36.9 °C)  Pulse:  [73-79] 74  Resp:  [16-20] 19  SpO2:  [92 %-98 %] 94 %  BP: (104-157)/(59-92) 157/76     Weight: 83.3 kg (183 lb 12.1 oz)  Body mass index is 24.92 kg/m².    Intake/Output Summary (Last 24 hours) at 2/9/2025 1448  Last data filed at 2/9/2025 0705  Gross per 24 hour   Intake --   Output 55 ml   Net -55 ml         Physical Exam  Vitals and nursing note reviewed.   Constitutional:       General: She is not in acute distress.     Appearance: She is ill-appearing. She is not toxic-appearing.      Interventions: Nasal cannula in place.   HENT:      Head: Normocephalic and atraumatic.   Cardiovascular:      Rate and Rhythm: Normal rate.   Pulmonary:      Effort: Pulmonary effort is normal. No respiratory distress.      Breath sounds: No rales.   Abdominal:      General: Bowel sounds are decreased.      Palpations: Abdomen is soft.      Tenderness: There is abdominal tenderness.      Comments: Abdominal binder in place  Isaak drains with serosanguinous fluid in bulbs    Musculoskeletal:      Right lower leg: No edema.      Left lower leg:  No edema.   Skin:     General: Skin is warm.      Coloration: Skin is pale.   Neurological:      Mental Status: She is alert and oriented to person, place, and time.      Motor: Weakness present.   Psychiatric:         Mood and Affect: Mood normal.         Behavior: Behavior normal. Behavior is cooperative.             Significant Labs: All pertinent labs within the past 24 hours have been reviewed.  CBC:   Recent Labs   Lab 02/08/25  0553 02/09/25  0623   WBC 7.05 4.33   HGB 10.9* 9.9*   HCT 34.9* 31.4*   PLT 96* 88*     CMP:   Recent Labs   Lab 02/08/25  0553 02/09/25  0623    141   K 4.3 3.8    107   CO2 27 26   GLU 91 77   BUN 11 9   CREATININE 0.8 0.7   CALCIUM 8.6* 8.4*   ANIONGAP 6* 8       Significant Imaging: I have reviewed all pertinent imaging results/findings within the past 24 hours.

## 2025-02-09 NOTE — SUBJECTIVE & OBJECTIVE
Interval History: Small amount of flatus, no BM. Tolerating clear liquids without nausea or vomiting. Pain well controlled.    Medications:  Continuous Infusions:  Scheduled Meds:   amLODIPine  5 mg Oral Daily    chlorhexidine  10 mL Mouth/Throat BID    enoxparin  40 mg Subcutaneous Daily    EScitalopram oxalate  10 mg Oral Daily    famotidine  20 mg Oral BID    metoprolol tartrate  25 mg Oral BID     PRN Meds:  Current Facility-Administered Medications:     clonazePAM, 0.5 mg, Oral, Nightly PRN    HYDROmorphone, 1 mg, Intravenous, Q6H PRN    ondansetron, 4 mg, Intravenous, Q6H PRN    oxyCODONE, 10 mg, Oral, Q4H PRN    oxyCODONE, 5 mg, Oral, Q4H PRN     Review of patient's allergies indicates:   Allergen Reactions    Benzalkonium chloride Hives    Codeine Itching    Morphine Nausea Only    Neosporin [neomycin-bacitracin-polymyxin] Hives    Sulfa (sulfonamide antibiotics) Hives and Itching           Morphine sulfate Other (See Comments)    Sulfamethoxazole-trimethoprim Other (See Comments)    Hydrocortisone Hives     Redness / can use bactroban       Objective:     Vital Signs (Most Recent):  Temp: 98.5 °F (36.9 °C) (02/09/25 1224)  Pulse: 74 (02/09/25 1224)  Resp: 19 (02/09/25 1224)  BP: (!) 157/76 (02/09/25 1224)  SpO2: (!) 94 % (02/09/25 1224) Vital Signs (24h Range):  Temp:  [97.9 °F (36.6 °C)-100.3 °F (37.9 °C)] 98.5 °F (36.9 °C)  Pulse:  [73-79] 74  Resp:  [16-20] 19  SpO2:  [92 %-98 %] 94 %  BP: (104-157)/(59-92) 157/76     Weight: 83.3 kg (183 lb 12.1 oz)  Body mass index is 24.92 kg/m².    Intake/Output - Last 3 Shifts         02/07 0700  02/08 0659 02/08 0700  02/09 0659 02/09 0700  02/10 0659    I.V. (mL/kg) 2181.9 (26.2)      IV Piggyback 280.3      Total Intake(mL/kg) 2462.1 (29.6)      Urine (mL/kg/hr) 1500 (0.8) 1 (0) 0 (0)    Emesis/NG output  0     Drains 215 80 15    Stool   0    Blood       Total Output 1715 81 15    Net +747.1 -81 -15           Urine Occurrence 1 x 6 x 1 x    Stool Occurrence  0 x  1 x    Emesis Occurrence  0 x              Physical Exam  Vitals and nursing note reviewed.   Constitutional:       General: She is not in acute distress.     Appearance: Normal appearance. She is not ill-appearing or toxic-appearing.   HENT:      Head: Normocephalic and atraumatic.   Eyes:      Extraocular Movements: Extraocular movements intact.      Conjunctiva/sclera: Conjunctivae normal.   Cardiovascular:      Rate and Rhythm: Normal rate and regular rhythm.   Pulmonary:      Effort: Pulmonary effort is normal.   Abdominal:      Comments: Soft, nondistended, appropriately TTP, incision c/d/I, JUDIE drains bilaterally serosanguinous    Musculoskeletal:      Right lower leg: No edema.      Left lower leg: No edema.   Skin:     General: Skin is warm and dry.      Coloration: Skin is not jaundiced.   Neurological:      Mental Status: She is alert and oriented to person, place, and time.      Motor: No weakness.   Psychiatric:         Mood and Affect: Mood normal.         Behavior: Behavior normal.          Significant Labs:  I have reviewed all pertinent lab results within the past 24 hours.  CBC:   Recent Labs   Lab 02/09/25  0623   WBC 4.33   RBC 3.17*   HGB 9.9*   HCT 31.4*   PLT 88*   MCV 99*   MCH 31.2*   MCHC 31.5*     BMP:   Recent Labs   Lab 02/09/25  0623   GLU 77      K 3.8      CO2 26   BUN 9   CREATININE 0.7   CALCIUM 8.4*     CMP:   Recent Labs   Lab 02/09/25  0623   GLU 77   CALCIUM 8.4*      K 3.8   CO2 26      BUN 9   CREATININE 0.7       Significant Diagnostics:  I have reviewed all pertinent imaging results/findings within the past 24 hours.

## 2025-02-09 NOTE — ASSESSMENT & PLAN NOTE
Anemia is likely due to acute blood loss which was from surgery and nutritional, bone marrow suppression . Most recent hemoglobin and hematocrit are listed below.  Recent Labs     02/07/25  0631 02/08/25  0553 02/09/25  0623   HGB 11.0* 10.9* 9.9*   HCT 34.0* 34.9* 31.4*     Plan  - Monitor serial CBC: Daily  - Transfuse PRBC if patient becomes hemodynamically unstable, symptomatic or H/H drops below 7/21.  - Patient has not received any PRBC transfusions to date  - Patient's anemia is currently worsening. Will continue current treatment  - image abdomen if warranted

## 2025-02-09 NOTE — ASSESSMENT & PLAN NOTE
Anemia is likely due to acute blood loss which was from surgery and nutritional, bone marrow suppression . Most recent hemoglobin and hematocrit are listed below.  Recent Labs     02/08/25  0553 02/09/25  0623 02/10/25  0518   HGB 10.9* 9.9* 9.5*   HCT 34.9* 31.4* 29.5*     Plan  - Monitor serial CBC: Daily  - Transfuse PRBC if patient becomes hemodynamically unstable, symptomatic or H/H drops below 7/21.  - Patient has not received any PRBC transfusions to date  - Patient's anemia is currently worsening. Will continue current treatment  - recommend discontinuing lovenox  image abdomen if warranted  Risk for further anemia>>>benefit of preventing dvt as patient self ambulating

## 2025-02-09 NOTE — ASSESSMENT & PLAN NOTE
Patient's blood pressure range in the last 24 hours was: BP  Min: 104/59  Max: 157/76.The patient's inpatient anti-hypertensive regimen is listed below:  Current Antihypertensives  metoprolol tartrate (LOPRESSOR) tablet 25 mg, 2 times daily, Oral  amLODIPine tablet 5 mg, Daily, Oral    Plan  - BP is uncontrolled, will adjust as follows: blood pressure fluctuating    Clinically stable  On beta blocker and lower dose amlodipine  Continue holding arb

## 2025-02-09 NOTE — ASSESSMENT & PLAN NOTE
POD #3- s/p excision peritoneal metastasis and VHR with mesh    Doing well.  Pain controlled.  Tolerating CLD, awaiting full return of bowel function.      -- trial reg diet. Encouraged patient to be judicious with intake given that she had not had complete full return of bowel function yet.  If she develops any distention or nausea or vomiting, we will back down her diet.  -- multimodal analgesia  -- encourage ambulation and IS  -- OT/PT consulted   Dispo: continued med/ surg    Lovenox and SCDs for DVT ppx, Famotidine for GI ppx

## 2025-02-09 NOTE — PT/OT/SLP PROGRESS
Physical Therapy  Treatment    Karen Gutierrez   MRN: [id number]   Admitting Diagnosis: Duodenal cancer    PT Received On: 02/09/25  PT Start Time: 0727     PT Stop Time: 0752    PT Total Time (min): 25 min       Billable Minutes:  Gait Training 10 and Therapeutic Activity 15    Treatment Type: Treatment  PT/PTA: PTA     Number of PTA visits since last PT visit: 1       General Precautions: Standard, fall  Orthopedic Precautions: N/A  Braces:  (abdominal binder)  Respiratory Status: Nasal cannula, flow 2 L/min         Subjective:  Communicated with nurse Armstrong and completed Epic chart review prior to session. Pt agreed to session.       Objective:   Patient found with: peripheral IV    Supine > Sit: SPV  Scoot towards EOB: SPV  Pt able to sit EOB and jose e socks (I)  STS from EOB: SPV no AD    Pt ambulated 160' with no AD with SBA    Stand Pivot to BSC: SPV no AD      Treatment and Education:  Reviewed HEP (LAQ, AP, HIP FLEX, HIP ABD/ADD) and encouraged pt to perform throughout the day to maintain/regain strength. Encouraged pt to increase time spent OOB and to sit up in BSC for all meals. Encouraged pt to sit up in BSC for 2hr 3x/day. Reviewed call don't fall policy and to call for assistance with all OOB needs.      AM-PAC 6 CLICK MOBILITY  How much help from another person does this patient currently need?   1 = Unable, Total/Dependent Assistance  2 = A lot, Maximum/Moderate Assistance  3 = A little, Minimum/Contact Guard/Supervision  4 = None, Modified [place]/Independent    Turning over in bed (including adjusting bedclothes, sheets and blankets)?: 4  Sitting down on and standing up from a chair with arms (e.g., wheelchair, bedside commode, etc.): 4  Moving from lying on back to sitting on the side of the bed?: 4  Moving to and from a bed to a chair (including a wheelchair)?: 4  Need to walk in hospital room?: 4  Climbing 3-5 steps with a railing?: 1  Basic Mobility Total Score: 21    AM-PAC Raw Score  CMS G-Code Modifier Level of Impairment Assistance   6 % Total / Unable   7 - 9 CM 80 - 100% Maximal Assist   10 - 14 CL 60 - 80% Moderate Assist   15 - 19 CK 40 - 60% Moderate Assist   20 - 22 CJ 20 - 40% Minimal Assist   23 CI 1-20% SBA / CGA   24 CH 0% Independent/ Mod I     Patient left up in chair with all lines intact, call button in reach, and nurse notified.    Assessment:  Karen Gutierrez is a 59 y.o. female with a medical diagnosis of Duodenal cancer and presents with the following functional limitations. Pt will continue to benefit from skilled PT in order to address the below deficits to return to PLOF.     Rehab identified problem list/impairments: impaired endurance, impaired functional mobility, gait instability, pain    Rehab potential is good.    Activity tolerance: Good    Discharge recommendations: No Therapy Indicated      Barriers to discharge:      Equipment recommendations: none     GOALS:   Multidisciplinary Problems       Physical Therapy Goals          Problem: Physical Therapy    Goal Priority Disciplines Outcome Interventions   Physical Therapy Goal     PT, PT/OT     Description: LT25  1. PT WILL COMPLETE BED MOBILITY IND  2. PT WILL GT TRAIN X 450' IND TO PROGRESS GT.   3. PT WILL T/F TO CHAIR IND FOR OOB TOLERANCE  4. PT WILL INC AMPAC SCORE BY 2 POINTS TO PROGRESS GROSS FUNC MOBILITY.                          DME Justifications:  No DME recommended requiring DME justifications    PLAN:    Patient to be seen 3 x/week to address the above listed problems via gait training, therapeutic activities, therapeutic exercises  Plan of Care expires: 25  Plan of Care reviewed with: patient         2025

## 2025-02-09 NOTE — ASSESSMENT & PLAN NOTE
Slight drop  On lovenox per primary  Self ambulating and wearing scds  Discontinue if persists  Defer to primary

## 2025-02-09 NOTE — PROGRESS NOTES
[facility] Medicine  Progress Note    Patient Name: Karen Gutierrez  MRN: [id number]  Patient Class: IP- Inpatient   Admission Date: 2/6/2025  Length of Stay: 3 days  Attending Physician: Alicia Hernandez MD  Primary Care Provider: Han Arshad MD        Subjective     Principal Problem:Duodenal cancer        HPI:  59F  has a past medical history of Anxiety, Cancer, Depression, Hypertension, Hypotension, iatrogenic, and Mitral valve prolapse.  Admitted for surgical resection of duodenal cancer mass by primary. Tolerated procedure well. Pain adequately controlled. Denies fever, chest pain, dyspnea. Takes strattera for ADHD.     Initial review of chart reveals hypotensive to normotensive episodes postoperatively. On room air.     Hospital medicine consulted for medical management.    Overview/Hospital Course:  2/7 admitted for surgical intervention, ex lap from duodenal metastasis and hernia repair per primary. No acute events overnight. Ambulated with Physical/occupational therapy. Blood pressure mildly elevated. Asymptomatic. Pain adequately controlled. Chavez removed. Not passing flatus.   2/8 not passing flatus or bowel movement. On clear liquid diet per primary. Pain adequately controlled. Self ambulating. Incentive spirometer encouraged  2/9 passing flatus but no bowel movement. Pain remains controlled. Hb/hct downtrending. Isaak drains with serosanguinous fluid in collection bulbs. Diet advanced per primary    Interval History: See hospital course for today      Review of Systems   Constitutional:  Negative for activity change, appetite change, fatigue and fever.   Respiratory:  Negative for shortness of breath.    Gastrointestinal:  Positive for abdominal pain and constipation. Negative for nausea and vomiting.   Skin:  Positive for wound.   Allergic/Immunologic: Positive for immunocompromised state.   Neurological:  Negative for weakness.   Psychiatric/Behavioral:  Negative for  agitation, behavioral problems, confusion, decreased concentration and dysphoric mood. The patient is not nervous/anxious.      Objective:     Vital Signs (Most Recent):  Temp: 98.5 °F (36.9 °C) (02/09/25 1224)  Pulse: 74 (02/09/25 1224)  Resp: 19 (02/09/25 1224)  BP: (!) 157/76 (02/09/25 1224)  SpO2: (!) 94 % (02/09/25 1224) Vital Signs (24h Range):  Temp:  [97.9 °F (36.6 °C)-100.3 °F (37.9 °C)] 98.5 °F (36.9 °C)  Pulse:  [73-79] 74  Resp:  [16-20] 19  SpO2:  [92 %-98 %] 94 %  BP: (104-157)/(59-92) 157/76     Weight: 83.3 kg (183 lb 12.1 oz)  Body mass index is 24.92 kg/m².    Intake/Output Summary (Last 24 hours) at 2/9/2025 1448  Last data filed at 2/9/2025 0705  Gross per 24 hour   Intake --   Output 55 ml   Net -55 ml         Physical Exam  Vitals and nursing note reviewed.   Constitutional:       General: She is not in acute distress.     Appearance: She is ill-appearing. She is not toxic-appearing.      Interventions: Nasal cannula in place.   HENT:      Head: Normocephalic and atraumatic.   Cardiovascular:      Rate and Rhythm: Normal rate.   Pulmonary:      Effort: Pulmonary effort is normal. No respiratory distress.      Breath sounds: No rales.   Abdominal:      General: Bowel sounds are decreased.      Palpations: Abdomen is soft.      Tenderness: There is abdominal tenderness.      Comments: Abdominal binder in place  Isaak drains with serosanguinous fluid in bulbs    Musculoskeletal:      Right lower leg: No edema.      Left lower leg: No edema.   Skin:     General: Skin is warm.      Coloration: Skin is pale.   Neurological:      Mental Status: She is alert and oriented to person, place, and time.      Motor: Weakness present.   Psychiatric:         Mood and Affect: Mood normal.         Behavior: Behavior normal. Behavior is cooperative.             Significant Labs: All pertinent labs within the past 24 hours have been reviewed.  CBC:   Recent Labs   Lab 02/08/25  0553 02/09/25  0623   WBC 7.05 4.33    HGB 10.9* 9.9*   HCT 34.9* 31.4*   PLT 96* 88*     CMP:   Recent Labs   Lab 02/08/25  0553 02/09/25  0623    141   K 4.3 3.8    107   CO2 27 26   GLU 91 77   BUN 11 9   CREATININE 0.8 0.7   CALCIUM 8.6* 8.4*   ANIONGAP 6* 8       Significant Imaging: I have reviewed all pertinent imaging results/findings within the past 24 hours.    Assessment and Plan     * Duodenal cancer  Status post ex lap with surgical intervention per primary        Anemia  Anemia is likely due to acute blood loss which was from surgery and nutritional, bone marrow suppression . Most recent hemoglobin and hematocrit are listed below.  Recent Labs     02/07/25  0631 02/08/25  0553 02/09/25  0623   HGB 11.0* 10.9* 9.9*   HCT 34.0* 34.9* 31.4*     Plan  - Monitor serial CBC: Daily  - Transfuse PRBC if patient becomes hemodynamically unstable, symptomatic or H/H drops below 7/21.  - Patient has not received any PRBC transfusions to date  - Patient's anemia is currently worsening. Will continue current treatment  - image abdomen if warranted  Recommend discontinuing lovenox  Risk of anemia>>>benefit of preventing dvt as patient ambulating    Thrombocytopenia  Slight drop  On lovenox per primary  Self ambulating and wearing scds  Discontinue if persists  Defer to primary    Depression  Patient has persistent depression which is unknown and is currently controlled. Will Continue anti-depressant medications. We will not consult psychiatry at this time. Patient does not display psychosis at this time. Continue to monitor closely and adjust plan of care as needed.        Generalized anxiety disorder  Resume home medication(s)       ADHD  Holding home medication(s)   Can resume on discharge       Benign hypertension  Patient's blood pressure range in the last 24 hours was: BP  Min: 104/59  Max: 157/76.The patient's inpatient anti-hypertensive regimen is listed below:  Current Antihypertensives  metoprolol tartrate (LOPRESSOR) tablet 25 mg,  2 times daily, Oral  amLODIPine tablet 5 mg, Daily, Oral    Plan  - BP is uncontrolled, will adjust as follows: blood pressure fluctuating    Clinically stable  On beta blocker and lower dose amlodipine  Continue holding arb        VTE Risk Mitigation (From admission, onward)           Ordered     enoxaparin injection 40 mg  Daily         02/06/25 1539     IP VTE HIGH RISK PATIENT  Once         02/06/25 1539     Place sequential compression device  Until discontinued         02/06/25 1539                    Discharge Planning   ANNI: 2/7/2025     Code Status: Full Code   Medical Readiness for Discharge Date:   Discharge Plan A: Home                    Chanetl Mcmanus MD  Department of Hospital Medicine   '[facility]

## 2025-02-09 NOTE — PROGRESS NOTES
[facility]  General Surgery  Progress Note    Subjective:     Interval History: Small amount of flatus, no BM. Tolerating clear liquids without nausea or vomiting. Pain well controlled.    Medications:  Continuous Infusions:  Scheduled Meds:   amLODIPine  5 mg Oral Daily    chlorhexidine  10 mL Mouth/Throat BID    enoxparin  40 mg Subcutaneous Daily    EScitalopram oxalate  10 mg Oral Daily    famotidine  20 mg Oral BID    metoprolol tartrate  25 mg Oral BID     PRN Meds:  Current Facility-Administered Medications:     clonazePAM, 0.5 mg, Oral, Nightly PRN    HYDROmorphone, 1 mg, Intravenous, Q6H PRN    ondansetron, 4 mg, Intravenous, Q6H PRN    oxyCODONE, 10 mg, Oral, Q4H PRN    oxyCODONE, 5 mg, Oral, Q4H PRN     Review of patient's allergies indicates:   Allergen Reactions    Benzalkonium chloride Hives    Codeine Itching    Morphine Nausea Only    Neosporin [neomycin-bacitracin-polymyxin] Hives    Sulfa (sulfonamide antibiotics) Hives and Itching           Morphine sulfate Other (See Comments)    Sulfamethoxazole-trimethoprim Other (See Comments)    Hydrocortisone Hives     Redness / can use bactroban       Objective:     Vital Signs (Most Recent):  Temp: 98.5 °F (36.9 °C) (02/09/25 1224)  Pulse: 74 (02/09/25 1224)  Resp: 19 (02/09/25 1224)  BP: (!) 157/76 (02/09/25 1224)  SpO2: (!) 94 % (02/09/25 1224) Vital Signs (24h Range):  Temp:  [97.9 °F (36.6 °C)-100.3 °F (37.9 °C)] 98.5 °F (36.9 °C)  Pulse:  [73-79] 74  Resp:  [16-20] 19  SpO2:  [92 %-98 %] 94 %  BP: (104-157)/(59-92) 157/76     Weight: 83.3 kg (183 lb 12.1 oz)  Body mass index is 24.92 kg/m².    Intake/Output - Last 3 Shifts         02/07 0700  02/08 0659 02/08 0700  02/09 0659 02/09 0700  02/10 0659    I.V. (mL/kg) 2181.9 (26.2)      IV Piggyback 280.3      Total Intake(mL/kg) 2462.1 (29.6)      Urine (mL/kg/hr) 1500 (0.8) 1 (0) 0 (0)    Emesis/NG output  0     Drains 215 80 15    Stool   0    Blood       Total Output 1715 81 15    Net +747.1  -81 -15           Urine Occurrence 1 x 6 x 1 x    Stool Occurrence  0 x 1 x    Emesis Occurrence  0 x              Physical Exam  Vitals and nursing note reviewed.   Constitutional:       General: She is not in acute distress.     Appearance: Normal appearance. She is not ill-appearing or toxic-appearing.   HENT:      Head: Normocephalic and atraumatic.   Eyes:      Extraocular Movements: Extraocular movements intact.      Conjunctiva/sclera: Conjunctivae normal.   Cardiovascular:      Rate and Rhythm: Normal rate and regular rhythm.   Pulmonary:      Effort: Pulmonary effort is normal.   Abdominal:      Comments: Soft, nondistended, appropriately TTP, incision c/d/I, JUDIE drains bilaterally serosanguinous    Musculoskeletal:      Right lower leg: No edema.      Left lower leg: No edema.   Skin:     General: Skin is warm and dry.      Coloration: Skin is not jaundiced.   Neurological:      Mental Status: She is alert and oriented to person, place, and time.      Motor: No weakness.   Psychiatric:         Mood and Affect: Mood normal.         Behavior: Behavior normal.          Significant Labs:  I have reviewed all pertinent lab results within the past 24 hours.  CBC:   Recent Labs   Lab 02/09/25  0623   WBC 4.33   RBC 3.17*   HGB 9.9*   HCT 31.4*   PLT 88*   MCV 99*   MCH 31.2*   MCHC 31.5*     BMP:   Recent Labs   Lab 02/09/25  0623   GLU 77      K 3.8      CO2 26   BUN 9   CREATININE 0.7   CALCIUM 8.4*     CMP:   Recent Labs   Lab 02/09/25  0623   GLU 77   CALCIUM 8.4*      K 3.8   CO2 26      BUN 9   CREATININE 0.7       Significant Diagnostics:  I have reviewed all pertinent imaging results/findings within the past 24 hours.  Assessment/Plan:     * Duodenal cancer  POD #3- s/p excision peritoneal metastasis and VHR with mesh    Doing well.  Pain controlled.  Tolerating CLD, awaiting full return of bowel function.      -- trial reg diet. Encouraged patient to be judicious with intake given  that she had not had complete full return of bowel function yet.  If she develops any distention or nausea or vomiting, we will back down her diet.  -- multimodal analgesia  -- encourage ambulation and IS  -- OT/PT consulted   Dispo: continued med/ surg    Lovenox and SCDs for DVT ppx, Famotidine for GI ppx     Thrombocytopenia  Chemo induced  -- continue to monitor     Depression  -- continue home escitalopram      Generalized anxiety disorder  -- continue home clonazepam    Benign hypertension  Care per hospital medicine      Rafl Quintana MD  General Surgery  [facility]

## 2025-02-10 LAB
ANION GAP SERPL CALC-SCNC: 7 MMOL/L (ref 8–16)
BUN SERPL-MCNC: 9 MG/DL (ref 6–20)
CALCIUM SERPL-MCNC: 8.4 MG/DL (ref 8.7–10.5)
CHLORIDE SERPL-SCNC: 104 MMOL/L (ref 95–110)
CO2 SERPL-SCNC: 30 MMOL/L (ref 23–29)
CREAT SERPL-MCNC: 0.7 MG/DL (ref 0.5–1.4)
ERYTHROCYTE [DISTWIDTH] IN BLOOD BY AUTOMATED COUNT: 13.3 % (ref 11.5–14.5)
EST. GFR  (NO RACE VARIABLE): >60 ML/MIN/1.73 M^2
GLUCOSE SERPL-MCNC: 99 MG/DL (ref 70–110)
HCT VFR BLD AUTO: 29.5 % (ref 37–48.5)
HGB BLD-MCNC: 9.5 G/DL (ref 12–16)
MCH RBC QN AUTO: 31.4 PG (ref 27–31)
MCHC RBC AUTO-ENTMCNC: 32.2 G/DL (ref 32–36)
MCV RBC AUTO: 97 FL (ref 82–98)
PLATELET # BLD AUTO: 100 K/UL (ref 150–450)
PMV BLD AUTO: 9.7 FL (ref 9.2–12.9)
POTASSIUM SERPL-SCNC: 3.7 MMOL/L (ref 3.5–5.1)
RBC # BLD AUTO: 3.03 M/UL (ref 4–5.4)
SODIUM SERPL-SCNC: 141 MMOL/L (ref 136–145)
WBC # BLD AUTO: 3.4 K/UL (ref 3.9–12.7)

## 2025-02-10 PROCEDURE — 25000003 PHARM REV CODE 250: Performed by: FAMILY MEDICINE

## 2025-02-10 PROCEDURE — 97116 GAIT TRAINING THERAPY: CPT

## 2025-02-10 PROCEDURE — 97530 THERAPEUTIC ACTIVITIES: CPT

## 2025-02-10 PROCEDURE — 36415 COLL VENOUS BLD VENIPUNCTURE: CPT | Performed by: SURGERY

## 2025-02-10 PROCEDURE — 80048 BASIC METABOLIC PNL TOTAL CA: CPT | Performed by: SURGERY

## 2025-02-10 PROCEDURE — 85027 COMPLETE CBC AUTOMATED: CPT | Performed by: SURGERY

## 2025-02-10 PROCEDURE — 25000003 PHARM REV CODE 250: Performed by: SURGERY

## 2025-02-10 PROCEDURE — 63600175 PHARM REV CODE 636 W HCPCS: Performed by: SURGERY

## 2025-02-10 PROCEDURE — 11000001 HC ACUTE MED/SURG PRIVATE ROOM

## 2025-02-10 RX ORDER — ACETAMINOPHEN 500 MG
1000 TABLET ORAL EVERY 8 HOURS PRN
Status: DISCONTINUED | OUTPATIENT
Start: 2025-02-10 | End: 2025-02-11 | Stop reason: HOSPADM

## 2025-02-10 RX ORDER — BISACODYL 10 MG/1
10 SUPPOSITORY RECTAL ONCE
Status: COMPLETED | OUTPATIENT
Start: 2025-02-10 | End: 2025-02-10

## 2025-02-10 RX ADMIN — FAMOTIDINE 20 MG: 20 TABLET, FILM COATED ORAL at 08:02

## 2025-02-10 RX ADMIN — ACETAMINOPHEN 1000 MG: 500 TABLET ORAL at 04:02

## 2025-02-10 RX ADMIN — AMLODIPINE BESYLATE 5 MG: 5 TABLET ORAL at 08:02

## 2025-02-10 RX ADMIN — METOPROLOL TARTRATE 25 MG: 25 TABLET, FILM COATED ORAL at 08:02

## 2025-02-10 RX ADMIN — ESCITALOPRAM OXALATE 10 MG: 10 TABLET ORAL at 08:02

## 2025-02-10 RX ADMIN — OXYCODONE HYDROCHLORIDE 10 MG: 5 TABLET ORAL at 08:02

## 2025-02-10 RX ADMIN — CHLORHEXIDINE GLUCONATE 0.12% ORAL RINSE 10 ML: 1.2 LIQUID ORAL at 08:02

## 2025-02-10 RX ADMIN — ENOXAPARIN SODIUM 40 MG: 40 INJECTION SUBCUTANEOUS at 05:02

## 2025-02-10 RX ADMIN — OXYCODONE HYDROCHLORIDE 10 MG: 5 TABLET ORAL at 03:02

## 2025-02-10 RX ADMIN — BISACODYL 10 MG: 10 SUPPOSITORY RECTAL at 04:02

## 2025-02-10 NOTE — ASSESSMENT & PLAN NOTE
Status post ex lap with surgical intervention per primary    Physical/occupational therapy consulted with no therapy needs  Encouraged ambulation and incentive spirometer

## 2025-02-10 NOTE — PROGRESS NOTES
[facility] Surg  General Surgery  Progress Note    Subjective:     History of Present Illness:  No notes on file    Post-Op Info:  Procedure(s) (LRB):  LAPAROTOMY, EXPLORATORY (N/A)  REPAIR, HERNIA, VENTRAL (N/A)  BLOCK, TRANSVERSUS ABDOMINIS PLANE (N/A)  EXCISION, LESION, ABDOMINAL WALL (N/A)   4 Days Post-Op     Interval History: AFVSS.  JOSE RAMON.  Doing well.  No Bms yet.  Passing more flatus.  O2 replaced again overnight.     Medications:  Continuous Infusions:  Scheduled Meds:   amLODIPine  5 mg Oral Daily    bisacodyL  10 mg Rectal Once    chlorhexidine  10 mL Mouth/Throat BID    enoxparin  40 mg Subcutaneous Daily    EScitalopram oxalate  10 mg Oral Daily    famotidine  20 mg Oral BID    metoprolol tartrate  25 mg Oral BID     PRN Meds:  Current Facility-Administered Medications:     acetaminophen, 1,000 mg, Oral, Q8H PRN    clonazePAM, 0.5 mg, Oral, Nightly PRN    HYDROmorphone, 1 mg, Intravenous, Q6H PRN    ondansetron, 4 mg, Intravenous, Q6H PRN    oxyCODONE, 10 mg, Oral, Q4H PRN    oxyCODONE, 5 mg, Oral, Q4H PRN     Review of patient's allergies indicates:   Allergen Reactions    Benzalkonium chloride Hives    Codeine Itching    Morphine Nausea Only    Neosporin [neomycin-bacitracin-polymyxin] Hives    Sulfa (sulfonamide antibiotics) Hives and Itching           Morphine sulfate Other (See Comments)    Sulfamethoxazole-trimethoprim Other (See Comments)    Hydrocortisone Hives     Redness / can use bactroban       Objective:     Vital Signs (Most Recent):  Temp: 99.3 °F (37.4 °C) (02/10/25 1550)  Pulse: 72 (02/10/25 1550)  Resp: 20 (02/10/25 1550)  BP: (!) 156/74 (02/10/25 1550)  SpO2: 96 % (02/10/25 1550) Vital Signs (24h Range):  Temp:  [98.4 °F (36.9 °C)-99.3 °F (37.4 °C)] 99.3 °F (37.4 °C)  Pulse:  [67-77] 72  Resp:  [16-20] 20  SpO2:  [94 %-98 %] 96 %  BP: (113-160)/(63-74) 156/74     Weight: 83.3 kg (183 lb 12.1 oz)  Body mass index is 24.92 kg/m².    Intake/Output - Last 3 Shifts         02/08  0700  02/09 0659 02/09 0700  02/10 0659 02/10 0700 02/11 0659    I.V. (mL/kg)       IV Piggyback       Total Intake(mL/kg)       Urine (mL/kg/hr) 1 (0) 0 (0)     Emesis/NG output 0      Drains 80 15     Stool  0     Total Output 81 15     Net -81 -15            Urine Occurrence 6 x 1 x     Stool Occurrence 0 x 1 x     Emesis Occurrence 0 x               Physical Exam  Vitals and nursing note reviewed.   Constitutional:       General: She is not in acute distress.     Appearance: Normal appearance. She is not ill-appearing or toxic-appearing.   HENT:      Head: Normocephalic and atraumatic.   Eyes:      Extraocular Movements: Extraocular movements intact.      Conjunctiva/sclera: Conjunctivae normal.   Cardiovascular:      Rate and Rhythm: Normal rate and regular rhythm.   Pulmonary:      Effort: Pulmonary effort is normal.   Abdominal:      Comments: Soft, nondistended, appropriately TTP, incision c/d/I, JUDIE drains bilaterally serosanguinous    Musculoskeletal:      Right lower leg: No edema.      Left lower leg: No edema.   Skin:     General: Skin is warm and dry.      Coloration: Skin is not jaundiced.   Neurological:      Mental Status: She is alert and oriented to person, place, and time.      Motor: No weakness.   Psychiatric:         Mood and Affect: Mood normal.         Behavior: Behavior normal.          Significant Labs:  I have reviewed all pertinent lab results within the past 24 hours.  CBC:   Recent Labs   Lab 02/10/25  0518   WBC 3.40*   RBC 3.03*   HGB 9.5*   HCT 29.5*   *   MCV 97   MCH 31.4*   MCHC 32.2     BMP:   Recent Labs   Lab 02/10/25  0518   GLU 99      K 3.7      CO2 30*   BUN 9   CREATININE 0.7   CALCIUM 8.4*       Significant Diagnostics:  I have reviewed all pertinent imaging results/findings within the past 24 hours.  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  Pain controlled.  Tolerating regular  diet, awaiting full return of bowel function.      -- continue regular diet  -- giving suppository today   -- multimodal analgesia  -- encourage ambulation and IS  -- OT/PT consulted   Dispo: continued med/ surg    Lovenox and SCDs for DVT ppx, Famotidine for GI ppx     Thrombocytopenia  Chemo induced  -- continue to monitor     Depression  -- continue home escitalopram      Generalized anxiety disorder  -- continue home clonazepam    Benign hypertension  Care per hospital medicine        Alicia Hernandez MD  General Surgery  [facility]

## 2025-02-10 NOTE — SUBJECTIVE & OBJECTIVE
Interval History: AFVSS.  JOSE RAMON.  Doing well.  No Bms yet.  Passing more flatus.  O2 replaced again overnight.     Medications:  Continuous Infusions:  Scheduled Meds:   amLODIPine  5 mg Oral Daily    bisacodyL  10 mg Rectal Once    chlorhexidine  10 mL Mouth/Throat BID    enoxparin  40 mg Subcutaneous Daily    EScitalopram oxalate  10 mg Oral Daily    famotidine  20 mg Oral BID    metoprolol tartrate  25 mg Oral BID     PRN Meds:  Current Facility-Administered Medications:     acetaminophen, 1,000 mg, Oral, Q8H PRN    clonazePAM, 0.5 mg, Oral, Nightly PRN    HYDROmorphone, 1 mg, Intravenous, Q6H PRN    ondansetron, 4 mg, Intravenous, Q6H PRN    oxyCODONE, 10 mg, Oral, Q4H PRN    oxyCODONE, 5 mg, Oral, Q4H PRN     Review of patient's allergies indicates:   Allergen Reactions    Benzalkonium chloride Hives    Codeine Itching    Morphine Nausea Only    Neosporin [neomycin-bacitracin-polymyxin] Hives    Sulfa (sulfonamide antibiotics) Hives and Itching           Morphine sulfate Other (See Comments)    Sulfamethoxazole-trimethoprim Other (See Comments)    Hydrocortisone Hives     Redness / can use bactroban       Objective:     Vital Signs (Most Recent):  Temp: 99.3 °F (37.4 °C) (02/10/25 1550)  Pulse: 72 (02/10/25 1550)  Resp: 20 (02/10/25 1550)  BP: (!) 156/74 (02/10/25 1550)  SpO2: 96 % (02/10/25 1550) Vital Signs (24h Range):  Temp:  [98.4 °F (36.9 °C)-99.3 °F (37.4 °C)] 99.3 °F (37.4 °C)  Pulse:  [67-77] 72  Resp:  [16-20] 20  SpO2:  [94 %-98 %] 96 %  BP: (113-160)/(63-74) 156/74     Weight: 83.3 kg (183 lb 12.1 oz)  Body mass index is 24.92 kg/m².    Intake/Output - Last 3 Shifts         02/08 0700  02/09 0659 02/09 0700  02/10 0659 02/10 0700  02/11 0659    I.V. (mL/kg)       IV Piggyback       Total Intake(mL/kg)       Urine (mL/kg/hr) 1 (0) 0 (0)     Emesis/NG output 0      Drains 80 15     Stool  0     Total Output 81 15     Net -81 -15            Urine Occurrence 6 x 1 x     Stool Occurrence 0 x 1 x     Emesis  Occurrence 0 x               Physical Exam  Vitals and nursing note reviewed.   Constitutional:       General: She is not in acute distress.     Appearance: Normal appearance. She is not ill-appearing or toxic-appearing.   HENT:      Head: Normocephalic and atraumatic.   Eyes:      Extraocular Movements: Extraocular movements intact.      Conjunctiva/sclera: Conjunctivae normal.   Cardiovascular:      Rate and Rhythm: Normal rate and regular rhythm.   Pulmonary:      Effort: Pulmonary effort is normal.   Abdominal:      Comments: Soft, nondistended, appropriately TTP, incision c/d/I, JUDIE drains bilaterally serosanguinous    Musculoskeletal:      Right lower leg: No edema.      Left lower leg: No edema.   Skin:     General: Skin is warm and dry.      Coloration: Skin is not jaundiced.   Neurological:      Mental Status: She is alert and oriented to person, place, and time.      Motor: No weakness.   Psychiatric:         Mood and Affect: Mood normal.         Behavior: Behavior normal.          Significant Labs:  I have reviewed all pertinent lab results within the past 24 hours.  CBC:   Recent Labs   Lab 02/10/25  0518   WBC 3.40*   RBC 3.03*   HGB 9.5*   HCT 29.5*   *   MCV 97   MCH 31.4*   MCHC 32.2     BMP:   Recent Labs   Lab 02/10/25  0518   GLU 99      K 3.7      CO2 30*   BUN 9   CREATININE 0.7   CALCIUM 8.4*       Significant Diagnostics:  I have reviewed all pertinent imaging results/findings within the past 24 hours.

## 2025-02-10 NOTE — SUBJECTIVE & OBJECTIVE
Interval History: See hospital course for today      Review of Systems   Constitutional:  Positive for activity change, appetite change (improving) and fatigue. Negative for fever.   Cardiovascular:  Negative for leg swelling.   Gastrointestinal:  Positive for abdominal pain and constipation. Negative for nausea and vomiting.   Skin:  Positive for wound.   Neurological:  Positive for weakness.   Psychiatric/Behavioral:  Negative for agitation, behavioral problems, confusion, decreased concentration and dysphoric mood. The patient is not nervous/anxious.      Objective:     Vital Signs (Most Recent):  Temp: 98.4 °F (36.9 °C) (02/10/25 0806)  Pulse: 71 (02/10/25 0806)  Resp: 18 (02/10/25 0808)  BP: (!) 160/72 (02/10/25 0806)  SpO2: 97 % (02/10/25 0806) Vital Signs (24h Range):  Temp:  [98.4 °F (36.9 °C)-99.1 °F (37.3 °C)] 98.4 °F (36.9 °C)  Pulse:  [67-77] 71  Resp:  [16-20] 18  SpO2:  [94 %-98 %] 97 %  BP: (113-160)/(68-76) 160/72     Weight: 83.3 kg (183 lb 12.1 oz)  Body mass index is 24.92 kg/m².  No intake or output data in the 24 hours ending 02/10/25 1019      Physical Exam  Vitals and nursing note reviewed.   Constitutional:       General: She is not in acute distress.     Appearance: She is ill-appearing. She is not toxic-appearing.   HENT:      Head: Normocephalic and atraumatic.   Cardiovascular:      Rate and Rhythm: Normal rate.   Pulmonary:      Effort: Pulmonary effort is normal. No respiratory distress.      Breath sounds: Rales (faint, bilateral) present.   Abdominal:      General: Bowel sounds are decreased.      Palpations: Abdomen is soft.      Tenderness: There is abdominal tenderness.      Comments: Abdominal surgical incision covered with abdominal binder  Isaak drains with serosanguinous fluid in bulbs   Musculoskeletal:      Right lower leg: No edema.      Left lower leg: No edema.   Skin:     General: Skin is warm.      Coloration: Skin is pale.   Neurological:      Mental Status: She is alert  and oriented to person, place, and time.      Motor: Weakness present.   Psychiatric:         Mood and Affect: Mood normal.         Behavior: Behavior normal.             Significant Labs: All pertinent labs within the past 24 hours have been reviewed.  CBC:   Recent Labs   Lab 02/09/25  0623 02/10/25  0518   WBC 4.33 3.40*   HGB 9.9* 9.5*   HCT 31.4* 29.5*   PLT 88* 100*     CMP:   Recent Labs   Lab 02/09/25  0623 02/10/25  0518    141   K 3.8 3.7    104   CO2 26 30*   GLU 77 99   BUN 9 9   CREATININE 0.7 0.7   CALCIUM 8.4* 8.4*   ANIONGAP 8 7*       Significant Imaging: I have reviewed all pertinent imaging results/findings within the past 24 hours.

## 2025-02-10 NOTE — PT/OT/SLP PROGRESS
Occupational Therapy      Patient Name:  Karen Gutierrez   MRN:  [id number]    OT chart review completed. Patient not seen today secondary to pt declining OT session at this time d/t fatigue. Pt reporting she just got back in bed from sitting up in chair throughout most of the day. Will follow-up as able.    2/10/2025  Preethi Cummings, OT   [id number]

## 2025-02-10 NOTE — PROGRESS NOTES
[facility] Hospital Medicine  Progress Note    Patient Name: Karen Gutierrez  MRN: [id number]  Patient Class: IP- Inpatient   Admission Date: 2/6/2025  Length of Stay: 4 days  Attending Physician: Alicia Hernandez MD  Primary Care Provider: Han Arshad MD        Subjective     Principal Problem:Duodenal cancer        HPI:  59F  has a past medical history of Anxiety, Cancer, Depression, Hypertension, Hypotension, iatrogenic, and Mitral valve prolapse.  Admitted for surgical resection of duodenal cancer mass by primary. Tolerated procedure well. Pain adequately controlled. Denies fever, chest pain, dyspnea. Takes strattera for ADHD.     Initial review of chart reveals hypotensive to normotensive episodes postoperatively. On room air.     Hospital medicine consulted for medical management.    Overview/Hospital Course:  2/7 admitted for surgical intervention, ex lap from duodenal metastasis and hernia repair per primary. No acute events overnight. Ambulated with Physical/occupational therapy. Blood pressure mildly elevated. Asymptomatic. Pain adequately controlled. Chavez removed. Not passing flatus.   2/8 not passing flatus or bowel movement. On clear liquid diet per primary. Pain adequately controlled. Self ambulating. Incentive spirometer encouraged  2/9 passing flatus but no bowel movement. Pain remains controlled. Hb/hct downtrending. Isaak drains with serosanguinous fluid in collection bulbs. Diet advanced per primary  2/10 no bowel movement. Tolerated regular diet without nausea/vomiting. Pain adequately controlled. Blood pressure normotensive to hypertensive.    Interval History: See hospital course for today      Review of Systems   Constitutional:  Positive for activity change, appetite change (improving) and fatigue. Negative for fever.   Cardiovascular:  Negative for leg swelling.   Gastrointestinal:  Positive for abdominal pain and constipation. Negative for nausea and vomiting.   Skin:   Positive for wound.   Neurological:  Positive for weakness.   Psychiatric/Behavioral:  Negative for agitation, behavioral problems, confusion, decreased concentration and dysphoric mood. The patient is not nervous/anxious.      Objective:     Vital Signs (Most Recent):  Temp: 98.4 °F (36.9 °C) (02/10/25 0806)  Pulse: 71 (02/10/25 0806)  Resp: 18 (02/10/25 0808)  BP: (!) 160/72 (02/10/25 0806)  SpO2: 97 % (02/10/25 0806) Vital Signs (24h Range):  Temp:  [98.4 °F (36.9 °C)-99.1 °F (37.3 °C)] 98.4 °F (36.9 °C)  Pulse:  [67-77] 71  Resp:  [16-20] 18  SpO2:  [94 %-98 %] 97 %  BP: (113-160)/(68-76) 160/72     Weight: 83.3 kg (183 lb 12.1 oz)  Body mass index is 24.92 kg/m².  No intake or output data in the 24 hours ending 02/10/25 1019      Physical Exam  Vitals and nursing note reviewed.   Constitutional:       General: She is not in acute distress.     Appearance: She is ill-appearing. She is not toxic-appearing.   HENT:      Head: Normocephalic and atraumatic.   Cardiovascular:      Rate and Rhythm: Normal rate.   Pulmonary:      Effort: Pulmonary effort is normal. No respiratory distress.      Breath sounds: Rales (faint, bilateral) present.   Abdominal:      General: Bowel sounds are decreased.      Palpations: Abdomen is soft.      Tenderness: There is abdominal tenderness.      Comments: Abdominal surgical incision covered with abdominal binder  Isaak drains with serosanguinous fluid in bulbs   Musculoskeletal:      Right lower leg: No edema.      Left lower leg: No edema.   Skin:     General: Skin is warm.      Coloration: Skin is pale.   Neurological:      Mental Status: She is alert and oriented to person, place, and time.      Motor: Weakness present.   Psychiatric:         Mood and Affect: Mood normal.         Behavior: Behavior normal.             Significant Labs: All pertinent labs within the past 24 hours have been reviewed.  CBC:   Recent Labs   Lab 02/09/25  0623 02/10/25  0518   WBC 4.33 3.40*   HGB 9.9*  9.5*   HCT 31.4* 29.5*   PLT 88* 100*     CMP:   Recent Labs   Lab 02/09/25  0623 02/10/25  0518    141   K 3.8 3.7    104   CO2 26 30*   GLU 77 99   BUN 9 9   CREATININE 0.7 0.7   CALCIUM 8.4* 8.4*   ANIONGAP 8 7*       Significant Imaging: I have reviewed all pertinent imaging results/findings within the past 24 hours.    Assessment and Plan     * Duodenal cancer  Status post ex lap with surgical intervention per primary    Physical/occupational therapy consulted with no therapy needs  Encouraged ambulation and incentive spirometer     Anemia  Anemia is likely due to acute blood loss which was from surgery and nutritional, bone marrow suppression . Most recent hemoglobin and hematocrit are listed below.  Recent Labs     02/08/25  0553 02/09/25  0623 02/10/25  0518   HGB 10.9* 9.9* 9.5*   HCT 34.9* 31.4* 29.5*     Plan  - Monitor serial CBC: Daily  - Transfuse PRBC if patient becomes hemodynamically unstable, symptomatic or H/H drops below 7/21.  - Patient has not received any PRBC transfusions to date  - Patient's anemia is currently worsening. Will continue current treatment  - recommend discontinuing lovenox  image abdomen if warranted  Risk for further anemia>>>benefit of preventing dvt as patient self ambulating     Thrombocytopenia  Stable   On lovenox per primary  Self ambulating and wearing scds  Discontinue if persists  Defer to primary    Depression  Patient has persistent depression which is unknown and is currently controlled. Will Continue anti-depressant medications. We will not consult psychiatry at this time. Patient does not display psychosis at this time. Continue to monitor closely and adjust plan of care as needed.        Generalized anxiety disorder  Resume home medication(s)       ADHD  Holding home medication(s)   Can resume on discharge       Benign hypertension  Patient's blood pressure range in the last 24 hours was: BP  Min: 113/68  Max: 160/72.The patient's inpatient  anti-hypertensive regimen is listed below:  Current Antihypertensives  metoprolol tartrate (LOPRESSOR) tablet 25 mg, 2 times daily, Oral  amLODIPine tablet 5 mg, Daily, Oral    Plan  - BP is uncontrolled, will adjust as follows: blood pressure fluctuating    Clinically stable  On beta blocker and lower dose amlodipine  Consider home dose amlodipine  Continue holding arb        VTE Risk Mitigation (From admission, onward)           Ordered     enoxaparin injection 40 mg  Daily         02/06/25 1539     IP VTE HIGH RISK PATIENT  Once         02/06/25 1539     Place sequential compression device  Until discontinued         02/06/25 1539                    Discharge Planning   ANNI: 2/7/2025     Code Status: Full Code   Medical Readiness for Discharge Date:   Discharge Plan A: Home                  Chantel Mcmanus MD  Department of Hospital Medicine   [facility] - Med Surg

## 2025-02-10 NOTE — PLAN OF CARE
Wound healing and drains wdl. Pain managed well. Becoming more independent. No other complaints overnight.       Problem: Adult Inpatient Plan of Care  Goal: Plan of Care Review  Outcome: Progressing  Goal: Patient-Specific Goal (Individualized)  Outcome: Progressing  Goal: Absence of Hospital-Acquired Illness or Injury  Outcome: Progressing  Goal: Optimal Comfort and Wellbeing  Outcome: Progressing  Goal: Readiness for Transition of Care  Outcome: Progressing     Problem: Infection  Goal: Absence of Infection Signs and Symptoms  Outcome: Progressing     Problem: Wound  Goal: Optimal Coping  Outcome: Progressing  Goal: Optimal Functional Ability  Outcome: Progressing  Goal: Absence of Infection Signs and Symptoms  Outcome: Progressing  Goal: Improved Oral Intake  Outcome: Progressing  Goal: Optimal Pain Control and Function  Outcome: Progressing  Goal: Skin Health and Integrity  Outcome: Progressing  Goal: Optimal Wound Healing  Outcome: Progressing     Problem: Fall Injury Risk  Goal: Absence of Fall and Fall-Related Injury  Outcome: Progressing

## 2025-02-10 NOTE — PT/OT/SLP PROGRESS
Physical Therapy  Treatment    Karen Gutierrez   MRN: [id number]   Admitting Diagnosis: Duodenal cancer    PT Received On: 02/10/25  PT Start Time: 0755     PT Stop Time: 0820    PT Total Time (min): 25 min       Billable Minutes:  Gait Training 15 and Therapeutic Activity 10    Treatment Type: Treatment  PT/PTA: PT     Number of PTA visits since last PT visit: 0       General Precautions: Standard, fall  Orthopedic Precautions: N/A  Braces: N/A  Respiratory Status: Room air         Subjective:  Communicated with NURSE ASHLEY AND EPIC CHART REVIEW  prior to session.  Pt agreed to tx     Pain/Comfort  Pain Rating 1: 3/10  Location 1: abdomen  Pain Addressed 1: Reposition  Pain Rating Post-Intervention 1: 3/10    Objective:   Patient found with: peripheral IV, JUDIE drain    Functional Mobility:  PT MET IN RM SUP IN BED. PT AGREED TO TX. PT LOG ROLLED TO RIGHT AND SEATED EOB IND. PT SCOOTED TO EOB AND GT. BELT AND  SOCKS DONNED PRIOR TO OOB MOBILITY.  PT STOOD WITH NO AD  AND GT TRAINED TO BATHROOM IND. PT TOILETED IND. PT GT TRAINED X 260' WITH NO LOB IND ON LEVEL INDOOR SURFACES. PT RETURNED TO  SEATED EOB AND SUP IN BED IND. PT WITH NO ASSIST THROUGHOUT TX. PT LEFT WITH ALL NEEDS MET AND EDUCATED TO CALL FOR NURSING WALK SEVERAL TIMES A DAY.       AM-PAC 6 CLICK MOBILITY  How much help from another person does this patient currently need?   1 = Unable, Total/Dependent Assistance  2 = A lot, Maximum/Moderate Assistance  3 = A little, Minimum/Contact Guard/Supervision  4 = None, Modified [place]/Independent    Turning over in bed (including adjusting bedclothes, sheets and blankets)?: 4  Sitting down on and standing up from a chair with arms (e.g., wheelchair, bedside commode, etc.): 4  Moving from lying on back to sitting on the side of the bed?: 4  Moving to and from a bed to a chair (including a wheelchair)?: 4  Need to walk in hospital room?: 4  Climbing 3-5 steps with a railing?: 1 (NT)  Basic  Mobility Total Score: 21    AM-PAC Raw Score CMS G-Code Modifier Level of Impairment Assistance   6 % Total / Unable   7 - 9 CM 80 - 100% Maximal Assist   10 - 14 CL 60 - 80% Moderate Assist   15 - 19 CK 40 - 60% Moderate Assist   20 - 22 CJ 20 - 40% Minimal Assist   23 CI 1-20% SBA / CGA   24 CH 0% Independent/ Mod I     Patient left supine with call button in reach.    Assessment:  PT GOALS MET     Rehab identified problem list/impairments: pain      Discharge recommendations: No Therapy Indicated      Barriers to discharge:      Equipment recommendations: none     GOALS:   Multidisciplinary Problems       Physical Therapy Goals       Not on file              Multidisciplinary Problems (Resolved)          Problem: Physical Therapy    Goal Priority Disciplines Outcome Interventions   Physical Therapy Goal   (Resolved)     PT, PT/OT Met    Description: LT25  1. PT WILL COMPLETE BED MOBILITY IND  2. PT WILL GT TRAIN X 450' IND TO PROGRESS GT.   3. PT WILL T/F TO CHAIR IND FOR OOB TOLERANCE  4. PT WILL INC AMPAC SCORE BY 2 POINTS TO PROGRESS GROSS FUNC MOBILITY.                          PLAN:    D/C FROM ACUTE P.T.   Plan of Care reviewed with: patient         02/10/2025

## 2025-02-10 NOTE — PLAN OF CARE
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 Most recent hemoglobin and hematocrit are listed below.        Recent Labs     02/08/25  0553 02/09/25  0623 02/10/25  0518   HGB 10.9* 9.9* 9.5*   HCT 34.9* 31.4* 29.5*      Plan  - Monitor serial CBC: Daily  - Transfuse PRBC if patient becomes hemodynamically unstable, symptomatic or H/H drops below 7/21.  - Patient has not received any PRBC transfusions to date  - Patient's anemia is currently worsening. Will continue current treatment  - recommend discontinuing lovenox  image abdomen if warranted  Risk for further anemia>>>benefit of preventing dvt as patient self ambulating      Thrombocytopenia  Stable   On lovenox per primary  Self ambulating and wearing scds  Discontinue if persists  Defer to primary     Depression  Patient has persistent depression which is unknown and is currently controlled. Will Continue anti-depressant medications. We will not consult psychiatry at this time. Patient does not display psychosis at this time. Continue to monitor closely and adjust plan of care as needed.           Generalized anxiety disorder  Resume home medication(s)         ADHD  Holding home medication(s)   Can resume on discharge         Benign hypertension  Patient's blood pressure range in the last 24 hours was: BP  Min: 113/68  Max: 160/72.The patient's inpatient anti-hypertensive regimen is listed below:  Current Antihypertensives  metoprolol  tartrate (LOPRESSOR) tablet 25 mg, 2 times daily, Oral  amLODIPine tablet 5 mg, Daily, Oral     Plan  - BP is uncontrolled, will adjust as follows: blood pressure fluctuating     Clinically stable  On beta blocker and lower dose amlodipine  Consider home dose amlodipine  Continue holding arb           VTE Risk Mitigation (From admission, onward)              Ordered       enoxaparin injection 40 mg  Daily         02/06/25 1539       IP VTE HIGH RISK PATIENT  Once         02/06/25 1539       Place sequential compression device  Until discontinued         02/06/25 1539                          Discharge Planning   ANNI: 2/7/2025     Code Status: Full Code   Medical Readiness for Discharge Date:   Discharge Plan A: Home

## 2025-02-10 NOTE — ASSESSMENT & PLAN NOTE
Patient's blood pressure range in the last 24 hours was: BP  Min: 113/68  Max: 160/72.The patient's inpatient anti-hypertensive regimen is listed below:  Current Antihypertensives  metoprolol tartrate (LOPRESSOR) tablet 25 mg, 2 times daily, Oral  amLODIPine tablet 5 mg, Daily, Oral    Plan  - BP is uncontrolled, will adjust as follows: blood pressure fluctuating    Clinically stable  On beta blocker and lower dose amlodipine  Consider home dose amlodipine  Continue holding arb

## 2025-02-10 NOTE — ASSESSMENT & PLAN NOTE
POD #4- s/p excision peritoneal metastasis and VHR with mesh    Doing well.  Pain controlled.  Tolerating regular diet, awaiting full return of bowel function.      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 No seizures at this time.\par Will continue current Meds.

## 2025-02-11 VITALS
DIASTOLIC BLOOD PRESSURE: 76 MMHG | HEART RATE: 73 BPM | OXYGEN SATURATION: 97 % | HEIGHT: 72 IN | WEIGHT: 183.75 LBS | BODY MASS INDEX: 24.89 KG/M2 | RESPIRATION RATE: 16 BRPM | TEMPERATURE: 98 F | SYSTOLIC BLOOD PRESSURE: 134 MMHG

## 2025-02-11 LAB
ANION GAP SERPL CALC-SCNC: 8 MMOL/L (ref 8–16)
BUN SERPL-MCNC: 9 MG/DL (ref 6–20)
CALCIUM SERPL-MCNC: 8.3 MG/DL (ref 8.7–10.5)
CHLORIDE SERPL-SCNC: 106 MMOL/L (ref 95–110)
CO2 SERPL-SCNC: 30 MMOL/L (ref 23–29)
CREAT SERPL-MCNC: 0.7 MG/DL (ref 0.5–1.4)
ERYTHROCYTE [DISTWIDTH] IN BLOOD BY AUTOMATED COUNT: 13.2 % (ref 11.5–14.5)
EST. GFR  (NO RACE VARIABLE): >60 ML/MIN/1.73 M^2
GLUCOSE SERPL-MCNC: 107 MG/DL (ref 70–110)
HCT VFR BLD AUTO: 30.1 % (ref 37–48.5)
HGB BLD-MCNC: 9.9 G/DL (ref 12–16)
MCH RBC QN AUTO: 31.4 PG (ref 27–31)
MCHC RBC AUTO-ENTMCNC: 32.9 G/DL (ref 32–36)
MCV RBC AUTO: 96 FL (ref 82–98)
PLATELET # BLD AUTO: 79 K/UL (ref 150–450)
PMV BLD AUTO: 9.8 FL (ref 9.2–12.9)
POTASSIUM SERPL-SCNC: 3.5 MMOL/L (ref 3.5–5.1)
RBC # BLD AUTO: 3.15 M/UL (ref 4–5.4)
SODIUM SERPL-SCNC: 144 MMOL/L (ref 136–145)
WBC # BLD AUTO: 2.74 K/UL (ref 3.9–12.7)

## 2025-02-11 PROCEDURE — 99900035 HC TECH TIME PER 15 MIN (STAT)

## 2025-02-11 PROCEDURE — 80048 BASIC METABOLIC PNL TOTAL CA: CPT | Performed by: SURGERY

## 2025-02-11 PROCEDURE — 97530 THERAPEUTIC ACTIVITIES: CPT

## 2025-02-11 PROCEDURE — 25000003 PHARM REV CODE 250: Performed by: SURGERY

## 2025-02-11 PROCEDURE — 36415 COLL VENOUS BLD VENIPUNCTURE: CPT | Performed by: SURGERY

## 2025-02-11 PROCEDURE — 25000003 PHARM REV CODE 250: Performed by: FAMILY MEDICINE

## 2025-02-11 PROCEDURE — 85027 COMPLETE CBC AUTOMATED: CPT | Performed by: SURGERY

## 2025-02-11 RX ADMIN — CHLORHEXIDINE GLUCONATE 0.12% ORAL RINSE 10 ML: 1.2 LIQUID ORAL at 09:02

## 2025-02-11 RX ADMIN — AMLODIPINE BESYLATE 5 MG: 5 TABLET ORAL at 09:02

## 2025-02-11 RX ADMIN — ACETAMINOPHEN 1000 MG: 500 TABLET ORAL at 09:02

## 2025-02-11 RX ADMIN — METOPROLOL TARTRATE 25 MG: 25 TABLET, FILM COATED ORAL at 09:02

## 2025-02-11 RX ADMIN — ACETAMINOPHEN 1000 MG: 500 TABLET ORAL at 12:02

## 2025-02-11 RX ADMIN — FAMOTIDINE 20 MG: 20 TABLET, FILM COATED ORAL at 09:02

## 2025-02-11 RX ADMIN — ESCITALOPRAM OXALATE 10 MG: 10 TABLET ORAL at 09:02

## 2025-02-11 NOTE — DISCHARGE SUMMARY
[facility] Surg  General Surgery  Discharge Summary      Patient Name: Karen Gutierrez  MRN: [id number]  Admission Date: 2/6/2025  Hospital Length of Stay: 5 days  Discharge Date and Time:  02/11/2025 10:01 AM  Attending Physician: Alicia Hernandez MD   Discharging Provider: Alicia Hernandez MD  Primary Care Provider: Han Arshad MD    HPI:   No notes on file    Procedure(s) (LRB):  LAPAROTOMY, EXPLORATORY (N/A)  REPAIR, HERNIA, VENTRAL (N/A)  BLOCK, TRANSVERSUS ABDOMINIS PLANE (N/A)  EXCISION, LESION, ABDOMINAL WALL (N/A)      Indwelling Lines/Drains at time of discharge:   Lines/Drains/Airways       Central Venous Catheter Line  Duration             Port A Cath Single Lumen right subclavian -- days              Drain  Duration                  Closed/Suction Drain 02/06/25 1106 Tube - 1 Right Abdomen Bulb 15 Fr. 4 days         Closed/Suction Drain 02/06/25 1107 Tube - 2 Left Abdomen Bulb 15 Fr. 4 days                  Hospital Course: 2/6/2025:  surgery     2/7/2025:  POD #1- doing well.  Tolerating CLD.  Awaiting return of bowel function.  Chavez removed     02/08/2025: POD #2. Tolerating clear liquids well. No BM/flatus yet. Pain control improving.    02/09/2025: POD #3. Small amount of flatus, no BM. Tolerating clear liquids without nausea or vomiting. Pain well controlled.    2/10/2025:  POD #4- more flatus.  Still no BM.  Malou regular diet. No nausea/ vomiting.  Still requiring o2 intermittently.     2/11/2025:  POD #5- AFVSS.  Several Bms.  Tolerating regular diet.  Appropriate for d/c home.  Drains still serosanguinous, approx 30cc / day     Goals of Care Treatment Preferences:  Code Status: Full Code    Health care agent: Biju Gutierrez  Health care agent number: [phone]    Living Will: Yes     What is most important right now is to focus on remaining as independent as possible.  Accordingly, we have decided that the best plan to meet the patient's goals includes continuing with  treatment.      Consults:   Consults (From admission, onward)          Status Ordering Provider     Inpatient consult to Hospitalist  Once        Provider:  Chantel Mcmanus MD    Acknowledged ALICIA GARNER            Significant Diagnostic Studies: Labs: BMP:   Recent Labs   Lab 02/10/25  0518 02/11/25 0522   GLU 99 107    144   K 3.7 3.5    106   CO2 30* 30*   BUN 9 9   CREATININE 0.7 0.7   CALCIUM 8.4* 8.3*   , CMP   Recent Labs   Lab 02/10/25  0518 02/11/25  0522    144   K 3.7 3.5    106   CO2 30* 30*   GLU 99 107   BUN 9 9   CREATININE 0.7 0.7   CALCIUM 8.4* 8.3*   ANIONGAP 7* 8   , CBC   Recent Labs   Lab 02/10/25  0518 02/11/25 0522   WBC 3.40* 2.74*   HGB 9.5* 9.9*   HCT 29.5* 30.1*   * 79*   , and All labs within the past 24 hours have been reviewed  Specimen (24h ago, onward)      None            Pending Diagnostic Studies:       Procedure Component Value Units Date/Time    Specimen to Pathology, Surgery General Surgery [[id number]] Collected: 02/06/25 1103    Order Status: Sent Lab Status: In process Updated: 02/06/25 1341    Specimen: Tissue     Specimen to Pathology, Surgery General Surgery [[id number]] Collected: 02/06/25 1100    Order Status: Sent Lab Status: In process Updated: 02/06/25 1100    Specimen: Tissue           Final Active Diagnoses:    Diagnosis Date Noted POA    PRINCIPAL PROBLEM:  Duodenal cancer [C17.0] 02/18/2021 Yes    Anemia [D64.9] 02/09/2025 Yes    Generalized anxiety disorder [F41.1] 01/17/2025 Yes    Depression [F32.A] 01/17/2025 Yes    Thrombocytopenia [D69.6] 01/17/2025 Yes    ADHD [F90.9] 01/15/2025 Yes    Benign hypertension [I10] 07/24/2019 Yes      Problems Resolved During this Admission:      Discharged Condition: good    Disposition: Home or Self Care    Follow Up:   Follow-up Information       Alicia Garner MD Follow up in 1 week(s).    Specialty: Surgical Oncology  Contact information:  [id number]  [place]  [id number]  [phone]                           Patient Instructions:      Diet Adult Regular     No dressing needed     Notify your health care provider if you experience any of the following:  temperature >100.4     Notify your health care provider if you experience any of the following:  persistent nausea and vomiting or diarrhea     Notify your health care provider if you experience any of the following:  severe uncontrolled pain     Notify your health care provider if you experience any of the following:  redness, tenderness, or signs of infection (pain, swelling, redness, odor or green/yellow discharge around incision site)     Notify your health care provider if you experience any of the following:  worsening rash     Medications:  Reconciled Home Medications:      Medication List        START taking these medications      oxyCODONE 5 MG immediate release tablet  Commonly known as: ROXICODONE  1-2 tablets by mouth every 4 (four) hours as needed for pain            CONTINUE taking these medications      amLODIPine 10 MG tablet  Commonly known as: NORVASC  Take 10 mg by mouth once daily.     atomoxetine 40 MG capsule  Commonly known as: STRATTERA  Take 1 capsule (40 mg total) by mouth once daily.     BYSTOLIC 20 mg Tab  Generic drug: nebivoloL  Take 1 tablet by mouth once daily.     cholecalciferol (vitamin D3) 125 mcg (5,000 unit) capsule  Take 5,000 Units by mouth once daily.     clonazePAM 0.5 MG tablet  Commonly known as: KlonoPIN  Take 1 tablet (0.5 mg total) by mouth nightly as needed for Anxiety.     cyanocobalamin 1000 MCG tablet  Commonly known as: VITAMIN B-12  Take 1,000 mcg by mouth once daily.     EScitalopram oxalate 10 MG tablet  Commonly known as: LEXAPRO  Take 1 tablet (10 mg total) by mouth once daily.     LIDOcaine-prilocaine cream  Commonly known as: EMLA  Apply to affected area once     OLANZapine 5 MG tablet  Commonly known as: ZyPREXA  Take 1 tablet (5 mg total) by mouth every evening. Take  nightly on days 1-3 of each chemotherapy cycle.     olmesartan 40 MG tablet  Commonly known as: BENICAR  TAKE 1 TABLET(40 MG) BY MOUTH EVERY DAY     ondansetron 4 MG Tbdl  Commonly known as: ZOFRAN-ODT  Take 1 tablet (4 mg total) by mouth 2 (two) times daily.     prochlorperazine 10 MG tablet  Commonly known as: COMPAZINE  Take 1 tablet (10 mg total) by mouth every 6 (six) hours as needed for Nausea.            Time spent on the discharge of patient: 20 minutes    Alicia Hernandez MD  General Surgery  [facility]

## 2025-02-11 NOTE — ASSESSMENT & PLAN NOTE
Anemia is likely due to acute blood loss which was from surgery and nutritional, bone marrow suppression . Most recent hemoglobin and hematocrit are listed below.  Recent Labs     02/09/25  0623 02/10/25  0518 02/11/25  0522   HGB 9.9* 9.5* 9.9*   HCT 31.4* 29.5* 30.1*       Plan  - Monitor serial CBC: Daily  - Transfuse PRBC if patient becomes hemodynamically unstable, symptomatic or H/H drops below 7/21.  - Patient has not received any PRBC transfusions to date  - Patient's anemia is currently worsening. Will continue current treatment  - recommend discontinuing lovenox  image abdomen if warranted  Risk for further anemia>>>benefit of preventing dvt as patient self ambulating     2/11/25: anemia stable, f/u with heme/Onc

## 2025-02-11 NOTE — PROGRESS NOTES
[facility] Medicine  Progress Note    Patient Name: Karen Gutierrez  MRN: [id number]  Patient Class: IP- Inpatient   Admission Date: 2/6/2025  Length of Stay: 5 days  Attending Physician: Alicia Hernandez MD  Primary Care Provider: Han Arshad MD        Subjective     Principal Problem:Duodenal cancer        HPI:  59F  has a past medical history of Anxiety, Cancer, Depression, Hypertension, Hypotension, iatrogenic, and Mitral valve prolapse.  Admitted for surgical resection of duodenal cancer mass by primary. Tolerated procedure well. Pain adequately controlled. Denies fever, chest pain, dyspnea. Takes strattera for ADHD.     Initial review of chart reveals hypotensive to normotensive episodes postoperatively. On room air.     Hospital medicine consulted for medical management.    Overview/Hospital Course:  2/7 admitted for surgical intervention, ex lap from duodenal metastasis and hernia repair per primary. No acute events overnight. Ambulated with Physical/occupational therapy. Blood pressure mildly elevated. Asymptomatic. Pain adequately controlled. Chavez removed. Not passing flatus.   2/8 not passing flatus or bowel movement. On clear liquid diet per primary. Pain adequately controlled. Self ambulating. Incentive spirometer encouraged  2/9 passing flatus but no bowel movement. Pain remains controlled. Hb/hct downtrending. Isaak drains with serosanguinous fluid in collection bulbs. Diet advanced per primary  2/10 no bowel movement. Tolerated regular diet without nausea/vomiting. Pain adequately controlled. Blood pressure normotensive to hypertensive.  2/11/25: VSS. Pt feels well +BM and flatus. Tolerating po diet well. No n/v. Pain controlled. ISAAK drain with 60ml serosanguinous output/24 hours. General surgery planning to discharged pt today.     Interval History: See hospital course for today      Review of Systems   Constitutional:  Positive for activity change, appetite change  (improving) and fatigue (improving). Negative for fever.   Respiratory:  Negative for shortness of breath (Using IS).    Cardiovascular:  Negative for leg swelling.   Gastrointestinal:  Positive for abdominal pain (controlled). Negative for constipation, nausea and vomiting.   Skin:  Positive for wound.   Neurological:  Positive for weakness (improved- declined HH).   Psychiatric/Behavioral:  Negative for agitation, behavioral problems, confusion, decreased concentration and dysphoric mood. The patient is not nervous/anxious.      Objective:     Vital Signs (Most Recent):  Temp: 98.4 °F (36.9 °C) (02/11/25 1222)  Pulse: 73 (02/11/25 1222)  Resp: 16 (02/11/25 1222)  BP: 134/76 (02/11/25 1222)  SpO2: 97 % (02/11/25 1222) Vital Signs (24h Range):  Temp:  [97.8 °F (36.6 °C)-99.3 °F (37.4 °C)] 98.4 °F (36.9 °C)  Pulse:  [54-77] 73  Resp:  [16-20] 16  SpO2:  [93 %-97 %] 97 %  BP: (123-157)/(58-80) 134/76     Weight: 83.3 kg (183 lb 12.1 oz)  Body mass index is 24.92 kg/m².    Intake/Output Summary (Last 24 hours) at 2/11/2025 1230  Last data filed at 2/11/2025 0947  Gross per 24 hour   Intake --   Output 100 ml   Net -100 ml         Physical Exam  Vitals and nursing note reviewed.   Constitutional:       General: She is not in acute distress.     Appearance: She is not ill-appearing or toxic-appearing.   HENT:      Head: Normocephalic and atraumatic.   Cardiovascular:      Rate and Rhythm: Normal rate.   Pulmonary:      Effort: Pulmonary effort is normal. No respiratory distress.      Breath sounds: No rales.   Abdominal:      General: Bowel sounds are normal.      Palpations: Abdomen is soft.      Tenderness: There is abdominal tenderness (expected post op TTP).      Comments: Abdominal surgical incision covered with abdominal binder  Isaak drain with serosanguinous fluid in bulb   Musculoskeletal:      Right lower leg: No edema.      Left lower leg: No edema.   Skin:     General: Skin is warm.      Coloration: Skin is not  pale.   Neurological:      Mental Status: She is alert and oriented to person, place, and time.      Motor: No weakness.   Psychiatric:         Mood and Affect: Mood normal.         Behavior: Behavior normal.             Significant Labs: All pertinent labs within the past 24 hours have been reviewed.  CBC:   Recent Labs   Lab 02/10/25  0518 02/11/25  0522   WBC 3.40* 2.74*   HGB 9.5* 9.9*   HCT 29.5* 30.1*   * 79*     CMP:   Recent Labs   Lab 02/10/25  0518 02/11/25  0522    144   K 3.7 3.5    106   CO2 30* 30*   GLU 99 107   BUN 9 9   CREATININE 0.7 0.7   CALCIUM 8.4* 8.3*   ANIONGAP 7* 8       Significant Imaging: I have reviewed all pertinent imaging results/findings within the past 24 hours.    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 Most recent hemoglobin and hematocrit are listed below.  Recent Labs     02/09/25  0623 02/10/25  0518 02/11/25  0522   HGB 9.9* 9.5* 9.9*   HCT 31.4* 29.5* 30.1*       Plan  - Monitor serial CBC: Daily  - Transfuse PRBC if patient becomes hemodynamically unstable, symptomatic or H/H drops below 7/21.  - Patient has not received any PRBC transfusions to date  - Patient's anemia is currently worsening. Will continue current treatment  - recommend discontinuing lovenox  image abdomen if warranted  Risk for further anemia>>>benefit of preventing dvt as patient self ambulating     2/11/25: anemia stable, f/u with heme/Onc     Thrombocytopenia  Stable   F/u with heme/Onc     Depression  Patient has persistent depression which is unknown  and is currently controlled. Will Continue anti-depressant medications. We will not consult psychiatry at this time. Patient does not display psychosis at this time. Continue to monitor closely and adjust plan of care as needed.        Generalized anxiety disorder  Resume home medication(s)       ADHD  Holding home medication(s)   Can resume on discharge       Benign hypertension  Patient's blood pressure range in the last 24 hours was: BP  Min: 123/58  Max: 157/80.The patient's inpatient anti-hypertensive regimen is listed below:  Current Antihypertensives  metoprolol tartrate (LOPRESSOR) tablet 25 mg, 2 times daily, Oral  amLODIPine tablet 5 mg, Daily, Oral    Plan  - BP is uncontrolled, will adjust as follows: blood pressure fluctuating    Clinically stable  On beta blocker and lower dose amlodipine  Consider home dose amlodipine  Continue holding arb    BP stable -ok to restart home meds       VTE Risk Mitigation (From admission, onward)           Ordered     enoxaparin injection 40 mg  Daily         02/06/25 1539     IP VTE HIGH RISK PATIENT  Once         02/06/25 1539     Place sequential compression device  Until discontinued         02/06/25 1539                    Discharge Planning   ANNI: 2/11/2025     Code Status: Full Code   Medical Readiness for Discharge Date: 2/11/2025  Discharge Plan A: Home with family   Discharge Delays: None known at this time            Please place Justification for DME        Christiana Fields NP  Department of Hospital Medicine   [facility]

## 2025-02-11 NOTE — PLAN OF CARE
Problem: Adult Inpatient Plan of Care  Goal: Plan of Care Review  Outcome: Met  Goal: Patient-Specific Goal (Individualized)  Outcome: Met  Goal: Absence of Hospital-Acquired Illness or Injury  Outcome: Met  Goal: Optimal Comfort and Wellbeing  Outcome: Met  Goal: Readiness for Transition of Care  Outcome: Met     Problem: Infection  Goal: Absence of Infection Signs and Symptoms  Outcome: Met     Problem: Wound  Goal: Optimal Coping  Outcome: Met  Goal: Optimal Functional Ability  Outcome: Met  Goal: Absence of Infection Signs and Symptoms  Outcome: Met  Goal: Improved Oral Intake  Outcome: Met  Goal: Optimal Pain Control and Function  Outcome: Met  Goal: Skin Health and Integrity  Outcome: Met  Goal: Optimal Wound Healing  Outcome: Met     Problem: Fall Injury Risk  Goal: Absence of Fall and Fall-Related Injury  Outcome: Met    Pt verbalized understanding of POC  Instructed and demonstrated on emptying anthony drains

## 2025-02-11 NOTE — PLAN OF CARE
Pt independent with stand pivot transfer chair to bed  Independent toileting and toilet transfer    Patient to be dc'd from skilled OT services at this time.  Pt in agreement with POC

## 2025-02-11 NOTE — DISCHARGE INSTRUCTIONS
-- No heavy lifting (nothing >10-15 lbs) for 4-6 weeks.   -- No driving while taking pain medications   -- Ok to shower with soap and water, no scrubbing incision.  No lotions or ointments to incisions.  Skin glue will begin to peel off in about a week, ok to remove it at that time.    -- No tub baths, swimming or submerging underwater for 4 weeks or until wound completely healed  -- Ok for Tylenol 1gram (2 extra strength Tylenol) every 8 hours and Ibuprofen 800mg every 8 hours alternating for pain.  Oxycodone for pain unrelieved by over the counter medications.     If youve had surgery, your surgeon may have placed one or more drains in your wound to drain blood or other fluids that can build up at the surgical site. The drain has two parts--a thin rubber tube and a round squeeze bulb. The bulb acts as a reservoir for the fluids and its also used to create a vacuum that helps with drainage.    What Should I Know About My Drains?  Emptying the drain and caring for your wound correctly helps you avoid infection and speeds healing. When the amount of drainage decreases to the level your doctor or nurse told you, your provider removes the drain.  These general instructions can get you started:  Empty the reservoir at least two times a day, even if its not full. You may need to empty it more than twice if it gets full.  Change the dressing around the wound at least once a day, or more often if it becomes soaked or dirty.  Milk the tubing every four hours while you are awake. This is also called stripping the tubing, and it prevents the drain from getting clogged.    How Should I Empty My Surgical Drains?  Empty the reservoir bulb in the morning and evening, or if it gets full. First, gather all the supplies you need, including the measuring cup and chart you were given, along with a pen or pencil. Then follow these steps:  Wash your hands well in soapy water.  Open the plug on the reservoir without touching the  inside of the plug.  Gently squeeze the reservoir to empty the fluid into the measuring cup.  Re-create the vacuum inside the reservoir by squeezing it flat and then replacing the plug.  Observe the amount of fluid and its color so you can write it down later. Then flush the fluid down the toilet.  Wash your hands again.  Record the amount of fluid for each drain on the chart. Remember to bring the chart with you to your first follow-up visit with your doctor.    How Do I Keep the Tubing Open?  Milking or stripping the tubing can help prevent clots or other obstructions from clogging the tube. If youre clearing obstructions that have already formed, you may have to repeat the process several times.  Follow these steps:  Wash your hands in soapy water.  Make sure the plug on the reservoir bulb is open, and then  the tubing close to your skin.  With your other hand, squeeze the tube and run your fingers toward the reservoir bulb.  Release your fingers from the tubing near your skin first and then at the end, near the bulb.  Putting lotion or hand  on your hands after washing them can help your fingers slide along the tubing.    How Should I Change the Wound Dressing?  If your wound doesnt have a dressing, make sure to keep the wound and surrounding skin clean and dry. If you do have a dressing, it needs to be changed carefully to avoid infection. Follow these steps:  Wash your hands carefully in soapy water.  Gather your supplies. You need gauze pads, medical tape and a trash bag to dispose of your old dressing. If your wound is out of your range of vision, you also need a mirror.  Remove the old dressing carefully and put it in the trash bag.  Wash your hands again.  Inspect the drain site. If you notice any redness, or if the drainage smells bad or is cloudy, pale yellow or yellow-green, call your provider.  Fold two new pieces of gauze in half and place them around each side of the tubing at the  drain site.  Place a full piece of gauze over the first two.  Put two strips of medical tape over the gauze.    When Should I Call My Doctor?  Pay attention to your drain and inspect your wound every time you change the dressing. Call your doctor if any of these things happen:  The tube falls out or the stitches that hold it in place get loose.  You cant re-create a vacuum in the reservoir bulb.  Your skin becomes red or more tender or swollen near the tube.  The drainage fluid has a bad odor, is cloudy, pale yellow or yellow-green.  You have a temperature of 100.5 degrees F or higher.

## 2025-02-11 NOTE — PLAN OF CARE
[facility] - Med Surg  Discharge Final Note    Primary Care Provider: Han Arshad MD    Expected Discharge Date: 2/11/2025    Final Discharge Note (most recent)       Final Note - 02/11/25 1039          Final Note    Assessment Type Final Discharge Note     Anticipated Discharge Disposition Home or Self Care     Hospital Resources/Appts/Education Provided Appointments scheduled and added to AVS        Post-Acute Status    Discharge Delays None known at this time                   Contact Info       Alicia Hernandez MD   Specialty: Surgical Oncology    [id number]  [facility] [address]   Phone: [phone]       Next Steps: Follow up in 1 week(s)          Patient has no d/c needs at this time. Sw to follow up, as needed, for d/c planning purposes.

## 2025-02-11 NOTE — SUBJECTIVE & OBJECTIVE
Interval History: See hospital course for today      Review of Systems   Constitutional:  Positive for activity change, appetite change (improving) and fatigue (improving). Negative for fever.   Respiratory:  Negative for shortness of breath (Using IS).    Cardiovascular:  Negative for leg swelling.   Gastrointestinal:  Positive for abdominal pain (controlled). Negative for constipation, nausea and vomiting.   Skin:  Positive for wound.   Neurological:  Positive for weakness (improved- declined HH).   Psychiatric/Behavioral:  Negative for agitation, behavioral problems, confusion, decreased concentration and dysphoric mood. The patient is not nervous/anxious.      Objective:     Vital Signs (Most Recent):  Temp: 98.4 °F (36.9 °C) (02/11/25 1222)  Pulse: 73 (02/11/25 1222)  Resp: 16 (02/11/25 1222)  BP: 134/76 (02/11/25 1222)  SpO2: 97 % (02/11/25 1222) Vital Signs (24h Range):  Temp:  [97.8 °F (36.6 °C)-99.3 °F (37.4 °C)] 98.4 °F (36.9 °C)  Pulse:  [54-77] 73  Resp:  [16-20] 16  SpO2:  [93 %-97 %] 97 %  BP: (123-157)/(58-80) 134/76     Weight: 83.3 kg (183 lb 12.1 oz)  Body mass index is 24.92 kg/m².    Intake/Output Summary (Last 24 hours) at 2/11/2025 1230  Last data filed at 2/11/2025 0947  Gross per 24 hour   Intake --   Output 100 ml   Net -100 ml         Physical Exam  Vitals and nursing note reviewed.   Constitutional:       General: She is not in acute distress.     Appearance: She is not ill-appearing or toxic-appearing.   HENT:      Head: Normocephalic and atraumatic.   Cardiovascular:      Rate and Rhythm: Normal rate.   Pulmonary:      Effort: Pulmonary effort is normal. No respiratory distress.      Breath sounds: No rales.   Abdominal:      General: Bowel sounds are normal.      Palpations: Abdomen is soft.      Tenderness: There is abdominal tenderness (expected post op TTP).      Comments: Abdominal surgical incision covered with abdominal binder  Isaak drain with serosanguinous fluid in bulb    Musculoskeletal:      Right lower leg: No edema.      Left lower leg: No edema.   Skin:     General: Skin is warm.      Coloration: Skin is not pale.   Neurological:      Mental Status: She is alert and oriented to person, place, and time.      Motor: No weakness.   Psychiatric:         Mood and Affect: Mood normal.         Behavior: Behavior normal.             Significant Labs: All pertinent labs within the past 24 hours have been reviewed.  CBC:   Recent Labs   Lab 02/10/25  0518 02/11/25  0522   WBC 3.40* 2.74*   HGB 9.5* 9.9*   HCT 29.5* 30.1*   * 79*     CMP:   Recent Labs   Lab 02/10/25  0518 02/11/25 0522    144   K 3.7 3.5    106   CO2 30* 30*   GLU 99 107   BUN 9 9   CREATININE 0.7 0.7   CALCIUM 8.4* 8.3*   ANIONGAP 7* 8       Significant Imaging: I have reviewed all pertinent imaging results/findings within the past 24 hours.

## 2025-02-11 NOTE — ASSESSMENT & PLAN NOTE
Patient's blood pressure range in the last 24 hours was: BP  Min: 123/58  Max: 157/80.The patient's inpatient anti-hypertensive regimen is listed below:  Current Antihypertensives  metoprolol tartrate (LOPRESSOR) tablet 25 mg, 2 times daily, Oral  amLODIPine tablet 5 mg, Daily, Oral    Plan  - BP is uncontrolled, will adjust as follows: blood pressure fluctuating    Clinically stable  On beta blocker and lower dose amlodipine  Consider home dose amlodipine  Continue holding arb    BP stable -ok to restart home meds

## 2025-02-11 NOTE — PLAN OF CARE
Discussed poc with pt, pt verbalized understanding    Purposeful rounding. Care clustered    VS wnl   Fall precautions in place, remains injury free    Pain  under control with PRN meds    Accurate I&Os   Drains didn't put out enough to collect    Bed locked at lowest position  Call light within reach    Chart check continued  Will cont with POC

## 2025-02-11 NOTE — PT/OT/SLP PROGRESS
Occupational Therapy   Treatment    Name: Karen Gutierrez  MRN: [id number]  Admitting Diagnosis:  Duodenal cancer  5 Days Post-Op    Recommendations:     Discharge Recommendations: No Therapy Indicated  Discharge Equipment Recommendations:  none  Barriers to discharge:  None    Assessment:     Karen Gutierrez is a 59 y.o. female with a medical diagnosis of Duodenal cancer. Performance deficits affecting function are weakness, impaired endurance, impaired self care skills, gait instability, impaired balance, decreased ROM.     Rehab Prognosis:  Good; patient would benefit from acute skilled OT services to address these deficits and reach maximum level of function.       Plan:     Patient to be seen 2 x/week to address the above listed problems via self-care/home management, therapeutic activities, therapeutic exercises  Plan of Care Expires: 02/21/25  Plan of Care Reviewed with: patient    Subjective     Chief Complaint: Ready for discharge  Patient/Family Comments/goals: Return home  Pain/Comfort:  Pain Rating 1: 3/10  Location - Orientation 1: generalized  Location 1: abdomen  Pain Addressed 1: Cessation of Activity  Pain Rating Post-Intervention 1: 3/10    Objective:     Communicated with: Nurse prior to session.  Patient found up in chair with telemetry, peripheral IV, JUDIE drain upon OT entry to room.    General Precautions: Standard, fall    Orthopedic Precautions:N/A  Braces: N/A  Respiratory Status: Room air     Occupational Performance:     Bed Mobility:    Patient completed Sit to Supine with independence     Functional Mobility/Transfers:  Patient completed Sit <> Stand Transfer with independence  with  no assistive device   Patient completed Toilet Transfer Stand Pivot technique with independence with  no AD  Functional Mobility: pt ambulates around room for toileting independently     Activities of Daily Living:  Toileting: independence standard commode      [facility] 6 Click ADL: 24    Treatment &  Education:  Pt presents at an independent level for functional mobility and self-care tasks. Pt requesting to return to bed to rest until discharge. Pt left with verbalizing no further needs. Pt to be discharged from skilled OT services at this time, as she is at [facility]. Pt in agreement to plan. Pt encouraged ot notify team if anything changes. Pt verbalized understanding.    Patient left supine with all lines intact and call button in reach    GOALS:   Multidisciplinary Problems       Occupational Therapy Goals          Problem: Occupational Therapy    Goal Priority Disciplines Outcome Interventions   Occupational Therapy Goal     OT, PT/OT Progressing    Description: Goals to be met by: 2/21/25     Patient will increase functional independence with ADLs by performing:    UE Dressing with [place].  Toileting from toilet with [place] for hygiene and clothing management.   Toilet transfer to toilet with [place].                           Time Tracking:     OT Date of Treatment: 02/11/25  OT Start Time: 1040  OT Stop Time: 1050  OT Total Time (min): 10 min    Billable Minutes:Therapeutic Activity 10    JESSY Lester  OT/ELI: OT     Number of ELI visits since last OT visit: 0    2/11/2025

## 2025-02-11 NOTE — ASSESSMENT & PLAN NOTE
Status post ex lap with surgical intervention per primary  +BM and flatus, tolerating po diet well   Physical/occupational therapy consulted with no therapy needs  Encouraged ambulation and incentive spirometer   JUDIE drain and incisional care per general surgery recommendations   F/u with general surgery as directed

## 2025-02-12 ENCOUNTER — PATIENT MESSAGE (OUTPATIENT)
Dept: SURGICAL ONCOLOGY | Facility: CLINIC | Age: 60
End: 2025-02-12
Payer: COMMERCIAL

## 2025-02-12 ENCOUNTER — TELEPHONE (OUTPATIENT)
Dept: SURGICAL ONCOLOGY | Facility: CLINIC | Age: 60
End: 2025-02-12
Payer: COMMERCIAL

## 2025-02-12 ENCOUNTER — PATIENT OUTREACH (OUTPATIENT)
Dept: ADMINISTRATIVE | Facility: CLINIC | Age: 60
End: 2025-02-12
Payer: COMMERCIAL

## 2025-02-12 LAB
FINAL PATHOLOGIC DIAGNOSIS: NORMAL
GROSS: NORMAL
Lab: NORMAL

## 2025-02-12 NOTE — TELEPHONE ENCOUNTER
Responded to pt's message asking about moving her appt to 02/18. Called and gave time date and location of new appt. Pt verbalized understanding of all things discussed

## 2025-02-12 NOTE — PROGRESS NOTES
C3 nurse spoke with Karen Gutierrez  for a TCC post hospital discharge follow up call. The patient does not have a scheduled HOSFU appointment with Han Arshad MD  within 5-7 days post hospital discharge date 02/11/2025. C3 nurse was unable to schedule HOSFU appointment in [facility].    Patient instructed to please contact PCP and schedule follow up appointment using HOSFU visit type on or before 02/18/2025.

## 2025-02-18 ENCOUNTER — TELEPHONE (OUTPATIENT)
Dept: SURGICAL ONCOLOGY | Facility: CLINIC | Age: 60
End: 2025-02-18
Payer: COMMERCIAL

## 2025-02-18 ENCOUNTER — OFFICE VISIT (OUTPATIENT)
Dept: SURGICAL ONCOLOGY | Facility: CLINIC | Age: 60
End: 2025-02-18
Payer: COMMERCIAL

## 2025-02-18 VITALS
TEMPERATURE: 98 F | SYSTOLIC BLOOD PRESSURE: 107 MMHG | HEART RATE: 71 BPM | DIASTOLIC BLOOD PRESSURE: 72 MMHG | WEIGHT: 180.69 LBS | BODY MASS INDEX: 24.5 KG/M2

## 2025-02-18 DIAGNOSIS — C17.0 DUODENAL ADENOCARCINOMA: Primary | ICD-10-CM

## 2025-02-18 DIAGNOSIS — K43.2 INCISIONAL HERNIA, WITHOUT OBSTRUCTION OR GANGRENE: ICD-10-CM

## 2025-02-18 PROCEDURE — 99999 PR PBB SHADOW E&M-EST. PATIENT-LVL III: CPT | Mod: PBBFAC,,, | Performed by: SURGERY

## 2025-02-18 NOTE — TELEPHONE ENCOUNTER
Called pt. Gave time date and location of 3 mo appts. Pt verbalized understanding of all things discussed

## 2025-02-18 NOTE — PROGRESS NOTES
Surgical Oncology Clinic Note      Referring Provider: Dr. Enzo Daley   PCP: Han Arshad MD    Reason For Visit: Gastroesophageal:  duodenal malignancy- recurrent/ metastatic     Staging:  Cancer Staging   Duodenal cancer  Staging form: Small Intestine - Adenocarcinoma, AJCC 8th Edition  - Pathologic stage from 3/12/2021: Stage IIIB (pT4, pN2, cM0) - Signed by Velvet Faith APRN, ANP-C on 3/14/2021  - Pathologic stage from 5/15/2024: Stage IV (cM1) - Signed by Alicia Hernandez MD on 5/15/2024       HISTORY OF PRESENT ILLNESS     Karen Gutierrez is a 59 y.o. female with history of duodenal adenocarcinoma s/p  who presents today for evaluation and management of recurrent duodenal carcinoma.    She originally presented in February of 2021 with a completely obstructing duodenal mass.  She underwent G-tube and J-tube placement it was then followed with Dr. Hitesh Díaz in [place].  She subsequently underwent a Whipple on 02/11/2021.  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  Post op course was c/b abscess requiring drainage.  She received 12 cycles of FOLFOX adjuvantly which she tolerated well, and which she completed in November 2021.   Her GB and her J-tube both came out and she has been on surveillance since that point in time.  Surveillance imaging in April of this year showed a new heterogeneous mass in the anterior abdominal wall measuring 6.8 x 5.1 x 4.6 cm concerning for tumor recurrence versus hematoma.  She underwent biopsy which did confirm metastatic disease.  She was discussed at tumor board with agreement for systemic chemotherapy and presents today for discussion of port placement.    Overall she feels well and is doing well.  She presents today with her sister.    INTERVAL HISTORY     08/14/2024:  She's had 4 cycles chemo so far.  She is tolerating it well.  Her tumor has shrunk significantly.       09/18/2024:  She's had 6 cycles so far.  She's doing  very well.  She can no longer palpate her tumor at all.     1/14/2025:  she's had total of 12 cycles of chemo and done well.  Last cycle was 1/2/25.  Repeat imaging shows continued decrease in size/ stability of the rectus mass.  No new issues.        02/18/2025:  Here for postop appointment.  Doing well drains have been putting out less than 20 cc a day for the last few days.  She is feeling well tolerating a regular diet and no longer requiring any pain medications.    Past Medical History:   Diagnosis Date    Anxiety     Cancer     duodenal cancer    Depression     Hypertension     Hypotension, iatrogenic     Mitral valve prolapse        Past Surgical History:   Procedure Laterality Date    BUNIONECTOMY Left     HYSTERECTOMY  2000    INJECTION OF ANESTHETIC AGENT INTO TISSUE PLANE DEFINED BY TRANSVERSUS ABDOMINIS MUSCLE N/A 2/6/2025    Procedure: BLOCK, TRANSVERSUS ABDOMINIS PLANE;  Surgeon: Alicia Hernandez MD;  Location: [facility] OR;  Service: General;  Laterality: N/A;    INSERTION OF TUNNELED CENTRAL VENOUS CATHETER (CVC) WITH SUBCUTANEOUS PORT N/A 03/26/2021    Procedure: [id number];  Surgeon: Lauren Abraham MD;  Location: [facility] OR;  Service: General;  Laterality: N/A;    INSERTION OF VENOUS ACCESS PORT Right 05/13/2024    Procedure: INSERTION, VENOUS ACCESS PORT;  Surgeon: Alicia Hernandez MD;  Location: [facility] OR;  Service: General;  Laterality: Right;    LAPAROTOMY, EXPLORATORY N/A 2/6/2025    Procedure: LAPAROTOMY, EXPLORATORY;  Surgeon: Alicia Hernandez MD;  Location: [facility] OR;  Service: General;  Laterality: N/A;    LITHOTRIPSY      PLACEMENT OF JEJUNOSTOMY TUBE  02/18/2021    Procedure: INSERTION, JEJUNOSTOMY TUBE;  Surgeon: Hitesh Díaz MD;  Location: [facility] OR [address] [place] [address];  Service: General;;    REPAIR, HERNIA, VENTRAL N/A 2/6/2025    Procedure: REPAIR, HERNIA, VENTRAL;  Surgeon: Alicia Hernandez MD;  Location: [facility] OR;  Service: General;  Laterality: N/A;  phasis ST mesh    SURGICAL  REMOVAL OF LESION OF ABDOMINAL WALL N/A 2/6/2025    Procedure: EXCISION, LESION, ABDOMINAL WALL;  Surgeon: Alicia Hernandez MD;  Location: [facility] OR;  Service: General;  Laterality: N/A;    ULTRASOUND GUIDANCE Right 05/13/2024    Procedure: ULTRASOUND GUIDANCE;  Surgeon: Alicia Hernandez MD;  Location: [facility] OR;  Service: General;  Laterality: Right;    VARICOSE VEIN STRIPPING  2015    WHIPPLE PROCEDURE N/A 02/18/2021    Procedure: WHIPPLE PROCEDURE;  Surgeon: Hitesh Díaz MD;  Location: [facility] OR 2ND FLR;  Service: General;  Laterality: N/A;       Family History   Problem Relation Name Age of Onset    Ovarian cancer Mother      Atrial fibrillation Mother      Stroke Mother      Hyperlipidemia Mother      Diabetes Mother      Bladder Cancer Father      Hypertension Father      Diabetes Father      No Known Problems Sister      Diabetes Maternal Grandmother      Colon cancer Maternal Grandmother      Stroke Maternal Grandfather      Diabetes Paternal Grandmother      No Known Problems Paternal Grandfather         Social History     Socioeconomic History    Marital status:    Tobacco Use    Smoking status: Former    Smokeless tobacco: Never   Substance and Sexual Activity    Alcohol use: Not Currently    Drug use: Never     Social Drivers of Health     Financial Resource Strain: Low Risk  (2/9/2025)    Overall Financial Resource Strain (CARDIA)     Difficulty of Paying Living Expenses: Not hard at all   Food Insecurity: No Food Insecurity (2/9/2025)    Hunger Vital Sign     Worried About Running Out of Food in the Last Year: Never true     Ran Out of Food in the Last Year: Never true   Transportation Needs: No Transportation Needs (2/9/2025)    TRANSPORTATION NEEDS     Transportation : No   Physical Activity: Inactive (6/14/2024)    Exercise Vital Sign     Days of Exercise per Week: 0 days     Minutes of Exercise per Session: 0 min   Stress: No Stress Concern Present (2/9/2025)    [employer] - Occupational Stress Questionnaire     Feeling of Stress : Not at all   Housing Stability: Unknown (2/9/2025)    Housing Stability Vital Sign     Unable to Pay for Housing in the Last Year: No     Homeless in the Last Year: No          Medication List            Accurate as of February 18, 2025 11:59 PM. If you have any questions, ask your nurse or doctor.                CONTINUE taking these medications      amLODIPine 10 MG tablet  Commonly known as: NORVASC     atomoxetine 40 MG capsule  Commonly known as: STRATTERA  Take 1 capsule (40 mg total) by mouth once daily.     BYSTOLIC 20 mg Tab  Generic drug: nebivoloL     cholecalciferol (vitamin D3) 125 mcg (5,000 unit) capsule     clonazePAM 0.5 MG tablet  Commonly known as: KlonoPIN  Take 1 tablet (0.5 mg total) by mouth nightly as needed for Anxiety.     cyanocobalamin 1000 MCG tablet  Commonly known as: VITAMIN B-12     EScitalopram oxalate 10 MG tablet  Commonly known as: LEXAPRO  Take 1 tablet (10 mg total) by mouth once daily.     LIDOcaine-prilocaine cream  Commonly known as: EMLA  Apply to affected area once     OLANZapine 5 MG tablet  Commonly known as: ZyPREXA  Take 1 tablet (5 mg total) by mouth every evening. Take nightly on days 1-3 of each chemotherapy cycle.     olmesartan 40 MG tablet  Commonly known as: BENICAR  TAKE 1 TABLET(40 MG) BY MOUTH EVERY DAY     ondansetron 4 MG Tbdl  Commonly known as: ZOFRAN-ODT  Take 1 tablet (4 mg total) by mouth 2 (two) times daily.     oxyCODONE 5 MG immediate release tablet  Commonly known as: ROXICODONE  1-2 tablets by mouth every 4 (four) hours as needed for pain     prochlorperazine 10 MG tablet  Commonly known as: COMPAZINE  Take 1 tablet (10 mg total) by mouth every 6 (six) hours as needed for Nausea.              Review of patient's allergies indicates:   Allergen Reactions    Benzalkonium chloride Hives    Codeine Itching    Morphine Nausea Only    Neosporin  [neomycin-bacitracin-polymyxin] Hives    Sulfa (sulfonamide antibiotics) Hives and Itching           Morphine sulfate Other (See Comments)    Sulfamethoxazole-trimethoprim Other (See Comments)    Hydrocortisone Hives     Redness / can use bactroban         Review of Systems   Constitutional:  Negative for chills, fever, malaise/fatigue and weight loss.   Respiratory:  Negative for cough, shortness of breath and wheezing.    Cardiovascular:  Negative for chest pain and palpitations.   Gastrointestinal:  Negative for abdominal pain, constipation, diarrhea, nausea and vomiting.   Musculoskeletal:  Negative for back pain, falls and joint pain.   Neurological:  Negative for dizziness, sensory change, speech change, focal weakness, seizures, loss of consciousness and weakness.   Psychiatric/Behavioral:  Negative for depression and hallucinations. The patient is not nervous/anxious.        PHYSICAL EXAM       Vitals:    02/18/25 1446   BP: 107/72   Pulse: 71   Temp: 98.4 °F (36.9 °C)     Body mass index is 24.5 kg/m².  ECOG SCORE    1 - Restricted in strenuous activity-ambulatory and able to carry out work of a light nature       Physical Exam  Vitals reviewed.   Constitutional:       General: She is not in acute distress.     Appearance: Normal appearance.   HENT:      Head: Normocephalic and atraumatic.      Mouth/Throat:      Mouth: Mucous membranes are moist.   Eyes:      General: No scleral icterus.     Extraocular Movements: Extraocular movements intact.      Conjunctiva/sclera: Conjunctivae normal.   Pulmonary:      Effort: Pulmonary effort is normal.   Abdominal:      General: There is no distension.      Palpations: Abdomen is soft.      Tenderness: There is no abdominal tenderness.      Comments: No palpable lesion.  Palpable hernia along upper midline    Musculoskeletal:         General: No swelling. Normal range of motion.   Skin:     General: Skin is warm and dry.   Neurological:      General: No focal deficit  present.      Mental Status: She is alert.   Psychiatric:         Mood and Affect: Mood normal.         Behavior: Behavior normal.         Thought Content: Thought content normal.         Judgment: Judgment normal.        LABS     DATA REVIEW:  I personally reviewed the following records for this visit: lab work from prior visit, notes from other physicians, computed tomography/CT imaging, surgical pathology results, radiographic study evaluation, and laboratory results done by primary care physician    Lab Results   Component Value Date    WBC 2.74 (L) 02/11/2025    HGB 9.9 (L) 02/11/2025    HCT 30.1 (L) 02/11/2025    PLT 79 (L) 02/11/2025     Lab Results   Component Value Date     02/11/2025    CALCIUM 8.3 (L) 02/11/2025    ALBUMIN 4.0 01/28/2025    PROT 6.9 01/28/2025     02/11/2025    K 3.5 02/11/2025    CO2 30 (H) 02/11/2025     02/11/2025    BUN 9 02/11/2025    CREATININE 0.7 02/11/2025    ALKPHOS 168 (H) 01/28/2025    ALT 43 01/28/2025    AST 51 (H) 01/28/2025    BILITOT 0.7 01/28/2025       Nutritional:  Lab Results   Component Value Date    PREALBUMIN 11 (L) 03/04/2021       Tumor Markers:  Lab Results   Component Value Date    CEA 1.7 12/30/2024     Lab Results   Component Value Date     6.7 12/30/2024       PATHOLOGY     2/18/2021- Whipple (Dr. Hitesh Díaz)      Component 3 yr ago   Final Pathologic Diagnosis 1.  GALLBLADDER, CHOLECYSTECTOMY:  - Gallbladder with changes suggestive of cholesterolosis  - Fragment of hepatic parenchyma with no significant histopathologic  abnormality  - One benign lymph node (0/1)  2.  LYMPH NODE, HEPATIC ARTERY, EXCISION:  - One benign lymph node, negative for metastatic disease (0/1)  3.  LYMPH NODE, NETTIE HEPATITIS, EXCISION:  - One benign lymph node, negative for metastatic disease (0/1)  4.  DUODENUM AND PANCREAS, PANCREATICODUODENECTOMY (WHIPPLE PROCEDURE)  ([id number]):  - Invasive duodneal adenocarcinoma,  intestinal-type,  moderately-differentiated, measuring 3.0 in greatest dimension  - Carcinoma involves pancreatic parenchyma and mesentery (mesocolon) of  adjacent transverse colon, pT4  - Metastatic adenocarcinoma present in five of eleven lymph nodes (5/11), pN2  - Surgical resection margins negative for neoplasia  - Please see comments for synoptic report  5.  SMALL INTESTINE, PARTIAL RESECTION:  - Segment of small intestine with a full thickness defect associated with  mucosal ulceration and acute serositis  - Resection margins viable  - No evidence of dysplasia or malignancy (multiple deeper levels examined)         Immunohistochemical studies for DNA mismatch repair proteins were performed  on this tumor (block 4J) and they demonstrate INTACT STAINING in all 4  proteins (MLH1, PMS2, MSH2, and MSH6).  These results indicate that this  tumor is microsatellite stable (BERNA) and that Estrada syndrome (Hereditary  Nonpolyposis Colorectal Cancer, HNPCC) is unlikely.  SMALL INTESTINE:    SPECIMEN      Procedure       Pancreaticoduodenectomy  (Whipple resection)    TUMOR      Tumor Site       Duodenum      Histologic Type:       Adenocarcinoma (not otherwise characterized)      Histologic Grade       G2: Moderately differentiated      Tumor Size       Greatest Dimension (Centimeters) 3.0 cm      Tumor Extension       Tumor invades other organs / structures  Other Organs / Structures           Mesentery of adjacent loops of bowel            Pancreas      Macroscopic Tumor Perforation       Not identified      Lymphovascular Invasion       Present          Margins Examined           Proximal            Distal            Uncinate            Bile duct            Pancreatic    LYMPH NODES      Regional Lymph Nodes         Number of Lymph Nodes  Involved           5          Number of Lymph Nodes Examined           11      Primary Tumor (pT)       pT4      Regional Lymph Nodes (pN)       pN2    ADDITIONAL FINDINGS       Additional Findings       Biliary intraepithelial  neoplasia, low-grade (BilIN 1) with foveolar metaplasia     05/02/2024:  IR biopsy abdominal wall mass      Component 13 d ago   Final Pathologic Diagnosis BIOPSY OF MASS FROM ABDOMINAL WALL:  METASTATIC ADENOCARCINOMA CONSISTENT WITH PANCREATIC ORIGIN       IMAGING     12/30/2020 CT Abdomen Pelvis Wwout Contrast (OLOL)  IMPRESSION:    1.  Arterial enhancing mass within the right hepatic lobe that is isodense on subsequent portal venous phase and delayed phase suggestive of a hemangioma. Follow-up CT of the liver three-phase or MRI of the abdomen with and without contrast in 6-12   months is recommended to confirm stability.    2.  Fluid distention of the stomach that may be secondary to ingestion of a recent meal or gastric bowel obstruction.   3. Nonspecific subcentimeter nodules in the right lung base. Fleischner Society 2017 guidelines for management of incidentally detected solid pulmonary nodules in adults (does not apply to patients younger than 35 years, CT lung cancer screening,   patients with immunosuppression or patients with known primary cancer): Multiple solid nodules < 6 mm in diameter: For low risk patients no routine follow-up. For high risk patents optional CT at 12 months.   4. Postoperative changes of hysterectomy.   5. Nonobstructing left nephrolithiasis.   6. Possible reflux esophagitis.   7. Colonic diverticulosis.     02/11/2021  CT Chest, abdomen, and Pelvis  Impression:   1. Wall thickening and luminal narrowing of the 3rd portion of the duodenum concerning for neoplasm.  2. Small paraduodenal node measuring 7 mm.  3. Punctate nonobstructing left-sided nephrolith.  No hydronephrosis.     02/12/2021:  CT Abdomen/ Pelvis  Impression:   1. Focal wall thickening of the 3rd portion of duodenum, previously obstructing, now status post gastrostomy and jejunal tube placements.  This could represent duodenal carcinoma or lymphoma.  Pancreatic tumor  thought less likely.  Minimal lymph node enlargement in the region.  No distant metastasis detected.  2. Status post hysterectomy and other findings as above.    02/21/2021  CTA Abdomen and Pelvis  Impression:   Postoperative changes of Whipple procedure and jejunostomy tube change as well as right quadrant terminating presumable drainage catheter.  Scattered free air, which may be postsurgical,  however unable to completely exclude bowel perforation.   Trace abdominal free fluid, small focal fluid collection without well-defined walls along the posterior aspect of the pancreas, mesenteric edema, and mild small bowel wall thickening near the bipin hepatis, possibly expected postsurgical changes.   Prominent loops of large bowel and distal small bowel measuring up to 3.0 cm.  No definite transition point.  Findings likely represent ileus.  No pneumatosis or portal venous gas.   Trace pleural effusions left greater than right with associated atelectasis.   Hepatomegaly.   No pseudoaneurysm or convincing evidence of active bleeding or hematoma.     03/01/2021  CT Abdomen/ Pelvis  Impression:   1. Status post Whipple procedure.  Peripherally enhancing collection adjacent to the resection bed as above, nonspecific but concerning for abscess.  Small volume abdominal and pelvic free fluid.  2. Persistent haziness and wall thickening of small bowel loops in the bipin hepatis, possibly postoperative.  3. Trace pleural effusions with associated atelectasis, similar to prior.  4. Additional findings as above.    05/10/2021 CT Abdomen/ Pelvis  Impression:   Stable postoperative changes of a Whipple procedure interval resolution of the previously demonstrated fluid collection in the postoperative bed.  Resolution of previously noted trace left pleural effusion.  Other findings as discussed in the body of the report at above.    11/22/2021  CT CAP  Multiple small scattered bilateral pulmonary nodules all measuring less than 3 mm.   Findings may have been present on CT from 02/11/2021 although this is difficult to ascertain given differences in technique/slice acquisition.  At minimum, continued follow-up is suggested.   Stable postoperative changes of Whipple procedure without definitive evidence of residual/recurrent disease.    03/07/2022  CT  CAP  Stable CT examination of the chest, abdomen, and pelvis.  No definite evidence of recurrent or metastatic disease.     10/05/2022  CT CAP  Overall no detrimental change with findings as above.     04/12/2023  CT CAP  1.  No concerning interval change compared to the prior 2 examinations.  2.  Postoperative findings within the right upper quadrant likely with partial pancreatectomy and small bowel resection.  Loops of bowel are somewhat matted to the surface of the liver which is unchanged.  No definite masslike abnormality within this region.  3.  Hepatomegaly and fatty infiltration of the liver.  4.  Punctate nonobstructing stones appear left kidney.     10/11/2023  CT CAP  Stable postsurgical changes in the right upper quadrant without evidence of recurrent or metastatic disease in the chest abdomen or pelvis.     04/19/2024:  CT CAP  1.  There is a new large heterogeneous mass identified in the anterior abdominal wall at the level of the umbilicus extending to the left of midline and measuring 6.8 x 5.1 x 4.6 cm.  New increased skin thickening is also visible in the umbilicus immediately anterior to this lesion.  Tumor recurrence is a consideration although rectus sheath hematoma or an inflammatory process are also considerations.  This lesion is easily amenable to percutaneous biopsy.  Characterization of this lesion by ultrasound should also be considered.  2.  Stable postsurgical changes following resection of the duodenum, gastric antrum and head of the pancreas with gastrojejunostomy.  3.  Hepatomegaly and generalized hepatic steatosis.  4.  Small nonobstructing intrarenal calculus on the  left.  5.  A stable 6 mm faint nodule in the right upper lobe is unchanged since 04/12/2023.  6.  Status post hysterectomy.    07/08/2024:  CT CAP  Decreased size of a ventral abdominal wall mass measuring 4.5 x 2.0 cm (series 3, image 92), previously 6.8 x 4.5 cm.  No new lesion.  Diastasis of the rectus abdominus with protrusion of the abdominal fat and: Midline.   No acute fracture or aggressive bone lesion.  Mild degenerative changes of the spine.   Impression:   1. Decreased size of a ventral abdominal wall mass.  No evidence of new metastatic disease.  2. Postsurgical changes of Whipple procedure, duodenal resection, and gastrojejunostomy.  3. Stable 6 mm ground-glass nodule in the right upper lobe.  Additional stable bilateral pulmonary micro nodules.  4. Hepatomegaly and steatosis.  5. Splenomegaly.  6. Additional findings as above.    09/10/2024:  CT Chest, Abdomen, and pelvis  Impression:   History of duodenal malignancy status post Whipple procedure without CT evidence of local tumor recurrence.  There is continued marked decrease size of the left rectus abdominis mass, umbilicus level, now with only minimal residual soft tissue thickening, markedly improved compared to April 2024.  Hepatosplenomegaly.  Fatty liver.  Stable multiple bilateral tiny pulmonary nodules, majority 3 to 4 mm.  Stable right middle lobe groundglass nodule, 5 mm.  No worrisome new or enlarging pulmonary nodule.  Stable sclerotic lesion first thoracic vertebral body, 1.2 cm, and stable lucent lesion ninth thoracic vertebra body, 0.8 cm.    01/10/2025:  CT Chest, Abdomen, and Pelvis  Stomach/Bowel/abdominal wall: Status post Whipple. Remaining stomach unremarkable. Small bowel nonobstructive. No acute large bowel abnormality. Large bowel containing upper ventral hernia again noted. No detrimental change at the site of previously noted ventral abdominal wall mass. Left rectus muscle thickening is slightly less prominent.      ASSESSMENT & PLAN     1. Duodenal adenocarcinoma    2. Incisional hernia, without obstruction or gangrene        Karen Gutierrez is a 59 y.o. 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  Treated with 12 cycles neoadjuvant FOLFIRINOX and now s/p resection and ventral hernia repair on 2/6/2025.    Very well postop.  Her drains were removed in clinic today since they have been putting out less than 20 cc a day for the last few days.  Her incision has healed well.    We reviewed her pathology today which showed almost a complete pathologic response with the only 1 small area less than 1 mm suspicious for minute foci of residual metastatic carcinoma.  There was no evidence of residual malignancy in the other specimens.  Pathologically speaking this was suggestive of a near complete treatment response.  This is consistent with what we saw on imaging.  I discussed with her that I am very pleased with the pathology from this as well as with her recovery.    Postoperative instructions reviewed again.  No heavy lifting for another 2 weeks (nothing greater than a gal of milk), otherwise okay to start living normal life and doing normal activities.  I did encourage her to continue to wear an abdominal binder as she can tolerate especially if she is doing things like starting to work out.  We also reviewed the importance of avoiding constipation and straining.  I will plan to see her back in 3 months with repeat imaging at that time to start surveillance.  She will call me in the interim if there are any issues.    Plan:  -- repeat CT TAP in 3 months      Ms. Gutierrez expressed understanding in regards to our discussion today. Many good questions were asked on today's visit, all of which were answered to their satisfaction.    Follow-up: Follow up in about  3 months (around 5/18/2025).                  Alicia Hernandez MD MS              Surgical Oncology              [facility] [place], [place]              Office: [phone]        Orders Placed This Encounter   Procedures    CT Chest Abdomen Pelvis With IV Contrast (XPD) Routine Oral Contrast     Standing Status:   Future     Expected Date:   5/18/2025     Expiration Date:   2/18/2026     Does this patient have impaired renal function?:   No     May the Radiologist modify the order per protocol to meet the clinical needs of the patient?:   Yes     Oral/Rectal Contrast instructions::   Routine Oral Contrast     Special CT ABD Protocol Request?:   Pancreas Protocol     This patient has an implanted port documented.  Can this port be accessed for contrast administration for this scan?:   No    CREATININE, SERUM     Standing Status:   Future     Expected Date:   5/18/2025     Expiration Date:   5/19/2026     Send normal result to authorizing provider's In Basket if patient is active on MyChart::   Yes

## 2025-02-23 ENCOUNTER — PATIENT MESSAGE (OUTPATIENT)
Dept: SURGICAL ONCOLOGY | Facility: CLINIC | Age: 60
End: 2025-02-23
Payer: COMMERCIAL

## 2025-02-24 ENCOUNTER — TELEPHONE (OUTPATIENT)
Dept: SURGICAL ONCOLOGY | Facility: CLINIC | Age: 60
End: 2025-02-24
Payer: COMMERCIAL

## 2025-02-24 NOTE — TELEPHONE ENCOUNTER
Called to let pt know she is scheduled for tomorrow @ 330. However, this may change. Went over ED protocol with pt. She is having 9/10 pain upon certain movements. Pain will wake her in her sleep. Pain does not last very long but is enough to stop her in her tracks. Pt states she is 0/10 otherwise.

## 2025-02-25 ENCOUNTER — OFFICE VISIT (OUTPATIENT)
Dept: SURGICAL ONCOLOGY | Facility: CLINIC | Age: 60
End: 2025-02-25
Payer: COMMERCIAL

## 2025-02-25 VITALS
BODY MASS INDEX: 23.92 KG/M2 | WEIGHT: 176.56 LBS | DIASTOLIC BLOOD PRESSURE: 70 MMHG | SYSTOLIC BLOOD PRESSURE: 114 MMHG | HEART RATE: 111 BPM | TEMPERATURE: 98 F | HEIGHT: 72 IN

## 2025-02-25 DIAGNOSIS — K43.2 INCISIONAL HERNIA, WITHOUT OBSTRUCTION OR GANGRENE: ICD-10-CM

## 2025-02-25 DIAGNOSIS — R10.12 LUQ PAIN: Primary | ICD-10-CM

## 2025-02-25 DIAGNOSIS — C17.0 DUODENAL ADENOCARCINOMA: ICD-10-CM

## 2025-02-25 PROCEDURE — 3074F SYST BP LT 130 MM HG: CPT | Mod: CPTII,S$GLB,, | Performed by: SURGERY

## 2025-02-25 PROCEDURE — 1159F MED LIST DOCD IN RCRD: CPT | Mod: CPTII,S$GLB,, | Performed by: SURGERY

## 2025-02-25 PROCEDURE — 3078F DIAST BP <80 MM HG: CPT | Mod: CPTII,S$GLB,, | Performed by: SURGERY

## 2025-02-25 PROCEDURE — 99024 POSTOP FOLLOW-UP VISIT: CPT | Mod: S$GLB,,, | Performed by: SURGERY

## 2025-02-25 PROCEDURE — 99999 PR PBB SHADOW E&M-EST. PATIENT-LVL III: CPT | Mod: PBBFAC,,, | Performed by: SURGERY

## 2025-02-25 NOTE — PROGRESS NOTES
Surgical Oncology Clinic Note      Referring Provider: Dr. Enzo Daley   PCP: Han Arshad MD    Reason For Visit: Gastroesophageal:  duodenal malignancy- recurrent/ metastatic     Staging:  Cancer Staging   Duodenal cancer  Staging form: Small Intestine - Adenocarcinoma, AJCC 8th Edition  - Pathologic stage from 3/12/2021: Stage IIIB (pT4, pN2, cM0) - Signed by Velvet Faith APRN, ANP-C on 3/14/2021  - Pathologic stage from 5/15/2024: Stage IV (cM1) - Signed by Alicia Hernandez MD on 5/15/2024       HISTORY OF PRESENT ILLNESS     Karen Gutierrez is a 59 y.o. female with history of duodenal adenocarcinoma s/p  who presents today for evaluation and management of recurrent duodenal carcinoma.    She originally presented in February of 2021 with a completely obstructing duodenal mass.  She underwent G-tube and J-tube placement it was then followed with Dr. Hitesh Díaz in [place].  She subsequently underwent a Whipple on 02/11/2021.  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  Post op course was c/b abscess requiring drainage.  She received 12 cycles of FOLFOX adjuvantly which she tolerated well, and which she completed in November 2021.   Her GB and her J-tube both came out and she has been on surveillance since that point in time.  Surveillance imaging in April of this year showed a new heterogeneous mass in the anterior abdominal wall measuring 6.8 x 5.1 x 4.6 cm concerning for tumor recurrence versus hematoma.  She underwent biopsy which did confirm metastatic disease.  She was discussed at tumor board with agreement for systemic chemotherapy and presents today for discussion of port placement.    Overall she feels well and is doing well.  She presents today with her sister.    INTERVAL HISTORY     08/14/2024:  She's had 4 cycles chemo so far.  She is tolerating it well.  Her tumor has shrunk significantly.       09/18/2024:  She's had 6 cycles so far.  She's doing  very well.  She can no longer palpate her tumor at all.     1/14/2025:  she's had total of 12 cycles of chemo and done well.  Last cycle was 1/2/25.  Repeat imaging shows continued decrease in size/ stability of the rectus mass.  No new issues.        02/18/2025:  Here for postop appointment.  Doing well drains have been putting out less than 20 cc a day for the last few days.  She is feeling well tolerating a regular diet and no longer requiring any pain medications.    02/25/2025:  Started having LUQ pain recently hurts under rib cage, at night wakes up and cramping throughout the abdomen.  Involuntary spasms with pain (that was happening even before surgery).            Past Medical History:   Diagnosis Date    Anxiety     Cancer     duodenal cancer    Depression     Hypertension     Hypotension, iatrogenic     Mitral valve prolapse        Past Surgical History:   Procedure Laterality Date    BUNIONECTOMY Left     HYSTERECTOMY  2000    INJECTION OF ANESTHETIC AGENT INTO TISSUE PLANE DEFINED BY TRANSVERSUS ABDOMINIS MUSCLE N/A 2/6/2025    Procedure: BLOCK, TRANSVERSUS ABDOMINIS PLANE;  Surgeon: Alicia Hernandez MD;  Location: [facility] OR;  Service: General;  Laterality: N/A;    INSERTION OF TUNNELED CENTRAL VENOUS CATHETER (CVC) WITH SUBCUTANEOUS PORT N/A 03/26/2021    Procedure: [id number];  Surgeon: Lauren Abraham MD;  Location: [facility] OR;  Service: General;  Laterality: N/A;    INSERTION OF VENOUS ACCESS PORT Right 05/13/2024    Procedure: INSERTION, VENOUS ACCESS PORT;  Surgeon: Alicia Hernandez MD;  Location: [facility] OR;  Service: General;  Laterality: Right;    LAPAROTOMY, EXPLORATORY N/A 2/6/2025    Procedure: LAPAROTOMY, EXPLORATORY;  Surgeon: Alicia Hernandez MD;  Location: [facility] OR;  Service: General;  Laterality: N/A;    LITHOTRIPSY      PLACEMENT OF JEJUNOSTOMY TUBE  02/18/2021    Procedure: INSERTION, JEJUNOSTOMY TUBE;  Surgeon: Hitesh Díaz MD;  Location: [address];  Service:  General;;    REPAIR, HERNIA, VENTRAL N/A 2/6/2025    Procedure: REPAIR, HERNIA, VENTRAL;  Surgeon: Alicia Hernandez MD;  Location: [facility] OR;  Service: General;  Laterality: N/A;  phasis ST mesh    SURGICAL REMOVAL OF LESION OF ABDOMINAL WALL N/A 2/6/2025    Procedure: EXCISION, LESION, ABDOMINAL WALL;  Surgeon: Alicia Hernandez MD;  Location: [facility] OR;  Service: General;  Laterality: N/A;    ULTRASOUND GUIDANCE Right 05/13/2024    Procedure: ULTRASOUND GUIDANCE;  Surgeon: Alicia Hernandez MD;  Location: [facility] OR;  Service: General;  Laterality: Right;    VARICOSE VEIN STRIPPING  2015    WHIPPLE PROCEDURE N/A 02/18/2021    Procedure: WHIPPLE PROCEDURE;  Surgeon: Hitesh Díaz MD;  Location: [facility] OR [address] [place] [address];  Service: General;  Laterality: N/A;       Family History   Problem Relation Name Age of Onset    Ovarian cancer Mother      Atrial fibrillation Mother      Stroke Mother      Hyperlipidemia Mother      Diabetes Mother      Bladder Cancer Father      Hypertension Father      Diabetes Father      No Known Problems Sister      Diabetes Maternal Grandmother      Colon cancer Maternal Grandmother      Stroke Maternal Grandfather      Diabetes Paternal Grandmother      No Known Problems Paternal Grandfather         Social History     Socioeconomic History    Marital status:    Tobacco Use    Smoking status: Former    Smokeless tobacco: Never   Substance and Sexual Activity    Alcohol use: Not Currently    Drug use: Never     Social Drivers of Health     Financial Resource Strain: Low Risk  (2/9/2025)    Overall Financial Resource Strain (CARDIA)     Difficulty of Paying Living Expenses: Not hard at all   Food Insecurity: No Food Insecurity (2/9/2025)    Hunger Vital Sign     Worried About Running Out of Food in the Last Year: Never true     Ran Out of Food in the Last Year: Never true   Transportation Needs: No Transportation Needs (2/9/2025)    TRANSPORTATION NEEDS     Transportation : No   Physical  Activity: Inactive (6/14/2024)    Exercise Vital Sign     Days of Exercise per Week: 0 days     Minutes of Exercise per Session: 0 min   Stress: No Stress Concern Present (2/9/2025)    [employer] [place] [employer] - Occupational Stress Questionnaire     Feeling of Stress : Not at all   Housing Stability: Unknown (2/9/2025)    Housing Stability Vital Sign     Unable to Pay for Housing in the Last Year: No     Homeless in the Last Year: No          Medication List            Accurate as of February 25, 2025  7:33 PM. If you have any questions, ask your nurse or doctor.                CONTINUE taking these medications      amLODIPine 10 MG tablet  Commonly known as: NORVASC     atomoxetine 40 MG capsule  Commonly known as: STRATTERA  Take 1 capsule (40 mg total) by mouth once daily.     BYSTOLIC 20 mg Tab  Generic drug: nebivoloL     cholecalciferol (vitamin D3) 125 mcg (5,000 unit) capsule     clonazePAM 0.5 MG tablet  Commonly known as: KlonoPIN  Take 1 tablet (0.5 mg total) by mouth nightly as needed for Anxiety.     cyanocobalamin 1000 MCG tablet  Commonly known as: VITAMIN B-12     EScitalopram oxalate 10 MG tablet  Commonly known as: LEXAPRO  Take 1 tablet (10 mg total) by mouth once daily.     LIDOcaine-prilocaine cream  Commonly known as: EMLA  Apply to affected area once     OLANZapine 5 MG tablet  Commonly known as: ZyPREXA  Take 1 tablet (5 mg total) by mouth every evening. Take nightly on days 1-3 of each chemotherapy cycle.     olmesartan 40 MG tablet  Commonly known as: BENICAR  TAKE 1 TABLET(40 MG) BY MOUTH EVERY DAY     ondansetron 4 MG Tbdl  Commonly known as: ZOFRAN-ODT  Take 1 tablet (4 mg total) by mouth 2 (two) times daily.     oxyCODONE 5 MG immediate release tablet  Commonly known as: ROXICODONE  1-2 tablets by mouth every 4 (four) hours as needed for pain     prochlorperazine 10 MG tablet  Commonly known as: COMPAZINE  Take 1 tablet (10 mg total) by mouth every 6 (six) hours as  needed for Nausea.              Review of patient's allergies indicates:   Allergen Reactions    Benzalkonium chloride Hives    Codeine Itching    Morphine Nausea Only    Neosporin [neomycin-bacitracin-polymyxin] Hives    Sulfa (sulfonamide antibiotics) Hives and Itching           Morphine sulfate Other (See Comments)    Sulfamethoxazole-trimethoprim Other (See Comments)    Hydrocortisone Hives     Redness / can use bactroban         Review of Systems   Constitutional:  Negative for chills, fever, malaise/fatigue and weight loss.   Respiratory:  Negative for cough, shortness of breath and wheezing.    Cardiovascular:  Negative for chest pain and palpitations.   Gastrointestinal:  Negative for abdominal pain, constipation, diarrhea, nausea and vomiting.   Musculoskeletal:  Negative for back pain, falls and joint pain.   Neurological:  Negative for dizziness, sensory change, speech change, focal weakness, seizures, loss of consciousness and weakness.   Psychiatric/Behavioral:  Negative for depression and hallucinations. The patient is not nervous/anxious.        PHYSICAL EXAM       Vitals:    02/25/25 1532   BP: 114/70   Pulse: (!) 111   Temp: 98.4 °F (36.9 °C)     Body mass index is 23.95 kg/m².  ECOG SCORE           Physical Exam  Vitals reviewed.   Constitutional:       General: She is not in acute distress.     Appearance: Normal appearance.   HENT:      Head: Normocephalic and atraumatic.      Mouth/Throat:      Mouth: Mucous membranes are moist.   Eyes:      General: No scleral icterus.     Extraocular Movements: Extraocular movements intact.      Conjunctiva/sclera: Conjunctivae normal.   Pulmonary:      Effort: Pulmonary effort is normal.   Abdominal:      General: There is no distension.      Palpations: Abdomen is soft.      Tenderness: There is no abdominal tenderness.      Comments: Well healed midline incision, no lumps or bruises   Musculoskeletal:         General: No swelling. Normal range of motion.    Skin:     General: Skin is warm and dry.   Neurological:      General: No focal deficit present.      Mental Status: She is alert.   Psychiatric:         Mood and Affect: Mood normal.         Behavior: Behavior normal.         Thought Content: Thought content normal.         Judgment: Judgment normal.        LABS     DATA REVIEW:  I personally reviewed the following records for this visit: lab work from prior visit, notes from other physicians, computed tomography/CT imaging, surgical pathology results, radiographic study evaluation, and laboratory results done by primary care physician    Lab Results   Component Value Date    WBC 2.74 (L) 02/11/2025    HGB 9.9 (L) 02/11/2025    HCT 30.1 (L) 02/11/2025    PLT 79 (L) 02/11/2025     Lab Results   Component Value Date     02/11/2025    CALCIUM 8.3 (L) 02/11/2025    ALBUMIN 4.0 01/28/2025    PROT 6.9 01/28/2025     02/11/2025    K 3.5 02/11/2025    CO2 30 (H) 02/11/2025     02/11/2025    BUN 9 02/11/2025    CREATININE 0.7 02/11/2025    ALKPHOS 168 (H) 01/28/2025    ALT 43 01/28/2025    AST 51 (H) 01/28/2025    BILITOT 0.7 01/28/2025       Nutritional:  Lab Results   Component Value Date    PREALBUMIN 11 (L) 03/04/2021       Tumor Markers:  Lab Results   Component Value Date    CEA 1.7 12/30/2024     Lab Results   Component Value Date     6.7 12/30/2024       PATHOLOGY     2/18/2021- Whipple (Dr. Hitesh Díaz)      Component 3 yr ago   Final Pathologic Diagnosis 1.  GALLBLADDER, CHOLECYSTECTOMY:  - Gallbladder with changes suggestive of cholesterolosis  - Fragment of hepatic parenchyma with no significant histopathologic  abnormality  - One benign lymph node (0/1)  2.  LYMPH NODE, HEPATIC ARTERY, EXCISION:  - One benign lymph node, negative for metastatic disease (0/1)  3.  LYMPH NODE, NETTIE HEPATITIS, EXCISION:  - One benign lymph node, negative for metastatic disease (0/1)  4.  DUODENUM AND PANCREAS, PANCREATICODUODENECTOMY (WHIPPLE  PROCEDURE)  ([id number]):  - Invasive duodneal adenocarcinoma, intestinal-type,  moderately-differentiated, measuring 3.0 in greatest dimension  - Carcinoma involves pancreatic parenchyma and mesentery (mesocolon) of  adjacent transverse colon, pT4  - Metastatic adenocarcinoma present in five of eleven lymph nodes (5/11), pN2  - Surgical resection margins negative for neoplasia  - Please see comments for synoptic report  5.  SMALL INTESTINE, PARTIAL RESECTION:  - Segment of small intestine with a full thickness defect associated with  mucosal ulceration and acute serositis  - Resection margins viable  - No evidence of dysplasia or malignancy (multiple deeper levels examined)         Immunohistochemical studies for DNA mismatch repair proteins were performed  on this tumor (block 4J) and they demonstrate INTACT STAINING in all 4  proteins (MLH1, PMS2, MSH2, and MSH6).  These results indicate that this  tumor is microsatellite stable (BERNA) and that Estrada syndrome (Hereditary  Nonpolyposis Colorectal Cancer, HNPCC) is unlikely.  SMALL INTESTINE:    SPECIMEN      Procedure       Pancreaticoduodenectomy  (Whipple resection)    TUMOR      Tumor Site       Duodenum      Histologic Type:       Adenocarcinoma (not otherwise characterized)      Histologic Grade       G2: Moderately differentiated      Tumor Size       Greatest Dimension (Centimeters) 3.0 cm      Tumor Extension       Tumor invades other organs / structures  Other Organs / Structures           Mesentery of adjacent loops of bowel            Pancreas      Macroscopic Tumor Perforation       Not identified      Lymphovascular Invasion       Present          Margins Examined           Proximal            Distal            Uncinate            Bile duct            Pancreatic    LYMPH NODES      Regional Lymph Nodes         Number of Lymph Nodes  Involved           5          Number of Lymph Nodes Examined           11      Primary Tumor (pT)       pT4      Regional  Lymph Nodes (pN)       pN2    ADDITIONAL FINDINGS      Additional Findings       Biliary intraepithelial  neoplasia, low-grade (BilIN 1) with foveolar metaplasia     05/02/2024:  IR biopsy abdominal wall mass      Component 13 d ago   Final Pathologic Diagnosis BIOPSY OF MASS FROM ABDOMINAL WALL:  METASTATIC ADENOCARCINOMA CONSISTENT WITH PANCREATIC ORIGIN       IMAGING     12/30/2020 CT Abdomen Pelvis Wwout Contrast (OLOL)  IMPRESSION:    1.  Arterial enhancing mass within the right hepatic lobe that is isodense on subsequent portal venous phase and delayed phase suggestive of a hemangioma. Follow-up CT of the liver three-phase or MRI of the abdomen with and without contrast in 6-12   months is recommended to confirm stability.    2.  Fluid distention of the stomach that may be secondary to ingestion of a recent meal or gastric bowel obstruction.   3. Nonspecific subcentimeter nodules in the right lung base. Fleischner Society 2017 guidelines for management of incidentally detected solid pulmonary nodules in adults (does not apply to patients younger than 35 years, CT lung cancer screening,   patients with immunosuppression or patients with known primary cancer): Multiple solid nodules < 6 mm in diameter: For low risk patients no routine follow-up. For high risk patents optional CT at 12 months.   4. Postoperative changes of hysterectomy.   5. Nonobstructing left nephrolithiasis.   6. Possible reflux esophagitis.   7. Colonic diverticulosis.     02/11/2021  CT Chest, abdomen, and Pelvis  Impression:   1. Wall thickening and luminal narrowing of the 3rd portion of the duodenum concerning for neoplasm.  2. Small paraduodenal node measuring 7 mm.  3. Punctate nonobstructing left-sided nephrolith.  No hydronephrosis.     02/12/2021:  CT Abdomen/ Pelvis  Impression:   1. Focal wall thickening of the 3rd portion of duodenum, previously obstructing, now status post gastrostomy and jejunal tube placements.  This could  represent duodenal carcinoma or lymphoma.  Pancreatic tumor thought less likely.  Minimal lymph node enlargement in the region.  No distant metastasis detected.  2. Status post hysterectomy and other findings as above.    02/21/2021  CTA Abdomen and Pelvis  Impression:   Postoperative changes of Whipple procedure and jejunostomy tube change as well as right quadrant terminating presumable drainage catheter.  Scattered free air, which may be postsurgical,  however unable to completely exclude bowel perforation.   Trace abdominal free fluid, small focal fluid collection without well-defined walls along the posterior aspect of the pancreas, mesenteric edema, and mild small bowel wall thickening near the bipin hepatis, possibly expected postsurgical changes.   Prominent loops of large bowel and distal small bowel measuring up to 3.0 cm.  No definite transition point.  Findings likely represent ileus.  No pneumatosis or portal venous gas.   Trace pleural effusions left greater than right with associated atelectasis.   Hepatomegaly.   No pseudoaneurysm or convincing evidence of active bleeding or hematoma.     03/01/2021  CT Abdomen/ Pelvis  Impression:   1. Status post Whipple procedure.  Peripherally enhancing collection adjacent to the resection bed as above, nonspecific but concerning for abscess.  Small volume abdominal and pelvic free fluid.  2. Persistent haziness and wall thickening of small bowel loops in the bipin hepatis, possibly postoperative.  3. Trace pleural effusions with associated atelectasis, similar to prior.  4. Additional findings as above.    05/10/2021 CT Abdomen/ Pelvis  Impression:   Stable postoperative changes of a Whipple procedure interval resolution of the previously demonstrated fluid collection in the postoperative bed.  Resolution of previously noted trace left pleural effusion.  Other findings as discussed in the body of the report at above.    11/22/2021  CT CAP  Multiple small  scattered bilateral pulmonary nodules all measuring less than 3 mm.  Findings may have been present on CT from 02/11/2021 although this is difficult to ascertain given differences in technique/slice acquisition.  At minimum, continued follow-up is suggested.   Stable postoperative changes of Whipple procedure without definitive evidence of residual/recurrent disease.    03/07/2022  CT  CAP  Stable CT examination of the chest, abdomen, and pelvis.  No definite evidence of recurrent or metastatic disease.     10/05/2022  CT CAP  Overall no detrimental change with findings as above.     04/12/2023  CT CAP  1.  No concerning interval change compared to the prior 2 examinations.  2.  Postoperative findings within the right upper quadrant likely with partial pancreatectomy and small bowel resection.  Loops of bowel are somewhat matted to the surface of the liver which is unchanged.  No definite masslike abnormality within this region.  3.  Hepatomegaly and fatty infiltration of the liver.  4.  Punctate nonobstructing stones appear left kidney.     10/11/2023  CT CAP  Stable postsurgical changes in the right upper quadrant without evidence of recurrent or metastatic disease in the chest abdomen or pelvis.     04/19/2024:  CT CAP  1.  There is a new large heterogeneous mass identified in the anterior abdominal wall at the level of the umbilicus extending to the left of midline and measuring 6.8 x 5.1 x 4.6 cm.  New increased skin thickening is also visible in the umbilicus immediately anterior to this lesion.  Tumor recurrence is a consideration although rectus sheath hematoma or an inflammatory process are also considerations.  This lesion is easily amenable to percutaneous biopsy.  Characterization of this lesion by ultrasound should also be considered.  2.  Stable postsurgical changes following resection of the duodenum, gastric antrum and head of the pancreas with gastrojejunostomy.  3.  Hepatomegaly and generalized  hepatic steatosis.  4.  Small nonobstructing intrarenal calculus on the left.  5.  A stable 6 mm faint nodule in the right upper lobe is unchanged since 04/12/2023.  6.  Status post hysterectomy.    07/08/2024:  CT CAP  Decreased size of a ventral abdominal wall mass measuring 4.5 x 2.0 cm (series 3, image 92), previously 6.8 x 4.5 cm.  No new lesion.  Diastasis of the rectus abdominus with protrusion of the abdominal fat and: Midline.   No acute fracture or aggressive bone lesion.  Mild degenerative changes of the spine.   Impression:   1. Decreased size of a ventral abdominal wall mass.  No evidence of new metastatic disease.  2. Postsurgical changes of Whipple procedure, duodenal resection, and gastrojejunostomy.  3. Stable 6 mm ground-glass nodule in the right upper lobe.  Additional stable bilateral pulmonary micro nodules.  4. Hepatomegaly and steatosis.  5. Splenomegaly.  6. Additional findings as above.    09/10/2024:  CT Chest, Abdomen, and pelvis  Impression:   History of duodenal malignancy status post Whipple procedure without CT evidence of local tumor recurrence.  There is continued marked decrease size of the left rectus abdominis mass, umbilicus level, now with only minimal residual soft tissue thickening, markedly improved compared to April 2024.  Hepatosplenomegaly.  Fatty liver.  Stable multiple bilateral tiny pulmonary nodules, majority 3 to 4 mm.  Stable right middle lobe groundglass nodule, 5 mm.  No worrisome new or enlarging pulmonary nodule.  Stable sclerotic lesion first thoracic vertebral body, 1.2 cm, and stable lucent lesion ninth thoracic vertebra body, 0.8 cm.    01/10/2025:  CT Chest, Abdomen, and Pelvis  Stomach/Bowel/abdominal wall: Status post Whipple. Remaining stomach unremarkable. Small bowel nonobstructive. No acute large bowel abnormality. Large bowel containing upper ventral hernia again noted. No detrimental change at the site of previously noted ventral abdominal wall mass.  Left rectus muscle thickening is slightly less prominent.     ASSESSMENT & PLAN     1. Duodenal adenocarcinoma    2. Incisional hernia, without obstruction or gangrene        Karen Gutierrez is a 59 y.o. 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  Treated with 12 cycles neoadjuvant FOLFIRINOX and now s/p resection and ventral hernia repair on 2/6/2025.    Overall still doing very well.  Based on her symptoms I suspect this is predominantly just due to increased activity as well as the trans fascial sutures from her hernia repair.  On physical exam I do not feel anything in that region that would explain the pain she is having.  This is very short duration and unpredictable therefore I do not even have any good recommendations for prevention.  I have discussed with this her and she will let me know if it does not improve in the next 2-3 weeks.    Plan:  -- call if no improvement in the next 2-3 weeks, otherwise we will continue the plan for repeat CT chest abdomen and pelvis in 3 months     Ms. Gutierrez expressed understanding in regards to our discussion today. Many good questions were asked on today's visit, all of which were answered to their satisfaction.    Follow-up: Follow up in about 3 months (around 5/26/2025).                  Alicia Hernandez MD MS              Surgical Oncology              [facility] [place], [place]              Office: [phone]        No orders of the defined types were placed in this encounter.

## 2025-03-11 ENCOUNTER — INFUSION (OUTPATIENT)
Dept: INFUSION THERAPY | Facility: HOSPITAL | Age: 60
End: 2025-03-11
Attending: INTERNAL MEDICINE
Payer: COMMERCIAL

## 2025-03-11 DIAGNOSIS — C17.0 DUODENAL CANCER: Primary | ICD-10-CM

## 2025-03-11 PROCEDURE — 25000003 PHARM REV CODE 250: Performed by: INTERNAL MEDICINE

## 2025-03-11 PROCEDURE — 96523 IRRIG DRUG DELIVERY DEVICE: CPT

## 2025-03-11 PROCEDURE — A4216 STERILE WATER/SALINE, 10 ML: HCPCS | Performed by: INTERNAL MEDICINE

## 2025-03-11 PROCEDURE — 63600175 PHARM REV CODE 636 W HCPCS: Performed by: INTERNAL MEDICINE

## 2025-03-11 RX ORDER — SODIUM CHLORIDE 0.9 % (FLUSH) 0.9 %
10 SYRINGE (ML) INJECTION
OUTPATIENT
Start: 2025-03-11

## 2025-03-11 RX ORDER — HEPARIN 100 UNIT/ML
500 SYRINGE INTRAVENOUS
Status: DISCONTINUED | OUTPATIENT
Start: 2025-03-11 | End: 2025-03-11 | Stop reason: HOSPADM

## 2025-03-11 RX ORDER — SODIUM CHLORIDE 0.9 % (FLUSH) 0.9 %
10 SYRINGE (ML) INJECTION
Status: DISCONTINUED | OUTPATIENT
Start: 2025-03-11 | End: 2025-03-11 | Stop reason: HOSPADM

## 2025-03-11 RX ORDER — HEPARIN 100 UNIT/ML
500 SYRINGE INTRAVENOUS
OUTPATIENT
Start: 2025-03-11

## 2025-03-11 RX ADMIN — Medication 10 ML: at 01:03

## 2025-03-11 RX ADMIN — HEPARIN 500 UNITS: 100 SYRINGE at 01:03

## 2025-03-11 NOTE — NURSING
"Pt here for Mediport Flush. Right chestwall mediport accessed with a 20g 1" layton via sterile technique.  Excellent blood return noted.  Flushed with 10ml NS and 5 ml heparin solution.  Needle D/C, site without redness, swelling, or drainage noted.  Dressing applied.  Patient tolerated well.    "

## 2025-04-04 ENCOUNTER — PATIENT MESSAGE (OUTPATIENT)
Dept: HEMATOLOGY/ONCOLOGY | Facility: CLINIC | Age: 60
End: 2025-04-04
Payer: COMMERCIAL

## 2025-04-04 ENCOUNTER — PATIENT MESSAGE (OUTPATIENT)
Dept: SURGICAL ONCOLOGY | Facility: CLINIC | Age: 60
End: 2025-04-04
Payer: COMMERCIAL

## 2025-04-07 ENCOUNTER — TELEPHONE (OUTPATIENT)
Dept: SURGICAL ONCOLOGY | Facility: CLINIC | Age: 60
End: 2025-04-07
Payer: COMMERCIAL

## 2025-04-07 NOTE — TELEPHONE ENCOUNTER
Called pt to let her know her letter is in her portal waiting for her. Also explained that she will be getting a call from me to schedule her port removal after I have heard from Dr. Daley' staff. Gave good call back number and asked her to call me back

## 2025-04-09 ENCOUNTER — PATIENT MESSAGE (OUTPATIENT)
Dept: SURGICAL ONCOLOGY | Facility: CLINIC | Age: 60
End: 2025-04-09
Payer: COMMERCIAL

## 2025-04-22 ENCOUNTER — INFUSION (OUTPATIENT)
Dept: INFUSION THERAPY | Facility: HOSPITAL | Age: 60
End: 2025-04-22
Attending: INTERNAL MEDICINE
Payer: COMMERCIAL

## 2025-04-22 VITALS
DIASTOLIC BLOOD PRESSURE: 82 MMHG | HEART RATE: 66 BPM | RESPIRATION RATE: 18 BRPM | SYSTOLIC BLOOD PRESSURE: 139 MMHG | OXYGEN SATURATION: 99 % | TEMPERATURE: 98 F

## 2025-04-22 DIAGNOSIS — C17.0 DUODENAL CANCER: Primary | ICD-10-CM

## 2025-04-22 PROCEDURE — 96523 IRRIG DRUG DELIVERY DEVICE: CPT

## 2025-04-22 PROCEDURE — 63600175 PHARM REV CODE 636 W HCPCS: Performed by: INTERNAL MEDICINE

## 2025-04-22 PROCEDURE — A4216 STERILE WATER/SALINE, 10 ML: HCPCS | Performed by: INTERNAL MEDICINE

## 2025-04-22 PROCEDURE — 25000003 PHARM REV CODE 250: Performed by: INTERNAL MEDICINE

## 2025-04-22 RX ORDER — SODIUM CHLORIDE 0.9 % (FLUSH) 0.9 %
10 SYRINGE (ML) INJECTION
Status: DISCONTINUED | OUTPATIENT
Start: 2025-04-22 | End: 2025-04-22 | Stop reason: HOSPADM

## 2025-04-22 RX ORDER — HEPARIN 100 UNIT/ML
500 SYRINGE INTRAVENOUS
Status: DISCONTINUED | OUTPATIENT
Start: 2025-04-22 | End: 2025-04-22 | Stop reason: HOSPADM

## 2025-04-22 RX ORDER — HEPARIN 100 UNIT/ML
500 SYRINGE INTRAVENOUS
OUTPATIENT
Start: 2025-04-22

## 2025-04-22 RX ORDER — SODIUM CHLORIDE 0.9 % (FLUSH) 0.9 %
10 SYRINGE (ML) INJECTION
OUTPATIENT
Start: 2025-04-22

## 2025-04-22 RX ADMIN — HEPARIN 500 UNITS: 100 SYRINGE at 01:04

## 2025-04-22 RX ADMIN — Medication 10 ML: at 01:04

## 2025-04-22 NOTE — NURSING
"Pt here for Mediport Flush. Right chestwall mediport accessed with a 20g 1" layton via sterile technique.  Excellent blood return noted.  Flushed with 10ml NS and 5 ml heparin solution.  Needle D/C, site without redness, swelling, or drainage noted.  Dressing applied.  Patient tolerated well.  Patient to return to clinic in 8 weeks      "

## 2025-05-02 ENCOUNTER — TELEPHONE (OUTPATIENT)
Dept: SURGICAL ONCOLOGY | Facility: CLINIC | Age: 60
End: 2025-05-02
Payer: COMMERCIAL

## 2025-05-19 ENCOUNTER — HOSPITAL ENCOUNTER (OUTPATIENT)
Dept: RADIOLOGY | Facility: HOSPITAL | Age: 60
Discharge: HOME | End: 2025-05-19
Attending: SURGERY
Payer: COMMERCIAL

## 2025-05-19 DIAGNOSIS — C17.0 DUODENAL ADENOCARCINOMA: ICD-10-CM

## 2025-05-19 LAB — CREAT SERPL-MCNC: 1.1 MG/DL (ref 0.5–1.4)

## 2025-05-19 PROCEDURE — 74177 CT ABD & PELVIS W/CONTRAST: CPT | Mod: 26,,, | Performed by: RADIOLOGY

## 2025-05-19 PROCEDURE — A9698 NON-RAD CONTRAST MATERIALNOC: HCPCS | Mod: PN | Performed by: SURGERY

## 2025-05-19 PROCEDURE — 74177 CT ABD & PELVIS W/CONTRAST: CPT | Mod: TC,PN

## 2025-05-19 PROCEDURE — 71260 CT THORAX DX C+: CPT | Mod: 26,,, | Performed by: RADIOLOGY

## 2025-05-19 PROCEDURE — 25500020 PHARM REV CODE 255: Mod: PN | Performed by: SURGERY

## 2025-05-19 RX ADMIN — IOHEXOL 1000 ML: 12 SOLUTION ORAL at 10:05

## 2025-05-19 RX ADMIN — IOHEXOL 100 ML: 350 INJECTION, SOLUTION INTRAVENOUS at 10:05

## 2025-05-20 ENCOUNTER — PATIENT MESSAGE (OUTPATIENT)
Dept: SURGICAL ONCOLOGY | Facility: CLINIC | Age: 60
End: 2025-05-20
Payer: COMMERCIAL

## 2025-05-21 ENCOUNTER — OFFICE VISIT (OUTPATIENT)
Dept: SURGICAL ONCOLOGY | Facility: CLINIC | Age: 60
End: 2025-05-21
Payer: COMMERCIAL

## 2025-05-21 ENCOUNTER — LAB VISIT (OUTPATIENT)
Dept: LAB | Facility: HOSPITAL | Age: 60
End: 2025-05-21
Attending: SURGERY
Payer: COMMERCIAL

## 2025-05-21 VITALS
TEMPERATURE: 98 F | WEIGHT: 184.44 LBS | HEIGHT: 72 IN | HEART RATE: 94 BPM | DIASTOLIC BLOOD PRESSURE: 82 MMHG | BODY MASS INDEX: 24.98 KG/M2 | SYSTOLIC BLOOD PRESSURE: 128 MMHG

## 2025-05-21 DIAGNOSIS — C17.0 DUODENAL ADENOCARCINOMA: Primary | ICD-10-CM

## 2025-05-21 DIAGNOSIS — C17.0 DUODENAL ADENOCARCINOMA: ICD-10-CM

## 2025-05-21 LAB — CANCER AG19-9 SERPL-ACNC: 7 U/ML

## 2025-05-21 PROCEDURE — 4010F ACE/ARB THERAPY RXD/TAKEN: CPT | Mod: CPTII,S$GLB,, | Performed by: SURGERY

## 2025-05-21 PROCEDURE — 1159F MED LIST DOCD IN RCRD: CPT | Mod: CPTII,S$GLB,, | Performed by: SURGERY

## 2025-05-21 PROCEDURE — 3008F BODY MASS INDEX DOCD: CPT | Mod: CPTII,S$GLB,, | Performed by: SURGERY

## 2025-05-21 PROCEDURE — 3079F DIAST BP 80-89 MM HG: CPT | Mod: CPTII,S$GLB,, | Performed by: SURGERY

## 2025-05-21 PROCEDURE — 3074F SYST BP LT 130 MM HG: CPT | Mod: CPTII,S$GLB,, | Performed by: SURGERY

## 2025-05-21 PROCEDURE — 1160F RVW MEDS BY RX/DR IN RCRD: CPT | Mod: CPTII,S$GLB,, | Performed by: SURGERY

## 2025-05-21 PROCEDURE — 99214 OFFICE O/P EST MOD 30 MIN: CPT | Mod: S$GLB,,, | Performed by: SURGERY

## 2025-05-21 PROCEDURE — 99999 PR PBB SHADOW E&M-EST. PATIENT-LVL III: CPT | Mod: PBBFAC,,, | Performed by: SURGERY

## 2025-05-21 PROCEDURE — 86301 IMMUNOASSAY TUMOR CA 19-9: CPT

## 2025-05-21 PROCEDURE — 36415 COLL VENOUS BLD VENIPUNCTURE: CPT

## 2025-05-21 NOTE — PROGRESS NOTES
Surgical Oncology Clinic Note      Referring Provider: Dr. Enzo Daley   PCP: Han Arshad MD    Reason For Visit: Gastroesophageal: duodenal malignancy- recurrent/ metastatic     Staging:  Cancer Staging   Duodenal cancer  Staging form: Small Intestine - Adenocarcinoma, AJCC 8th Edition  - Pathologic stage from 3/12/2021: Stage IIIB (pT4, pN2, cM0) - Signed by Velvet Faith APRN, ANP-C on 3/14/2021  - Pathologic stage from 5/15/2024: Stage IV (cM1) - Signed by Alicia Hernandez MD on 5/15/2024       HISTORY OF PRESENT ILLNESS     Karen Gutierrez is a 60 y.o. female with history of duodenal adenocarcinoma s/p  who presents today for evaluation and management of recurrent duodenal carcinoma.    She originally presented in February of 2021 with a completely obstructing duodenal mass.  She underwent G-tube and J-tube placement it was then followed with Dr. Hitesh Díaz in [place].  She subsequently underwent a Whipple on 02/11/2021.  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  Post op course was c/b abscess requiring drainage.  She received 12 cycles of FOLFOX adjuvantly which she tolerated well, and which she completed in November 2021.   Her GB and her J-tube both came out and she has been on surveillance since that point in time.  Surveillance imaging in April of this year showed a new heterogeneous mass in the anterior abdominal wall measuring 6.8 x 5.1 x 4.6 cm concerning for tumor recurrence versus hematoma.  She underwent biopsy which did confirm metastatic disease.  She was discussed at tumor board with agreement for systemic chemotherapy and presents today for discussion of port placement.    Overall she feels well and is doing well.  She presents today with her sister.    INTERVAL HISTORY     08/14/2024:  She's had 4 cycles chemo so far.  She is tolerating it well.  Her tumor has shrunk significantly.       09/18/2024:  She's had 6 cycles so far.  She's doing  very well.  She can no longer palpate her tumor at all.     1/14/2025:  she's had total of 12 cycles of chemo and done well.  Last cycle was 1/2/25.  Repeat imaging shows continued decrease in size/ stability of the rectus mass.  No new issues.        02/18/2025:  Here for postop appointment.  Doing well drains have been putting out less than 20 cc a day for the last few days.  She is feeling well tolerating a regular diet and no longer requiring any pain medications.    02/25/2025:  Started having LUQ pain recently hurts under rib cage, at night wakes up and cramping throughout the abdomen.  Involuntary spasms with pain (that was happening even before surgery).      05/21/2025:  For her 3 month follow up.  She is doing well.  She did notice total nodules on both sides of the abdomen near where her drains were that have not really changed in size at all.  She is still has some intermittent pain in her abdomen.  She is tolerating regular diet.  She overall feels well.          Past Medical History:   Diagnosis Date    Anxiety     Cancer     duodenal cancer    Depression     Hypertension     Hypotension, iatrogenic     Mitral valve prolapse        Past Surgical History:   Procedure Laterality Date    BUNIONECTOMY Left     HYSTERECTOMY  2000    INJECTION OF ANESTHETIC AGENT INTO TISSUE PLANE DEFINED BY TRANSVERSUS ABDOMINIS MUSCLE N/A 2/6/2025    Procedure: BLOCK, TRANSVERSUS ABDOMINIS PLANE;  Surgeon: Alicia Hernandez MD;  Location: [facility];  Service: General;  Laterality: N/A;    INSERTION OF TUNNELED CENTRAL VENOUS CATHETER (CVC) WITH SUBCUTANEOUS PORT N/A 03/26/2021    Procedure: [id number];  Surgeon: Lauren Abrahma MD;  Location: [facility] OR;  Service: General;  Laterality: N/A;    INSERTION OF VENOUS ACCESS PORT Right 05/13/2024    Procedure: INSERTION, VENOUS ACCESS PORT;  Surgeon: Alicia Hernandez MD;  Location: [facility] OR;  Service: General;  Laterality: Right;    LAPAROTOMY, EXPLORATORY N/A  2/6/2025    Procedure: LAPAROTOMY, EXPLORATORY;  Surgeon: Alicia Hernandez MD;  Location: [facility] OR;  Service: General;  Laterality: N/A;    LITHOTRIPSY      PLACEMENT OF JEJUNOSTOMY TUBE  02/18/2021    Procedure: INSERTION, JEJUNOSTOMY TUBE;  Surgeon: Hitesh Díaz MD;  Location: [facility] OR 2ND FLR;  Service: General;;    REPAIR, HERNIA, VENTRAL N/A 2/6/2025    Procedure: REPAIR, HERNIA, VENTRAL;  Surgeon: Alicia Hernandez MD;  Location: [facility] OR;  Service: General;  Laterality: N/A;  phasis ST mesh    SURGICAL REMOVAL OF LESION OF ABDOMINAL WALL N/A 2/6/2025    Procedure: EXCISION, LESION, ABDOMINAL WALL;  Surgeon: Alicia Hernandez MD;  Location: [facility] OR;  Service: General;  Laterality: N/A;    ULTRASOUND GUIDANCE Right 05/13/2024    Procedure: ULTRASOUND GUIDANCE;  Surgeon: Alicia Hernandez MD;  Location: [facility] OR;  Service: General;  Laterality: Right;    VARICOSE VEIN STRIPPING  2015    WHIPPLE PROCEDURE N/A 02/18/2021    Procedure: WHIPPLE PROCEDURE;  Surgeon: Hitesh Díaz MD;  Location: [facility] OR 2ND FLR;  Service: General;  Laterality: N/A;       Family History   Problem Relation Name Age of Onset    Ovarian cancer Mother      Atrial fibrillation Mother      Stroke Mother      Hyperlipidemia Mother      Diabetes Mother      Bladder Cancer Father      Hypertension Father      Diabetes Father      No Known Problems Sister      Diabetes Maternal Grandmother      Colon cancer Maternal Grandmother      Stroke Maternal Grandfather      Diabetes Paternal Grandmother      No Known Problems Paternal Grandfather         Social History     Socioeconomic History    Marital status:    Tobacco Use    Smoking status: Former    Smokeless tobacco: Never   Substance and Sexual Activity    Alcohol use: Not Currently    Drug use: Never     Social Drivers of Health     Financial Resource Strain: Low Risk  (5/21/2025)    Overall Financial Resource Strain (CARDIA)     Difficulty of Paying Living Expenses: Not hard at all    Food Insecurity: No Food Insecurity (5/21/2025)    Hunger Vital Sign     Worried About Running Out of Food in the Last Year: Never true     Ran Out of Food in the Last Year: Never true   Transportation Needs: No Transportation Needs (5/21/2025)    PRAPARE - Transportation     Lack of Transportation (Medical): No     Lack of Transportation (Non-Medical): No   Physical Activity: Insufficiently Active (5/21/2025)    Exercise Vital Sign     Days of Exercise per Week: 1 day     Minutes of Exercise per Session: 10 min   Stress: No Stress Concern Present (5/21/2025)    [employer] - Occupational Stress Questionnaire     Feeling of Stress : Not at all   Housing Stability: Low Risk  (5/21/2025)    Housing Stability Vital Sign     Unable to Pay for Housing in the Last Year: No     Number of Times Moved in the Last Year: 0     Homeless in the Last Year: No          Medication List            Accurate as of May 21, 2025 11:59 PM. If you have any questions, ask your nurse or doctor.                CONTINUE taking these medications      amLODIPine 10 MG tablet  Commonly known as: NORVASC     atomoxetine 40 MG capsule  Commonly known as: STRATTERA  Take 1 capsule (40 mg total) by mouth once daily.     BYSTOLIC 20 mg Tab  Generic drug: nebivoloL     clonazePAM 0.5 MG tablet  Commonly known as: KlonoPIN  Take 1 tablet (0.5 mg total) by mouth nightly as needed for Anxiety.     EScitalopram oxalate 10 MG tablet  Commonly known as: LEXAPRO  Take 1 tablet (10 mg total) by mouth once daily.     olmesartan 40 MG tablet  Commonly known as: BENICAR  TAKE 1 TABLET(40 MG) BY MOUTH EVERY DAY              Review of patient's allergies indicates:   Allergen Reactions    Benzalkonium chloride Hives    Codeine Itching    Morphine Nausea Only    Neosporin [neomycin-bacitracin-polymyxin] Hives    Sulfa (sulfonamide antibiotics) Hives and Itching           Morphine sulfate Other (See Comments)     Sulfamethoxazole-trimethoprim Other (See Comments)    Hydrocortisone Hives     Redness / can use bactroban         Review of Systems   Constitutional:  Negative for chills, fever, malaise/fatigue and weight loss.   Respiratory:  Negative for cough, shortness of breath and wheezing.    Cardiovascular:  Negative for chest pain and palpitations.   Gastrointestinal:  Negative for abdominal pain, constipation, diarrhea, nausea and vomiting.   Musculoskeletal:  Negative for back pain, falls and joint pain.   Neurological:  Negative for dizziness, sensory change, speech change, focal weakness, seizures, loss of consciousness and weakness.   Psychiatric/Behavioral:  Negative for depression and hallucinations. The patient is not nervous/anxious.        PHYSICAL EXAM       Vitals:    05/21/25 1133   BP: 128/82   Pulse: 94   Temp: 97.5 °F (36.4 °C)     Body mass index is 25.01 kg/m².  ECOG SCORE           Physical Exam  Vitals reviewed.   Constitutional:       General: She is not in acute distress.     Appearance: Normal appearance.   HENT:      Head: Normocephalic and atraumatic.      Mouth/Throat:      Mouth: Mucous membranes are moist.   Eyes:      General: No scleral icterus.     Extraocular Movements: Extraocular movements intact.      Conjunctiva/sclera: Conjunctivae normal.   Pulmonary:      Effort: Pulmonary effort is normal.   Abdominal:      General: There is no distension.      Palpations: Abdomen is soft.      Tenderness: There is no abdominal tenderness.      Comments: Well healed midline incision, no lumps or bruises, small pooch along inferior incision.  Palpable nodularity on bilateral lower abdomen near drain sites- soft and mobile.     Musculoskeletal:         General: No swelling. Normal range of motion.   Skin:     General: Skin is warm and dry.   Neurological:      General: No focal deficit present.      Mental Status: She is alert.   Psychiatric:         Mood and Affect: Mood normal.         Behavior:  Behavior normal.         Thought Content: Thought content normal.         Judgment: Judgment normal.        LABS     DATA REVIEW:  I personally reviewed the following records for this visit: lab work from prior visit, notes from other physicians, computed tomography/CT imaging, surgical pathology results, radiographic study evaluation, and laboratory results done by primary care physician    Lab Results   Component Value Date    WBC 2.74 (L) 02/11/2025    HGB 9.9 (L) 02/11/2025    HCT 30.1 (L) 02/11/2025    PLT 79 (L) 02/11/2025     Lab Results   Component Value Date     02/11/2025    CALCIUM 8.3 (L) 02/11/2025    ALBUMIN 4.0 01/28/2025    PROT 6.9 01/28/2025     02/11/2025    K 3.5 02/11/2025    CO2 30 (H) 02/11/2025     02/11/2025    BUN 9 02/11/2025    CREATININE 0.7 02/11/2025    ALKPHOS 168 (H) 01/28/2025    ALT 43 01/28/2025    AST 51 (H) 01/28/2025    BILITOT 0.7 01/28/2025       Nutritional:  Lab Results   Component Value Date    PREALBUMIN 11 (L) 03/04/2021       Tumor Markers:  Lab Results   Component Value Date    CEA 1.7 12/30/2024     Lab Results   Component Value Date     7.0 05/21/2025       PATHOLOGY     2/18/2021- Whipple (Dr. Hitesh Díaz)      Component 3 yr ago   Final Pathologic Diagnosis 1.  GALLBLADDER, CHOLECYSTECTOMY:  - Gallbladder with changes suggestive of cholesterolosis  - Fragment of hepatic parenchyma with no significant histopathologic  abnormality  - One benign lymph node (0/1)  2.  LYMPH NODE, HEPATIC ARTERY, EXCISION:  - One benign lymph node, negative for metastatic disease (0/1)  3.  LYMPH NODE, NETTIE HEPATITIS, EXCISION:  - One benign lymph node, negative for metastatic disease (0/1)  4.  DUODENUM AND PANCREAS, PANCREATICODUODENECTOMY (WHIPPLE PROCEDURE)  ([id number]):  - Invasive duodneal adenocarcinoma, intestinal-type,  moderately-differentiated, measuring 3.0 in greatest dimension  - Carcinoma involves pancreatic parenchyma and mesentery (mesocolon)  of  adjacent transverse colon, pT4  - Metastatic adenocarcinoma present in five of eleven lymph nodes (5/11), pN2  - Surgical resection margins negative for neoplasia  - Please see comments for synoptic report  5.  SMALL INTESTINE, PARTIAL RESECTION:  - Segment of small intestine with a full thickness defect associated with  mucosal ulceration and acute serositis  - Resection margins viable  - No evidence of dysplasia or malignancy (multiple deeper levels examined)         Immunohistochemical studies for DNA mismatch repair proteins were performed  on this tumor (block 4J) and they demonstrate INTACT STAINING in all 4  proteins (MLH1, PMS2, MSH2, and MSH6).  These results indicate that this  tumor is microsatellite stable (BERNA) and that Estrada syndrome (Hereditary  Nonpolyposis Colorectal Cancer, HNPCC) is unlikely.  SMALL INTESTINE:    SPECIMEN      Procedure       Pancreaticoduodenectomy  (Whipple resection)    TUMOR      Tumor Site       Duodenum      Histologic Type:       Adenocarcinoma (not otherwise characterized)      Histologic Grade       G2: Moderately differentiated      Tumor Size       Greatest Dimension (Centimeters) 3.0 cm      Tumor Extension       Tumor invades other organs / structures  Other Organs / Structures           Mesentery of adjacent loops of bowel            Pancreas      Macroscopic Tumor Perforation       Not identified      Lymphovascular Invasion       Present          Margins Examined           Proximal            Distal            Uncinate            Bile duct            Pancreatic    LYMPH NODES      Regional Lymph Nodes         Number of Lymph Nodes  Involved           5          Number of Lymph Nodes Examined           11      Primary Tumor (pT)       pT4      Regional Lymph Nodes (pN)       pN2    ADDITIONAL FINDINGS      Additional Findings       Biliary intraepithelial  neoplasia, low-grade (BilIN 1) with foveolar metaplasia     05/02/2024:  IR biopsy abdominal wall mass       Component 13 d ago   Final Pathologic Diagnosis BIOPSY OF MASS FROM ABDOMINAL WALL:  METASTATIC ADENOCARCINOMA CONSISTENT WITH PANCREATIC ORIGIN       IMAGING     12/30/2020 CT Abdomen Pelvis Wwout Contrast (OLOL)  IMPRESSION:    1.  Arterial enhancing mass within the right hepatic lobe that is isodense on subsequent portal venous phase and delayed phase suggestive of a hemangioma. Follow-up CT of the liver three-phase or MRI of the abdomen with and without contrast in 6-12   months is recommended to confirm stability.    2.  Fluid distention of the stomach that may be secondary to ingestion of a recent meal or gastric bowel obstruction.   3. Nonspecific subcentimeter nodules in the right lung base. Fleischner Society 2017 guidelines for management of incidentally detected solid pulmonary nodules in adults (does not apply to patients younger than 35 years, CT lung cancer screening,   patients with immunosuppression or patients with known primary cancer): Multiple solid nodules < 6 mm in diameter: For low risk patients no routine follow-up. For high risk patents optional CT at 12 months.   4. Postoperative changes of hysterectomy.   5. Nonobstructing left nephrolithiasis.   6. Possible reflux esophagitis.   7. Colonic diverticulosis.     02/11/2021  CT Chest, abdomen, and Pelvis  Impression:   1. Wall thickening and luminal narrowing of the 3rd portion of the duodenum concerning for neoplasm.  2. Small paraduodenal node measuring 7 mm.  3. Punctate nonobstructing left-sided nephrolith.  No hydronephrosis.     02/12/2021:  CT Abdomen/ Pelvis  Impression:   1. Focal wall thickening of the 3rd portion of duodenum, previously obstructing, now status post gastrostomy and jejunal tube placements.  This could represent duodenal carcinoma or lymphoma.  Pancreatic tumor thought less likely.  Minimal lymph node enlargement in the region.  No distant metastasis detected.  2. Status post hysterectomy and other findings as  above.    02/21/2021  CTA Abdomen and Pelvis  Impression:   Postoperative changes of Whipple procedure and jejunostomy tube change as well as right quadrant terminating presumable drainage catheter.  Scattered free air, which may be postsurgical,  however unable to completely exclude bowel perforation.   Trace abdominal free fluid, small focal fluid collection without well-defined walls along the posterior aspect of the pancreas, mesenteric edema, and mild small bowel wall thickening near the bipin hepatis, possibly expected postsurgical changes.   Prominent loops of large bowel and distal small bowel measuring up to 3.0 cm.  No definite transition point.  Findings likely represent ileus.  No pneumatosis or portal venous gas.   Trace pleural effusions left greater than right with associated atelectasis.   Hepatomegaly.   No pseudoaneurysm or convincing evidence of active bleeding or hematoma.     03/01/2021  CT Abdomen/ Pelvis  Impression:   1. Status post Whipple procedure.  Peripherally enhancing collection adjacent to the resection bed as above, nonspecific but concerning for abscess.  Small volume abdominal and pelvic free fluid.  2. Persistent haziness and wall thickening of small bowel loops in the bipin hepatis, possibly postoperative.  3. Trace pleural effusions with associated atelectasis, similar to prior.  4. Additional findings as above.    05/10/2021 CT Abdomen/ Pelvis  Impression:   Stable postoperative changes of a Whipple procedure interval resolution of the previously demonstrated fluid collection in the postoperative bed.  Resolution of previously noted trace left pleural effusion.  Other findings as discussed in the body of the report at above.    11/22/2021  CT CAP  Multiple small scattered bilateral pulmonary nodules all measuring less than 3 mm.  Findings may have been present on CT from 02/11/2021 although this is difficult to ascertain given differences in technique/slice acquisition.  At  minimum, continued follow-up is suggested.   Stable postoperative changes of Whipple procedure without definitive evidence of residual/recurrent disease.    03/07/2022  CT  CAP  Stable CT examination of the chest, abdomen, and pelvis.  No definite evidence of recurrent or metastatic disease.     10/05/2022  CT CAP  Overall no detrimental change with findings as above.     04/12/2023  CT CAP  1.  No concerning interval change compared to the prior 2 examinations.  2.  Postoperative findings within the right upper quadrant likely with partial pancreatectomy and small bowel resection.  Loops of bowel are somewhat matted to the surface of the liver which is unchanged.  No definite masslike abnormality within this region.  3.  Hepatomegaly and fatty infiltration of the liver.  4.  Punctate nonobstructing stones appear left kidney.     10/11/2023  CT CAP  Stable postsurgical changes in the right upper quadrant without evidence of recurrent or metastatic disease in the chest abdomen or pelvis.     04/19/2024:  CT CAP  1.  There is a new large heterogeneous mass identified in the anterior abdominal wall at the level of the umbilicus extending to the left of midline and measuring 6.8 x 5.1 x 4.6 cm.  New increased skin thickening is also visible in the umbilicus immediately anterior to this lesion.  Tumor recurrence is a consideration although rectus sheath hematoma or an inflammatory process are also considerations.  This lesion is easily amenable to percutaneous biopsy.  Characterization of this lesion by ultrasound should also be considered.  2.  Stable postsurgical changes following resection of the duodenum, gastric antrum and head of the pancreas with gastrojejunostomy.  3.  Hepatomegaly and generalized hepatic steatosis.  4.  Small nonobstructing intrarenal calculus on the left.  5.  A stable 6 mm faint nodule in the right upper lobe is unchanged since 04/12/2023.  6.  Status post hysterectomy.    07/08/2024:  CT  CAP  Decreased size of a ventral abdominal wall mass measuring 4.5 x 2.0 cm (series 3, image 92), previously 6.8 x 4.5 cm.  No new lesion.  Diastasis of the rectus abdominus with protrusion of the abdominal fat and: Midline.   No acute fracture or aggressive bone lesion.  Mild degenerative changes of the spine.   Impression:   1. Decreased size of a ventral abdominal wall mass.  No evidence of new metastatic disease.  2. Postsurgical changes of Whipple procedure, duodenal resection, and gastrojejunostomy.  3. Stable 6 mm ground-glass nodule in the right upper lobe.  Additional stable bilateral pulmonary micro nodules.  4. Hepatomegaly and steatosis.  5. Splenomegaly.  6. Additional findings as above.    09/10/2024:  CT Chest, Abdomen, and pelvis  Impression:   History of duodenal malignancy status post Whipple procedure without CT evidence of local tumor recurrence.  There is continued marked decrease size of the left rectus abdominis mass, umbilicus level, now with only minimal residual soft tissue thickening, markedly improved compared to April 2024.  Hepatosplenomegaly.  Fatty liver.  Stable multiple bilateral tiny pulmonary nodules, majority 3 to 4 mm.  Stable right middle lobe groundglass nodule, 5 mm.  No worrisome new or enlarging pulmonary nodule.  Stable sclerotic lesion first thoracic vertebral body, 1.2 cm, and stable lucent lesion ninth thoracic vertebra body, 0.8 cm.    01/10/2025:  CT Chest, Abdomen, and Pelvis  Stomach/Bowel/abdominal wall: Status post Whipple. Remaining stomach unremarkable. Small bowel nonobstructive. No acute large bowel abnormality. Large bowel containing upper ventral hernia again noted. No detrimental change at the site of previously noted ventral abdominal wall mass. Left rectus muscle thickening is slightly less prominent.     05/19/2025:  CT Chest, Abdomen, and Pelvis  Impression:  No CT evidence of recurrent or metastatic disease.    ASSESSMENT & PLAN     1. Duodenal  adenocarcinoma      Karen Gutierrez is a 60 y.o. 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  Treated with 12 cycles neoadjuvant FOLFIRINOX and now s/p resection and ventral hernia repair on 2/6/2025.    Doing well.  Has some subcutaneous nodularity on physical exam adjacent to previous drains, but no evidence of anything in these locations on my evaluation of her imaging.  I suspect these are just small stitches that are palpable vs fat necrosis.  No evidence of disease recurrence at this time.      We discussed that the standard of care according to NCCN guidelines is to continue surveillance for recurrence with clinical exam, CA 19-9 every 3 months for the first 2 years, then every 6 months for a total of 5 years with a  CT Chest/ Abdomen/ and Pelvis every 6 months for the first 2 years and annually for years 3-5.      Plan:  -- CA 19-9 today and again in 3 months  -- repeat CT Scan in 3 months, after that will move to every 6 months      Ms. Gutierrez expressed understanding in regards to our discussion today. Many good questions were asked on today's visit, all of which were answered to their satisfaction.    Follow-up: Follow up in about 3 months (around 8/21/2025).                  Alicia Hernanedz MD MS              Surgical Oncology              [facility] [place], [place]              Office: [phone]      30 minutes were spent on today's visit in face-to-face and non face-to-face time with the patient. This patient was diagnosed with recurrent duodenal cancer and time was required to provide counseling and guidance regarding their diagnosis. Time was spent reviewing additional new or outside records and information pertaining to their work-up, treatment and/or surveillance in order to formulate a  treatment plan in line with standardized guidelines.      Orders Placed This Encounter   Procedures    CT Chest Abdomen Pelvis With IV Contrast (XPD) Routine Oral Contrast     Standing Status:   Future     Expected Date:   8/21/2025     Expiration Date:   5/21/2026     Does this patient have impaired renal function?:   No     May the Radiologist modify the order per protocol to meet the clinical needs of the patient?:   Yes     Oral/Rectal Contrast instructions::   Routine Oral Contrast     Special CT ABD Protocol Request?:   Routine     This patient has an implanted port documented.  Can this port be accessed for contrast administration for this scan?:   No    Cancer Antigen 19-9     Standing Status:   Future     Number of Occurrences:   1     Expected Date:   5/21/2025     Expiration Date:   8/19/2026     Send normal result to authorizing provider's In Basket if patient is active on MyChart::   Yes    Creatinine, Serum     Standing Status:   Future     Expected Date:   8/21/2025     Expiration Date:   8/19/2026    Cancer Antigen 19-9     Standing Status:   Future     Expected Date:   8/21/2025     Expiration Date:   8/19/2026     Send normal result to authorizing provider's In Basket if patient is active on MyChart::   Yes

## 2025-05-22 ENCOUNTER — RESULTS FOLLOW-UP (OUTPATIENT)
Dept: SURGERY | Facility: HOSPITAL | Age: 60
End: 2025-05-22

## 2025-06-11 ENCOUNTER — INFUSION (OUTPATIENT)
Dept: INFUSION THERAPY | Facility: HOSPITAL | Age: 60
End: 2025-06-11
Attending: INTERNAL MEDICINE
Payer: COMMERCIAL

## 2025-06-11 VITALS
TEMPERATURE: 98 F | HEART RATE: 66 BPM | DIASTOLIC BLOOD PRESSURE: 73 MMHG | RESPIRATION RATE: 18 BRPM | SYSTOLIC BLOOD PRESSURE: 123 MMHG | OXYGEN SATURATION: 99 %

## 2025-06-11 DIAGNOSIS — C17.0 DUODENAL CANCER: Primary | ICD-10-CM

## 2025-06-11 PROCEDURE — 25000003 PHARM REV CODE 250: Performed by: INTERNAL MEDICINE

## 2025-06-11 PROCEDURE — A4216 STERILE WATER/SALINE, 10 ML: HCPCS | Performed by: INTERNAL MEDICINE

## 2025-06-11 PROCEDURE — 96523 IRRIG DRUG DELIVERY DEVICE: CPT

## 2025-06-11 PROCEDURE — 63600175 PHARM REV CODE 636 W HCPCS: Performed by: INTERNAL MEDICINE

## 2025-06-11 RX ORDER — HEPARIN 100 UNIT/ML
500 SYRINGE INTRAVENOUS
Status: DISCONTINUED | OUTPATIENT
Start: 2025-06-11 | End: 2025-06-11 | Stop reason: HOSPADM

## 2025-06-11 RX ORDER — HEPARIN 100 UNIT/ML
500 SYRINGE INTRAVENOUS
OUTPATIENT
Start: 2025-06-11

## 2025-06-11 RX ORDER — SODIUM CHLORIDE 0.9 % (FLUSH) 0.9 %
10 SYRINGE (ML) INJECTION
OUTPATIENT
Start: 2025-06-11

## 2025-06-11 RX ORDER — SODIUM CHLORIDE 0.9 % (FLUSH) 0.9 %
10 SYRINGE (ML) INJECTION
Status: DISCONTINUED | OUTPATIENT
Start: 2025-06-11 | End: 2025-06-11 | Stop reason: HOSPADM

## 2025-06-11 RX ADMIN — HEPARIN 500 UNITS: 100 SYRINGE at 01:06

## 2025-06-11 RX ADMIN — Medication 10 ML: at 01:06

## 2025-06-11 NOTE — NURSING
"1355: Pt here for Mediport Flush. Right chest wall mediport accessed with a 20g 1" layton via sterile technique.  Excellent blood return noted.  Flushed with 10ml NS and 5 ml heparin solution.  Needle D/C, site without redness, swelling, or drainage noted.  Dressing applied.  Patient tolerated well.  Patient to return to clinic in eight weeks.  "

## 2025-06-11 NOTE — DISCHARGE INSTRUCTIONS
Thank you for allowing me to care for you today,  DILLON ContiN, RN    [facility]  [address] [address]  [phone] phone     [phone] fax  Hours of Operation: Monday- Friday 7:00am- 5:30pm  After hours phone  [phone]  Hematology / Oncology Physicians on call      BRAULIO Orlando Dr., Dr., Dr., Dr., Dr., Dr., Dr., Dr., Dr., Dr., Dr., Dr., Dr., Dr., Dr., KALINA Shi, KALINA Hill, KALINA Quigley, NP  Please call with any concerns regarding your appointment today.   FALL PREVENTION   Falls often occur due to slipping, tripping or losing your balance. Here are ways to reduce your risk of falling again.   Was there anything that caused your fall that can be fixed, removed or replaced?   Make your home safe by keeping walkways clear of objects you may trip over.   Use non-slip pads under rugs.   Do not walk in poorly lit areas.   Do not stand on chairs or wobbly ladders.   Use caution when reaching overhead or looking upward. This position can cause a loss of balance.   Be sure your shoes fit properly, have non-slip bottoms and are in good condition.   Be cautious when going up and down stairs, curbs, and when walking on uneven sidewalks.   If your balance is poor, consider using a cane or walker.   If your fall was related to alcohol use, stop or limit alcohol intake.   If your fall was related to use of sleeping medicines, talk to your doctor about this. You may need to reduce your dosage at bedtime if you awaken during the night to go to the bathroom.   To reduce the need for nighttime bathroom trips:   Avoid drinking fluids for several hours before going to bed   Empty your bladder before going to bed   Men can keep a urinal at the bedside   © 7312-0402 [employer], [address] [place] [address], [place] [address]. All rights reserved. This information is not intended as a  substitute for professional medical care. Always follow your healthcare professional's instructions.

## 2025-08-06 ENCOUNTER — INFUSION (OUTPATIENT)
Dept: INFUSION THERAPY | Facility: HOSPITAL | Age: 60
End: 2025-08-06
Attending: INTERNAL MEDICINE
Payer: COMMERCIAL

## 2025-08-06 DIAGNOSIS — C17.0 DUODENAL CANCER: Primary | ICD-10-CM

## 2025-08-06 PROCEDURE — 25000003 PHARM REV CODE 250: Performed by: INTERNAL MEDICINE

## 2025-08-06 PROCEDURE — 96523 IRRIG DRUG DELIVERY DEVICE: CPT

## 2025-08-06 PROCEDURE — 63600175 PHARM REV CODE 636 W HCPCS: Performed by: INTERNAL MEDICINE

## 2025-08-06 PROCEDURE — A4216 STERILE WATER/SALINE, 10 ML: HCPCS | Performed by: INTERNAL MEDICINE

## 2025-08-06 RX ORDER — SODIUM CHLORIDE 0.9 % (FLUSH) 0.9 %
10 SYRINGE (ML) INJECTION
Status: DISCONTINUED | OUTPATIENT
Start: 2025-08-06 | End: 2025-08-06 | Stop reason: HOSPADM

## 2025-08-06 RX ORDER — HEPARIN 100 UNIT/ML
500 SYRINGE INTRAVENOUS
OUTPATIENT
Start: 2025-08-06

## 2025-08-06 RX ORDER — SODIUM CHLORIDE 0.9 % (FLUSH) 0.9 %
10 SYRINGE (ML) INJECTION
OUTPATIENT
Start: 2025-08-06

## 2025-08-06 RX ORDER — HEPARIN 100 UNIT/ML
500 SYRINGE INTRAVENOUS
Status: DISCONTINUED | OUTPATIENT
Start: 2025-08-06 | End: 2025-08-06 | Stop reason: HOSPADM

## 2025-08-06 RX ADMIN — Medication 10 ML: at 10:08

## 2025-08-06 RX ADMIN — HEPARIN 500 UNITS: 100 SYRINGE at 10:08

## 2025-08-06 NOTE — DISCHARGE INSTRUCTIONS
[facility]  [address] [facility]  [address]  [phone] phone     [phone] fax  Hours of Operation: Monday- Friday 8:00am- 5:00pm  After hours phone  [phone]  Hematology / Oncology Physicians on call      OSEI Otero Dr., NP Phaon Dunbar, NP Khelsea Conley, FNP    Please call with any concerns regarding your appointment today.

## 2025-08-06 NOTE — NURSING
"Pt here for Mediport Flush. Right chestwall mediport accessed with a 20g 1" layton via sterile technique.  Excellent blood return noted. Flushed with 10ml NS and 5 ml heparin solution. Needle D/C, site without redness, swelling, or drainage noted. Dressing applied. Patient tolerated well. Patient to return to clinic October 1, 2025.   "

## 2025-08-18 ENCOUNTER — LAB VISIT (OUTPATIENT)
Dept: LAB | Facility: HOSPITAL | Age: 60
End: 2025-08-18
Attending: SURGERY
Payer: COMMERCIAL

## 2025-08-18 DIAGNOSIS — C17.0 DUODENAL ADENOCARCINOMA: ICD-10-CM

## 2025-08-18 LAB
CREAT SERPL-MCNC: 1.1 MG/DL (ref 0.5–1.4)
GFR SERPLBLD CREATININE-BSD FMLA CKD-EPI: 57 ML/MIN/1.73/M2

## 2025-08-18 PROCEDURE — 82565 ASSAY OF CREATININE: CPT

## 2025-08-18 PROCEDURE — 86301 IMMUNOASSAY TUMOR CA 19-9: CPT

## 2025-08-18 PROCEDURE — 36415 COLL VENOUS BLD VENIPUNCTURE: CPT | Mod: PO

## 2025-08-19 LAB — CANCER AG19-9 SERPL-ACNC: 7.3 U/ML

## 2025-08-20 ENCOUNTER — HOSPITAL ENCOUNTER (OUTPATIENT)
Dept: RADIOLOGY | Facility: HOSPITAL | Age: 60
Discharge: HOME | End: 2025-08-20
Attending: SURGERY
Payer: COMMERCIAL

## 2025-08-20 DIAGNOSIS — C17.0 DUODENAL ADENOCARCINOMA: ICD-10-CM

## 2025-08-20 PROCEDURE — A9698 NON-RAD CONTRAST MATERIALNOC: HCPCS | Mod: PN | Performed by: SURGERY

## 2025-08-20 PROCEDURE — 74177 CT ABD & PELVIS W/CONTRAST: CPT | Mod: TC,PN

## 2025-08-20 PROCEDURE — 25500020 PHARM REV CODE 255: Mod: PN | Performed by: SURGERY

## 2025-08-20 PROCEDURE — 74177 CT ABD & PELVIS W/CONTRAST: CPT | Mod: 26,,, | Performed by: RADIOLOGY

## 2025-08-20 PROCEDURE — 71260 CT THORAX DX C+: CPT | Mod: 26,,, | Performed by: RADIOLOGY

## 2025-08-20 RX ADMIN — IOHEXOL 1000 ML: 12 SOLUTION ORAL at 11:08

## 2025-08-20 RX ADMIN — IOHEXOL 75 ML: 350 INJECTION, SOLUTION INTRAVENOUS at 11:08

## 2025-08-26 ENCOUNTER — OFFICE VISIT (OUTPATIENT)
Dept: SURGICAL ONCOLOGY | Facility: CLINIC | Age: 60
End: 2025-08-26
Payer: COMMERCIAL

## 2025-08-26 VITALS
TEMPERATURE: 99 F | BODY MASS INDEX: 25.84 KG/M2 | WEIGHT: 190.81 LBS | OXYGEN SATURATION: 98 % | SYSTOLIC BLOOD PRESSURE: 135 MMHG | HEIGHT: 72 IN | DIASTOLIC BLOOD PRESSURE: 74 MMHG

## 2025-08-26 DIAGNOSIS — C17.0 DUODENAL ADENOCARCINOMA: Primary | ICD-10-CM

## 2025-08-26 DIAGNOSIS — K43.2 INCISIONAL HERNIA, WITHOUT OBSTRUCTION OR GANGRENE: ICD-10-CM

## 2025-08-26 PROCEDURE — 3078F DIAST BP <80 MM HG: CPT | Mod: CPTII,S$GLB,, | Performed by: SURGERY

## 2025-08-26 PROCEDURE — 1159F MED LIST DOCD IN RCRD: CPT | Mod: CPTII,S$GLB,, | Performed by: SURGERY

## 2025-08-26 PROCEDURE — 99999 PR PBB SHADOW E&M-EST. PATIENT-LVL III: CPT | Mod: PBBFAC,,, | Performed by: SURGERY

## 2025-08-26 PROCEDURE — 3008F BODY MASS INDEX DOCD: CPT | Mod: CPTII,S$GLB,, | Performed by: SURGERY

## 2025-08-26 PROCEDURE — 4010F ACE/ARB THERAPY RXD/TAKEN: CPT | Mod: CPTII,S$GLB,, | Performed by: SURGERY

## 2025-08-26 PROCEDURE — 99213 OFFICE O/P EST LOW 20 MIN: CPT | Mod: S$GLB,,, | Performed by: SURGERY

## 2025-08-26 PROCEDURE — 3075F SYST BP GE 130 - 139MM HG: CPT | Mod: CPTII,S$GLB,, | Performed by: SURGERY

## (undated) DEVICE — HEADREST ROUND DISP FOAM 9IN

## (undated) DEVICE — ELECTRODE BLADE INSULATED 1 IN

## (undated) DEVICE — MANIFOLD 4 PORT

## (undated) DEVICE — COVER LIGHT HANDLE 80/CA

## (undated) DEVICE — SOL NACL IRR 1000ML BTL

## (undated) DEVICE — ADHESIVE DERMABOND ADVANCED

## (undated) DEVICE — SEE MEDLINE ITEM 146313

## (undated) DEVICE — SUT SILK 2-0 SH 18IN BLACK

## (undated) DEVICE — SUT VICRYL PLUS 3-0 SH 18IN

## (undated) DEVICE — DRAPE INCISE IOBAN 2 23X17IN

## (undated) DEVICE — TOWEL OR NONABSORB ADH 17X26

## (undated) DEVICE — DRESSING TRANS 4X4 TEGADERM

## (undated) DEVICE — ELECTRODE BLD EXT 6.50 ST DISP

## (undated) DEVICE — NDL 22GA X1 1/2 REG BEVEL

## (undated) DEVICE — SPONGE LAP 18X18 PREWASHED

## (undated) DEVICE — PACK BASIC SETUP SC BR

## (undated) DEVICE — SUT PDS II 0 CT-1 VIL MONO

## (undated) DEVICE — TUBING MEDI-VAC 20FT .25IN

## (undated) DEVICE — GAUZE SPONGE 4X4 12PLY

## (undated) DEVICE — SUT 5/0 27IN PDS II VIO MO

## (undated) DEVICE — ELECTRODE REM PLYHSV RETURN 9

## (undated) DEVICE — SUT VICRYL 3-0 27 SH

## (undated) DEVICE — PAD K-THERMIA 24IN X 60IN

## (undated) DEVICE — APPLICATOR CHLORAPREP ORN 26ML

## (undated) DEVICE — SUT 2-0 12-18IN SILK

## (undated) DEVICE — SUT 1 36IN PDS II VIO MONO

## (undated) DEVICE — SUT 2/0 30IN SILK BLK BRAI

## (undated) DEVICE — COVER PROBE US 5.5X58L NON LTX

## (undated) DEVICE — DECANTER VIAL ASEPTIC TRANSFER

## (undated) DEVICE — GOWN POLY REINF BRTH SLV XL

## (undated) DEVICE — Device

## (undated) DEVICE — NDL 20GX1-1/2IN IB

## (undated) DEVICE — COVER CAMERA OPERATING ROOM

## (undated) DEVICE — TOWEL OR XRAY WHITE 17X26IN

## (undated) DEVICE — BLADE 4 INCH EDGE UN-INS

## (undated) DEVICE — CONTAINER SPECIMEN STRL 4OZ

## (undated) DEVICE — SUT SILK 0 SUTUPAK SA86H

## (undated) DEVICE — SUT SILK 2-0 STRANDS 30IN

## (undated) DEVICE — GLOVE SURGICAL LATEX SZ 6.5

## (undated) DEVICE — GLOVE SENSICARE PI MICRO 6.5

## (undated) DEVICE — SUT VICRYL CTD 2-0 GI 27 SH

## (undated) DEVICE — SUT MONOCRYL 4-0 PS-2

## (undated) DEVICE — DRAPE CORETEMP FLD WRM 56X62IN

## (undated) DEVICE — DRAPE MOBILE C-ARM

## (undated) DEVICE — PAD ABDOMINAL STERILE 8X10IN

## (undated) DEVICE — SUT SILK 3-0 CR/RB-1 8-18

## (undated) DEVICE — TUBE FEEDING PURPLE 5FRX40CM

## (undated) DEVICE — CONNECTOR TUBING STR 5 IN 1

## (undated) DEVICE — SUT PROLENE 2-0 30 SH

## (undated) DEVICE — SUT ETHILON 2-0 BLK PS-2

## (undated) DEVICE — NDL ECLIPSE SAF REG 25GX1.5IN

## (undated) DEVICE — DRAPE THYROID SOFT STERILE

## (undated) DEVICE — SUT PROLENE 3-0 SH DA 36 BL

## (undated) DEVICE — BINDER ABDOM 4PANEL 12IN LG/XL

## (undated) DEVICE — DRESSING GAUZE XEROFORM 5X9

## (undated) DEVICE — SEE MEDLINE ITEM 152622

## (undated) DEVICE — GOWN SMART IMP BREATHABLE XXLG

## (undated) DEVICE — SEALER LIGASURE MARYLAND 23CM

## (undated) DEVICE — SEE MEDLINE ITEM 157117

## (undated) DEVICE — SUT SILK 3-0 SH 18IN BLACK

## (undated) DEVICE — GLOVE SIGNATURE ESSNTL LTX 6.5

## (undated) DEVICE — CUTTER PROXIMATE BLUE 55MM

## (undated) DEVICE — STAPLER SKIN PROXIMATE WIDE

## (undated) DEVICE — DRESSING AQUACEL SACRAL 9 X 9

## (undated) DEVICE — DRAPE LAP T SHT W/ INSTR PAD

## (undated) DEVICE — SUT MONOCRYL 4.0 PS2 CP496G

## (undated) DEVICE — SPONGE IV DRAIN 4X4 STERILE

## (undated) DEVICE — POWDER ARISTA AH 3G

## (undated) DEVICE — SYR 30CC LUER LOCK

## (undated) DEVICE — KIT SAHARA DRAPE DRAW/LIFT

## (undated) DEVICE — ADHESIVE MASTISOL VIAL 48/BX

## (undated) DEVICE — CLOSURE SKIN STERI STRIP 1/2X4

## (undated) DEVICE — TRAY FOLEY 16FR INFECTION CONT

## (undated) DEVICE — SPONGE COTTON TRAY 4X4IN

## (undated) DEVICE — SUT PROLENE 4-0 RB-1 BL MO

## (undated) DEVICE — POSITIONER HEAD DONUT 9IN FOAM

## (undated) DEVICE — SUT SILK 3-0 STRANDS 30IN

## (undated) DEVICE — NDL SAFETY 25G X 1.5 ECLIPSE

## (undated) DEVICE — SUT 3-0 12-18IN SILK

## (undated) DEVICE — SUT 1 48IN PDS II VIO MONO

## (undated) DEVICE — SUT CHROMIC 2-0 SH 27IN BRN

## (undated) DEVICE — SUT MONOCRYL 4-0 PS-1 UND

## (undated) DEVICE — COVER PROBE STERILE CIVFLEX

## (undated) DEVICE — EVACUATOR WOUND BULB 100CC

## (undated) DEVICE — GOWN SURGICAL X-LARGE

## (undated) DEVICE — RELOAD PROXIMATE CUT BLUE 55MM

## (undated) DEVICE — DRESSING TRANS 2X2 TEGADERM

## (undated) DEVICE — SHEET THYROID W/ISO-BAC

## (undated) DEVICE — DECANTER 6 VIAL

## (undated) DEVICE — TAPE SURG MEDIPORE 6X72IN

## (undated) DEVICE — ELECTRODE BLD EXT 6.50 ST MDFD

## (undated) DEVICE — DRAPE THREE-QTR REINF 53X77IN

## (undated) DEVICE — SYR LUER LOCK STERILE 10ML

## (undated) DEVICE — CATH MALECOT 16FR 6EA/BX OR CA

## (undated) DEVICE — SUT CTD VICRYL 2-0 UND SUTU

## (undated) DEVICE — SPONGE GAUZE 4X4 12 PLY STRL

## (undated) DEVICE — SYR 10CC LUER LOCK

## (undated) DEVICE — SUT PROLENE 5-0 36 V-5 V-5

## (undated) DEVICE — SOL 9P NACL IRR PIC IL

## (undated) DEVICE — SUT 5/0 30IN PDS II VIO MO

## (undated) DEVICE — SEE MEDLINE ITEM 157027

## (undated) DEVICE — SEE MEDLINE ITEM 156902

## (undated) DEVICE — PHASIX MESH, 8" X 10" (20.3 CM X 25.4 CM), RECTANGLE
Type: IMPLANTABLE DEVICE | Site: ABDOMEN | Status: NON-FUNCTIONAL
Brand: PHASIX

## (undated) DEVICE — TOWEL OR DISP STRL BLUE 4/PK

## (undated) DEVICE — SEE MEDLINE ITEM 157144

## (undated) DEVICE — SUPPORT ULNA NERVE PROTECTOR

## (undated) DEVICE — DRAPE ABDOMINAL TIBURON 14X11

## (undated) DEVICE — SPONGE DERMACEA 4X4IN 12PLY